# Patient Record
Sex: FEMALE | Race: WHITE | NOT HISPANIC OR LATINO | Employment: OTHER | URBAN - METROPOLITAN AREA
[De-identification: names, ages, dates, MRNs, and addresses within clinical notes are randomized per-mention and may not be internally consistent; named-entity substitution may affect disease eponyms.]

---

## 2017-01-13 ENCOUNTER — APPOINTMENT (OUTPATIENT)
Dept: PULMONOLOGY | Facility: HOSPICE | Age: 77
End: 2017-01-13
Payer: MEDICARE

## 2017-01-16 ENCOUNTER — HOSPITAL ENCOUNTER (OUTPATIENT)
Dept: LAB | Facility: MEDICAL CENTER | Age: 77
End: 2017-01-16
Attending: INTERNAL MEDICINE
Payer: MEDICARE

## 2017-01-16 ENCOUNTER — HOSPITAL ENCOUNTER (OUTPATIENT)
Facility: MEDICAL CENTER | Age: 77
End: 2017-01-16
Attending: INTERNAL MEDICINE
Payer: MEDICARE

## 2017-01-16 LAB
AMYLASE SERPL-CCNC: 25 U/L (ref 20–103)
FOLATE SERPL-MCNC: 10.3 NG/ML
LIPASE SERPL-CCNC: 19 U/L (ref 11–82)
VIT B12 SERPL-MCNC: 312 PG/ML (ref 211–911)

## 2017-01-16 PROCEDURE — 82607 VITAMIN B-12: CPT

## 2017-01-16 PROCEDURE — 83690 ASSAY OF LIPASE: CPT

## 2017-01-16 PROCEDURE — 82746 ASSAY OF FOLIC ACID SERUM: CPT

## 2017-01-16 PROCEDURE — 82150 ASSAY OF AMYLASE: CPT

## 2017-01-16 PROCEDURE — 82784 ASSAY IGA/IGD/IGG/IGM EACH: CPT

## 2017-01-16 PROCEDURE — 83516 IMMUNOASSAY NONANTIBODY: CPT

## 2017-01-16 PROCEDURE — 36415 COLL VENOUS BLD VENIPUNCTURE: CPT

## 2017-01-17 LAB
C DIFF DNA SPEC QL NAA+PROBE: NEGATIVE
C DIFF TOX GENS STL QL NAA+PROBE: NEGATIVE
G LAMBLIA+C PARVUM AG STL QL RAPID: NORMAL
IGA SERPL-MCNC: 95 MG/DL (ref 68–408)
SIGNIFICANT IND 70042: NORMAL
SOURCE SOURCE: NORMAL
WBC STL QL MICRO: NORMAL

## 2017-01-18 LAB — TTG IGA SER IA-ACNC: 0 U/ML (ref 0–3)

## 2017-02-01 ENCOUNTER — OFFICE VISIT (OUTPATIENT)
Dept: PULMONOLOGY | Facility: HOSPICE | Age: 77
End: 2017-02-01
Payer: MEDICARE

## 2017-02-01 VITALS
TEMPERATURE: 98.4 F | WEIGHT: 216 LBS | SYSTOLIC BLOOD PRESSURE: 110 MMHG | HEIGHT: 65 IN | RESPIRATION RATE: 14 BRPM | HEART RATE: 78 BPM | DIASTOLIC BLOOD PRESSURE: 72 MMHG | BODY MASS INDEX: 35.99 KG/M2

## 2017-02-01 DIAGNOSIS — C34.11 MALIGNANT NEOPLASM OF RIGHT UPPER LOBE OF LUNG (HCC): ICD-10-CM

## 2017-02-01 DIAGNOSIS — R06.09 DYSPNEA ON EXERTION: ICD-10-CM

## 2017-02-01 DIAGNOSIS — Z87.891 HISTORY OF TOBACCO USE: ICD-10-CM

## 2017-02-01 DIAGNOSIS — J98.4 SMALL AIRWAYS DISEASE: ICD-10-CM

## 2017-02-01 DIAGNOSIS — R05.8 DRY COUGH: ICD-10-CM

## 2017-02-01 PROCEDURE — G0009 ADMIN PNEUMOCOCCAL VACCINE: HCPCS | Performed by: NURSE PRACTITIONER

## 2017-02-01 PROCEDURE — G8419 CALC BMI OUT NRM PARAM NOF/U: HCPCS | Performed by: NURSE PRACTITIONER

## 2017-02-01 PROCEDURE — 4040F PNEUMOC VAC/ADMIN/RCVD: CPT | Performed by: NURSE PRACTITIONER

## 2017-02-01 PROCEDURE — 3017F COLORECTAL CA SCREEN DOC REV: CPT | Performed by: NURSE PRACTITIONER

## 2017-02-01 PROCEDURE — G8432 DEP SCR NOT DOC, RNG: HCPCS | Performed by: NURSE PRACTITIONER

## 2017-02-01 PROCEDURE — 1101F PT FALLS ASSESS-DOCD LE1/YR: CPT | Mod: 8P | Performed by: NURSE PRACTITIONER

## 2017-02-01 PROCEDURE — 99214 OFFICE O/P EST MOD 30 MIN: CPT | Mod: 25 | Performed by: NURSE PRACTITIONER

## 2017-02-01 PROCEDURE — 1036F TOBACCO NON-USER: CPT | Performed by: NURSE PRACTITIONER

## 2017-02-01 PROCEDURE — 90732 PPSV23 VACC 2 YRS+ SUBQ/IM: CPT | Performed by: NURSE PRACTITIONER

## 2017-02-01 PROCEDURE — G8484 FLU IMMUNIZE NO ADMIN: HCPCS | Performed by: NURSE PRACTITIONER

## 2017-02-01 NOTE — PROGRESS NOTES
Chief Complaint   Patient presents with   • Follow-Up       HPI:  Clarisse Reeves is a 76 y.o. year old female here today for follow-up on CT scan results. History of lung cancer. 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. She has a history of chronic lymphocytic leukemia and diabetes type 2. She had thoracic surgery 2000 for removal of granuloma.   Initially in February 2015 a density in the right upper lobe was identified.  CT scan 11/8/2015 indicates a stellate density in the anterior aspect of the right upper lobe measuring 16 x 22 mm.  When compared to February 2015 CT there may been a slight amount of interval enlargement. PET scan showed minimal FDG uptake, however biopsy showed adenocarcinoma. She underwent thoracoscopy with anterior segmentectomy of RUL 02/01/16 which showed adenocarcinoma in situ. She has felt well, and was released by Dr. Ganser. Repeat CT scan 8/27/16 indicated postoperative changes right upper lobe, residual fibrosis and calcifications, no recurrent mass. Hyperexpanded lungs, right lung apex fibrotic change and left lung base atelectasis similar to previous findings. No pleural effusions. Thyroid gland nodules. Reviewed testing with patient she is due for repeat CT scan now. She is also pending a follow-up visit for her thyroid nodules. She notes a chronic dry cough over the last 6 months and mild dyspnea on exertion. She denies phlegm. She notes rare wheezing. She denies fever, chills, night sweats, just becoming stronger hemoptysis. She notes chronic GI issues and recently stopped taking Prevacid daily. We will restart this to see if it reduces her cough. She denies dizziness, headaches or sinus issues. She is not on any inhalers.      PFT 9/8/2015 indicated FVC of 73%, FEV1 was 1.75 L or 72%, FEV1/FVC with a ratio of 74, RV with 132%, TLC was 107%, and a DLCO of 128% predicted.  There is no significant bronchodilator response.  The flow-volume loop shows changes  consistent with small airways disease.    ROS: As per HPI and otherwise negative if not stated.    Past Medical History   Diagnosis Date   • Hypertension    • CLL (chronic lymphoblastic leukemia)    • MEDICAL HOME    • Personal history of colonic polyps 11/26/2012   • Cutaneous skin tags 1/29/2015   • Thyroid disease    • Indigestion    • Hiatus hernia syndrome      no surgery   • Cancer (CMS-HCC) 2006     CLL   • Cancer (CMS-HCC) 2016     lung   • Carcinoma in situ of respiratory system 2016     lung- RUL lobectomy   • Type II or unspecified type diabetes mellitus without mention of complication, not stated as uncontrolled      pre-diabetic   • Pneumonia 2014   • Cataract      right  and  left  IOL   • DM (diabetes mellitus) (CMS-HCC)        Past Surgical History   Procedure Laterality Date   • Us-needle core bx-breast panel     • Vaginal hysterectomy total       Hysterectomy,Total Vaginal   • Recovery  12/18/2015     Procedure: CT-SCP-RUL LUNG BIOPSY-;  Surgeon: Recoveryon Surgery;  Location: SURGERY PRE-POST PROC UNIT [facility] [place];  Service:    • Other orthopedic surgery  1990     bakers cyst removed in right knee, multiple scopes   • Appendectomy  1955   • Cholecystectomy  1995   • Other  2001     Lower Left segment of lung removed    • Other  1984     left Thyroid removed, might remove right side in the future   • Other  1990     hemmroid removal   • Thoracoscopy Right 2/1/2016     Procedure: THORACOSCOPY Upper Lobectomy ;  Surgeon: John H Ganser, M.D.;  Location: SURGERY [facility] [place];  Service:    • Node dissection Right 2/1/2016     Procedure: NODE DISSECTION;  Surgeon: John H Ganser, M.D.;  Location: SURGERY [facility] [place];  Service:    • Cataract extraction with iol     • Tonsillectomy         Family History   Problem Relation Age of Onset   • Cancer Mother    • Lung Disease Father    • Cancer Father        Social History     Social History   • Marital Status: Single     Spouse Name: N/A   • Number of  "Children: N/A   • Years of Education: N/A     Occupational History   • Not on file.     Social History Main Topics   • Smoking status: Former Smoker -- 2.00 packs/day for 50 years     Types: Cigarettes     Quit date: 05/06/2006   • Smokeless tobacco: Never Used      Comment: quit smoking 2002   • Alcohol Use: 0.0 oz/week     0 Standard drinks or equivalent per week      Comment: 2 drinks per week   • Drug Use: No   • Sexual Activity: Not Currently     Other Topics Concern   • Not on file     Social History Narrative       Allergies as of 02/01/2017 - Delio as Reviewed 02/01/2017   Allergen Reaction Noted   • Codeine Hives and Nausea 06/19/2010   • Lipitor [atorvastatin calcium] Myalgia 09/11/2013   • Lovastatin Myalgia 12/03/2015   • Zocor [simvastatin - high dose] Myalgia 09/11/2013        @Vital signs for this encounter:  Filed Vitals:    02/01/17 0837   Height: 1.651 m (5' 5\")   Weight: 97.977 kg (216 lb)   Weight % change since last entry.: 0 %   BP: 110/72   Pulse: 78   BMI (Calculated): 35.94   Resp: 14   Temp: 36.9 °C (98.4 °F)   TempSrc: Temporal   O2 sat % room air: 96 %       Current medications as of today   Current Outpatient Prescriptions   Medication Sig Dispense Refill   • lansoprazole (PREVACID) 30 MG CAPSULE DELAYED RELEASE Take 1 Cap by mouth every day. 90 Cap 4   • lorazepam (ATIVAN) 2 MG tablet Take 2 Tabs by mouth at bedtime as needed. For sleep or anxiety 60 Tab 5   • omeprazole (PRILOSEC) 40 MG delayed-release capsule Take 1 Cap by mouth every day. 30 Cap 11   • ibuprofen (MOTRIN) 600 MG Tab Take 1 Tab by mouth every 6 hours as needed. 30 Tab 0   • diphenoxylate-atropine (LOMOTIL) 2.5-0.025 MG Tab Take 1 Tab by mouth 4 times a day as needed for Diarrhea. 30 Tab 0   • fenofibrate (TRIGLIDE) 160 MG tablet Take 1 Tab by mouth every day. 90 Tab 3   • lisinopril-hydrochlorothiazide (PRINZIDE, ZESTORETIC) 20-25 MG per tablet Take 1 Tab by mouth every day. 90 Tab 3   • metformin ER (GLUCOPHAGE XR) 500 " MG TABLET SR 24 HR Take 2 Tabs by mouth every day. 180 Tab 3   • levothyroxine (SYNTHROID) 112 MCG Tab Take 1 Tab by mouth every day. 90 Tab 3   • calcitRIOL (ROCALTROL) 0.5 MCG Cap Take 0.5 mcg by mouth every day.     • Cholecalciferol (VITAMIN D-3) 5000 UNITS Tab Take 5,000 Units by mouth every day. 90 Tab 1   • aspirin (ASA) 81 MG Chew Tab chewable tablet Take 1 Tab by mouth every day. 100 Tab 11     Current Facility-Administered Medications   Medication Dose Route Frequency Provider Last Rate Last Dose   • cyanocobalamin (VITAMIN B-12) injection 1,000 mcg  1,000 mcg Intramuscular Q30 DAYS Siva Smart M.D.   1,000 mcg at 04/14/16 1732         Physical Exam:   Gen:           Alert and oriented, No apparent distress. Mood and affect appropriate, normal interaction with examiner.  Eyes:          PERRL, EOM intact, sclere white, conjunctive moist.  Ears:          Not examined.   Hearing:     Grossly intact.  Nose:          Normal, no lesions or deformities.  Dentition:    Good dentition.  Oropharynx:   Tongue normal, posterior pharynx without erythema or exudate.  Mallampati Classification: 3, no uvula  Neck:        Supple, trachea midline, no masses.  Respiratory Effort: No intercostal retractions or use of accessory muscles.   Lung Auscultation:      Clear to auscultation bilaterally; no rales, rhonchi or wheezing.  CV:            Regular rate and rhythm. No murmurs, rubs or gallops.  Abd:           Not examined.   Lymphadenopathy: Not examined.  Gait and Station: Normal.  Digits and Nails: No clubbing, cyanosis, petechiae, or nodes.   Cranial Nerves: II-XII grossly intact.  Skin:        No rashes, lesions or ulcers noted.               Ext:           No cyanosis or edema.      Assessment:  1. Small airways disease  AMB PULMONARY FUNCTION TEST/LAB    PNEUMOCOCCAL POLYSACCHARIDE VACCINE 23-VALENT =>1YO SQ/IM   2. 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, 2/1/2016-  partial  lobectomy RUL/RML- Dr Ganser- folowed by [facility]  CT-CHEST (THORAX) W/O    AMB PULMONARY FUNCTION TEST/LAB    PNEUMOCOCCAL POLYSACCHARIDE VACCINE 23-VALENT =>1YO SQ/IM   3. History of tobacco use     4. BMI 35.0-35.9,adult     5. Dyspnea on exertion  AMB PULMONARY FUNCTION TEST/LAB   6. Dry cough  AMB PULMONARY FUNCTION TEST/LAB       Immunizations:    Flu:2016  Pneumovax 23:given today  Prevnar 13:2014    Plan:  1. Update pneumovax 23 today.  2. CT scan of chest w/o contrast now.  3. PFT at next OV.  4. Restart Prevacid QD.  5. Discussed respiratory hygiene.  6. Followup in 2 weeks with CT/PFT, sooner if needed. Consider adding ICS if asthma/reactive airway disease present.

## 2017-02-01 NOTE — PATIENT INSTRUCTIONS
1. Start Prevacid daily x 2 weeks,  2. Complete CT scan now.  3. Follow up in 2 weeks with testing, sooner if needed.  4. Pneumovax 23 given today.

## 2017-02-01 NOTE — MR AVS SNAPSHOT
"        Clarisse Reeves   2017 9:00 AM   Office Visit   MRN: [id number]    Department:  [facility]   Dept Phone:  [phone]    Description:  Female : 1940   Provider:  CHERRY Boateng           Reason for Visit     Follow-Up           Allergies as of 2017     Allergen Noted Reactions    Codeine 2010   Hives, Nausea    RXN=40 years ago    Lipitor [Atorvastatin Calcium] 2013   Myalgia    WQH=6662      Lovastatin 2015   Myalgia    Muscle aches  YBW=4820    Zocor [Simvastatin - High Dose] 2013   Myalgia    Severe myalgias  QNG=8515      You were diagnosed with     Small airways disease   [505490]       Malignant neoplasm of right upper lobe of lung (CMS-HCC)   [186879]       History of tobacco use   [262341]       BMI 35.0-35.9,adult   [961942]       Dyspnea on exertion   [852199]       Dry cough   [922042]         Vital Signs     Blood Pressure Pulse Temperature Respirations Height Weight    110/72 mmHg 78 36.9 °C (98.4 °F) (Temporal) 14 1.651 m (5' 5\") 97.977 kg (216 lb)    Body Mass Index Smoking Status                35.94 kg/m2 Former Smoker          Basic Information     Date Of Birth Sex Race Ethnicity Preferred Language    1940 Female White Non- English      Your appointments     2017  2:30 PM   Pulmonary Function Test with PFT-RM2   [facility] (--)    [address]  [address]  [facility] [address] [phone]            2017  3:30 PM   Established Patient Pul with ALEXIS BoatengRLEXY   [facility] (--)    [address]  [address]  [place] [address] [phone]            Mar 03, 2017  8:40 AM   Established Patient with Demarco Bowers M.D.   [facility]    [address]  [facility] [address] [phone]           You will be receiving a confirmation call a few days before your appointment from our " automated call confirmation system.              Problem List              ICD-10-CM Priority Class Noted - Resolved    CLL (chronic lymphocytic leukemia)- wbc= 20k-30k, since ; no rx; dr alegria C91.10 Medium  2012 - Present    Mixed hyperlipidemia E78.2 Medium  2012 - Present    Fatty infiltration of liver K76.0   2012 - Present    Vitamin D insufficiency E55.9   2013 - Present    GERD (gastroesophageal reflux disease) K21.9   2013 - Present    Multiple thyroid nodules- dr jaeger, neg FNA  E04.2   2015 - Present    Essential hypertension I10   2015 - Present    Diabetes mellitus type 2, controlled, with complications (CMS-HCC) E11.8   2015 - Present    Hypothyroidism due to acquired atrophy of thyroid E03.4   2015 - Present    BMI 35.0-35.9,adult Z68.35 Low  2015 - Present    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, 2016-  partial lobectomy RUL/RML- Dr Ganser- folowed by [facility] C34.11   2016 - Present    Abdominal pain, chronic, right upper quadrant- s/p cholecystectomy  R10.11, G89.29   10/7/2016 - Present    Family history of heart attack- daughter 52 yo  [place] Z82.49   10/7/2016 - Present    History of tobacco use Z87.891   2017 - Present    Small airways disease J98.4   2017 - Present      Health Maintenance        Date Due Completion Dates    IMM ZOSTER VACCINE 2001 ---    IMM PNEUMOCOCCAL 65+ (ADULT) HIGH/HIGHEST RISK SERIES (2 of 2 - PPSV23) 2014    IMM INFLUENZA (1) 2016, 10/31/2014, 2013, 2012, 2011, 2010    COLON CANCER SCREENING ANNUAL FIT 2016    A1C SCREENING 2017 10/11/2016, 2016, 2015, 2015, 2015, 2014, 2014, 10/31/2013, 2013, 3/8/2013, 2012, 2012    MAMMOGRAM 2017, 10/29/2014, 2014, 2014, 12/3/2012, 10/25/2011, 2010, 2009, 2009    DIABETES  MONOFILAMENT / LE EXAM 5/3/2017 5/3/2016, 4/14/2016, 12/3/2015, 9/1/2015, 1/29/2015 (Done)    Override on 1/29/2015: Done    RETINAL SCREENING 5/4/2017 5/4/2016    FASTING LIPID PROFILE 10/11/2017 10/11/2016, 4/11/2016, 11/28/2015, 9/5/2015, 2/4/2015, 2/3/2014, 10/31/2013, 6/12/2013, 3/8/2013, 11/26/2012, 1/26/2011, 8/25/2009    URINE ACR / MICROALBUMIN 10/11/2017 10/11/2016, 4/11/2016, 9/5/2015, 2/4/2015, 5/19/2014, 6/12/2013, 11/26/2012    SERUM CREATININE 10/11/2017 10/11/2016, 4/11/2016, 2/2/2016, 1/29/2016, 12/19/2015, 11/28/2015, 9/5/2015, 2/13/2015, 2/12/2015, 2/4/2015, 11/17/2014, 5/19/2014, 2/3/2014, 10/31/2013, 9/11/2013, 6/12/2013, 4/12/2013, 4/3/2013, 4/1/2013, 3/31/2013, 3/30/2013, 3/29/2013, 3/8/2013, 11/26/2012, 1/29/2011, 1/28/2011, 1/27/2011, 1/26/2011, 1/25/2011, 8/25/2009    COLONOSCOPY 6/7/2018 6/7/2016, 6/3/2016, 1/10/2011    BONE DENSITY 11/21/2019 11/21/2014    IMM DTaP/Tdap/Td Vaccine (2 - Td) 6/13/2026 6/13/2016, 4/14/2016, 6/19/2010            Current Immunizations     13-VALENT PCV PREVNAR 9/17/2014    Influenza TIV (IM) 10/31/2014, 8/30/2010    Influenza Vaccine Adult HD 9/19/2015    Influenza Vaccine Quad Inj (Pf) 9/11/2013, 9/4/2012, 8/2/2011    Pneumococcal polysaccharide vaccine (PPSV-23)  Incomplete    TD Vaccine 6/19/2010  6:30 PM    Tdap Vaccine 6/13/2016, 4/14/2016  5:25 PM    Tuberculin Skin Test 6/24/2014 10:15 AM, 6/17/2014      Below and/or attached are the medications your provider expects you to take. Review all of your home medications and newly ordered medications with your provider and/or pharmacist. Follow medication instructions as directed by your provider and/or pharmacist. Please keep your medication list with you and share with your provider. Update the information when medications are discontinued, doses are changed, or new medications (including over-the-counter products) are added; and carry medication information at all times in the event of emergency situations      Allergies:  CODEINE - Hives,Nausea     LIPITOR - Myalgia     LOVASTATIN - Myalgia     ZOCOR - Myalgia               Medications  Valid as of: February 01, 2017 -  9:18 AM    Generic Name Brand Name Tablet Size Instructions for use    Aspirin (Chew Tab) ASA 81 MG Take 1 Tab by mouth every day.        Calcitriol (Cap) ROCALTROL 0.5 MCG Take 0.5 mcg by mouth every day.        Cholecalciferol (Tab) Vitamin D-3 5000 UNITS Take 5,000 Units by mouth every day.        Diphenoxylate-Atropine (Tab) LOMOTIL 2.5-0.025 MG Take 1 Tab by mouth 4 times a day as needed for Diarrhea.        Fenofibrate (Tab) TRIGLIDE 160 MG Take 1 Tab by mouth every day.        Ibuprofen (Tab) MOTRIN 600 MG Take 1 Tab by mouth every 6 hours as needed.        Lansoprazole (CAPSULE DELAYED RELEASE) PREVACID 30 MG Take 1 Cap by mouth every day.        Levothyroxine Sodium (Tab) SYNTHROID 112 MCG Take 1 Tab by mouth every day.        Lisinopril-Hydrochlorothiazide (Tab) PRINZIDE, ZESTORETIC 20-25 MG Take 1 Tab by mouth every day.        LORazepam (Tab) ATIVAN 2 MG Take 2 Tabs by mouth at bedtime as needed. For sleep or anxiety        MetFORMIN HCl (TABLET SR 24 HR) GLUCOPHAGE  MG Take 2 Tabs by mouth every day.        Omeprazole (CAPSULE DELAYED RELEASE) PRILOSEC 40 MG Take 1 Cap by mouth every day.        .                 Medicines prescribed today were sent to:     [facility] #[id number] - [place], [place] - [address] [place] [address]    Phone: [phone] Fax: [phone]    Open 24 Hours?: No      Medication refill instructions:       If your prescription bottle indicates you have medication refills left, it is not necessary to call your provider’s office. Please contact your pharmacy and they will refill your medication.    If your prescription bottle indicates you do not have any refills left, you may request refills at any time through one of the following ways: The online [employer] system (except  Urgent Care), by calling your provider’s office, or by asking your pharmacy to contact your provider’s office with a refill request. Medication refills are processed only during regular business hours and may not be available until the next business day. Your provider may request additional information or to have a follow-up visit with you prior to refilling your medication.   *Please Note: Medication refills are assigned a new Rx number when refilled electronically. Your pharmacy may indicate that no refills were authorized even though a new prescription for the same medication is available at the pharmacy. Please request the medicine by name with the pharmacy before contacting your provider for a refill.        Your To Do List     Future Labs/Procedures Complete By Expires    CT-CHEST (THORAX) W/O  2/6/2017 2/1/2018    AMB PULMONARY FUNCTION TEST/LAB  As directed 2/1/2018    Comments:    At next OV      Instructions    1. Start Prevacid daily x 2 weeks,  2. Complete CT scan now.  3. Follow up in 2 weeks with testing, sooner if needed.  4. Pneumovax 23 given today.          [employer] Access Code: Activation code not generated  Current [employer] Status: Active

## 2017-02-09 ENCOUNTER — APPOINTMENT (OUTPATIENT)
Dept: RADIOLOGY | Facility: MEDICAL CENTER | Age: 77
End: 2017-02-09
Attending: INTERNAL MEDICINE
Payer: MEDICARE

## 2017-02-09 ENCOUNTER — TELEPHONE (OUTPATIENT)
Dept: MEDICAL GROUP | Age: 77
End: 2017-02-09

## 2017-02-09 DIAGNOSIS — F51.01 PRIMARY INSOMNIA: ICD-10-CM

## 2017-02-09 NOTE — TELEPHONE ENCOUNTER
Pt requesting a prescription for one touch ultra test strips sig: use once daily #100    Was the patient seen in the last year in this department? Yes     Does patient have an active prescription for medications requested? No     Received Request Via: Pharmacy

## 2017-02-09 NOTE — TELEPHONE ENCOUNTER
J[facility]ONNY ONLY    Phone Number Called: [facility] [phone]    Message: Left message on provider line informing all diabetic supplies and medication needs to go Dr. Bowers.    Left Message for patient to call back: no

## 2017-02-09 NOTE — TELEPHONE ENCOUNTER
Have pharmacy call Dr Bowers (pt's endocrinologist) for refill of this and all diabetes meds/supplies

## 2017-02-10 ENCOUNTER — TELEPHONE (OUTPATIENT)
Dept: ENDOCRINOLOGY | Facility: MEDICAL CENTER | Age: 77
End: 2017-02-10

## 2017-02-10 DIAGNOSIS — E11.9 TYPE 2 DIABETES MELLITUS WITHOUT COMPLICATION, WITHOUT LONG-TERM CURRENT USE OF INSULIN (HCC): ICD-10-CM

## 2017-02-10 NOTE — TELEPHONE ENCOUNTER
Kindly put a new Rx for One touch Ultra test strips.    Pt testing 2x daily.    Pt need a 30 days supply and Rx will be send to [facility].    Thank you  [place]

## 2017-02-21 ENCOUNTER — APPOINTMENT (OUTPATIENT)
Dept: RADIOLOGY | Facility: MEDICAL CENTER | Age: 77
End: 2017-02-21
Attending: NURSE PRACTITIONER
Payer: MEDICARE

## 2017-02-27 DIAGNOSIS — E11.8 CONTROLLED TYPE 2 DIABETES MELLITUS WITH COMPLICATION, WITHOUT LONG-TERM CURRENT USE OF INSULIN (HCC): ICD-10-CM

## 2017-02-27 DIAGNOSIS — E78.2 MIXED HYPERLIPIDEMIA: ICD-10-CM

## 2017-02-27 DIAGNOSIS — C91.10 CLL (CHRONIC LYMPHOCYTIC LEUKEMIA) (HCC): ICD-10-CM

## 2017-02-27 DIAGNOSIS — E03.4 HYPOTHYROIDISM DUE TO ACQUIRED ATROPHY OF THYROID: ICD-10-CM

## 2017-02-28 PROBLEM — Z87.891 HISTORY OF TOBACCO USE: Status: RESOLVED | Noted: 2017-02-01 | Resolved: 2017-02-28

## 2017-02-28 RX ORDER — FENOFIBRATE 160 MG/1
TABLET ORAL
Qty: 90 TAB | Refills: 0 | Status: SHIPPED | OUTPATIENT
Start: 2017-02-28 | End: 2017-07-11 | Stop reason: SDUPTHER

## 2017-03-01 NOTE — TELEPHONE ENCOUNTER
1. Caller Name: Clarisse Reeves                                           Call Back Number: [phone] (home)         Patient approves a detailed voicemail message: yes    Patient has scheduled appt for 3/30/2017. Patient is requesting lab orders for CBC and A1C for her upcoming visit.

## 2017-03-03 ENCOUNTER — OFFICE VISIT (OUTPATIENT)
Dept: ENDOCRINOLOGY | Facility: MEDICAL CENTER | Age: 77
End: 2017-03-03
Payer: MEDICARE

## 2017-03-03 VITALS
SYSTOLIC BLOOD PRESSURE: 124 MMHG | DIASTOLIC BLOOD PRESSURE: 68 MMHG | OXYGEN SATURATION: 94 % | HEART RATE: 95 BPM | WEIGHT: 213.8 LBS | HEIGHT: 65 IN | BODY MASS INDEX: 35.62 KG/M2

## 2017-03-03 DIAGNOSIS — E04.2 MULTIPLE THYROID NODULES: ICD-10-CM

## 2017-03-03 DIAGNOSIS — E03.9 ACQUIRED HYPOTHYROIDISM: ICD-10-CM

## 2017-03-03 DIAGNOSIS — E11.9 TYPE 2 DIABETES MELLITUS WITHOUT COMPLICATION, WITHOUT LONG-TERM CURRENT USE OF INSULIN (HCC): ICD-10-CM

## 2017-03-03 PROCEDURE — G8419 CALC BMI OUT NRM PARAM NOF/U: HCPCS | Performed by: INTERNAL MEDICINE

## 2017-03-03 PROCEDURE — 3044F HG A1C LEVEL LT 7.0%: CPT | Performed by: INTERNAL MEDICINE

## 2017-03-03 PROCEDURE — 3017F COLORECTAL CA SCREEN DOC REV: CPT | Performed by: INTERNAL MEDICINE

## 2017-03-03 PROCEDURE — 4040F PNEUMOC VAC/ADMIN/RCVD: CPT | Performed by: INTERNAL MEDICINE

## 2017-03-03 PROCEDURE — 99214 OFFICE O/P EST MOD 30 MIN: CPT | Mod: 25 | Performed by: INTERNAL MEDICINE

## 2017-03-03 PROCEDURE — G8484 FLU IMMUNIZE NO ADMIN: HCPCS | Performed by: INTERNAL MEDICINE

## 2017-03-03 PROCEDURE — 1101F PT FALLS ASSESS-DOCD LE1/YR: CPT | Mod: 8P | Performed by: INTERNAL MEDICINE

## 2017-03-03 PROCEDURE — 1036F TOBACCO NON-USER: CPT | Performed by: INTERNAL MEDICINE

## 2017-03-03 NOTE — PROGRESS NOTES
Endocrinology Clinic Progress Note  PCP: Siva Smart M.D.    CC: Thyroid nodules    HPI:  Clarisse Reeves is a 76 y.o. old patient who comes in today for routine follow up.     Multiple thyroid nodules: She was found to have multiple thyroid nodules in right thyroid several years ago. She has history of left thyroidectomy over 20 years ago. Most recent thyroid ultrasound showed slight increase in one of the right thyroid lobe nodules. Biopsy of right thyroid lobe and estimates nodule back in  was benign. No family history of thyroid cancer. She has mild right-sided neck discomfort and difficulty swallowing.    Hypothyroidism: She is currently on levothyroxine 112 µg daily. She reports compliance medications.    Type 2 diabetes: She is currently on metformin 1000 mg twice a day. Reports compliance with medications. Recent hemoglobin A1c is 5.9%.    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, 2016-  partial lobectomy RUL/RML- Dr Ganser- folowed by [facility] 2016   • Essential hypertension 2015   • Controlled type 2 diabetes mellitus with complication, without long-term current use of insulin (CMS-HCC) 2015   • Hypothyroidism due to acquired atrophy of thyroid 2015   • Multiple thyroid nodules- dr jaeger, neg FNA 2015   • GERD (gastroesophageal reflux  disease) 11/07/2013   • Vitamin D insufficiency 07/09/2013   • Fatty infiltration of liver 12/11/2012       Medications:    Current outpatient prescriptions:   •  fenofibrate (TRIGLIDE) 160 MG tablet, TAKE ONE TABLET BY MOUTH EVERY DAY (GENERIC FOR TRIGLIDE), Disp: 90 Tab, Rfl: 0  •  Blood Glucose Monitoring Suppl SUPPLIES Misc, One touch Ultra test strips for blood sugar check twice a day, Disp: 60 Each, Rfl: 6  •  lansoprazole (PREVACID) 30 MG CAPSULE DELAYED RELEASE, Take 1 Cap by mouth every day., Disp: 90 Cap, Rfl: 4  •  lorazepam (ATIVAN) 2 MG tablet, Take 2 Tabs by mouth at bedtime as needed. For sleep or anxiety, Disp: 60 Tab, Rfl: 5  •  omeprazole (PRILOSEC) 40 MG delayed-release capsule, Take 1 Cap by mouth every day., Disp: 30 Cap, Rfl: 11  •  lisinopril-hydrochlorothiazide (PRINZIDE, ZESTORETIC) 20-25 MG per tablet, Take 1 Tab by mouth every day., Disp: 90 Tab, Rfl: 3  •  metformin ER (GLUCOPHAGE XR) 500 MG TABLET SR 24 HR, Take 2 Tabs by mouth every day., Disp: 180 Tab, Rfl: 3  •  levothyroxine (SYNTHROID) 112 MCG Tab, Take 1 Tab by mouth every day., Disp: 90 Tab, Rfl: 3  •  Cholecalciferol (VITAMIN D-3) 5000 UNITS Tab, Take 5,000 Units by mouth every day., Disp: 90 Tab, Rfl: 1  •  aspirin (ASA) 81 MG Chew Tab chewable tablet, Take 1 Tab by mouth every day., Disp: 100 Tab, Rfl: 11    Current facility-administered medications:   •  cyanocobalamin (VITAMIN B-12) injection 1,000 mcg, 1,000 mcg, Intramuscular, Q30 DAYS, Siva Smart M.D., 1,000 mcg at 04/14/16 4362    Labs:   Results for GREG DEMARCO (MRN [id number]) as of 3/3/2017 09:20   Ref. Range 10/11/2016 07:04   Sodium Latest Ref Range: 135-145 mmol/L 137   Potassium Latest Ref Range: 3.6-5.5 mmol/L 4.0   Chloride Latest Ref Range:  mmol/L 101   Co2 Latest Ref Range: 20-33 mmol/L 26   Anion Gap Latest Ref Range: 0.0-11.9  10.0   Glucose Latest Ref Range: 65-99 mg/dL 117 (H)   Bun Latest Ref Range: 8-22 mg/dL 20   Creatinine Latest Ref  "Range: 0.50-1.40 mg/dL 0.92   GFR If  Latest Ref Range: >60 mL/min/1.73 m 2 >60   GFR If Non  Latest Ref Range: >60 mL/min/1.73 m 2 59 (A)   Calcium Latest Ref Range: 8.5-10.5 mg/dL 9.5   AST(SGOT) Latest Ref Range: 12-45 U/L 15   ALT(SGPT) Latest Ref Range: 2-50 U/L 22   Alkaline Phosphatase Latest Ref Range: 30-99 U/L 48   Total Bilirubin Latest Ref Range: 0.1-1.5 mg/dL 0.4   Albumin Latest Ref Range: 3.2-4.9 g/dL 4.2   Total Protein Latest Ref Range: 6.0-8.2 g/dL 7.1   Globulin Latest Ref Range: 1.9-3.5 g/dL 2.9   A-G Ratio Latest Units: g/dL 1.4   Glycohemoglobin Latest Ref Range: 0.0-5.6 % 5.9 (H)   Estim. Avg Glu Latest Units: mg/dL 123   Cholesterol,Tot Latest Ref Range: 100-199 mg/dL 156   Triglycerides Latest Ref Range: 0-149 mg/dL 162 (H)   HDL Latest Ref Range: >=40 mg/dL 37 (A)   LDL Latest Ref Range: <100 mg/dL 87   Micro Alb Creat Ratio Latest Ref Range: 0-30 mg/g see below   Creatinine, Urine Latest Units: mg/dL 158.30   Microalbumin, Urine Random Latest Units: mg/dL <0.7   Results for GREG DEMARCO (MRN [id number]) as of 3/3/2017 09:20   Ref. Range 10/11/2016 07:04   TSH Latest Ref Range: 0.300-3.700 uIU/mL 0.910   Free T-4 Latest Ref Range: 0.53-1.43 ng/dL 1.35     Physical Examination:  Vital signs: /68 mmHg  Pulse 95  Ht 1.651 m (5' 5\")  Wt 96.979 kg (213 lb 12.8 oz)  BMI 35.58 kg/m2  SpO2 94%  General: No apparent distress, cooperative  Eyes: No scleral icterus, no discharge, normal eyelids  Neck: +R thyroid nodule  Chest: Normal effort, clear to auscultation bilaterally  CVS: Regular rate and rhythm, S1 S2 normal, no murmur  Extremities: No lower extremity edema  Musculoskeletal: Normal digits and nails  Skin: No rash on visible skin  Psych: Alert and oriented, normal mood and affect    Assessment and Plan:    1. Multiple thyroid nodules  · Due to slight increase in the size of dominant right thyroid lobe nodule on ultrasound in August 2016 we will " repeat ultrasound now, if that is any further increase she is leaning towards completion thyroidectomy  · FNA of dominant right thyroid lobe nodule and isthmus nodule in June 2015 was benign  - US-SOFT TISSUES OF HEAD - NECK; Future    2. Acquired hypothyroidism  · Continue levothyroxine 112 µg daily  · Repeat labs:  - FREE THYROXINE; Future  - TSH; Future    3. Type 2 diabetes mellitus without complication, without long-term current use of insulin (CMS-[facility])  · Recent hemoglobin A1c 5.9%  · Continue metformin 1000 mg twice a day    Return in about 6 months (around 9/3/2017).    Thank you for allowing me to participate in the care of this patient.    Demarco Bowers M.D.  03/03/2017    CC:   Siva Smart M.D.    This note was created using voice recognition software (Dragon). The accuracy of the dictation is limited by the abilities of the software. I have reviewed the note prior to signing, however some errors in grammar and context are still possible. If you have any questions related to this note please do not hesitate to contact our office.

## 2017-03-03 NOTE — MR AVS SNAPSHOT
"        Clarisse Reeves   3/3/2017 8:40 AM   Office Visit   MRN: [id number]    Department:  Endocrinology Med [facility]   Dept Phone:  [phone]    Description:  Female : 1940   Provider:  Demarco Bowers M.D.           Reason for Visit     Follow-Up Multiple Thyroid Nodules      Allergies as of 3/3/2017     Allergen Noted Reactions    Codeine 2010   Hives, Nausea    RXN=40 years ago    Lipitor [Atorvastatin Calcium] 2013   Myalgia    BUJ=2811      Lovastatin 2015   Myalgia    Muscle aches  JNR=8392    Zocor [Simvastatin - High Dose] 2013   Myalgia    Severe myalgias  TPZ=9958      You were diagnosed with     Multiple thyroid nodules   [175848]       Acquired hypothyroidism   [6864215]         Vital Signs     Blood Pressure Pulse Height Weight Body Mass Index Oxygen Saturation    124/68 mmHg 95 1.651 m (5' 5\") 96.979 kg (213 lb 12.8 oz) 35.58 kg/m2 94%    Smoking Status                   Former Smoker           Basic Information     Date Of Birth Sex Race Ethnicity Preferred Language    1940 Female White Non- English      Your appointments     Mar 07, 2017  3:00 PM   CT BODY WO 30 with [facility] CT 2   [facility]    [address]  [place] [address] [phone]           Taking medications as regularly scheduled is strongly encouraged.            Mar 14, 2017  2:00 PM   Pulmonary Function Test with PFT-RM3   [facility] (--)    [address]  [address]  [place] [address] [phone]            Mar 14, 2017  3:00 PM   Established Patient Pul with CHERRY Boateng   [facility] (--)    [address]  [address]  [place] [address] [phone]            Mar 30, 2017 10:20 AM   Established Patient with Siva Smart M.D.   [facility]    [address]  [place] [address] [phone]           You will be receiving a " confirmation call a few days before your appointment from our automated call confirmation system.            Sep 01, 2017  1:00 PM   Established Patient with Demarco Jaeger M.D.   [facility] ([facility])    [address] [facility], [address]  [place] [id number] [phone]           You will be receiving a confirmation call a few days before your appointment from our automated call confirmation system.              Problem List              ICD-10-CM Priority Class Noted - Resolved    CLL (chronic lymphocytic leukemia)- wbc= 20k-30k, since ; no rx; dr alegria C91.10 Medium  2012 - Present    Mixed hyperlipidemia E78.2 Medium  2012 - Present    Fatty infiltration of liver K76.0   2012 - Present    Vitamin D insufficiency E55.9   2013 - Present    GERD (gastroesophageal reflux disease) K21.9   2013 - Present    Multiple thyroid nodules- dr jaeger, neg FNA  E04.2   2015 - Present    Essential hypertension I10   2015 - Present    Controlled type 2 diabetes mellitus with complication, without long-term current use of insulin (CMS-HCC) E11.8   2015 - Present    Hypothyroidism due to acquired atrophy of thyroid E03.4   2015 - Present    BMI 35.0-35.9,adult Z68.35 Low  2015 - Present    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, 2016-  partial lobectomy RUL/RML- Dr Ganser- folowed by [facility] C34.11   2016 - Present    Abdominal pain, chronic, right upper quadrant- s/p cholecystectomy  R10.11, G89.29   10/7/2016 - Present    Family history of heart attack- daughter 54 yo  MI Z82.49   10/7/2016 - Present    Small airways disease J98.4   2017 - Present      Health Maintenance        Date Due Completion Dates    IMM ZOSTER VACCINE 2001 ---    IMM INFLUENZA (1) 2016, 10/31/2014, 2013, 2012, 2011, 2010    A1C SCREENING 2017 10/11/2016, 2016, 2015,  9/1/2015, 2/4/2015, 11/17/2014, 5/19/2014, 10/31/2013, 6/12/2013, 3/8/2013, 12/11/2012, 11/26/2012    DIABETES MONOFILAMENT / LE EXAM 5/3/2017 5/3/2016, 4/14/2016, 12/3/2015, 9/1/2015, 1/29/2015 (Done)    Override on 1/29/2015: Done    RETINAL SCREENING 5/4/2017 5/4/2016    FASTING LIPID PROFILE 10/11/2017 10/11/2016, 4/11/2016, 11/28/2015, 9/5/2015, 2/4/2015, 2/3/2014, 10/31/2013, 6/12/2013, 3/8/2013, 11/26/2012, 1/26/2011, 8/25/2009    URINE ACR / MICROALBUMIN 10/11/2017 10/11/2016, 4/11/2016, 9/5/2015, 2/4/2015, 5/19/2014, 6/12/2013, 11/26/2012    SERUM CREATININE 10/11/2017 10/11/2016, 4/11/2016, 2/2/2016, 1/29/2016, 12/19/2015, 11/28/2015, 9/5/2015, 2/13/2015, 2/12/2015, 2/4/2015, 11/17/2014, 5/19/2014, 2/3/2014, 10/31/2013, 9/11/2013, 6/12/2013, 4/12/2013, 4/3/2013, 4/1/2013, 3/31/2013, 3/30/2013, 3/29/2013, 3/8/2013, 11/26/2012, 1/29/2011, 1/28/2011, 1/27/2011, 1/26/2011, 1/25/2011, 8/25/2009    MAMMOGRAM 5/2/2018 5/2/2016, 10/29/2014, 4/28/2014, 4/24/2014, 12/3/2012, 10/25/2011, 9/16/2010, 9/11/2009, 9/11/2009    COLONOSCOPY 6/7/2019 6/7/2016, 6/3/2016, 1/10/2011    BONE DENSITY 11/21/2019 11/21/2014    IMM DTaP/Tdap/Td Vaccine (2 - Td) 6/13/2026 6/13/2016, 4/14/2016, 6/19/2010            Current Immunizations     13-VALENT PCV PREVNAR 9/17/2014    Influenza TIV (IM) 10/31/2014, 8/30/2010    Influenza Vaccine Adult HD 9/19/2015    Influenza Vaccine Quad Inj (Pf) 9/11/2013, 9/4/2012, 8/2/2011    Pneumococcal polysaccharide vaccine (PPSV-23) 2/1/2017    TD Vaccine 6/19/2010  6:30 PM    Tdap Vaccine 6/13/2016, 4/14/2016  5:25 PM    Tuberculin Skin Test 6/24/2014 10:15 AM, 6/17/2014      Below and/or attached are the medications your provider expects you to take. Review all of your home medications and newly ordered medications with your provider and/or pharmacist. Follow medication instructions as directed by your provider and/or pharmacist. Please keep your medication list with you and share with your  provider. Update the information when medications are discontinued, doses are changed, or new medications (including over-the-counter products) are added; and carry medication information at all times in the event of emergency situations     Allergies:  CODEINE - Hives,Nausea     LIPITOR - Myalgia     LOVASTATIN - Myalgia     ZOCOR - Myalgia               Medications  Valid as of: March 03, 2017 -  8:59 AM    Generic Name Brand Name Tablet Size Instructions for use    Aspirin (Chew Tab) ASA 81 MG Take 1 Tab by mouth every day.        Blood Glucose Monitoring Suppl (Misc) Blood Glucose Monitoring Suppl SUPPLIES One touch Ultra test strips for blood sugar check twice a day        Cholecalciferol (Tab) Vitamin D-3 5000 UNITS Take 5,000 Units by mouth every day.        Fenofibrate (Tab) TRIGLIDE 160 MG TAKE ONE TABLET BY MOUTH EVERY DAY (GENERIC FOR TRIGLIDE)        Lansoprazole (CAPSULE DELAYED RELEASE) PREVACID 30 MG Take 1 Cap by mouth every day.        Levothyroxine Sodium (Tab) SYNTHROID 112 MCG Take 1 Tab by mouth every day.        Lisinopril-Hydrochlorothiazide (Tab) PRINZIDE, ZESTORETIC 20-25 MG Take 1 Tab by mouth every day.        LORazepam (Tab) ATIVAN 2 MG Take 2 Tabs by mouth at bedtime as needed. For sleep or anxiety        MetFORMIN HCl (TABLET SR 24 HR) GLUCOPHAGE  MG Take 2 Tabs by mouth every day.        Omeprazole (CAPSULE DELAYED RELEASE) PRILOSEC 40 MG Take 1 Cap by mouth every day.        .                 Medicines prescribed today were sent to:     [id number] - [place], [place] - [address] [place] [address]    Phone: [phone] Fax: [phone]    Open 24 Hours?: No      Medication refill instructions:       If your prescription bottle indicates you have medication refills left, it is not necessary to call your provider’s office. Please contact your pharmacy and they will refill your medication.    If your prescription bottle indicates you do  not have any refills left, you may request refills at any time through one of the following ways: The online Meta system (except Urgent Care), by calling your provider’s office, or by asking your pharmacy to contact your provider’s office with a refill request. Medication refills are processed only during regular business hours and may not be available until the next business day. Your provider may request additional information or to have a follow-up visit with you prior to refilling your medication.   *Please Note: Medication refills are assigned a new Rx number when refilled electronically. Your pharmacy may indicate that no refills were authorized even though a new prescription for the same medication is available at the pharmacy. Please request the medicine by name with the pharmacy before contacting your provider for a refill.        Your To Do List     Future Labs/Procedures Complete By Expires    FREE THYROXINE  As directed 3/3/2018    TSH  As directed 3/3/2018    US-SOFT TISSUES OF HEAD - NECK  As directed 3/3/2018         Vitronet [employer] Access Code: Activation code not generated  Current Meta Status: Active

## 2017-03-07 ENCOUNTER — HOSPITAL ENCOUNTER (OUTPATIENT)
Dept: RADIOLOGY | Facility: MEDICAL CENTER | Age: 77
End: 2017-03-07
Attending: NURSE PRACTITIONER
Payer: MEDICARE

## 2017-03-07 DIAGNOSIS — C34.11 MALIGNANT NEOPLASM OF RIGHT UPPER LOBE OF LUNG (HCC): ICD-10-CM

## 2017-03-07 PROCEDURE — 71250 CT THORAX DX C-: CPT

## 2017-03-14 ENCOUNTER — OFFICE VISIT (OUTPATIENT)
Dept: PULMONOLOGY | Facility: HOSPICE | Age: 77
End: 2017-03-14
Payer: MEDICARE

## 2017-03-14 ENCOUNTER — NON-PROVIDER VISIT (OUTPATIENT)
Dept: PULMONOLOGY | Facility: HOSPICE | Age: 77
End: 2017-03-14
Payer: MEDICARE

## 2017-03-14 VITALS
TEMPERATURE: 97.6 F | DIASTOLIC BLOOD PRESSURE: 78 MMHG | SYSTOLIC BLOOD PRESSURE: 122 MMHG | HEART RATE: 80 BPM | OXYGEN SATURATION: 94 % | HEIGHT: 65 IN | RESPIRATION RATE: 16 BRPM | BODY MASS INDEX: 35.49 KG/M2 | WEIGHT: 213 LBS

## 2017-03-14 VITALS — BODY MASS INDEX: 34.78 KG/M2 | WEIGHT: 209 LBS

## 2017-03-14 DIAGNOSIS — C34.11 MALIGNANT NEOPLASM OF RIGHT UPPER LOBE OF LUNG (HCC): ICD-10-CM

## 2017-03-14 DIAGNOSIS — K21.9 GASTROESOPHAGEAL REFLUX DISEASE, ESOPHAGITIS PRESENCE NOT SPECIFIED: ICD-10-CM

## 2017-03-14 DIAGNOSIS — R05.8 DRY COUGH: ICD-10-CM

## 2017-03-14 DIAGNOSIS — J98.4 SMALL AIRWAYS DISEASE: ICD-10-CM

## 2017-03-14 DIAGNOSIS — R06.09 DYSPNEA ON EXERTION: ICD-10-CM

## 2017-03-14 DIAGNOSIS — E04.2 MULTIPLE THYROID NODULES: ICD-10-CM

## 2017-03-14 DIAGNOSIS — E03.4 HYPOTHYROIDISM DUE TO ACQUIRED ATROPHY OF THYROID: ICD-10-CM

## 2017-03-14 DIAGNOSIS — I10 ESSENTIAL HYPERTENSION: ICD-10-CM

## 2017-03-14 PROCEDURE — G8432 DEP SCR NOT DOC, RNG: HCPCS | Performed by: NURSE PRACTITIONER

## 2017-03-14 PROCEDURE — 1101F PT FALLS ASSESS-DOCD LE1/YR: CPT | Mod: 8P | Performed by: NURSE PRACTITIONER

## 2017-03-14 PROCEDURE — 99214 OFFICE O/P EST MOD 30 MIN: CPT | Performed by: NURSE PRACTITIONER

## 2017-03-14 PROCEDURE — G8484 FLU IMMUNIZE NO ADMIN: HCPCS | Performed by: NURSE PRACTITIONER

## 2017-03-14 PROCEDURE — 3017F COLORECTAL CA SCREEN DOC REV: CPT | Performed by: NURSE PRACTITIONER

## 2017-03-14 PROCEDURE — 4040F PNEUMOC VAC/ADMIN/RCVD: CPT | Performed by: NURSE PRACTITIONER

## 2017-03-14 PROCEDURE — G8419 CALC BMI OUT NRM PARAM NOF/U: HCPCS | Performed by: NURSE PRACTITIONER

## 2017-03-14 PROCEDURE — 1036F TOBACCO NON-USER: CPT | Performed by: NURSE PRACTITIONER

## 2017-03-14 ASSESSMENT — PULMONARY FUNCTION TESTS
FEV1: 1.87
FEV1/FVC: 78.24
FEV1/FVC_PERCENT_PREDICTED: 75
FEV1/FVC_PERCENT_CHANGE: 0
FEV1_PERCENT_CHANGE: 0
FEV1_PREDICTED: 2.38
FVC_PERCENT_PREDICTED: 75
FVC_PREDICTED: 3.16
FEV1_PERCENT_PREDICTED: 78
FVC: 2.37
FEV1/FVC: 79
FVC: 2.39
FEV1: 1.87
FVC_PERCENT_PREDICTED: 75
FEV1_PERCENT_PREDICTED: 78
FEV1/FVC_PERCENT_PREDICTED: 104
FEV1/FVC_PERCENT_PREDICTED: 104
FEV1_PERCENT_CHANGE: 0

## 2017-03-14 NOTE — MR AVS SNAPSHOT
"        Clarisse Reeves   3/14/2017 3:00 PM   Office Visit   MRN: [id number]    Department:  [facility]   Dept Phone:  [phone]    Description:  Female : 1940   Provider:  CHERRY Boateng           Reason for Visit     Results CT/PFT      Allergies as of 3/14/2017     Allergen Noted Reactions    Codeine 2010   Hives, Nausea    RXN=40 years ago    Lipitor [Atorvastatin Calcium] 2013   Myalgia    ZYH=0464      Lovastatin 2015   Myalgia    Muscle aches  MUH=4372    Zocor [Simvastatin - High Dose] 2013   Myalgia    Severe myalgias  LJK=7162      You were diagnosed with     Malignant neoplasm of right upper lobe of lung (CMS-HCC)   [302240]       Multiple thyroid nodules   [700344]       Hypothyroidism due to acquired atrophy of thyroid   [0600676]       Gastroesophageal reflux disease, esophagitis presence not specified   [5788974]       Essential hypertension   [7119084]       BMI 35.0-35.9,adult   [608368]         Vital Signs     Blood Pressure Pulse Temperature Respirations Height Weight    122/78 mmHg 80 36.4 °C (97.6 °F) 16 1.651 m (5' 5\") 96.616 kg (213 lb)    Body Mass Index Oxygen Saturation Smoking Status             35.44 kg/m2 94% Former Smoker         Basic Information     Date Of Birth Sex Race Ethnicity Preferred Language    1940 Female White Non- English      Your appointments     Mar 22, 2017  1:00 PM   US [id number] with [facility] US 1   [facility] IMAGING - ULTRASOUND - [facility] ([facility])    [facility]  [address]  [place] [address] [phone]            Mar 30, 2017 10:20 AM   Established Patient with Siva Smart M.D.   [facility] [address] [place] [address] ([facility])    [address] [facility]  [place] [address] [phone]           You will be receiving a confirmation call a few days before your appointment from our automated call confirmation system.            Sep 01, 2017  1:00 PM   "   Established Patient with Demarco Jaeger M.D.   [facility] ([facility])    [address]  [place] [address] [phone]           You will be receiving a confirmation call a few days before your appointment from our automated call confirmation system.            Sep 18, 2017  1:00 PM   Established Patient Pul with CHERRY Boateng   [facility]monary [facility] (--)    [address]  [address]  [place] [address] [phone]              Problem List              ICD-10-CM Priority Class Noted - Resolved    CLL (chronic lymphocytic leukemia)- wbc= 20k-30k, since ; no rx; dr alegria C91.10 Medium  2012 - Present    Mixed hyperlipidemia E78.2 Medium  2012 - Present    Fatty infiltration of liver K76.0   2012 - Present    Vitamin D insufficiency E55.9   2013 - Present    GERD (gastroesophageal reflux disease) K21.9   2013 - Present    Multiple thyroid nodules- dr jaeger, neg FNA  E04.2   2015 - Present    Essential hypertension I10   2015 - Present    Controlled type 2 diabetes mellitus with complication, without long-term current use of insulin (CMS-HCC) E11.8   2015 - Present    Hypothyroidism due to acquired atrophy of thyroid E03.4   2015 - Present    BMI 35.0-35.9,adult Z68.35 Low  2015 - Present    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, 2016-  partial lobectomy RUL/RML- Dr Ganser- folowed by PMA C34.11   2016 - Present    Abdominal pain, chronic, right upper quadrant- s/p cholecystectomy  R10.11, G89.29   10/7/2016 - Present    Family history of heart attack- daughter 52 yo  MI Z82.49   10/7/2016 - Present    Small airways disease J98.4   2017 - Present      Health Maintenance        Date Due Completion Dates    IMM ZOSTER VACCINE 2001 ---    IMM INFLUENZA (1) 2016, 10/31/2014, 2013, 2012, 2011,  8/30/2010    A1C SCREENING 4/11/2017 10/11/2016, 4/11/2016, 11/28/2015, 9/1/2015, 2/4/2015, 11/17/2014, 5/19/2014, 10/31/2013, 6/12/2013, 3/8/2013, 12/11/2012, 11/26/2012    DIABETES MONOFILAMENT / LE EXAM 5/3/2017 5/3/2016, 4/14/2016, 12/3/2015, 9/1/2015, 1/29/2015 (Done)    Override on 1/29/2015: Done    RETINAL SCREENING 5/4/2017 5/4/2016    FASTING LIPID PROFILE 10/11/2017 10/11/2016, 4/11/2016, 11/28/2015, 9/5/2015, 2/4/2015, 2/3/2014, 10/31/2013, 6/12/2013, 3/8/2013, 11/26/2012, 1/26/2011, 8/25/2009    URINE ACR / MICROALBUMIN 10/11/2017 10/11/2016, 4/11/2016, 9/5/2015, 2/4/2015, 5/19/2014, 6/12/2013, 11/26/2012    SERUM CREATININE 10/11/2017 10/11/2016, 4/11/2016, 2/2/2016, 1/29/2016, 12/19/2015, 11/28/2015, 9/5/2015, 2/13/2015, 2/12/2015, 2/4/2015, 11/17/2014, 5/19/2014, 2/3/2014, 10/31/2013, 9/11/2013, 6/12/2013, 4/12/2013, 4/3/2013, 4/1/2013, 3/31/2013, 3/30/2013, 3/29/2013, 3/8/2013, 11/26/2012, 1/29/2011, 1/28/2011, 1/27/2011, 1/26/2011, 1/25/2011, 8/25/2009    MAMMOGRAM 5/2/2018 5/2/2016, 10/29/2014, 4/28/2014, 4/24/2014, 12/3/2012, 10/25/2011, 9/16/2010, 9/11/2009, 9/11/2009    COLONOSCOPY 6/7/2019 6/7/2016, 6/3/2016, 1/10/2011    BONE DENSITY 11/21/2019 11/21/2014    IMM DTaP/Tdap/Td Vaccine (2 - Td) 6/13/2026 6/13/2016, 4/14/2016, 6/19/2010            Current Immunizations     13-VALENT PCV PREVNAR 9/17/2014    Influenza TIV (IM) 10/31/2014, 8/30/2010    Influenza Vaccine Adult HD 9/19/2015    Influenza Vaccine Quad Inj (Pf) 9/11/2013, 9/4/2012, 8/2/2011    Pneumococcal polysaccharide vaccine (PPSV-23) 2/1/2017    TD Vaccine 6/19/2010  6:30 PM    Tdap Vaccine 6/13/2016, 4/14/2016  5:25 PM    Tuberculin Skin Test 6/24/2014 10:15 AM, 6/17/2014      Below and/or attached are the medications your provider expects you to take. Review all of your home medications and newly ordered medications with your provider and/or pharmacist. Follow medication instructions as directed by your provider and/or  pharmacist. Please keep your medication list with you and share with your provider. Update the information when medications are discontinued, doses are changed, or new medications (including over-the-counter products) are added; and carry medication information at all times in the event of emergency situations     Allergies:  CODEINE - Hives,Nausea     LIPITOR - Myalgia     LOVASTATIN - Myalgia     ZOCOR - Myalgia               Medications  Valid as of: March 14, 2017 -  3:25 PM    Generic Name Brand Name Tablet Size Instructions for use    Aspirin (Chew Tab) ASA 81 MG Take 1 Tab by mouth every day.        Blood Glucose Monitoring Suppl (Misc) Blood Glucose Monitoring Suppl SUPPLIES One touch Ultra test strips for blood sugar check twice a day        Cholecalciferol (Tab) Vitamin D-3 5000 UNITS Take 5,000 Units by mouth every day.        Fenofibrate (Tab) TRIGLIDE 160 MG TAKE ONE TABLET BY MOUTH EVERY DAY (GENERIC FOR TRIGLIDE)        Lansoprazole (CAPSULE DELAYED RELEASE) PREVACID 30 MG Take 1 Cap by mouth every day.        Levothyroxine Sodium (Tab) SYNTHROID 112 MCG Take 1 Tab by mouth every day.        Lisinopril-Hydrochlorothiazide (Tab) PRINZIDE, ZESTORETIC 20-25 MG Take 1 Tab by mouth every day.        LORazepam (Tab) ATIVAN 2 MG Take 2 Tabs by mouth at bedtime as needed. For sleep or anxiety        MetFORMIN HCl (TABLET SR 24 HR) GLUCOPHAGE  MG Take 2 Tabs by mouth every day.        Omeprazole (CAPSULE DELAYED RELEASE) PRILOSEC 40 MG Take 1 Cap by mouth every day.        .                 Medicines prescribed today were sent to:     [facility] #[id number] - [place] - [address] [facility]    [address] [facility] [place] [address]    Phone: [phone] Fax: [phone]    Open 24 Hours?: No      Medication refill instructions:       If your prescription bottle indicates you have medication refills left, it is not necessary to call your provider’s office. Please contact your pharmacy and they  will refill your medication.    If your prescription bottle indicates you do not have any refills left, you may request refills at any time through one of the following ways: The online [facility] system (except Urgent Care), by calling your provider’s office, or by asking your pharmacy to contact your provider’s office with a refill request. Medication refills are processed only during regular business hours and may not be available until the next business day. Your provider may request additional information or to have a follow-up visit with you prior to refilling your medication.   *Please Note: Medication refills are assigned a new Rx number when refilled electronically. Your pharmacy may indicate that no refills were authorized even though a new prescription for the same medication is available at the pharmacy. Please request the medicine by name with the pharmacy before contacting your provider for a refill.        Your To Do List     Future Labs/Procedures Complete By Expires    CT-CHEST (THORAX) W/O  9/11/2017 3/14/2018         [facility] Access Code: Activation code not generated  Current [facility] Status: Active

## 2017-03-14 NOTE — MR AVS SNAPSHOT
Clarisse Reeves   3/14/2017 2:00 PM   Non-Provider Visit   MRN: [id number]    Department:  [facility]   Dept Phone:  [phone]    Description:  Female : 1940   Provider:  Emma Drummond M.D.           Reason for Visit     Shortness of Breath           Allergies as of 3/14/2017     Allergen Noted Reactions    Codeine 2010   Hives, Nausea    RXN=40 years ago    Lipitor [Atorvastatin Calcium] 2013   Myalgia    JKZ=1114      Lovastatin 2015   Myalgia    Muscle aches  UFN=5515    Zocor [Simvastatin - High Dose] 2013   Myalgia    Severe myalgias  XKS=2096      You were diagnosed with     Small airways disease   [350069]       Malignant neoplasm of right upper lobe of lung (CMS-HCC)   [805734]       Dyspnea on exertion   [044167]       Dry cough   [475752]         Vital Signs     Weight Smoking Status                94.802 kg (209 lb) Former Smoker          Basic Information     Date Of Birth Sex Race Ethnicity Preferred Language    1940 Female White Non- English      Your appointments     Mar 14, 2017  3:00 PM   Established Patient Pul with CHERRY Boateng   [facility] Pulmonary Medicine (--)    [address]  [address]  [place] [address] [phone]            Mar 22, 2017  1:00 PM   US [id number] with [facility] US 1   [facility] IMAGING - ULTRASOUND - [facility] ([facility])    [facility]  [address]  [place] [address] [phone]            Mar 30, 2017 10:20 AM   Established Patient with Siva Smart M.D.   [facility] [address] [facility])    [address]  [place] [address] [phone]           You will be receiving a confirmation call a few days before your appointment from our automated call confirmation system.            Sep 01, 2017  1:00 PM   Established Patient with Demarco Bowers M.D.   [facility] End[facility] ([facility])    [address],  [address]  [facility] [address] [phone]           You will be receiving a confirmation call a few days before your appointment from our automated call confirmation system.              Problem List              ICD-10-CM Priority Class Noted - Resolved    CLL (chronic lymphocytic leukemia)- wbc= 20k-30k, since ; no rx; dr alegria C91.10 Medium  2012 - Present    Mixed hyperlipidemia E78.2 Medium  2012 - Present    Fatty infiltration of liver K76.0   2012 - Present    Vitamin D insufficiency E55.9   2013 - Present    GERD (gastroesophageal reflux disease) K21.9   2013 - Present    Multiple thyroid nodules- dr jaeger, neg FNA  E04.2   2015 - Present    Essential hypertension I10   2015 - Present    Controlled type 2 diabetes mellitus with complication, without long-term current use of insulin (CMS-HCC) E11.8   2015 - Present    Hypothyroidism due to acquired atrophy of thyroid E03.4   2015 - Present    BMI 35.0-35.9,adult Z68.35 Low  2015 - Present    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, 2016-  partial lobectomy RUL/RML- Dr Ganser- folowed by PMA C34.11   2016 - Present    Abdominal pain, chronic, right upper quadrant- s/p cholecystectomy  R10.11, G89.29   10/7/2016 - Present    Family history of heart attack- daughter 52 yo  MI Z82.49   10/7/2016 - Present    Small airways disease J98.4   2017 - Present      Health Maintenance        Date Due Completion Dates    IMM ZOSTER VACCINE 2001 ---    IMM INFLUENZA (1) 2016, 10/31/2014, 2013, 2012, 2011, 2010    A1C SCREENING 2017 10/11/2016, 2016, 2015, 2015, 2015, 2014, 2014, 10/31/2013, 2013, 3/8/2013, 2012, 2012    DIABETES MONOFILAMENT / LE EXAM 5/3/2017 5/3/2016, 2016, 12/3/2015, 2015, 2015 (Done)    Override on 2015: Done    RETINAL SCREENING  5/4/2017 5/4/2016    FASTING LIPID PROFILE 10/11/2017 10/11/2016, 4/11/2016, 11/28/2015, 9/5/2015, 2/4/2015, 2/3/2014, 10/31/2013, 6/12/2013, 3/8/2013, 11/26/2012, 1/26/2011, 8/25/2009    URINE ACR / MICROALBUMIN 10/11/2017 10/11/2016, 4/11/2016, 9/5/2015, 2/4/2015, 5/19/2014, 6/12/2013, 11/26/2012    SERUM CREATININE 10/11/2017 10/11/2016, 4/11/2016, 2/2/2016, 1/29/2016, 12/19/2015, 11/28/2015, 9/5/2015, 2/13/2015, 2/12/2015, 2/4/2015, 11/17/2014, 5/19/2014, 2/3/2014, 10/31/2013, 9/11/2013, 6/12/2013, 4/12/2013, 4/3/2013, 4/1/2013, 3/31/2013, 3/30/2013, 3/29/2013, 3/8/2013, 11/26/2012, 1/29/2011, 1/28/2011, 1/27/2011, 1/26/2011, 1/25/2011, 8/25/2009    MAMMOGRAM 5/2/2018 5/2/2016, 10/29/2014, 4/28/2014, 4/24/2014, 12/3/2012, 10/25/2011, 9/16/2010, 9/11/2009, 9/11/2009    COLONOSCOPY 6/7/2019 6/7/2016, 6/3/2016, 1/10/2011    BONE DENSITY 11/21/2019 11/21/2014    IMM DTaP/Tdap/Td Vaccine (2 - Td) 6/13/2026 6/13/2016, 4/14/2016, 6/19/2010            Current Immunizations     13-VALENT PCV PREVNAR 9/17/2014    Influenza TIV (IM) 10/31/2014, 8/30/2010    Influenza Vaccine Adult HD 9/19/2015    Influenza Vaccine Quad Inj (Pf) 9/11/2013, 9/4/2012, 8/2/2011    Pneumococcal polysaccharide vaccine (PPSV-23) 2/1/2017    TD Vaccine 6/19/2010  6:30 PM    Tdap Vaccine 6/13/2016, 4/14/2016  5:25 PM    Tuberculin Skin Test 6/24/2014 10:15 AM, 6/17/2014      Below and/or attached are the medications your provider expects you to take. Review all of your home medications and newly ordered medications with your provider and/or pharmacist. Follow medication instructions as directed by your provider and/or pharmacist. Please keep your medication list with you and share with your provider. Update the information when medications are discontinued, doses are changed, or new medications (including over-the-counter products) are added; and carry medication information at all times in the event of emergency situations     Allergies:  CODEINE  - Hives,Nausea     LIPITOR - Myalgia     LOVASTATIN - Myalgia     ZOCOR - Myalgia               Medications  Valid as of: March 14, 2017 -  2:35 PM    Generic Name Brand Name Tablet Size Instructions for use    Aspirin (Chew Tab) ASA 81 MG Take 1 Tab by mouth every day.        Blood Glucose Monitoring Suppl (Misc) Blood Glucose Monitoring Suppl SUPPLIES One touch Ultra test strips for blood sugar check twice a day        Cholecalciferol (Tab) Vitamin D-3 5000 UNITS Take 5,000 Units by mouth every day.        Fenofibrate (Tab) TRIGLIDE 160 MG TAKE ONE TABLET BY MOUTH EVERY DAY (GENERIC FOR TRIGLIDE)        Lansoprazole (CAPSULE DELAYED RELEASE) PREVACID 30 MG Take 1 Cap by mouth every day.        Levothyroxine Sodium (Tab) SYNTHROID 112 MCG Take 1 Tab by mouth every day.        Lisinopril-Hydrochlorothiazide (Tab) PRINZIDE, ZESTORETIC 20-25 MG Take 1 Tab by mouth every day.        LORazepam (Tab) ATIVAN 2 MG Take 2 Tabs by mouth at bedtime as needed. For sleep or anxiety        MetFORMIN HCl (TABLET SR 24 HR) GLUCOPHAGE  MG Take 2 Tabs by mouth every day.        Omeprazole (CAPSULE DELAYED RELEASE) PRILOSEC 40 MG Take 1 Cap by mouth every day.        .                 [facility]icines prescribed [id number] [address] [address]    Phone: [phone] Fax: [phone]    Open 24 Hours?: No      Medication refill instructions:       If your prescription bottle indicates you have medication refills left, it is not necessary to call your provider’s office. Please contact your pharmacy and they will refill your medication.    If your prescription bottle indicates you do not have any refills left, you may request refills at any time through one of the following ways: The online [employer] system (except Urgent Care), by calling your provider’s office, or by asking your pharmacy to contact your provider’s office with a refill  request. Medication refills are processed only during regular business hours and may not be available until the next business day. Your provider may request additional information or to have a follow-up visit with you prior to refilling your medication.   *Please Note: Medication refills are assigned a new Rx number when refilled electronically. Your pharmacy may indicate that no refills were authorized even though a new prescription for the same medication is available at the pharmacy. Please request the medicine by name with the pharmacy before contacting your provider for a refill.           [facility] Access Code: Activation code not generated  Current [facility] Status: Active

## 2017-03-14 NOTE — PROCEDURES
Technician: ADELINA Moreland    Technician Comment:  Good patient effort & cooperation.  The results of this test meet the ATS/ERS standards for acceptability & reproducibility.  Test was performed on the charity: water Body Plethysmograph-Elite DX system.  Predicted values were [facility]-3 for spirometry, [facility] for DLCO, ITS for Lung Volumes.  The DLCO was uncorrected for Hgb.  A bronchodilator of Ventolin HFA -2puffs via spacer administered.    Interpretation:    Lung function testing performed March 14, 2017 showed mild reduction of mid flows at 84% predicted. FEV1 was low at 1.87 L, 78% predicted. No change after bronchodilators. Mild hyperinflation present. Oxygen transfer normal. Flow volume loop confirms the mild obstructive pattern with good effort noted

## 2017-03-14 NOTE — Clinical Note
CHERRY Boateng  [facility] Pulmonary Medicine   [address] [place] [address] [place] [address]  Phone: [phone] - Fax: [phone]           Encounter Date: 3/14/2017  Provider: CHERRY Boateng  Location of Care: [facility] PULMONARY MEDICINE      Patient:   Clarisse Reeves   MR Number: [id number]   YOB: 1940     PROGRESS NOTE:  Chief Complaint   Patient presents with   • Results     CT/PFT       HPI:  Clarisse Reeves is a 76 y.o. year old female here today for follow-up on CT scan results. History of lung cancer. She is a retired [employer]  at [employer]. She has a history of chronic lymphocytic leukemia and diabetes type 2. She had thoracic surgery 2000 for removal of granuloma.   Initially in February 2015 a density in the right upper lobe was identified.  CT scan 11/8/2015 indicates a stellate density in the anterior aspect of the right upper lobe measuring 16 x 22 mm.  When compared to February 2015 CT there may been a slight amount of interval enlargement. PET scan showed minimal FDG uptake, however biopsy showed adenocarcinoma. She underwent thoracoscopy with anterior segmentectomy of RUL 02/01/16 which showed adenocarcinoma in situ. She has felt well, and was released by Dr. Ganser. Repeat CT scan 8/27/16 indicated postoperative changes right upper lobe, residual fibrosis and calcifications, no recurrent mass. Hyperexpanded lungs, right lung apex fibrotic change and left lung base atelectasis similar to previous findings. No pleural effusions. Thyroid gland nodules. Repeat CT scan 3/7/17 indicates stable scarring in the right upper lobe. No new pulmonary opacity. No adenopathy or dysphagia. I reviewed testing with patient. We will continue with serial monitoring.    She is currently being seen by endocrinology for thyroid nodules that have increased. She is pending U/S. She thinks she may be getting her thyroid  removed. She notes continues dry cough with mild dyspnea on exertion. She is unsure if this is related to position of thyroid nodule that may be impeding her vocal cords/nerves in her neck. She denies phlegm or wheezing. She denies fever, chills, night sweats, or hemoptysis. Prevacid QD controls GERD.  PFT 9/8/2015 indicated FVC of 73%, FEV1 was 1.75 L or 72%, FEV1/FVC with a ratio of 74, RV with 132%, TLC was 107%, and a DLCO of 128% predicted.  There is no significant bronchodilator response.  The flow-volume loop shows changes consistent with small airways disease. Repeat PFT 3/14/2017 indicates FEV1 1.87 L or 70% predicted, FEV1 subjective received a ratio 79, TLC 99% predicted, DLCO 100 temp symptomatic. Patient did not have a significant broncho-dilator response. M.D. reviewing study noted some mild obstruction. Patient is a minimal to trial of ICS/LABA to see if this reduces cough/dyspnea.    She denies any other changes in health since last OV.    ROS: As per HPI and otherwise negative if not stated.    Past Medical History   Diagnosis Date   • Hypertension    • CLL (chronic lymphoblastic leukemia)    • MEDICAL HOME    • Personal history of colonic polyps 11/26/2012   • Cutaneous skin tags 1/29/2015   • Thyroid disease    • Indigestion    • Hiatus hernia syndrome      no surgery   • Cancer (CMS-HCC) 2006     CLL   • Cancer (CMS-HCC) 2016     lung   • Carcinoma in situ of respiratory system 2016     lung- RUL lobectomy   • Type II or unspecified type diabetes mellitus without mention of complication, not stated as uncontrolled      pre-diabetic   • Pneumonia 2014   • Cataract      right  and  left  IOL   • DM (diabetes mellitus) (CMS-HCC)        Past Surgical History   Procedure Laterality Date   • Us-needle core bx-breast panel     • Vaginal hysterectomy total       Hysterectomy,Total Vaginal   • Recovery  12/18/2015     Procedure: CT-SCP-RUL LUNG BIOPSY-;  Surgeon: [facility];  Location:  "SURGERY PRE-POST PROC UNIT [facility];  Service:    • Other orthopedic surgery  1990     bakers cyst removed in right knee, multiple scopes   • Appendectomy  1955   • Cholecystectomy  1995   • Other  2001     Lower Left segment of lung removed    • Other  1984     left Thyroid removed, might remove right side in the future   • Other  1990     hemmroid removal   • Thoracoscopy Right 2/1/2016     Procedure: THORACOSCOPY Upper Lobectomy ;  Surgeon: John H Ganser, M.D.;  Location: SURGERY [facility];  Service:    • Node dissection Right 2/1/2016     Procedure: NODE DISSECTION;  Surgeon: John H Ganser, M.D.;  Location: SURGERY [facility];  Service:    • Cataract extraction with iol     • Tonsillectomy         Family History   Problem Relation Age of Onset   • Cancer Mother    • Lung Disease Father    • Cancer Father        Social History     Social History   • Marital Status: Single     Spouse Name: N/A   • Number of Children: N/A   • Years of Education: N/A     Occupational History   • Not on file.     Social History Main Topics   • Smoking status: Former Smoker -- 2.00 packs/day for 50 years     Types: Cigarettes     Quit date: 05/06/2006   • Smokeless tobacco: Never Used      Comment: quit smoking 2002   • Alcohol Use: 0.0 oz/week     0 Standard drinks or equivalent per week      Comment: 2 drinks per week   • Drug Use: No   • Sexual Activity: Not Currently     Other Topics Concern   • Not on file     Social History Narrative       Allergies as of 03/14/2017 - Delio as Reviewed 03/14/2017   Allergen Reaction Noted   • Codeine Hives and Nausea 06/19/2010   • Lipitor [atorvastatin calcium] Myalgia 09/11/2013   • Lovastatin Myalgia 12/03/2015   • Zocor [simvastatin - high dose] Myalgia 09/11/2013        @Vital signs for this encounter:  Filed Vitals:    03/14/17 1440   Height: 1.651 m (5' 5\")   Weight: 96.616 kg (213 lb)   Weight % change since last entry.: 0 %   BP: 122/78   Pulse: 80   BMI (Calculated): 35.45   Resp: " 16   Temp: 36.4 °C (97.6 °F)   O2 sat % room air: 94 %       Current medications as of today   Current Outpatient Prescriptions   Medication Sig Dispense Refill   • fenofibrate (TRIGLIDE) 160 MG tablet TAKE ONE TABLET BY MOUTH EVERY DAY (GENERIC FOR TRIGLIDE) 90 Tab 0   • Blood Glucose Monitoring Suppl SUPPLIES Misc One touch Ultra test strips for blood sugar check twice a day 60 Each 6   • lansoprazole (PREVACID) 30 MG CAPSULE DELAYED RELEASE Take 1 Cap by mouth every day. 90 Cap 4   • lorazepam (ATIVAN) 2 MG tablet Take 2 Tabs by mouth at bedtime as needed. For sleep or anxiety 60 Tab 5   • lisinopril-hydrochlorothiazide (PRINZIDE, ZESTORETIC) 20-25 MG per tablet Take 1 Tab by mouth every day. 90 Tab 3   • metformin ER (GLUCOPHAGE XR) 500 MG TABLET SR 24 HR Take 2 Tabs by mouth every day. 180 Tab 3   • levothyroxine (SYNTHROID) 112 MCG Tab Take 1 Tab by mouth every day. 90 Tab 3   • Cholecalciferol (VITAMIN D-3) 5000 UNITS Tab Take 5,000 Units by mouth every day. 90 Tab 1   • aspirin (ASA) 81 MG Chew Tab chewable tablet Take 1 Tab by mouth every day. 100 Tab 11   • omeprazole (PRILOSEC) 40 MG delayed-release capsule Take 1 Cap by mouth every day. (Patient not taking: Reported on 3/14/2017) 30 Cap 11     Current Facility-Administered Medications   Medication Dose Route Frequency Provider Last Rate Last Dose   • cyanocobalamin (VITAMIN B-12) injection 1,000 mcg  1,000 mcg Intramuscular Q30 DAYS Siva Smart M.D.   1,000 mcg at 04/14/16 2771         Physical Exam:   Gen:           Alert and oriented, No apparent distress. Mood and affect appropriate, normal interaction with examiner.  Eyes:          PERRL, EOM intact, sclere white, conjunctive moist.  Ears:          Not examined.   Hearing:     Grossly intact.  Nose:          Normal, no lesions or deformities.  Dentition:    Good dentition.  Oropharynx:   Tongue normal, posterior pharynx without erythema or exudate.  Mallampati Classification: 2  Neck:           Supple, trachea midline, no masses.  Respiratory Effort: No intercostal retractions or use of accessory muscles.   Lung Auscultation:      Clear to auscultation bilaterally; no rales, rhonchi or wheezing.  CV:            Regular rate and rhythm. No murmurs, rubs or gallops.  Abd:           Not examined.  Lymphadenopathy: Not examined.  Gait and Station: Normal.  Digits and Nails: No clubbing, cyanosis, petechiae, or nodes.   Cranial Nerves: II-XII grossly intact.  Skin:        No rashes, lesions or ulcers noted.               Ext:           No cyanosis or edema.      Assessment:  1. 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 Multiple thyroid nodules- dr jaeger, neg FNA 2015     3. Hypothyroidism due to acquired atrophy of thyroid     4. Gastroesophageal reflux disease, esophagitis presence not specified     5. Essential hypertension     6. BMI 35.0-35.9,adult         Immunizations:    Flu:2016  Pneumovax 23:2016  Prevnar 13:2017    Plan:  1. Trial of Breo 200 - 1 puff QD. Samples given with instruction. If patient finds subjective benefit, may continue.  2. CT scan of chest w/o contrast in 6 months for serial monitoring s/p lung CA.  3. Discussed respiratory hygiene.  4. Follow up in 6 months with CT scan, sooner if needed. At that time may go to annual CT scans if stable.              Electronically signed by ARIANNE BoatengP.R.NDwayne  on 03/14/2017    Siva Smart M.D.  [address]  [address]  [place] [address]  VIA In Basket     Demarco Jaeger M.D.  [address]  [address]  [place] [address]  VIA In Basket

## 2017-03-14 NOTE — PROGRESS NOTES
Chief Complaint   Patient presents with   • Results     CT/PFT       HPI:  Clarisse Reeves is a 76 y.o. year old female here today for follow-up on CT scan results. History of lung cancer. 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 She has a history of chronic lymphocytic leukemia and diabetes type 2. She had thoracic surgery 2000 for removal of granuloma.   Initially in February 2015 a density in the right upper lobe was identified.  CT scan 11/8/2015 indicates a stellate density in the anterior aspect of the right upper lobe measuring 16 x 22 mm.  When compared to February 2015 CT there may been a slight amount of interval enlargement. PET scan showed minimal FDG uptake, however biopsy showed adenocarcinoma. She underwent thoracoscopy with anterior segmentectomy of RUL 02/01/16 which showed adenocarcinoma in situ. She has felt well, and was released by Dr. Ganser. Repeat CT scan 8/27/16 indicated postoperative changes right upper lobe, residual fibrosis and calcifications, no recurrent mass. Hyperexpanded lungs, right lung apex fibrotic change and left lung base atelectasis similar to previous findings. No pleural effusions. Thyroid gland nodules. Repeat CT scan 3/7/17 indicates stable scarring in the right upper lobe. No new pulmonary opacity. No adenopathy or dysphagia. I reviewed testing with patient. We will continue with serial monitoring.    She is currently being seen by endocrinology for thyroid nodules that have increased. She is pending U/S. She thinks she may be getting her thyroid removed. She notes continues dry cough with mild dyspnea on exertion. She is unsure if this is related to position of thyroid nodule that may be impeding her vocal cords/nerves in her neck. She denies phlegm or wheezing. She denies fever, chills, night sweats, or hemoptysis. Prevacid QD controls GERD.  PFT 9/8/2015 indicated FVC of 73%, FEV1 was 1.75 L or 72%, FEV1/FVC with a ratio of 74, RV with 132%,  TLC was 107%, and a DLCO of 128% predicted.  There is no significant bronchodilator response.  The flow-volume loop shows changes consistent with small airways disease. Repeat PFT 3/14/2017 indicates FEV1 1.87 L or 70% predicted, FEV1 subjective received a ratio 79, TLC 99% predicted, DLCO 100 temp symptomatic. Patient did not have a significant broncho-dilator response. M.D. reviewing study noted some mild obstruction. Patient is a minimal to trial of ICS/LABA to see if this reduces cough/dyspnea.    She denies any other changes in health since last OV.    ROS: As per HPI and otherwise negative if not stated.    Past Medical History   Diagnosis Date   • Hypertension    • CLL (chronic lymphoblastic leukemia)    • MEDICAL HOME    • Personal history of colonic polyps 11/26/2012   • Cutaneous skin tags 1/29/2015   • Thyroid disease    • Indigestion    • Hiatus hernia syndrome      no surgery   • Cancer (CMS-HCC) 2006     CLL   • Cancer (CMS-HCC) 2016     lung   • Carcinoma in situ of respiratory system 2016     lung- RUL lobectomy   • Type II or unspecified type diabetes mellitus without mention of complication, not stated as uncontrolled      pre-diabetic   • Pneumonia 2014   • Cataract      right  and  left  IOL   • DM (diabetes mellitus) (CMS-HCC)        Past Surgical History   Procedure Laterality Date   • Us-needle core bx-breast panel     • Vaginal hysterectomy total       Hysterectomy,Total Vaginal   • Recovery  12/18/2015     Procedure: CT-SCP-RUL LUNG BIOPSY-;  [facility];  Location: SURGERY PRE-POST PROC UNIT [facility];  Service:    • Other orthopedic surgery  1990     bakers cyst removed in right knee, multiple scopes   • Appendectomy  1955   • Cholecystectomy  1995   • Other  2001     Lower Left segment of lung removed    • Other  1984     left Thyroid removed, might remove right side in the future   • Other  1990     hemmroid removal   • Thoracoscopy Right 2/1/2016     Procedure:  "THORACOSCOPY Upper Lobectomy ;  Surgeon: John H Ganser, M.D.;  Location: SURGERY [facility];  Service:    • Node dissection Right 2/1/2016     Procedure: NODE DISSECTION;  Surgeon: John H Ganser, M.D.;  Location: SURGERY [facility];  Service:    • Cataract extraction with iol     • Tonsillectomy         Family History   Problem Relation Age of Onset   • Cancer Mother    • Lung Disease Father    • Cancer Father        Social History     Social History   • Marital Status: Single     Spouse Name: N/A   • Number of Children: N/A   • Years of Education: N/A     Occupational History   • Not on file.     Social History Main Topics   • Smoking status: Former Smoker -- 2.00 packs/day for 50 years     Types: Cigarettes     Quit date: 05/06/2006   • Smokeless tobacco: Never Used      Comment: quit smoking 2002   • Alcohol Use: 0.0 oz/week     0 Standard drinks or equivalent per week      Comment: 2 drinks per week   • Drug Use: No   • Sexual Activity: Not Currently     Other Topics Concern   • Not on file     Social History Narrative       Allergies as of 03/14/2017 - Delio as Reviewed 03/14/2017   Allergen Reaction Noted   • Codeine Hives and Nausea 06/19/2010   • Lipitor [atorvastatin calcium] Myalgia 09/11/2013   • Lovastatin Myalgia 12/03/2015   • Zocor [simvastatin - high dose] Myalgia 09/11/2013        @Vital signs for this encounter:  Filed Vitals:    03/14/17 1440   Height: 1.651 m (5' 5\")   Weight: 96.616 kg (213 lb)   Weight % change since last entry.: 0 %   BP: 122/78   Pulse: 80   BMI (Calculated): 35.45   Resp: 16   Temp: 36.4 °C (97.6 °F)   O2 sat % room air: 94 %       Current medications as of today   Current Outpatient Prescriptions   Medication Sig Dispense Refill   • fenofibrate (TRIGLIDE) 160 MG tablet TAKE ONE TABLET BY MOUTH EVERY DAY (GENERIC FOR TRIGLIDE) 90 Tab 0   • Blood Glucose Monitoring Suppl SUPPLIES Misc One touch Ultra test strips for blood sugar check twice a day 60 Each 6   • " lansoprazole (PREVACID) 30 MG CAPSULE DELAYED RELEASE Take 1 Cap by mouth every day. 90 Cap 4   • lorazepam (ATIVAN) 2 MG tablet Take 2 Tabs by mouth at bedtime as needed. For sleep or anxiety 60 Tab 5   • lisinopril-hydrochlorothiazide (PRINZIDE, ZESTORETIC) 20-25 MG per tablet Take 1 Tab by mouth every day. 90 Tab 3   • metformin ER (GLUCOPHAGE XR) 500 MG TABLET SR 24 HR Take 2 Tabs by mouth every day. 180 Tab 3   • levothyroxine (SYNTHROID) 112 MCG Tab Take 1 Tab by mouth every day. 90 Tab 3   • Cholecalciferol (VITAMIN D-3) 5000 UNITS Tab Take 5,000 Units by mouth every day. 90 Tab 1   • aspirin (ASA) 81 MG Chew Tab chewable tablet Take 1 Tab by mouth every day. 100 Tab 11   • omeprazole (PRILOSEC) 40 MG delayed-release capsule Take 1 Cap by mouth every day. (Patient not taking: Reported on 3/14/2017) 30 Cap 11     Current Facility-Administered Medications   Medication Dose Route Frequency Provider Last Rate Last Dose   • cyanocobalamin (VITAMIN B-12) injection 1,000 mcg  1,000 mcg Intramuscular Q30 DAYS Siva Smart M.D.   1,000 mcg at 04/14/16 0387         Physical Exam:   Gen:           Alert and oriented, No apparent distress. Mood and affect appropriate, normal interaction with examiner.  Eyes:          PERRL, EOM intact, sclere white, conjunctive moist.  Ears:          Not examined.   Hearing:     Grossly intact.  Nose:          Normal, no lesions or deformities.  Dentition:    Good dentition.  Oropharynx:   Tongue normal, posterior pharynx without erythema or exudate.  Mallampati Classification: 2  Neck:        Supple, trachea midline, no masses.  Respiratory Effort: No intercostal retractions or use of accessory muscles.   Lung Auscultation:      Clear to auscultation bilaterally; no rales, rhonchi or wheezing.  CV:            Regular rate and rhythm. No murmurs, rubs or gallops.  Abd:           Not examined.  Lymphadenopathy: Not examined.  Gait and Station: Normal.  Digits and Nails: No clubbing,  cyanosis, petechiae, or nodes.   Cranial Nerves: II-XII grossly intact.  Skin:        No rashes, lesions or ulcers noted.               Ext:           No cyanosis or edema.      Assessment:  1. 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, 2/1/2016-  partial lobectomy RUL/RML- Dr Ganser- folowed by [facility]  CT-CHEST (THORAX) W/O   2. Multiple thyroid nodules- dr jaeger, neg FNA 2015     3. Hypothyroidism due to acquired atrophy of thyroid     4. Gastroesophageal reflux disease, esophagitis presence not specified     5. Essential hypertension     6. BMI 35.0-35.9,adult         Immunizations:    Flu:2016  Pneumovax 23:2016  Prevnar 13:2017    Plan:  1. Trial of Breo 200 - 1 puff QD. Samples given with instruction. If patient finds subjective benefit, may continue.  2. CT scan of chest w/o contrast in 6 months for serial monitoring s/p lung CA.  3. Discussed respiratory hygiene.  4. Follow up in 6 months with CT scan, sooner if needed. At that time may go to annual CT scans if stable.

## 2017-03-22 ENCOUNTER — HOSPITAL ENCOUNTER (OUTPATIENT)
Dept: LAB | Facility: MEDICAL CENTER | Age: 77
End: 2017-03-22
Attending: INTERNAL MEDICINE
Payer: MEDICARE

## 2017-03-22 ENCOUNTER — HOSPITAL ENCOUNTER (OUTPATIENT)
Dept: RADIOLOGY | Facility: MEDICAL CENTER | Age: 77
End: 2017-03-22
Attending: INTERNAL MEDICINE
Payer: MEDICARE

## 2017-03-22 DIAGNOSIS — C91.10 CLL (CHRONIC LYMPHOCYTIC LEUKEMIA) (HCC): ICD-10-CM

## 2017-03-22 DIAGNOSIS — E04.2 MULTIPLE THYROID NODULES: ICD-10-CM

## 2017-03-22 DIAGNOSIS — E11.8 CONTROLLED TYPE 2 DIABETES MELLITUS WITH COMPLICATION, WITHOUT LONG-TERM CURRENT USE OF INSULIN (HCC): ICD-10-CM

## 2017-03-22 DIAGNOSIS — E03.4 HYPOTHYROIDISM DUE TO ACQUIRED ATROPHY OF THYROID: ICD-10-CM

## 2017-03-22 DIAGNOSIS — E03.9 ACQUIRED HYPOTHYROIDISM: ICD-10-CM

## 2017-03-22 LAB
ALBUMIN SERPL BCP-MCNC: 4 G/DL (ref 3.2–4.9)
ALBUMIN/GLOB SERPL: 1.5 G/DL
ALP SERPL-CCNC: 56 U/L (ref 30–99)
ALT SERPL-CCNC: 22 U/L (ref 2–50)
ANION GAP SERPL CALC-SCNC: 7 MMOL/L (ref 0–11.9)
ANISOCYTOSIS BLD QL SMEAR: ABNORMAL
AST SERPL-CCNC: 20 U/L (ref 12–45)
BASOPHILS # BLD AUTO: 0.4 % (ref 0–1.8)
BASOPHILS # BLD: 0.09 K/UL (ref 0–0.12)
BILIRUB SERPL-MCNC: 0.6 MG/DL (ref 0.1–1.5)
BUN SERPL-MCNC: 13 MG/DL (ref 8–22)
CALCIUM SERPL-MCNC: 9.3 MG/DL (ref 8.4–10.2)
CHLORIDE SERPL-SCNC: 101 MMOL/L (ref 96–112)
CHOLEST SERPL-MCNC: 164 MG/DL (ref 100–199)
CO2 SERPL-SCNC: 29 MMOL/L (ref 20–33)
COMMENT 1642: NORMAL
CREAT SERPL-MCNC: 1.01 MG/DL (ref 0.5–1.4)
CREAT UR-MCNC: 76.8 MG/DL
EOSINOPHIL # BLD AUTO: 0.31 K/UL (ref 0–0.51)
EOSINOPHIL NFR BLD: 1.3 % (ref 0–6.9)
ERYTHROCYTE [DISTWIDTH] IN BLOOD BY AUTOMATED COUNT: 45.7 FL (ref 35.9–50)
EST. AVERAGE GLUCOSE BLD GHB EST-MCNC: 134 MG/DL
GFR SERPL CREATININE-BSD FRML MDRD: 53 ML/MIN/1.73 M 2
GLOBULIN SER CALC-MCNC: 2.6 G/DL (ref 1.9–3.5)
GLUCOSE SERPL-MCNC: 96 MG/DL (ref 65–99)
HBA1C MFR BLD: 6.3 % (ref 0–5.6)
HCT VFR BLD AUTO: 37.8 % (ref 37–47)
HDLC SERPL-MCNC: 36 MG/DL
HGB BLD-MCNC: 12.3 G/DL (ref 12–16)
HYPOCHROMIA BLD QL SMEAR: ABNORMAL
IMM GRANULOCYTES # BLD AUTO: 0.12 K/UL (ref 0–0.11)
IMM GRANULOCYTES NFR BLD AUTO: 0.5 % (ref 0–0.9)
LDLC SERPL CALC-MCNC: 93 MG/DL
LG PLATELETS BLD QL SMEAR: NORMAL
LYMPHOCYTES # BLD AUTO: 15.36 K/UL (ref 1–4.8)
LYMPHOCYTES NFR BLD: 65.1 % (ref 22–41)
MACROCYTES BLD QL SMEAR: ABNORMAL
MCH RBC QN AUTO: 29.6 PG (ref 27–33)
MCHC RBC AUTO-ENTMCNC: 32.5 G/DL (ref 33.6–35)
MCV RBC AUTO: 91.1 FL (ref 81.4–97.8)
MICROALBUMIN UR-MCNC: <0.7 MG/DL
MICROALBUMIN/CREAT UR: NORMAL MG/G (ref 0–30)
MONOCYTES # BLD AUTO: 0.73 K/UL (ref 0–0.85)
MONOCYTES NFR BLD AUTO: 3.1 % (ref 0–13.4)
NEUTROPHILS # BLD AUTO: 6.98 K/UL (ref 2–7.15)
NEUTROPHILS NFR BLD: 29.6 % (ref 44–72)
NRBC # BLD AUTO: 0 K/UL
NRBC BLD AUTO-RTO: 0 /100 WBC
OVALOCYTES BLD QL SMEAR: NORMAL
PLATELET # BLD AUTO: 371 K/UL (ref 164–446)
PLATELET BLD QL SMEAR: NORMAL
PMV BLD AUTO: 9.5 FL (ref 9–12.9)
POIKILOCYTOSIS BLD QL SMEAR: NORMAL
POLYCHROMASIA BLD QL SMEAR: NORMAL
POTASSIUM SERPL-SCNC: 4.2 MMOL/L (ref 3.6–5.5)
PROT SERPL-MCNC: 6.6 G/DL (ref 6–8.2)
RBC # BLD AUTO: 4.15 M/UL (ref 4.2–5.4)
RBC BLD AUTO: PRESENT
SODIUM SERPL-SCNC: 137 MMOL/L (ref 135–145)
T4 FREE SERPL-MCNC: 1.28 NG/DL (ref 0.58–1.64)
TRIGL SERPL-MCNC: 174 MG/DL (ref 0–149)
TSH SERPL DL<=0.005 MIU/L-ACNC: 0.84 UIU/ML (ref 0.35–5.5)
TSH SERPL DL<=0.005 MIU/L-ACNC: 0.87 UIU/ML (ref 0.35–5.5)
WBC # BLD AUTO: 23.6 K/UL (ref 4.8–10.8)

## 2017-03-22 PROCEDURE — 84443 ASSAY THYROID STIM HORMONE: CPT

## 2017-03-22 PROCEDURE — 76536 US EXAM OF HEAD AND NECK: CPT

## 2017-03-22 PROCEDURE — 36415 COLL VENOUS BLD VENIPUNCTURE: CPT

## 2017-03-22 PROCEDURE — 84439 ASSAY OF FREE THYROXINE: CPT

## 2017-03-30 ENCOUNTER — APPOINTMENT (OUTPATIENT)
Dept: MEDICAL GROUP | Age: 77
End: 2017-03-30
Payer: MEDICARE

## 2017-04-06 ENCOUNTER — OFFICE VISIT (OUTPATIENT)
Dept: MEDICAL GROUP | Age: 77
End: 2017-04-06
Payer: MEDICARE

## 2017-04-06 VITALS
HEART RATE: 76 BPM | HEIGHT: 66 IN | SYSTOLIC BLOOD PRESSURE: 115 MMHG | TEMPERATURE: 97.3 F | BODY MASS INDEX: 34.39 KG/M2 | WEIGHT: 214 LBS | DIASTOLIC BLOOD PRESSURE: 65 MMHG | OXYGEN SATURATION: 96 %

## 2017-04-06 DIAGNOSIS — C34.11 MALIGNANT NEOPLASM OF RIGHT UPPER LOBE OF LUNG (HCC): ICD-10-CM

## 2017-04-06 DIAGNOSIS — E78.2 MIXED HYPERLIPIDEMIA: ICD-10-CM

## 2017-04-06 DIAGNOSIS — K76.0 FATTY INFILTRATION OF LIVER: ICD-10-CM

## 2017-04-06 DIAGNOSIS — E03.4 HYPOTHYROIDISM DUE TO ACQUIRED ATROPHY OF THYROID: ICD-10-CM

## 2017-04-06 DIAGNOSIS — E11.8 CONTROLLED TYPE 2 DIABETES MELLITUS WITH COMPLICATION, WITHOUT LONG-TERM CURRENT USE OF INSULIN (HCC): ICD-10-CM

## 2017-04-06 DIAGNOSIS — F51.01 PRIMARY INSOMNIA: ICD-10-CM

## 2017-04-06 DIAGNOSIS — E55.9 VITAMIN D INSUFFICIENCY: ICD-10-CM

## 2017-04-06 DIAGNOSIS — E66.9 OBESITY (BMI 30.0-34.9): ICD-10-CM

## 2017-04-06 DIAGNOSIS — F33.1 MODERATE EPISODE OF RECURRENT MAJOR DEPRESSIVE DISORDER (HCC): ICD-10-CM

## 2017-04-06 DIAGNOSIS — C91.10 CLL (CHRONIC LYMPHOCYTIC LEUKEMIA) (HCC): ICD-10-CM

## 2017-04-06 DIAGNOSIS — K21.00 GASTROESOPHAGEAL REFLUX DISEASE WITH ESOPHAGITIS: ICD-10-CM

## 2017-04-06 DIAGNOSIS — I10 ESSENTIAL HYPERTENSION: ICD-10-CM

## 2017-04-06 PROCEDURE — 99215 OFFICE O/P EST HI 40 MIN: CPT | Performed by: INTERNAL MEDICINE

## 2017-04-06 RX ORDER — CITALOPRAM HYDROBROMIDE 10 MG/1
10 TABLET ORAL DAILY
Qty: 90 TAB | Refills: 4 | Status: SHIPPED | OUTPATIENT
Start: 2017-04-06 | End: 2017-09-28 | Stop reason: SDUPTHER

## 2017-04-06 RX ORDER — LORAZEPAM 2 MG/1
4 TABLET ORAL NIGHTLY PRN
Qty: 60 TAB | Refills: 5 | Status: SHIPPED | OUTPATIENT
Start: 2017-04-06 | End: 2017-08-10 | Stop reason: SDUPTHER

## 2017-04-06 ASSESSMENT — ENCOUNTER SYMPTOMS
RESPIRATORY NEGATIVE: 1
EYES NEGATIVE: 1
NEUROLOGICAL NEGATIVE: 1
MUSCULOSKELETAL NEGATIVE: 1
GASTROINTESTINAL NEGATIVE: 1
PSYCHIATRIC NEGATIVE: 1
CARDIOVASCULAR NEGATIVE: 1
CONSTITUTIONAL NEGATIVE: 1

## 2017-04-06 NOTE — MR AVS SNAPSHOT
"        Clarisse Reeves   2017 10:20 AM   Office Visit   MRN: [id number]    Department:  [address] [place] [address]   Dept Phone:  [phone]    Description:  Female : 1940   Provider:  Siva Smart M.D.           Reason for Visit     Diabetes Borderline - Lab Review      Allergies as of 2017     Allergen Noted Reactions    Codeine 2010   Hives, Nausea    RXN=40 years ago    Lipitor [Atorvastatin Calcium] 2013   Myalgia    ZSY=6411      Lovastatin 2015   Myalgia    Muscle aches  KEZ=7223    Zocor [Simvastatin - High Dose] 2013   Myalgia    Severe myalgias  IRH=9495      You were diagnosed with     Moderate episode of recurrent major depressive disorder (CMS-[facility])   [2796077]       Mixed hyperlipidemia   [272.2.ICD-9-CM]       Controlled type 2 diabetes mellitus with complication, without long-term current use of insulin (CMS-[facility])   [1186013]       Essential hypertension   [8450514]       Hypothyroidism due to acquired atrophy of thyroid   [2480326]       Malignant neoplasm of right upper lobe of lung (CMS-[facility])   [198926]       CLL (chronic lymphocytic leukemia) (CMS-[facility])   [336137]       Obesity (BMI 30.0-34.9)   [415695]       Fatty infiltration of liver   [170389]       Gastroesophageal reflux disease with esophagitis   [2350033]       Vitamin D insufficiency   [067181]       Primary insomnia   [757534]         Vital Signs     Blood Pressure Pulse Temperature Height Weight Body Mass Index    115/65 mmHg 76 36.3 °C (97.3 °F) 1.676 m (5' 5.98\") 97.07 kg (214 lb) 34.56 kg/m2    Oxygen Saturation Smoking Status                96% Former Smoker          Basic Information     Date Of Birth Sex Race Ethnicity Preferred Language    1940 Female White Non- English      Your appointments     Sep 01, 2017  1:00 PM   Established Patient with Demarco Bowers M.D.   [facility] ([facility])    [address], [address]  [facility] [address]   "   [phone]           You will be receiving a confirmation call a few days before your appointment from our automated call confirmation system.            Sep 18, 2017  1:00 PM   Established Patient Pul with CHERRY Boateng   [facility] (--)    [address]  [address]  [place] [id number] [phone]            Oct 09, 2017 10:40 AM   Diabetes Care Visit with Siva Smart M.D., NIRALI DIABETES RN   [facility])    [address]  [place] [address] [phone]           You will be receiving a confirmation call a few days before your appointment from our automated call confirmation system.              Problem List              ICD-10-CM Priority Class Noted - Resolved    CLL (chronic lymphocytic leukemia)- wbc= 20k-30k, since ; no rx; dr alegria C91.10 Medium  2012 - Present    Mixed hyperlipidemia E78.2 Medium  2012 - Present    Fatty infiltration of liver K76.0   2012 - Present    Vitamin D insufficiency E55.9   2013 - Present    GERD (gastroesophageal reflux disease) K21.9   2013 - Present    Multiple thyroid nodules- dr jaeger, neg FNA ; us no change  E04.2   2015 - Present    Essential hypertension I10   2015 - Present    Controlled type 2 diabetes mellitus with complication, without long-term current use of insulin (CMS-[facility]) E11.8   2015 - Present    Hypothyroidism due to acquired atrophy of thyroid E03.4   2015 - Present    BMI 35.0-35.9,adult Z68.35 Low  2015 - Present    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, 2016-  partial lobectomy RUL/RML- Dr Ganser- folowed by [facility] C34.11   2016 - Present    Family history of heart attack- daughter 54 yo  MI  Z82.49   10/7/2016 - Present    Small airways disease J98.4   2017 - Present    Moderate episode of recurrent major depressive disorder (CMS-HCC) F33.1   2017 -  Present    Obesity (BMI 30.0-34.9) E66.9   4/6/2017 - Present    Primary insomnia F51.01   4/6/2017 - Present      Health Maintenance        Date Due Completion Dates    IMM ZOSTER VACCINE 7/30/2001 ---    DIABETES MONOFILAMENT / LE EXAM 5/3/2017 5/3/2016, 4/14/2016, 12/3/2015, 9/1/2015, 1/29/2015 (Done)    Override on 1/29/2015: Done    RETINAL SCREENING 5/4/2017 5/4/2016    A1C SCREENING 9/22/2017 3/22/2017, 10/11/2016, 4/11/2016, 11/28/2015, 9/1/2015, 2/4/2015, 11/17/2014, 5/19/2014, 10/31/2013, 6/12/2013, 3/8/2013, 12/11/2012, 11/26/2012    FASTING LIPID PROFILE 3/22/2018 3/22/2017, 10/11/2016, 4/11/2016, 11/28/2015, 9/5/2015, 2/4/2015, 2/3/2014, 10/31/2013, 6/12/2013, 3/8/2013, 11/26/2012, 1/26/2011, 8/25/2009    URINE ACR / MICROALBUMIN 3/22/2018 3/22/2017, 10/11/2016, 4/11/2016, 9/5/2015, 2/4/2015, 5/19/2014, 6/12/2013, 11/26/2012    SERUM CREATININE 3/22/2018 3/22/2017, 10/11/2016, 4/11/2016, 2/2/2016, 1/29/2016, 12/19/2015, 11/28/2015, 9/5/2015, 2/13/2015, 2/12/2015, 2/4/2015, 11/17/2014, 5/19/2014, 2/3/2014, 10/31/2013, 9/11/2013, 6/12/2013, 4/12/2013, 4/3/2013, 4/1/2013, 3/31/2013, 3/30/2013, 3/29/2013, 3/8/2013, 11/26/2012, 1/29/2011, 1/28/2011, 1/27/2011, 1/26/2011, 1/25/2011, 8/25/2009    MAMMOGRAM 5/2/2018 5/2/2016, 10/29/2014, 4/28/2014, 4/24/2014, 12/3/2012, 10/25/2011, 9/16/2010, 9/11/2009, 9/11/2009    COLONOSCOPY 6/7/2019 6/7/2016, 6/3/2016, 1/10/2011    BONE DENSITY 11/21/2019 11/21/2014    IMM DTaP/Tdap/Td Vaccine (2 - Td) 6/13/2026 6/13/2016, 4/14/2016, 6/19/2010            Current Immunizations     13-VALENT PCV PREVNAR 9/17/2014    Influenza TIV (IM) 10/31/2014, 8/30/2010    Influenza Vaccine Adult HD 9/19/2015    Influenza Vaccine Quad Inj (Pf) 9/11/2013, 9/4/2012, 8/2/2011    Pneumococcal polysaccharide vaccine (PPSV-23) 2/1/2017    TD Vaccine 6/19/2010  6:30 PM    Tdap Vaccine 6/13/2016, 4/14/2016  5:25 PM    Tuberculin Skin Test 6/24/2014 10:15 AM, 6/17/2014      Below and/or attached  are the medications your provider expects you to take. Review all of your home medications and newly ordered medications with your provider and/or pharmacist. Follow medication instructions as directed by your provider and/or pharmacist. Please keep your medication list with you and share with your provider. Update the information when medications are discontinued, doses are changed, or new medications (including over-the-counter products) are added; and carry medication information at all times in the event of emergency situations     Allergies:  CODEINE - Hives,Nausea     LIPITOR - Myalgia     LOVASTATIN - Myalgia     ZOCOR - Myalgia               Medications  Valid as of: April 06, 2017 - 11:16 AM    Generic Name Brand Name Tablet Size Instructions for use    Aspirin (Chew Tab) ASA 81 MG Take 1 Tab by mouth every day.        Blood Glucose Monitoring Suppl (Misc) Blood Glucose Monitoring Suppl SUPPLIES One touch Ultra test strips for blood sugar check twice a day        Cholecalciferol (Tab) Vitamin D-3 5000 UNITS Take 5,000 Units by mouth every day.        Citalopram Hydrobromide (Tab) CELEXA 10 MG Take 1 Tab by mouth every day.        Fenofibrate (Tab) TRIGLIDE 160 MG TAKE ONE TABLET BY MOUTH EVERY DAY (GENERIC FOR TRIGLIDE)        Lansoprazole (CAPSULE DELAYED RELEASE) PREVACID 30 MG Take 1 Cap by mouth every day.        Levothyroxine Sodium (Tab) SYNTHROID 112 MCG Take 1 Tab by mouth every day.        Lisinopril-Hydrochlorothiazide (Tab) PRINZIDE, ZESTORETIC 20-25 MG Take 1 Tab by mouth every day.        LORazepam (Tab) ATIVAN 2 MG Take 2 Tabs by mouth at bedtime as needed. For sleep or anxiety        MetFORMIN HCl (TABLET SR 24 HR) GLUCOPHAGE  MG Take 2 Tabs by mouth every day.        Omeprazole (CAPSULE DELAYED RELEASE) PRILOSEC 40 MG Take 1 Cap by mouth every day.        .                 Medicines prescribed today were sent to:     [facility][id number] - [place], [place] - [address] [address]  [facility] [address]    Phone: [phone] Fax: [phone]    Open 24 Hours?: No      Medication refill instructions:       If your prescription bottle indicates you have medication refills left, it is not necessary to call your provider’s office. Please contact your pharmacy and they will refill your medication.    If your prescription bottle indicates you do not have any refills left, you may request refills at any time through one of the following ways: The online Indexing system (except Urgent Care), by calling your provider’s office, or by asking your pharmacy to contact your provider’s office with a refill request. Medication refills are processed only during regular business hours and may not be available until the next business day. Your provider may request additional information or to have a follow-up visit with you prior to refilling your medication.   *Please Note: Medication refills are assigned a new Rx number when refilled electronically. Your pharmacy may indicate that no refills were authorized even though a new prescription for the same medication is available at the pharmacy. Please request the medicine by name with the pharmacy before contacting your provider for a refill.        Your To Do List     Future Labs/Procedures Complete By Expires    CBC WITH DIFFERENTIAL  As directed 4/6/2018    COMP METABOLIC PANEL  As directed 4/6/2018    HEMOGLOBIN A1C  As directed 4/6/2018    LIPID PROFILE  As directed 4/6/2018    MICROALBUMIN CREAT RATIO URINE  As directed 4/6/2018    TSH  As directed 4/6/2018         Indexing Access Code: Activation code not generated  Current Indexing Status: Active

## 2017-04-07 NOTE — PROGRESS NOTES
Subjective:      Clarisse Reeves is a 76 y.o. female who presents with Diabetes  The patient is here for followup of chronic medical problems listed below. The patient is compliant with medications and having no side effects from them. Denies chest pain, abdominal pain, dyspnea, myalgias, or cough.   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 2016   • Essential hypertension 2015   • Controlled type 2 diabetes mellitus with complication, without long-term current use of insulin (CMS-[facility]) 2015   • Hypothyroidism due to acquired atrophy of thyroid 2015   • Multiple thyroid nodules- dr jaeger, neg FNA ;  no change 2015   • GERD (gastroesophageal reflux disease) 2013   • Vitamin D insufficiency 2013   • Fatty infiltration of liver 2012     Codeine; Lipitor; Lovastatin; and Zocor  Outpatient Prescriptions Prior to Visit   Medication Sig Dispense Refill   • fenofibrate (TRIGLIDE) 160 MG tablet TAKE ONE TABLET BY MOUTH EVERY DAY (GENERIC FOR TRIGLIDE) 90 Tab 0   • Blood Glucose Monitoring Suppl SUPPLIES Misc One touch Ultra test strips for blood sugar check twice a day 60 Each 6   • lansoprazole (PREVACID) 30 MG CAPSULE DELAYED RELEASE Take 1 Cap by mouth every day. 90 Cap 4   • lisinopril-hydrochlorothiazide (PRINZIDE, ZESTORETIC)  "20-25 MG per tablet Take 1 Tab by mouth every day. 90 Tab 3   • metformin ER (GLUCOPHAGE XR) 500 MG TABLET SR 24 HR Take 2 Tabs by mouth every day. 180 Tab 3   • levothyroxine (SYNTHROID) 112 MCG Tab Take 1 Tab by mouth every day. 90 Tab 3   • Cholecalciferol (VITAMIN D-3) 5000 UNITS Tab Take 5,000 Units by mouth every day. 90 Tab 1   • aspirin (ASA) 81 MG Chew Tab chewable tablet Take 1 Tab by mouth every day. 100 Tab 11   • lorazepam (ATIVAN) 2 MG tablet Take 2 Tabs by mouth at bedtime as needed. For sleep or anxiety 60 Tab 5   • omeprazole (PRILOSEC) 40 MG delayed-release capsule Take 1 Cap by mouth every day. (Patient not taking: Reported on 3/14/2017) 30 Cap 11     Facility-Administered Medications Prior to Visit   Medication Dose Route Frequency Provider Last Rate Last Dose   • cyanocobalamin (VITAMIN B-12) injection 1,000 mcg  1,000 mcg Intramuscular Q30 DAYS Siva Smart M.D.   1,000 mcg at 04/14/16 1732              Diabetes        Review of Systems   Constitutional: Negative.    HENT: Negative.    Eyes: Negative.    Respiratory: Negative.    Cardiovascular: Negative.    Gastrointestinal: Negative.    Genitourinary: Negative.    Musculoskeletal: Negative.    Skin: Negative.    Neurological: Negative.    Endo/Heme/Allergies: Negative.    Psychiatric/Behavioral: Negative.           Objective:     /65 mmHg  Pulse 76  Temp(Src) 36.3 °C (97.3 °F)  Ht 1.676 m (5' 5.98\")  Wt 97.07 kg (214 lb)  BMI 34.56 kg/m2  SpO2 96%     Physical Exam   Constitutional: She is oriented to person, place, and time. She appears well-developed and well-nourished.   HENT:   Head: Normocephalic and atraumatic.   Right Ear: External ear normal.   Left Ear: External ear normal.   Nose: Nose normal.   Mouth/Throat: Oropharynx is clear and moist.   Eyes: Conjunctivae and EOM are normal. Pupils are equal, round, and reactive to light. Right eye exhibits no discharge. Left eye exhibits no discharge. No scleral icterus. "   Neck: Normal range of motion. Neck supple. No JVD present. No tracheal deviation present. No thyromegaly present.   Cardiovascular: Normal rate, regular rhythm, normal heart sounds and intact distal pulses.  Exam reveals no gallop and no friction rub.    Pulmonary/Chest: Effort normal and breath sounds normal. No stridor. No respiratory distress. She has no wheezes. She has no rales. She exhibits no tenderness.   Abdominal: Soft. Bowel sounds are normal. She exhibits no distension and no mass. There is no tenderness. There is no rebound and no guarding. No hernia.   Musculoskeletal: Normal range of motion. She exhibits no edema or tenderness.   Lymphadenopathy:     She has no cervical adenopathy.   Neurological: She is alert and oriented to person, place, and time. She has normal reflexes. She displays normal reflexes. No cranial nerve deficit. Coordination normal.   Skin: Skin is warm and dry. No rash noted. No erythema. No pallor.   Psychiatric: She has a normal mood and affect. Her behavior is normal. Judgment and thought content normal.   Nursing note and vitals reviewed.    Hospital Outpatient Visit on 03/22/2017   Component Date Value   • Free T-4 03/22/2017 1.28    • TSH 03/22/2017 0.840    Hospital Outpatient Visit on 03/22/2017   Component Date Value   • TSH 03/22/2017 0.870    • Sodium 03/22/2017 137    • Potassium 03/22/2017 4.2    • Chloride 03/22/2017 101    • Co2 03/22/2017 29    • Anion Gap 03/22/2017 7.0    • Glucose 03/22/2017 96    • Bun 03/22/2017 13    • Creatinine 03/22/2017 1.01    • Calcium 03/22/2017 9.3    • AST(SGOT) 03/22/2017 20    • ALT(SGPT) 03/22/2017 22    • Alkaline Phosphatase 03/22/2017 56    • Total Bilirubin 03/22/2017 0.6    • Albumin 03/22/2017 4.0    • Total Protein 03/22/2017 6.6    • Globulin 03/22/2017 2.6    • A-G Ratio 03/22/2017 1.5    • Cholesterol,Tot 03/22/2017 164    • Triglycerides 03/22/2017 174*   • HDL 03/22/2017 36*   • LDL 03/22/2017 93    • WBC 03/22/2017  23.6*   • RBC 03/22/2017 4.15*   • Hemoglobin 03/22/2017 12.3    • Hematocrit 03/22/2017 37.8    • MCV 03/22/2017 91.1    • MCH 03/22/2017 29.6    • MCHC 03/22/2017 32.5*   • RDW 03/22/2017 45.7    • Platelet Count 03/22/2017 371    • MPV 03/22/2017 9.5    • Nucleated RBC 03/22/2017 0.00    • NRBC (Absolute) 03/22/2017 0.00    • Neutrophils-Polys 03/22/2017 29.60*   • Lymphocytes 03/22/2017 65.10*   • Monocytes 03/22/2017 3.10    • Eosinophils 03/22/2017 1.30    • Basophils 03/22/2017 0.40    • Immature Granulocytes 03/22/2017 0.50    • Neutrophils (Absolute) 03/22/2017 6.98    • Lymphs (Absolute) 03/22/2017 15.36*   • Monos (Absolute) 03/22/2017 0.73    • Eos (Absolute) 03/22/2017 0.31    • Baso (Absolute) 03/22/2017 0.09    • Immature Granulocytes (a* 03/22/2017 0.12*   • Hypochromia 03/22/2017 2+    • Anisocytosis 03/22/2017 1+    • Macrocytosis 03/22/2017 1+    • Glycohemoglobin 03/22/2017 6.3*   • Est Avg Glucose 03/22/2017 134    • Creatinine, Urine 03/22/2017 76.80    • Microalbumin, Urine Rand* 03/22/2017 <0.7    • Micro Alb Creat Ratio 03/22/2017 see below    • Plt Estimation 03/22/2017 Normal    • RBC Morphology 03/22/2017 Present    • Large Platelets 03/22/2017 1+    • Polychromia 03/22/2017 1+    • Poikilocytosis 03/22/2017 1+    • Ovalocytes 03/22/2017 1+    • Comments-Diff 03/22/2017 see below    • GFR If  03/22/2017 >60    • GFR If Non  Ameri* 03/22/2017 53*      Lab Results   Component Value Date/Time    GLYCOHEMOGLOBIN 6.3* 03/22/2017 12:19 PM    GLYCOHEMOGLOBIN 5.9* 10/11/2016 07:04 AM     Lab Results   Component Value Date/Time    SODIUM 137 03/22/2017 12:19 PM    POTASSIUM 4.2 03/22/2017 12:19 PM    CHLORIDE 101 03/22/2017 12:19 PM    CO2 29 03/22/2017 12:19 PM    GLUCOSE 96 03/22/2017 12:19 PM    BUN 13 03/22/2017 12:19 PM    CREATININE 1.01 03/22/2017 12:19 PM    BUN-CREATININE RATIO 19 11/17/2014 10:29 AM    ALKALINE PHOSPHATASE 56 03/22/2017 12:19 PM    AST(SGOT) 20  03/22/2017 12:19 PM    ALT(SGPT) 22 03/22/2017 12:19 PM    TOTAL BILIRUBIN 0.6 03/22/2017 12:19 PM     Lab Results   Component Value Date/Time    INR 0.93 12/16/2015 03:17 PM    INR 1.01 02/13/2015 12:45 AM    INR 0.85* 02/12/2015 06:03 PM     Lab Results   Component Value Date/Time    CHOLESTEROL, 03/22/2017 12:19 PM    LDL 93 03/22/2017 12:19 PM    HDL 36* 03/22/2017 12:19 PM    TRIGLYCERIDES 174* 03/22/2017 12:19 PM       No results found for: TESTOSTERONE  Lab Results   Component Value Date/Time    TSH 0.617 04/30/2014 10:35 AM    TSH 0.383* 02/06/2014 01:26 PM     Lab Results   Component Value Date/Time    FREE T-4 1.28 03/22/2017 12:21 PM    FREE T-4 1.35 10/11/2016 07:04 AM     No results found for: URICACID  No components found for: VITB12  Lab Results   Component Value Date/Time    25-HYDROXY   VITAMIN D 25 33 10/11/2016 07:04 AM    25-HYDROXY   VITAMIN D 25 37 04/11/2016 08:07 AM                   Assessment/Plan:     1. Moderate episode of recurrent major depressive disorder (CMS-HCC)Under good control. Continue same regimen.     - citalopram (CELEXA) 10 MG tablet; Take 1 Tab by mouth every day.  Dispense: 90 Tab; Refill: 4    2. Mixed hyperlipidemia   Under good control. Continue same regimen.    3. Controlled type 2 diabetes mellitus with complication, without long-term current use of insulin (CMS-HCC)Under good control. Continue same regimen.     - COMP METABOLIC PANEL; Future  - LIPID PROFILE; Future  - CBC WITH DIFFERENTIAL; Future  - HEMOGLOBIN A1C; Future  - MICROALBUMIN CREAT RATIO URINE; Future    4. Essential hypertensionUnder good control. Continue same regimen.       5. Hypothyroidism due to acquired atrophy of thyroidUnder good control. Continue same regimen.     - TSH; Future    6. 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 Continue same regimen.     7. CLL (chronic  lymphocytic leukemia)- wbc= 20k-30k, since 2006; no rx; dr Wynn good control. Continue same regimen.     8. Obesity (BMI 30.0-34.9)   diet/exercise/lose 15 lbs.; patient counseled       9. Fatty infiltration of liver   diet/exercise/lose 15 lbs.; patient counseled         10. Gastroesophageal reflux disease with esophagitis   Under good control. Continue same regimen.    11. Vitamin D insufficiencyUnder good control. Continue same regimen.      12. Primary insomniaUnder good control. Continue same regimen.       - lorazepam (ATIVAN) 2 MG tablet; Take 2 Tabs by mouth at bedtime as needed. For sleep or anxiety  Dispense: 60 Tab; Refill: 5      40 minute face-to-face encounter took place today.  More than half of this time was spent in the coordination of care of the above problems, as well as counseling.

## 2017-05-10 RX ORDER — LEVOTHYROXINE SODIUM 112 UG/1
TABLET ORAL
Qty: 90 TAB | Refills: 4 | Status: ON HOLD | OUTPATIENT
Start: 2017-05-10 | End: 2017-12-01

## 2017-06-01 ENCOUNTER — RESOLUTE PROFESSIONAL BILLING HOSPITAL PROF FEE (OUTPATIENT)
Dept: HOSPITALIST | Facility: MEDICAL CENTER | Age: 77
End: 2017-06-01
Payer: MEDICARE

## 2017-06-01 ENCOUNTER — APPOINTMENT (OUTPATIENT)
Dept: RADIOLOGY | Facility: MEDICAL CENTER | Age: 77
DRG: 871 | End: 2017-06-01
Attending: EMERGENCY MEDICINE
Payer: MEDICARE

## 2017-06-01 ENCOUNTER — HOSPITAL ENCOUNTER (INPATIENT)
Facility: MEDICAL CENTER | Age: 77
LOS: 5 days | DRG: 871 | End: 2017-06-06
Attending: EMERGENCY MEDICINE | Admitting: INTERNAL MEDICINE
Payer: MEDICARE

## 2017-06-01 DIAGNOSIS — R65.21 SEPTIC SHOCK(785.52): ICD-10-CM

## 2017-06-01 DIAGNOSIS — A41.9 SEPSIS, DUE TO UNSPECIFIED ORGANISM: ICD-10-CM

## 2017-06-01 DIAGNOSIS — J18.9 PNEUMONIA OF RIGHT LOWER LOBE DUE TO INFECTIOUS ORGANISM: ICD-10-CM

## 2017-06-01 DIAGNOSIS — A41.9 SEPTIC SHOCK(785.52): ICD-10-CM

## 2017-06-01 DIAGNOSIS — J18.9 CAP (COMMUNITY ACQUIRED PNEUMONIA): ICD-10-CM

## 2017-06-01 DIAGNOSIS — C91.10 CLL (CHRONIC LYMPHOCYTIC LEUKEMIA) (HCC): ICD-10-CM

## 2017-06-01 PROBLEM — N28.9 ACUTE RENAL INSUFFICIENCY: Status: ACTIVE | Noted: 2017-06-01

## 2017-06-01 LAB
ALBUMIN SERPL BCP-MCNC: 4.4 G/DL (ref 3.2–4.9)
ALBUMIN/GLOB SERPL: 1.5 G/DL
ALP SERPL-CCNC: 53 U/L (ref 30–99)
ALT SERPL-CCNC: 26 U/L (ref 2–50)
ANION GAP SERPL CALC-SCNC: 10 MMOL/L (ref 0–11.9)
APPEARANCE UR: CLEAR
APTT PPP: 26.9 SEC (ref 24.7–36)
AST SERPL-CCNC: 27 U/L (ref 12–45)
BASE EXCESS BLDV CALC-SCNC: -6 MMOL/L
BASOPHILS # BLD AUTO: 0 % (ref 0–1.8)
BASOPHILS # BLD: 0 K/UL (ref 0–0.12)
BILIRUB SERPL-MCNC: 0.4 MG/DL (ref 0.1–1.5)
BILIRUB UR QL STRIP.AUTO: NEGATIVE
BNP SERPL-MCNC: 74 PG/ML (ref 0–100)
BODY TEMPERATURE: ABNORMAL CENTIGRADE
BUN SERPL-MCNC: 21 MG/DL (ref 8–22)
CALCIUM SERPL-MCNC: 8.8 MG/DL (ref 8.4–10.2)
CHLORIDE SERPL-SCNC: 101 MMOL/L (ref 96–112)
CO2 SERPL-SCNC: 25 MMOL/L (ref 20–33)
COLOR UR: YELLOW
CREAT SERPL-MCNC: 1.13 MG/DL (ref 0.5–1.4)
EOSINOPHIL # BLD AUTO: 0 K/UL (ref 0–0.51)
EOSINOPHIL NFR BLD: 0 % (ref 0–6.9)
ERYTHROCYTE [DISTWIDTH] IN BLOOD BY AUTOMATED COUNT: 45.4 FL (ref 35.9–50)
GFR SERPL CREATININE-BSD FRML MDRD: 47 ML/MIN/1.73 M 2
GLOBULIN SER CALC-MCNC: 2.9 G/DL (ref 1.9–3.5)
GLUCOSE BLD-MCNC: 114 MG/DL (ref 65–99)
GLUCOSE BLD-MCNC: 118 MG/DL (ref 65–99)
GLUCOSE BLD-MCNC: 119 MG/DL (ref 65–99)
GLUCOSE SERPL-MCNC: 170 MG/DL (ref 65–99)
GLUCOSE UR STRIP.AUTO-MCNC: NEGATIVE MG/DL
HCO3 BLDV-SCNC: 20 MMOL/L (ref 24–28)
HCT VFR BLD AUTO: 36.9 % (ref 37–47)
HGB BLD-MCNC: 12.2 G/DL (ref 12–16)
INR PPP: 0.95 (ref 0.87–1.13)
KETONES UR STRIP.AUTO-MCNC: NEGATIVE MG/DL
LACTATE BLD-SCNC: 1.38 MMOL/L (ref 0.5–2)
LACTATE BLD-SCNC: 1.53 MMOL/L (ref 0.5–2)
LACTATE BLD-SCNC: 2.29 MMOL/L (ref 0.5–2)
LACTATE BLD-SCNC: 3 MMOL/L (ref 0.5–2)
LEUKOCYTE ESTERASE UR QL STRIP.AUTO: NEGATIVE
LYMPHOCYTES # BLD AUTO: 13.3 K/UL (ref 1–4.8)
LYMPHOCYTES NFR BLD: 38 % (ref 22–41)
MANUAL DIFF BLD: NORMAL
MCH RBC QN AUTO: 29.8 PG (ref 27–33)
MCHC RBC AUTO-ENTMCNC: 33.1 G/DL (ref 33.6–35)
MCV RBC AUTO: 90 FL (ref 81.4–97.8)
MICRO URNS: NORMAL
MONOCYTES # BLD AUTO: 0.7 K/UL (ref 0–0.85)
MONOCYTES NFR BLD AUTO: 2 % (ref 0–13.4)
NEUTROPHILS # BLD AUTO: 21 K/UL (ref 2–7.15)
NEUTROPHILS NFR BLD: 60 % (ref 44–72)
NITRITE UR QL STRIP.AUTO: NEGATIVE
NRBC # BLD AUTO: 0 K/UL
NRBC BLD AUTO-RTO: 0 /100 WBC
PCO2 BLDV: 42.7 MMHG (ref 41–51)
PH BLDV: 7.3 [PH] (ref 7.31–7.45)
PH UR STRIP.AUTO: 6 [PH]
PLATELET # BLD AUTO: 371 K/UL (ref 164–446)
PMV BLD AUTO: 9.8 FL (ref 9–12.9)
PO2 BLDV: 41.6 MMHG (ref 25–40)
POTASSIUM SERPL-SCNC: 4.3 MMOL/L (ref 3.6–5.5)
PROT SERPL-MCNC: 7.3 G/DL (ref 6–8.2)
PROT UR QL STRIP: NEGATIVE MG/DL
PROTHROMBIN TIME: 12.5 SEC (ref 12–14.6)
RBC # BLD AUTO: 4.1 M/UL (ref 4.2–5.4)
RBC UR QL AUTO: NEGATIVE
SAO2 % BLDV: 73.1 %
SODIUM SERPL-SCNC: 136 MMOL/L (ref 135–145)
SP GR UR STRIP.AUTO: <=1.005
SPECIMEN DRAWN FROM PATIENT: ABNORMAL
SPECIMEN DRAWN FROM PATIENT: ABNORMAL
SPECIMEN DRAWN FROM PATIENT: NORMAL
SPECIMEN DRAWN FROM PATIENT: NORMAL
TROPONIN I SERPL-MCNC: 0.03 NG/ML (ref 0–0.04)
TROPONIN I SERPL-MCNC: 0.03 NG/ML (ref 0–0.04)
TROPONIN I SERPL-MCNC: 0.04 NG/ML (ref 0–0.04)
WBC # BLD AUTO: 35 K/UL (ref 4.8–10.8)

## 2017-06-01 PROCEDURE — 74177 CT ABD & PELVIS W/CONTRAST: CPT

## 2017-06-01 PROCEDURE — 700105 HCHG RX REV CODE 258: Performed by: INTERNAL MEDICINE

## 2017-06-01 PROCEDURE — 83880 ASSAY OF NATRIURETIC PEPTIDE: CPT

## 2017-06-01 PROCEDURE — 96375 TX/PRO/DX INJ NEW DRUG ADDON: CPT

## 2017-06-01 PROCEDURE — 80053 COMPREHEN METABOLIC PANEL: CPT

## 2017-06-01 PROCEDURE — 96367 TX/PROPH/DG ADDL SEQ IV INF: CPT

## 2017-06-01 PROCEDURE — 71010 DX-CHEST-PORTABLE (1 VIEW): CPT

## 2017-06-01 PROCEDURE — 87040 BLOOD CULTURE FOR BACTERIA: CPT | Mod: 91

## 2017-06-01 PROCEDURE — 700117 HCHG RX CONTRAST REV CODE 255: Performed by: EMERGENCY MEDICINE

## 2017-06-01 PROCEDURE — A9270 NON-COVERED ITEM OR SERVICE: HCPCS | Performed by: INTERNAL MEDICINE

## 2017-06-01 PROCEDURE — 36415 COLL VENOUS BLD VENIPUNCTURE: CPT

## 2017-06-01 PROCEDURE — 700111 HCHG RX REV CODE 636 W/ 250 OVERRIDE (IP): Performed by: EMERGENCY MEDICINE

## 2017-06-01 PROCEDURE — 83605 ASSAY OF LACTIC ACID: CPT | Mod: 91

## 2017-06-01 PROCEDURE — 99291 CRITICAL CARE FIRST HOUR: CPT

## 2017-06-01 PROCEDURE — 85610 PROTHROMBIN TIME: CPT

## 2017-06-01 PROCEDURE — 85027 COMPLETE CBC AUTOMATED: CPT

## 2017-06-01 PROCEDURE — 770022 HCHG ROOM/CARE - ICU (200)

## 2017-06-01 PROCEDURE — 96361 HYDRATE IV INFUSION ADD-ON: CPT

## 2017-06-01 PROCEDURE — 96365 THER/PROPH/DIAG IV INF INIT: CPT

## 2017-06-01 PROCEDURE — 700111 HCHG RX REV CODE 636 W/ 250 OVERRIDE (IP)

## 2017-06-01 PROCEDURE — 700102 HCHG RX REV CODE 250 W/ 637 OVERRIDE(OP): Performed by: INTERNAL MEDICINE

## 2017-06-01 PROCEDURE — 85007 BL SMEAR W/DIFF WBC COUNT: CPT

## 2017-06-01 PROCEDURE — 84484 ASSAY OF TROPONIN QUANT: CPT | Mod: 91

## 2017-06-01 PROCEDURE — 99291 CRITICAL CARE FIRST HOUR: CPT | Performed by: INTERNAL MEDICINE

## 2017-06-01 PROCEDURE — 304562 HCHG STAT O2 MASK/CANNULA

## 2017-06-01 PROCEDURE — 85730 THROMBOPLASTIN TIME PARTIAL: CPT

## 2017-06-01 PROCEDURE — 94760 N-INVAS EAR/PLS OXIMETRY 1: CPT

## 2017-06-01 PROCEDURE — 82803 BLOOD GASES ANY COMBINATION: CPT

## 2017-06-01 PROCEDURE — 82962 GLUCOSE BLOOD TEST: CPT | Mod: 91

## 2017-06-01 PROCEDURE — 81003 URINALYSIS AUTO W/O SCOPE: CPT

## 2017-06-01 PROCEDURE — 700111 HCHG RX REV CODE 636 W/ 250 OVERRIDE (IP): Performed by: INTERNAL MEDICINE

## 2017-06-01 PROCEDURE — 87086 URINE CULTURE/COLONY COUNT: CPT

## 2017-06-01 PROCEDURE — 700105 HCHG RX REV CODE 258: Performed by: EMERGENCY MEDICINE

## 2017-06-01 RX ORDER — BENZONATATE 100 MG/1
100 CAPSULE ORAL 3 TIMES DAILY PRN
Status: DISCONTINUED | OUTPATIENT
Start: 2017-06-01 | End: 2017-06-06 | Stop reason: HOSPADM

## 2017-06-01 RX ORDER — MIDAZOLAM HYDROCHLORIDE 1 MG/ML
1-5 INJECTION INTRAMUSCULAR; INTRAVENOUS ONCE
Status: COMPLETED | OUTPATIENT
Start: 2017-06-01 | End: 2017-06-01

## 2017-06-01 RX ORDER — SODIUM CHLORIDE 9 MG/ML
INJECTION, SOLUTION INTRAVENOUS CONTINUOUS
Status: DISCONTINUED | OUTPATIENT
Start: 2017-06-01 | End: 2017-06-01

## 2017-06-01 RX ORDER — POLYETHYLENE GLYCOL 3350 17 G/17G
1 POWDER, FOR SOLUTION ORAL
Status: DISCONTINUED | OUTPATIENT
Start: 2017-06-01 | End: 2017-06-05

## 2017-06-01 RX ORDER — ONDANSETRON 2 MG/ML
4 INJECTION INTRAMUSCULAR; INTRAVENOUS ONCE
Status: COMPLETED | OUTPATIENT
Start: 2017-06-01 | End: 2017-06-01

## 2017-06-01 RX ORDER — ASPIRIN 81 MG/1
81 TABLET, CHEWABLE ORAL DAILY
Status: DISCONTINUED | OUTPATIENT
Start: 2017-06-01 | End: 2017-06-06 | Stop reason: HOSPADM

## 2017-06-01 RX ORDER — AZITHROMYCIN 250 MG/1
250 TABLET, FILM COATED ORAL EVERY 24 HOURS
Status: DISCONTINUED | OUTPATIENT
Start: 2017-06-02 | End: 2017-06-06 | Stop reason: HOSPADM

## 2017-06-01 RX ORDER — AZITHROMYCIN 250 MG/1
500 TABLET, FILM COATED ORAL ONCE
Status: DISCONTINUED | OUTPATIENT
Start: 2017-06-01 | End: 2017-06-01

## 2017-06-01 RX ORDER — LORAZEPAM 2 MG/ML
0.5 INJECTION INTRAMUSCULAR ONCE
Status: COMPLETED | OUTPATIENT
Start: 2017-06-01 | End: 2017-06-01

## 2017-06-01 RX ORDER — LORAZEPAM 2 MG/ML
0.5 INJECTION INTRAMUSCULAR EVERY 6 HOURS PRN
Status: DISCONTINUED | OUTPATIENT
Start: 2017-06-01 | End: 2017-06-06 | Stop reason: HOSPADM

## 2017-06-01 RX ORDER — AMPICILLIN AND SULBACTAM 2; 1 G/1; G/1
3 INJECTION, POWDER, FOR SOLUTION INTRAMUSCULAR; INTRAVENOUS ONCE
Status: COMPLETED | OUTPATIENT
Start: 2017-06-01 | End: 2017-06-01

## 2017-06-01 RX ORDER — SODIUM CHLORIDE 9 MG/ML
30 INJECTION, SOLUTION INTRAVENOUS
Status: DISCONTINUED | OUTPATIENT
Start: 2017-06-01 | End: 2017-06-06 | Stop reason: HOSPADM

## 2017-06-01 RX ORDER — ACETAMINOPHEN 325 MG/1
650 TABLET ORAL EVERY 6 HOURS PRN
Status: DISCONTINUED | OUTPATIENT
Start: 2017-06-01 | End: 2017-06-06 | Stop reason: HOSPADM

## 2017-06-01 RX ORDER — SODIUM CHLORIDE 9 MG/ML
1000 INJECTION, SOLUTION INTRAVENOUS ONCE
Status: COMPLETED | OUTPATIENT
Start: 2017-06-01 | End: 2017-06-01

## 2017-06-01 RX ORDER — LORAZEPAM 0.5 MG/1
0.5 TABLET ORAL EVERY 6 HOURS PRN
Status: DISCONTINUED | OUTPATIENT
Start: 2017-06-01 | End: 2017-06-06 | Stop reason: HOSPADM

## 2017-06-01 RX ORDER — ONDANSETRON 2 MG/ML
4 INJECTION INTRAMUSCULAR; INTRAVENOUS EVERY 4 HOURS PRN
Status: DISCONTINUED | OUTPATIENT
Start: 2017-06-01 | End: 2017-06-06 | Stop reason: HOSPADM

## 2017-06-01 RX ORDER — CITALOPRAM 20 MG/1
10 TABLET ORAL DAILY
Status: DISCONTINUED | OUTPATIENT
Start: 2017-06-01 | End: 2017-06-06 | Stop reason: HOSPADM

## 2017-06-01 RX ORDER — OMEPRAZOLE 20 MG/1
20 CAPSULE, DELAYED RELEASE ORAL DAILY
Status: DISCONTINUED | OUTPATIENT
Start: 2017-06-01 | End: 2017-06-06 | Stop reason: HOSPADM

## 2017-06-01 RX ORDER — ONDANSETRON 4 MG/1
4 TABLET, ORALLY DISINTEGRATING ORAL EVERY 4 HOURS PRN
Status: DISCONTINUED | OUTPATIENT
Start: 2017-06-01 | End: 2017-06-06 | Stop reason: HOSPADM

## 2017-06-01 RX ORDER — SODIUM CHLORIDE 9 MG/ML
1000 INJECTION, SOLUTION INTRAVENOUS
Status: COMPLETED | OUTPATIENT
Start: 2017-06-01 | End: 2017-06-01

## 2017-06-01 RX ORDER — MIDAZOLAM HYDROCHLORIDE 1 MG/ML
INJECTION INTRAMUSCULAR; INTRAVENOUS
Status: COMPLETED
Start: 2017-06-01 | End: 2017-06-01

## 2017-06-01 RX ORDER — LEVOTHYROXINE SODIUM 112 UG/1
112 TABLET ORAL
Status: DISCONTINUED | OUTPATIENT
Start: 2017-06-01 | End: 2017-06-06 | Stop reason: HOSPADM

## 2017-06-01 RX ORDER — AMOXICILLIN 250 MG
2 CAPSULE ORAL 2 TIMES DAILY
Status: DISCONTINUED | OUTPATIENT
Start: 2017-06-01 | End: 2017-06-05

## 2017-06-01 RX ORDER — BISACODYL 10 MG
10 SUPPOSITORY, RECTAL RECTAL
Status: DISCONTINUED | OUTPATIENT
Start: 2017-06-01 | End: 2017-06-05

## 2017-06-01 RX ORDER — LANSOPRAZOLE 30 MG/1
30 CAPSULE, DELAYED RELEASE ORAL DAILY
Status: DISCONTINUED | OUTPATIENT
Start: 2017-06-01 | End: 2017-06-01

## 2017-06-01 RX ORDER — SODIUM CHLORIDE 9 MG/ML
30 INJECTION, SOLUTION INTRAVENOUS ONCE
Status: COMPLETED | OUTPATIENT
Start: 2017-06-01 | End: 2017-06-01

## 2017-06-01 RX ORDER — GUAIFENESIN 600 MG/1
600 TABLET, EXTENDED RELEASE ORAL EVERY 12 HOURS
Status: DISCONTINUED | OUTPATIENT
Start: 2017-06-01 | End: 2017-06-06 | Stop reason: HOSPADM

## 2017-06-01 RX ADMIN — ENOXAPARIN SODIUM 40 MG: 100 INJECTION SUBCUTANEOUS at 11:21

## 2017-06-01 RX ADMIN — IOHEXOL 100 ML: 350 INJECTION, SOLUTION INTRAVENOUS at 08:03

## 2017-06-01 RX ADMIN — SODIUM CHLORIDE: 9 INJECTION, SOLUTION INTRAVENOUS at 06:38

## 2017-06-01 RX ADMIN — AMPICILLIN SODIUM AND SULBACTAM SODIUM 3 G: 2; 1 INJECTION, POWDER, FOR SOLUTION INTRAMUSCULAR; INTRAVENOUS at 17:44

## 2017-06-01 RX ADMIN — ONDANSETRON 4 MG: 2 INJECTION INTRAMUSCULAR; INTRAVENOUS at 06:56

## 2017-06-01 RX ADMIN — ONDANSETRON 4 MG: 2 INJECTION INTRAMUSCULAR; INTRAVENOUS at 06:33

## 2017-06-01 RX ADMIN — MIDAZOLAM HYDROCHLORIDE 5 MG: 1 INJECTION, SOLUTION INTRAMUSCULAR; INTRAVENOUS at 14:56

## 2017-06-01 RX ADMIN — GUAIFENESIN 600 MG: 600 TABLET, EXTENDED RELEASE ORAL at 11:20

## 2017-06-01 RX ADMIN — MIDAZOLAM HYDROCHLORIDE 5 MG: 1 INJECTION INTRAMUSCULAR; INTRAVENOUS at 14:56

## 2017-06-01 RX ADMIN — AMPICILLIN SODIUM AND SULBACTAM SODIUM 3 G: 2; 1 INJECTION, POWDER, FOR SOLUTION INTRAMUSCULAR; INTRAVENOUS at 12:22

## 2017-06-01 RX ADMIN — ASPIRIN 81 MG CHEWABLE TABLET 81 MG: 81 TABLET CHEWABLE at 11:20

## 2017-06-01 RX ADMIN — SODIUM CHLORIDE: 9 INJECTION, SOLUTION INTRAVENOUS at 11:06

## 2017-06-01 RX ADMIN — AZITHROMYCIN MONOHYDRATE 500 MG: 500 INJECTION, POWDER, LYOPHILIZED, FOR SOLUTION INTRAVENOUS at 08:10

## 2017-06-01 RX ADMIN — HYDROMORPHONE HYDROCHLORIDE 0.5 MG: 1 INJECTION, SOLUTION INTRAMUSCULAR; INTRAVENOUS; SUBCUTANEOUS at 06:36

## 2017-06-01 RX ADMIN — SODIUM CHLORIDE 1000 ML: 9 INJECTION, SOLUTION INTRAVENOUS at 12:25

## 2017-06-01 RX ADMIN — AMPICILLIN SODIUM AND SULBACTAM SODIUM 3 G: 2; 1 INJECTION, POWDER, FOR SOLUTION INTRAMUSCULAR; INTRAVENOUS at 07:33

## 2017-06-01 RX ADMIN — SODIUM CHLORIDE 2856 ML: 9 INJECTION, SOLUTION INTRAVENOUS at 06:52

## 2017-06-01 RX ADMIN — SODIUM CHLORIDE 1000 ML: 900 INJECTION, SOLUTION INTRAVENOUS at 09:20

## 2017-06-01 RX ADMIN — ONDANSETRON 4 MG: 2 INJECTION INTRAMUSCULAR; INTRAVENOUS at 15:20

## 2017-06-01 RX ADMIN — CITALOPRAM HYDROBROMIDE 10 MG: 20 TABLET ORAL at 11:21

## 2017-06-01 RX ADMIN — LORAZEPAM 0.5 MG: 2 INJECTION INTRAMUSCULAR; INTRAVENOUS at 07:16

## 2017-06-01 RX ADMIN — ACETAMINOPHEN 650 MG: 325 TABLET, FILM COATED ORAL at 11:20

## 2017-06-01 RX ADMIN — OMEPRAZOLE 20 MG: 20 CAPSULE, DELAYED RELEASE ORAL at 11:20

## 2017-06-01 RX ADMIN — GUAIFENESIN 600 MG: 600 TABLET, EXTENDED RELEASE ORAL at 20:56

## 2017-06-01 ASSESSMENT — LIFESTYLE VARIABLES
TOTAL SCORE: 0
EVER FELT BAD OR GUILTY ABOUT YOUR DRINKING: NO
HOW MANY TIMES IN THE PAST YEAR HAVE YOU HAD 5 OR MORE DRINKS IN A DAY: 0
HAVE YOU EVER FELT YOU SHOULD CUT DOWN ON YOUR DRINKING: NO
TOTAL SCORE: 0
ALCOHOL_USE: YES
EVER_SMOKED: YES
AVERAGE NUMBER OF DAYS PER WEEK YOU HAVE A DRINK CONTAINING ALCOHOL: 3
TOTAL SCORE: 0
ON A TYPICAL DAY WHEN YOU DRINK ALCOHOL HOW MANY DRINKS DO YOU HAVE: 1
HAVE PEOPLE ANNOYED YOU BY CRITICIZING YOUR DRINKING: NO
CONSUMPTION TOTAL: NEGATIVE
EVER_SMOKED: YES
EVER HAD A DRINK FIRST THING IN THE MORNING TO STEADY YOUR NERVES TO GET RID OF A HANGOVER: NO

## 2017-06-01 ASSESSMENT — COPD QUESTIONNAIRES
DO YOU EVER COUGH UP ANY MUCUS OR PHLEGM?: NO/ONLY WITH OCCASIONAL COLDS OR INFECTIONS
COPD SCREENING SCORE: 4
HAVE YOU SMOKED AT LEAST 100 CIGARETTES IN YOUR ENTIRE LIFE: YES
DURING THE PAST 4 WEEKS HOW MUCH DID YOU FEEL SHORT OF BREATH: NONE/LITTLE OF THE TIME

## 2017-06-01 ASSESSMENT — PAIN SCALES - GENERAL
PAINLEVEL_OUTOF10: 0
PAINLEVEL_OUTOF10: 4
PAINLEVEL_OUTOF10: 2

## 2017-06-01 NOTE — IP AVS SNAPSHOT
[employer] Access Code: Activation code not generated  Current [employer] Status: Active    [employer]  A secure, online tool to manage your health information     [employer]® is a secure, online tool that connects you to your personalized health information from the privacy of your home -- day or night - making it very easy for you to manage your healthcare. Once the activation process is completed, you can even access your medical information using the [employer] josey, which is available for free in the Apple Josey store or Google Play store.     [employer] provides the following levels of access (as shown below):   My Chart Features   [facility] Primary Care Doctor [facility]  Specialists [facility]  Urgent  Care Non-[facility]  Primary Care  Doctor   Email your healthcare team securely and privately 24/7 X X X X   Manage appointments: schedule your next appointment; view details of past/upcoming appointments X      Request prescription refills. X      View recent personal medical records, including lab and immunizations X X X X   View health record, including health history, allergies, medications X X X X   Read reports about your outpatient visits, procedures, consult and ER notes X X X X   See your discharge summary, which is a recap of your hospital and/or ER visit that includes your diagnosis, lab results, and care plan. X X       How to register for [employer]  1. Go to  [email or web address].  2. Click on the Sign Up Now box, which takes you to the New Member Sign Up page. You will need to provide the following information:  a. Enter your [employer] Access Code exactly as it appears at the top of this page. (You will not need to use this code after you’ve completed the sign-up process. If you do not sign up before the expiration date, you must request a new code.)   b. Enter your date of birth.   c. Enter your home email address.   d. Click Submit, and follow the next screen’s instructions.  3. Create a [employer] ID. This will  be your [facility] login ID and cannot be changed, so think of one that is secure and easy to remember.  4. Create a [facility] password. You can change your password at any time.  5. Enter your Password Reset Question and Answer. This can be used at a later time if you forget your password.   6. Enter your e-mail address. This allows you to receive e-mail notifications when new information is available in [facility].  7. Click Sign Up. You can now view your health information.    For assistance activating your [facility] account, call [phone]

## 2017-06-01 NOTE — IP AVS SNAPSHOT
" <p align=\"LEFT\"><IMG SRC=\"//[email or web address]$/[email or web address]\" alt=\"Image\" WIDTH=\"50%\" HEIGHT=\"200\" BORDER=\"\"></p>                   Name:Clarisse Reeves  Medical Record Number:[id number]  CSN: [id number]    YOB: 1940   Age: 76 y.o.  Sex: female  HT:1.676 m (5' 5.98\") WT: 100.2 kg (220 lb 14.4 oz)          Admit Date: 6/1/2017     Discharge Date:   Today's Date: 6/6/2017  Attending Doctor:  Mireille Crane D.O.                  Allergies:  Codeine; Lipitor; Lovastatin; and Zocor          Your appointments     Sep 01, 2017  1:00 PM   Established Patient with Demaroc Bowers M.D.   [facility] ([facility]    [address], [address]  [facility] [address] [phone]           You will be receiving a confirmation call a few days before your appointment from our automated call confirmation system.            Sep 11, 2017 11:00 AM   CT BODY WO 30 with [facility] CT 1   [facility] - CT - [facility])    [address] [facility] [address] [phone]           Some exams require specific prep instructions that would have been given to you at time of scheduling. If you have any additional questions about the prep instructions, please call Imaging Scheduling at [phone] and press #2.            Sep 19, 2017  1:00 PM   Established Patient Pul with CHERRY Boateng   [facility] (--)    [address]  [address]  [place] [address] [phone]            Oct 09, 2017 10:40 AM   Diabetes Care Visit with Siva Smart M.D., NIRALI DIABETES RN   [facility] [address] [facility] ([facility])    [address]  [place] [address] [phone]           You will be receiving a confirmation call a few days before your appointment from our automated call confirmation system.              Follow-up Information     1. Follow up with [facility] .    Specialty:  Home Health Services    Contact information    [address] " [facility]  [place] [address]           Medication List      Take these Medications        Instructions    amoxicillin-clavulanate 875-125 MG Tabs   Commonly known as:  AUGMENTIN    Take 1 Tab by mouth every 12 hours.   Dose:  1 Tab       aspirin 81 MG Chew chewable tablet   Commonly known as:  ASA    Take 1 Tab by mouth every day.   Dose:  81 mg       citalopram 10 MG tablet   Commonly known as:  CELEXA    Take 1 Tab by mouth every day.   Dose:  10 mg       fenofibrate 160 MG tablet   Commonly known as:  TRIGLIDE    Doctor's comments:  Needs to be seen for any more refills, after this one; make 3 mos appt now   TAKE ONE TABLET BY MOUTH EVERY DAY (GENERIC FOR TRIGLIDE)       lansoprazole 30 MG Cpdr   Commonly known as:  PREVACID    Doctor's comments:  Replaces 15 mg pill   Take 1 Cap by mouth every day.   Dose:  30 mg       levothyroxine 112 MCG Tabs   Commonly known as:  SYNTHROID    TAKE ONE TABLET BY MOUTH EVERY DAY       lisinopril-hydrochlorothiazide 20-25 MG per tablet   Commonly known as:  PRINZIDE, ZESTORETIC    Take 1 Tab by mouth every day.   Dose:  1 Tab       lorazepam 2 MG tablet   Commonly known as:  ATIVAN    Take 2 Tabs by mouth at bedtime as needed. For sleep or anxiety   Dose:  4 mg       metformin  MG Tb24   What changed:  how much to take   Commonly known as:  GLUCOPHAGE XR    Take 2 Tabs by mouth every day.   Dose:  1000 mg       Vitamin D-3 5000 UNITS Tabs    Take 5,000 Units by mouth every day.   Dose:  5000 Units

## 2017-06-01 NOTE — IP AVS SNAPSHOT
" Home Care Instructions                                                                                                                  Name:Clarisse Reeves  Medical Record Number:[id number]  CSN: [id number]    YOB: 1940   Age: 76 y.o.  Sex: female  HT:1.676 m (5' 5.98\") WT: 100.2 kg (220 lb 14.4 oz)          Admit Date: 6/1/2017     Discharge Date:   Today's Date: 6/6/2017  Attending Doctor:  Mireille Crane D.O.                  Allergies:  Codeine; Lipitor; Lovastatin; and Zocor            Discharge Instructions       Discharge Instructions    Discharged to home by car with relative. Discharged via wheelchair, hospital escort: Yes.  Special equipment needed: Oxygen    Be sure to schedule a follow-up appointment with your primary care doctor or any specialists as instructed.     Discharge Plan:   Diet Plan: Discussed  Activity Level: Discussed  Confirmed Follow up Appointment: Patient to Call and Schedule Appointment  Confirmed Symptoms Management: Discussed  Medication Reconciliation Updated: Yes  Influenza Vaccine Indication: Not indicated: Previously immunized this influenza season and > 8 years of age    I understand that a diet low in cholesterol, fat, and sodium is recommended for good health. Unless I have been given specific instructions below for another diet, I accept this instruction as my diet prescription.   Other diet: as tolerated    Special Instructions: Clostridium Difficile Toxin  This is a test which may be done when a patient has diarrhea that lasts for several days, or has abdominal pain, fever, and nausea after antibiotic therapy.   This test looks for the presence of Clostridium difficile (C.diff.) toxin in a stool sample. C.diff. is a germ (bacterium) that is one of the groups of bacteria that are usually in the colon, called \"normal deangelo.\" If something upsets the growth of the other normal deangelo, C.diff. may overgrow and disrupt the balance of bacteria in the colon. C. diff. " may produce two toxins, A and B. The combination of overgrowth and toxins may cause prolonged diarrhea. The toxins may damage the lining of the colon and lead to colitis.   While some cases of C. diff. diarrhea and colitis do not require treatment, others require specific oral antibiotic therapy. Most patients improve as the normal deangelo re-establishes itself, but about some may have one or more relapses, with symptoms and detectible toxin levels coming back.  PREPARATION FOR TEST  There is no special preparation for the test. A fresh stool sample is collected in a sterile container. The sample should not be mixed with urine or water. The stool should be taken to the lab within an hour. It may be refrigerated or frozen and taken to the lab as soon as possible. The container should be labeled with your name and the date and time of the stool collection.   NORMAL FINDINGS  Negative Tissue Culture (no toxin identified)  Ranges for normal findings may vary among different laboratories and hospitals. You should always check with your doctor after having lab work or other tests done to discuss the meaning of your test results and whether your values are considered within normal limits.  MEANING OF TEST   Your caregiver will go over the test results with you and discuss the importance and meaning of your results, as well as treatment options and the need for additional tests if necessary.  OBTAINING THE TEST RESULTS  It is your responsibility to obtain your test results. Ask the lab or department performing the test when and how you will get your results.  Document Released: 01/10/2006 Document Revised: 03/11/2013 Document Reviewed: 11/26/2009  [employer]® Patient Information ©2014 [employer].  Pneumonia, Adult  Pneumonia is an infection of the lungs.   CAUSES  Pneumonia may be caused by bacteria or a virus. Usually, the infection is caused by breathing in droplets from an infected person's cough or sneeze.   SYMPTOMS      Symptoms of pneumonia include:  · Cough.  · Fever.  · Chest pain.  · Rapid breathing.  · Shortness of breath.  · Shaking chills.  · Mucus production.  DIAGNOSIS   If you have the common symptoms of pneumonia, often your health care provider will confirm the diagnosis with a chest X-ray. The X-ray will show an abnormality in the lung if you have pneumonia. Other tests may be done on your blood, urine, or mucus (sputum) to find the specific cause of your pneumonia. A blood gas test or pulse oximetry test may be needed to check how well your lungs are working.  TREATMENT   Your treatment will depend on whether your pneumonia is caused by bacteria or a virus.   · Bacterial pneumonia is treated with antibiotic medicine.  · Pneumonia that is caused by the influenza virus may be treated with an antiviral medicine.  · Pneumonia that is caused by a virus other than influenza will not respond to antibiotic medicine. This type of pneumonia will have to run its course.   HOME CARE INSTRUCTIONS   · Cough suppressants may be used if you are losing too much rest from coughing at night. However, you should try to avoid taking cough suppresants. This is because coughing helps to remove mucus from your lungs.  · Sleep in a semi-upright position at night. Try sleeping in a reclining chair, or place a few pillows under your head.  · Try using a cold steam vaporizer or humidifier in your home or bedroom. This may help loosen your mucus.  · If you were prescribed an antibiotic medicine, finish all of it even if you start to feel better.  · If you were prescribed an expectorant, take it as directed by your health care provider. This medicine loosens the mucus so you can cough it up.  · Take medicines only as directed by your health care provider.  · Do not smoke. If you are a smoker and continue to smoke, your cough may last several weeks after your pneumonia has cleared.  · Get rest when you feel tired, or as needed.  PREVENTION  A  pneumococcal shot (vaccine) is available to prevent a common bacterial cause of pneumonia. This is usually suggested for:  · People over 65 years old.  · People on chemotherapy.  · People with chronic lung problems, such as bronchitis or emphysema.  · People with immune system problems.  If you are over 65 years old or have a high risk condition, you may receive the pneumococcal vaccine if you have not received it before. In some countries, a routine influenza vaccine is also recommended. This vaccine can help prevent some cases of pneumonia. You may be offered the influenza vaccine as part of your care.  If you are a smoker, it is time to quit in order to prevent pneumonia in the future. You may receive instructions on how to stop smoking. Your health care provider can provide medicines and counseling to help you quit.  SEEK MEDICAL CARE IF:  · You have a fever.  · You cannot control your cough with suppressants at night, and you keep losing sleep.  SEEK IMMEDIATE MEDICAL CARE IF:   · You have worsening shortness of breath.  · You have increased chest pain.  · Your sickness becomes worse, especially if you are an older adult or have a weakened immune system.  · You cough up blood.  · You have pain that is getting worse or is not controlled with medicines.  · Your symptoms are getting worse rather than better.     This information is not intended to replace advice given to you by your health care provider. Make sure you discuss any questions you have with your health care provider.     Document Released: 12/18/2006 Document Revised: 01/08/2016 Document Reviewed: 04/13/2016  [employer] Patient Education ©2016 [employer]  Sepsis, Adult  Sepsis is a serious infection of your blood or tissues that affects your whole body. The infection that causes sepsis may be bacterial, viral, fungal, or parasitic. Sepsis may be life threatening. Sepsis can cause your blood pressure to drop. This may result in shock.  Shock causes your central nervous system and your organs to stop working correctly.   RISK FACTORS  Sepsis can happen in anyone, but it is more likely to happen in people who have weakened immune systems.  SIGNS AND SYMPTOMS   Symptoms of sepsis can include:  · Fever or low body temperature (hypothermia).  · Rapid breathing (hyperventilation).  · Chills.  · Rapid heartbeat (tachycardia).  · Confusion or light-headedness.  · Trouble breathing.  · Urinating much less than usual.  · Cool, clammy skin or red, flushed skin.  · Other problems with the heart, kidneys, or brain.  DIAGNOSIS   Your health care provider will likely do tests to look for an infection, to see if the infection has spread to your blood, and to see how serious your condition is. Tests can include:  · Blood tests, including cultures of your blood.  · Cultures of other fluids from your body, such as:  · Urine.  · Pus from wounds.  · Mucus coughed up from your lungs.  · Urine tests other than cultures.  · X-ray exams or other imaging tests.  TREATMENT   Treatment will begin with elimination of the source of infection. If your sepsis is likely caused by a bacterial or fungal infection, you will be given antibiotic or antifungal medicines.  You may also receive:  · Oxygen.  · Fluids through an IV tube.  · Medicines to increase your blood pressure.  · A machine to clean your blood (dialysis) if your kidneys fail.  · A machine to help you breathe if your lungs fail.  SEEK IMMEDIATE MEDICAL CARE IF:  You get an infection or develop any of the signs and symptoms of sepsis after surgery or a hospitalization.     This information is not intended to replace advice given to you by your health care provider. Make sure you discuss any questions you have with your health care provider.     Document Released: 09/15/2004 Document Revised: 05/03/2016 Document Reviewed: 08/25/2014  [email or web address] Interactive Patient Education ©2016 [email or web address]      · Is patient discharged  on Warfarin / Coumadin?   No     · Is patient Post Blood Transfusion?  No    Depression / Suicide Risk    As you are discharged from this [facility] facility, it is important to learn how to keep safe from harming yourself.    Recognize the warning signs:  · Abrupt changes in personality, positive or negative- including increase in energy   · Giving away possessions  · Change in eating patterns- significant weight changes-  positive or negative  · Change in sleeping patterns- unable to sleep or sleeping all the time   · Unwillingness or inability to communicate  · Depression  · Unusual sadness, discouragement and loneliness  · Talk of wanting to die  · Neglect of personal appearance   · Rebelliousness- reckless behavior  · Withdrawal from people/activities they love  · Confusion- inability to concentrate     If you or a loved one observes any of these behaviors or has concerns about self-harm, here's what you can do:  · Talk about it- your feelings and reasons for harming yourself  · Remove any means that you might use to hurt yourself (examples: pills, rope, extension cords, firearm)  · Get professional help from the community (Mental Health, Substance Abuse, psychological counseling)  · Do not be alone:Call your Safe Contact- someone whom you trust who will be there for you.  · Call your local CRISIS HOTLINE 350-5042 or 433-370-1314  · Call your local Children's Mobile Crisis Response Team [place] (856) 710-9516 or www.[employer]  · Call the toll free National Suicide Prevention Hotlines   · National Suicide Prevention Lifeline 761-382-HESS (4331)  · National Hope Line Network 800-SUICIDE (094-0786)        Your appointments     Sep 01, 2017  1:00 PM   Established Patient with Demarco Bowers M.D.   [facility] ([facility])    [address]  [facility] [address] [phone]           You will be receiving a confirmation call a few days before your  appointment from our automated call confirmation system.            Sep 11, 2017 11:00 AM   CT BODY WO 30 with [facility] 1   [facility] - [place] - [place])    [address] [facility] [address] [phone]           Some exams require specific prep instructions that would have been given to you at time of scheduling. If you have any additional questions about the prep instructions, please call Imaging Scheduling at [phone] and press #2.            Sep 19, 2017  1:00 PM   Established Patient Pul with CHERRY Boateng   [facility] (--)    [address]  [address]  [facility] [address] [phone]            Oct 09, 2017 10:40 AM   Diabetes Care Visit with Siva Smart M.D., NIRALI DIABETES RN   [facility] 25 [facility] ([facility])    [address]  [facility] [address] [phone]           You will be receiving a confirmation call a few days before your appointment from our automated call confirmation system.              Follow-up Information     1. Follow up with [facility] .    Specialty:  Home Health Services    Contact information    [address] [facility] [address]           Discharge Medication Instructions:    Below are the medications your physician expects you to take upon discharge:    Review all your home medications and newly ordered medications with your doctor and/or pharmacist. Follow medication instructions as directed by your doctor and/or pharmacist.    Please keep your medication list with you and share with your physician.               Medication List      START taking these medications        Instructions    Morning Afternoon Evening Bedtime    amoxicillin-clavulanate 875-125 MG Tabs   Last time this was given:  1 Tab on 6/6/2017  8:05 AM   Commonly known as:  AUGMENTIN        Take 1 Tab by mouth every 12 hours.   Dose:  1 Tab                          CHANGE how you take these medications         Instructions    Morning Afternoon Evening Bedtime    metformin  MG Tb24   What changed:  how much to take   Commonly known as:  GLUCOPHAGE XR        Take 2 Tabs by mouth every day.   Dose:  1000 mg                          CONTINUE taking these medications        Instructions    Morning Afternoon Evening Bedtime    aspirin 81 MG Chew chewable tablet   Last time this was given:  81 mg on 6/6/2017  8:05 AM   Commonly known as:  ASA        Take 1 Tab by mouth every day.   Dose:  81 mg                        citalopram 10 MG tablet   Last time this was given:  10 mg on 6/6/2017  8:05 AM   Commonly known as:  CELEXA        Take 1 Tab by mouth every day.   Dose:  10 mg                        fenofibrate 160 MG tablet   Commonly known as:  TRIGLIDE        Doctor's comments:  Needs to be seen for any more refills, after this one; make 3 mos appt now   TAKE ONE TABLET BY MOUTH EVERY DAY (GENERIC FOR TRIGLIDE)                        lansoprazole 30 MG Cpdr   Commonly known as:  PREVACID        Doctor's comments:  Replaces 15 mg pill   Take 1 Cap by mouth every day.   Dose:  30 mg                        levothyroxine 112 MCG Tabs   Last time this was given:  112 mcg on 6/6/2017  6:22 AM   Commonly known as:  SYNTHROID        TAKE ONE TABLET BY MOUTH EVERY DAY                        lisinopril-hydrochlorothiazide 20-25 MG per tablet   Commonly known as:  PRINZIDE, ZESTORETIC        Take 1 Tab by mouth every day.   Dose:  1 Tab                        lorazepam 2 MG tablet   Last time this was given:  0.5 mg on 6/5/2017  8:37 PM   Commonly known as:  ATIVAN        Take 2 Tabs by mouth at bedtime as needed. For sleep or anxiety   Dose:  4 mg                        Vitamin D-3 5000 UNITS Tabs        Take 5,000 Units by mouth every day.   Dose:  5000 Units                             Where to Get Your Medications      Information about where to get these medications is not yet available     ! Ask your nurse or doctor about  these medications    - amoxicillin-clavulanate 875-125 MG Tabs            Orders for after discharge     DME Nebulizer    Complete by:  As directed    Small volume nebulizer and supplies.       DME O2 New Set Up    Complete by:  As directed        REFERRAL TO HOME HEALTH    Complete by:  As directed    Home health will create and establish a plan of care             Instructions           Diet / Nutrition:    Follow any diet instructions given to you by your doctor or the dietician, including how much salt (sodium) you are allowed each day.    If you are overweight, talk to your doctor about a weight reduction plan.    Activity:    Remain physically active following your doctor's instructions about exercise and activity.    Rest often.     Any time you become even a little tired or short of breath, SIT DOWN and rest.    Worsening Symptoms:    Report any of the following signs and symptoms to the doctor's office immediately:    *Pain of jaw, arm, or neck  *Chest pain not relieved by medication                               *Dizziness or loss of consciousness  *Difficulty breathing even when at rest   *More tired than usual                                       *Bleeding drainage or swelling of surgical site  *Swelling of feet, ankles, legs or stomach                 *Fever (>100ºF)  *Pink or blood tinged sputum  *Weight gain (3lbs/day or 5lbs /week)           *Shock from internal defibrillator (if applicable)  *Palpitations or irregular heartbeats                *Cool and/or numb extremities    Stroke Awareness    Common Risk Factors for Stroke include:    Age  Atrial Fibrillation  Carotid Artery Stenosis  Diabetes Mellitus  Excessive alcohol consumption  High blood pressure  Overweight   Physical inactivity  Smoking    Warning signs and symptoms of a stroke include:    *Sudden numbness or weakness of the face, arm or leg (especially on one side of the body).  *Sudden confusion, trouble speaking or  understanding.  *Sudden trouble seeing in one or both eyes.  *Sudden trouble walking, dizziness, loss of balance or coordination.Sudden severe headache with no known cause.    It is very important to get treatment quickly when a stroke occurs. If you experience any of the above warning signs, call 911 immediately.                   Disclaimer         Quit Smoking / Tobacco Use:    I understand the use of any tobacco products increases my chance of suffering from future heart disease or stroke and could cause other illnesses which may shorten my life. Quitting the use of tobacco products is the single most important thing I can do to improve my health. For further information on smoking / tobacco cessation call a Toll Free Quit Line at 1-797.828.8667 (*National Cancer [place]) or 1-275.895.3721 (American Lung Association) or you can access the web based program at www.lungusa.org.    [place] Tobacco Users Help Line:  (129) 546-5436       Toll Free: 1-939.305.3417  Quit Tobacco Program [facility] (720)830-6896    Crisis Hotline:    [place] Crisis Hotline:  5-045-STBBPDO or 1-829.232.2167    [place] Crisis Hotline:    1-449.308.2681 or 135-105-6594    Discharge Survey:   Thank you for choosing [facility]. We hope we did everything we could to make your hospital stay a pleasant one. You may be receiving a phone survey and we would appreciate your time and participation in answering the questions. Your input is very valuable to us in our efforts to improve our service to our patients and their families.        My signature on this form indicates that:    1. I have reviewed and understand the above information.  2. My questions regarding this information have been answered to my satisfaction.  3. I have formulated a plan with my discharge nurse to obtain my prescribed medications for home.                  Disclaimer         __________________________________                     __________        ________                       Patient Signature                                                 Date                    Time

## 2017-06-01 NOTE — FLOWSHEET NOTE
06/01/17 1215   Interdisciplinary Plan of Care-Goals (Indications)   Hyperinflation Protocol Indications Atelectasis Documented by Chest X-Ray;Restrictive Lung Disorder / Consolidation   Interdisciplinary Plan of Care-Outcomes    Hyperinflation Protocol Goals/Outcome Greater Than 60% of Predicted I.S. Volume x 24 hrs   Education   Education Yes - Pt. / Family has been Instructed in use of Respiratory Equipment   Incentive Spirometry Group   Incentive Spirometry Instruction Yes   Incentive Spirometer Volume 1650 mL   Incentive Spirometer Date Last Changed 06/01/17   Incentive Spirometer Next Change Date (Q 30 Days) 07/01/17   Chest Exam   Respiration (!) 32   Pulse 68   Heart Rate (Monitored) 67   Breath Sounds   RML Breath Sounds Clear   RLL Breath Sounds Diminished;Fine Crackles   JERED Breath Sounds Clear   LLL Breath Sounds Diminished   Oximetry   Continuous Oximetry Yes   Oxygen   Home O2 Use Prior To Admission? No   Pulse Oximetry 100 %   O2 (LPM) 2   O2 Daily Delivery Respiratory  Nasal Cannula

## 2017-06-01 NOTE — ED NOTES
"Pt here with c/o    Chief Complaint   Patient presents with   • Epigastric Pain     radiates to LLQ x 2 days   • Generalized Body Aches   • Nausea/Vomiting/Diarrhea     started last night   • Dizziness   • Fever     102 degree fever yesterday       /76 mmHg  Pulse 114  Temp(Src) 39.8 °C (103.6 °F)  Resp 20  Ht 1.676 m (5' 5.98\")  Wt 95.2 kg (209 lb 14.1 oz)  BMI 33.89 kg/m2  SpO2 95%    "

## 2017-06-01 NOTE — PROGRESS NOTES
[id number] - Pt admitted to  324 from ER via gurney.  A&O x4.  Up to amb to bed with asst x 1, unsteady on feet.  C/o dizziness with amb.  Friend at bs.  Pt oriented to , bed controls, call light.  Fall precautions in effect, bed alarm on.  Pt c/o mild sob oe, O2 per nc ongoing, mild nausea, declines intervention, mild HA, see mar, and dizziness with amb, pt encouraged to call for asst.  Assessment completed, see flowsheet.  poc reviewed with pt, pt vu, all questions answered.  Tele = sr, freq vs ongoing, vss  Will cont to monitor.  1300 - call to update Dr Suárez on pt's b/p.  Will continue with infusing bolus and update MD.  2605 - update to Dr Suárez with pt's b/p.

## 2017-06-01 NOTE — ED PROVIDER NOTES
ED Provider Note    CHIEF COMPLAINT  Chief Complaint   Patient presents with   • Epigastric Pain     radiates to LLQ x 2 days   • Generalized Body Aches   • Nausea/Vomiting/Diarrhea     started last night   • Dizziness   • Fever     102 degree fever yesterday       HPI  Clarisse Reeves is a 76 y.o. female who presents for evaluation of abdominal pain, vomiting, and diarrhea. The patient's been sick over the past 2 days. She states that she is had both vomiting and diarrhea. Her abdominal pain is generalized at this point she states it seemed to start in the epigastric region. She states she feels dehydrated. She had a temperature of 102 yesterday. She is status post cholecystectomy, appendectomy, and hysterectomy. She is on metformin for type II diabetes. She denies any urinary symptoms.    REVIEW OF SYSTEMS  See HPI for further details. All other systems are negative.     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   • Type II or unspecified type diabetes mellitus without mention of complication, not stated as uncontrolled      pre-diabetic   • Pneumonia 2014   • Cataract      right  and  left  IOL   • DM (diabetes mellitus) (CMS-[facility])        FAMILY HISTORY  Family History   Problem Relation Age of Onset   • Cancer Mother    • Lung Disease Father    • Cancer Father        SOCIAL HISTORY  Social History     Social History   • Marital Status: Single     Spouse Name: N/A   • Number of Children: N/A   • Years of Education: N/A     Social History Main Topics   • Smoking status: Former Smoker -- 2.00 packs/day for 50 years     " Types: Cigarettes     Quit date: 05/06/2006   • Smokeless tobacco: Never Used      Comment: quit smoking 2002   • Alcohol Use: 0.0 oz/week     0 Standard drinks or equivalent per week      Comment: 2 drinks per week   • Drug Use: No   • Sexual Activity: Not Currently     Other Topics Concern   • Not on file     Social History Narrative       SURGICAL HISTORY  Past Surgical History   Procedure Laterality Date   • Us-needle core bx-breast panel     • Vaginal hysterectomy total       Hysterectomy,Total Vaginal   • Recovery  12/18/2015     Procedure: CT-SCP-RUL LUNG BIOPSY-;  Surgeon: Recoveryon Surgery;  Location: SURGERY PRE-POST PROC UNIT [facility] [place];  Service:    • Other orthopedic surgery  1990     bakers cyst removed in right knee, multiple scopes   • Appendectomy  1955   • Cholecystectomy  1995   • Other  2001     Lower Left segment of lung removed    • Other  1984     left Thyroid removed, might remove right side in the future   • Other  1990     hemmroid removal   • Thoracoscopy Right 2/1/2016     Procedure: THORACOSCOPY Upper Lobectomy ;  Surgeon: John H Ganser, M.D.;  Location: SURGERY [facility];  Service:    • Node dissection Right 2/1/2016     Procedure: NODE DISSECTION;  Surgeon: John H Ganser, M.D.;  Location: SURGERY [facility];  Service:    • Cataract extraction with iol     • Tonsillectomy         CURRENT MEDICATIONS  Home Medications     **Home medications have not yet been reviewed for this encounter**          ALLERGIES  Allergies   Allergen Reactions   • Codeine Hives and Nausea     RXN=40 years ago   • Lipitor [Atorvastatin Calcium] Myalgia     SAT=7453     • Lovastatin Myalgia     Muscle aches  JOH=7896   • Zocor [Simvastatin - High Dose] Myalgia     Severe myalgias  AKG=9389       PHYSICAL EXAM  VITAL SIGNS: /76 mmHg  Pulse 114  Temp(Src) 39.8 °C (103.6 °F)  Resp 20  Ht 1.676 m (5' 5.98\")  Wt 95.2 kg (209 lb 14.1 oz)  BMI 33.89 kg/m2  SpO2 95%    Constitutional: Well " developed, Well nourished,  Non-toxic appearance.   HENT: Normocephalic, Atraumatic. Somewhat dry mucous membranes.  Eyes:  EOMI, Conjunctiva normal, No discharge.   Cardiovascular: Tachycardic, Normal rhythm, No murmurs, No rubs, No gallops.   Thorax & Lungs: Lungs clear to auscultation bilaterally without wheezes, rales or rhonchi. No respiratory distress.    Abdomen:  Soft, mild diffuse tenderness, No masses, No pulsatile masses. No guarding and no rebound.  Skin: Warm, Dry.   Musculoskeletal: Good range of motion in all major joints.  Neurologic: Awake alert.    EKG  EKG Interpretation    Interpreted by emergency department physician    Rhythm: sinus tachycardia  Rate: 100-110  Axis: normal  Ectopy: premature atrial contraction  Conduction: normal  ST Segments: no acute change  T Waves: no acute change  Q Waves: none    Clinical Impression: sinus tachycardia        RADIOLOGY/PROCEDURES  CT-ABDOMEN-PELVIS WITH   Final Result      1.  No evidence of bowel obstruction or inflammatory change.      2.  Fatty liver.      3.  Prior cholecystectomy.      4.  Hiatal hernia.      DX-CHEST-PORTABLE (1 VIEW)   Final Result      Minimal right lower lobe interstitial and alveolar edema or pneumonia.            COURSE & MEDICAL DECISION MAKING  Pertinent Labs & Imaging studies reviewed. (See chart for details)  This is a 76-year-old here for evaluation of vomiting and diarrhea as well as epigastric abdominal pain. She is febrile and tachycardic on arrival and meet sepsis criteria and therefore sepsis protocol is initiated. The patient is hydrated with 30 ML's per kilogram of normal saline. Laboratories include chemistries which are normal with the exception of a glucose of 170. Liver function studies are normal. The patient has a CBC showing a white count of 35. She does have a history of CLL and chronically has white counts in the 20s. Differential today shows 60 polys and 30 lymphocytes. Lactate is elevated at 3.0. Chest  x-ray shows a minimal right lower lobe interstitial and alveolar infiltrate which I believe represents a pneumonia. The patient is treated with antibiotics for sepsis protocol community-acquired pneumonia. Patient had an episode of feeling short of breath. When I went in to reevaluate her she was 100% on room air saturations. Her breath sounds were clear and full bilaterally. She didn't seem to have symptoms when we were speaking but when not speaking to have occasional gasping respirations. This seemed to be more anxiety related and she is treated with Ativan with improvement. When I explained to her that she appears to have pneumonia she states that that would explain why she's had such a bad cough over the past week although those 1st time she never mentioned a cough. I will discuss case with Dr. Suárez of the hospitalist service and he will be the primary admitting physician.      FINAL IMPRESSION  1. Sepsis  2. Pneumonia  3. CLL         Electronically signed by: Jj Barboza, 6/1/2017 6:23 AM

## 2017-06-01 NOTE — ED NOTES
Called into room for possible resp distress, sitting up in bed, able to talk in complete sentences and take a deep breath.  ERP at bedside.  Oxygen increased to 3L NC to keep SPO2> 90%.  New PIV started in RAC, medicated as ordered.

## 2017-06-01 NOTE — IP AVS SNAPSHOT
6/6/2017    Clarisse Reeves  [address]  [place] [address]    Dear Clarisse:    [facility] wants to ensure your discharge home is safe and you or your loved ones have had all of your questions answered regarding your care after you leave the hospital.    Below is a list of resources and contact information should you have any questions regarding your hospital stay, follow-up instructions, or active medical symptoms.    Questions or Concerns Regarding… Contact   Medical Questions Related to Your Discharge  (7 days a week, 8am-5pm) Contact a Nurse Care Coordinator   [phone]   Medical Questions Not Related to Your Discharge  (24 hours a day / 7 days a week)  Contact the Nurse Health Line   [phone]    Medications or Discharge Instructions Refer to your discharge packet   or contact your [facility] Primary Care Provider   [phone]   Follow-up Appointment(s) Schedule your appointment via Ryan   or contact Scheduling [phone]   Billing Review your statement via Ryan  or contact Billing [phone]   Medical Records Review your records via Ryan   or contact Medical Records [phone]     You may receive a telephone call within two days of discharge. This call is to make certain you understand your discharge instructions and have the opportunity to have any questions answered. You can also easily access your medical information, test results and upcoming appointments via the Ryan free online health management tool. You can learn more and sign up at PingMD/Ryan. For assistance setting up your Ryan account, please call [phone].    Once again, we want to ensure your discharge home is safe and that you have a clear understanding of any next steps in your care. If you have any questions or concerns, please do not hesitate to contact us, we are here for you. Thank you for choosing [facility] for your healthcare needs.    Sincerely,    Your [facility] Healthcare Team

## 2017-06-02 ENCOUNTER — APPOINTMENT (OUTPATIENT)
Dept: RADIOLOGY | Facility: MEDICAL CENTER | Age: 77
End: 2017-06-02
Attending: INTERNAL MEDICINE
Payer: MEDICARE

## 2017-06-02 PROBLEM — D64.9 NORMOCYTIC ANEMIA: Status: ACTIVE | Noted: 2017-06-02

## 2017-06-02 LAB
ALBUMIN SERPL BCP-MCNC: 3.1 G/DL (ref 3.2–4.9)
ALBUMIN/GLOB SERPL: 1.6 G/DL
ALP SERPL-CCNC: 40 U/L (ref 30–99)
ALT SERPL-CCNC: 77 U/L (ref 2–50)
ANION GAP SERPL CALC-SCNC: 6 MMOL/L (ref 0–11.9)
AST SERPL-CCNC: 48 U/L (ref 12–45)
BASOPHILS # BLD AUTO: 0 % (ref 0–1.8)
BASOPHILS # BLD: 0 K/UL (ref 0–0.12)
BILIRUB SERPL-MCNC: 0.6 MG/DL (ref 0.1–1.5)
BUN SERPL-MCNC: 9 MG/DL (ref 8–22)
CALCIUM SERPL-MCNC: 7.4 MG/DL (ref 8.4–10.2)
CHLORIDE SERPL-SCNC: 106 MMOL/L (ref 96–112)
CO2 SERPL-SCNC: 24 MMOL/L (ref 20–33)
CREAT SERPL-MCNC: 0.84 MG/DL (ref 0.5–1.4)
EOSINOPHIL # BLD AUTO: 0.21 K/UL (ref 0–0.51)
EOSINOPHIL NFR BLD: 1 % (ref 0–6.9)
ERYTHROCYTE [DISTWIDTH] IN BLOOD BY AUTOMATED COUNT: 49.1 FL (ref 35.9–50)
GFR SERPL CREATININE-BSD FRML MDRD: >60 ML/MIN/1.73 M 2
GLOBULIN SER CALC-MCNC: 1.9 G/DL (ref 1.9–3.5)
GLUCOSE BLD-MCNC: 113 MG/DL (ref 65–99)
GLUCOSE BLD-MCNC: 117 MG/DL (ref 65–99)
GLUCOSE BLD-MCNC: 122 MG/DL (ref 65–99)
GLUCOSE BLD-MCNC: 136 MG/DL (ref 65–99)
GLUCOSE SERPL-MCNC: 122 MG/DL (ref 65–99)
HCT VFR BLD AUTO: 29.7 % (ref 37–47)
HGB BLD-MCNC: 9.6 G/DL (ref 12–16)
LYMPHOCYTES # BLD AUTO: 7.94 K/UL (ref 1–4.8)
LYMPHOCYTES NFR BLD: 38 % (ref 22–41)
MANUAL DIFF BLD: NORMAL
MCH RBC QN AUTO: 30.1 PG (ref 27–33)
MCHC RBC AUTO-ENTMCNC: 32.2 G/DL (ref 33.6–35)
MCV RBC AUTO: 93.4 FL (ref 81.4–97.8)
MONOCYTES # BLD AUTO: 1.05 K/UL (ref 0–0.85)
MONOCYTES NFR BLD AUTO: 5 % (ref 0–13.4)
NEUTROPHILS # BLD AUTO: 11.7 K/UL (ref 2–7.15)
NEUTROPHILS NFR BLD: 55 % (ref 44–72)
NEUTS BAND NFR BLD MANUAL: 1 % (ref 0–10)
NRBC # BLD AUTO: 0 K/UL
NRBC BLD AUTO-RTO: 0 /100 WBC
PLATELET # BLD AUTO: 255 K/UL (ref 164–446)
PLATELET BLD QL SMEAR: NORMAL
PMV BLD AUTO: 9.8 FL (ref 9–12.9)
POTASSIUM SERPL-SCNC: 3.5 MMOL/L (ref 3.6–5.5)
PROT SERPL-MCNC: 5 G/DL (ref 6–8.2)
RBC # BLD AUTO: 3.16 M/UL (ref 4.2–5.4)
RBC BLD AUTO: NORMAL
SMUDGE CELLS BLD QL SMEAR: NORMAL
SODIUM SERPL-SCNC: 136 MMOL/L (ref 135–145)
VARIANT LYMPHS BLD QL SMEAR: NORMAL
WBC # BLD AUTO: 20.9 K/UL (ref 4.8–10.8)

## 2017-06-02 PROCEDURE — 700102 HCHG RX REV CODE 250 W/ 637 OVERRIDE(OP): Performed by: INTERNAL MEDICINE

## 2017-06-02 PROCEDURE — A9270 NON-COVERED ITEM OR SERVICE: HCPCS | Performed by: INTERNAL MEDICINE

## 2017-06-02 PROCEDURE — 80053 COMPREHEN METABOLIC PANEL: CPT

## 2017-06-02 PROCEDURE — 700105 HCHG RX REV CODE 258: Performed by: INTERNAL MEDICINE

## 2017-06-02 PROCEDURE — 770020 HCHG ROOM/CARE - TELE (206)

## 2017-06-02 PROCEDURE — 85027 COMPLETE CBC AUTOMATED: CPT

## 2017-06-02 PROCEDURE — 82962 GLUCOSE BLOOD TEST: CPT | Mod: 91

## 2017-06-02 PROCEDURE — 94760 N-INVAS EAR/PLS OXIMETRY 1: CPT

## 2017-06-02 PROCEDURE — 99233 SBSQ HOSP IP/OBS HIGH 50: CPT | Performed by: INTERNAL MEDICINE

## 2017-06-02 PROCEDURE — 700111 HCHG RX REV CODE 636 W/ 250 OVERRIDE (IP): Performed by: INTERNAL MEDICINE

## 2017-06-02 PROCEDURE — 85007 BL SMEAR W/DIFF WBC COUNT: CPT

## 2017-06-02 RX ORDER — POTASSIUM CHLORIDE 20 MEQ/1
40 TABLET, EXTENDED RELEASE ORAL 2 TIMES DAILY
Status: COMPLETED | OUTPATIENT
Start: 2017-06-02 | End: 2017-06-02

## 2017-06-02 RX ADMIN — MAGNESIUM HYDROXIDE 30 ML: 400 SUSPENSION ORAL at 17:29

## 2017-06-02 RX ADMIN — DOCUSATE SODIUM AND SENNOSIDES 2 TABLET: 8.6; 5 TABLET, FILM COATED ORAL at 20:29

## 2017-06-02 RX ADMIN — AMPICILLIN SODIUM AND SULBACTAM SODIUM 3 G: 2; 1 INJECTION, POWDER, FOR SOLUTION INTRAMUSCULAR; INTRAVENOUS at 00:06

## 2017-06-02 RX ADMIN — GUAIFENESIN 600 MG: 600 TABLET, EXTENDED RELEASE ORAL at 20:29

## 2017-06-02 RX ADMIN — AMPICILLIN SODIUM AND SULBACTAM SODIUM 3 G: 2; 1 INJECTION, POWDER, FOR SOLUTION INTRAMUSCULAR; INTRAVENOUS at 17:30

## 2017-06-02 RX ADMIN — DOCUSATE SODIUM AND SENNOSIDES 2 TABLET: 8.6; 5 TABLET, FILM COATED ORAL at 08:31

## 2017-06-02 RX ADMIN — ONDANSETRON 4 MG: 2 INJECTION INTRAMUSCULAR; INTRAVENOUS at 20:29

## 2017-06-02 RX ADMIN — ASPIRIN 81 MG CHEWABLE TABLET 81 MG: 81 TABLET CHEWABLE at 08:31

## 2017-06-02 RX ADMIN — OMEPRAZOLE 20 MG: 20 CAPSULE, DELAYED RELEASE ORAL at 08:32

## 2017-06-02 RX ADMIN — AMPICILLIN SODIUM AND SULBACTAM SODIUM 3 G: 2; 1 INJECTION, POWDER, FOR SOLUTION INTRAMUSCULAR; INTRAVENOUS at 06:15

## 2017-06-02 RX ADMIN — GUAIFENESIN 600 MG: 600 TABLET, EXTENDED RELEASE ORAL at 08:32

## 2017-06-02 RX ADMIN — CITALOPRAM HYDROBROMIDE 10 MG: 20 TABLET ORAL at 08:32

## 2017-06-02 RX ADMIN — LEVOTHYROXINE SODIUM 112 MCG: 112 TABLET ORAL at 06:15

## 2017-06-02 RX ADMIN — POTASSIUM CHLORIDE 40 MEQ: 1500 TABLET, EXTENDED RELEASE ORAL at 08:32

## 2017-06-02 RX ADMIN — AMPICILLIN SODIUM AND SULBACTAM SODIUM 3 G: 2; 1 INJECTION, POWDER, FOR SOLUTION INTRAMUSCULAR; INTRAVENOUS at 11:45

## 2017-06-02 RX ADMIN — ONDANSETRON 4 MG: 4 TABLET, ORALLY DISINTEGRATING ORAL at 14:38

## 2017-06-02 RX ADMIN — AZITHROMYCIN 250 MG: 250 TABLET, FILM COATED ORAL at 08:31

## 2017-06-02 RX ADMIN — ENOXAPARIN SODIUM 40 MG: 100 INJECTION SUBCUTANEOUS at 08:32

## 2017-06-02 RX ADMIN — POTASSIUM CHLORIDE 40 MEQ: 1500 TABLET, EXTENDED RELEASE ORAL at 20:29

## 2017-06-02 ASSESSMENT — ENCOUNTER SYMPTOMS
FEVER: 0
DEPRESSION: 0
COUGH: 1
TINGLING: 0
MYALGIAS: 0
SPUTUM PRODUCTION: 0
FALLS: 0
HEADACHES: 0
DIARRHEA: 0
STRIDOR: 0
LOSS OF CONSCIOUSNESS: 0
PALPITATIONS: 0
CONSTIPATION: 0
SHORTNESS OF BREATH: 1
CHILLS: 0
WEAKNESS: 0
NAUSEA: 0
DIZZINESS: 0
VOMITING: 0
ABDOMINAL PAIN: 0

## 2017-06-02 ASSESSMENT — PAIN SCALES - GENERAL
PAINLEVEL_OUTOF10: 0

## 2017-06-02 NOTE — PROGRESS NOTES
0700-Assumed care,  bedside report received. Pt resting in bed comfortably, AAO. Pt c/o no pain. All lines, tubes, and pumps checked and verified. All orders, labs, and medications reviewed. POC discussed, questions and concerns addressed. Bed locked, fall precautions in place, call light within reach. Will continue to monitor.     0800- Patient up to chair for breakfast. MD at bedside, plan to downgrade patient from ICU status to Telemetry. Oxygen turned off to evaluate room air saturation, continuous pulse ox in place. Encouraged use of IS while sitting up.     1000-Patient back to bed per request. Oxygen placed back on 2 L as patients saturations dropped to high 80s. Encouraged continued use of IS.     1230-Patient sitting at edge of bed for lunch. No other needs at this time.     1440-Patient having some dizziness and nausea. Orthostatic BPs done while patient sitting and standing. Patient did experience dizziness, but no significant change in blood pressure and heart rate appreciated. Patient medicated for nausea. Instructed her to change positions slowly and call for assistance out of bed with emphasis on not getting out of bed on her own. Patient verbalizes understanding.     1500-Patient transferred to Med/Tele 316-2 with all belongings with CNA via wheelchair. Report given to receiving RN. Patient stable, care relinquished.

## 2017-06-02 NOTE — PROGRESS NOTES
[facility] Hospitalist Progress Note    Date of Service: 2017    Chief Complaint  76 y.o. female admitted 2017 with dyspnea and cough.    Interval Problem Update  Pt noted to have sepsis from pna, had borderline BP that is now improved.   Pt with hx of CLL, chronically high wbc.  Pt seen in ICU, ICU care given.  Discussed plan and pt condition with RN at bedside and social work.    Consultants/Specialty  none    Disposition  Improved, ok to floor.        Review of Systems   Constitutional: Positive for malaise/fatigue. Negative for fever and chills.   HENT: Negative for congestion.    Respiratory: Positive for cough and shortness of breath. Negative for sputum production and stridor.    Cardiovascular: Negative for chest pain, palpitations and leg swelling.   Gastrointestinal: Negative for nausea, vomiting, abdominal pain, diarrhea and constipation.   Genitourinary: Negative for dysuria and urgency.   Musculoskeletal: Negative for myalgias and falls.   Neurological: Negative for dizziness, tingling, loss of consciousness, weakness and headaches.   Psychiatric/Behavioral: Negative for depression and suicidal ideas.      Physical Exam  Laboratory/Imaging   Hemodynamics  Temp (24hrs), Av.1 °C (98.8 °F), Min:36.3 °C (97.4 °F), Max:37.6 °C (99.7 °F)   Temperature: 37.3 °C (99.2 °F)  Pulse  Av.2  Min: 39  Max: 114 Heart Rate (Monitored): 73  NIBP: 111/59 mmHg      Respiratory      Respiration: 16, Pulse Oximetry: 97 %, O2 Daily Delivery Respiratory : Nasal Cannula        RUL Breath Sounds: Absent, RML Breath Sounds: Clear, RLL Breath Sounds: Diminished, JERED Breath Sounds: Clear, LLL Breath Sounds: Absent    Fluids    Intake/Output Summary (Last 24 hours) at 17 1141  Last data filed at 17 1000   Gross per 24 hour   Intake   5190 ml   Output   1975 ml   Net   3215 ml       Nutrition  Orders Placed This Encounter   Procedures   • Diet Order     Standing Status: Standing      Number of Occurrences: 1       Standing Expiration Date:      Order Specific Question:  Diet:     Answer:  Diabetic [3]     Physical Exam   Constitutional: She is oriented to person, place, and time. She appears well-developed. No distress.   HENT:   Head: Normocephalic and atraumatic.   Mouth/Throat: Oropharynx is clear and moist. No oropharyngeal exudate.   Eyes: Right eye exhibits no discharge. Left eye exhibits no discharge.   Neck: Neck supple. No tracheal deviation present.   Cardiovascular: Normal rate and regular rhythm.  Exam reveals no gallop and no friction rub.    No murmur heard.  Pulmonary/Chest: Effort normal and breath sounds normal. No stridor. No respiratory distress. She has no wheezes. She has no rales. She exhibits no tenderness.   Abdominal: Soft. Bowel sounds are normal. She exhibits no distension. There is no tenderness.   Musculoskeletal: Normal range of motion. She exhibits no edema or tenderness.   Lymphadenopathy:     She has no cervical adenopathy.   Neurological: She is alert and oriented to person, place, and time. No cranial nerve deficit.   Skin: Skin is warm and dry. No rash noted. She is not diaphoretic. No erythema.   Psychiatric: She has a normal mood and affect. Her behavior is normal. Judgment and thought content normal.   Nursing note and vitals reviewed.      Recent Labs      06/01/17 0623 06/02/17 0440   WBC  35.0*  20.9*   RBC  4.10*  3.16*   HEMOGLOBIN  12.2  9.6*   HEMATOCRIT  36.9*  29.7*   MCV  90.0  93.4   MCH  29.8  30.1   MCHC  33.1*  32.2*   RDW  45.4  49.1   PLATELETCT  371  255   MPV  9.8  9.8     Recent Labs      06/01/17 0623 06/02/17   0440   SODIUM  136  136   POTASSIUM  4.3  3.5*   CHLORIDE  101  106   CO2  25  24   GLUCOSE  170*  122*   BUN  21  9   CREATININE  1.13  0.84   CALCIUM  8.8  7.4*     Recent Labs      06/01/17 0623   APTT  26.9   INR  0.95     Recent Labs      06/01/17 0623   BNPBTYPENAT  74              Assessment/Plan     Septic shock (CMS-[facility])  Assessment &  Plan  - improved, shock now resolved   - continue abx  - await culture results    CAP (community acquired pneumonia)  Assessment & Plan  - continue unasyn/azithromycin  - much improved today    GERD (gastroesophageal reflux disease) (present on admission)  Assessment & Plan  - continue omeprazole     Controlled type 2 diabetes mellitus with complication, without long-term current use of insulin (CMS-[facility]) (present on admission)  Assessment & Plan  - continue ISS, adjust as needed    Hypothyroidism due to acquired atrophy of thyroid (present on admission)  Assessment & Plan  - continue synthroid    Acute renal insufficiency  Assessment & Plan  - resolved with ivf    Normocytic anemia  Assessment & Plan  - new, no sign of gross bleeding  - will repeat cbc in am    CLL (chronic lymphocytic leukemia)- wbc= 20k-30k, since 2006; no rx; dr alegria (present on admission)  Assessment & Plan  - wbc back to baseline  - outpt f/u      Labs reviewed, Medications reviewed and Radiology images reviewed        DVT Prophylaxis: Enoxaparin (Lovenox)    Ulcer prophylaxis: Yes  Antibiotics: Treating active infection/contamination beyond 24 hours perioperative coverage

## 2017-06-02 NOTE — PROGRESS NOTES
Report received from Lynn about 1445 and pt transferred about 1500 in a wheelchair.  Pt able to ambulate short distance, but she does complain of dizziness when she stands.  Assessment completed and diminished in her lungs.  Pt on 3 liters/min which she states that she wears none at home.  Pt wanting to ambulate to the bathroom vs [facility] and has been calling for assistance.  VSS.  Tele showing SR with no ectopy.

## 2017-06-02 NOTE — H&P
Date of service: 6/1/17  ATTENDING:  [facility] hospitalist.    PRIMARY CARE PROVIDER:  Siva Smart MD    CODE STATUS:  Full code.    CHIEF COMPLAINT:  Cough and shortness of breath.    HISTORY OF PRESENT ILLNESS:  This is a 76-year-old female who presented to the   emergency department due to cough and shortness of breath.  Patient states   her cough started on Friday.  Initially, she thought it was allergies,   however, persisted.  Patient began having sputum that was yellow.  Patient   stated that started on Friday, shortly thereafter she began having abdominal   pain that was across the upper aspect of her abdomen, likely from coughing.    Patient states yesterday she developed fever and chills.  Patient states all   this continued to worsen, so she presented to the emergency department.  Upon   arrival to the emergency department, it was noted that she had a pneumonia as   well as sepsis.    PAST MEDICAL HISTORY:  1.  CLL.  2.  Hypertension.  3.  Thyroid cancer.  4.  Diabetes.    PAST SURGICAL HISTORY:  1.  Lung biopsy.  2.  Appendectomy.  3.  Hysterectomy, partial.  4.  Bilateral lung lobectomy.  5.  Left thyroidectomy.  6.  Hemorrhoidectomy.    MEDICATIONS:  1.  Aspirin 81 mg daily.  2.  Vitamin D3 5000 units daily.  3.  Celexa 10 mg daily.  4.  Vitamin B12 every 30 days.  5.  Fenofibrate 160 mg daily.  6.  Prevacid 30 mg daily.  7.  Synthroid 112 mcg daily.  8.  Lisinopril/hydrochlorothiazide 20/25 mg daily.  9.  Ativan 2 mg at bedtime p.r.n.  10.  Metformin 1000 mg daily.    ALLERGIES:  CODEINE AND STATINS.    SOCIAL HISTORY:  She quit smoking 20 years ago, occasionally drinks alcohol,   denies any illicit drug use.    FAMILY HISTORY:  Significant for unknown cancer, multiple myeloma, coronary   artery disease with myocardial infarction at the age of 54.    REVIEW OF SYSTEMS:  Complete review of systems obtained with positives noted   in the HPI above, all others negative.    PHYSICAL EXAMINATION:  VITAL  SIGNS:  Temp 37.8, pulse 101, respirations 25, blood pressure 86/47,   satting 97% on 2 liter nasal cannula.  GENERAL:  No acute distress, alert and oriented x3.  HEENT:  Normocephalic, atraumatic.  Dry mucous membranes.  NECK:  No JVD, bruit, thyromegaly, cervical or supraclavicular adenopathy   noted.  HEART:  Tachycardic.  No murmurs, rubs, or gallops appreciated.  Bilateral   pulses are 2+ with a brisk capillary refill.  LUNGS:  Scattered crackles.  No wheeze or rhonchi appreciated.  ABDOMEN:  Bowel sounds x4, soft, nontender, nondistended, no   hepatosplenomegaly.  EXTREMITIES:  No clubbing, cyanosis or edema.  SKIN:  No rash or erythema, normal in appearance.  NEUROLOGIC:  Cranial nerves II-XII intact, extraocular muscles intact.    LABORATORY DATA:  White count 35, hemoglobin 12.2, hematocrit 36.9, platelets   371.  Sodium 136, potassium 4.3, chloride 101, CO2 25, BUN 21, creatinine   1.13, glucose is 170.  Lactic acid is 3.  Troponin 0.03.  BNP is 74.    IMAGING:  There was a CT abdomen and pelvis that showed no evidence of bowel   obstruction or inflammatory change.  There was a chest x-ray that showed   minimal right lower lobe interstitial and alveolar edema or pneumonia.    ASSESSMENT AND PLAN:  1.  Septic shock - patient's blood pressure has continued to be marginal, at   times it has dropped down to a systolic into the 60s, patient is hesitant to   receive a central line, but agreeable if necessary.  I did spend some time   looking for decent vein on either side on the IJ and I could not locate a   vein.  At this point in time, patient's blood pressure has improved; however,   she still is at high risk for needing IV pressors and she is understandable.    If PICC line can be placed today, we will just go ahead and do that.    Otherwise, we will monitor, try again to locate an IJ later or place   subclavian.  Levophed would be first line agent if needed.  At this point in   time, we will continue to  bolus as needed.  Continue to trend lactic acid.    Patient does have tachycardia, tachypnea, also does have a significant   leukocytosis, does appear her borderline is closer to 25 and now at 35.  2.  Pneumonia - likely community-acquired pneumonia, we will give Unasyn and   azithromycin, pan culture and await the results.  3.  Acute renal insufficiency - mild, likely secondary to dehydration, we will   give IV fluids and repeat a BMP in the morning.  4.  Chronic lymphocytic leukemia - continue outpatient workup.  5.  Hypothyroidism - patient did have thyroid cancer status post partial   thyroidectomy, continue Synthroid.  6.  Diabetes - we will give insulin sliding scale and adjust as needed.  7.  Gastroesophageal reflux disease - continue omeprazole.    Patient is critically ill and will be placed in the ICU for continued fluid   boluses and patient is at high risk for needing pressors, Levophed will be   initiated.    Critical care time not including procedures, no overlap:  44 minutes.       ____________________________________     DO RIVERA TORRES / WANDA    DD:  06/01/2017 16:45:30  DT:  06/01/2017 19:59:49    D#:  [id number]  Job#:  [id number]

## 2017-06-02 NOTE — CARE PLAN
Problem: Safety  Goal: Will remain free from falls  Intervention: Implement fall precautions  Room/floor clear. Non skid socks on. Proper signs up. Bed alarm on, bed low and locked. Call light and belonging within reach. Hourly rounding in place to make sure needs are met.        Problem: Infection  Goal: Will remain free from infection  Intervention: Implement standard precautions and perform hand washing before and after patient contact  Hand washing every encounter. IV ports scrubbed with alcohol when hanging medicine. Patient watch for s/s of infection. Patient taught to report s/s of infection, verbalized understanding.        Problem: Venous Thromboembolism (VTW)/Deep Vein Thrombosis (DVT) Prevention:  Goal: Patient will participate in Venous Thrombosis (VTE)/Deep Vein Thrombosis (DVT)Prevention Measures  Intervention: Encourage patient to perform ankle flex, foot rotation, and knee flex exercises in addition to other prophylatic measures every hour while awake  Encouraged to perform flexion of the feet while in bed and awake, verbalized understanding.       Problem: Respiratory:  Goal: Respiratory status will improve  Intervention: Educate and encourage coughing and deep breathing  Encouraged to perform coughing and deep breathing, verbalized understanding.

## 2017-06-02 NOTE — ASSESSMENT & PLAN NOTE
- continue augmentin  - improving per breath sounds but pt hasn't felt better the last couple days  - had to give significant fluid d/t hypotension, then fluid overloaded, improved with lasix

## 2017-06-02 NOTE — PROGRESS NOTES
BS report received. Patient in bed, alert and oriented. No c/o pain, nausea or sob at this time. POC discussed, verbalized understanding. Bed low and locked, bed alarm on. Call light and belonging within reach and demonstrated use of call light. Lines, drips, and monitor alarms verified. Fall precaution in effect. Will continue to monitor.

## 2017-06-02 NOTE — RESPIRATORY CARE
COPD EDUCATION by COPD CLINICAL EDUCATOR  6/2/2017 at 10:50 AM by Isabella Pulido     Patient reviewed by COPD education team. Patient does not qualify for COPD program. Patient denies COPD.

## 2017-06-02 NOTE — ASSESSMENT & PLAN NOTE
- shock now resolved   - continue abx  - cultures negative, was unable to get sputum culture   - now with diarrhea, r/o c-diff

## 2017-06-02 NOTE — CARE PLAN
Problem: Venous Thromboembolism (VTW)/Deep Vein Thrombosis (DVT) Prevention:  Goal: Patient will participate in Venous Thrombosis (VTE)/Deep Vein Thrombosis (DVT)Prevention Measures  Patient is currently on appropriate DVT prophylaxis.     Problem: Pain Management  Goal: Pain level will decrease to patient’s comfort goal  Patient educated regarding pain scale. Pt calls appropriately for pain medication when pain level is outside of comfort level.

## 2017-06-03 ENCOUNTER — HOME HEALTH ADMISSION (OUTPATIENT)
Dept: HOME HEALTH SERVICES | Facility: HOME HEALTHCARE | Age: 77
End: 2017-06-03
Payer: MEDICARE

## 2017-06-03 LAB
BACTERIA UR CULT: NORMAL
ERYTHROCYTE [DISTWIDTH] IN BLOOD BY AUTOMATED COUNT: 47.4 FL (ref 35.9–50)
GLUCOSE BLD-MCNC: 118 MG/DL (ref 65–99)
GLUCOSE BLD-MCNC: 119 MG/DL (ref 65–99)
GLUCOSE BLD-MCNC: 119 MG/DL (ref 65–99)
GLUCOSE BLD-MCNC: 155 MG/DL (ref 65–99)
HCT VFR BLD AUTO: 28.9 % (ref 37–47)
HGB BLD-MCNC: 9.4 G/DL (ref 12–16)
MCH RBC QN AUTO: 29.7 PG (ref 27–33)
MCHC RBC AUTO-ENTMCNC: 32.5 G/DL (ref 33.6–35)
MCV RBC AUTO: 91.5 FL (ref 81.4–97.8)
PLATELET # BLD AUTO: 265 K/UL (ref 164–446)
PMV BLD AUTO: 10.4 FL (ref 9–12.9)
RBC # BLD AUTO: 3.16 M/UL (ref 4.2–5.4)
SIGNIFICANT IND 70042: NORMAL
SITE SITE: NORMAL
SOURCE SOURCE: NORMAL
WBC # BLD AUTO: 19.2 K/UL (ref 4.8–10.8)

## 2017-06-03 PROCEDURE — 99232 SBSQ HOSP IP/OBS MODERATE 35: CPT | Performed by: INTERNAL MEDICINE

## 2017-06-03 PROCEDURE — 700111 HCHG RX REV CODE 636 W/ 250 OVERRIDE (IP): Performed by: INTERNAL MEDICINE

## 2017-06-03 PROCEDURE — 94640 AIRWAY INHALATION TREATMENT: CPT

## 2017-06-03 PROCEDURE — 700101 HCHG RX REV CODE 250: Performed by: INTERNAL MEDICINE

## 2017-06-03 PROCEDURE — A9270 NON-COVERED ITEM OR SERVICE: HCPCS | Performed by: INTERNAL MEDICINE

## 2017-06-03 PROCEDURE — 94760 N-INVAS EAR/PLS OXIMETRY 1: CPT

## 2017-06-03 PROCEDURE — 770020 HCHG ROOM/CARE - TELE (206)

## 2017-06-03 PROCEDURE — 85027 COMPLETE CBC AUTOMATED: CPT

## 2017-06-03 PROCEDURE — 700102 HCHG RX REV CODE 250 W/ 637 OVERRIDE(OP): Performed by: INTERNAL MEDICINE

## 2017-06-03 PROCEDURE — 700105 HCHG RX REV CODE 258: Performed by: INTERNAL MEDICINE

## 2017-06-03 PROCEDURE — 82962 GLUCOSE BLOOD TEST: CPT | Mod: 91

## 2017-06-03 RX ORDER — POTASSIUM CHLORIDE 20 MEQ/1
40 TABLET, EXTENDED RELEASE ORAL ONCE
Status: COMPLETED | OUTPATIENT
Start: 2017-06-03 | End: 2017-06-03

## 2017-06-03 RX ORDER — AMOXICILLIN AND CLAVULANATE POTASSIUM 875; 125 MG/1; MG/1
1 TABLET, FILM COATED ORAL EVERY 12 HOURS
Status: DISCONTINUED | OUTPATIENT
Start: 2017-06-03 | End: 2017-06-06 | Stop reason: HOSPADM

## 2017-06-03 RX ORDER — IPRATROPIUM BROMIDE AND ALBUTEROL SULFATE 2.5; .5 MG/3ML; MG/3ML
3 SOLUTION RESPIRATORY (INHALATION)
Status: DISCONTINUED | OUTPATIENT
Start: 2017-06-03 | End: 2017-06-06 | Stop reason: HOSPADM

## 2017-06-03 RX ORDER — IPRATROPIUM BROMIDE AND ALBUTEROL SULFATE 2.5; .5 MG/3ML; MG/3ML
3 SOLUTION RESPIRATORY (INHALATION)
Status: COMPLETED | OUTPATIENT
Start: 2017-06-03 | End: 2017-06-03

## 2017-06-03 RX ADMIN — ACETAMINOPHEN 650 MG: 325 TABLET, FILM COATED ORAL at 13:27

## 2017-06-03 RX ADMIN — IPRATROPIUM BROMIDE AND ALBUTEROL SULFATE 3 ML: .5; 3 SOLUTION RESPIRATORY (INHALATION) at 08:58

## 2017-06-03 RX ADMIN — GUAIFENESIN 600 MG: 600 TABLET, EXTENDED RELEASE ORAL at 08:12

## 2017-06-03 RX ADMIN — ENOXAPARIN SODIUM 40 MG: 100 INJECTION SUBCUTANEOUS at 08:15

## 2017-06-03 RX ADMIN — POTASSIUM CHLORIDE 40 MEQ: 1500 TABLET, EXTENDED RELEASE ORAL at 12:07

## 2017-06-03 RX ADMIN — LEVOTHYROXINE SODIUM 112 MCG: 112 TABLET ORAL at 06:15

## 2017-06-03 RX ADMIN — ASPIRIN 81 MG CHEWABLE TABLET 81 MG: 81 TABLET CHEWABLE at 08:13

## 2017-06-03 RX ADMIN — ACETAMINOPHEN 650 MG: 325 TABLET, FILM COATED ORAL at 21:15

## 2017-06-03 RX ADMIN — ACETAMINOPHEN 650 MG: 325 TABLET, FILM COATED ORAL at 00:34

## 2017-06-03 RX ADMIN — AMOXICILLIN AND CLAVULANATE POTASSIUM 1 TABLET: 875; 125 TABLET, FILM COATED ORAL at 21:16

## 2017-06-03 RX ADMIN — ONDANSETRON 4 MG: 4 TABLET, ORALLY DISINTEGRATING ORAL at 13:13

## 2017-06-03 RX ADMIN — LORAZEPAM 0.5 MG: 0.5 TABLET ORAL at 00:34

## 2017-06-03 RX ADMIN — AMPICILLIN SODIUM AND SULBACTAM SODIUM 3 G: 2; 1 INJECTION, POWDER, FOR SOLUTION INTRAMUSCULAR; INTRAVENOUS at 06:15

## 2017-06-03 RX ADMIN — GUAIFENESIN 600 MG: 600 TABLET, EXTENDED RELEASE ORAL at 21:15

## 2017-06-03 RX ADMIN — LORAZEPAM 0.5 MG: 0.5 TABLET ORAL at 21:16

## 2017-06-03 RX ADMIN — AMOXICILLIN AND CLAVULANATE POTASSIUM 1 TABLET: 875; 125 TABLET, FILM COATED ORAL at 12:08

## 2017-06-03 RX ADMIN — AZITHROMYCIN 250 MG: 250 TABLET, FILM COATED ORAL at 08:12

## 2017-06-03 RX ADMIN — OMEPRAZOLE 20 MG: 20 CAPSULE, DELAYED RELEASE ORAL at 08:13

## 2017-06-03 RX ADMIN — AMPICILLIN SODIUM AND SULBACTAM SODIUM 3 G: 2; 1 INJECTION, POWDER, FOR SOLUTION INTRAMUSCULAR; INTRAVENOUS at 00:30

## 2017-06-03 RX ADMIN — CITALOPRAM HYDROBROMIDE 10 MG: 20 TABLET ORAL at 08:13

## 2017-06-03 ASSESSMENT — ENCOUNTER SYMPTOMS
LOSS OF CONSCIOUSNESS: 0
WHEEZING: 0
STRIDOR: 0
NECK PAIN: 0
COUGH: 1
DIZZINESS: 0
FALLS: 0
ABDOMINAL PAIN: 0
SHORTNESS OF BREATH: 1
CHILLS: 0
FEVER: 0
PALPITATIONS: 0
SPUTUM PRODUCTION: 0
WEAKNESS: 1
VOMITING: 0
NAUSEA: 0
MYALGIAS: 0
DEPRESSION: 0

## 2017-06-03 ASSESSMENT — PAIN SCALES - GENERAL
PAINLEVEL_OUTOF10: 4
PAINLEVEL_OUTOF10: 3

## 2017-06-03 NOTE — DISCHARGE PLANNING
MARYURI met with pt at bedside to obtain HH choice. SW discussed HH services with pt and went over options for HH providers. Pt signed choice form for [facility]. SW advised she would be updated with information on HH. Copy of choice form provided to pt with instructions to call HH if they have not contacted her within 2 days of dc.     Choice form faxed to [facility]. Awaiting accepting HH agency.

## 2017-06-03 NOTE — PROGRESS NOTES
POC discussed with pt including noc routine. Denies CP, some abd bloating from constipation. Bowel protocol being followed. Numbness/tingling in fingertips. Minor nausea. Meds given per MAR. FSBS 136, no coverage required. Snack provided. No other needs at this time. Safety precautions in place.

## 2017-06-03 NOTE — PROGRESS NOTES
FSBS 118, no coverage required. Meds given per MAR. No other needs at this time. Pt back to sleep. O2 down to 1L.

## 2017-06-03 NOTE — PROGRESS NOTES
[facility] Hospitalist Progress Note    Date of Service: 6/3/2017    Chief Complaint  76 y.o. female admitted 2017 with dyspnea and cough.    Interval Problem Update  Pt noted to have sepsis from pna, had borderline BP that is now improved.   Pt with hx of CLL, chronically high wbc.  Discussed plan and pt condition with RN at bedside and social work.    Consultants/Specialty  none    Disposition  Still requires additional treatment in the hospital         Review of Systems   Constitutional: Positive for malaise/fatigue. Negative for fever and chills.   HENT: Negative for congestion.    Respiratory: Positive for cough and shortness of breath. Negative for sputum production, wheezing and stridor.    Cardiovascular: Negative for chest pain and palpitations.   Gastrointestinal: Negative for nausea, vomiting and abdominal pain.   Genitourinary: Negative for dysuria.   Musculoskeletal: Negative for myalgias, falls and neck pain.   Neurological: Positive for weakness. Negative for dizziness and loss of consciousness.   Psychiatric/Behavioral: Negative for depression and suicidal ideas.      Physical Exam  Laboratory/Imaging   Hemodynamics  Temp (24hrs), Av.9 °C (98.4 °F), Min:36.5 °C (97.7 °F), Max:37.2 °C (98.9 °F)   Temperature: 36.9 °C (98.5 °F)  Pulse  Av.6  Min: 39  Max: 114 Heart Rate (Monitored): 89  Blood Pressure : 147/86 mmHg (nurse aware.), NIBP: 116/57 mmHg      Respiratory      Respiration: 16 (post 16), Pulse Oximetry: 94 %, O2 Daily Delivery Respiratory : Silicone Nasal Cannula     Given By:: Mouthpiece, Work Of Breathing / Effort: Mild  RUL Breath Sounds: Diminished, RML Breath Sounds: Diminished, RLL Breath Sounds: Diminished, JERED Breath Sounds: Diminished, LLL Breath Sounds: Diminished    Fluids    Intake/Output Summary (Last 24 hours) at 17 1159  Last data filed at 17 1800   Gross per 24 hour   Intake    610 ml   Output    250 ml   Net    360 ml       Nutrition  Orders Placed This  Encounter   Procedures   • Diet Order     Standing Status: Standing      Number of Occurrences: 1      Standing Expiration Date:      Order Specific Question:  Diet:     Answer:  Diabetic [3]     Physical Exam   Constitutional: She is oriented to person, place, and time. No distress.   HENT:   Head: Normocephalic and atraumatic.   Eyes: Right eye exhibits no discharge. Left eye exhibits no discharge. No scleral icterus.   Neck: Neck supple.   Cardiovascular: Normal rate and regular rhythm.    No murmur heard.  Pulmonary/Chest: Effort normal. No stridor. No respiratory distress. She has wheezes. She has no rales. She exhibits no tenderness.   Abdominal: Soft. Bowel sounds are normal. She exhibits no distension.   Musculoskeletal: She exhibits no edema or tenderness.   Lymphadenopathy:     She has no cervical adenopathy.   Neurological: She is alert and oriented to person, place, and time.   Skin: Skin is warm and dry. She is not diaphoretic. No erythema.   Psychiatric: She has a normal mood and affect. Her behavior is normal. Judgment normal.   Nursing note and vitals reviewed.      Recent Labs      06/01/17 0623 06/02/17   0440  06/03/17   0412   WBC  35.0*  20.9*  19.2*   RBC  4.10*  3.16*  3.16*   HEMOGLOBIN  12.2  9.6*  9.4*   HEMATOCRIT  36.9*  29.7*  28.9*   MCV  90.0  93.4  91.5   MCH  29.8  30.1  29.7   MCHC  33.1*  32.2*  32.5*   RDW  45.4  49.1  47.4   PLATELETCT  371  255  265   MPV  9.8  9.8  10.4     Recent Labs      06/01/17 0623 06/02/17   0440   SODIUM  136  136   POTASSIUM  4.3  3.5*   CHLORIDE  101  106   CO2  25  24   GLUCOSE  170*  122*   BUN  21  9   CREATININE  1.13  0.84   CALCIUM  8.8  7.4*     Recent Labs      06/01/17 0623   APTT  26.9   INR  0.95     Recent Labs      06/01/17 0623   BNPBTYPENAT  74              Assessment/Plan     Septic shock (CMS-[facility])  Assessment & Plan  - improved, shock now resolved   - continue abx  - await culture results    CAP (community acquired  pneumonia)  Assessment & Plan  - continue unasyn/azithromycin  - improving but mild wheeze today, will get RT for nebs    GERD (gastroesophageal reflux disease) (present on admission)  Assessment & Plan  - continue omeprazole     Controlled type 2 diabetes mellitus with complication, without long-term current use of insulin (CMS-HCC) (present on admission)  Assessment & Plan  - continue ISS, adjust as needed    Hypothyroidism due to acquired atrophy of thyroid (present on admission)  Assessment & Plan  - continue synthroid    Acute renal insufficiency  Assessment & Plan  - resolved with ivf    Normocytic anemia  Assessment & Plan  - no sign of gross bleeding    CLL (chronic lymphocytic leukemia)- wbc= 20k-30k, since 2006; no rx; dr alegria (present on admission)  Assessment & Plan  - wbc back to baseline  - outpt f/u      Labs reviewed, Medications reviewed and Radiology images reviewed        DVT Prophylaxis: Enoxaparin (Lovenox)    Ulcer prophylaxis: Yes  Antibiotics: Treating active infection/contamination beyond 24 hours perioperative coverage

## 2017-06-03 NOTE — PROGRESS NOTES
Report received from JEN Anne. Pt denies any needs at this time. Lines patent. Safety precautions in place, call light within reach. Will continue to monitor.

## 2017-06-03 NOTE — FLOWSHEET NOTE
06/03/17 0901   Events/Summary/Plan   Events/Summary/Plan svn given on MDs request   Interdisciplinary Plan of Care-Goals (Indications)   Obstructive Ventilatory Defect or Pulmonary Disease without Obvious Obstruction Strong Subjective / Objective Improvement   Interdisciplinary Plan of Care-Outcomes    Bronchodilator Outcome Improved Vital Signs and Measures of Gas Exchange;Patient at Stable Baseline   SVN Group   #SVN Performed Yes   Given By: Mouthpiece   Respiratory WDL   Respiratory (WDL) X   Chest Exam   Work Of Breathing / Effort Mild   Respiration 16  (post 16)   Pulse 89  (post 86)   Heart Rate (Monitored) 89   Breath Sounds   Pre/Post Intervention Pre Intervention Assessment  (slight increase in aeration after svn)   RUL Breath Sounds Diminished   RML Breath Sounds Diminished   RLL Breath Sounds Diminished   JERED Breath Sounds Diminished   LLL Breath Sounds Diminished   Secretions   Cough Dry   Oximetry   #Pulse Oximetry (Single Determination) Yes   Oxygen   Home O2 Use Prior To Admission? No   Pulse Oximetry 94 %   O2 (LPM) 2   O2 Daily Delivery Respiratory  Silicone Nasal Cannula

## 2017-06-03 NOTE — DISCHARGE PLANNING
Received choice form from [facility] at [phone].  Referral sent to [facility] at [phone] on 06-03-17.

## 2017-06-03 NOTE — FACE TO FACE
Face to Face Supporting Documentation - Home Health    The encounter with this patient was in whole or in part the primary reason for home health admission.    Date of encounter:   Patient:                    MRN:                       YOB: 2016  Clarisse Reeves  [id number]  1940     Home health to see patient for:  Skilled Nursing care for assessment, interventions & education, Physical Therapy evaluation and treatment and Occupational therapy evaluation and treatment    Skilled need for:  New Onset Medical Diagnosis weakness s/p pna, general medical care    Skilled nursing interventions to include:  Comment: medical care    Homebound status evidenced by:  Need the aid of supportive devices such as crutches, canes, wheelchairs or walkers. Leaving home requires a considerable and taxing effort. There is a normal inability to leave the home.    Community Physician to provide follow up care: Siva Smart M.D.     Optional Interventions? No      I certify the face to face encounter for this home health care referral meets the CMS requirements and the encounter/clinical assessment with the patient was, in whole, or in part, for the medical condition(s) listed above, which is the primary reason for home health care. Based on my clinical findings: the service(s) are medically necessary, support the need for home health care, and the homebound criteria are met.  I certify that this patient has had a face to face encounter by myself.  Gary Suárez D.O. - HAROLDOI: [id number]

## 2017-06-03 NOTE — CARE PLAN
Problem: Bowel/Gastric:  Goal: Normal bowel function is maintained or improved  Outcome: PROGRESSING SLOWER THAN EXPECTED    06/02/17 2021   OTHER   Last BM 06/01/17     Pt describes feeling constipated. Bowel protocol being followed.    Problem: Knowledge Deficit  Goal: Knowledge of disease process/condition, treatment plan, diagnostic tests, and medications will improve  Outcome: PROGRESSING AS EXPECTED  Frequently updated with POC. Questions answered.

## 2017-06-04 ENCOUNTER — APPOINTMENT (OUTPATIENT)
Dept: RADIOLOGY | Facility: MEDICAL CENTER | Age: 77
DRG: 871 | End: 2017-06-04
Attending: INTERNAL MEDICINE
Payer: MEDICARE

## 2017-06-04 LAB
ANION GAP SERPL CALC-SCNC: 11 MMOL/L (ref 0–11.9)
BASOPHILS # BLD AUTO: 0.2 % (ref 0–1.8)
BASOPHILS # BLD: 0.04 K/UL (ref 0–0.12)
BUN SERPL-MCNC: 8 MG/DL (ref 8–22)
CALCIUM SERPL-MCNC: 8.5 MG/DL (ref 8.4–10.2)
CHLORIDE SERPL-SCNC: 103 MMOL/L (ref 96–112)
CO2 SERPL-SCNC: 24 MMOL/L (ref 20–33)
COMMENT 1642: NORMAL
CREAT SERPL-MCNC: 0.78 MG/DL (ref 0.5–1.4)
EOSINOPHIL # BLD AUTO: 0.02 K/UL (ref 0–0.51)
EOSINOPHIL NFR BLD: 0.1 % (ref 0–6.9)
ERYTHROCYTE [DISTWIDTH] IN BLOOD BY AUTOMATED COUNT: 46.1 FL (ref 35.9–50)
GFR SERPL CREATININE-BSD FRML MDRD: >60 ML/MIN/1.73 M 2
GLUCOSE BLD-MCNC: 124 MG/DL (ref 65–99)
GLUCOSE BLD-MCNC: 133 MG/DL (ref 65–99)
GLUCOSE BLD-MCNC: 150 MG/DL (ref 65–99)
GLUCOSE BLD-MCNC: 152 MG/DL (ref 65–99)
GLUCOSE SERPL-MCNC: 144 MG/DL (ref 65–99)
HCT VFR BLD AUTO: 29.2 % (ref 37–47)
HGB BLD-MCNC: 9.5 G/DL (ref 12–16)
IMM GRANULOCYTES # BLD AUTO: 0.13 K/UL (ref 0–0.11)
IMM GRANULOCYTES NFR BLD AUTO: 0.7 % (ref 0–0.9)
LACTATE BLD-SCNC: 1.52 MMOL/L (ref 0.5–2)
LYMPHOCYTES # BLD AUTO: 7.14 K/UL (ref 1–4.8)
LYMPHOCYTES NFR BLD: 37.7 % (ref 22–41)
MCH RBC QN AUTO: 29.4 PG (ref 27–33)
MCHC RBC AUTO-ENTMCNC: 32.5 G/DL (ref 33.6–35)
MCV RBC AUTO: 90.4 FL (ref 81.4–97.8)
MONOCYTES # BLD AUTO: 0.6 K/UL (ref 0–0.85)
MONOCYTES NFR BLD AUTO: 3.2 % (ref 0–13.4)
NEUTROPHILS # BLD AUTO: 11 K/UL (ref 2–7.15)
NEUTROPHILS NFR BLD: 58.1 % (ref 44–72)
NRBC # BLD AUTO: 0 K/UL
NRBC BLD AUTO-RTO: 0 /100 WBC
PLATELET # BLD AUTO: 276 K/UL (ref 164–446)
PMV BLD AUTO: 10.4 FL (ref 9–12.9)
POTASSIUM SERPL-SCNC: 4.1 MMOL/L (ref 3.6–5.5)
RBC # BLD AUTO: 3.23 M/UL (ref 4.2–5.4)
SODIUM SERPL-SCNC: 138 MMOL/L (ref 135–145)
SPECIMEN DRAWN FROM PATIENT: NORMAL
WBC # BLD AUTO: 18.9 K/UL (ref 4.8–10.8)

## 2017-06-04 PROCEDURE — 82962 GLUCOSE BLOOD TEST: CPT | Mod: 91

## 2017-06-04 PROCEDURE — 94640 AIRWAY INHALATION TREATMENT: CPT

## 2017-06-04 PROCEDURE — 770020 HCHG ROOM/CARE - TELE (206)

## 2017-06-04 PROCEDURE — 99233 SBSQ HOSP IP/OBS HIGH 50: CPT | Performed by: INTERNAL MEDICINE

## 2017-06-04 PROCEDURE — 71020 DX-CHEST-2 VIEWS: CPT

## 2017-06-04 PROCEDURE — 83605 ASSAY OF LACTIC ACID: CPT

## 2017-06-04 PROCEDURE — 700102 HCHG RX REV CODE 250 W/ 637 OVERRIDE(OP): Performed by: INTERNAL MEDICINE

## 2017-06-04 PROCEDURE — 700101 HCHG RX REV CODE 250: Performed by: INTERNAL MEDICINE

## 2017-06-04 PROCEDURE — 700111 HCHG RX REV CODE 636 W/ 250 OVERRIDE (IP): Performed by: INTERNAL MEDICINE

## 2017-06-04 PROCEDURE — A9270 NON-COVERED ITEM OR SERVICE: HCPCS | Performed by: INTERNAL MEDICINE

## 2017-06-04 PROCEDURE — 85025 COMPLETE CBC W/AUTO DIFF WBC: CPT

## 2017-06-04 PROCEDURE — 80048 BASIC METABOLIC PNL TOTAL CA: CPT

## 2017-06-04 PROCEDURE — 94760 N-INVAS EAR/PLS OXIMETRY 1: CPT

## 2017-06-04 RX ORDER — FUROSEMIDE 40 MG/1
40 TABLET ORAL DAILY
Status: DISCONTINUED | OUTPATIENT
Start: 2017-06-04 | End: 2017-06-06 | Stop reason: HOSPADM

## 2017-06-04 RX ORDER — FUROSEMIDE 10 MG/ML
40 INJECTION INTRAMUSCULAR; INTRAVENOUS DAILY
Status: DISCONTINUED | OUTPATIENT
Start: 2017-06-04 | End: 2017-06-04

## 2017-06-04 RX ADMIN — CITALOPRAM HYDROBROMIDE 10 MG: 20 TABLET ORAL at 09:11

## 2017-06-04 RX ADMIN — FUROSEMIDE 40 MG: 40 TABLET ORAL at 16:23

## 2017-06-04 RX ADMIN — ASPIRIN 81 MG CHEWABLE TABLET 81 MG: 81 TABLET CHEWABLE at 09:11

## 2017-06-04 RX ADMIN — GUAIFENESIN 600 MG: 600 TABLET, EXTENDED RELEASE ORAL at 20:54

## 2017-06-04 RX ADMIN — AMOXICILLIN AND CLAVULANATE POTASSIUM 1 TABLET: 875; 125 TABLET, FILM COATED ORAL at 09:11

## 2017-06-04 RX ADMIN — IPRATROPIUM BROMIDE AND ALBUTEROL SULFATE 3 ML: .5; 3 SOLUTION RESPIRATORY (INHALATION) at 13:51

## 2017-06-04 RX ADMIN — OMEPRAZOLE 20 MG: 20 CAPSULE, DELAYED RELEASE ORAL at 09:11

## 2017-06-04 RX ADMIN — AZITHROMYCIN 250 MG: 250 TABLET, FILM COATED ORAL at 09:11

## 2017-06-04 RX ADMIN — LORAZEPAM 0.5 MG: 0.5 TABLET ORAL at 20:54

## 2017-06-04 RX ADMIN — AMOXICILLIN AND CLAVULANATE POTASSIUM 1 TABLET: 875; 125 TABLET, FILM COATED ORAL at 20:54

## 2017-06-04 RX ADMIN — LORAZEPAM 0.5 MG: 0.5 TABLET ORAL at 04:43

## 2017-06-04 RX ADMIN — GUAIFENESIN 600 MG: 600 TABLET, EXTENDED RELEASE ORAL at 09:11

## 2017-06-04 RX ADMIN — ENOXAPARIN SODIUM 40 MG: 100 INJECTION SUBCUTANEOUS at 09:12

## 2017-06-04 RX ADMIN — ACETAMINOPHEN 650 MG: 325 TABLET, FILM COATED ORAL at 09:11

## 2017-06-04 RX ADMIN — LEVOTHYROXINE SODIUM 112 MCG: 112 TABLET ORAL at 06:29

## 2017-06-04 RX ADMIN — IPRATROPIUM BROMIDE AND ALBUTEROL SULFATE 3 ML: .5; 3 SOLUTION RESPIRATORY (INHALATION) at 03:41

## 2017-06-04 RX ADMIN — ONDANSETRON 4 MG: 4 TABLET, ORALLY DISINTEGRATING ORAL at 11:07

## 2017-06-04 RX ADMIN — ACETAMINOPHEN 650 MG: 325 TABLET, FILM COATED ORAL at 16:27

## 2017-06-04 ASSESSMENT — ENCOUNTER SYMPTOMS
WEAKNESS: 1
HEADACHES: 0
NAUSEA: 0
SHORTNESS OF BREATH: 1
LOSS OF CONSCIOUSNESS: 0
TINGLING: 0
DIZZINESS: 0
COUGH: 1
SPUTUM PRODUCTION: 0
MYALGIAS: 0
DEPRESSION: 0
STRIDOR: 0
VOMITING: 0
FEVER: 0
FALLS: 0

## 2017-06-04 ASSESSMENT — PAIN SCALES - GENERAL
PAINLEVEL_OUTOF10: 0
PAINLEVEL_OUTOF10: 0

## 2017-06-04 NOTE — PROGRESS NOTES
Received bedside report from NOC nurseRenan.  Discussed POC.  Pt resting in bed, semi-fowlers, no s/s of acute distress, respirations even and mild labored, on 2 L of oxygen via NC, calm and cooperative, personal belongings w/in reach, safety precautions in place, assumed pt care, will CTM.

## 2017-06-04 NOTE — PROGRESS NOTES
Report received from Millie at bedside and pt states she is feeling better and ready to go home.  Morning medications and assessment completed about 0815.  When asking how she was feeling overall she states she feels out of it, but she has more energy.  Pt pulled out her IV accidentally after breakfast and Dr. Suárez ok'd to leave it out.  She also reports that her constipations has resolved and that she has gone 3 times and its very liquidy.  Pt rested after breakfast and then showered with some assistance of the CNA before lunch. Pt very cold after her shower and having chills but she sat up at edge of bed for lunch and gave her antibiotics and potassium.  Pt called me back into the room about 1300 saying she was nauseated and that she was having pain in arms and then proceed to vomit 10 ml of yellow fluid.  When returning back from throwing away her emesis, pt said she did not feel good; her lips were blue and she had the chills again.  Grabbed Kelli, hospitalist RN, to assess.  Full set of vitals taken with her temp being 99.5F orally and her sat at the time was 76% on 3 lt/min, but her fingers were cold; called monitor tech to verify heart rhythm and rate and it was accurate on the pulse oximetry machine.  Pt placed on an oxy mask at 6 liters/min and wrapped her up in warm blankets.  No new orders received from doctor.  When checking back about 1325 pt still complaining of the pain in arms, but overall achiness, medicated with tylenol.  Friend Clarisse at bedside and stayed with her.  When checking back in an hour, pt complaining that she was hot and oral temp was 99.4F, but that she was hungry.  Box lunch of a turkey sandwich given and she ate at least half.  When doing her FS for dinner, pt states after she ate and rested, that she broke out in a sweat and soaked through her sheets, gown and her pillow case, but that she was feeling better.  FS was 155 tonight, but no coverage given as she had just eaten about 11/2  hour prior.  Bedside report given to Florencia.    Tele strip at 0747 shows SR at 90.      Measurements from am strip were as follows:  WY=0.12  QRS=0.08  QT=0.36    Tele Shift Summary:    Rhythm : SR  Rate : 80-90s  Ectopy : Per CCT Neva, pt had rare PVCs and couplets.     Telemetry monitoring strips placed in pt chart.

## 2017-06-04 NOTE — CARE PLAN
Problem: Safety  Goal: Will remain free from falls  Outcome: PROGRESSING AS EXPECTED  Pt remains free of accidental falls.  Pt uses call light approprietly.  Safety precautions in place: anti slip socks on, bed in low position, call light/personal belongings w/in reach, hourly rounding, room next to nursing station.

## 2017-06-04 NOTE — PROGRESS NOTES
Pt's tele summary: sinus rhythm w/occasional PAC's.        HR 80      ME interval 0.12  QRS complex 0.08  QT interval 0.34      @1536  Pt experienced PSVT for approximately 0.6 sec    This RN notified of this occurrence @1803.    @ 1800  Pt experience PSVT for approximately 0.8 sec    This RN notified of this occurrence @1803.    Pt denied any s/s of pain/discomfort.

## 2017-06-04 NOTE — FLOWSHEET NOTE
06/04/17 0400   Interdisciplinary Plan of Care-Goals (Indications)   Obstructive Ventilatory Defect or Pulmonary Disease without Obvious Obstruction Strong Subjective / Objective Improvement   Interdisciplinary Plan of Care-Outcomes    Bronchodilator Outcome Improvement in Airflow (peak flow, PFT)   Education   Education Yes - Pt. / Family has been Instructed in use of Respiratory Medications and Adverse Reactions   RT Assessment of Delivered Medications   Evaluation of Medication Delivery Daily Yes-- Pt /Family has been Instructed in use of Respiratory Medications and Adverse Reactions   SVN Group   #SVN Performed Yes   Given By: Mask   Date SVN Last Changed 06/03/17   Date SVN Next Change Due (Q 7 Days) 06/10/17   Chest Exam   Work Of Breathing / Effort Mild   Respiration 18   Pulse 89   Breath Sounds   RUL Breath Sounds Diminished   RML Breath Sounds Diminished   RLL Breath Sounds Diminished   JERED Breath Sounds Diminished   LLL Breath Sounds Diminished   Secretions   Cough Non Productive   Oximetry   #Pulse Oximetry (Single Determination) Yes   Oxygen   Home O2 Use Prior To Admission? No   Pulse Oximetry 95 %   O2 (LPM) 2   O2 Daily Delivery Respiratory  Silicone Nasal Cannula

## 2017-06-04 NOTE — CARE PLAN
Problem: Safety  Goal: Will remain free from falls  Outcome: PROGRESSING AS EXPECTED  Pt encouraged to call for assistance as needed. Safety precautions in place. Regular rounding.    Problem: Discharge Barriers/Planning  Goal: Patient’s continuum of care needs will be met  Outcome: PROGRESSING AS EXPECTED  Plan of care discussed with pt. Explained medications/procedures in place. Provided pt with education on illnesses.

## 2017-06-04 NOTE — FLOWSHEET NOTE
06/04/17 1352   Events/Summary/Plan   Events/Summary/Plan Pt requested for PRN tx for SOB   Non-Invasive Resp Device Site Inspection Completed Intact   Interdisciplinary Plan of Care-Goals (Indications)   Obstructive Ventilatory Defect or Pulmonary Disease without Obvious Obstruction Strong Subjective / Objective Improvement   Interdisciplinary Plan of Care-Outcomes    Bronchodilator Outcome Patient at Stable Baseline   Education   Education Yes - Pt. / Family has been Instructed in use of Respiratory Medications and Adverse Reactions   RT Assessment of Delivered Medications   Evaluation of Medication Delivery Daily Yes-- Pt /Family has been Instructed in use of Respiratory Medications and Adverse Reactions   SVN Group   #SVN Performed Yes   Given By: Mask   Respiratory WDL   Respiratory (WDL) X   Chest Exam   Work Of Breathing / Effort Mild   Respiration 18   Heart Rate (Monitored) 78   Breath Sounds   Pre/Post Intervention Pre Intervention Assessment  (slight wheeze heard on RUL. good air movement post tx)   RUL Breath Sounds Clear;Fine Crackles   RML Breath Sounds Clear;Fine Crackles   RLL Breath Sounds Fine Crackles   JERED Breath Sounds Clear;Diminished   LLL Breath Sounds Fine Crackles   Oximetry   #Pulse Oximetry (Single Determination) Yes   Oxygen   Home O2 Use Prior To Admission? No   Pulse Oximetry 95 %   O2 (LPM) 2   O2 Daily Delivery Respiratory  Silicone Nasal Cannula

## 2017-06-04 NOTE — PROGRESS NOTES
Pt feeling short of breath. Requested breathing treatment and anxiety medication. Meds given per MAR.

## 2017-06-05 ENCOUNTER — APPOINTMENT (OUTPATIENT)
Dept: RADIOLOGY | Facility: MEDICAL CENTER | Age: 77
DRG: 871 | End: 2017-06-05
Attending: INTERNAL MEDICINE
Payer: MEDICARE

## 2017-06-05 PROBLEM — R19.7 DIARRHEA: Status: ACTIVE | Noted: 2017-06-05

## 2017-06-05 LAB
C DIFF DNA SPEC QL NAA+PROBE: NEGATIVE
C DIFF TOX A+B STL QL IA: NEGATIVE
C DIFF TOX GENS STL QL NAA+PROBE: NEGATIVE
ERYTHROCYTE [DISTWIDTH] IN BLOOD BY AUTOMATED COUNT: 45.8 FL (ref 35.9–50)
GLUCOSE BLD-MCNC: 108 MG/DL (ref 65–99)
GLUCOSE BLD-MCNC: 126 MG/DL (ref 65–99)
GLUCOSE BLD-MCNC: 127 MG/DL (ref 65–99)
GLUCOSE BLD-MCNC: 137 MG/DL (ref 65–99)
HCT VFR BLD AUTO: 29.7 % (ref 37–47)
HGB BLD-MCNC: 9.6 G/DL (ref 12–16)
MCH RBC QN AUTO: 29.4 PG (ref 27–33)
MCHC RBC AUTO-ENTMCNC: 32.3 G/DL (ref 33.6–35)
MCV RBC AUTO: 90.8 FL (ref 81.4–97.8)
PLATELET # BLD AUTO: 334 K/UL (ref 164–446)
PMV BLD AUTO: 9.7 FL (ref 9–12.9)
RBC # BLD AUTO: 3.27 M/UL (ref 4.2–5.4)
WBC # BLD AUTO: 20.6 K/UL (ref 4.8–10.8)

## 2017-06-05 PROCEDURE — 770020 HCHG ROOM/CARE - TELE (206)

## 2017-06-05 PROCEDURE — 99233 SBSQ HOSP IP/OBS HIGH 50: CPT | Performed by: INTERNAL MEDICINE

## 2017-06-05 PROCEDURE — 700101 HCHG RX REV CODE 250: Performed by: INTERNAL MEDICINE

## 2017-06-05 PROCEDURE — 82962 GLUCOSE BLOOD TEST: CPT | Mod: 91

## 2017-06-05 PROCEDURE — A9270 NON-COVERED ITEM OR SERVICE: HCPCS | Performed by: INTERNAL MEDICINE

## 2017-06-05 PROCEDURE — 94760 N-INVAS EAR/PLS OXIMETRY 1: CPT

## 2017-06-05 PROCEDURE — 87324 CLOSTRIDIUM AG IA: CPT

## 2017-06-05 PROCEDURE — 700111 HCHG RX REV CODE 636 W/ 250 OVERRIDE (IP): Performed by: INTERNAL MEDICINE

## 2017-06-05 PROCEDURE — 87493 C DIFF AMPLIFIED PROBE: CPT

## 2017-06-05 PROCEDURE — 700102 HCHG RX REV CODE 250 W/ 637 OVERRIDE(OP): Performed by: INTERNAL MEDICINE

## 2017-06-05 PROCEDURE — 85027 COMPLETE CBC AUTOMATED: CPT

## 2017-06-05 PROCEDURE — 71010 DX-CHEST-PORTABLE (1 VIEW): CPT

## 2017-06-05 PROCEDURE — 94640 AIRWAY INHALATION TREATMENT: CPT

## 2017-06-05 RX ADMIN — BENZONATATE 100 MG: 100 CAPSULE ORAL at 06:02

## 2017-06-05 RX ADMIN — CITALOPRAM HYDROBROMIDE 10 MG: 20 TABLET ORAL at 09:17

## 2017-06-05 RX ADMIN — GUAIFENESIN 600 MG: 600 TABLET, EXTENDED RELEASE ORAL at 20:37

## 2017-06-05 RX ADMIN — GUAIFENESIN 600 MG: 600 TABLET, EXTENDED RELEASE ORAL at 09:17

## 2017-06-05 RX ADMIN — LORAZEPAM 0.5 MG: 0.5 TABLET ORAL at 03:12

## 2017-06-05 RX ADMIN — IPRATROPIUM BROMIDE AND ALBUTEROL SULFATE 3 ML: .5; 3 SOLUTION RESPIRATORY (INHALATION) at 00:46

## 2017-06-05 RX ADMIN — ASPIRIN 81 MG CHEWABLE TABLET 81 MG: 81 TABLET CHEWABLE at 09:17

## 2017-06-05 RX ADMIN — ENOXAPARIN SODIUM 40 MG: 100 INJECTION SUBCUTANEOUS at 09:17

## 2017-06-05 RX ADMIN — AMOXICILLIN AND CLAVULANATE POTASSIUM 1 TABLET: 875; 125 TABLET, FILM COATED ORAL at 20:37

## 2017-06-05 RX ADMIN — FUROSEMIDE 40 MG: 40 TABLET ORAL at 09:17

## 2017-06-05 RX ADMIN — AMOXICILLIN AND CLAVULANATE POTASSIUM 1 TABLET: 875; 125 TABLET, FILM COATED ORAL at 09:17

## 2017-06-05 RX ADMIN — AZITHROMYCIN 250 MG: 250 TABLET, FILM COATED ORAL at 09:17

## 2017-06-05 RX ADMIN — LEVOTHYROXINE SODIUM 112 MCG: 112 TABLET ORAL at 06:02

## 2017-06-05 RX ADMIN — ACETAMINOPHEN 650 MG: 325 TABLET, FILM COATED ORAL at 06:30

## 2017-06-05 RX ADMIN — LORAZEPAM 0.5 MG: 0.5 TABLET ORAL at 20:37

## 2017-06-05 RX ADMIN — OMEPRAZOLE 20 MG: 20 CAPSULE, DELAYED RELEASE ORAL at 09:17

## 2017-06-05 ASSESSMENT — ENCOUNTER SYMPTOMS
WHEEZING: 0
NAUSEA: 0
COUGH: 1
SPUTUM PRODUCTION: 0
ABDOMINAL PAIN: 1
LOSS OF CONSCIOUSNESS: 0
NECK PAIN: 0
STRIDOR: 0
PALPITATIONS: 0
DIARRHEA: 1
FALLS: 0
FEVER: 1
VOMITING: 0
MYALGIAS: 0
SHORTNESS OF BREATH: 1
CHILLS: 1
WEAKNESS: 1
DIZZINESS: 0
DEPRESSION: 0

## 2017-06-05 ASSESSMENT — PAIN SCALES - GENERAL
PAINLEVEL_OUTOF10: 0
PAINLEVEL_OUTOF10: 7
PAINLEVEL_OUTOF10: 0

## 2017-06-05 NOTE — DISCHARGE PLANNING
[facility] received a DME choice form. Per the chocie form the referral has been sent to [facility].

## 2017-06-05 NOTE — DISCHARGE PLANNING
SW noted new order for Nebulizer and home O2.  Ziggy met with this patient at bedside and completed choice form ([facility] chosen)  SW faxed choice form to [facility].

## 2017-06-05 NOTE — FACE TO FACE
Face to Face Note  -  Durable Medical Equipment    Gary Suárez D.O. - NPI: [id number]  I certify that this patient is under my care and that they had a durable medical equipment(DME)face to face encounter by myself that meets the physician DME face-to-face encounter requirements with this patient on:    Date of encounter:   Patient:                    MRN:                       YOB: 2016  Clarisse Reeves  [id number]  1940     The encounter with the patient was in whole, or in part, for the following medical condition, which is the primary reason for durable medical equipment:  Other - pneumonia with hypoxia    I certify that, based on my findings, the following durable medical equipment is medically necessary:  Nebulizer.    HOME O2 Saturation Measurements:(Values must be present for Home Oxygen orders)         ,     ,         My Clinical findings support the need for the above equipment due to:  Hypoxia    Supporting Symptoms: pneumonia causing hypoxia that is improved with nebs. Will help to decrease hospitalization.

## 2017-06-05 NOTE — PROGRESS NOTES
Pt's tele summary: sinus rhythm w/rare PVC's.      HR 73      [place] interval 0.12  QRS complex 0.08  QT interval 0.38

## 2017-06-05 NOTE — DISCHARGE PLANNING
Care Transition Team Assessment    Met with pt at bedside. Pt states she will discharge home with no needs. Per physician notes, pt most likely will discharge home with nebulizer. Pt drives and has support at home.    Information Source  Orientation : Oriented x 4  Information Given By: Patient  Informant's Name: Clarisse Reeves  Who is responsible for making decisions for patient? : Patient    Readmission Evaluation  Is this a readmission?: No    Elopement Risk  Legal Hold: No  Ambulatory or Self Mobile in Wheelchair: Yes  Disoriented: No  Psychiatric Symptoms: None  History of Wandering: No  Elopement this Admit: No  Vocalizing Wanting to Leave: No  Displays Behaviors, Body Language Wanting to Leave: No-Not at Risk for Elopement  Elopement Risk: Not at Risk for Elopement    Interdisciplinary Discharge Planning  Does Admitting Nurse Feel This Could be a Complex Discharge?: No  Primary Care Physician: rober  Lives with - Patient's Self Care Capacity: Alone and Able to Care For Self  Patient or legal guardian wants to designate a caregiver (see row info): No  Support Systems: Friends / Neighbors  Housing / Facility: 2 Story Apartment / Condo  Do You Take your Prescribed Medications Regularly: Yes  Able to Return to Previous ADL's: Yes  Mobility Issues: No  Prior Services: None  Patient Expects to be Discharged to:: home  Assistance Needed: No  Durable Medical Equipment: Not Applicable    Discharge Preparedness  What is your plan after discharge?: Home with help  What are your discharge supports?: Other (comment)  Prior Functional Level: Ambulatory, Drives Self, Independent with Activities of Daily Living, Independent with Medication Management  Difficulity with ADLs: None  Difficulity with IADLs: None    Functional Assesment  Prior Functional Level: Ambulatory, Drives Self, Independent with Activities of Daily Living, Independent with Medication Management    Finances  Financial Barriers to Discharge: No  Prescription  Coverage: Yes (Pharmacy: [facility] [place] [place])    Vision / Hearing Impairment  Vision Impairment : No  Hearing Impairment : No    Values / Beliefs / Concerns  Values / Beliefs Concerns : No    Advance Directive  Advance Directive?: None  Advance Directive offered?: AD Booklet refused    Domestic Abuse  Have you ever been the victim of abuse or violence?: No    Psychological Assessment  History of Substance Abuse: None  History of Psychiatric Problems: No  Non-compliant with Treatment: No  Newly Diagnosed Illness: No    Discharge Risks or Barriers  Discharge risks or barriers?: No    Anticipated Discharge Information  Anticipated discharge disposition: Home  Discharge Address: [facility] [address]. [address] [place], [place] [address] [id number]  Discharge Contact Phone Number: [phone]

## 2017-06-05 NOTE — PROGRESS NOTES
Gave report to NOC nurseBethany.  Discussed POC.  Pt sleeping in bed, semi-fowlers, no s/s of acute distress, personal belongings w/in reach, safety precautions in place.

## 2017-06-05 NOTE — FACE TO FACE
Face to Face Note  -  Durable Medical Equipment    Gary Suárez D.O. - NPI: [id number]  I certify that this patient is under my care and that they had a durable medical equipment(DME)face to face encounter by myself that meets the physician DME face-to-face encounter requirements with this patient on:    Date of encounter:   Patient:                    MRN:                       YOB: 2016  Clarisse Reeves  [id number]  1940     The encounter with the patient was in whole, or in part, for the following medical condition, which is the primary reason for durable medical equipment:  Other - pneumonia    I certify that, based on my findings, the following durable medical equipment is medically necessary:  Oxygen.    HOME O2 Saturation Measurements:(Values must be present for Home Oxygen orders)  Room air sat at rest: 90  Room air sat with amb: 88  With liters of O2: 2, O2 sat at rest with O2: 94  With Liters of O2: 2, O2 sat with amb with O2 : 92  Is the patient mobile?: Yes    My Clinical findings support the need for the above equipment due to:  Hypoxia    Supporting Symptoms: pneumonia causing hypoxia

## 2017-06-05 NOTE — PROGRESS NOTES
[facility] Hospitalist Progress Note    Date of Service: 2017    Chief Complaint  76 y.o. female admitted 2017 with dyspnea and cough.    Interval Problem Update  Pt noted to have sepsis from pna, had borderline BP that is now improved.   Pt with hx of CLL, chronically high wbc.  Pt was improving, then with fluid overload, now with diarrhea.  Discussed plan and pt condition with RN at bedside and social work.    Consultants/Specialty  none    Disposition  Still requires additional treatment in the hospital         Review of Systems   Constitutional: Positive for fever, chills and malaise/fatigue.   HENT: Negative for congestion.    Respiratory: Positive for cough and shortness of breath. Negative for sputum production, wheezing and stridor.    Cardiovascular: Negative for chest pain, palpitations and leg swelling.   Gastrointestinal: Positive for abdominal pain and diarrhea. Negative for nausea and vomiting.   Genitourinary: Negative for dysuria, urgency and frequency.   Musculoskeletal: Negative for myalgias, falls and neck pain.   Neurological: Positive for weakness. Negative for dizziness and loss of consciousness.   Psychiatric/Behavioral: Negative for depression and suicidal ideas.      Physical Exam  Laboratory/Imaging   Hemodynamics  Temp (24hrs), Av.2 °C (98.9 °F), Min:36.4 °C (97.6 °F), Max:38.2 °C (100.7 °F)   Temperature: 36.4 °C (97.6 °F)  Pulse  Av.1  Min: 39  Max: 114    Blood Pressure : 128/69 mmHg      Respiratory      Respiration: 18, Pulse Oximetry: 91 %, O2 Daily Delivery Respiratory : Silicone Nasal Cannula     Given By:: Mask, Work Of Breathing / Effort: Mild  RUL Breath Sounds: Clear, RML Breath Sounds: Clear, RLL Breath Sounds: Fine Crackles, JERED Breath Sounds: Clear, LLL Breath Sounds: Fine Crackles    Fluids  No intake or output data in the 24 hours ending 17 1424    Nutrition  Orders Placed This Encounter   Procedures   • Diet Order     Standing Status: Standing       Number of Occurrences: 1      Standing Expiration Date:      Order Specific Question:  Diet:     Answer:  Diabetic [3]     Physical Exam   Constitutional: She is oriented to person, place, and time. No distress.   HENT:   Head: Normocephalic and atraumatic.   Eyes: Right eye exhibits no discharge. Left eye exhibits no discharge. No scleral icterus.   Neck: Neck supple. No JVD present.   Cardiovascular: Normal rate and regular rhythm.  Exam reveals no gallop and no friction rub.    No murmur heard.  Pulmonary/Chest: Effort normal. No stridor. No respiratory distress. She has no wheezes. She has rales. She exhibits no tenderness.   Abdominal: Soft. Bowel sounds are normal. She exhibits no distension. There is tenderness.   Musculoskeletal: She exhibits no edema or tenderness.   Neurological: She is alert and oriented to person, place, and time.   Skin: Skin is warm and dry. She is not diaphoretic. No erythema.   Psychiatric: She has a normal mood and affect. Judgment and thought content normal.   Nursing note and vitals reviewed.      Recent Labs      06/03/17   0412  06/04/17   0800  06/05/17   0815   WBC  19.2*  18.9*  20.6*   RBC  3.16*  3.23*  3.27*   HEMOGLOBIN  9.4*  9.5*  9.6*   HEMATOCRIT  28.9*  29.2*  29.7*   MCV  91.5  90.4  90.8   MCH  29.7  29.4  29.4   MCHC  32.5*  32.5*  32.3*   RDW  47.4  46.1  45.8   PLATELETCT  265  276  334   MPV  10.4  10.4  9.7     Recent Labs      06/04/17   0457   SODIUM  138   POTASSIUM  4.1   CHLORIDE  103   CO2  24   GLUCOSE  144*   BUN  8   CREATININE  0.78   CALCIUM  8.5                      Assessment/Plan     Septic shock (CMS-[facility])  Assessment & Plan  - shock now resolved   - continue abx  - cultures negative, was unable to get sputum culture   - now with diarrhea, r/o c-diff    CAP (community acquired pneumonia)  Assessment & Plan  - continue augmentin  - improving per breath sounds but pt hasn't felt better the last couple days  - had to give significant fluid d/t  hypotension, then fluid overloaded, improved with lasix    GERD (gastroesophageal reflux disease) (present on admission)  Assessment & Plan  - continue omeprazole     Controlled type 2 diabetes mellitus with complication, without long-term current use of insulin (CMS-[facility]) (present on admission)  Assessment & Plan  - continue ISS, adjust as needed    Hypothyroidism due to acquired atrophy of thyroid (present on admission)  Assessment & Plan  - continue synthroid    Acute renal insufficiency  Assessment & Plan  - resolved with ivf    Normocytic anemia  Assessment & Plan  - no sign of gross bleeding    Diarrhea  Assessment & Plan  - new, associated with abdominal pain  - was just on abx, concern for c-diff per RN  - will get c-diff, oral vanco if positive  - was constipated and received stool softeners but not for days, was having regular BMs post softeners but now severe diarrhea    CLL (chronic lymphocytic leukemia)- wbc= 20k-30k, since 2006; no rx; dr alegria (present on admission)  Assessment & Plan  - wbc back to baseline  - outpt f/u      Labs reviewed, Medications reviewed and Radiology images reviewed        DVT Prophylaxis: Enoxaparin (Lovenox)    Ulcer prophylaxis: Yes  Antibiotics: Treating active infection/contamination beyond 24 hours perioperative coverage

## 2017-06-05 NOTE — ASSESSMENT & PLAN NOTE
- new, associated with abdominal pain  - was just on abx, concern for c-diff per RN  - will get c-diff, oral vanco if positive  - was constipated and received stool softeners but not for days, was having regular BMs post softeners but now severe diarrhea

## 2017-06-05 NOTE — PROGRESS NOTES
Late entry due to priority of care:    Tele strip:  NSR HR 76, no ectopy seen, 0.16/0.08/0.40    1900: Received bedside shift report with off-going RN, POC/events/orders reviewed> Fall precautions in place, bed in lowest position, brakes on, and call bell/belongsings within reach. Pt currently resting, no requests.     2000: Assessment completed, pt states she is a little anxious, ativan given as per MAR. Medications given as per MAR. Pt denies any pain. No requests at this time.    2200: Anxiety improved, no requests at this time.     0000: Pt sleeping no requests at this time.    0100: Pt moved rooms, complaining about some anxiety, explained she was not due for ativan until 0300    0300: Pt still complaining of some anxiety pt given ativan as per MAR.     0600: FS completed, medications given as per MAR. No further requests from patient.     0635: Pt complaining of 7/10 head ache. Pt given medications as per MAR. No other requests at this time

## 2017-06-05 NOTE — PROGRESS NOTES
Pt c/o feeling warm, temp checked 100.7 f, tylenol given (see MAR), will CTM.        @ 1725  Pt's temp 98.8 F

## 2017-06-06 VITALS
WEIGHT: 220.9 LBS | RESPIRATION RATE: 18 BRPM | DIASTOLIC BLOOD PRESSURE: 57 MMHG | HEIGHT: 66 IN | BODY MASS INDEX: 35.5 KG/M2 | TEMPERATURE: 98.5 F | HEART RATE: 71 BPM | OXYGEN SATURATION: 95 % | SYSTOLIC BLOOD PRESSURE: 133 MMHG

## 2017-06-06 PROBLEM — N28.9 ACUTE RENAL INSUFFICIENCY: Status: RESOLVED | Noted: 2017-06-01 | Resolved: 2017-06-06

## 2017-06-06 PROBLEM — A41.9 SEPTIC SHOCK(785.52): Status: RESOLVED | Noted: 2017-06-01 | Resolved: 2017-06-06

## 2017-06-06 PROBLEM — R65.21 SEPTIC SHOCK(785.52): Status: RESOLVED | Noted: 2017-06-01 | Resolved: 2017-06-06

## 2017-06-06 LAB
ANION GAP SERPL CALC-SCNC: 9 MMOL/L (ref 0–11.9)
BACTERIA BLD CULT: NORMAL
BACTERIA BLD CULT: NORMAL
BUN SERPL-MCNC: 7 MG/DL (ref 8–22)
CALCIUM SERPL-MCNC: 8.7 MG/DL (ref 8.4–10.2)
CHLORIDE SERPL-SCNC: 102 MMOL/L (ref 96–112)
CO2 SERPL-SCNC: 28 MMOL/L (ref 20–33)
CREAT SERPL-MCNC: 0.8 MG/DL (ref 0.5–1.4)
ERYTHROCYTE [DISTWIDTH] IN BLOOD BY AUTOMATED COUNT: 45.9 FL (ref 35.9–50)
GFR SERPL CREATININE-BSD FRML MDRD: >60 ML/MIN/1.73 M 2
GLUCOSE BLD-MCNC: 136 MG/DL (ref 65–99)
GLUCOSE BLD-MCNC: 99 MG/DL (ref 65–99)
GLUCOSE SERPL-MCNC: 116 MG/DL (ref 65–99)
HCT VFR BLD AUTO: 29.2 % (ref 37–47)
HGB BLD-MCNC: 9.5 G/DL (ref 12–16)
MCH RBC QN AUTO: 29.2 PG (ref 27–33)
MCHC RBC AUTO-ENTMCNC: 32.5 G/DL (ref 33.6–35)
MCV RBC AUTO: 89.8 FL (ref 81.4–97.8)
PLATELET # BLD AUTO: 354 K/UL (ref 164–446)
PMV BLD AUTO: 10 FL (ref 9–12.9)
POTASSIUM SERPL-SCNC: 3.6 MMOL/L (ref 3.6–5.5)
RBC # BLD AUTO: 3.25 M/UL (ref 4.2–5.4)
SIGNIFICANT IND 70042: NORMAL
SIGNIFICANT IND 70042: NORMAL
SITE SITE: NORMAL
SITE SITE: NORMAL
SODIUM SERPL-SCNC: 139 MMOL/L (ref 135–145)
SOURCE SOURCE: NORMAL
SOURCE SOURCE: NORMAL
WBC # BLD AUTO: 19.7 K/UL (ref 4.8–10.8)

## 2017-06-06 PROCEDURE — 82962 GLUCOSE BLOOD TEST: CPT

## 2017-06-06 PROCEDURE — A9270 NON-COVERED ITEM OR SERVICE: HCPCS | Performed by: INTERNAL MEDICINE

## 2017-06-06 PROCEDURE — 94640 AIRWAY INHALATION TREATMENT: CPT

## 2017-06-06 PROCEDURE — 700101 HCHG RX REV CODE 250: Performed by: INTERNAL MEDICINE

## 2017-06-06 PROCEDURE — 700111 HCHG RX REV CODE 636 W/ 250 OVERRIDE (IP): Performed by: INTERNAL MEDICINE

## 2017-06-06 PROCEDURE — 80048 BASIC METABOLIC PNL TOTAL CA: CPT

## 2017-06-06 PROCEDURE — 99238 HOSP IP/OBS DSCHRG MGMT 30/<: CPT | Performed by: INTERNAL MEDICINE

## 2017-06-06 PROCEDURE — 94760 N-INVAS EAR/PLS OXIMETRY 1: CPT

## 2017-06-06 PROCEDURE — 700102 HCHG RX REV CODE 250 W/ 637 OVERRIDE(OP): Performed by: INTERNAL MEDICINE

## 2017-06-06 PROCEDURE — 85027 COMPLETE CBC AUTOMATED: CPT

## 2017-06-06 RX ORDER — IPRATROPIUM BROMIDE AND ALBUTEROL SULFATE 2.5; .5 MG/3ML; MG/3ML
3 SOLUTION RESPIRATORY (INHALATION) 4 TIMES DAILY
Qty: 120 VIAL | Refills: 2 | Status: SHIPPED | OUTPATIENT
Start: 2017-06-06 | End: 2017-06-16

## 2017-06-06 RX ORDER — AMOXICILLIN AND CLAVULANATE POTASSIUM 875; 125 MG/1; MG/1
1 TABLET, FILM COATED ORAL EVERY 12 HOURS
Qty: 14 TAB | Refills: 0
Start: 2017-06-06 | End: 2017-06-16

## 2017-06-06 RX ADMIN — CITALOPRAM HYDROBROMIDE 10 MG: 20 TABLET ORAL at 08:05

## 2017-06-06 RX ADMIN — AZITHROMYCIN 250 MG: 250 TABLET, FILM COATED ORAL at 08:05

## 2017-06-06 RX ADMIN — LEVOTHYROXINE SODIUM 112 MCG: 112 TABLET ORAL at 06:22

## 2017-06-06 RX ADMIN — ENOXAPARIN SODIUM 40 MG: 100 INJECTION SUBCUTANEOUS at 08:05

## 2017-06-06 RX ADMIN — OMEPRAZOLE 20 MG: 20 CAPSULE, DELAYED RELEASE ORAL at 08:05

## 2017-06-06 RX ADMIN — IPRATROPIUM BROMIDE AND ALBUTEROL SULFATE 3 ML: .5; 3 SOLUTION RESPIRATORY (INHALATION) at 05:11

## 2017-06-06 RX ADMIN — AMOXICILLIN AND CLAVULANATE POTASSIUM 1 TABLET: 875; 125 TABLET, FILM COATED ORAL at 08:05

## 2017-06-06 RX ADMIN — ASPIRIN 81 MG CHEWABLE TABLET 81 MG: 81 TABLET CHEWABLE at 08:05

## 2017-06-06 RX ADMIN — FUROSEMIDE 40 MG: 40 TABLET ORAL at 08:05

## 2017-06-06 RX ADMIN — GUAIFENESIN 600 MG: 600 TABLET, EXTENDED RELEASE ORAL at 08:05

## 2017-06-06 ASSESSMENT — PAIN SCALES - GENERAL
PAINLEVEL_OUTOF10: 0
PAINLEVEL_OUTOF10: 0

## 2017-06-06 ASSESSMENT — LIFESTYLE VARIABLES: EVER_SMOKED: YES

## 2017-06-06 NOTE — CARE PLAN
Problem: Safety  Goal: Will remain free from injury  Outcome: PROGRESSING AS EXPECTED  Self, up ad divya with steady gait. CLIP. Pt calls for assist appropriately. Hourly rounding. Personal belongings within reach. Non-skid socks. Bed locked & in low position.            Problem: Bowel/Gastric:  Goal: Normal bowel function is maintained or improved  Outcome: PROGRESSING SLOWER THAN EXPECTED  Multiple loose BMs. Iso for r/o cdiff. Stool negative now.     Problem: Discharge Barriers/Planning  Goal: Patient’s continuum of care needs will be met  Outcome: PROGRESSING AS EXPECTED  Plan to d/c home when medically cleared. Nursing to continue coordinating with pt, MD, and case management.

## 2017-06-06 NOTE — PROGRESS NOTES
AOx4. Afebrile. Denied pain. PRN ativan given with due NOC meds per pt request (see MAR).  Assessment per doc flowsheet. No IV access. States understanding of POC. Snacks given. No additional concerns at this time. CLIP. Personal belongings within reach. Non-skid socks. Bed locked & in low position.

## 2017-06-06 NOTE — PROGRESS NOTES
Bedside report given to JEN Alonzo. POC discussed w/ pt. Lines patent. Fall precautions in place.

## 2017-06-06 NOTE — PROGRESS NOTES
Assumed care of patient. Bedside report from JEN Seymour. POC discussed w/ pt. Pt resting comfortably in bed. No IV access. CLIP. Fall precautions in place.

## 2017-06-06 NOTE — PROGRESS NOTES
FSBS 136, no coverage given per sliding scale. Up to bathroom. Still with loose BM. Due AM meds given (see MAR). No further needs.

## 2017-06-06 NOTE — CARE PLAN
Problem: Safety  Goal: Will remain free from injury  Outcome: PROGRESSING AS EXPECTED  Intervention: Provide assistance with mobility  Encourage to call for any assistance needed, call light within reach.      Problem: Knowledge Deficit  Goal: Knowledge of disease process/condition, treatment plan, diagnostic tests, and medications will improve  Outcome: PROGRESSING AS EXPECTED  Intervention: Explain information regarding disease process/condition, treatment plan, diagnostic tests, and medications and document in education  Updated with plan of care, going home today.seen by MD.

## 2017-06-06 NOTE — PROGRESS NOTES
Prescriptions given to pt. By rg GRAY to home, discharge instructions given, explained and understood, accompanied by friend via wheel chair with O2., nebulized will be deliver to home.

## 2017-06-06 NOTE — PROGRESS NOTES
Tele strip at 1906 shows sinus tach w/ HR of 102.     Measurements: 0.20 / 0.08 / 0.36    Tele Shift Summary:    Rhythm : SR  Rate : 60-80s    Ectopy : Per CCT Noy, pt had rare to occasional PVCs.     Telemetry monitoring strips placed in pt chart.

## 2017-06-06 NOTE — DISCHARGE PLANNING
Medical Social Work    Referral: Pt discussed at IDT rounds this AM.     Intervention: Pt has been accepted by [facility].  O2 was delivered by [facility] but not the nebulizer.  Pt possible d.c home today.    Plan: MARYURI Ratliff called [facility] requesting follow up with [facility] who stated that they will deliver the nebulizer when pt calls for the concentrator to be delivered.  MARYURI gave pt the contact number for [facility] and informed nova Alonzo.

## 2017-06-06 NOTE — DISCHARGE INSTRUCTIONS
"Discharge Instructions    Discharged to home by car with relative. Discharged via wheelchair, hospital escort: Yes.  Special equipment needed: Oxygen    Be sure to schedule a follow-up appointment with your primary care doctor or any specialists as instructed.     Discharge Plan:   Diet Plan: Discussed  Activity Level: Discussed  Confirmed Follow up Appointment: Patient to Call and Schedule Appointment  Confirmed Symptoms Management: Discussed  Medication Reconciliation Updated: Yes  Influenza Vaccine Indication: Not indicated: Previously immunized this influenza season and > 8 years of age    I understand that a diet low in cholesterol, fat, and sodium is recommended for good health. Unless I have been given specific instructions below for another diet, I accept this instruction as my diet prescription.   Other diet: as tolerated    Special Instructions: Clostridium Difficile Toxin  This is a test which may be done when a patient has diarrhea that lasts for several days, or has abdominal pain, fever, and nausea after antibiotic therapy.   This test looks for the presence of Clostridium difficile (C.diff.) toxin in a stool sample. C.diff. is a germ (bacterium) that is one of the groups of bacteria that are usually in the colon, called \"normal deangelo.\" If something upsets the growth of the other normal deangelo, C.diff. may overgrow and disrupt the balance of bacteria in the colon. C. diff. may produce two toxins, A and B. The combination of overgrowth and toxins may cause prolonged diarrhea. The toxins may damage the lining of the colon and lead to colitis.   While some cases of C. diff. diarrhea and colitis do not require treatment, others require specific oral antibiotic therapy. Most patients improve as the normal deangelo re-establishes itself, but about some may have one or more relapses, with symptoms and detectible toxin levels coming back.  PREPARATION FOR TEST  There is no special preparation for the test. A fresh " stool sample is collected in a sterile container. The sample should not be mixed with urine or water. The stool should be taken to the lab within an hour. It may be refrigerated or frozen and taken to the lab as soon as possible. The container should be labeled with your name and the date and time of the stool collection.   NORMAL FINDINGS  Negative Tissue Culture (no toxin identified)  Ranges for normal findings may vary among different laboratories and hospitals. You should always check with your doctor after having lab work or other tests done to discuss the meaning of your test results and whether your values are considered within normal limits.  MEANING OF TEST   Your caregiver will go over the test results with you and discuss the importance and meaning of your results, as well as treatment options and the need for additional tests if necessary.  OBTAINING THE TEST RESULTS  It is your responsibility to obtain your test results. Ask the lab or department performing the test when and how you will get your results.  Document Released: 01/10/2006 Document Revised: 03/11/2013 Document Reviewed: 11/26/2009  [employer]® Patient Information ©2014 [employer].  Pneumonia, Adult  Pneumonia is an infection of the lungs.   CAUSES  Pneumonia may be caused by bacteria or a virus. Usually, the infection is caused by breathing in droplets from an infected person's cough or sneeze.   SYMPTOMS   Symptoms of pneumonia include:  · Cough.  · Fever.  · Chest pain.  · Rapid breathing.  · Shortness of breath.  · Shaking chills.  · Mucus production.  DIAGNOSIS   If you have the common symptoms of pneumonia, often your health care provider will confirm the diagnosis with a chest X-ray. The X-ray will show an abnormality in the lung if you have pneumonia. Other tests may be done on your blood, urine, or mucus (sputum) to find the specific cause of your pneumonia. A blood gas test or pulse oximetry test may be needed to check how well  your lungs are working.  TREATMENT   Your treatment will depend on whether your pneumonia is caused by bacteria or a virus.   · Bacterial pneumonia is treated with antibiotic medicine.  · Pneumonia that is caused by the influenza virus may be treated with an antiviral medicine.  · Pneumonia that is caused by a virus other than influenza will not respond to antibiotic medicine. This type of pneumonia will have to run its course.   HOME CARE INSTRUCTIONS   · Cough suppressants may be used if you are losing too much rest from coughing at night. However, you should try to avoid taking cough suppresants. This is because coughing helps to remove mucus from your lungs.  · Sleep in a semi-upright position at night. Try sleeping in a reclining chair, or place a few pillows under your head.  · Try using a cold steam vaporizer or humidifier in your home or bedroom. This may help loosen your mucus.  · If you were prescribed an antibiotic medicine, finish all of it even if you start to feel better.  · If you were prescribed an expectorant, take it as directed by your health care provider. This medicine loosens the mucus so you can cough it up.  · Take medicines only as directed by your health care provider.  · Do not smoke. If you are a smoker and continue to smoke, your cough may last several weeks after your pneumonia has cleared.  · Get rest when you feel tired, or as needed.  PREVENTION  A pneumococcal shot (vaccine) is available to prevent a common bacterial cause of pneumonia. This is usually suggested for:  · People over 65 years old.  · People on chemotherapy.  · People with chronic lung problems, such as bronchitis or emphysema.  · People with immune system problems.  If you are over 65 years old or have a high risk condition, you may receive the pneumococcal vaccine if you have not received it before. In some countries, a routine influenza vaccine is also recommended. This vaccine can help prevent some cases of  pneumonia. You may be offered the influenza vaccine as part of your care.  If you are a smoker, it is time to quit in order to prevent pneumonia in the future. You may receive instructions on how to stop smoking. Your health care provider can provide medicines and counseling to help you quit.  SEEK MEDICAL CARE IF:  · You have a fever.  · You cannot control your cough with suppressants at night, and you keep losing sleep.  SEEK IMMEDIATE MEDICAL CARE IF:   · You have worsening shortness of breath.  · You have increased chest pain.  · Your sickness becomes worse, especially if you are an older adult or have a weakened immune system.  · You cough up blood.  · You have pain that is getting worse or is not controlled with medicines.  · Your symptoms are getting worse rather than better.     This information is not intended to replace advice given to you by your health care provider. Make sure you discuss any questions you have with your health care provider.     Document Released: 12/18/2006 Document Revised: 01/08/2016 Document Reviewed: 04/13/2016  [employer] Interactive Patient Education ©2016 [employer].  Sepsis, Adult  Sepsis is a serious infection of your blood or tissues that affects your whole body. The infection that causes sepsis may be bacterial, viral, fungal, or parasitic. Sepsis may be life threatening. Sepsis can cause your blood pressure to drop. This may result in shock. Shock causes your central nervous system and your organs to stop working correctly.   RISK FACTORS  Sepsis can happen in anyone, but it is more likely to happen in people who have weakened immune systems.  SIGNS AND SYMPTOMS   Symptoms of sepsis can include:  · Fever or low body temperature (hypothermia).  · Rapid breathing (hyperventilation).  · Chills.  · Rapid heartbeat (tachycardia).  · Confusion or light-headedness.  · Trouble breathing.  · Urinating much less than usual.  · Cool, clammy skin or red, flushed skin.  · Other problems  with the heart, kidneys, or brain.  DIAGNOSIS   Your health care provider will likely do tests to look for an infection, to see if the infection has spread to your blood, and to see how serious your condition is. Tests can include:  · Blood tests, including cultures of your blood.  · Cultures of other fluids from your body, such as:  · Urine.  · Pus from wounds.  · Mucus coughed up from your lungs.  · Urine tests other than cultures.  · X-ray exams or other imaging tests.  TREATMENT   Treatment will begin with elimination of the source of infection. If your sepsis is likely caused by a bacterial or fungal infection, you will be given antibiotic or antifungal medicines.  You may also receive:  · Oxygen.  · Fluids through an IV tube.  · Medicines to increase your blood pressure.  · A machine to clean your blood (dialysis) if your kidneys fail.  · A machine to help you breathe if your lungs fail.  SEEK IMMEDIATE MEDICAL CARE IF:  You get an infection or develop any of the signs and symptoms of sepsis after surgery or a hospitalization.     This information is not intended to replace advice given to you by your health care provider. Make sure you discuss any questions you have with your health care provider.     Document Released: 09/15/2004 Document Revised: 05/03/2016 Document Reviewed: 08/25/2014  [employer] Interactive Patient Education ©2016 [employer]      · Is patient discharged on Warfarin / Coumadin?   No     · Is patient Post Blood Transfusion?  No    Depression / Suicide Risk    As you are discharged from this [facility] facility, it is important to learn how to keep safe from harming yourself.    Recognize the warning signs:  · Abrupt changes in personality, positive or negative- including increase in energy   · Giving away possessions  · Change in eating patterns- significant weight changes-  positive or negative  · Change in sleeping patterns- unable to sleep or sleeping all the time   · Unwillingness  or inability to communicate  · Depression  · Unusual sadness, discouragement and loneliness  · Talk of wanting to die  · Neglect of personal appearance   · Rebelliousness- reckless behavior  · Withdrawal from people/activities they love  · Confusion- inability to concentrate     If you or a loved one observes any of these behaviors or has concerns about self-harm, here's what you can do:  · Talk about it- your feelings and reasons for harming yourself  · Remove any means that you might use to hurt yourself (examples: pills, rope, extension cords, firearm)  · Get professional help from the community (Mental Health, Substance Abuse, psychological counseling)  · Do not be alone:Call your Safe Contact- someone whom you trust who will be there for you.  · Call your local CRISIS HOTLINE 047-8854 or 673-475-8919  · Call your local Children's Mobile Crisis Response Team [place] (031) 593-2436 or www.[employer]  · Call the toll free National Suicide Prevention Hotlines   · National Suicide Prevention Lifeline 802-377-ARIP (4836)  · National Hope Line Network 800-SUICIDE (252-6137)

## 2017-06-07 ENCOUNTER — PATIENT OUTREACH (OUTPATIENT)
Dept: HEALTH INFORMATION MANAGEMENT | Facility: OTHER | Age: 77
End: 2017-06-07

## 2017-06-07 ENCOUNTER — HOME CARE VISIT (OUTPATIENT)
Dept: HOME HEALTH SERVICES | Facility: HOME HEALTHCARE | Age: 77
End: 2017-06-07

## 2017-06-07 ENCOUNTER — TELEPHONE (OUTPATIENT)
Dept: PULMONOLOGY | Facility: HOSPICE | Age: 77
End: 2017-06-07

## 2017-06-07 NOTE — TELEPHONE ENCOUNTER
Yes, Please see if we can get her in for a sooner hospital follow up visit. See is currently scheduled with Walter OSBORN in September.

## 2017-06-07 NOTE — TELEPHONE ENCOUNTER
Pt called states that she was released from the hospital yesterday, states that she had sepsis due to pneumonia and wanted to know if she should be seen sooner than her 9/19/17 appt?    Last OV: 3/14/17   Next OV: 9/19/17  Community acquired pneumonia

## 2017-06-07 NOTE — DISCHARGE SUMMARY
DATE OF ADMISSION:  06/01/2017    DATE OF DISCHARGE:  06/06/2017    DISCHARGE DIAGNOSES:  1.  Septic shock, resolved.  2.  Community-acquired pneumonia, on antibiotics.  3.  Acute renal insufficiency, resolved.  4.  Pulmonary edema, improved on Lasix.  5.  Chronic lymphocytic leukemia with chronic elevated WBC, which is now back   to baseline.  6.  Hypothyroidism.  7.  Diabetes.  8.  Gastroesophageal reflux disease.    PROCEDURES OPERATIONS PERFORMED:  None.    CONSULTATIONS:  None.    SUMMARY OF HOSPITALIZATION:  The patient is a 76-year-old female with past   medical history of CLL, hypertension, thyroid cancer, and diabetes who   presented to [facility] on 06/01/2017 with complaint   of cough and shortness of breath.  Apparently, the cough started few days   prior to admission with productive yellow sputum.  Patient also had associated   symptoms of fevers and chills.  She was diagnosed with pneumonia as well as   sepsis and was admitted for antibiotics administration.  It was noticed that   patient's systolic blood pressure decreased 60s; therefore, a central line was   placed and patient had aggressive fluid resuscitation.  Patient was also   started on azithromycin and Unasyn.  Chest x-ray was ordered on the patient,   which showed evidence of pulmonary edema, which was likely due to aggressive   fluid resuscitation.  Patient was subsequently started on Lasix after her   blood pressure stabilized and septic shock resolved.  Patient was able to   diurese well on Lasix and a followup chest x-ray showed mild pulmonary edema.    Also, patient's elevated WBC trended down to 19 on the day of discharge,   which appears to be patient's baseline.    At the day of discharge, patient with stable vitals and laboratory values.    She does not complain of any chest pain, shortness of breath, cough, nausea,   vomiting, or abdominal pain.  She states all of the symptoms that brought her   to the  hospital on the admission has completely resolved and patient feels   well and would like to go home.    Also,  was consulted for home health and oxygen.    DISCHARGE INSTRUCTIONS AND FOLLOWUP:  1.  The patient to follow up with Dr. Smart, primary care physician within 7   days of discharge from the hospital.  2.  The patient was also advised to follow up with her pulmonologist with   pulmonary specialist as soon as she is able to obtain an appointment.  3.  The patient will be seen by [facility] on 2017.  4.  The patient was advised to return immediately to the hospital if any   symptoms of shortness of breath, chest pain or any worsening symptoms.    ACTIVITY:  As tolerated.    DIET:  Diabetic.    DISCHARGE MEDICATIONS:  The patient will be discharged on medication   includin.  Lasix 20 mg 1 tablet p.o. daily for next 30 days.  5.  Aspirin 81 mg 1 tablet p.o. daily, levothyroxine 112 mcg 1 tablet p.o.   daily.  6.  Azithromycin was completed on this admission and patient did not require   any prescription for this.  7.  Citalopram 10 mg 1 tablet by mouth daily.  8.  Fenofibrate 160 mg tablet by mouth daily.  9.  Patient was told to resume her, lisinopril/hydrochlorothiazide 1 tablet by   mouth daily.  10.  Metformin extended release 500 mg 2 tablets by mouth daily.  11.  Vitamin D3 5000 units tablet by mouth daily.  12.  Vitamin B12 shots 1000 mcg every 30 days as needed per her primary care   physician.  13.  Patient was given prescription for Augmentin 875/125 mg 1 tablet p.o.   b.i.d. for the next 7 days as well as Lasix 20 mg 1 tablet daily for total of   30 tablets.  14.  At the time of discharge, the patient is alert and oriented x4.  She has   a reasonable understanding of her diagnosis, and plan of care.  Patient   demonstrated understanding by repeating the above instructions back to me.    The patient understands that she requires to continue with her antibiotics as    recommended as well as Lasix and rest of the home medication.  She is also   aware that she requires a followup with her primary care physician, Dr. Stafford   within 7 days after discharge from the hospital.  All their questions were   answered.       ____________________________________     DO GILDA Field / WANDA    DD:  06/06/2017 14:56:23  DT:  06/06/2017 22:24:19    D#:  [id number]  Job#:  [id number]    cc: JESIKA STAFFORD MD

## 2017-06-09 ENCOUNTER — TELEPHONE (OUTPATIENT)
Dept: MEDICAL GROUP | Age: 77
End: 2017-06-09

## 2017-06-09 NOTE — TELEPHONE ENCOUNTER
Phone Number Called: [phone] (home)     Spoke to patient regarding scheduling an appointment after her recent release from the Hospital.  She has a follow-up appointment scheduled for the Pulmonary Specialist on 6/12/17 and will schedule an appointment with this office if necessary after that appointment.    Left Message for patient to call back: no

## 2017-06-12 ENCOUNTER — OFFICE VISIT (OUTPATIENT)
Dept: PULMONOLOGY | Facility: HOSPICE | Age: 77
End: 2017-06-12
Payer: MEDICARE

## 2017-06-12 VITALS
BODY MASS INDEX: 34.49 KG/M2 | RESPIRATION RATE: 16 BRPM | DIASTOLIC BLOOD PRESSURE: 60 MMHG | OXYGEN SATURATION: 96 % | WEIGHT: 207 LBS | HEIGHT: 65 IN | SYSTOLIC BLOOD PRESSURE: 110 MMHG | TEMPERATURE: 97.9 F | HEART RATE: 83 BPM

## 2017-06-12 DIAGNOSIS — E66.9 OBESITY (BMI 30.0-34.9): ICD-10-CM

## 2017-06-12 DIAGNOSIS — C34.11 MALIGNANT NEOPLASM OF RIGHT UPPER LOBE OF LUNG (HCC): ICD-10-CM

## 2017-06-12 DIAGNOSIS — I10 ESSENTIAL HYPERTENSION: ICD-10-CM

## 2017-06-12 DIAGNOSIS — J18.9 CAP (COMMUNITY ACQUIRED PNEUMONIA): ICD-10-CM

## 2017-06-12 DIAGNOSIS — J98.4 SMALL AIRWAYS DISEASE: ICD-10-CM

## 2017-06-12 DIAGNOSIS — J45.20 MILD INTERMITTENT ASTHMA WITHOUT COMPLICATION: ICD-10-CM

## 2017-06-12 PROCEDURE — 99214 OFFICE O/P EST MOD 30 MIN: CPT | Performed by: NURSE PRACTITIONER

## 2017-06-12 NOTE — MR AVS SNAPSHOT
"        Clarisse Reeves   2017 3:00 PM   Office Visit   MRN: [id number]    Department:  [facility]   Dept Phone:  [phone]    Description:  Female : 1940   Provider:  ALEXIS BoatengRLEXY           Reason for Visit     Follow-Up           Allergies as of 2017     Allergen Noted Reactions    Codeine 2010   Hives, Nausea    RXN=40 years ago    Lipitor [Atorvastatin Calcium] 2013   Myalgia    VWV=6055      Lovastatin 2015   Myalgia    Muscle aches  YWF=5134    Zocor [Simvastatin - High Dose] 2013   Myalgia    Severe myalgias  UGO=4273      You were diagnosed with     CAP (community acquired pneumonia)   [271382]       Malignant neoplasm of right upper lobe of lung (CMS-HCC)   [919681]       Mild intermittent asthma without complication   [759347]       Small airways disease   [251574]       Obesity (BMI 30.0-34.9)   [948907]       Essential hypertension   [7132744]         Vital Signs     Blood Pressure Pulse Temperature Respirations Height Weight    110/60 mmHg 83 36.6 °C (97.9 °F) 16 1.651 m (5' 5\") 93.895 kg (207 lb)    Body Mass Index Oxygen Saturation Smoking Status             34.45 kg/m2 96% Former Smoker         Basic Information     Date Of Birth Sex Race Ethnicity Preferred Language    1940 Female White Non- English      Your appointments     Aug 15, 2017 11:20 AM   XRAY 15 with PULMONARY DX 1   [facility] - Pulmonary ([address])    [address] [facility] [address]               Aug 15, 2017 11:40 AM   Established Patient Pul with ALEXIS BoatengRLEXY   [facility] (--)    [address]  [address]  [place] [address] [phone]            Sep 01, 2017  1:00 PM   Established Patient with Demarco Bowers M.D.   [facility]    [address]  [place] [address] [phone]           You will be receiving a confirmation call a few days " before your appointment from our automated call confirmation system.            Sep 11, 2017 11:00 AM   CT BODY WO 30 with [facility] CT 1   [facility] [place]    [address]  [place] [address] [phone]           Some exams require specific prep instructions that would have been given to you at time of scheduling. If you have any additional questions about the prep instructions, please call Imaging Scheduling at [phone] and press #2.            Sep 19, 2017  1:00 PM   Established Patient Pul with CHERRY Boateng   [facility] (--)    [address]  [address]  [place] [address] [phone]            Oct 09, 2017 10:40 AM   Diabetes Care Visit with Siva Smart M.D., NIRALI DIABETES RN   [facility]    [address]  [place] [address] [phone]           You will be receiving a confirmation call a few days before your appointment from our automated call confirmation system.              Problem List              ICD-10-CM Priority Class Noted - Resolved    CLL (chronic lymphocytic leukemia)- wbc= 20k-30k, since 2006; no rx; dr alegria C91.10 Low  4/30/2012 - Present    Mixed hyperlipidemia E78.2 Medium  11/26/2012 - Present    Fatty infiltration of liver K76.0   12/11/2012 - Present    Vitamin D insufficiency E55.9   7/9/2013 - Present    GERD (gastroesophageal reflux disease) K21.9   11/7/2013 - Present    Multiple thyroid nodules- dr jaeger, neg FNA 2015; us no change 2017 E04.2   4/21/2015 - Present    Essential hypertension I10   9/1/2015 - Present    Controlled type 2 diabetes mellitus with complication, without long-term current use of insulin (CMS-HCC) E11.8   9/1/2015 - Present    Hypothyroidism due to acquired atrophy of thyroid E03.4   9/1/2015 - Present    BMI 35.0-35.9,adult Z68.35 Low  9/1/2015 - Present    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, 2016-  partial lobectomy RUL/RML- Dr Ganser- folowed by [facility] C34.11   2016 - Present    Family history of heart attack- daughter 54 yo  MI  Z82.49   10/7/2016 - Present    Small airways disease J98.4   2017 - Present    Moderate episode of recurrent major depressive disorder (CMS-HCC) F33.1   2017 - Present    Obesity (BMI 30.0-34.9) E66.9   2017 - Present    Primary insomnia F51.01   2017 - Present    CAP (community acquired pneumonia) J18.9 Medium  2017 - Present    Normocytic anemia D64.9   2017 - Present    Diarrhea R19.7   2017 - Present    Mild intermittent asthma without complication J45.20   2017 - Present      Health Maintenance        Date Due Completion Dates    IMM ZOSTER VACCINE 2001 ---    DIABETES MONOFILAMENT / LE EXAM 5/3/2017 5/3/2016, 2016, 12/3/2015, 2015, 2015 (Done)    Override on 2015: Done    RETINAL SCREENING 2017    A1C SCREENING 2017 3/22/2017, 10/11/2016, 2016, 2015, 2015, 2015, 2014, 2014, 10/31/2013, 2013, 3/8/2013, 2012, 2012    FASTING LIPID PROFILE 3/22/2018 3/22/2017, 10/11/2016, 2016, 2015, 2015, 2015, 2/3/2014, 10/31/2013, 2013, 3/8/2013, 2012, 2011, 2009    URINE ACR / MICROALBUMIN 3/22/2018 3/22/2017, 10/11/2016, 2016, 2015, 2015, 2014, 2013, 2012    MAMMOGRAM 2018, 10/29/2014, 2014, 2014, 12/3/2012, 10/25/2011, 2010, 2009, 2009    SERUM CREATININE 2018, 2017, 2017, 2017, 3/22/2017, 10/11/2016, 2016, 2016, 2016, 2015, 2015, 2015, 2015, 2015, 2015, 2014, 2014, 2/3/2014, 10/31/2013, 2013, 2013, 2013, 4/3/2013, 2013, 3/31/2013, 3/30/2013, 3/29/2013, 3/8/2013, 2012, 2011, 2011, 2011, 2011, 2011, 2009     COLONOSCOPY 6/7/2019 6/7/2016, 6/3/2016, 1/10/2011    BONE DENSITY 11/21/2019 11/21/2014    IMM DTaP/Tdap/Td Vaccine (2 - Td) 6/13/2026 6/13/2016, 4/14/2016, 6/19/2010            Current Immunizations     13-VALENT PCV PREVNAR 9/17/2014    Influenza TIV (IM) 10/31/2014, 8/30/2010    Influenza Vaccine Adult HD 9/19/2015    Influenza Vaccine Quad Inj (Pf) 9/11/2013, 9/4/2012, 8/2/2011    Pneumococcal polysaccharide vaccine (PPSV-23) 2/1/2017    TD Vaccine 6/19/2010  6:30 PM    Tdap Vaccine 6/13/2016, 4/14/2016  5:25 PM    Tuberculin Skin Test 6/24/2014 10:15 AM, 6/17/2014      Below and/or attached are the medications your provider expects you to take. Review all of your home medications and newly ordered medications with your provider and/or pharmacist. Follow medication instructions as directed by your provider and/or pharmacist. Please keep your medication list with you and share with your provider. Update the information when medications are discontinued, doses are changed, or new medications (including over-the-counter products) are added; and carry medication information at all times in the event of emergency situations     Allergies:  CODEINE - Hives,Nausea     LIPITOR - Myalgia     LOVASTATIN - Myalgia     ZOCOR - Myalgia               Medications  Valid as of: June 12, 2017 -  3:16 PM    Generic Name Brand Name Tablet Size Instructions for use    Amoxicillin-Pot Clavulanate (Tab) AUGMENTIN 875-125 MG Take 1 Tab by mouth every 12 hours.        Aspirin (Chew Tab) ASA 81 MG Take 1 Tab by mouth every day.        Cholecalciferol (Tab) Vitamin D-3 5000 UNITS Take 5,000 Units by mouth every day.        Citalopram Hydrobromide (Tab) CELEXA 10 MG Take 1 Tab by mouth every day.        Fenofibrate (Tab) TRIGLIDE 160 MG TAKE ONE TABLET BY MOUTH EVERY DAY (GENERIC FOR TRIGLIDE)        Ipratropium-Albuterol (Solution) DUONEB 0.5-2.5 (3) MG/3ML 3 mL by Nebulization route 4 times a day.        Lansoprazole (CAPSULE DELAYED  RELEASE) PREVACID 30 MG Take 1 Cap by mouth every day.        Levothyroxine Sodium (Tab) SYNTHROID 112 MCG TAKE ONE TABLET BY MOUTH EVERY DAY        Lisinopril-Hydrochlorothiazide (Tab) PRINZIDE, ZESTORETIC 20-25 MG Take 1 Tab by mouth every day.        LORazepam (Tab) ATIVAN 2 MG Take 2 Tabs by mouth at bedtime as needed. For sleep or anxiety        MetFORMIN HCl (TABLET SR 24 HR) GLUCOPHAGE  MG Take 2 Tabs by mouth every day.        .                 Medicines prescribed today were sent to:     [facility][id number] - [place], [place] - [address]    [address] [place] [address]    Phone: [phone] Fax: [phone]    Open 24 Hours?: No      Medication refill instructions:       If your prescription bottle indicates you have medication refills left, it is not necessary to call your provider’s office. Please contact your pharmacy and they will refill your medication.    If your prescription bottle indicates you do not have any refills left, you may request refills at any time through one of the following ways: The online [employer] system (except Urgent Care), by calling your provider’s office, or by asking your pharmacy to contact your provider’s office with a refill request. Medication refills are processed only during regular business hours and may not be available until the next business day. Your provider may request additional information or to have a follow-up visit with you prior to refilling your medication.   *Please Note: Medication refills are assigned a new Rx number when refilled electronically. Your pharmacy may indicate that no refills were authorized even though a new prescription for the same medication is available at the pharmacy. Please request the medicine by name with the pharmacy before contacting your provider for a refill.        Your To Do List     Future Labs/Procedures Complete By Expires    DX-CHEST-2 VIEWS  7/17/2017 6/12/2018    Scheduling Instructions:     When: at next OV  Where: in clinic      Instructions    1. Finish Augmentin and complete 30 days of Lasix.  2. Follow up in 6-8 weeks with chest x ray in office, sooner if needed.          [employer] Access Code: Activation code not generated  Current [employer] Status: Active

## 2017-06-12 NOTE — PROGRESS NOTES
Chief Complaint   Patient presents with   • Follow-Up       HPI:  Clarisse eReves is a 76 y.o. year old female here today for follow-up on recent hospital visit for CAP. She noted cough and shortness of breath. Chest x-ray 6/1/2017 indicated minimal right lower lobe interstitial and alveolar edema or pneumonia. She is given Unasyn and azithromycin. Patient's blood pressure was weighing 2 aggressive fluid resuscitation as given. Chest x-ray indicated evidence of pulmonary edema, which is likely due to aggressive fluid resuscitation and subsequently sternal Lasix once blood pressure stabilized. She is discharged home on Lasix 20 mg one tablet daily ×30 days, no bronchodilators and Augmentin one tablet ×7 days. Chest x-ray 6/5/2017 indicated cardiomegaly with pulmonary edema. Today she notes feeling fatigued and recovering slowly but no dyspnea and rare cough/phlegm/wheezing. She denies fever, chills, night sweats, chest pain, ankle swelling or hemoptysis. She did not start Breo due to no significant dyspnea.     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 She has a history of chronic lymphocytic leukemia and diabetes type 2. She had thoracic surgery 2000 for removal of granuloma.    History of lung cancer.   Initially in February 2015 a density in the right upper lobe was identified.  CT scan 11/8/2015 indicates a stellate density in the anterior aspect of the right upper lobe measuring 16 x 22 mm.  When compared to February 2015 CT there may been a slight amount of interval enlargement. PET scan showed minimal FDG uptake, however biopsy showed adenocarcinoma. She underwent thoracoscopy with anterior segmentectomy of RUL 02/01/16 which showed adenocarcinoma in situ. She has felt well, and was released by Dr. Ganser. Repeat CT scan 8/27/16 indicated postoperative changes right upper lobe, residual fibrosis and calcifications, no recurrent mass. Hyperexpanded lungs, right lung apex fibrotic change  and left lung base atelectasis similar to previous findings. No pleural effusions. Thyroid gland nodules.  CT 3/7/17 indicatedStable scarring in the right upper lobe. No new pulmonary opacity. No adenopathy identified. No adrenal enlargement. Stable moderate size hiatal hernia. She is due for serial CT scan 9/2017 and continued monitoring for remission of lung CA.    PFT 9/8/2015 indicated FVC of 73%, FEV1 was 1.75 L or 72%, FEV1/FVC with a ratio of 74, RV with 132%, TLC was 107%, and a DLCO of 128% predicted.  There is no significant bronchodilator response.  The flow-volume loop shows changes consistent with small airways disease.      ROS: As per HPI and otherwise negative if not stated.    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   • Type II or unspecified type diabetes mellitus without mention of complication, not stated as uncontrolled      pre-diabetic   • Pneumonia 2014   • Cataract      right  and  left  IOL   • DM (diabetes mellitus) (CMS-HCC)    • Cancer (CMS-HCC) 2006     CLL   • Cancer (CMS-HCC) 2016     lung       Past Surgical History   Procedure Laterality Date   • Us-needle core bx-breast panel     • Vaginal hysterectomy total       Hysterectomy,Total Vaginal   • Recovery  12/18/2015     Procedure: CT-SCP-RUL LUNG BIOPSY-;  Surgeon: Recoveryonly Surgery;  Location: SURGERY [facility];  Service:    • Other orthopedic surgery  1990     bakers cyst removed in right knee, multiple scopes   • Appendectomy  1955   • Cholecystectomy  1995   • Other  2001     Lower Left segment of lung removed    • Other  1984     left Thyroid removed, might remove right side in the future   •  "Other  1990     hemmroid removal   • Thoracoscopy Right 2/1/2016     Procedure: THORACOSCOPY Upper Lobectomy ;  Surgeon: John H Ganser, M.D.;  Location: SURGERY [facility];  Service:    • Node dissection Right 2/1/2016     Procedure: NODE DISSECTION;  Surgeon: John H Ganser, M.D.;  Location: SURGERY [facility];  Service:    • Cataract extraction with iol     • Tonsillectomy         Family History   Problem Relation Age of Onset   • Cancer Mother    • Lung Disease Father    • Cancer Father        Social History     Social History   • Marital Status: Single     Spouse Name: N/A   • Number of Children: N/A   • Years of Education: N/A     Occupational History   • Not on file.     Social History Main Topics   • Smoking status: Former Smoker -- 2.00 packs/day for 50 years     Types: Cigarettes     Quit date: 05/06/2006   • Smokeless tobacco: Never Used      Comment: quit smoking 2002   • Alcohol Use: 0.0 oz/week     0 Standard drinks or equivalent per week      Comment: 2 drinks per week   • Drug Use: No   • Sexual Activity: Not Currently     Other Topics Concern   • Not on file     Social History Narrative       Allergies as of 06/12/2017 - Delio as Reviewed 06/12/2017   Allergen Reaction Noted   • Codeine Hives and Nausea 06/19/2010   • Lipitor [atorvastatin calcium] Myalgia 09/11/2013   • Lovastatin Myalgia 12/03/2015   • Zocor [simvastatin - high dose] Myalgia 09/11/2013        @Vital signs for this encounter:  Filed Vitals:    06/12/17 1446   Height: 1.651 m (5' 5\")   Weight: 93.895 kg (207 lb)   Weight % change since last entry.: 0 %   BP: 110/60   Pulse: 83   BMI (Calculated): 34.45   Resp: 16   Temp: 36.6 °C (97.9 °F)       Current medications as of today   Current Outpatient Prescriptions   Medication Sig Dispense Refill   • amoxicillin-clavulanate (AUGMENTIN) 875-125 MG Tab Take 1 Tab by mouth every 12 hours. 14 Tab 0   • ipratropium-albuterol (DUONEB) 0.5-2.5 (3) MG/3ML nebulizer solution 3 mL by " Nebulization route 4 times a day. 120 Vial 2   • levothyroxine (SYNTHROID) 112 MCG Tab TAKE ONE TABLET BY MOUTH EVERY DAY 90 Tab 4   • citalopram (CELEXA) 10 MG tablet Take 1 Tab by mouth every day. 90 Tab 4   • lorazepam (ATIVAN) 2 MG tablet Take 2 Tabs by mouth at bedtime as needed. For sleep or anxiety 60 Tab 5   • fenofibrate (TRIGLIDE) 160 MG tablet TAKE ONE TABLET BY MOUTH EVERY DAY (GENERIC FOR TRIGLIDE) 90 Tab 0   • lansoprazole (PREVACID) 30 MG CAPSULE DELAYED RELEASE Take 1 Cap by mouth every day. 90 Cap 4   • lisinopril-hydrochlorothiazide (PRINZIDE, ZESTORETIC) 20-25 MG per tablet Take 1 Tab by mouth every day. 90 Tab 3   • metformin ER (GLUCOPHAGE XR) 500 MG TABLET SR 24 HR Take 2 Tabs by mouth every day. (Patient taking differently: Take 500 mg by mouth every day.) 180 Tab 3   • Cholecalciferol (VITAMIN D-3) 5000 UNITS Tab Take 5,000 Units by mouth every day. 90 Tab 1   • aspirin (ASA) 81 MG Chew Tab chewable tablet Take 1 Tab by mouth every day. 100 Tab 11     Current Facility-Administered Medications   Medication Dose Route Frequency Provider Last Rate Last Dose   • cyanocobalamin (VITAMIN B-12) injection 1,000 mcg  1,000 mcg Intramuscular Q30 DAYS Siva Smart M.D.   1,000 mcg at 04/14/16 1732         Physical Exam:   Gen:           Alert and oriented, No apparent distress. Mood and affect appropriate, normal interaction with examiner.  Eyes:          PERRL, EOM intact, sclere white, conjunctive moist.  Ears:          Not examined.   Hearing:     Grossly intact.  Nose:          Normal, no lesions or deformities.  Dentition:    Good dentition.  Oropharynx:   Tongue normal, posterior pharynx without erythema or exudate.  Mallampati Classification: 2/3  Neck:        Supple, trachea midline, no masses.  Respiratory Effort: No intercostal retractions or use of accessory muscles.   Lung Auscultation:      Clear to auscultation bilaterally; no rales, rhonchi or wheezing.  CV:            Regular rate  and rhythm. No murmurs, rubs or gallops.  Abd:           Not examined.   Lymphadenopathy: Not examined.  Gait and Station: Normal.  Digits and Nails: No clubbing, cyanosis, petechiae, or nodes.   Cranial Nerves: II-XII grossly intact.  Skin:        No rashes, lesions or ulcers noted.               Ext:           No cyanosis or edema.      Assessment:  1. CAP (community acquired pneumonia)     2. 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 Mild intermittent asthma without complication     4. Small airways disease     5. Obesity (BMI 30.0-34.9)     6. Essential hypertension         Immunizations:    Flu:not given  Pneumovax 23:2017  Prevnar 13:2014    Plan: Patient is clinically stable and recovering slow from CAP with sepsis. She is asymptomatic.  1. CXR in 6-8 weeks to verify resolution of pneumonia/pulmonary edema.  2. CT scan of chest for hx lung CA due 9/2017.  3. Patient denies dyspnea and would like to hold off on bronchodilators at this time.  4. Discussed respiratory hygiene.  5. Finish lasix 20mg daily and Augmentin until gone.  6. Encouraged routine walking.  7. Follow up in 6-8 weeks with CXR, sooner if needed.

## 2017-06-12 NOTE — PATIENT INSTRUCTIONS
1. Finish Augmentin and complete 30 days of Lasix.  2. Follow up in 6-8 weeks with chest x ray in office, sooner if needed.

## 2017-06-16 ENCOUNTER — APPOINTMENT (OUTPATIENT)
Dept: RADIOLOGY | Facility: MEDICAL CENTER | Age: 77
End: 2017-06-16
Attending: EMERGENCY MEDICINE
Payer: MEDICARE

## 2017-06-16 ENCOUNTER — HOSPITAL ENCOUNTER (EMERGENCY)
Facility: MEDICAL CENTER | Age: 77
End: 2017-06-16
Attending: EMERGENCY MEDICINE
Payer: MEDICARE

## 2017-06-16 ENCOUNTER — TELEPHONE (OUTPATIENT)
Dept: MEDICAL GROUP | Age: 77
End: 2017-06-16

## 2017-06-16 VITALS
BODY MASS INDEX: 32.77 KG/M2 | DIASTOLIC BLOOD PRESSURE: 58 MMHG | TEMPERATURE: 97.3 F | HEART RATE: 93 BPM | RESPIRATION RATE: 16 BRPM | HEIGHT: 66 IN | SYSTOLIC BLOOD PRESSURE: 113 MMHG | WEIGHT: 203.93 LBS | OXYGEN SATURATION: 96 %

## 2017-06-16 DIAGNOSIS — R10.84 GENERALIZED ABDOMINAL PAIN: ICD-10-CM

## 2017-06-16 DIAGNOSIS — K59.00 CONSTIPATION, UNSPECIFIED CONSTIPATION TYPE: ICD-10-CM

## 2017-06-16 DIAGNOSIS — R53.83 FATIGUE, UNSPECIFIED TYPE: ICD-10-CM

## 2017-06-16 LAB
ALBUMIN SERPL BCP-MCNC: 4.1 G/DL (ref 3.2–4.9)
ALBUMIN/GLOB SERPL: 1.5 G/DL
ALP SERPL-CCNC: 58 U/L (ref 30–99)
ALT SERPL-CCNC: 30 U/L (ref 2–50)
ANION GAP SERPL CALC-SCNC: 11 MMOL/L (ref 0–11.9)
APPEARANCE UR: CLEAR
AST SERPL-CCNC: 24 U/L (ref 12–45)
BASOPHILS # BLD AUTO: 0.3 % (ref 0–1.8)
BASOPHILS # BLD: 0.11 K/UL (ref 0–0.12)
BILIRUB SERPL-MCNC: 0.4 MG/DL (ref 0.1–1.5)
BILIRUB UR QL STRIP.AUTO: NEGATIVE
BUN SERPL-MCNC: 16 MG/DL (ref 8–22)
CALCIUM SERPL-MCNC: 9.5 MG/DL (ref 8.4–10.2)
CHLORIDE SERPL-SCNC: 99 MMOL/L (ref 96–112)
CO2 SERPL-SCNC: 25 MMOL/L (ref 20–33)
COLOR UR: YELLOW
COMMENT 1642: NORMAL
CREAT SERPL-MCNC: 1.01 MG/DL (ref 0.5–1.4)
CULTURE IF INDICATED INDCX: NO UA CULTURE
EOSINOPHIL # BLD AUTO: 0.4 K/UL (ref 0–0.51)
EOSINOPHIL NFR BLD: 1.2 % (ref 0–6.9)
ERYTHROCYTE [DISTWIDTH] IN BLOOD BY AUTOMATED COUNT: 45.1 FL (ref 35.9–50)
GFR SERPL CREATININE-BSD FRML MDRD: 53 ML/MIN/1.73 M 2
GLOBULIN SER CALC-MCNC: 2.7 G/DL (ref 1.9–3.5)
GLUCOSE SERPL-MCNC: 117 MG/DL (ref 65–99)
GLUCOSE UR STRIP.AUTO-MCNC: NEGATIVE MG/DL
HCT VFR BLD AUTO: 37.2 % (ref 37–47)
HGB BLD-MCNC: 12.1 G/DL (ref 12–16)
IMM GRANULOCYTES # BLD AUTO: 0.16 K/UL (ref 0–0.11)
IMM GRANULOCYTES NFR BLD AUTO: 0.5 % (ref 0–0.9)
KETONES UR STRIP.AUTO-MCNC: NEGATIVE MG/DL
LEUKOCYTE ESTERASE UR QL STRIP.AUTO: NEGATIVE
LYMPHOCYTES # BLD AUTO: 23.24 K/UL (ref 1–4.8)
LYMPHOCYTES NFR BLD: 72.4 % (ref 22–41)
MCH RBC QN AUTO: 28.7 PG (ref 27–33)
MCHC RBC AUTO-ENTMCNC: 32.5 G/DL (ref 33.6–35)
MCV RBC AUTO: 88.2 FL (ref 81.4–97.8)
MICRO URNS: NORMAL
MONOCYTES # BLD AUTO: 0.84 K/UL (ref 0–0.85)
MONOCYTES NFR BLD AUTO: 2.6 % (ref 0–13.4)
NEUTROPHILS # BLD AUTO: 7.37 K/UL (ref 2–7.15)
NEUTROPHILS NFR BLD: 23 % (ref 44–72)
NITRITE UR QL STRIP.AUTO: NEGATIVE
NRBC # BLD AUTO: 0 K/UL
NRBC BLD AUTO-RTO: 0 /100 WBC
PH UR STRIP.AUTO: 5.5 [PH]
PLATELET # BLD AUTO: 522 K/UL (ref 164–446)
PLATELET BLD QL SMEAR: NORMAL
PMV BLD AUTO: 9.1 FL (ref 9–12.9)
POTASSIUM SERPL-SCNC: 4.3 MMOL/L (ref 3.6–5.5)
PROT SERPL-MCNC: 6.8 G/DL (ref 6–8.2)
PROT UR QL STRIP: NEGATIVE MG/DL
RBC # BLD AUTO: 4.22 M/UL (ref 4.2–5.4)
RBC BLD AUTO: NORMAL
RBC UR QL AUTO: NEGATIVE
SODIUM SERPL-SCNC: 135 MMOL/L (ref 135–145)
SP GR UR STRIP.AUTO: <=1.005
VARIANT LYMPHS BLD QL SMEAR: NORMAL
WBC # BLD AUTO: 32.1 K/UL (ref 4.8–10.8)

## 2017-06-16 PROCEDURE — 99284 EMERGENCY DEPT VISIT MOD MDM: CPT

## 2017-06-16 PROCEDURE — 74020 DX-ABDOMEN-2 VIEWS: CPT

## 2017-06-16 PROCEDURE — 85025 COMPLETE CBC W/AUTO DIFF WBC: CPT

## 2017-06-16 PROCEDURE — 700102 HCHG RX REV CODE 250 W/ 637 OVERRIDE(OP): Performed by: EMERGENCY MEDICINE

## 2017-06-16 PROCEDURE — 36415 COLL VENOUS BLD VENIPUNCTURE: CPT

## 2017-06-16 PROCEDURE — 80053 COMPREHEN METABOLIC PANEL: CPT

## 2017-06-16 PROCEDURE — 81003 URINALYSIS AUTO W/O SCOPE: CPT

## 2017-06-16 PROCEDURE — A9270 NON-COVERED ITEM OR SERVICE: HCPCS | Performed by: EMERGENCY MEDICINE

## 2017-06-16 RX ORDER — BISACODYL 10 MG
10 SUPPOSITORY, RECTAL RECTAL DAILY
Qty: 30 SUPPOSITORY | Refills: 0 | Status: SHIPPED | OUTPATIENT
Start: 2017-06-16 | End: 2017-07-11

## 2017-06-16 RX ORDER — DICYCLOMINE HCL 20 MG
20 TABLET ORAL ONCE
Status: COMPLETED | OUTPATIENT
Start: 2017-06-16 | End: 2017-06-16

## 2017-06-16 RX ORDER — SENNOSIDES 8.6 MG
1 TABLET ORAL
Qty: 30 TAB | Refills: 0 | Status: SHIPPED | OUTPATIENT
Start: 2017-06-16 | End: 2017-07-11

## 2017-06-16 RX ORDER — AMOXICILLIN AND CLAVULANATE POTASSIUM 875; 125 MG/1; MG/1
1 TABLET, FILM COATED ORAL 2 TIMES DAILY
Status: SHIPPED | COMMUNITY
Start: 2017-06-12 | End: 2017-07-11

## 2017-06-16 RX ORDER — DOCUSATE SODIUM 100 MG/1
100 CAPSULE, LIQUID FILLED ORAL 2 TIMES DAILY
Qty: 60 CAP | Refills: 0 | Status: SHIPPED | OUTPATIENT
Start: 2017-06-16 | End: 2017-07-11

## 2017-06-16 RX ADMIN — DICYCLOMINE HYDROCHLORIDE 20 MG: 20 TABLET ORAL at 11:35

## 2017-06-16 ASSESSMENT — PAIN SCALES - GENERAL: PAINLEVEL_OUTOF10: 8

## 2017-06-16 NOTE — ED NOTES
IV started in RW with 20g x1 attempt. Labs drawn from start, sent to lab for processing. IV flushed with NS without difficulty. Pt tolerated well.

## 2017-06-16 NOTE — ED NOTES
Black soft, un-formed stools x 1 week. Fatigued, nausea, and lightheaded. Was tested for C-diff recently. Discharged on 6/6 post pneumonia/sepsis.

## 2017-06-16 NOTE — ED AVS SNAPSHOT
Home Care Instructions                                                                                                                Clarisse Reeves   MRN: [id number]    Department:  [facility], Emergency Dept   Date of Visit:  6/16/2017            [facility], Emergency Dept    [address]    [place] [address]    Phone:  [phone]      You were seen by     Michelle Salcido M.D.      Your Diagnosis Was     Generalized abdominal pain     R10.84       These are the medications you received during your hospitalization from 06/16/2017 0933 to 06/16/2017 1341     Date/Time Order Dose Route Action    06/16/2017 1135 dicyclomine (BENTYL) tablet 20 mg 20 mg Oral Given      Follow-up Information     1. Follow up with Siva Smart M.D.. Schedule an appointment as soon as possible for a visit in 1 week.    Specialty:  Internal Medicine    Why:  for recheck    Contact information    [address] [id number] [phone]          2. Follow up with [facility], Emergency Dept.    Specialty:  Emergency Medicine    Why:  worsening pain, weakness, fever or other concerns    Contact information    [address] [address] [phone]      Medication Information     Review all of your home medications and newly ordered medications with your primary doctor and/or pharmacist as soon as possible. Follow medication instructions as directed by your doctor and/or pharmacist.     Please keep your complete medication list with you and share with your physician. Update the information when medications are discontinued, doses are changed, or new medications (including over-the-counter products) are added; and carry medication information at all times in the event of emergency situations.               Medication List      START taking these medications        Instructions    Morning Afternoon Evening Bedtime    bisacodyl 10  MG Supp   Commonly known as:  DULCOLAX        Insert 1 Suppository in rectum every day.   Dose:  10 mg                        docusate sodium 100 MG Caps   Commonly known as:  COLACE        Take 1 Cap by mouth 2 times a day.   Dose:  100 mg                        Senna 8.6 MG Tabs        Take 1 Tab by mouth every bedtime.   Dose:  1 Tab                          ASK your doctor about these medications        Instructions    Morning Afternoon Evening Bedtime    amoxicillin-clavulanate 875-125 MG Tabs   Commonly known as:  AUGMENTIN        Take 1 Tab by mouth 2 times a day. Pt started a 7 day course on 6/12   Dose:  1 Tab                        aspirin 81 MG Chew chewable tablet   Commonly known as:  ASA        Take 1 Tab by mouth every day.   Dose:  81 mg                        citalopram 10 MG tablet   Commonly known as:  CELEXA        Take 1 Tab by mouth every day.   Dose:  10 mg                        fenofibrate 160 MG tablet   Commonly known as:  TRIGLIDE        Doctor's comments:  Needs to be seen for any more refills, after this one; make 3 mos appt now   TAKE ONE TABLET BY MOUTH EVERY DAY (GENERIC FOR TRIGLIDE)                        lansoprazole 30 MG Cpdr   Commonly known as:  PREVACID        Doctor's comments:  Replaces 15 mg pill   Take 1 Cap by mouth every day.   Dose:  30 mg                        levothyroxine 112 MCG Tabs   Commonly known as:  SYNTHROID        TAKE ONE TABLET BY MOUTH EVERY DAY                        lisinopril-hydrochlorothiazide 20-25 MG per tablet   Commonly known as:  PRINZIDE, ZESTORETIC        Take 1 Tab by mouth every day.   Dose:  1 Tab                        lorazepam 2 MG tablet   Commonly known as:  ATIVAN        Take 2 Tabs by mouth at bedtime as needed. For sleep or anxiety   Dose:  4 mg                        metformin  MG Tb24   Commonly known as:  GLUCOPHAGE XR        Take 2 Tabs by mouth every day.   Dose:  1000 mg                        Vitamin D-3 5000 UNITS  Tabs        Take 5,000 Units by mouth every day.   Dose:  5000 Units                             Where to Get Your Medications      You can get these medications from any pharmacy     Bring a paper prescription for each of these medications    - bisacodyl 10 MG Supp  - docusate sodium 100 MG Caps  - Senna 8.6 MG Tabs            Procedures and tests performed during your visit     CBC WITH DIFFERENTIAL    COMP METABOLIC PANEL    DIFFERENTIAL COMMENT    ON-TFRBOXD-3 VIEWS    ESTIMATED GFR    IV Saline Lock    MORPHOLOGY    PLATELET ESTIMATE    URINALYSIS CULTURE, IF INDICATED        Discharge Instructions       Abdominal Pain, Adult  Many things can cause abdominal pain. Usually, abdominal pain is not caused by a disease and will improve without treatment. It can often be observed and treated at home. Your health care provider will do a physical exam and possibly order blood tests and X-rays to help determine the seriousness of your pain. However, in many cases, more time must pass before a clear cause of the pain can be found. Before that point, your health care provider may not know if you need more testing or further treatment.  HOME CARE INSTRUCTIONS   Monitor your abdominal pain for any changes. The following actions may help to alleviate any discomfort you are experiencing:  · Only take over-the-counter or prescription medicines as directed by your health care provider.  · Do not take laxatives unless directed to do so by your health care provider.  · Try a clear liquid diet (broth, tea, or water) as directed by your health care provider. Slowly move to a bland diet as tolerated.  SEEK MEDICAL CARE IF:  · You have unexplained abdominal pain.  · You have abdominal pain associated with nausea or diarrhea.  · You have pain when you urinate or have a bowel movement.  · You experience abdominal pain that wakes you in the night.  · You have abdominal pain that is worsened or improved by eating food.  · You have  abdominal pain that is worsened with eating fatty foods.  · You have a fever.  SEEK IMMEDIATE MEDICAL CARE IF:   · Your pain does not go away within 2 hours.  · You keep throwing up (vomiting).  · Your pain is felt only in portions of the abdomen, such as the right side or the left lower portion of the abdomen.  · You pass bloody or black tarry stools.  MAKE SURE YOU:  · Understand these instructions.    · Will watch your condition.    · Will get help right away if you are not doing well or get worse.       This information is not intended to replace advice given to you by your health care provider. Make sure you discuss any questions you have with your health care provider.     Document Released: 09/27/2006 Document Revised: 01/08/2016 Document Reviewed: 08/27/2014  [employer] Interactive Patient Education ©2016 [employer]    Bloating  Bloating is the feeling of fullness in your belly. You may feel as though your pants are too tight. Often the cause of bloating is overeating, retaining fluids, or having gas in your bowel. It is also caused by swallowing air and eating foods that cause gas. Irritable bowel syndrome is one of the most common causes of bloating. Constipation is also a common cause. Sometimes more serious problems can cause bloating.  SYMPTOMS   Usually there is a feeling of fullness, as though your abdomen is bulged out. There may be mild discomfort.   DIAGNOSIS   Usually no particular testing is necessary for most bloating. If the condition persists and seems to become worse, your caregiver may do additional testing.   TREATMENT   · There is no direct treatment for bloating.   · Do not put gas into the bowel. Avoid chewing gum and sucking on candy. These tend to make you swallow air. Swallowing air can also be a nervous habit. Try to avoid this.   · Avoiding high residue diets will help. Eat foods with soluble fibers (examples include root vegetables, apples, or barley) and substitute dairy products  with soy and rice products. This helps irritable bowel syndrome.   · If constipation is the cause, then a high residue diet with more fiber will help.   · Avoid carbonated beverages.   · Over-the-counter preparations are available that help reduce gas. Your pharmacist can help you with this.   SEEK MEDICAL CARE IF:   · Bloating continues and seems to be getting worse.   · You notice a weight gain.   · You have a weight loss but the bloating is getting worse.   · You have changes in your bowel habits or develop nausea or vomiting.   SEEK IMMEDIATE MEDICAL CARE IF:   · You develop shortness of breath or swelling in your legs.   · You have an increase in abdominal pain or develop chest pain.   Document Released: 10/18/2007 Document Revised: 03/11/2013 Document Reviewed: 12/05/2008  [employer]® Patient Information ©2013 [employer].          Patient Information     Patient Information    Following emergency treatment: all patient requiring follow-up care must return either to a private physician or a clinic if your condition worsens before you are able to obtain further medical attention, please return to the emergency room.     Billing Information    At [facility], we work to make the billing process streamlined for our patients.  Our Representatives are here to answer any questions you may have regarding your hospital bill.  If you have insurance coverage and have supplied your insurance information to us, we will submit a claim to your insurer on your behalf.  Should you have any questions regarding your bill, we can be reached online or by phone as follows:  Online: You are able pay your bills online or live chat with our representatives about any billing questions you may have. We are here to help Monday - Friday from 8:00am to 7:30pm and 9:00am - 12:00pm on Saturdays.  Please visit [email or web address]  for more information.   Phone:  [phone] or [phone]    Please  note that your emergency physician, surgeon, pathologist, radiologist, anesthesiologist, and other specialists are not employed by [facility] and will therefore bill separately for their services.  Please contact them directly for any questions concerning their bills at the numbers below:     Emergency Physician Services:  [phone]  [facility]:  [phone]  [facility]:  [phone]  [facility]:  [phone]    1. Your final bill may vary from the amount quoted upon discharge if all procedures are not complete at that time, or if your doctor has additional procedures of which we are not aware. You will receive an additional bill if you return to the Emergency Department at [facility] for suture removal regardless of the facility of which the sutures were placed.     2. Please arrange for settlement of this account at the emergency registration.    3. All self-pay accounts are due in full at the time of treatment.  If you are unable to meet this obligation then payment is expected within 4-5 days.     4. If you have had radiology studies (CT, X-ray, Ultrasound, MRI), you have received a preliminary result during your emergency department visit. Please contact the radiology department [phone] to receive a copy of your final result. Please discuss the Final result with your primary physician or with the follow up physician provided.     Crisis Hotline:  [place] Crisis Hotline:  2-712-ZOMWXVM or 1-131.123.2384  [place] Crisis Hotline:    1-554.192.7176 or 367-800-0159         ED Discharge Follow Up Questions    1. In order to provide you with very good care, we would like to follow up with a phone call in the next few days.  May we have your permission to contact you?     YES /  NO    2. What is the best phone number to call you? (       )_____-__________    3. What is the best time to call you?      Morning  /  Afternoon  /  Evening                    Patient Signature:  ____________________________________________________________    Date:  ____________________________________________________________      Your appointments     Jun 19, 2017 10:20 AM   Established Patient with Siva Smart M.D.   [facility]    [address]  [place] [id number] [phone]           You will be receiving a confirmation call a few days before your appointment from our automated call confirmation system.            Aug 02, 2017  3:10 PM   [place] SCRN10 with S RASHI MG 1   [facility] MAMMO[facility]HY ([place])    [address]  [place] [address]   [phone]           No deodorant, powder, perfume or lotion under the arm or breast area.            Aug 15, 2017 11:20 AM   XRAY 15 with PULMONARY DX 1   [facility] - Pulmonary ([address])    [address] [place] [address] [place] [address]               Aug 15, 2017 11:40 AM   Established Patient Pul with CHERRY Boateng   [facility] Pulmonary Medicine (--)    [address] [facility] [address]  [place] [address]   [phone]            Sep 01, 2017  1:00 PM   Established Patient with Demarco Bowers M.D.   [facility] Endocrinology [facility])    [address]  [place] [id number] [phone]           You will be receiving a confirmation call a few days before your appointment from our automated call confirmation system.            Sep 11, 2017 11:00 AM   CT BODY WO 30 with [facility] CT 1   [facility] - [facility])    [address]  [place] [address] [phone]           Some exams require specific prep instructions that would have been given to you at time of scheduling. If you have any additional questions about the prep instructions, please call Imaging Scheduling at [phone] and press #[phone].            Sep 19, 2017  1:00 PM   Established Patient Pul with CHERRY Boateng      [facility] Pulmonary Medicine (--)    [address]  [address]  [facility] [address]   [phone]            Oct 09, 2017 10:40 AM   Diabetes Care Visit with Siva Smatr M.D., [facility] RN   [address] ([facility])    [address] [place] [id number] [phone]           You will be receiving a confirmation call a few days before your appointment from our automated call confirmation system.

## 2017-06-16 NOTE — TELEPHONE ENCOUNTER
ESTABLISHED PATIENT PRE-VISIT PLANNING     Note: Patient will not be contacted if there is no indication to call.     1.  Reviewed notes from the last few office visits within the medical group: Yes    2.  If any orders were placed at last visit or intended to be done for this visit (i.e. 6 mos follow-up), do we have Results/Consult Notes?        •  Labs - Labs ordered, completed and results are in chart.       •  Imaging - Imaging ordered, completed and results are in chart.       •  Referrals - No referrals were ordered at last office visit.    3. Is this appointment scheduled as a Hospital Follow-Up? No    4.  Immunizations were updated in Epic using WebIZ?: Epic matches WebIZ       •  Web Iz Recommendations: HEPATITIS A  TD ZOSTAVAX (Shingles)    5.  Patient is due for the following Health Maintenance Topics:   Health Maintenance Due   Topic Date Due   • Annual Wellness Visit  1940   • IMM ZOSTER VACCINE  07/30/2001   • DIABETES MONOFILAMENT / LE EXAM  05/03/2017   • RETINAL SCREENING  05/04/2017           6.  Patient was NOT informed to arrive 15 min prior to their scheduled appointment and bring in their medication bottles.

## 2017-06-16 NOTE — ED PROVIDER NOTES
ED Provider Note    CHIEF COMPLAINT  Chief Complaint   Patient presents with   • Melena   • Nausea   • Abdominal Pain   • Lightheadedness       HPI  Clarisse Reeves is a 76 y.o. female who presents with abdominal pain distention and black stools for the last week. She was admitted to the hospital beginning of this month for pneumonia and sepsis. Tested for C. diff before she left. When she was in the hospital she was quite constipated and had multiple stool softeners and then had a very large bowel movement. Since then she has had this dark stool. She is going multiple times a day but only a small bit every time. She has felt very tired and fatigued. Her abdominal pain is more bloating and diffuse.      REVIEW OF SYSTEMS  positive for abdominal pain bloating fatigue, negative for fevers nausea vomiting. All other systems are negative.     PAST MEDICAL HISTORY   has a past medical history of Hypertension; CLL (chronic lymphoblastic leukemia); MEDICAL HOME; Personal history of colonic polyps (11/26/2012); Cutaneous skin tags (1/29/2015); Thyroid disease; Indigestion; Hiatus hernia syndrome; Carcinoma in situ of respiratory system (2016); Type II or unspecified type diabetes mellitus without mention of complication, not stated as uncontrolled; Pneumonia (2014); Cataract; DM (diabetes mellitus) (CMS[facility]); Cancer (CMS-Formerly [facility]) (2006); and Cancer (CMS-Formerly [facility]) (2016).    SOCIAL HISTORY  Social History     Social History Main Topics   • Smoking status: Former Smoker -- 2.00 packs/day for 50 years     Types: Cigarettes     Quit date: 05/06/2006   • Smokeless tobacco: Never Used      Comment: quit smoking 2002   • Alcohol Use: 0.0 oz/week     0 Standard drinks or equivalent per week      Comment: 2 drinks per week   • Drug Use: No   • Sexual Activity: Not Currently       SURGICAL HISTORY   has past surgical history that includes us-needle core bx-breast panel; vaginal hysterectomy total; recovery (12/18/2015); other orthopedic  "surgery (1990); appendectomy (1955); cholecystectomy (1995); other (2001); other (1984); other (1990); thoracoscopy (Right, 2/1/2016); node dissection (Right, 2/1/2016); cataract extraction with iol; and tonsillectomy.    CURRENT MEDICATIONS  Home Medications     Reviewed by Franko Wilson (Pharmacy Tech) on 06/16/17 at 1042  Med List Status: Complete    Medication Last Dose Status    amoxicillin-clavulanate (AUGMENTIN) 875-125 MG Tab 6/16/2017 Active    aspirin (ASA) 81 MG Chew Tab chewable tablet 6/16/2017 Active    Cholecalciferol (VITAMIN D-3) 5000 UNITS Tab 6/16/2017 Active    citalopram (CELEXA) 10 MG tablet 6/16/2017 Active    cyanocobalamin (VITAMIN B-12) injection 1,000 mcg  Active    fenofibrate (TRIGLIDE) 160 MG tablet 6/16/2017 Active    lansoprazole (PREVACID) 30 MG CAPSULE DELAYED RELEASE 6/16/2017 Active    levothyroxine (SYNTHROID) 112 MCG Tab 6/16/2017 Active    lisinopril-hydrochlorothiazide (PRINZIDE, ZESTORETIC) 20-25 MG per tablet 6/15/2017 Active    lorazepam (ATIVAN) 2 MG tablet 6/15/2017 Active    metformin ER (GLUCOPHAGE XR) 500 MG TABLET SR 24 HR 6/15/2017 Active                ALLERGIES  Allergies   Allergen Reactions   • Codeine Hives and Nausea     RXN=40 years ago   • Lipitor [Atorvastatin Calcium] Myalgia     DGX=0304     • Lovastatin Myalgia     Muscle aches  UJJ=7681   • Zocor [Simvastatin - High Dose] Myalgia     Severe myalgias  WVP=9458       PHYSICAL EXAM  VITAL SIGNS: /58 mmHg  Pulse 93  Temp(Src) 36.3 °C (97.3 °F)  Resp 16  Ht 1.676 m (5' 5.98\")  Wt 92.5 kg (203 lb 14.8 oz)  BMI 32.93 kg/m2  SpO2 96%.  Constitutional: Alert in no apparent distress.  HENT: No signs of trauma, Bilateral external ears normal, Nose normal.   Eyes: Pupils are equal and reactive, Conjunctiva normal, Non-icteric.   Neck: Normal range of motion, No tenderness, Supple, No stridor.   Cardiovascular: Regular rate and rhythm, no murmurs.   Thorax & Lungs: Normal breath sounds, No " respiratory distress, No wheezing, No chest tenderness.   Abdomen: Bowel sounds normal, Soft, No tenderness, No masses, No peritoneal signs.  Rectal: Hemoccult negative.  Skin: Warm, Dry, No erythema, No rash.   Musculoskeletal:  no major deformities noted.   Neurologic: Alert,  No focal deficits noted.   Psychiatric: Affect normal, Judgment normal, Mood normal.       DIAGNOSTIC STUDIES / PROCEDURES      LABS  Results for orders placed or performed during the hospital encounter of 06/16/17   CBC WITH DIFFERENTIAL   Result Value Ref Range    WBC 32.1 (HH) 4.8 - 10.8 K/uL    RBC 4.22 4.20 - 5.40 M/uL    Hemoglobin 12.1 12.0 - 16.0 g/dL    Hematocrit 37.2 37.0 - 47.0 %    MCV 88.2 81.4 - 97.8 fL    MCH 28.7 27.0 - 33.0 pg    MCHC 32.5 (L) 33.6 - 35.0 g/dL    RDW 45.1 35.9 - 50.0 fL    Platelet Count 522 (H) 164 - 446 K/uL    MPV 9.1 9.0 - 12.9 fL    Neutrophils-Polys 23.00 (L) 44.00 - 72.00 %    Lymphocytes 72.40 (H) 22.00 - 41.00 %    Monocytes 2.60 0.00 - 13.40 %    Eosinophils 1.20 0.00 - 6.90 %    Basophils 0.30 0.00 - 1.80 %    Immature Granulocytes 0.50 0.00 - 0.90 %    Nucleated RBC 0.00 /100 WBC    Neutrophils (Absolute) 7.37 (H) 2.00 - 7.15 K/uL    Lymphs (Absolute) 23.24 (H) 1.00 - 4.80 K/uL    Monos (Absolute) 0.84 0.00 - 0.85 K/uL    Eos (Absolute) 0.40 0.00 - 0.51 K/uL    Baso (Absolute) 0.11 0.00 - 0.12 K/uL    Immature Granulocytes (abs) 0.16 (H) 0.00 - 0.11 K/uL    NRBC (Absolute) 0.00 K/uL   COMP METABOLIC PANEL   Result Value Ref Range    Sodium 135 135 - 145 mmol/L    Potassium 4.3 3.6 - 5.5 mmol/L    Chloride 99 96 - 112 mmol/L    Co2 25 20 - 33 mmol/L    Anion Gap 11.0 0.0 - 11.9    Glucose 117 (H) 65 - 99 mg/dL    Bun 16 8 - 22 mg/dL    Creatinine 1.01 0.50 - 1.40 mg/dL    Calcium 9.5 8.4 - 10.2 mg/dL    AST(SGOT) 24 12 - 45 U/L    ALT(SGPT) 30 2 - 50 U/L    Alkaline Phosphatase 58 30 - 99 U/L    Total Bilirubin 0.4 0.1 - 1.5 mg/dL    Albumin 4.1 3.2 - 4.9 g/dL    Total Protein 6.8 6.0 - 8.2 g/dL     Globulin 2.7 1.9 - 3.5 g/dL    A-G Ratio 1.5 g/dL   URINALYSIS CULTURE, IF INDICATED   Result Value Ref Range    Color Yellow     Character Clear     Specific Gravity <=1.005 <1.035    Ph 5.5 5.0-8.0    Glucose Negative Negative mg/dL    Ketones Negative Negative mg/dL    Protein Negative Negative mg/dL    Bilirubin Negative Negative    Nitrite Negative Negative    Leukocyte Esterase Negative Negative    Occult Blood Negative Negative    Micro Urine Req see below     Culture Indicated No UA Culture   ESTIMATED GFR   Result Value Ref Range    GFR If African American >60 >60 mL/min/1.73 m 2    GFR If Non  53 (A) >60 mL/min/1.73 m 2   MORPHOLOGY   Result Value Ref Range    RBC Morphology Normal     Reactive Lymphocytes Few    PLATELET ESTIMATE   Result Value Ref Range    Plt Estimation Increased    DIFFERENTIAL COMMENT   Result Value Ref Range    Comments-Diff see below          RADIOLOGY  [id number] VIEWS   Final Result      No dilated bowel              COURSE & MEDICAL DECISION MAKING  Pertinent Labs & Imaging studies reviewed. (See chart for details)  This is a 76-year-old female presents with abdominal pain and bloating. She has a history of CLL and was recently admitted for pneumonia. Differentials include but are not limited to constipation, obstruction, GI bleed. Rectal exam is negative for occult blood. Labs show an elevated white blood cell count primarily lymphocytes. CMP is essentially normal. X-ray does not show any evidence of obstruction or dilated bowel. She does have a significant amount of stool burden on my review of the images. She felt better after Bentyl. I suspect that she is significantly constipated. She had a significant hospitalization a few weeks ago. She does have CLL and I suspect a white blood cell count is because of this. She typically runs in the 20s so this is elevated however she is not febrile. She is not tachycardic or do not think she has an active infection  at this time. I do think she is stable for discharge she feels better she will follow-up with her primary care doctor on Monday.     The patient will return for new or worsening symptoms and is stable at the time of discharge. Patient was given return precautions. Patient and/or family member verbalizes understanding and will comply.    DISPOSITION:  Patient will be discharged home in stable condition.    FOLLOW UP:  Siva Smart M.D.  [address] [facility] [place]   [address]  [place] [id number] [phone]    Schedule an appointment as soon as possible for a visit in 1 week  for recheck    [facility] [place], Emergency Dept  [address]  [place] [id number] [phone]    worsening pain, weakness, fever or other concerns      OUTPATIENT MEDICATIONS:  New Prescriptions    BISACODYL (DULCOLAX) 10 MG SUPPOS    Insert 1 Suppository in rectum every day.    DOCUSATE SODIUM (COLACE) 100 MG CAP    Take 1 Cap by mouth 2 times a day.    SENNOSIDES (SENNA) 8.6 MG TAB    Take 1 Tab by mouth every bedtime.             FINAL IMPRESSION  1. Generalized abdominal pain    2. Constipation, unspecified constipation type    3. Fatigue, unspecified type        2.   3.    This dictation has been creating using voice recognition software. The accuracy of the dictation is limited the abilities of the software.  I expect there may be some errors of grammar and possibly content. I made every attempt to manually correct the errors within my dictation. However errors related to this voice recognition software may still exist and should be interpreted within the appropriate context.      The note accurately reflects work and decisions made by me.  Michelle Salcido  6/16/2017  1:46 PM

## 2017-06-16 NOTE — ED NOTES
Tech from Lab called with critical result of WBC 32.1 at 1128. Critical lab result read back to Tech.   Dr. Salcido notified of critical lab result at 1128.  Critical lab result read back by Dr. Salcido.

## 2017-06-16 NOTE — ED AVS SNAPSHOT
[employer] Access Code: Activation code not generated  Current [employer] Status: Active    [employer]  A secure, online tool to manage your health information     [employer]’s [employer]® is a secure, online tool that connects you to your personalized health information from the privacy of your home -- day or night - making it very easy for you to manage your healthcare. Once the activation process is completed, you can even access your medical information using the [employer] josey, which is available for free in the Apple Josey store or Google Play store.     [employer] provides the following levels of access (as shown below):   My Chart Features   [facility] Primary Care Doctor [facility]  Specialists [facility]  Urgent  Care Non-[facility]  Primary Care  Doctor   Email your healthcare team securely and privately 24/7 X X X X   Manage appointments: schedule your next appointment; view details of past/upcoming appointments X      Request prescription refills. X      View recent personal medical records, including lab and immunizations X X X X   View health record, including health history, allergies, medications X X X X   Read reports about your outpatient visits, procedures, consult and ER notes X X X X   See your discharge summary, which is a recap of your hospital and/or ER visit that includes your diagnosis, lab results, and care plan. X X       How to register for [employer]:  1. Go to  [email or web address].  2. Click on the Sign Up Now box, which takes you to the New Member Sign Up page. You will need to provide the following information:  a. Enter your [employer] Access Code exactly as it appears at the top of this page. (You will not need to use this code after you’ve completed the sign-up process. If you do not sign up before the expiration date, you must request a new code.)   b. Enter your date of birth.   c. Enter your home email address.   d. Click Submit, and follow the next screen’s instructions.  3. Create a [employer] ID. This will  be your [employer] login ID and cannot be changed, so think of one that is secure and easy to remember.  4. Create a [employer] password. You can change your password at any time.  5. Enter your Password Reset Question and Answer. This can be used at a later time if you forget your password.   6. Enter your e-mail address. This allows you to receive e-mail notifications when new information is available in [employer].  7. Click Sign Up. You can now view your health information.    For assistance activating your [employer] account, call [phone]

## 2017-06-16 NOTE — ED AVS SNAPSHOT
6/16/2017    Clarisse Reeves  [address]  [place] [address]    Dear Clarisse:    [facility] wants to ensure your discharge home is safe and you or your loved ones have had all of your questions answered regarding your care after you leave the hospital.    Below is a list of resources and contact information should you have any questions regarding your hospital stay, follow-up instructions, or active medical symptoms.    Questions or Concerns Regarding… Contact   Medical Questions Related to Your Discharge  (7 days a week, 8am-5pm) Contact a Nurse Care Coordinator   [phone]   Medical Questions Not Related to Your Discharge  (24 hours a day / 7 days a week)  Contact the Nurse Health Line   [phone]    Medications or Discharge Instructions Refer to your discharge packet   or contact your [facility] Primary Care Provider   [phone]   Follow-up Appointment(s) Schedule your appointment via [employer]   or contact Scheduling [phone]   Billing Review your statement via [employer]  or contact Billing [phone]   Medical Records Review your records via [employer]   or contact Medical Records [phone]     You may receive a telephone call within two days of discharge. This call is to make certain you understand your discharge instructions and have the opportunity to have any questions answered. You can also easily access your medical information, test results and upcoming appointments via the [employer] free online health management tool. You can learn more and sign up at [email or web address]. For assistance setting up your [employer] account, please call [phone].    Once again, we want to ensure your discharge home is safe and that you have a clear understanding of any next steps in your care. If you have any questions or concerns, please do not hesitate to contact us, we are here for you. Thank you for choosing [facility] for your healthcare needs.    Sincerely,    Your [facility] Healthcare Team

## 2017-06-16 NOTE — ED NOTES
Discharge instructions provided.  Pt verbalized the understanding of discharge instructions to follow up with PCP and to return to ER if condition worsens.  Pt ambulated out of ER without difficulty.   Patient educated on 3 new prescriptions.

## 2017-06-16 NOTE — DISCHARGE INSTRUCTIONS
Abdominal Pain, Adult  Many things can cause abdominal pain. Usually, abdominal pain is not caused by a disease and will improve without treatment. It can often be observed and treated at home. Your health care provider will do a physical exam and possibly order blood tests and X-rays to help determine the seriousness of your pain. However, in many cases, more time must pass before a clear cause of the pain can be found. Before that point, your health care provider may not know if you need more testing or further treatment.  HOME CARE INSTRUCTIONS   Monitor your abdominal pain for any changes. The following actions may help to alleviate any discomfort you are experiencing:  · Only take over-the-counter or prescription medicines as directed by your health care provider.  · Do not take laxatives unless directed to do so by your health care provider.  · Try a clear liquid diet (broth, tea, or water) as directed by your health care provider. Slowly move to a bland diet as tolerated.  SEEK MEDICAL CARE IF:  · You have unexplained abdominal pain.  · You have abdominal pain associated with nausea or diarrhea.  · You have pain when you urinate or have a bowel movement.  · You experience abdominal pain that wakes you in the night.  · You have abdominal pain that is worsened or improved by eating food.  · You have abdominal pain that is worsened with eating fatty foods.  · You have a fever.  SEEK IMMEDIATE MEDICAL CARE IF:   · Your pain does not go away within 2 hours.  · You keep throwing up (vomiting).  · Your pain is felt only in portions of the abdomen, such as the right side or the left lower portion of the abdomen.  · You pass bloody or black tarry stools.  MAKE SURE YOU:  · Understand these instructions.    · Will watch your condition.    · Will get help right away if you are not doing well or get worse.       This information is not intended to replace advice given to you by your health care provider. Make sure you  discuss any questions you have with your health care provider.     Document Released: 09/27/2006 Document Revised: 01/08/2016 Document Reviewed: 08/27/2014  [employer] Interactive Patient Education ©2016 [employer]    Bloating  Bloating is the feeling of fullness in your belly. You may feel as though your pants are too tight. Often the cause of bloating is overeating, retaining fluids, or having gas in your bowel. It is also caused by swallowing air and eating foods that cause gas. Irritable bowel syndrome is one of the most common causes of bloating. Constipation is also a common cause. Sometimes more serious problems can cause bloating.  SYMPTOMS   Usually there is a feeling of fullness, as though your abdomen is bulged out. There may be mild discomfort.   DIAGNOSIS   Usually no particular testing is necessary for most bloating. If the condition persists and seems to become worse, your caregiver may do additional testing.   TREATMENT   · There is no direct treatment for bloating.   · Do not put gas into the bowel. Avoid chewing gum and sucking on candy. These tend to make you swallow air. Swallowing air can also be a nervous habit. Try to avoid this.   · Avoiding high residue diets will help. Eat foods with soluble fibers (examples include root vegetables, apples, or barley) and substitute dairy products with soy and rice products. This helps irritable bowel syndrome.   · If constipation is the cause, then a high residue diet with more fiber will help.   · Avoid carbonated beverages.   · Over-the-counter preparations are available that help reduce gas. Your pharmacist can help you with this.   SEEK MEDICAL CARE IF:   · Bloating continues and seems to be getting worse.   · You notice a weight gain.   · You have a weight loss but the bloating is getting worse.   · You have changes in your bowel habits or develop nausea or vomiting.   SEEK IMMEDIATE MEDICAL CARE IF:   · You develop shortness of breath or swelling in  your legs.   · You have an increase in abdominal pain or develop chest pain.   Document Released: 10/18/2007 Document Revised: 03/11/2013 Document Reviewed: 12/05/2008  [employer]® Patient Information ©2013 [employer].

## 2017-06-19 ENCOUNTER — OFFICE VISIT (OUTPATIENT)
Dept: MEDICAL GROUP | Age: 77
End: 2017-06-19
Payer: MEDICARE

## 2017-06-19 VITALS
OXYGEN SATURATION: 97 % | HEIGHT: 66 IN | SYSTOLIC BLOOD PRESSURE: 110 MMHG | TEMPERATURE: 97.9 F | DIASTOLIC BLOOD PRESSURE: 64 MMHG | WEIGHT: 203 LBS | HEART RATE: 92 BPM | BODY MASS INDEX: 32.62 KG/M2

## 2017-06-19 DIAGNOSIS — F51.01 PRIMARY INSOMNIA: ICD-10-CM

## 2017-06-19 DIAGNOSIS — J81.0 ACUTE PULMONARY EDEMA (HCC): ICD-10-CM

## 2017-06-19 DIAGNOSIS — C34.11 MALIGNANT NEOPLASM OF RIGHT UPPER LOBE OF LUNG (HCC): ICD-10-CM

## 2017-06-19 DIAGNOSIS — E11.8 CONTROLLED TYPE 2 DIABETES MELLITUS WITH COMPLICATION, WITHOUT LONG-TERM CURRENT USE OF INSULIN (HCC): ICD-10-CM

## 2017-06-19 DIAGNOSIS — E55.9 VITAMIN D DEFICIENCY: ICD-10-CM

## 2017-06-19 DIAGNOSIS — J45.20 MILD INTERMITTENT ASTHMA WITHOUT COMPLICATION: ICD-10-CM

## 2017-06-19 DIAGNOSIS — E66.9 OBESITY (BMI 30-39.9): ICD-10-CM

## 2017-06-19 DIAGNOSIS — J18.9 CAP (COMMUNITY ACQUIRED PNEUMONIA): ICD-10-CM

## 2017-06-19 DIAGNOSIS — E03.4 HYPOTHYROIDISM DUE TO ACQUIRED ATROPHY OF THYROID: ICD-10-CM

## 2017-06-19 DIAGNOSIS — F33.1 MODERATE EPISODE OF RECURRENT MAJOR DEPRESSIVE DISORDER (HCC): ICD-10-CM

## 2017-06-19 DIAGNOSIS — E78.2 MIXED HYPERLIPIDEMIA: ICD-10-CM

## 2017-06-19 DIAGNOSIS — I10 ESSENTIAL HYPERTENSION: ICD-10-CM

## 2017-06-19 DIAGNOSIS — C91.10 CLL (CHRONIC LYMPHOCYTIC LEUKEMIA) (HCC): ICD-10-CM

## 2017-06-19 PROBLEM — J81.1 PULMONARY EDEMA: Status: ACTIVE | Noted: 2017-06-19

## 2017-06-19 PROBLEM — R19.7 DIARRHEA: Status: RESOLVED | Noted: 2017-06-05 | Resolved: 2017-06-19

## 2017-06-19 PROCEDURE — 99214 OFFICE O/P EST MOD 30 MIN: CPT | Performed by: INTERNAL MEDICINE

## 2017-06-19 ASSESSMENT — ENCOUNTER SYMPTOMS
PSYCHIATRIC NEGATIVE: 1
GASTROINTESTINAL NEGATIVE: 1
NEUROLOGICAL NEGATIVE: 1
EYES NEGATIVE: 1
CONSTITUTIONAL NEGATIVE: 1
RESPIRATORY NEGATIVE: 1
CARDIOVASCULAR NEGATIVE: 1
MUSCULOSKELETAL NEGATIVE: 1

## 2017-06-19 NOTE — MR AVS SNAPSHOT
"        Clarisse Reeves   2017 10:20 AM   Office Visit   MRN: [id number]    Department:  [address] [place] [address]   Dept Phone:  [phone]    Description:  Female : 1940   Provider:  Siva Smart M.D.           Reason for Visit     Follow-Up ER      Allergies as of 2017     Allergen Noted Reactions    Codeine 2010   Hives, Nausea    RXN=40 years ago    Lipitor [Atorvastatin Calcium] 2013   Myalgia    MQE=2859      Lovastatin 2015   Myalgia    Muscle aches  OFT=4206    Zocor [Simvastatin - High Dose] 2013   Myalgia    Severe myalgias  VFK=9603      You were diagnosed with     CAP (community acquired pneumonia)   [288959]       Mixed hyperlipidemia   [272.2.ICD-9-CM]       CLL (chronic lymphocytic leukemia) (CMS-[facility])   [741144]       Mild intermittent asthma without complication   [946925]       Obesity (BMI 30-39.9)   [447008]       Essential hypertension   [7303951]       Controlled type 2 diabetes mellitus with complication, without long-term current use of insulin (CMS-[facility])   [8612966]       Hypothyroidism due to acquired atrophy of thyroid   [9360290]       Moderate episode of recurrent major depressive disorder (CMS-HCC)   [2685165]       Primary insomnia   [604041]       Malignant neoplasm of right upper lobe of lung (CMS-[facility])   [484449]       Acute pulmonary edema (CMS-[facility])   [604542]       Vitamin D deficiency   [0815961]         Vital Signs     Blood Pressure Pulse Temperature Height Weight Body Mass Index    110/64 mmHg 92 36.6 °C (97.9 °F) 1.676 m (5' 5.98\") 92.08 kg (203 lb) 32.78 kg/m2    Oxygen Saturation Smoking Status                97% Former Smoker          Basic Information     Date Of Birth Sex Race Ethnicity Preferred Language    1940 Female White Non- English      Your appointments     Aug 02, 2017  3:10 PM   MA SCRN10 with LILIYA MARKHAM MG 1   [facility]    [address]  [place] [address]   [phone]           No deodorant, powder, perfume or lotion under the arm or breast area.            Aug 15, 2017 11:20 AM   XRAY 15 with PULMONARY DX 1   [facility] - Pulmonary ([address])    [address]  [place] [address]               Aug 15, 2017 11:40 AM   Established Patient Pul with CHERRY Boateng   [facility] Pulmonary Medicine (--)    [address] [place] [address] [address]  [place] [address] [phone]            Sep 01, 2017  1:00 PM   Established Patient with Demarco Bowers M.D.   [facility] & Endocrinology ([facility])    [address]  [place] [address] [phone]           You will be receiving a confirmation call a few days before your appointment from our automated call confirmation system.            Sep 11, 2017 11:00 AM   CT BODY WO 30 with [facility] CT 1   [facility] - [place] - [place]    [address]  [place] [address] [phone]           Some exams require specific prep instructions that would have been given to you at time of scheduling. If you have any additional questions about the prep instructions, please call Imaging Scheduling at [phone] and press #2.            Sep 19, 2017  1:00 PM   Established Patient Pul with CHERRY Boateng   [facility] (--)    [address] [place] [address] [address]  [place] [address] [phone]            Sep 25, 2017  2:40 PM   Established Patient with Siva Smart M.D.   [facility] [address] [facility])    [address] [facility] [place] [address] [phone]           You will be receiving a confirmation call a few days before your appointment from our automated call confirmation system.            Oct 09, 2017 10:40 AM   Diabetes Care Visit with Siva Smart M.D., NIRALI DIABETES RN   [facility] [address] [facility]    [address]  [place] [address] [phone]           You  will be receiving a confirmation call a few days before your appointment from our automated call confirmation system.              Problem List              ICD-10-CM Priority Class Noted - Resolved    CLL (chronic lymphocytic leukemia)- wbc= 20k-30k, since ; no rx; dr alegria C91.10 Low  2012 - Present    Mixed hyperlipidemia E78.2 Medium  2012 - Present    Fatty infiltration of liver K76.0   2012 - Present    Vitamin D insufficiency E55.9   2013 - Present    GERD (gastroesophageal reflux disease) K21.9   2013 - Present    Multiple thyroid nodules- dr jaeger, neg FNA ; us no change  E04.2   2015 - Present    Essential hypertension I10   2015 - Present    Controlled type 2 diabetes mellitus with complication, without long-term current use of insulin (CMS-HCC) E11.8   2015 - Present    Hypothyroidism due to acquired atrophy of thyroid E03.4   2015 - Present    Obesity (BMI 30-39.9) E66.9 Low  2015 - Present    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, 2016-  partial lobectomy RUL/RML- Dr Ganser- folowed by [facility] C34.11   2016 - Present    Family history of heart attack- daughter 54 yo  MI  Z82.49   10/7/2016 - Present    Small airways disease J98.4   2017 - Present    Moderate episode of recurrent major depressive disorder (CMS-HCC) F33.1   2017 - Present    Primary insomnia F51.01   2017 - Present    CAP (community acquired pneumonia) J18.9 Medium  2017 - Present    Normocytic anemia D64.9   2017 - Present    Mild intermittent asthma without complication J45.20   2017 - Present    Pulmonary edema-  J81.1   2017 - Present      Health Maintenance        Date Due Completion Dates    IMM ZOSTER VACCINE 2001 ---    DIABETES MONOFILAMENT / LE EXAM 5/3/2017 5/3/2016, 2016, 12/3/2015, 2015, 2015 (Done)    Override on 2015: Done    RETINAL SCREENING 2017  5/4/2016    A1C SCREENING 9/22/2017 3/22/2017, 10/11/2016, 4/11/2016, 11/28/2015, 9/1/2015, 2/4/2015, 11/17/2014, 5/19/2014, 10/31/2013, 6/12/2013, 3/8/2013, 12/11/2012, 11/26/2012    FASTING LIPID PROFILE 3/22/2018 3/22/2017, 10/11/2016, 4/11/2016, 11/28/2015, 9/5/2015, 2/4/2015, 2/3/2014, 10/31/2013, 6/12/2013, 3/8/2013, 11/26/2012, 1/26/2011, 8/25/2009    URINE ACR / MICROALBUMIN 3/22/2018 3/22/2017, 10/11/2016, 4/11/2016, 9/5/2015, 2/4/2015, 5/19/2014, 6/12/2013, 11/26/2012    MAMMOGRAM 5/2/2018 5/2/2016, 10/29/2014, 4/28/2014, 4/24/2014, 12/3/2012, 10/25/2011, 9/16/2010, 9/11/2009, 9/11/2009    SERUM CREATININE 6/16/2018 6/16/2017, 6/6/2017, 6/4/2017, 6/2/2017, 6/1/2017, 3/22/2017, 10/11/2016, 4/11/2016, 2/2/2016, 1/29/2016, 12/19/2015, 11/28/2015, 9/5/2015, 2/13/2015, 2/12/2015, 2/4/2015, 11/17/2014, 5/19/2014, 2/3/2014, 10/31/2013, 9/11/2013, 6/12/2013, 4/12/2013, 4/3/2013, 4/1/2013, 3/31/2013, 3/30/2013, 3/29/2013, 3/8/2013, 11/26/2012, 1/29/2011, 1/28/2011, 1/27/2011, 1/26/2011, 1/25/2011, 8/25/2009    COLONOSCOPY 6/7/2019 6/7/2016, 6/3/2016, 1/10/2011    BONE DENSITY 11/21/2019 11/21/2014    IMM DTaP/Tdap/Td Vaccine (2 - Td) 6/13/2026 6/13/2016, 4/14/2016, 6/19/2010            Current Immunizations     13-VALENT PCV PREVNAR 9/17/2014    Influenza TIV (IM) 10/31/2014, 8/30/2010    Influenza Vaccine Adult HD 9/19/2015    Influenza Vaccine Quad Inj (Pf) 9/11/2013, 9/4/2012, 8/2/2011    Pneumococcal polysaccharide vaccine (PPSV-23) 2/1/2017    TD Vaccine 6/19/2010  6:30 PM    Tdap Vaccine 6/13/2016, 4/14/2016  5:25 PM    Tuberculin Skin Test 6/24/2014 10:15 AM, 6/17/2014      Below and/or attached are the medications your provider expects you to take. Review all of your home medications and newly ordered medications with your provider and/or pharmacist. Follow medication instructions as directed by your provider and/or pharmacist. Please keep your medication list with you and share with your provider. Update  the information when medications are discontinued, doses are changed, or new medications (including over-the-counter products) are added; and carry medication information at all times in the event of emergency situations     Allergies:  CODEINE - Hives,Nausea     LIPITOR - Myalgia     LOVASTATIN - Myalgia     ZOCOR - Myalgia               Medications  Valid as of: June 19, 2017 - 11:30 AM    Generic Name Brand Name Tablet Size Instructions for use    Amoxicillin-Pot Clavulanate (Tab) AUGMENTIN 875-125 MG Take 1 Tab by mouth 2 times a day. Pt started a 7 day course on 6/12        Aspirin (Chew Tab) ASA 81 MG Take 1 Tab by mouth every day.        Bisacodyl (Suppos) DULCOLAX 10 MG Insert 1 Suppository in rectum every day.        Cholecalciferol (Tab) Vitamin D-3 5000 UNITS Take 5,000 Units by mouth every day.        Citalopram Hydrobromide (Tab) CELEXA 10 MG Take 1 Tab by mouth every day.        Docusate Sodium (Cap) COLACE 100 MG Take 1 Cap by mouth 2 times a day.        Fenofibrate (Tab) TRIGLIDE 160 MG TAKE ONE TABLET BY MOUTH EVERY DAY (GENERIC FOR TRIGLIDE)        Lansoprazole (CAPSULE DELAYED RELEASE) PREVACID 30 MG Take 1 Cap by mouth every day.        Levothyroxine Sodium (Tab) SYNTHROID 112 MCG TAKE ONE TABLET BY MOUTH EVERY DAY        Lisinopril-Hydrochlorothiazide (Tab) PRINZIDE, ZESTORETIC 20-25 MG Take 1 Tab by mouth every day.        LORazepam (Tab) ATIVAN 2 MG Take 2 Tabs by mouth at bedtime as needed. For sleep or anxiety        MetFORMIN HCl (TABLET SR 24 HR) GLUCOPHAGE  MG Take 2 Tabs by mouth every day.        Sennosides (Tab) Senna 8.6 MG Take 1 Tab by mouth every bedtime.        .                 Medicines prescribed today were sent to:     [facility][id number] - [place] - [address]    [address] [place] [address]    Phone: [phone] Fax: [phone]    Open 24 Hours?: No      Medication refill instructions:       If your prescription bottle indicates you  have medication refills left, it is not necessary to call your provider’s office. Please contact your pharmacy and they will refill your medication.    If your prescription bottle indicates you do not have any refills left, you may request refills at any time through one of the following ways: The online Rundown system (except Urgent Care), by calling your provider’s office, or by asking your pharmacy to contact your provider’s office with a refill request. Medication refills are processed only during regular business hours and may not be available until the next business day. Your provider may request additional information or to have a follow-up visit with you prior to refilling your medication.   *Please Note: Medication refills are assigned a new Rx number when refilled electronically. Your pharmacy may indicate that no refills were authorized even though a new prescription for the same medication is available at the pharmacy. Please request the medicine by name with the pharmacy before contacting your provider for a refill.        Your To Do List     Future Labs/Procedures Complete By Expires    CBC WITH DIFFERENTIAL  As directed 6/19/2018    COMP METABOLIC PANEL  As directed 6/19/2018    ECHOCARDIOGRAM COMP W/O CONT  As directed 12/18/2017    Comments:    asap    HEMOGLOBIN A1C  As directed 6/19/2018    LIPID PROFILE  As directed 6/19/2018    MICROALBUMIN CREAT RATIO URINE  As directed 6/19/2018    TSH  As directed 6/19/2018    VITAMIN D,25 HYDROXY  As directed 6/19/2018      Referral     A referral request has been sent to our patient care coordination department. Please allow 3-5 business days for us to process this request and contact you either by phone or mail. If you do not hear from us by the 5th business day, please call us at [phone].           Rundown Access Code: Activation code not generated  Current Rundown Status: Active

## 2017-06-19 NOTE — Clinical Note
[facility]  Siva Smart M.D.  [address]  [place] [address]  Fax: [phone]   Authorization for Release/Disclosure of   Protected Health Information   Name: GREG DEMARCO : 1940 SSN: [id number]   Address: [facility] [address]  [place] [address] Phone:    [phone] (home)    I authorize the entity listed below to release/disclose the PHI below to:   [facility] mobileo/Siva Smart M.D. and Siav Smart M.D.   Provider or Entity Name:  [facility]   Address   City, [facility]   Phone:      Fax:[phone]       Reason for request: continuity of care   Information to be released:    [  ] LAST COLONOSCOPY,  including any PATH REPORT and follow-up  [  ] LAST FIT/COLOGUARD RESULT [  ] LAST DEXA  [  ] LAST MAMMOGRAM  [  ] LAST PAP  [  ] LAST LABS [  ] RETINA EXAM REPORT  [  ] IMMUNIZATION RECORDS  [  ] Release all info      [  ] Check here and initial the line next to each item to release ALL health information INCLUDING  _____ Care and treatment for drug and / or alcohol abuse  _____ HIV testing, infection status, or AIDS  _____ Genetic Testing    DATES OF SERVICE OR TIME PERIOD TO BE DISCLOSED: _____________  I understand and acknowledge that:  * This Authorization may be revoked at any time by you in writing, except if your health information has already been used or disclosed.  * Your health information that will be used or disclosed as a result of you signing this authorization could be re-disclosed by the recipient. If this occurs, your re-disclosed health information may no longer be protected by State or Federal laws.  * You may refuse to sign this Authorization. Your refusal will not affect your ability to obtain treatment.  * This Authorization becomes effective upon signing and will  on (date) __________.      If no date is indicated, this Authorization will  one (1) year from the signature date.    Name: Greg Demarco    Signature:   Date:          6/19/2017       PLEASE FAX REQUESTED RECORDS BACK TO: [phone]

## 2017-06-19 NOTE — PROGRESS NOTES
"Subjective:      Clarisse Reeves is a 76 y.o. female who presents with Follow-Up            HPI    Review of Systems   Constitutional: Negative.    HENT: Negative.    Eyes: Negative.    Respiratory: Negative.    Cardiovascular: Negative.    Gastrointestinal: Negative.    Genitourinary: Negative.    Musculoskeletal: Negative.    Skin: Negative.    Neurological: Negative.    Endo/Heme/Allergies: Negative.    Psychiatric/Behavioral: Negative.           Objective:     /64 mmHg  Pulse 92  Temp(Src) 36.6 °C (97.9 °F)  Ht 1.676 m (5' 5.98\")  Wt 92.08 kg (203 lb)  BMI 32.78 kg/m2  SpO2 97%     Physical Exam   Constitutional: She is oriented to person, place, and time. She appears well-developed and well-nourished.   HENT:   Head: Normocephalic and atraumatic.   Right Ear: External ear normal.   Left Ear: External ear normal.   Nose: Nose normal.   Mouth/Throat: Oropharynx is clear and moist.   Eyes: Conjunctivae and EOM are normal. Pupils are equal, round, and reactive to light. Right eye exhibits no discharge. Left eye exhibits no discharge. No scleral icterus.   Neck: Normal range of motion. Neck supple. No JVD present. No tracheal deviation present. No thyromegaly present.   Cardiovascular: Normal rate, regular rhythm, normal heart sounds and intact distal pulses.  Exam reveals no gallop and no friction rub.    Pulmonary/Chest: Effort normal and breath sounds normal. No stridor. No respiratory distress. She has no wheezes. She has no rales. She exhibits no tenderness.   Abdominal: Soft. Bowel sounds are normal. She exhibits no distension and no mass. There is no tenderness. There is no rebound and no guarding. No hernia.   Musculoskeletal: Normal range of motion. She exhibits no edema or tenderness.   Lymphadenopathy:     She has no cervical adenopathy.   Neurological: She is alert and oriented to person, place, and time. She has normal reflexes. She displays normal reflexes. No cranial nerve deficit. " Coordination normal.   Skin: Skin is warm and dry. No rash noted. No erythema. No pallor.   Psychiatric: She has a normal mood and affect. Her behavior is normal. Judgment and thought content normal.   Nursing note and vitals reviewed.    Admission on 06/16/2017, Discharged on 06/16/2017   Component Date Value   • WBC 06/16/2017 32.1*   • RBC 06/16/2017 4.22    • Hemoglobin 06/16/2017 12.1    • Hematocrit 06/16/2017 37.2    • MCV 06/16/2017 88.2    • MCH 06/16/2017 28.7    • MCHC 06/16/2017 32.5*   • RDW 06/16/2017 45.1    • Platelet Count 06/16/2017 522*   • MPV 06/16/2017 9.1    • Neutrophils-Polys 06/16/2017 23.00*   • Lymphocytes 06/16/2017 72.40*   • Monocytes 06/16/2017 2.60    • Eosinophils 06/16/2017 1.20    • Basophils 06/16/2017 0.30    • Immature Granulocytes 06/16/2017 0.50    • Nucleated RBC 06/16/2017 0.00    • Neutrophils (Absolute) 06/16/2017 7.37*   • Lymphs (Absolute) 06/16/2017 23.24*   • Monos (Absolute) 06/16/2017 0.84    • Eos (Absolute) 06/16/2017 0.40    • Baso (Absolute) 06/16/2017 0.11    • Immature Granulocytes (a* 06/16/2017 0.16*   • NRBC (Absolute) 06/16/2017 0.00    • Sodium 06/16/2017 135    • Potassium 06/16/2017 4.3    • Chloride 06/16/2017 99    • Co2 06/16/2017 25    • Anion Gap 06/16/2017 11.0    • Glucose 06/16/2017 117*   • Bun 06/16/2017 16    • Creatinine 06/16/2017 1.01    • Calcium 06/16/2017 9.5    • AST(SGOT) 06/16/2017 24    • ALT(SGPT) 06/16/2017 30    • Alkaline Phosphatase 06/16/2017 58    • Total Bilirubin 06/16/2017 0.4    • Albumin 06/16/2017 4.1    • Total Protein 06/16/2017 6.8    • Globulin 06/16/2017 2.7    • A-G Ratio 06/16/2017 1.5    • Color 06/16/2017 Yellow    • Character 06/16/2017 Clear    • Specific Gravity 06/16/2017 <=1.005    • Ph 06/16/2017 5.5    • Glucose 06/16/2017 Negative    • Ketones 06/16/2017 Negative    • Protein 06/16/2017 Negative    • Bilirubin 06/16/2017 Negative    • Nitrite 06/16/2017 Negative    • Leukocyte Esterase 06/16/2017  Negative    • Occult Blood 06/16/2017 Negative    • Micro Urine Req 06/16/2017 see below    • Culture Indicated 06/16/2017 No    • GFR If  06/16/2017 >60    • GFR If Non  Ameri* 06/16/2017 53*   • RBC Morphology 06/16/2017 Normal    • Reactive Lymphocytes 06/16/2017 Few    • Plt Estimation 06/16/2017 Increased    • Comments-Diff 06/16/2017 see below    Admission on 06/01/2017, Discharged on 06/06/2017   No results displayed because visit has over 200 results.         Lab Results   Component Value Date/Time    GLYCOHEMOGLOBIN 6.3* 03/22/2017 12:19 PM    GLYCOHEMOGLOBIN 5.9* 10/11/2016 07:04 AM     Lab Results   Component Value Date/Time    SODIUM 135 06/16/2017 10:45 AM    POTASSIUM 4.3 06/16/2017 10:45 AM    CHLORIDE 99 06/16/2017 10:45 AM    CO2 25 06/16/2017 10:45 AM    GLUCOSE 117* 06/16/2017 10:45 AM    BUN 16 06/16/2017 10:45 AM    CREATININE 1.01 06/16/2017 10:45 AM    BUN-CREATININE RATIO 19 11/17/2014 10:29 AM    ALKALINE PHOSPHATASE 58 06/16/2017 10:45 AM    AST(SGOT) 24 06/16/2017 10:45 AM    ALT(SGPT) 30 06/16/2017 10:45 AM    TOTAL BILIRUBIN 0.4 06/16/2017 10:45 AM     Lab Results   Component Value Date/Time    INR 0.95 06/01/2017 06:23 AM    INR 0.93 12/16/2015 03:17 PM    INR 1.01 02/13/2015 12:45 AM     Lab Results   Component Value Date/Time    CHOLESTEROL, 03/22/2017 12:19 PM    LDL 93 03/22/2017 12:19 PM    HDL 36* 03/22/2017 12:19 PM    TRIGLYCERIDES 174* 03/22/2017 12:19 PM       No results found for: TESTOSTERONE  Lab Results   Component Value Date/Time    TSH 0.617 04/30/2014 10:35 AM    TSH 0.383* 02/06/2014 01:26 PM     Lab Results   Component Value Date/Time    FREE T-4 1.28 03/22/2017 12:21 PM    FREE T-4 1.35 10/11/2016 07:04 AM     No results found for: URICACID  No components found for: VITB12  Lab Results   Component Value Date/Time    25-HYDROXY   VITAMIN D 25 33 10/11/2016 07:04 AM    25-HYDROXY   VITAMIN D 25 37 04/11/2016 08:07 AM                   Assessment/Plan:     1. CAP (community acquired pneumonia)      This has resolved. Lungs are clear. Repeat chest x-ray clear. However patient presented to exclude O with her pneumonia with an pulmonary edema and was diuresed. We'll therefore check an echocardiogram to make sure is no LV dysfunction    2. Mixed hyperlipidemia  Under good control. Continue same regimen.       3. CLL (chronic lymphocytic leukemia)- wbc= 20k-30k, since 2006; no rx; dr alegria-patient's white count is increasing to 35,000. We'll get second opinion from oncology even though she has no anemia or throbs endocranial lymphadenopathy or hepatosplenomegaly. And she feels generally well at this time although 3 days ago she presented to the ER with severe malaise and dizziness and aching all over. This is probably a viral syndrome which appears to have run its course. Nevertheless a second opinion would be reasonable.      - REFERRAL TO HEMATOLOGY ONCOLOGY Referral to?: [facility]    4. Mild intermittent asthma without complication  Under good control. Continue same regimen.       5. Obesity (BMI 30-39.9)   diet/exercise/lose 15 lbs.; patient counseled         6. Essential hypertension      Under good control. Continue same regimen.    7. Controlled type 2 diabetes mellitus with complication, without long-term current use of insulin (CMS-HCC)    diet/exercise/lose 15 lbs.; patient counseled     - COMP METABOLIC PANEL; Future  - LIPID PROFILE; Future  - CBC WITH DIFFERENTIAL; Future  - HEMOGLOBIN A1C; Future  - MICROALBUMIN CREAT RATIO URINE; Future  - Diabetic Monofilament Lower Extremity Exam    8. Hypothyroidism due to acquired atrophy of thyroid  Under good control. Continue same regimen.     - TSH; Future    9. Moderate episode of recurrent major depressive disorder (CMS-HCC)  Under good control. Continue same regimen.       10. Primary insomnia  Under good control. Continue same regimen.        11. 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 Continue same regimen. No recurrence.     12. Acute pulmonary edema (CMS-HCC)-rule out LV dysfunction either systolic or diastolic. Her pulmonary edema has resolved since hospitalization with diuresis. Probably secondary to her sepsis.      - ECHOCARDIOGRAM COMP W/O CONT; Future  - CBC WITH DIFFERENTIAL; Future    13. Vitamin D deficiency  Under good control. Continue same regimen.       - VITAMIN D,25 HYDROXY; Future    30 minute face-to-face encounter took place today.  More than half of this time was spent in the coordination of care of the above problems, as well as counseling.

## 2017-06-21 ENCOUNTER — TELEPHONE (OUTPATIENT)
Dept: ENDOCRINOLOGY | Facility: MEDICAL CENTER | Age: 77
End: 2017-06-21

## 2017-06-21 DIAGNOSIS — E04.2 MULTIPLE THYROID NODULES: ICD-10-CM

## 2017-06-21 DIAGNOSIS — E11.9 TYPE 2 DIABETES MELLITUS WITHOUT COMPLICATION, WITHOUT LONG-TERM CURRENT USE OF INSULIN (HCC): ICD-10-CM

## 2017-06-27 RX ORDER — LISINOPRIL AND HYDROCHLOROTHIAZIDE 25; 20 MG/1; MG/1
TABLET ORAL
Qty: 90 TAB | Refills: 4 | Status: SHIPPED | OUTPATIENT
Start: 2017-06-27 | End: 2017-10-30 | Stop reason: SINTOL

## 2017-07-05 ENCOUNTER — PATIENT OUTREACH (OUTPATIENT)
Dept: HEALTH INFORMATION MANAGEMENT | Facility: OTHER | Age: 77
End: 2017-07-05

## 2017-07-05 NOTE — PROGRESS NOTES
Attempt #:1    WebIZ Checked & Epic Updated: yes  HealthConnect Verified: yes  Verify PCP: yes    Communication Preference Obtained: yes     Review Care Team: yes    Annual Wellness Visit Scheduling  1. Scheduling Status:Scheduled    Care Gap Scheduling (Attempt to Schedule EACH Overdue Care Gap!)     Health Maintenance Due   Topic Date Due   • Annual Wellness Visit  SCHEDULED    • RETINAL SCREENING  PT DIDN'T WANT TO SCHEDULE AT THE MOMENT          [employer] Activation: already active  [employer] Josey: no  Virtual Visits: no  Opt In to Text Messages: no

## 2017-07-06 ENCOUNTER — TELEPHONE (OUTPATIENT)
Dept: MEDICAL GROUP | Age: 77
End: 2017-07-06

## 2017-07-06 NOTE — TELEPHONE ENCOUNTER
ANNUAL WELLNESS VISIT PRE-VISIT PLANNING     1.  Reviewed note from last office visit with PCP: YES    2.  If any orders were placed at last visit, do we have Results/Consult Notes?        •  Labs - Labs ordered, completed and results are in chart.       •  Imaging - Imaging ordered, completed and results are in chart.       •  Referrals - No referrals were ordered at last office visit.    3.  Immunizations were updated in [facility] using WebIZ?: Yes       •  WebIZ Recommendations: ZOSTAVAX (Shingles)       •  Is patient due for Tdap? NO       •  Is patient due for Shingles? YES. Patient was notified of copay.     4.  Patient is due for the following Health Maintenance Topics:   Health Maintenance Due   Topic Date Due   • Annual Wellness Visit  1940   • RETINAL SCREENING  05/04/2017       - Patient has completed FLU, PNEUMOVAX (PPSV23), PREVNAR (PCV13) , TD and TDAP Immunization(s) per WebIZ. Chart has been updated.    5.  Reviewed/Updated the following with patient:       •   Preferred Pharmacy? YES       •   Preferred Lab? YES       •   Medications? YES. Was Abstract Encounter opened and chart updated? YES       •   Social History? YES. Was Abstract Encounter opened and chart updated? YES       •   Family History? YES. Was Abstract Encounter opened and chart updated? YES    6.  Care Team Updated:       •   DME Company (gait device, O2, CPAP, etc.): N\A       •   Other Specialists (eye doctor, derm, GYN, cardiology, endo, etc): YES    7.  Patient has the following Care Path diagnoses on Problem List:  DIABETES    Has patient ever had diabetes education? Yes, and is NOT interested in more at this time.      8.  Specialty Comments was updated with diagnosis information provided by SCP: N\A    9.  Patient was advised: “This is a free wellness visit. The provider will screen for medical conditions to help you stay healthy. If you have other concerns to address you may be asked to discuss these at a separate visit or  there may be an additional fee.”     6.  Patient was informed to arrive 15 min prior to their scheduled appointment and bring in their medication bottles.

## 2017-07-11 ENCOUNTER — OFFICE VISIT (OUTPATIENT)
Dept: MEDICAL GROUP | Age: 77
End: 2017-07-11
Payer: MEDICARE

## 2017-07-11 VITALS
BODY MASS INDEX: 32.95 KG/M2 | WEIGHT: 205 LBS | HEART RATE: 63 BPM | TEMPERATURE: 97.6 F | DIASTOLIC BLOOD PRESSURE: 72 MMHG | HEIGHT: 66 IN | OXYGEN SATURATION: 96 % | SYSTOLIC BLOOD PRESSURE: 98 MMHG

## 2017-07-11 DIAGNOSIS — J81.0 ACUTE PULMONARY EDEMA (HCC): ICD-10-CM

## 2017-07-11 DIAGNOSIS — J45.20 MILD INTERMITTENT ASTHMA WITHOUT COMPLICATION: ICD-10-CM

## 2017-07-11 DIAGNOSIS — F51.01 PRIMARY INSOMNIA: ICD-10-CM

## 2017-07-11 DIAGNOSIS — I10 ESSENTIAL HYPERTENSION: ICD-10-CM

## 2017-07-11 DIAGNOSIS — E66.9 OBESITY (BMI 30-39.9): ICD-10-CM

## 2017-07-11 DIAGNOSIS — K21.00 GASTROESOPHAGEAL REFLUX DISEASE WITH ESOPHAGITIS: ICD-10-CM

## 2017-07-11 DIAGNOSIS — E03.4 HYPOTHYROIDISM DUE TO ACQUIRED ATROPHY OF THYROID: ICD-10-CM

## 2017-07-11 DIAGNOSIS — R20.0 NUMBNESS OF LEFT HAND: ICD-10-CM

## 2017-07-11 DIAGNOSIS — F33.1 MODERATE EPISODE OF RECURRENT MAJOR DEPRESSIVE DISORDER (HCC): ICD-10-CM

## 2017-07-11 DIAGNOSIS — D64.9 NORMOCYTIC ANEMIA: ICD-10-CM

## 2017-07-11 DIAGNOSIS — C34.11 MALIGNANT NEOPLASM OF RIGHT UPPER LOBE OF LUNG (HCC): ICD-10-CM

## 2017-07-11 DIAGNOSIS — E04.2 MULTIPLE THYROID NODULES: ICD-10-CM

## 2017-07-11 DIAGNOSIS — K76.0 FATTY INFILTRATION OF LIVER: ICD-10-CM

## 2017-07-11 DIAGNOSIS — Z00.00 MEDICARE ANNUAL WELLNESS VISIT, SUBSEQUENT: ICD-10-CM

## 2017-07-11 DIAGNOSIS — E11.8 CONTROLLED TYPE 2 DIABETES MELLITUS WITH COMPLICATION, WITHOUT LONG-TERM CURRENT USE OF INSULIN (HCC): ICD-10-CM

## 2017-07-11 DIAGNOSIS — E78.2 MIXED HYPERLIPIDEMIA: ICD-10-CM

## 2017-07-11 DIAGNOSIS — C91.10 CLL (CHRONIC LYMPHOCYTIC LEUKEMIA) (HCC): ICD-10-CM

## 2017-07-11 DIAGNOSIS — E55.9 VITAMIN D INSUFFICIENCY: ICD-10-CM

## 2017-07-11 DIAGNOSIS — R53.82 CHRONIC FATIGUE: ICD-10-CM

## 2017-07-11 PROBLEM — J98.4 SMALL AIRWAYS DISEASE: Status: RESOLVED | Noted: 2017-02-01 | Resolved: 2017-07-11

## 2017-07-11 PROBLEM — J18.9 CAP (COMMUNITY ACQUIRED PNEUMONIA): Status: RESOLVED | Noted: 2017-06-01 | Resolved: 2017-07-11

## 2017-07-11 PROCEDURE — G0438 PPPS, INITIAL VISIT: HCPCS | Performed by: INTERNAL MEDICINE

## 2017-07-11 RX ORDER — FENOFIBRATE 160 MG/1
160 TABLET ORAL DAILY
Qty: 90 TAB | Refills: 4 | Status: SHIPPED | OUTPATIENT
Start: 2017-07-11 | End: 2018-01-15 | Stop reason: SDUPTHER

## 2017-07-11 RX ORDER — METFORMIN HYDROCHLORIDE 500 MG/1
500 TABLET, EXTENDED RELEASE ORAL
Qty: 90 TAB | Refills: 4 | Status: SHIPPED | OUTPATIENT
Start: 2017-07-11 | End: 2017-09-28 | Stop reason: SINTOL

## 2017-07-11 RX ADMIN — CYANOCOBALAMIN 1000 MCG: 1000 INJECTION, SOLUTION INTRAMUSCULAR; SUBCUTANEOUS at 12:02

## 2017-07-11 ASSESSMENT — PATIENT HEALTH QUESTIONNAIRE - PHQ9: CLINICAL INTERPRETATION OF PHQ2 SCORE: 0

## 2017-07-11 NOTE — MR AVS SNAPSHOT
"        Clarisse Reeves   2017 11:20 AM   Office Visit   MRN: [id number]    Department:  [facility]   Dept Phone:  [phone]    Description:  Female : 1940   Provider:  Siva Smart M.D.; [facility]            Reason for Visit     Annual Wellness Visit           Allergies as of 2017     Allergen Noted Reactions    Codeine 2010   Hives, Nausea    RXN=40 years ago    Lipitor [Atorvastatin Calcium] 2013   Myalgia    OCW=2659      Lovastatin 2015   Myalgia    Muscle aches  JAW=1386    Zocor [Simvastatin - High Dose] 2013   Myalgia    Severe myalgias  LDB=4917      You were diagnosed with     Mixed hyperlipidemia   [272.2.ICD-9-CM]       CLL (chronic lymphocytic leukemia) (CMS-HCC)   [191991]       Obesity (BMI 30-39.9)   [260717]       Fatty infiltration of liver   [005265]       Vitamin D insufficiency   [320321]       Gastroesophageal reflux disease with esophagitis   [2892307]       Multiple thyroid nodules   [955619]       Essential hypertension   [4002488]       Controlled type 2 diabetes mellitus with complication, without long-term current use of insulin (CMS-HCC)   [0934220]       Hypothyroidism due to acquired atrophy of thyroid   [9860730]       Malignant neoplasm of right upper lobe of lung (CMS-HCC)   [045089]       Moderate episode of recurrent major depressive disorder (CMS-HCC)   [8859333]       Primary insomnia   [482303]       Normocytic anemia   [970286]       Mild intermittent asthma without complication   [900223]       Acute pulmonary edema (CMS-HCC)   [221881]       Numbness of left hand   [9481967]       Chronic fatigue   [478212]         Vital Signs     Blood Pressure Pulse Temperature Height Weight Body Mass Index    98/72 mmHg 63 36.4 °C (97.6 °F) 1.676 m (5' 6\") 92.987 kg (205 lb) 33.10 kg/m2    Oxygen Saturation Smoking Status                96% Former Smoker          Basic Information     Date Of Birth Sex Race Ethnicity " Preferred Language    1940 Female White Non- English      Your appointments     Jul 12, 2017  2:45 PM   ECHO with ECHO [facility] RM2   ECHOCARDIOLOGY [facility] ([facility])    [address]  [place] [address]   [phone]            Jul 19, 2017  1:00 PM   Hematology New Patient with Denise Barroso M.D.   [facility] (--)    [address], [address]  [place] [address] [phone]            Jul 25, 2017  1:30 PM   CT BODY WO 30 with [facility] CT 2   [facility] IMAGING - CT - [place]    [address]  [place] [address] [phone]           Some exams require specific prep instructions that would have been given to you at time of scheduling. If you have any additional questions about the prep instructions, please call Imaging Scheduling at [phone] and press #2.            Aug 02, 2017  3:10 PM   MA [id number] with LILIYA MARKHAM MG 1   [facility] IMAGING [facility] MAMMOGRAPHY ([place])    [address]  [place] [address] [phone]           No deodorant, powder, perfume or lotion under the arm or breast area.            Aug 15, 2017 11:20 AM   XRAY 15 with PULMONARY DX 1   [facility] - Pulmonary ([address])    [address]  [place] [address]               Aug 15, 2017 11:40 AM   Established Patient Pul with CHERRY Boateng   [facility]monary [facility] (--)    [address]  [place] [address]   [phone]            Aug 25, 2017  8:30 AM   Adult Draw/Collection with LAB MARIXA MANRIQUE   [facility] (--)    [address]  [place] [address]   [phone]            Sep 01, 2017 11:30 AM   US [id number] with LILIYA MARKHAM US 1   [facility]    [facility]  [address]  [address]  [place] [id number] [phone]            Sep 06, 2017  1:20 PM   Established Patient with Demarco Bowers M.D.   [facility] ([facility])     [address]  [facility] [id number] [phone]           You will be receiving a confirmation call a few days before your appointment from our automated call confirmation system.            Sep 25, 2017  2:40 PM   Established Patient with Siva Smart M.D.   [address])    [facility] [address] [phone]           You will be receiving a confirmation call a few days before your appointment from our automated call confirmation system.            Oct 09, 2017 10:40 AM   Diabetes Care Visit with Siva Smart M.D., NIRALI DIABETES RN   [facility] [address])    [address] [facility] [address] [phone]           You will be receiving a confirmation call a few days before your appointment from our automated call confirmation system.              Problem List              ICD-10-CM Priority Class Noted - Resolved    CLL (chronic lymphocytic leukemia)- wbc= 20k-30k, since 2006; no rx; oncologist to follow C91.10 Low  4/30/2012 - Present    Mixed hyperlipidemia E78.2 Medium  11/26/2012 - Present    Fatty infiltration of liver K76.0   12/11/2012 - Present    Vitamin D insufficiency E55.9   7/9/2013 - Present    Gastroesophageal reflux disease with esophagitis K21.0   11/7/2013 - Present    Multiple thyroid nodules- dr jaeger, neg FNA 2015; us no change 2017 E04.2   4/21/2015 - Present    Essential hypertension I10   9/1/2015 - Present    Controlled type 2 diabetes mellitus with complication, without long-term current use of insulin (CMS-HCC) E11.8   9/1/2015 - Present    Hypothyroidism due to acquired atrophy of thyroid E03.4   9/1/2015 - Present    Obesity (BMI 30-39.9) E66.9 Low  9/1/2015 - Present    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, 2/1/2016-  partial lobectomy RUL/RML- Dr Ganser- folowed by [facility] C34.11   2/1/2016 - Present    Family history of heart attack- daughter 53  yo  MI 2016 Z82.49   10/7/2016 - Present    Moderate episode of recurrent major depressive disorder (CMS-HCC) F33.1   2017 - Present    Primary insomnia F51.01   2017 - Present    Normocytic anemia D64.9   2017 - Present    Mild intermittent asthma without complication- pma;  albuterol inhaler prn J45.20   2017 - Present    Acute pulmonary edema (CMS-HCC)- 2017 from overhydration iv fluids from septic shock of cap- resolved; echo pend tbd 2017 J81.0   2017 - Present    Numbness of left hand- aftter hosp for cap 2017 R20.8   2017 - Present    Chronic fatigue- following cap 2017 R53.82   2017 - Present      Health Maintenance        Date Due Completion Dates    RETINAL SCREENING 2017    IMM ZOSTER VACCINE 2018 (Originally 2001) ---    IMM INFLUENZA (1) 2017, 10/31/2014, 2013, 2012, 2011, 2010    A1C SCREENING 2017 3/22/2017, 10/11/2016, 2016, 2015, 2015, 2015, 2014, 2014, 10/31/2013, 2013, 3/8/2013, 2012, 2012    FASTING LIPID PROFILE 3/22/2018 3/22/2017, 10/11/2016, 2016, 2015, 2015, 2015, 2/3/2014, 10/31/2013, 2013, 3/8/2013, 2012, 2011, 2009    URINE ACR / MICROALBUMIN 3/22/2018 3/22/2017, 10/11/2016, 2016, 2015, 2015, 2014, 2013, 2012    MAMMOGRAM 2018, 10/29/2014, 2014, 2014, 12/3/2012, 10/25/2011, 2010, 2009, 2009    SERUM CREATININE 2018, 2017, 2017, 2017, 2017, 3/22/2017, 10/11/2016, 2016, 2016, 2016, 2015, 2015, 2015, 2015, 2015, 2015, 2014, 2014, 2/3/2014, 10/31/2013, 2013, 2013, 2013, 4/3/2013, 2013, 3/31/2013, 3/30/2013, 3/29/2013, 3/8/2013, 2012, 2011, 2011, 2011, 2011, 2011, 2009    DIABETES  MONOFILAMENT / LE EXAM 6/19/2018 6/19/2017, 5/3/2016, 4/14/2016, 12/3/2015, 9/1/2015, 1/29/2015 (Done)    Override on 1/29/2015: Done    COLONOSCOPY 6/7/2019 6/7/2016, 6/3/2016, 1/10/2011    BONE DENSITY 11/21/2019 11/21/2014    IMM DTaP/Tdap/Td Vaccine (2 - Td) 6/13/2026 6/13/2016, 4/14/2016, 6/19/2010            Current Immunizations     13-VALENT PCV PREVNAR 9/17/2014    Influenza TIV (IM) 10/31/2014, 8/30/2010    Influenza Vaccine Adult HD 9/19/2015    Influenza Vaccine Quad Inj (Pf) 9/11/2013, 9/4/2012, 8/2/2011    Pneumococcal polysaccharide vaccine (PPSV-23) 2/1/2017    TD Vaccine 6/19/2010  6:30 PM    Tdap Vaccine 6/13/2016, 4/14/2016  5:25 PM    Tuberculin Skin Test 6/24/2014 10:15 AM, 6/17/2014      Below and/or attached are the medications your provider expects you to take. Review all of your home medications and newly ordered medications with your provider and/or pharmacist. Follow medication instructions as directed by your provider and/or pharmacist. Please keep your medication list with you and share with your provider. Update the information when medications are discontinued, doses are changed, or new medications (including over-the-counter products) are added; and carry medication information at all times in the event of emergency situations     Allergies:  CODEINE - Hives,Nausea     LIPITOR - Myalgia     LOVASTATIN - Myalgia     ZOCOR - Myalgia               Medications  Valid as of: July 11, 2017 - 12:08 PM    Generic Name Brand Name Tablet Size Instructions for use    Amoxicillin-Pot Clavulanate (Tab) AUGMENTIN 875-125 MG Take 1 Tab by mouth 2 times a day. Pt started a 7 day course on 6/12        Aspirin (Chew Tab) ASA 81 MG Take 1 Tab by mouth every day.        Bisacodyl (Suppos) DULCOLAX 10 MG Insert 1 Suppository in rectum every day.        Cholecalciferol (Tab) Vitamin D-3 5000 UNITS Take 5,000 Units by mouth every day.        Citalopram Hydrobromide (Tab) CELEXA 10 MG Take 1 Tab by mouth  every day.        Docusate Sodium (Cap) COLACE 100 MG Take 1 Cap by mouth 2 times a day.        Fenofibrate (Tab) TRIGLIDE 160 MG TAKE ONE TABLET BY MOUTH EVERY DAY (GENERIC FOR TRIGLIDE)        Lansoprazole (CAPSULE DELAYED RELEASE) PREVACID 30 MG Take 1 Cap by mouth every day.        Levothyroxine Sodium (Tab) SYNTHROID 112 MCG TAKE ONE TABLET BY MOUTH EVERY DAY        Lisinopril-Hydrochlorothiazide (Tab) PRINZIDE, ZESTORETIC 20-25 MG TAKE 1 TABLET BY MOUTH EVERY DAY        LORazepam (Tab) ATIVAN 2 MG Take 2 Tabs by mouth at bedtime as needed. For sleep or anxiety        MetFORMIN HCl (TABLET SR 24 HR) GLUCOPHAGE  MG Take 2 Tabs by mouth every day.        Sennosides (Tab) Senna 8.6 MG Take 1 Tab by mouth every bedtime.        .                 Medicines prescribed today were sent to:     \[facility]\"" [facility] #[id number] - [place], [place] - [address]    [address] [facility] [place] [address]    Phone: [phone] Fax: [phone]    Open 24 Hours?: No      Medication refill instructions:       If your prescription bottle indicates you have medication refills left, it is not necessary to call your provider’s office. Please contact your pharmacy and they will refill your medication.    If your prescription bottle indicates you do not have any refills left, you may request refills at any time through one of the following ways: The online [employer] system (except Urgent Care), by calling your provider’s office, or by asking your pharmacy to contact your provider’s office with a refill request. Medication refills are processed only during regular business hours and may not be available until the next business day. Your provider may request additional information or to have a follow-up visit with you prior to refilling your medication.   *Please Note: Medication refills are assigned a new Rx number when refilled electronically. Your pharmacy may indicate that no refills were authorized even though a new  prescription for the same medication is available at the pharmacy. Please request the medicine by name with the pharmacy before contacting your provider for a refill.           MyChart Access Code: Activation code not generated  Current MyChart Status: Active

## 2017-07-11 NOTE — PROGRESS NOTES
Chief Complaint   Patient presents with   • Annual Wellness Visit         HPI:  Clarisse Reeves is a 76 y.o. female here for Medicare Annual Wellness Visit          Patient Active Problem List    Diagnosis Date Noted   • CAP (community acquired pneumonia) 2017     Priority: Medium   • Mixed hyperlipidemia 2012     Priority: Medium   • Obesity (BMI 30-39.9) 2015     Priority: Low   • CLL (chronic lymphocytic leukemia)- wbc= 20k-30k, since ; no rx; dr alegria 2012     Priority: Low   • Pulmonary edema- 2017   • Mild intermittent asthma without complication 2017   • Normocytic anemia 2017   • Moderate episode of recurrent major depressive disorder (CMS-HCC) 2017   • Primary insomnia 2017   • Small airways disease 2017   • Family history of heart attack- daughter 52 yo  MI 2016 10/07/2016   • 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, 2016-  partial lobectomy RUL/RML- Dr Ganser- folowed by [facility] 2016   • Essential hypertension 2015   • Controlled type 2 diabetes mellitus with complication, without long-term current use of insulin (CMS-HCC) 2015   • Hypothyroidism due to acquired atrophy of thyroid 2015   • Multiple thyroid nodules- dr jaeger, neg FNA ; us no change 2017 2015   • GERD (gastroesophageal reflux disease) 2013   • Vitamin D insufficiency 2013   • Fatty infiltration of liver 2012       Current Outpatient Prescriptions   Medication Sig Dispense Refill   • lisinopril-hydrochlorothiazide (PRINZIDE, ZESTORETIC) 20-25 MG per tablet TAKE 1 TABLET BY MOUTH EVERY DAY 90 Tab 4   • levothyroxine (SYNTHROID) 112 MCG Tab TAKE ONE TABLET BY MOUTH EVERY DAY 90 Tab 4   • citalopram (CELEXA) 10 MG tablet Take 1 Tab by mouth every day. 90 Tab 4   • lorazepam (ATIVAN) 2 MG tablet Take 2 Tabs by mouth at bedtime as needed. For sleep or anxiety 60 Tab 5   •  fenofibrate (TRIGLIDE) 160 MG tablet TAKE ONE TABLET BY MOUTH EVERY DAY (GENERIC FOR TRIGLIDE) 90 Tab 0   • lansoprazole (PREVACID) 30 MG CAPSULE DELAYED RELEASE Take 1 Cap by mouth every day. 90 Cap 4   • metformin ER (GLUCOPHAGE XR) 500 MG TABLET SR 24 HR Take 2 Tabs by mouth every day. (Patient taking differently: Take 500 mg by mouth every day.) 180 Tab 3   • Cholecalciferol (VITAMIN D-3) 5000 UNITS Tab Take 5,000 Units by mouth every day. 90 Tab 1   • aspirin (ASA) 81 MG Chew Tab chewable tablet Take 1 Tab by mouth every day. 100 Tab 11   • amoxicillin-clavulanate (AUGMENTIN) 875-125 MG Tab Take 1 Tab by mouth 2 times a day. Pt started a 7 day course on 6/12     • docusate sodium (COLACE) 100 MG Cap Take 1 Cap by mouth 2 times a day. (Patient not taking: Reported on 7/11/2017) 60 Cap 0   • Sennosides (SENNA) 8.6 MG Tab Take 1 Tab by mouth every bedtime. (Patient not taking: Reported on 7/11/2017) 30 Tab 0   • bisacodyl (DULCOLAX) 10 MG Suppos Insert 1 Suppository in rectum every day. (Patient not taking: Reported on 7/11/2017) 30 Suppository 0     Current Facility-Administered Medications   Medication Dose Route Frequency Provider Last Rate Last Dose   • cyanocobalamin (VITAMIN B-12) injection 1,000 mcg  1,000 mcg Intramuscular Q30 DAYS Siva Smart M.D.   1,000 mcg at 04/14/16 2903        Patient is taking medications as noted in medication list.  Current supplements as per medication list.   Chronic narcotic pain medicines: no    Allergies: Codeine; Lipitor; Lovastatin; and Zocor    Current social contact/activities: gambling, going to shows, movies, taking long drives, going to [employer] for lunch,travel      Is patient current with immunizations?  Yes.     She  reports that she quit smoking about 11 years ago. Her smoking use included Cigarettes. She has a 100 pack-year smoking history. She has never used smokeless tobacco. She reports that she drinks alcohol. She reports that she does not use illicit  drugs.  Counseling given: Yes        DPA/Advanced Directive:  Patient has Advanced Directive on file.     ROS:    Gait: Uses no assistive device    Ostomy: no    Other tubes: no    Amputations: no    Chronic oxygen use: no    Last eye exam: 5/2016    Wears hearing aids: no    : Reports incontinence. Wears a small pad        Depression Screening    Little interest or pleasure in doing things?  0 - not at all  Feeling down, depressed, or hopeless?  0 - not at all  Patient Health Questionnaire Score: 0  If depressive symptoms identified deferred to follow up visit unless specifically addressed in assessment and plan.    Interpretation of PHQ-9 Total Score   Score Severity   1-4 Minimal Depression   5-9 Mild Depression   10-14 Moderate Depression   15-19 Moderately Severe Depression   20-27 Severe Depression    Screening for Cognitive Impairment    Three Minute Recall (banana, sunrise, fence)  3/3    Draw clock face with all 12 numbers set to the hand to show 10 minutes past 11 o'clock   4/5  If cognitive concerns identified deferred to follow up visit unless specifically addressed in assessment and plan.    Fall Risk Assessment    Has the patient had two or more falls in the last year or any fall with injury in the last year?  No  If Fall Risk identified deferred to follow up visit unless specifically addressed in assessment and plan.    Safety Assessment    Throw rugs on floor.  Yes  Handrails on all stairs.  Yes  Good lighting in all hallways.  Yes  Difficulty hearing.  No  Patient counseled about all safety risks that were identified.    Functional Assessment ADLs    Are there any barriers preventing you from cooking for yourself or meeting nutritional needs?  No.    Are there any barriers preventing you from driving safely or obtaining transportation?  No.    Are there any barriers preventing you from using a telephone or calling for help?  No.    Are there any barriers preventing you from shopping?  No.    Are  there any barriers preventing you from taking care of your own finances?  No.    Are there any barriers preventing you from managing your medications?  No.    Are currently engaging any exercise or physical activity?  Yes.  Walking the dog 4 times a day.    Health Maintenance Summary                Annual Wellness Visit Overdue 1940     RETINAL SCREENING Overdue 5/4/2017      Done 5/4/2016 AMB REFERRAL FOR RETINAL SCREENING EXAM    IMM ZOSTER VACCINE Postponed 1/5/2018 Originally 7/30/2001. Patient Refused    IMM INFLUENZA Next Due 9/1/2017      Done 9/19/2015 Imm Admin: Influenza Vaccine Adult HD     Patient has more history with this topic...    A1C SCREENING Next Due 9/22/2017      Done 3/22/2017 HEMOGLOBIN A1C (A)     Patient has more history with this topic...    PFT SCREENING-FEV1 AND FEV/FVC RATIO / SPIROMETRY SHOULD BE PERFORMED ANNUALLY Next Due 3/14/2018      Done 3/14/2017 AMB PULMONARY FUNCTION TEST/LAB    FASTING LIPID PROFILE Next Due 3/22/2018      Done 3/22/2017 LIPID PROFILE (A)     Patient has more history with this topic...    URINE ACR / MICROALBUMIN Next Due 3/22/2018      Done 3/22/2017 MICROALBUMIN CREAT RATIO URINE     Patient has more history with this topic...    MAMMOGRAM Next Due 5/2/2018      Done 5/2/2016 MA-SCREEN MAMMO W/CAD-BILAT     Patient has more history with this topic...    SERUM CREATININE Next Due 6/16/2018      Done 6/16/2017 COMP METABOLIC PANEL (A)     Patient has more history with this topic...    DIABETES MONOFILAMENT / LE EXAM Next Due 6/19/2018      Done 6/19/2017 AMB DIABETIC MONOFILAMENT LOWER EXTREMITY EXAM     Patient has more history with this topic...    COLONOSCOPY Next Due 6/7/2019      Done 6/7/2016 AMB REFERRAL TO GI FOR COLONOSCOPY     Patient has more history with this topic...    BONE DENSITY Next Due 11/21/2019      Done 11/21/2014 DS-BONE DENSITY STUDY (DEXA)    IMM DTaP/Tdap/Td Vaccine Next Due 6/13/2026      Done 6/13/2016 Imm Admin: Tdap Vaccine      Patient has more history with this topic...          Patient Care Team:  Siva Smart M.D. as PCP - General (Internal Medicine)  [facility] as Consulting Physician (Ophthalmology)  [facility] as Consulting Physician (Gastroenterology)  [facility] as Respiratory (Pulmonary Medicine)  Demarco Bowers M.D. as Consulting Physician (Endocrinology)  TELMA Troncoso as Patient Advocate - IHD    Social History   Substance Use Topics   • Smoking status: Former Smoker -- 2.00 packs/day for 50 years     Types: Cigarettes     Quit date: 05/06/2006   • Smokeless tobacco: Never Used      Comment: quit smoking 2002   • Alcohol Use: 0.0 oz/week     0 Standard drinks or equivalent per week      Comment: 2 drinks per week     Family History   Problem Relation Age of Onset   • Cancer Mother    • Lung Disease Father    • Cancer Father      She  has a past medical history of Hypertension; CLL (chronic lymphoblastic leukemia); MEDICAL HOME; Personal history of colonic polyps (11/26/2012); Cutaneous skin tags (1/29/2015); Thyroid disease; Indigestion; Hiatus hernia syndrome; Carcinoma in situ of respiratory system (2016); Type II or unspecified type diabetes mellitus without mention of complication, not stated as uncontrolled; Pneumonia (2014); Cataract; DM (diabetes mellitus) (CMS-[facility]); Cancer (CMS-[facility]) (2006); and Cancer (CMS-[facility]) (2016).   Past Surgical History   Procedure Laterality Date   • Us-needle core bx-breast panel     • Vaginal hysterectomy total       Hysterectomy,Total Vaginal   • Recovery  12/18/2015     Procedure: CT-SCP-RUL LUNG BIOPSY-;  Surgeon: Recoveryon Surgery;  Location: SURGERY PRE-POST PROC UNIT [facility];  Service:    • Other orthopedic surgery  1990     bakers cyst removed in right knee, multiple scopes   • Appendectomy  1955   • Cholecystectomy  1995   • Other  2001     Lower Left segment of lung removed    • Other  1984      "left Thyroid removed, might remove right side in the future   • Other  1990     hemmroid removal   • Thoracoscopy Right 2/1/2016     Procedure: THORACOSCOPY Upper Lobectomy ;  Surgeon: John H Ganser, M.D.;  Location: SURGERY [facility];  Service:    • Node dissection Right 2/1/2016     Procedure: NODE DISSECTION;  Surgeon: John H Ganser, M.D.;  Location: SURGERY [facility];  Service:    • Cataract extraction with iol     • Tonsillectomy             Exam:     Blood pressure 98/72, pulse 63, temperature 36.4 °C (97.6 °F), height 1.676 m (5' 6\"), weight 92.987 kg (205 lb), SpO2 96 %. Body mass index is 33.1 kg/(m^2).    Hearing excellent.    Dentition good  Alert, oriented in no acute distress.  Eye contact is good, speech goal directed, affect calm        Assessment and Plan. The following treatment and monitoring plan is recommended:       1. Medicare annual wellness visit, subsequent       2. Controlled type 2 diabetes mellitus with complication, without long-term current use of insulin (CMS-HCC)    Under good control. Continue same regimen.    - metformin ER (GLUCOPHAGE XR) 500 MG TABLET SR 24 HR; Take 1 Tab by mouth every day.  Dispense: 90 Tab; Refill: 4  - Initial Wellness Visit - Includes PPPS ()    3. Mixed hyperlipidemia  Under good control. Continue same regimen.   - fenofibrate (TRIGLIDE) 160 MG tablet; Take 1 Tab by mouth every day.  Dispense: 90 Tab; Refill: 4  - Initial Wellness Visit - Includes PPPS ()    4. CLL (chronic lymphocytic leukemia)- wbc= 20k-30k, since 2006; no rx; oncologist to follow- appt next wk     - Initial Wellness Visit - Includes PPPS ()    5. Obesity (BMI 30-39.9)    diet/exercise/lose 15 lbs.; patient counseled    - Initial Wellness Visit - Includes PPPS ()    6. Fatty infiltration of liver         diet/exercise/lose 15 lbs.; patient counseled    - Initial Wellness Visit - Includes PPPS ()    7. Vitamin D insufficiency     Under good control. " Continue same regimen.vit d 5000 u qd otc  - Initial Wellness Visit - Includes PPPS ()    8. Gastroesophageal reflux disease with esophagitis  Under good control. Continue same regimen. Prevacid 30 g daily OTC      - Initial Wellness Visit - Includes PPPS ()    9. Multiple thyroid nodules- dr jaeger, neg FNA 2015; us no change 2017  Under good control. Continue same regimen. Still active problem. Recent thyroid ultrasound pending. We'll see endocrinologist later this month to review     - Initial Wellness Visit - Includes PPPS ()    10. Essential hypertension  Under good control. Continue same regimen. Prinzide 20/25 daily  - Initial Wellness Visit - Includes PPPS ()    11. Hypothyroidism due to acquired atrophy of thyroid  Under good control. Continue same regimen. Continue Synthroid 112 mg daily     - Initial Wellness Visit - Includes PPPS ()    12. 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 Continue same regimen.       - Initial Wellness Visit - Includes PPPS ()    13. Moderate episode of recurrent major depressive disorder (CMS-HCC)     Under good control. Continue same regimen. Celexa 10 mg daily- Initial Wellness Visit - Includes PPPS ()    14. Primary insomnia  Under good control. Continue same regimen. Lorazepam 2 mg at bedtime.    - Initial Wellness Visit - Includes PPPS ()    15. Normocytic anemia  Under good control. Continue same regimen. Secondary to CLL     - Initial Wellness Visit - Includes PPPS ()    16. Mild intermittent asthma without complication- pma;  albuterol inhaler prn   Albuterol inhaler when necessary  - Initial Wellness Visit - Includes PPPS ()    17. Acute pulmonary edema (CMS-HCC)- june 2017 from overhydration iv fluids from septic shock of cap- resolved; echo pend tbd july 2017   This problem appears to have resolved. We'll  check echocardiogram to confirm no LV dysfunction, and if normal will remove from the problem list.- Initial Wellness Visit - Includes PPPS ()    18. Numbness of left hand-probably compression neuropathy from recent hospitalization. A take 6 months to resolve. Patient reassured will follow-up in 3 months and if it seems to be progressing well obtain neurodiagnostic studies     - Initial Wellness Visit - Includes PPPS ()    19. Chronic fatigue-improving and probably secondary to recent hospitalization for severe pneumonia with septic shock.     - Initial Wellness Visit - Includes PPPS ()         Services suggested: No services needed at this time   Health Care Screening recommendations as per orders if indicated.  Referrals offered: PT/OT/Nutrition counseling/Behavioral Health/Smoking cessation as per orders if indicated.    Discussion today about general wellness and lifestyle habits:    · Prevent falls and reduce trip hazards; Cautioned about securing or removing rugs.  · Have a working fire alarm and carbon monoxide detector;   · Engage in regular physical activity and social activities       Follow-up: No Follow-up on file.

## 2017-07-12 ENCOUNTER — APPOINTMENT (OUTPATIENT)
Dept: CARDIOLOGY | Facility: MEDICAL CENTER | Age: 77
End: 2017-07-12
Attending: INTERNAL MEDICINE
Payer: MEDICARE

## 2017-07-13 ENCOUNTER — PATIENT OUTREACH (OUTPATIENT)
Dept: HEALTH INFORMATION MANAGEMENT | Facility: OTHER | Age: 77
End: 2017-07-13

## 2017-07-13 ENCOUNTER — TELEPHONE (OUTPATIENT)
Dept: HEALTH INFORMATION MANAGEMENT | Facility: OTHER | Age: 77
End: 2017-07-13

## 2017-07-13 DIAGNOSIS — I10 ESSENTIAL HYPERTENSION: ICD-10-CM

## 2017-07-13 NOTE — PROGRESS NOTES
Outbound call to patient for medication reconciliation.  Updated allergy and medication lists.    Patient demonstrates adherence to medication schedule and understanding of indications for medications.    Meds that meet Beer's criteria for potentially inappropriate use in patients over 65 include:  Lorazepam. Patient denies any cognitive impairment, no recent falls.  This is preferred agent in elderly patient requiring a benzodiazepine due to short t1/2 and no active metabolite.    Patient experiences diarrhea with metformin BID.  She decreased to 1 daily and has tolerated well.  FBS is between 140 and 148.  Patient is amenable to adding/changing to an injectable medication to avoid GI side affects and to lower FBS.    Clarisse reports episodes of hypotension.  She states her BP has been as low as 70/52 when using the monitor at the pharmacy.  She is symptomatic and frequently feels lightheaded and dizzy.  Patient would like to use RHM equipment as BP medications are adjusted.    Patient feels satisfied with medication regimen.      Patient can afford medications.    Patient does not smoke tobacco.    She takes her dog for a walk twice daily.    Patient is up to date with vaccinations.  States she is not a candidate for shingles vaccine due to CLL.    Plan:  Contact PCP regarding remote home monitoring for HTN.

## 2017-07-13 NOTE — PROGRESS NOTES
Outbound call to patient for med rec. Left  for patient to call [phone].  1st attempt to reach patient.

## 2017-07-13 NOTE — TELEPHONE ENCOUNTER
Dear Dr. Smart,    I had the pleasure of speaking with Clarisse today and she reports episodes of hypotension.  She is interested in remote home monitoring.  If you feel patient would benefit from pharmacist managed HTN service, please sign pended order.    Thanks for your time,  Shabbir Raya, PharmD  Clinical Pharmacist Population Health Management   [address] [place] [id number]  P: [phone]  [email or web address]

## 2017-07-14 ENCOUNTER — PATIENT OUTREACH (OUTPATIENT)
Dept: HEALTH INFORMATION MANAGEMENT | Facility: OTHER | Age: 77
End: 2017-07-14

## 2017-07-14 NOTE — PROGRESS NOTES
Order for RHM and HTN service approved by PCP.  Ticket opened in [facility].  Outbound call to patient to notify of approval.  Let her know that a kit will be shipped to her next week and she will be getting a call from [facility] to set up equipment.  Patient aware that she will have to come to office for initial visit to calibrate RHM equipment.  Patient is amenable and excited to begin the program.      Plan:    F/U call to patient next week after she receives RHM equipment and to schedule initial visit for patient.

## 2017-07-19 ENCOUNTER — HOSPITAL ENCOUNTER (OUTPATIENT)
Facility: MEDICAL CENTER | Age: 77
End: 2017-07-19
Attending: INTERNAL MEDICINE
Payer: MEDICARE

## 2017-07-19 ENCOUNTER — OFFICE VISIT (OUTPATIENT)
Dept: HEMATOLOGY ONCOLOGY | Facility: MEDICAL CENTER | Age: 77
End: 2017-07-19
Payer: MEDICARE

## 2017-07-19 ENCOUNTER — NON-PROVIDER VISIT (OUTPATIENT)
Dept: HEMATOLOGY ONCOLOGY | Facility: MEDICAL CENTER | Age: 77
End: 2017-07-19
Payer: MEDICARE

## 2017-07-19 VITALS
WEIGHT: 204 LBS | DIASTOLIC BLOOD PRESSURE: 54 MMHG | BODY MASS INDEX: 32.78 KG/M2 | HEART RATE: 100 BPM | OXYGEN SATURATION: 95 % | TEMPERATURE: 97.8 F | HEIGHT: 66 IN | RESPIRATION RATE: 18 BRPM | SYSTOLIC BLOOD PRESSURE: 98 MMHG

## 2017-07-19 VITALS
DIASTOLIC BLOOD PRESSURE: 54 MMHG | BODY MASS INDEX: 32.92 KG/M2 | WEIGHT: 204.81 LBS | OXYGEN SATURATION: 95 % | HEIGHT: 66 IN | HEART RATE: 100 BPM | RESPIRATION RATE: 18 BRPM | SYSTOLIC BLOOD PRESSURE: 98 MMHG | TEMPERATURE: 97.8 F

## 2017-07-19 DIAGNOSIS — C91.10 CLL (CHRONIC LYMPHOCYTIC LEUKEMIA) (HCC): ICD-10-CM

## 2017-07-19 DIAGNOSIS — C91.10 CLL (CHRONIC LYMPHOCYTIC LEUKEMIA) (HCC): Primary | ICD-10-CM

## 2017-07-19 LAB
ALBUMIN SERPL BCP-MCNC: 4.4 G/DL (ref 3.2–4.9)
ALP SERPL-CCNC: 58 U/L (ref 30–99)
ALT SERPL-CCNC: 18 U/L (ref 2–50)
ANISOCYTOSIS BLD QL SMEAR: ABNORMAL
AST SERPL-CCNC: 17 U/L (ref 12–45)
BASOPHILS # BLD AUTO: 0 % (ref 0–1.8)
BASOPHILS # BLD: 0 K/UL (ref 0–0.12)
BILIRUB CONJ SERPL-MCNC: <0.1 MG/DL (ref 0.1–0.5)
BILIRUB INDIRECT SERPL-MCNC: NORMAL MG/DL (ref 0–1)
BILIRUB SERPL-MCNC: 0.4 MG/DL (ref 0.1–1.5)
EOSINOPHIL # BLD AUTO: 0.39 K/UL (ref 0–0.51)
EOSINOPHIL NFR BLD: 1.7 % (ref 0–6.9)
ERYTHROCYTE [DISTWIDTH] IN BLOOD BY AUTOMATED COUNT: 46.5 FL (ref 35.9–50)
HCT VFR BLD AUTO: 36.8 % (ref 37–47)
HGB BLD-MCNC: 11.6 G/DL (ref 12–16)
LYMPHOCYTES # BLD AUTO: 14.46 K/UL (ref 1–4.8)
LYMPHOCYTES NFR BLD: 62.6 % (ref 22–41)
MANUAL DIFF BLD: NORMAL
MCH RBC QN AUTO: 28.4 PG (ref 27–33)
MCHC RBC AUTO-ENTMCNC: 31.5 G/DL (ref 33.6–35)
MCV RBC AUTO: 90.2 FL (ref 81.4–97.8)
MONOCYTES # BLD AUTO: 0.81 K/UL (ref 0–0.85)
MONOCYTES NFR BLD AUTO: 3.5 % (ref 0–13.4)
MORPHOLOGY BLD-IMP: NORMAL
NEUTROPHILS # BLD AUTO: 7.44 K/UL (ref 2–7.15)
NEUTROPHILS NFR BLD: 32.2 % (ref 44–72)
NRBC # BLD AUTO: 0.02 K/UL
NRBC BLD AUTO-RTO: 0.1 /100 WBC
PLATELET # BLD AUTO: 437 K/UL (ref 164–446)
PLATELET BLD QL SMEAR: NORMAL
PMV BLD AUTO: 10.3 FL (ref 9–12.9)
PROT SERPL-MCNC: 6.8 G/DL (ref 6–8.2)
RBC # BLD AUTO: 4.08 M/UL (ref 4.2–5.4)
RBC BLD AUTO: PRESENT
SMUDGE CELLS BLD QL SMEAR: NORMAL
WBC # BLD AUTO: 23.1 K/UL (ref 4.8–10.8)

## 2017-07-19 PROCEDURE — 80076 HEPATIC FUNCTION PANEL: CPT

## 2017-07-19 PROCEDURE — 85007 BL SMEAR W/DIFF WBC COUNT: CPT

## 2017-07-19 PROCEDURE — 36415 COLL VENOUS BLD VENIPUNCTURE: CPT | Performed by: INTERNAL MEDICINE

## 2017-07-19 PROCEDURE — 99205 OFFICE O/P NEW HI 60 MIN: CPT | Performed by: INTERNAL MEDICINE

## 2017-07-19 PROCEDURE — 85027 COMPLETE CBC AUTOMATED: CPT

## 2017-07-19 ASSESSMENT — PAIN SCALES - GENERAL
PAINLEVEL: NO PAIN
PAINLEVEL: NO PAIN

## 2017-07-19 NOTE — MR AVS SNAPSHOT
"        Clarisse Reeves   2017 2:00 PM   Non-Provider Visit   MRN: [id number]    Department:  [facility]   Dept Phone:  [phone]    Description:  Female : 1940   Provider:  ONC MA 1           Reason for Visit     Other pERIPHERAL LABS      Allergies as of 2017     Allergen Noted Reactions    Codeine 2010   Hives, Nausea    RXN=40 years ago    Lipitor [Atorvastatin Calcium] 2013   Myalgia    LXV=6237      Lovastatin 2015   Myalgia    Muscle aches  MTH=3386    Zocor [Simvastatin - High Dose] 2013   Myalgia    Severe myalgias  BAQ=5749      Vital Signs     Blood Pressure Pulse Temperature Respirations Height Weight    98/54 mmHg 100 36.6 °C (97.8 °F) 18 1.676 m (5' 5.98\") 92.534 kg (204 lb)    Body Mass Index Oxygen Saturation Breastfeeding? Smoking Status          32.94 kg/m2 95% No Former Smoker        Basic Information     Date Of Birth Sex Race Ethnicity Preferred Language    1940 Female White Non- English      Your appointments     2017  2:00 PM   Non Provider 1 with ONC MA 1   [facility] (--)    [address], [address]  [place] [address] [phone]           You will be receiving a confirmation call a few days before your appointment from our automated call confirmation system.            2017  1:30 PM   CT BODY WO 30 with [facility] CT 2   [facility])    [address]  [place] [address] [phone]           Some exams require specific prep instructions that would have been given to you at time of scheduling. If you have any additional questions about the prep instructions, please call Imaging Scheduling at [phone] and press #2.            2017  1:45 PM   ECHO with ECHO [facility] RM2   ECHOCARDIOLOGY [facility])    [address]  [place] [address]   [phone]            Aug 02, 2017  3:10 PM   MA [id number] with LILIYA MARKHAM MG 1   [facility]" [facility] ([place])    [address]  [facility] [address] [phone]           No deodorant, powder, perfume or lotion under the arm or breast area.            Aug 15, 2017 11:15 AM   XRAY 15 with PULMONARY DX 1   [facility] Imaging - Pulmonary ([address])    [address]  [place] [address]               Aug 15, 2017 11:40 AM   Established Patient Pul with CHERRY Boateng   [facility] Pulmonary Medicine (--)    [address]  [address]  [place] [address]   [phone]            Aug 25, 2017  8:30 AM   Adult Draw/Collection with LAB MARIXA HILARIA   LAB - [facility] (--)    [address]  [facility] [address]   [phone]            Sep 01, 2017 11:30 AM   US [id number] with S RASHI US 1   IMAGING [facility] ([place])    Imaging [place]  [address]  [facility] [address]   [phone]            Sep 06, 2017  1:20 PM   Established Patient with Demarco Bowers M.D.   [facility] & Endocrinology ([facility]    [address]  [facility] [address] [phone]           You will be receiving a confirmation call a few days before your appointment from our automated call confirmation system.            Sep 25, 2017  2:40 PM   Established Patient with Siva Smart M.D.   [address]    [address] [facility] [address] [phone]           You will be receiving a confirmation call a few days before your appointment from our automated call confirmation system.            Oct 09, 2017 10:40 AM   Diabetes Care Visit with Siva Smart M.D., NIRALI DIABETES RN   [address]    [address] [facility] [address] [phone]           You will be receiving a confirmation call a few days before your appointment from our automated call confirmation system.            Nov 16, 2017 10:00 AM   US ABDOMEN FASTING (30  MINUTES) with [facility] US 1   [facility] IMAGING - ULTRASOUND - [facility] ([facility])    [facility]  [address]  [place] [address] [phone]           Some exams require specific prep instructions that would have been given to you at time of scheduling. If you have any additional questions about the prep instructions, please call Imaging Scheduling at [phone] and press #2.            2018  2:00 PM   Hematology Est Patient with Denise Barroso M.D.   [facility] (--)    [address] [facility], [address]  [place] [address] [phone]              Problem List              ICD-10-CM Priority Class Noted - Resolved    CLL (chronic lymphocytic leukemia)- wbc= 20k-30k, since ; no rx; oncologist to follow C91.10 Low  2012 - Present    Mixed hyperlipidemia E78.2 Medium  2012 - Present    Fatty infiltration of liver K76.0   2012 - Present    Vitamin D insufficiency E55.9   2013 - Present    Gastroesophageal reflux disease with esophagitis K21.0   2013 - Present    Multiple thyroid nodules- dr jaeger, neg FNA ; us no change  E04.2   2015 - Present    Essential hypertension I10   2015 - Present    Controlled type 2 diabetes mellitus with complication, without long-term current use of insulin (CMS-HCC) E11.8   2015 - Present    Hypothyroidism due to acquired atrophy of thyroid E03.4   2015 - Present    Obesity (BMI 30-39.9) E66.9 Low  2015 - Present    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, 2016-  partial lobectomy RUL/RML- Dr Ganser- folowed by PMA C34.11   2016 - Present    Family history of heart attack- daughter 52 yo  MI 2016 Z82.49   10/7/2016 - Present    Moderate episode of recurrent major depressive disorder (CMS-HCC) F33.1   2017 - Present    Primary insomnia F51.01   2017 - Present    Normocytic anemia D64.9   2017 - Present    Mild  intermittent asthma without complication- pma;  albuterol inhaler prn J45.20   6/12/2017 - Present    Acute pulmonary edema (CMS-HCC)- june 2017 from overhydration iv fluids from septic shock of cap- resolved; echo pend tbd july 2017 J81.0   6/19/2017 - Present    Numbness of left hand- aftter hosp for cap june 2017 R20.8   7/11/2017 - Present    Chronic fatigue- following cap june 2017 R53.82   7/11/2017 - Present      Health Maintenance        Date Due Completion Dates    RETINAL SCREENING 5/4/2017 5/4/2016    IMM ZOSTER VACCINE 1/5/2018 (Originally 7/30/2001) ---    IMM INFLUENZA (1) 9/1/2017 9/19/2015, 10/31/2014, 9/11/2013, 9/4/2012, 8/2/2011, 8/30/2010    A1C SCREENING 9/22/2017 3/22/2017, 10/11/2016, 4/11/2016, 11/28/2015, 9/1/2015, 2/4/2015, 11/17/2014, 5/19/2014, 10/31/2013, 6/12/2013, 3/8/2013, 12/11/2012, 11/26/2012    FASTING LIPID PROFILE 3/22/2018 3/22/2017, 10/11/2016, 4/11/2016, 11/28/2015, 9/5/2015, 2/4/2015, 2/3/2014, 10/31/2013, 6/12/2013, 3/8/2013, 11/26/2012, 1/26/2011, 8/25/2009    URINE ACR / MICROALBUMIN 3/22/2018 3/22/2017, 10/11/2016, 4/11/2016, 9/5/2015, 2/4/2015, 5/19/2014, 6/12/2013, 11/26/2012    MAMMOGRAM 5/2/2018 5/2/2016, 10/29/2014, 4/28/2014, 4/24/2014, 12/3/2012, 10/25/2011, 9/16/2010, 9/11/2009, 9/11/2009    SERUM CREATININE 6/16/2018 6/16/2017, 6/6/2017, 6/4/2017, 6/2/2017, 6/1/2017, 3/22/2017, 10/11/2016, 4/11/2016, 2/2/2016, 1/29/2016, 12/19/2015, 11/28/2015, 9/5/2015, 2/13/2015, 2/12/2015, 2/4/2015, 11/17/2014, 5/19/2014, 2/3/2014, 10/31/2013, 9/11/2013, 6/12/2013, 4/12/2013, 4/3/2013, 4/1/2013, 3/31/2013, 3/30/2013, 3/29/2013, 3/8/2013, 11/26/2012, 1/29/2011, 1/28/2011, 1/27/2011, 1/26/2011, 1/25/2011, 8/25/2009    DIABETES MONOFILAMENT / LE EXAM 6/19/2018 6/19/2017, 5/3/2016, 4/14/2016, 12/3/2015, 9/1/2015, 1/29/2015 (Done)    Override on 1/29/2015: Done    COLONOSCOPY 6/7/2019 6/7/2016, 6/3/2016, 1/10/2011    BONE DENSITY 11/21/2019 11/21/2014    IMM DTaP/Tdap/Td Vaccine  (2 - Td) 6/13/2026 6/13/2016, 4/14/2016, 6/19/2010            Current Immunizations     13-VALENT PCV PREVNAR 9/17/2014    Influenza TIV (IM) 10/31/2014, 8/30/2010    Influenza Vaccine Adult HD 9/19/2015    Influenza Vaccine Quad Inj (Pf) 9/11/2013, 9/4/2012, 8/2/2011    Pneumococcal polysaccharide vaccine (PPSV-23) 2/1/2017    TD Vaccine 6/19/2010  6:30 PM    Tdap Vaccine 6/13/2016, 4/14/2016  5:25 PM    Tuberculin Skin Test 6/24/2014 10:15 AM, 6/17/2014      Below and/or attached are the medications your provider expects you to take. Review all of your home medications and newly ordered medications with your provider and/or pharmacist. Follow medication instructions as directed by your provider and/or pharmacist. Please keep your medication list with you and share with your provider. Update the information when medications are discontinued, doses are changed, or new medications (including over-the-counter products) are added; and carry medication information at all times in the event of emergency situations     Allergies:  CODEINE - Hives,Nausea     LIPITOR - Myalgia     LOVASTATIN - Myalgia     ZOCOR - Myalgia               Medications  Valid as of: July 19, 2017 -  1:34 PM    Generic Name Brand Name Tablet Size Instructions for use    Aspirin (Chew Tab) ASA 81 MG Take 1 Tab by mouth every day.        Cholecalciferol (Tab) Vitamin D-3 5000 UNITS Take 5,000 Units by mouth every day.        Citalopram Hydrobromide (Tab) CELEXA 10 MG Take 1 Tab by mouth every day.        Fenofibrate (Tab) TRIGLIDE 160 MG Take 1 Tab by mouth every day.        Lansoprazole (CAPSULE DELAYED RELEASE) PREVACID 30 MG Take 1 Cap by mouth every day.        Levothyroxine Sodium (Tab) SYNTHROID 112 MCG TAKE ONE TABLET BY MOUTH EVERY DAY        Lisinopril-Hydrochlorothiazide (Tab) PRINZIDE, ZESTORETIC 20-25 MG TAKE 1 TABLET BY MOUTH EVERY DAY        LORazepam (Tab) ATIVAN 2 MG Take 2 Tabs by mouth at bedtime as needed. For sleep or anxiety         MetFORMIN HCl (TABLET SR 24 HR) GLUCOPHAGE  MG Take 1 Tab by mouth every day.        .                 Medicines prescribed today were sent to:     [facility] #[id number] - [place], [place] - [address]    [address] [facility] [place] [address]    Phone: [phone] Fax: [phone]    Open 24 Hours?: No      Medication refill instructions:       If your prescription bottle indicates you have medication refills left, it is not necessary to call your provider’s office. Please contact your pharmacy and they will refill your medication.    If your prescription bottle indicates you do not have any refills left, you may request refills at any time through one of the following ways: The online [employer] system (except Urgent Care), by calling your provider’s office, or by asking your pharmacy to contact your provider’s office with a refill request. Medication refills are processed only during regular business hours and may not be available until the next business day. Your provider may request additional information or to have a follow-up visit with you prior to refilling your medication.   *Please Note: Medication refills are assigned a new Rx number when refilled electronically. Your pharmacy may indicate that no refills were authorized even though a new prescription for the same medication is available at the pharmacy. Please request the medicine by name with the pharmacy before contacting your provider for a refill.           [employer] Access Code: Activation code not generated  Current [employer] Status: Active

## 2017-07-19 NOTE — MR AVS SNAPSHOT
"        Clarisse Reeves   2017 1:00 PM   Office Visit   MRN: [id number]    Department:  [facility]   Dept Phone:  [phone]    Description:  Female : 1940   Provider:  Denise Barroso M.D.           Reason for Visit     New Patient Ref by Bing Dx:CLL      Allergies as of 2017     Allergen Noted Reactions    Codeine 2010   Hives, Nausea    RXN=40 years ago    Lipitor [Atorvastatin Calcium] 2013   Myalgia    MTD=6693      Lovastatin 2015   Myalgia    Muscle aches  PVS=4905    Zocor [Simvastatin - High Dose] 2013   Myalgia    Severe myalgias  ALA=2652      You were diagnosed with     CLL (chronic lymphocytic leukemia) (CMS-HCC)   [313236]  -  Primary       Vital Signs     Blood Pressure Pulse Temperature Respirations Height Weight    98/54 mmHg 100 36.6 °C (97.8 °F) 18 1.676 m (5' 5.98\") 92.9 kg (204 lb 12.9 oz)    Body Mass Index Oxygen Saturation Breastfeeding? Smoking Status          33.07 kg/m2 95% No Former Smoker        Basic Information     Date Of Birth Sex Race Ethnicity Preferred Language    1940 Female White Non- English      Your appointments     2017  2:00 PM   Non Provider 1 with ONC MA 1   [facility] (--)    [address] [place] [address], [address]  [facility] [address] [phone]           You will be receiving a confirmation call a few days before your appointment from our automated call confirmation system.            2017  1:30 PM   CT BODY WO 30 with [facility] CT 2   [facility] IMAGING - CT - [facility])    [address]  [place] [address] [phone]           Some exams require specific prep instructions that would have been given to you at time of scheduling. If you have any additional questions about the prep instructions, please call Imaging Scheduling at [phone] and press #2.            2017  1:45 PM   ECHO with ECHO [facility] RM2   ECHOCARDIOLOGY [facility])    [address]  [facility] [address]   [phone]            Aug 02, 2017  3:10 PM   MA [id number] with S MCCARRAN MG 1   [facility] IMAGING [facility] MAMMOGRAPHY ([place])    [address]  [facility] [address]   [phone]           No deodorant, powder, perfume or lotion under the arm or breast area.            Aug 15, 2017 11:15 AM   XRAY 15 with PULMONARY DX 1   [facility] Imaging - Pulmonary ([address])    [address]  [place] [address]               Aug 15, 2017 11:40 AM   Established Patient Pul with CHERRY Boateng   [facility] Pulmonary Medicine (--)    [address]  [address]  [facility] [address]   [phone]            Aug 25, 2017  8:30 AM   Adult Draw/Collection with LAB MARIXA MANRIQUE   LAB - [facility] (--)    [address]  [facility] [address]   [phone]            Sep 01, 2017 11:30 AM   US [id number] with S EDWINARRAN US 1   [facility])    Imaging [facility]  [address]  [address]  [facility] [address]   [phone]            Sep 06, 2017  1:20 PM   Established Patient with Demarco Bowers M.D.   [facility] & Endocrinology [facility]    [address]  [facility] [address] [phone]           You will be receiving a confirmation call a few days before your appointment from our automated call confirmation system.            Sep 25, 2017  2:40 PM   Established Patient with Siva Smart M.D.   [address])    [address] [facility] [address] [phone]           You will be receiving a confirmation call a few days before your appointment from our automated call confirmation system.            Oct 09, 2017 10:40 AM   Diabetes Care Visit with Siva Smart M.D., NIRALI DIABETES RN   [address]  [place] [address] [phone]           You will be receiving a confirmation call a few days before  your appointment from our automated call confirmation system.            2018  2:00 PM   Hematology Est Patient with Denise Barroso M.D.   [facility] (--)    [address], [address]  [place] [address] [phone]              Problem List              ICD-10-CM Priority Class Noted - Resolved    CLL (chronic lymphocytic leukemia)- wbc= 20k-30k, since ; no rx; oncologist to follow C91.10 Low  2012 - Present    Mixed hyperlipidemia E78.2 Medium  2012 - Present    Fatty infiltration of liver K76.0   2012 - Present    Vitamin D insufficiency E55.9   2013 - Present    Gastroesophageal reflux disease with esophagitis K21.0   2013 - Present    Multiple thyroid nodules- dr jaeger, neg FNA ; us no change  E04.2   2015 - Present    Essential hypertension I10   2015 - Present    Controlled type 2 diabetes mellitus with complication, without long-term current use of insulin (CMS-HCC) E11.8   2015 - Present    Hypothyroidism due to acquired atrophy of thyroid E03.4   2015 - Present    Obesity (BMI 30-39.9) E66.9 Low  2015 - Present    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, 2016-  partial lobectomy RUL/RML- Dr Ganser- folowed by PMA C34.11   2016 - Present    Family history of heart attack- daughter 54 yo  MI  Z82.49   10/7/2016 - Present    Moderate episode of recurrent major depressive disorder (CMS-HCC) F33.1   2017 - Present    Primary insomnia F51.01   2017 - Present    Normocytic anemia D64.9   2017 - Present    Mild intermittent asthma without complication- pma;  albuterol inhaler prn J45.20   2017 - Present    Acute pulmonary edema (CMS-HCC)- 2017 from overhydration iv fluids from septic shock of cap- resolved; echo pend tbd 2017 J81.0   2017 - Present    Numbness of left hand- aftter hosp for cap 2017 R20.8   2017 - Present    Chronic fatigue-  following cap june 2017 R53.82   7/11/2017 - Present      Health Maintenance        Date Due Completion Dates    RETINAL SCREENING 5/4/2017 5/4/2016    IMM ZOSTER VACCINE 1/5/2018 (Originally 7/30/2001) ---    IMM INFLUENZA (1) 9/1/2017 9/19/2015, 10/31/2014, 9/11/2013, 9/4/2012, 8/2/2011, 8/30/2010    A1C SCREENING 9/22/2017 3/22/2017, 10/11/2016, 4/11/2016, 11/28/2015, 9/1/2015, 2/4/2015, 11/17/2014, 5/19/2014, 10/31/2013, 6/12/2013, 3/8/2013, 12/11/2012, 11/26/2012    FASTING LIPID PROFILE 3/22/2018 3/22/2017, 10/11/2016, 4/11/2016, 11/28/2015, 9/5/2015, 2/4/2015, 2/3/2014, 10/31/2013, 6/12/2013, 3/8/2013, 11/26/2012, 1/26/2011, 8/25/2009    URINE ACR / MICROALBUMIN 3/22/2018 3/22/2017, 10/11/2016, 4/11/2016, 9/5/2015, 2/4/2015, 5/19/2014, 6/12/2013, 11/26/2012    MAMMOGRAM 5/2/2018 5/2/2016, 10/29/2014, 4/28/2014, 4/24/2014, 12/3/2012, 10/25/2011, 9/16/2010, 9/11/2009, 9/11/2009    SERUM CREATININE 6/16/2018 6/16/2017, 6/6/2017, 6/4/2017, 6/2/2017, 6/1/2017, 3/22/2017, 10/11/2016, 4/11/2016, 2/2/2016, 1/29/2016, 12/19/2015, 11/28/2015, 9/5/2015, 2/13/2015, 2/12/2015, 2/4/2015, 11/17/2014, 5/19/2014, 2/3/2014, 10/31/2013, 9/11/2013, 6/12/2013, 4/12/2013, 4/3/2013, 4/1/2013, 3/31/2013, 3/30/2013, 3/29/2013, 3/8/2013, 11/26/2012, 1/29/2011, 1/28/2011, 1/27/2011, 1/26/2011, 1/25/2011, 8/25/2009    DIABETES MONOFILAMENT / LE EXAM 6/19/2018 6/19/2017, 5/3/2016, 4/14/2016, 12/3/2015, 9/1/2015, 1/29/2015 (Done)    Override on 1/29/2015: Done    COLONOSCOPY 6/7/2019 6/7/2016, 6/3/2016, 1/10/2011    BONE DENSITY 11/21/2019 11/21/2014    IMM DTaP/Tdap/Td Vaccine (2 - Td) 6/13/2026 6/13/2016, 4/14/2016, 6/19/2010            Current Immunizations     13-VALENT PCV PREVNAR 9/17/2014    Influenza TIV (IM) 10/31/2014, 8/30/2010    Influenza Vaccine Adult HD 9/19/2015    Influenza Vaccine Quad Inj (Pf) 9/11/2013, 9/4/2012, 8/2/2011    Pneumococcal polysaccharide vaccine (PPSV-23) 2/1/2017    TD Vaccine 6/19/2010  6:30 PM    Tdap  Vaccine 6/13/2016, 4/14/2016  5:25 PM    Tuberculin Skin Test 6/24/2014 10:15 AM, 6/17/2014      Below and/or attached are the medications your provider expects you to take. Review all of your home medications and newly ordered medications with your provider and/or pharmacist. Follow medication instructions as directed by your provider and/or pharmacist. Please keep your medication list with you and share with your provider. Update the information when medications are discontinued, doses are changed, or new medications (including over-the-counter products) are added; and carry medication information at all times in the event of emergency situations     Allergies:  CODEINE - Hives,Nausea     LIPITOR - Myalgia     LOVASTATIN - Myalgia     ZOCOR - Myalgia               Medications  Valid as of: July 19, 2017 -  1:12 PM    Generic Name Brand Name Tablet Size Instructions for use    Aspirin (Chew Tab) ASA 81 MG Take 1 Tab by mouth every day.        Cholecalciferol (Tab) Vitamin D-3 5000 UNITS Take 5,000 Units by mouth every day.        Citalopram Hydrobromide (Tab) CELEXA 10 MG Take 1 Tab by mouth every day.        Fenofibrate (Tab) TRIGLIDE 160 MG Take 1 Tab by mouth every day.        Lansoprazole (CAPSULE DELAYED RELEASE) PREVACID 30 MG Take 1 Cap by mouth every day.        Levothyroxine Sodium (Tab) SYNTHROID 112 MCG TAKE ONE TABLET BY MOUTH EVERY DAY        Lisinopril-Hydrochlorothiazide (Tab) PRINZIDE, ZESTORETIC 20-25 MG TAKE 1 TABLET BY MOUTH EVERY DAY        LORazepam (Tab) ATIVAN 2 MG Take 2 Tabs by mouth at bedtime as needed. For sleep or anxiety        MetFORMIN HCl (TABLET SR 24 HR) GLUCOPHAGE  MG Take 1 Tab by mouth every day.        .                 Medicines prescribed today were sent to:     [facility] #[id number] - [place], [place] - [address]    [address] [facility] [place] [address]    Phone: [phone] Fax: [phone]    Open 24 Hours?: No      Medication refill instructions:        If your prescription bottle indicates you have medication refills left, it is not necessary to call your provider’s office. Please contact your pharmacy and they will refill your medication.    If your prescription bottle indicates you do not have any refills left, you may request refills at any time through one of the following ways: The online [employer] system (except Urgent Care), by calling your provider’s office, or by asking your pharmacy to contact your provider’s office with a refill request. Medication refills are processed only during regular business hours and may not be available until the next business day. Your provider may request additional information or to have a follow-up visit with you prior to refilling your medication.   *Please Note: Medication refills are assigned a new Rx number when refilled electronically. Your pharmacy may indicate that no refills were authorized even though a new prescription for the same medication is available at the pharmacy. Please request the medicine by name with the pharmacy before contacting your provider for a refill.        Your To Do List     Future Labs/Procedures Complete By Expires    CBC WITH DIFFERENTIAL  As directed 7/19/2018    HEPATIC FUNCTION PANEL  As directed 7/19/2018    US-ABDOMEN LIMITED  As directed 7/19/2018         [employer] Access Code: Activation code not generated  Current [employer] Status: Active

## 2017-07-19 NOTE — NON-PROVIDER
Clarisse Reeves is a 76 y.o. female here for a non-provider visit for: Lab Draws  on 7/19/2017 at 1:38 PM    Procedure Performed: Venipuncture     Anatomical site: Left Antecubital Area (AC)    Equipment used: 21g    Labs drawn: CBC w/diff and hEPATIC fUNCTION pANEL    Ordering Provider: Dharmesh Gaspar By: Melia Luna, Med Ass't  No Complications

## 2017-07-19 NOTE — PROGRESS NOTES
Consult Note: Oncology    Date of consultation: 7/19/2017 1:09 PM    Referring provider: Siva Smart M.D.    Reason for consultation:   CC: Follow up for CLL    History of presenting illness:   CLL  2006 diagnosed with CLL in [place] after being admitted to the hospital for an infection and found to have leucocytosis.  Patient followed by Dr. Foster until 2014 until her insurance changed. Has been followed by her PCP since.    Fibroadenoma of right breast  Nov 12, 2014  Stereotactic biopsy: Benign fibrofatty breast tissue with discrete nodules of hyalinized fibroadenoma with microcalcifications. No atypia or malignancy.    Adenocarcinoma in situ   Dec 18, 2015 .Right upper lobe lung nodule, 6 cores: Adenocarcinoma-in-situ in fibroelastotic scar tissue, with foci suspicious for invasive adenocarcinoma.  Feb 1, 2016. Right thoracoscopy with anterior segmentectomy of the right upper lobe and removal of a portion of the right middle lobe.      Clarisse Reeves  is a 76 y.o. year old female who presents to [facility] care. She denies any acute complaints at this time except for some chronic fatigue which has been worse for the past past 3-4 months. She states she had some drenching night sweats last night but none in the past 6 months. She has also lost about 27 lbs in the past 3-4 months or so, but she has been trying to watch her diet and lose weight.    She was started on Celexa for depression last year(2016) after she lost her daughter who 51 to massive MI.      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   • Type II or unspecified type diabetes mellitus without mention of  complication, not stated as uncontrolled      pre-diabetic   • Pneumonia 2014   • Cataract      right  and  left  IOL   • DM (diabetes mellitus) (CMS-HCC)    • Cancer (CMS-HCC) 2006     CLL   • Cancer (CMS-HCC) 2016     lung       Past surgical history:    Past Surgical History   Procedure Laterality Date   • Us-needle core bx-breast panel     • Vaginal hysterectomy total       Hysterectomy,Total Vaginal   • Recovery  12/18/2015     Procedure: CT-SCP-RUL LUNG BIOPSY-;  Surgeon: Recoveryonly Surgery;  Location: SURGERY PRE-POST PROC UNIT [facility];  Service:    • Other orthopedic surgery  1990     bakers cyst removed in right knee, multiple scopes   • Appendectomy  1955   • Cholecystectomy  1995   • Other  2001     Lower Left segment of lung removed    • Other  1984     left Thyroid removed, might remove right side in the future   • Other  1990     hemmroid removal   • Thoracoscopy Right 2/1/2016     Procedure: THORACOSCOPY Upper Lobectomy ;  Surgeon: John H Ganser, M.D.;  Location: SURGERY [facility];  Service:    • Node dissection Right 2/1/2016     Procedure: NODE DISSECTION;  Surgeon: John H Ganser, M.D.;  Location: SURGERY [facility];  Service:    • Cataract extraction with iol     • Tonsillectomy         Allergies:  Codeine; Lipitor; Lovastatin; and Zocor    Medications:    Current Outpatient Prescriptions   Medication Sig Dispense Refill   • metformin ER (GLUCOPHAGE XR) 500 MG TABLET SR 24 HR Take 1 Tab by mouth every day. 90 Tab 4   • fenofibrate (TRIGLIDE) 160 MG tablet Take 1 Tab by mouth every day. 90 Tab 4   • lisinopril-hydrochlorothiazide (PRINZIDE, ZESTORETIC) 20-25 MG per tablet TAKE 1 TABLET BY MOUTH EVERY DAY 90 Tab 4   • levothyroxine (SYNTHROID) 112 MCG Tab TAKE ONE TABLET BY MOUTH EVERY DAY 90 Tab 4   • citalopram (CELEXA) 10 MG tablet Take 1 Tab by mouth every day. 90 Tab 4   • lorazepam (ATIVAN) 2 MG tablet Take 2 Tabs by mouth at bedtime as needed. For sleep or anxiety 60 Tab 5   •  "lansoprazole (PREVACID) 30 MG CAPSULE DELAYED RELEASE Take 1 Cap by mouth every day. 90 Cap 4   • Cholecalciferol (VITAMIN D-3) 5000 UNITS Tab Take 5,000 Units by mouth every day. 90 Tab 1   • aspirin (ASA) 81 MG Chew Tab chewable tablet Take 1 Tab by mouth every day. 100 Tab 11     Current Facility-Administered Medications   Medication Dose Route Frequency Provider Last Rate Last Dose   • cyanocobalamin (VITAMIN B-12) injection 1,000 mcg  1,000 mcg Intramuscular Q30 DAYS Siva Smart M.D.   1,000 mcg at 07/11/17 1202       Social History:     Social History     Social History   • Marital Status: Single     Spouse Name: N/A   • Number of Children: N/A   • Years of Education: N/A     Occupational History   • Not on file.     Social History Main Topics   • Smoking status: Former Smoker -- 2.00 packs/day for 50 years     Types: Cigarettes     Quit date: 05/06/2006   • Smokeless tobacco: Never Used      Comment: quit smoking 2002   • Alcohol Use: 0.0 oz/week     0 Standard drinks or equivalent per week      Comment: 2 drinks per week   • Drug Use: No   • Sexual Activity: Not Currently     Other Topics Concern   • Not on file     Social History Narrative     She i    Family History:     Family History   Problem Relation Age of Onset   • Cancer Mother    • Lung Disease Father    • Cancer Father        Review of Systems:    All other review of systems are negative except what was mentioned above in the HPI.      Physical Exam:  Vitals:   BP 98/54 mmHg  Pulse 100  Temp(Src) 36.6 °C (97.8 °F)  Resp 18  Ht 1.676 m (5' 5.98\")  Wt 92.9 kg (204 lb 12.9 oz)  BMI 33.07 kg/m2  SpO2 95%  Breastfeeding? No    General: Not in acute distress, alert and oriented x 3  HEENT: No pallor, icterus. Oropharynx clear.   Neck: Supple, no palpable masses.  Lymph nodes: No palpable cervical, supraclavicular, axillary or inguinal lymphadenopathy.    CVS: regular rate and rhythm, no rubs or gallops  RESP: Clear to auscultate " bilaterally, no wheezing or crackles.   ABD: Soft, RUQ tender to deep palpation, non distended, positive bowel sounds, no palpable organomegaly  EXT: No edema or cyanosis  CNS: Alert and oriented x3, No focal deficits.  Skin- No rash      Labs:   Results for GREG DEMARCO (MRN [id number]) as of 7/19/2017 14:00   Ref. Range 6/16/2017 10:45   WBC Latest Ref Range: 4.8-10.8 K/uL 32.1 (HH)   RBC Latest Ref Range: 4.20-5.40 M/uL 4.22   Hemoglobin Latest Ref Range: 12.0-16.0 g/dL 12.1   Hematocrit Latest Ref Range: 37.0-47.0 % 37.2   MCV Latest Ref Range: 81.4-97.8 fL 88.2   MCH Latest Ref Range: 27.0-33.0 pg 28.7   MCHC Latest Ref Range: 33.6-35.0 g/dL 32.5 (L)   RDW Latest Ref Range: 35.9-50.0 fL 45.1   Platelet Count Latest Ref Range: 164-446 K/uL 522 (H)   MPV Latest Ref Range: 9.0-12.9 fL 9.1   Neutrophils-Polys Latest Ref Range: 44.00-72.00 % 23.00 (L)   Neutrophils (Absolute) Latest Ref Range: 2.00-7.15 K/uL 7.37 (H)   Lymphocytes Latest Ref Range: 22.00-41.00 % 72.40 (H)   Lymphs (Absolute) Latest Ref Range: 1.00-4.80 K/uL 23.24 (H)   Monocytes Latest Ref Range: 0.00-13.40 % 2.60   Monos (Absolute) Latest Ref Range: 0.00-0.85 K/uL 0.84   Eosinophils Latest Ref Range: 0.00-6.90 % 1.20   Eos (Absolute) Latest Ref Range: 0.00-0.51 K/uL 0.40   Basophils Latest Ref Range: 0.00-1.80 % 0.30   Baso (Absolute) Latest Ref Range: 0.00-0.12 K/uL 0.11   Immature Granulocytes Latest Ref Range: 0.00-0.90 % 0.50   Immature Granulocytes (abs) Latest Ref Range: 0.00-0.11 K/uL 0.16 (H)   Nucleated RBC Latest Units: /100 WBC 0.00   NRBC (Absolute) Latest Units: K/uL 0.00   Plt Estimation Unknown Increased   RBC Morphology Unknown Normal   Reactive Lymphocytes Unknown Few         Assessment and Plan:  - CLL  - The patient is currently Coon Stage 0 with lymphocytois only. She has some constitutional complaints which are unclear if tied to the CLL.   - She is currently under active observation  - She states she did have some night  sweats 6 months ago but none since then.   - She also has significant weight loss HOWEVER the patient states she has been trying to lose weight.   - She has also complained of fatigues which is not severe and temporally related to her recent hospitalization.  - On physical exam RUQ tenderness was elicited and USG abdomen has been requested to see if the CLL stage has changed.  - If the patients symptoms do not resolve we will repeat flow cytometry and FISH to evaluate for cytogenetics before starting therapy.    - Fibroadenoma of right breast  - Stereotactic biopsy Nov 12, 2014  Showed benign fibrofatty breast tissue with discrete nodules of hyalinized fibroadenoma with microcalcifications. No atypia or malignancy.  - The patient continues to have annual mammograms, as ordered by PCP  - Self breast exam was encouraged    - Adenocarcinoma in situ   - Diagnosed Dec 18, 2015 in the right upper lobe lung. biopsy of  nodule, 6 showed Adenocarcinoma-in-situ in fibroelastotic scar tissue, with foci suspicious for invasive adenocarcinoma.  - Feb 1, 2016. Right thoracoscopy with anterior segmentectomy of the right upper lobe and removal of a portion of the right middle lobe.Pathology report states The adenocarcinoma in situ focally demonstrates areas of central fibrosis which surrounds the adenocarcinoma in situ glands, which makes evaluation for invasion difficult. However, there are a few scattered clusters of malignant cells and small foci suspicious for invasive adenocarcinoma  - CT chest was done in March,2017 and repeat scheduled for later this month as ordered per pulm service  - continue follow up with pulm with Q month CT chest.    Follow up in 6 months, or sooner if needed.    She agreed and verbalized her agreement and understanding with the current plan.  I answered all questions and concerns she has at this time.   Dear  Siva Smart M.D., Thank you very much for allowing me to see  Clarisse Reeves today  .     Please note that this dictation was created using voice recognition software. I have made every reasonable attempt to correct obvious errors, but I expect that there are errors of grammar and possibly content that I did not discover before finalizing the note.      SIGNATURES:  Denise Barroso    CC:  Siva Smart M.D.  Siva Smart M.D.        Hospital Outpatient Visit on 07/19/2017   Component Date Value Ref Range Status   • WBC 07/19/2017 23.1* 4.8 - 10.8 K/uL Final   • RBC 07/19/2017 4.08* 4.20 - 5.40 M/uL Final   • Hemoglobin 07/19/2017 11.6* 12.0 - 16.0 g/dL Final   • Hematocrit 07/19/2017 36.8* 37.0 - 47.0 % Final   • MCV 07/19/2017 90.2  81.4 - 97.8 fL Final   • MCH 07/19/2017 28.4  27.0 - 33.0 pg Final   • MCHC 07/19/2017 31.5* 33.6 - 35.0 g/dL Final   • RDW 07/19/2017 46.5  35.9 - 50.0 fL Final   • Platelet Count 07/19/2017 437  164 - 446 K/uL Final   • MPV 07/19/2017 10.3  9.0 - 12.9 fL Final   • Nucleated RBC 07/19/2017 0.10   Final   • NRBC (Absolute) 07/19/2017 0.02   Final   • Neutrophils-Polys 07/19/2017 32.20* 44.00 - 72.00 % Final   • Lymphocytes 07/19/2017 62.60* 22.00 - 41.00 % Final   • Monocytes 07/19/2017 3.50  0.00 - 13.40 % Final   • Eosinophils 07/19/2017 1.70  0.00 - 6.90 % Final   • Basophils 07/19/2017 0.00  0.00 - 1.80 % Final   • Neutrophils (Absolute) 07/19/2017 7.44* 2.00 - 7.15 K/uL Final    Includes immature neutrophils, if present.   • Lymphs (Absolute) 07/19/2017 14.46* 1.00 - 4.80 K/uL Final   • Monos (Absolute) 07/19/2017 0.81  0.00 - 0.85 K/uL Final   • Eos (Absolute) 07/19/2017 0.39  0.00 - 0.51 K/uL Final   • Baso (Absolute) 07/19/2017 0.00  0.00 - 0.12 K/uL Final   • Anisocytosis 07/19/2017 1+   Final   • Alkaline Phosphatase 07/19/2017 58  30 - 99 U/L Final   • AST(SGOT) 07/19/2017 17  12 - 45 U/L Final   • ALT(SGPT) 07/19/2017 18  2 - 50 U/L Final   • Total Bilirubin 07/19/2017 0.4  0.1 - 1.5 mg/dL Final   • Direct Bilirubin 07/19/2017 <0.1  0.1 - 0.5  mg/dL Final   • Indirect Bilirubin 07/19/2017 see below  0.0 - 1.0 mg/dL Final    Comment: Unable to calculate indirect bilirubin due to a total or direct  bilirubin result being outside the measurement range of the analyzer.     • Albumin 07/19/2017 4.4  3.2 - 4.9 g/dL Final   • Total Protein 07/19/2017 6.8  6.0 - 8.2 g/dL Final   • Manual Diff Status 07/19/2017 PERFORMED   Final   • Peripheral Smear Review 07/19/2017 see below   Final    Comment: Due to instrument suspect flags, further review of peripheral smear is  indicated on this patient sample. This review may or may not result in  abnormal findings.     • Plt Estimation 07/19/2017 Increased   Final   • RBC Morphology 07/19/2017 Present   Final    WBC: diff.count done on albumin smear   • Smudge Cells 07/19/2017 Few   Final     Labs reviewed are within normal limits    Denise Barroso  07/28/2017 1:44 PM

## 2017-07-20 ENCOUNTER — PATIENT OUTREACH (OUTPATIENT)
Dept: HEALTH INFORMATION MANAGEMENT | Facility: OTHER | Age: 77
End: 2017-07-20

## 2017-07-20 NOTE — PROGRESS NOTES
Outbound call to Clarisse returning VM from pt.  Patient inquired how to use recently delivered [facility] equipment.  Per Jimmy notes:        Left  stating patient should call Jimmy at the number left on VM.  If she cannot do so, Jimmy will try to reach patient again.      Plan:  Follow up with patient when Jimmy sets up equipment.

## 2017-07-25 ENCOUNTER — APPOINTMENT (OUTPATIENT)
Dept: RADIOLOGY | Facility: MEDICAL CENTER | Age: 77
End: 2017-07-25
Attending: NURSE PRACTITIONER
Payer: MEDICARE

## 2017-07-25 ENCOUNTER — PATIENT OUTREACH (OUTPATIENT)
Dept: HEALTH INFORMATION MANAGEMENT | Facility: OTHER | Age: 77
End: 2017-07-25

## 2017-07-27 ENCOUNTER — APPOINTMENT (OUTPATIENT)
Dept: VASCULAR LAB | Facility: MEDICAL CENTER | Age: 77
End: 2017-07-27
Payer: MEDICARE

## 2017-07-28 ENCOUNTER — HOSPITAL ENCOUNTER (OUTPATIENT)
Dept: CARDIOLOGY | Facility: MEDICAL CENTER | Age: 77
End: 2017-07-28
Attending: INTERNAL MEDICINE
Payer: MEDICARE

## 2017-07-28 DIAGNOSIS — J81.0 ACUTE PULMONARY EDEMA (HCC): ICD-10-CM

## 2017-07-28 LAB
LV EJECT FRACT  99904: 70
LV EJECT FRACT MOD 2C 99903: 71.15
LV EJECT FRACT MOD 4C 99902: 72.38
LV EJECT FRACT MOD BP 99901: 73.72

## 2017-07-28 PROCEDURE — 93306 TTE W/DOPPLER COMPLETE: CPT

## 2017-07-31 ENCOUNTER — PATIENT OUTREACH (OUTPATIENT)
Dept: HEALTH INFORMATION MANAGEMENT | Facility: OTHER | Age: 77
End: 2017-07-31

## 2017-07-31 NOTE — PROGRESS NOTES
Clarisse Reeves was discharged from [facility] on 6/6/2017 with a diagnosis of severe sepsis and septic shock and a LACE score of 77. The patient was discharged home with instructions to follow up with Pulmonology and PCP. The patient kept both appointments. The patient was ordered [facility] but the patient declined services post discharge. The patient discharged home with oxygen and was delivered a nebulizer post discharge. The Patient Advocate was able to confirm that the patient picked up all of their medications. The patient did not have transportation barriers. Patient Advocate was able to engage with the patient after discharge. At time of closing, the patient’s appointments are as follows:  • Pulmonology Dr. Watson scheduled 7/15 -Kept   • PCP Dr. Smart scheduled 6/19 - Kept   • [facility] - Patient declined; services not rendered.

## 2017-08-08 ENCOUNTER — HOSPITAL ENCOUNTER (OUTPATIENT)
Dept: RADIOLOGY | Facility: MEDICAL CENTER | Age: 77
End: 2017-08-08
Attending: NURSE PRACTITIONER
Payer: MEDICARE

## 2017-08-08 ENCOUNTER — HOSPITAL ENCOUNTER (OUTPATIENT)
Dept: LAB | Facility: MEDICAL CENTER | Age: 77
End: 2017-08-08
Attending: INTERNAL MEDICINE
Payer: MEDICARE

## 2017-08-08 ENCOUNTER — HOSPITAL ENCOUNTER (OUTPATIENT)
Dept: RADIOLOGY | Facility: MEDICAL CENTER | Age: 77
End: 2017-08-08
Attending: INTERNAL MEDICINE
Payer: MEDICARE

## 2017-08-08 DIAGNOSIS — E04.2 MULTIPLE THYROID NODULES: ICD-10-CM

## 2017-08-08 DIAGNOSIS — C34.11 MALIGNANT NEOPLASM OF RIGHT UPPER LOBE OF LUNG (HCC): ICD-10-CM

## 2017-08-08 DIAGNOSIS — E11.9 TYPE 2 DIABETES MELLITUS WITHOUT COMPLICATION, WITHOUT LONG-TERM CURRENT USE OF INSULIN (HCC): ICD-10-CM

## 2017-08-08 LAB
ANION GAP SERPL CALC-SCNC: 8 MMOL/L (ref 0–11.9)
BUN SERPL-MCNC: 20 MG/DL (ref 8–22)
CALCIUM SERPL-MCNC: 9.6 MG/DL (ref 8.5–10.5)
CHLORIDE SERPL-SCNC: 103 MMOL/L (ref 96–112)
CHOLEST SERPL-MCNC: 160 MG/DL (ref 100–199)
CO2 SERPL-SCNC: 25 MMOL/L (ref 20–33)
CREAT SERPL-MCNC: 0.8 MG/DL (ref 0.5–1.4)
CREAT UR-MCNC: 25 MG/DL
EST. AVERAGE GLUCOSE BLD GHB EST-MCNC: 126 MG/DL
GFR SERPL CREATININE-BSD FRML MDRD: >60 ML/MIN/1.73 M 2
GLUCOSE SERPL-MCNC: 103 MG/DL (ref 65–99)
HBA1C MFR BLD: 6 % (ref 0–5.6)
HDLC SERPL-MCNC: 39 MG/DL
LDLC SERPL CALC-MCNC: 96 MG/DL
MICROALBUMIN UR-MCNC: <0.7 MG/DL
MICROALBUMIN/CREAT UR: NORMAL MG/G (ref 0–30)
POTASSIUM SERPL-SCNC: 3.9 MMOL/L (ref 3.6–5.5)
SODIUM SERPL-SCNC: 136 MMOL/L (ref 135–145)
T4 FREE SERPL-MCNC: 1.34 NG/DL (ref 0.53–1.43)
TRIGL SERPL-MCNC: 124 MG/DL (ref 0–149)
TSH SERPL DL<=0.005 MIU/L-ACNC: 0.4 UIU/ML (ref 0.3–3.7)

## 2017-08-08 PROCEDURE — 83036 HEMOGLOBIN GLYCOSYLATED A1C: CPT

## 2017-08-08 PROCEDURE — 84443 ASSAY THYROID STIM HORMONE: CPT

## 2017-08-08 PROCEDURE — 82570 ASSAY OF URINE CREATININE: CPT

## 2017-08-08 PROCEDURE — 80048 BASIC METABOLIC PNL TOTAL CA: CPT

## 2017-08-08 PROCEDURE — 84439 ASSAY OF FREE THYROXINE: CPT

## 2017-08-08 PROCEDURE — 82043 UR ALBUMIN QUANTITATIVE: CPT

## 2017-08-08 PROCEDURE — 36415 COLL VENOUS BLD VENIPUNCTURE: CPT

## 2017-08-08 PROCEDURE — 80061 LIPID PANEL: CPT

## 2017-08-08 PROCEDURE — 71250 CT THORAX DX C-: CPT

## 2017-08-10 ENCOUNTER — NON-PROVIDER VISIT (OUTPATIENT)
Dept: MEDICAL GROUP | Age: 77
End: 2017-08-10
Payer: MEDICARE

## 2017-08-10 DIAGNOSIS — F51.01 PRIMARY INSOMNIA: ICD-10-CM

## 2017-08-10 DIAGNOSIS — E53.8 VITAMIN B 12 DEFICIENCY: ICD-10-CM

## 2017-08-10 PROCEDURE — 96372 THER/PROPH/DIAG INJ SC/IM: CPT | Performed by: INTERNAL MEDICINE

## 2017-08-10 RX ORDER — LORAZEPAM 2 MG/1
4 TABLET ORAL NIGHTLY PRN
Qty: 60 TAB | Refills: 5 | Status: SHIPPED | OUTPATIENT
Start: 2017-08-10 | End: 2017-08-16

## 2017-08-10 RX ADMIN — CYANOCOBALAMIN 1000 MCG: 1000 INJECTION, SOLUTION INTRAMUSCULAR; SUBCUTANEOUS at 10:22

## 2017-08-10 NOTE — MR AVS SNAPSHOT
Clarisse Reeves   8/10/2017 10:30 AM   Non-Provider Visit   MRN: [id number]    Department:  [address] [place] [address]   Dept Phone:  [phone]    Description:  Female : 1940   Provider:  BLAS CAMPOVERDE MA           Reason for Visit     Injections Vitamin B 12      Allergies as of 8/10/2017     Allergen Noted Reactions    Codeine 2010   Hives, Nausea    RXN=40 years ago    Lipitor [Atorvastatin Calcium] 2013   Myalgia    XNF=1627      Lovastatin 2015   Myalgia    Muscle aches  OQJ=0608    Zocor [Simvastatin - High Dose] 2013   Myalgia    Severe myalgias  PIV=5123      You were diagnosed with     Vitamin B 12 deficiency   [710938]         Vital Signs     Smoking Status                   Former Smoker           Basic Information     Date Of Birth Sex Race Ethnicity Preferred Language    1940 Female White Non- English      Your appointments     Aug 15, 2017 11:15 AM   XRAY 15 with PULMONARY DX 1   [facility] - Pulmonary ([address])    [address]  [place] [address]               Aug 15, 2017 11:40 AM   Established Patient Pul with CHERRY Boateng   [facility] (--)    [address]  [address]  [place] [id number] [phone]            Aug 17, 2017  1:20 PM   MA SCRN10 with RBHC MG 3   [facility] ([facility])    [address]  [place] [address] [phone]           No deodorant, powder, perfume or lotion under the arm or breast area.            Aug 22, 2017  8:20 AM   Established Patient with Demarco Bowers M.D.   [facility] Endocrinology [facility])    [address], [address]  [place] [address] [phone]           You will be receiving a confirmation call a few days before your appointment from our automated call confirmation system.            Sep 25, 2017  2:40 PM   Established Patient with Siva Smart M.D.   [facility]  ([facility])    [address] [address] [phone]           You will be receiving a confirmation call a few days before your appointment from our automated call confirmation system.            Oct 09, 2017 10:40 AM   Diabetes Care Visit with Siva Smart M.D., LAURA DIABETES RN   [facility] [address] ([facility])    [address]  [place] [address] [phone]           You will be receiving a confirmation call a few days before your appointment from our automated call confirmation system.            Nov 16, 2017 10:00 AM   US ABDOMEN FASTING (30 MINUTES) with [facility] US 1   [facility] ULTR[facility]OUND - [facility] ([facility])    [facility]  [address]  [place] [address] [phone]           Some exams require specific prep instructions that would have been given to you at time of scheduling. If you have any additional questions about the prep instructions, please call Imaging Scheduling at [phone] and press #2.            Jan 19, 2018  2:00 PM   Hematology Est Patient with Denise Barroso M.D.   [facility] (--)    [address] [facility] [address] [facility], [address]  [facility] [address] [phone]              Problem List              ICD-10-CM Priority Class Noted - Resolved    CLL (chronic lymphocytic leukemia)- wbc= 20k-30k, since 2006; no rx; oncologist to follow C91.10 Low  4/30/2012 - Present    Mixed hyperlipidemia E78.2 Medium  11/26/2012 - Present    Fatty infiltration of liver K76.0   12/11/2012 - Present    Vitamin D insufficiency E55.9   7/9/2013 - Present    Gastroesophageal reflux disease with esophagitis K21.0   11/7/2013 - Present    Multiple thyroid nodules- dr jaeger, neg FNA 2015; us no change 2017 E04.2   4/21/2015 - Present    Essential hypertension I10   9/1/2015 - Present    Controlled type 2 diabetes mellitus with complication, without long-term current use of insulin (CMS-HCC) E11.8   9/1/2015 - Present     Hypothyroidism due to acquired atrophy of thyroid E03.4   2015 - Present    Obesity (BMI 30-39.9) E66.9 Low  2015 - Present    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, 2016-  partial lobectomy RUL/RML- Dr Ganser- folowed by PMA C34.11   2016 - Present    Family history of heart attack- daughter 54 yo  MI  Z82.49   10/7/2016 - Present    Moderate episode of recurrent major depressive disorder (CMS-HCC) F33.1   2017 - Present    Primary insomnia F51.01   2017 - Present    Normocytic anemia D64.9   2017 - Present    Mild intermittent asthma without complication- pma;  albuterol inhaler prn J45.20   2017 - Present    Acute pulmonary edema (CMS-HCC)- 2017 from overhydration iv fluids from septic shock of cap- resolved; echo pend tbd 2017 J81.0   2017 - Present    Numbness of left hand- aftter hosp for cap 2017 R20.8   2017 - Present    Chronic fatigue- following cap 2017 R53.82   2017 - Present      Health Maintenance        Date Due Completion Dates    RETINAL SCREENING 2017    IMM ZOSTER VACCINE 2018 (Originally 2001) ---    IMM INFLUENZA (1) 2017, 10/31/2014, 2013, 2012, 2011, 2010    A1C SCREENING 2018, 3/22/2017, 10/11/2016, 2016, 2015, 2015, 2015, 2014, 2014, 10/31/2013, 2013, 3/8/2013, 2012, 2012    MAMMOGRAM 2018, 10/29/2014, 2014, 2014, 12/3/2012, 10/25/2011, 2010, 2009, 2009    DIABETES MONOFILAMENT / LE EXAM 2018, 5/3/2016, 2016, 12/3/2015, 2015, 2015 (Done)    Override on 2015: Done    FASTING LIPID PROFILE 2018, 3/22/2017, 10/11/2016, 2016, 2015, 2015, 2015, 2/3/2014, 10/31/2013, 2013, 3/8/2013, 2012, 2011, 2009    URINE ACR / MICROALBUMIN 2018,  3/22/2017, 10/11/2016, 4/11/2016, 9/5/2015, 2/4/2015, 5/19/2014, 6/12/2013, 11/26/2012    SERUM CREATININE 8/8/2018 8/8/2017, 6/16/2017, 6/6/2017, 6/4/2017, 6/2/2017, 6/1/2017, 3/22/2017, 10/11/2016, 4/11/2016, 2/2/2016, 1/29/2016, 12/19/2015, 11/28/2015, 9/5/2015, 2/13/2015, 2/12/2015, 2/4/2015, 11/17/2014, 5/19/2014, 2/3/2014, 10/31/2013, 9/11/2013, 6/12/2013, 4/12/2013, 4/3/2013, 4/1/2013, 3/31/2013, 3/30/2013, 3/29/2013, 3/8/2013, 11/26/2012, 1/29/2011, 1/28/2011, 1/27/2011, 1/26/2011, 1/25/2011, 8/25/2009    COLONOSCOPY 6/7/2019 6/7/2016, 6/3/2016, 1/10/2011    BONE DENSITY 11/21/2019 11/21/2014    IMM DTaP/Tdap/Td Vaccine (2 - Td) 6/13/2026 6/13/2016, 4/14/2016, 6/19/2010            Current Immunizations     13-VALENT PCV PREVNAR 9/17/2014    Influenza TIV (IM) 10/31/2014, 8/30/2010    Influenza Vaccine Adult HD 9/19/2015    Influenza Vaccine Quad Inj (Pf) 9/11/2013, 9/4/2012, 8/2/2011    Pneumococcal polysaccharide vaccine (PPSV-23) 2/1/2017    TD Vaccine 6/19/2010  6:30 PM    Tdap Vaccine 6/13/2016, 4/14/2016  5:25 PM    Tuberculin Skin Test 6/24/2014 10:15 AM, 6/17/2014      Below and/or attached are the medications your provider expects you to take. Review all of your home medications and newly ordered medications with your provider and/or pharmacist. Follow medication instructions as directed by your provider and/or pharmacist. Please keep your medication list with you and share with your provider. Update the information when medications are discontinued, doses are changed, or new medications (including over-the-counter products) are added; and carry medication information at all times in the event of emergency situations     Allergies:  CODEINE - Hives,Nausea     LIPITOR - Myalgia     LOVASTATIN - Myalgia     ZOCOR - Myalgia               Medications  Valid as of: August 10, 2017 - 10:40 AM    Generic Name Brand Name Tablet Size Instructions for use    Aspirin (Chew Tab) ASA 81 MG Take 1 Tab by mouth  every day.        Cholecalciferol (Tab) Vitamin D-3 5000 UNITS Take 5,000 Units by mouth every day.        Citalopram Hydrobromide (Tab) CELEXA 10 MG Take 1 Tab by mouth every day.        Fenofibrate (Tab) TRIGLIDE 160 MG Take 1 Tab by mouth every day.        Lansoprazole (CAPSULE DELAYED RELEASE) PREVACID 30 MG Take 1 Cap by mouth every day.        Levothyroxine Sodium (Tab) SYNTHROID 112 MCG TAKE ONE TABLET BY MOUTH EVERY DAY        Lisinopril-Hydrochlorothiazide (Tab) PRINZIDE, ZESTORETIC 20-25 MG TAKE 1 TABLET BY MOUTH EVERY DAY        LORazepam (Tab) ATIVAN 2 MG Take 2 Tabs by mouth at bedtime as needed. For sleep or anxiety        MetFORMIN HCl (TABLET SR 24 HR) GLUCOPHAGE  MG Take 1 Tab by mouth every day.        .                 Medicines prescribed today were sent to:     [facility][id number] - [place], [place] - [address]    [address] [facility] [place] [address]    Phone: [phone] Fax: [phone]    Open 24 Hours?: No      Medication refill instructions:       If your prescription bottle indicates you have medication refills left, it is not necessary to call your provider’s office. Please contact your pharmacy and they will refill your medication.    If your prescription bottle indicates you do not have any refills left, you may request refills at any time through one of the following ways: The online [employer] system (except Urgent Care), by calling your provider’s office, or by asking your pharmacy to contact your provider’s office with a refill request. Medication refills are processed only during regular business hours and may not be available until the next business day. Your provider may request additional information or to have a follow-up visit with you prior to refilling your medication.   *Please Note: Medication refills are assigned a new Rx number when refilled electronically. Your pharmacy may indicate that no refills were authorized even though a new prescription for  the same medication is available at the pharmacy. Please request the medicine by name with the pharmacy before contacting your provider for a refill.           MyChart Access Code: Activation code not generated  Current MyChart Status: Active

## 2017-08-10 NOTE — TELEPHONE ENCOUNTER
Was the patient seen in the last year in this department? Yes     Does patient have an active prescription for medications requested? Yes     Received Request Via: Patient   Patient came in for a MA visit and stated she had dropped her ativan down her garbage disposal and just needs a 10 day supply.

## 2017-08-10 NOTE — PROGRESS NOTES
Clarisse Reeves is a 77 y.o. female here for a non-provider visit for B 12 injection.    Reason for injection: Vitamin B 12 deficiency  Order in MAR?: Yes  Patient supplied?:No  Minimum interval has been met for this injection (per MAR order): Yes    Order and dose verified by: Uriel  Patient tolerated injection and no adverse effects were observed or reported: Yes    # of Administrations remaining in MAR: 1

## 2017-08-15 ENCOUNTER — APPOINTMENT (OUTPATIENT)
Dept: RADIOLOGY | Facility: IMAGING CENTER | Age: 77
End: 2017-08-15
Attending: NURSE PRACTITIONER
Payer: MEDICARE

## 2017-08-15 ENCOUNTER — OFFICE VISIT (OUTPATIENT)
Dept: PULMONOLOGY | Facility: HOSPICE | Age: 77
End: 2017-08-15
Payer: MEDICARE

## 2017-08-15 VITALS
OXYGEN SATURATION: 96 % | HEART RATE: 77 BPM | WEIGHT: 202 LBS | SYSTOLIC BLOOD PRESSURE: 102 MMHG | RESPIRATION RATE: 16 BRPM | DIASTOLIC BLOOD PRESSURE: 68 MMHG | BODY MASS INDEX: 32.47 KG/M2 | TEMPERATURE: 98.6 F | HEIGHT: 66 IN

## 2017-08-15 DIAGNOSIS — E66.9 OBESITY (BMI 30-39.9): ICD-10-CM

## 2017-08-15 DIAGNOSIS — I10 ESSENTIAL HYPERTENSION: ICD-10-CM

## 2017-08-15 DIAGNOSIS — K21.00 GASTROESOPHAGEAL REFLUX DISEASE WITH ESOPHAGITIS: ICD-10-CM

## 2017-08-15 DIAGNOSIS — J45.20 MILD INTERMITTENT ASTHMA WITHOUT COMPLICATION: ICD-10-CM

## 2017-08-15 DIAGNOSIS — J18.9 CAP (COMMUNITY ACQUIRED PNEUMONIA): ICD-10-CM

## 2017-08-15 DIAGNOSIS — J98.4 SMALL AIRWAYS DISEASE: ICD-10-CM

## 2017-08-15 DIAGNOSIS — C34.11 MALIGNANT NEOPLASM OF RIGHT UPPER LOBE OF LUNG (HCC): ICD-10-CM

## 2017-08-15 PROCEDURE — 99214 OFFICE O/P EST MOD 30 MIN: CPT | Performed by: NURSE PRACTITIONER

## 2017-08-15 PROCEDURE — 71020 DX-CHEST-2 VIEWS: CPT | Mod: TC | Performed by: NURSE PRACTITIONER

## 2017-08-15 NOTE — PROGRESS NOTES
Chief Complaint   Patient presents with   • Results     CXR/CT       HPI:  Clarisse Reeves is a 77 y.o. year old female here today for follow-up on CXR and CT scan results. 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 Last OV patient followed up for CAP and successfully treated with Unasyn and azithromycin and DC'd home on Augmentin. Chest x-ray 6/1/2017 indicated minimal right lower lobe interstitial and alveolar edema or pneumonia. Chest x-ray 6/5/2017 indicated cardiomegaly with pulmonary edema. CXR 8/15/17 indicates resolution of pulmonary interstitial edema. She feels much better and rarely uses LORY. She denies dyspnea, phlegm or wheezing. She notes dry cough that started prior to pneumonia. She is on ACE but has been for a long time. She has a history of thyroid nodules being monitored and is wondering if the right nodule could be causing some irritation to trache/vocal cords. She f/u's with endocrinology next week. She denies GERD.     She has a history of chronic lymphocytic leukemia and diabetes type 2. She had thoracic surgery 2000 for removal of granuloma.    History of lung cancer.   Initially in February 2015 a density in the right upper lobe was identified.  CT scan 11/8/2015 indicates a stellate density in the anterior aspect of the right upper lobe measuring 16 x 22 mm.  When compared to February 2015 CT there may been a slight amount of interval enlargement. PET scan showed minimal FDG uptake, however biopsy showed adenocarcinoma. She underwent thoracoscopy with anterior segmentectomy of RUL 02/01/16 which showed adenocarcinoma in situ. She has felt well, and was released by Dr. Ganser. Repeat CT scan 8/27/16 indicated postoperative changes right upper lobe, residual fibrosis and calcifications, no recurrent mass. Hyperexpanded lungs, right lung apex fibrotic change and left lung base atelectasis similar to previous findings. No pleural effusions. Thyroid gland nodules.  CT  3/7/17 indicatedStable scarring in the right upper lobe. No new pulmonary opacity. No adenopathy identified. No adrenal enlargement. Stable moderate size hiatal hernia. Repeat CT 8/8/17 indicates postsurgical changes and scarring in bilateral upper lungs. No CT evidence of recurrent disease. I reviewed testing with patient.    PFT 9/8/2015 indicated FVC of 73%, FEV1 was 1.75 L or 72%, FEV1/FVC with a ratio of 74, RV with 132%, TLC was 107%, and a DLCO of 128% predicted.  There is no significant bronchodilator response.  The flow-volume loop shows changes consistent with small airways disease.      ROS: As per HPI and otherwise negative if not stated.    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   • Type II or unspecified type diabetes mellitus without mention of complication, not stated as uncontrolled      pre-diabetic   • Pneumonia 2014   • Cataract      right  and  left  IOL   • DM (diabetes mellitus) (CMS-HCC)    • Cancer (CMS-HCC) 2006     CLL   • Cancer (CMS-HCC) 2016     lung       Past Surgical History   Procedure Laterality Date   • Us-needle core bx-breast panel     • Vaginal hysterectomy total       Hysterectomy,Total Vaginal   • Recovery  12/18/2015     Procedure: CT-SCP-RUL LUNG BIOPSY-;  Surgeon: Recoveryonly Surgery;  Location: SURGERY PRE-POST [facility] [place];  Service:    • Other orthopedic surgery  1990     bakers cyst removed in right knee, multiple scopes   • Appendectomy  1955   • Cholecystectomy  1995   • Other  2001     Lower Left segment of lung removed    • Other  1984     left Thyroid removed, might remove right side in the future   • Other  1990     hemmroid removal   •  "Thoracoscopy Right 2/1/2016     Procedure: THORACOSCOPY Upper Lobectomy ;  Surgeon: John H Ganser, M.D.;  Location: SURGERY [facility];  Service:    • Node dissection Right 2/1/2016     Procedure: NODE DISSECTION;  Surgeon: John H Ganser, M.D.;  Location: SURGERY [facility];  Service:    • Cataract extraction with iol     • Tonsillectomy         Family History   Problem Relation Age of Onset   • Cancer Mother    • Lung Disease Father    • Cancer Father        Social History     Social History   • Marital Status: Single     Spouse Name: N/A   • Number of Children: N/A   • Years of Education: N/A     Occupational History   • Not on file.     Social History Main Topics   • Smoking status: Former Smoker -- 2.00 packs/day for 50 years     Types: Cigarettes     Quit date: 05/06/2006   • Smokeless tobacco: Never Used      Comment: quit smoking 2002   • Alcohol Use: 0.0 oz/week     0 Standard drinks or equivalent per week      Comment: 2 drinks per week   • Drug Use: No   • Sexual Activity: Not Currently     Other Topics Concern   • Not on file     Social History Narrative       Allergies as of 08/15/2017 - Delio as Reviewed 08/15/2017   Allergen Reaction Noted   • Codeine Hives and Nausea 06/19/2010   • Lipitor [atorvastatin calcium] Myalgia 09/11/2013   • Lovastatin Myalgia 12/03/2015   • Zocor [simvastatin - high dose] Myalgia 09/11/2013        @Vital signs for this encounter:  Filed Vitals:    08/15/17 1135   Height: 1.676 m (5' 5.98\")   Weight: 91.627 kg (202 lb)   Weight % change since last entry.: 0 %   BP: 102/68   Pulse: 77   BMI (Calculated): 32.62   Resp: 16   Temp: 37 °C (98.6 °F)   O2 sat % room air: 96 %       Current medications as of today   Current Outpatient Prescriptions   Medication Sig Dispense Refill   • lorazepam (ATIVAN) 2 MG tablet Take 2 Tabs by mouth at bedtime as needed. For sleep or anxiety 60 Tab 5   • metformin ER (GLUCOPHAGE XR) 500 MG TABLET SR 24 HR Take 1 Tab by mouth every day. " 90 Tab 4   • fenofibrate (TRIGLIDE) 160 MG tablet Take 1 Tab by mouth every day. 90 Tab 4   • lisinopril-hydrochlorothiazide (PRINZIDE, ZESTORETIC) 20-25 MG per tablet TAKE 1 TABLET BY MOUTH EVERY DAY 90 Tab 4   • levothyroxine (SYNTHROID) 112 MCG Tab TAKE ONE TABLET BY MOUTH EVERY DAY 90 Tab 4   • citalopram (CELEXA) 10 MG tablet Take 1 Tab by mouth every day. 90 Tab 4   • lansoprazole (PREVACID) 30 MG CAPSULE DELAYED RELEASE Take 1 Cap by mouth every day. 90 Cap 4   • Cholecalciferol (VITAMIN D-3) 5000 UNITS Tab Take 5,000 Units by mouth every day. 90 Tab 1   • aspirin (ASA) 81 MG Chew Tab chewable tablet Take 1 Tab by mouth every day. 100 Tab 11     Current Facility-Administered Medications   Medication Dose Route Frequency Provider Last Rate Last Dose   • cyanocobalamin (VITAMIN B-12) injection 1,000 mcg  1,000 mcg Intramuscular Q30 DAYS Siva Smart M.D.   1,000 mcg at 08/10/17 1022         Physical Exam:   Gen:           Alert and oriented, No apparent distress. Mood and affect appropriate, normal interaction with examiner.  Eyes:          PERRL, EOM intact, sclere white, conjunctive moist.  Ears:          Not examined.   Hearing:     Grossly intact.  Nose:          Normal, no lesions or deformities.  Dentition:    Good dentition.  Oropharynx:   Tongue normal, posterior pharynx without erythema or exudate.  Mallampati Classification: 3  Neck:        Supple, trachea midline, no masses.  Respiratory Effort: No intercostal retractions or use of accessory muscles.   Lung Auscultation:      Clear to auscultation bilaterally; no rales, rhonchi or wheezing.  CV:            Regular rate and rhythm. No murmurs, rubs or gallops.  Abd:           Not examined.   Lymphadenopathy: Not examined.  Gait and Station: Normal.  Digits and Nails: No clubbing, cyanosis, petechiae, or nodes.   Cranial Nerves: II-XII grossly intact.  Skin:        No rashes, lesions or ulcers noted.               Ext:           No cyanosis or  edema.      Assessment:  1. 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, 2/1/2016-  partial lobectomy RUL/RML- Dr Ganser- folowed by [facility]  CT-CHEST (THORAX) W/O   2. Mild intermittent asthma without complication- [facility];  albuterol inhaler prn     3. Gastroesophageal reflux disease with esophagitis     4. Essential hypertension     5. Obesity (BMI 30-39.9)  HEIGHT AND WEIGHT       Immunizations:    Flu:not given, recommend fall 2017  Pneumovax 23:2017  Prevnar 13:2014    Plan:  1. CT scan of chest w/o contrast in 6 months. Patient hx lung CA.   2. Patient declines medication for dry cough at this time. She notes it mild and not impairing quality of life.  3. Continue PPI daily.  4. Discussed respiratory hygiene.  5. Follow up in 6 months with CT Scan, sooner if needed.

## 2017-08-15 NOTE — MR AVS SNAPSHOT
"        Clarisse Reeves   8/15/2017 11:40 AM   Office Visit   MRN: [id number]    Department:  [facility]   Dept Phone:  [phone]    Description:  Female : 1940   Provider:  CHERRY Boateng           Reason for Visit     Results CXR/CT      Allergies as of 8/15/2017     Allergen Noted Reactions    Codeine 2010   Hives, Nausea    RXN=40 years ago    Lipitor [Atorvastatin Calcium] 2013   Myalgia    VRT=7211      Lovastatin 2015   Myalgia    Muscle aches  FBQ=2369    Zocor [Simvastatin - High Dose] 2013   Myalgia    Severe myalgias  AIX=5471      You were diagnosed with     Malignant neoplasm of right upper lobe of lung (CMS-HCC)   [336767]       Mild intermittent asthma without complication   [112547]       Gastroesophageal reflux disease with esophagitis   [9914573]       Essential hypertension   [5835306]       Obesity (BMI 30-39.9)   [957372]         Vital Signs     Blood Pressure Pulse Temperature Respirations Height Weight    102/68 mmHg 77 37 °C (98.6 °F) 16 1.676 m (5' 5.98\") 91.627 kg (202 lb)    Body Mass Index Oxygen Saturation Smoking Status             32.62 kg/m2 96% Former Smoker         Basic Information     Date Of Birth Sex Race Ethnicity Preferred Language    1940 Female White Non- English      Your appointments     Aug 22, 2017  8:20 AM   Established Patient with Demarco Bowers M.D.   [facility])    [address]  [facility] [address] [phone]           You will be receiving a confirmation call a few days before your appointment from our automated call confirmation system.            Aug 22, 2017  1:20 PM   MA [id number] with LILIYA MARKHAM MG 1   [facility] ([place])    [address]  [place] [address] [phone]           No deodorant, powder, perfume or lotion under the arm or breast area.            Sep 25, 2017  " 2:40 PM   Established Patient with Siva Smart M.D.   [facility] [address] ([facility])    [address]  [place] [address] [phone]           You will be receiving a confirmation call a few days before your appointment from our automated call confirmation system.            Oct 09, 2017 10:40 AM   Diabetes Care Visit with Siva Smart M.D., NIRALI DIABETES RN   [facility] [address] [facility])    [address]  [place] [address] [phone]           You will be receiving a confirmation call a few days before your appointment from our automated call confirmation system.            Nov 16, 2017 10:00 AM   US ABDOMEN FASTING (30 MINUTES) with [facility] US 1   [facility] IMAGING - ULTRASOUND - [facility] ([facility])    [facility]  [address]  [place] [address] [phone]           Some exams require specific prep instructions that would have been given to you at time of scheduling. If you have any additional questions about the prep instructions, please call Imaging Scheduling at [phone] and press #2.            Jan 19, 2018  2:00 PM   Hematology Est Patient with Denise Barroso M.D.   [facility] (--)    [address] [facility] [address], [address]  [place] [address] [phone]            Feb 13, 2018 11:40 AM   Established Patient Pul with CHERRY Boateng   [facility] (--)    [address]  [address]  [place] [address] [phone]              Problem List              ICD-10-CM Priority Class Noted - Resolved    CLL (chronic lymphocytic leukemia)- wbc= 20k-30k, since 2006; no rx; oncologist to follow C91.10 Low  4/30/2012 - Present    Mixed hyperlipidemia E78.2 Medium  11/26/2012 - Present    Fatty infiltration of liver K76.0   12/11/2012 - Present    Vitamin D insufficiency E55.9   7/9/2013 - Present    Gastroesophageal reflux disease with esophagitis K21.0   11/7/2013 - Present     Multiple thyroid nodules- dr jaeger, neg FNA ; us no change  E04.2   2015 - Present    Essential hypertension I10   2015 - Present    Controlled type 2 diabetes mellitus with complication, without long-term current use of insulin (CMS-HCC) E11.8   2015 - Present    Hypothyroidism due to acquired atrophy of thyroid E03.4   2015 - Present    Obesity (BMI 30-39.9) E66.9 Low  2015 - Present    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, 2016-  partial lobectomy RUL/RML- Dr Ganser- folowed by [facility] C34.11   2016 - Present    Family history of heart attack- daughter 54 yo  MI  Z82.49   10/7/2016 - Present    Moderate episode of recurrent major depressive disorder (CMS-HCC) F33.1   2017 - Present    Primary insomnia F51.01   2017 - Present    Normocytic anemia D64.9   2017 - Present    Mild intermittent asthma without complication- [facility];  albuterol inhaler prn J45.20   2017 - Present    Acute pulmonary edema (CMS-HCC)- 2017 from overhydration iv fluids from septic shock of cap- resolved; echo pend tbd 2017 J81.0   2017 - Present    Numbness of left hand- aftter hosp for cap 2017 R20.8   2017 - Present    Chronic fatigue- following cap 2017 R53.82   2017 - Present      Health Maintenance        Date Due Completion Dates    RETINAL SCREENING 2017    IMM ZOSTER VACCINE 2018 (Originally 2001) ---    IMM INFLUENZA (1) 2017, 10/31/2014, 2013, 2012, 2011, 2010    A1C SCREENING 2018, 3/22/2017, 10/11/2016, 2016, 2015, 2015, 2015, 2014, 2014, 10/31/2013, 2013, 3/8/2013, 2012, 2012    MAMMOGRAM 2018, 10/29/2014, 2014, 2014, 12/3/2012, 10/25/2011, 2010, 2009, 2009    DIABETES MONOFILAMENT / LE EXAM 2018, 5/3/2016, 2016, 12/3/2015, 2015,  1/29/2015 (Done)    Override on 1/29/2015: Done    FASTING LIPID PROFILE 8/8/2018 8/8/2017, 3/22/2017, 10/11/2016, 4/11/2016, 11/28/2015, 9/5/2015, 2/4/2015, 2/3/2014, 10/31/2013, 6/12/2013, 3/8/2013, 11/26/2012, 1/26/2011, 8/25/2009    URINE ACR / MICROALBUMIN 8/8/2018 8/8/2017, 3/22/2017, 10/11/2016, 4/11/2016, 9/5/2015, 2/4/2015, 5/19/2014, 6/12/2013, 11/26/2012    SERUM CREATININE 8/8/2018 8/8/2017, 6/16/2017, 6/6/2017, 6/4/2017, 6/2/2017, 6/1/2017, 3/22/2017, 10/11/2016, 4/11/2016, 2/2/2016, 1/29/2016, 12/19/2015, 11/28/2015, 9/5/2015, 2/13/2015, 2/12/2015, 2/4/2015, 11/17/2014, 5/19/2014, 2/3/2014, 10/31/2013, 9/11/2013, 6/12/2013, 4/12/2013, 4/3/2013, 4/1/2013, 3/31/2013, 3/30/2013, 3/29/2013, 3/8/2013, 11/26/2012, 1/29/2011, 1/28/2011, 1/27/2011, 1/26/2011, 1/25/2011, 8/25/2009    COLONOSCOPY 6/7/2019 6/7/2016, 6/3/2016, 1/10/2011    BONE DENSITY 11/21/2019 11/21/2014    IMM DTaP/Tdap/Td Vaccine (2 - Td) 6/13/2026 6/13/2016, 4/14/2016, 6/19/2010            Current Immunizations     13-VALENT PCV PREVNAR 9/17/2014    Influenza TIV (IM) 10/31/2014, 8/30/2010    Influenza Vaccine Adult HD 9/19/2015    Influenza Vaccine Quad Inj (Pf) 9/11/2013, 9/4/2012, 8/2/2011    Pneumococcal polysaccharide vaccine (PPSV-23) 2/1/2017    TD Vaccine 6/19/2010  6:30 PM    Tdap Vaccine 6/13/2016, 4/14/2016  5:25 PM    Tuberculin Skin Test 6/24/2014 10:15 AM, 6/17/2014      Below and/or attached are the medications your provider expects you to take. Review all of your home medications and newly ordered medications with your provider and/or pharmacist. Follow medication instructions as directed by your provider and/or pharmacist. Please keep your medication list with you and share with your provider. Update the information when medications are discontinued, doses are changed, or new medications (including over-the-counter products) are added; and carry medication information at all times in the event of emergency situations      Allergies:  CODEINE - Hives,Nausea     LIPITOR - Myalgia     LOVASTATIN - Myalgia     ZOCOR - Myalgia               Medications  Valid as of: August 15, 2017 - 12:06 PM    Generic Name Brand Name Tablet Size Instructions for use    Aspirin (Chew Tab) ASA 81 MG Take 1 Tab by mouth every day.        Cholecalciferol (Tab) Vitamin D-3 5000 UNITS Take 5,000 Units by mouth every day.        Citalopram Hydrobromide (Tab) CELEXA 10 MG Take 1 Tab by mouth every day.        Fenofibrate (Tab) TRIGLIDE 160 MG Take 1 Tab by mouth every day.        Lansoprazole (CAPSULE DELAYED RELEASE) PREVACID 30 MG Take 1 Cap by mouth every day.        Levothyroxine Sodium (Tab) SYNTHROID 112 MCG TAKE ONE TABLET BY MOUTH EVERY DAY        Lisinopril-Hydrochlorothiazide (Tab) PRINZIDE, ZESTORETIC 20-25 MG TAKE 1 TABLET BY MOUTH EVERY DAY        LORazepam (Tab) ATIVAN 2 MG Take 2 Tabs by mouth at bedtime as needed. For sleep or anxiety        MetFORMIN HCl (TABLET SR 24 HR) GLUCOPHAGE  MG Take 1 Tab by mouth every day.        .                 Medicines prescribed today were sent to:     [id number] - [place], [place] - [address]    [address] [facility] [place] [address]    Phone: [phone] Fax: [phone]    Open 24 Hours?: No      Medication refill instructions:       If your prescription bottle indicates you have medication refills left, it is not necessary to call your provider’s office. Please contact your pharmacy and they will refill your medication.    If your prescription bottle indicates you do not have any refills left, you may request refills at any time through one of the following ways: The online SNTMNT system (except Urgent Care), by calling your provider’s office, or by asking your pharmacy to contact your provider’s office with a refill request. Medication refills are processed only during regular business hours and may not be available until the next business day. Your provider may request  additional information or to have a follow-up visit with you prior to refilling your medication.   *Please Note: Medication refills are assigned a new Rx number when refilled electronically. Your pharmacy may indicate that no refills were authorized even though a new prescription for the same medication is available at the pharmacy. Please request the medicine by name with the pharmacy before contacting your provider for a refill.        Your To Do List     Future Labs/Procedures Complete By Expires    CT-CHEST (THORAX) W/O  2/15/2018 8/15/2018         Topokine Therapeutic[facility] Access Code: Activation code not generated  Current CREAT Status: Active

## 2017-08-16 ENCOUNTER — RESOLUTE PROFESSIONAL BILLING HOSPITAL PROF FEE (OUTPATIENT)
Dept: HOSPITALIST | Facility: MEDICAL CENTER | Age: 77
End: 2017-08-16
Payer: MEDICARE

## 2017-08-16 ENCOUNTER — HOSPITAL ENCOUNTER (INPATIENT)
Facility: MEDICAL CENTER | Age: 77
LOS: 2 days | DRG: 392 | End: 2017-08-18
Attending: EMERGENCY MEDICINE | Admitting: HOSPITALIST
Payer: MEDICARE

## 2017-08-16 ENCOUNTER — APPOINTMENT (OUTPATIENT)
Dept: RADIOLOGY | Facility: MEDICAL CENTER | Age: 77
DRG: 392 | End: 2017-08-16
Attending: EMERGENCY MEDICINE
Payer: MEDICARE

## 2017-08-16 DIAGNOSIS — R19.7 DIARRHEA, UNSPECIFIED TYPE: ICD-10-CM

## 2017-08-16 DIAGNOSIS — R10.84 GENERALIZED ABDOMINAL PAIN: ICD-10-CM

## 2017-08-16 PROBLEM — D72.829 LEUKOCYTOSIS: Status: ACTIVE | Noted: 2017-08-16

## 2017-08-16 PROBLEM — A04.72 CLOSTRIDIUM DIFFICILE ENTEROCOLITIS: Status: ACTIVE | Noted: 2017-08-16

## 2017-08-16 PROBLEM — K52.9 ACUTE GASTROENTERITIS: Status: ACTIVE | Noted: 2017-08-16

## 2017-08-16 PROBLEM — Z66 DNR (DO NOT RESUSCITATE): Status: ACTIVE | Noted: 2017-08-16

## 2017-08-16 LAB
ALBUMIN SERPL BCP-MCNC: 4.3 G/DL (ref 3.2–4.9)
ALBUMIN/GLOB SERPL: 1.6 G/DL
ALP SERPL-CCNC: 54 U/L (ref 30–99)
ALT SERPL-CCNC: 28 U/L (ref 2–50)
ANION GAP SERPL CALC-SCNC: 8 MMOL/L (ref 0–11.9)
APPEARANCE UR: CLEAR
AST SERPL-CCNC: 19 U/L (ref 12–45)
BASOPHILS # BLD AUTO: 0 % (ref 0–1.8)
BASOPHILS # BLD: 0 K/UL (ref 0–0.12)
BILIRUB SERPL-MCNC: 0.5 MG/DL (ref 0.1–1.5)
BILIRUB UR QL STRIP.AUTO: NEGATIVE
BUN SERPL-MCNC: 25 MG/DL (ref 8–22)
CALCIUM SERPL-MCNC: 8.7 MG/DL (ref 8.4–10.2)
CHLORIDE SERPL-SCNC: 105 MMOL/L (ref 96–112)
CO2 SERPL-SCNC: 22 MMOL/L (ref 20–33)
COLOR UR: YELLOW
CREAT SERPL-MCNC: 0.86 MG/DL (ref 0.5–1.4)
CULTURE IF INDICATED INDCX: NO UA CULTURE
EOSINOPHIL # BLD AUTO: 0.23 K/UL (ref 0–0.51)
EOSINOPHIL NFR BLD: 1 % (ref 0–6.9)
ERYTHROCYTE [DISTWIDTH] IN BLOOD BY AUTOMATED COUNT: 45.7 FL (ref 35.9–50)
FERRITIN SERPL-MCNC: 42.6 NG/ML (ref 10–291)
FOLATE SERPL-MCNC: 11.8 NG/ML
GFR SERPL CREATININE-BSD FRML MDRD: >60 ML/MIN/1.73 M 2
GLOBULIN SER CALC-MCNC: 2.7 G/DL (ref 1.9–3.5)
GLUCOSE SERPL-MCNC: 98 MG/DL (ref 65–99)
GLUCOSE UR STRIP.AUTO-MCNC: NEGATIVE MG/DL
HCT VFR BLD AUTO: 35.2 % (ref 37–47)
HGB BLD-MCNC: 11.4 G/DL (ref 12–16)
IRON SATN MFR SERPL: 15 % (ref 15–55)
IRON SERPL-MCNC: 68 UG/DL (ref 40–170)
KETONES UR STRIP.AUTO-MCNC: NEGATIVE MG/DL
LACTATE BLD-SCNC: 1.12 MMOL/L (ref 0.5–2)
LEUKOCYTE ESTERASE UR QL STRIP.AUTO: NEGATIVE
LIPASE SERPL-CCNC: 21 U/L (ref 7–58)
LYMPHOCYTES # BLD AUTO: 19.09 K/UL (ref 1–4.8)
LYMPHOCYTES NFR BLD: 83 % (ref 22–41)
MAGNESIUM SERPL-MCNC: 1.9 MG/DL (ref 1.5–2.5)
MANUAL DIFF BLD: NORMAL
MCH RBC QN AUTO: 29.2 PG (ref 27–33)
MCHC RBC AUTO-ENTMCNC: 32.4 G/DL (ref 33.6–35)
MCV RBC AUTO: 90 FL (ref 81.4–97.8)
MICRO URNS: NORMAL
MONOCYTES # BLD AUTO: 0.23 K/UL (ref 0–0.85)
MONOCYTES NFR BLD AUTO: 1 % (ref 0–13.4)
NEUTROPHILS # BLD AUTO: 3.45 K/UL (ref 2–7.15)
NEUTROPHILS NFR BLD: 15 % (ref 44–72)
NITRITE UR QL STRIP.AUTO: NEGATIVE
NRBC # BLD AUTO: 0 K/UL
NRBC BLD AUTO-RTO: 0 /100 WBC
PH UR STRIP.AUTO: 5 [PH]
PLATELET # BLD AUTO: 345 K/UL (ref 164–446)
PMV BLD AUTO: 9.7 FL (ref 9–12.9)
POTASSIUM SERPL-SCNC: 4.1 MMOL/L (ref 3.6–5.5)
PROT SERPL-MCNC: 7 G/DL (ref 6–8.2)
PROT UR QL STRIP: NEGATIVE MG/DL
RBC # BLD AUTO: 3.91 M/UL (ref 4.2–5.4)
RBC UR QL AUTO: NEGATIVE
SODIUM SERPL-SCNC: 135 MMOL/L (ref 135–145)
SP GR UR STRIP.AUTO: <=1.005
SPECIMEN DRAWN FROM PATIENT: NORMAL
TIBC SERPL-MCNC: 448 UG/DL (ref 250–450)
VIT B12 SERPL-MCNC: 696 PG/ML (ref 211–911)
WBC # BLD AUTO: 23 K/UL (ref 4.8–10.8)

## 2017-08-16 PROCEDURE — 96374 THER/PROPH/DIAG INJ IV PUSH: CPT

## 2017-08-16 PROCEDURE — 83550 IRON BINDING TEST: CPT

## 2017-08-16 PROCEDURE — 700111 HCHG RX REV CODE 636 W/ 250 OVERRIDE (IP): Performed by: EMERGENCY MEDICINE

## 2017-08-16 PROCEDURE — 700117 HCHG RX CONTRAST REV CODE 255: Performed by: EMERGENCY MEDICINE

## 2017-08-16 PROCEDURE — 700111 HCHG RX REV CODE 636 W/ 250 OVERRIDE (IP): Performed by: HOSPITALIST

## 2017-08-16 PROCEDURE — 770006 HCHG ROOM/CARE - MED/SURG/GYN SEMI*

## 2017-08-16 PROCEDURE — 700105 HCHG RX REV CODE 258: Performed by: HOSPITALIST

## 2017-08-16 PROCEDURE — 83690 ASSAY OF LIPASE: CPT

## 2017-08-16 PROCEDURE — 82607 VITAMIN B-12: CPT

## 2017-08-16 PROCEDURE — A9270 NON-COVERED ITEM OR SERVICE: HCPCS | Performed by: NURSE PRACTITIONER

## 2017-08-16 PROCEDURE — 700105 HCHG RX REV CODE 258: Performed by: EMERGENCY MEDICINE

## 2017-08-16 PROCEDURE — 83605 ASSAY OF LACTIC ACID: CPT

## 2017-08-16 PROCEDURE — 96376 TX/PRO/DX INJ SAME DRUG ADON: CPT

## 2017-08-16 PROCEDURE — 80053 COMPREHEN METABOLIC PANEL: CPT

## 2017-08-16 PROCEDURE — 700102 HCHG RX REV CODE 250 W/ 637 OVERRIDE(OP): Performed by: HOSPITALIST

## 2017-08-16 PROCEDURE — 83735 ASSAY OF MAGNESIUM: CPT

## 2017-08-16 PROCEDURE — 74177 CT ABD & PELVIS W/CONTRAST: CPT

## 2017-08-16 PROCEDURE — 85007 BL SMEAR W/DIFF WBC COUNT: CPT

## 2017-08-16 PROCEDURE — 87040 BLOOD CULTURE FOR BACTERIA: CPT

## 2017-08-16 PROCEDURE — 83540 ASSAY OF IRON: CPT

## 2017-08-16 PROCEDURE — 85027 COMPLETE CBC AUTOMATED: CPT

## 2017-08-16 PROCEDURE — 96375 TX/PRO/DX INJ NEW DRUG ADDON: CPT

## 2017-08-16 PROCEDURE — 81003 URINALYSIS AUTO W/O SCOPE: CPT

## 2017-08-16 PROCEDURE — 99285 EMERGENCY DEPT VISIT HI MDM: CPT

## 2017-08-16 PROCEDURE — 700111 HCHG RX REV CODE 636 W/ 250 OVERRIDE (IP)

## 2017-08-16 PROCEDURE — 700111 HCHG RX REV CODE 636 W/ 250 OVERRIDE (IP): Performed by: NURSE PRACTITIONER

## 2017-08-16 PROCEDURE — 82728 ASSAY OF FERRITIN: CPT

## 2017-08-16 PROCEDURE — 99223 1ST HOSP IP/OBS HIGH 75: CPT | Performed by: HOSPITALIST

## 2017-08-16 PROCEDURE — 82746 ASSAY OF FOLIC ACID SERUM: CPT

## 2017-08-16 PROCEDURE — 700102 HCHG RX REV CODE 250 W/ 637 OVERRIDE(OP): Performed by: NURSE PRACTITIONER

## 2017-08-16 PROCEDURE — A9270 NON-COVERED ITEM OR SERVICE: HCPCS | Performed by: HOSPITALIST

## 2017-08-16 RX ORDER — MORPHINE SULFATE 4 MG/ML
INJECTION, SOLUTION INTRAMUSCULAR; INTRAVENOUS
Status: COMPLETED
Start: 2017-08-16 | End: 2017-08-16

## 2017-08-16 RX ORDER — ACETAMINOPHEN 325 MG/1
650 TABLET ORAL EVERY 6 HOURS PRN
Status: DISCONTINUED | OUTPATIENT
Start: 2017-08-16 | End: 2017-08-18 | Stop reason: HOSPADM

## 2017-08-16 RX ORDER — ZOLPIDEM TARTRATE 5 MG/1
5 TABLET ORAL
Status: DISCONTINUED | OUTPATIENT
Start: 2017-08-16 | End: 2017-08-18 | Stop reason: HOSPADM

## 2017-08-16 RX ORDER — METFORMIN HYDROCHLORIDE 500 MG/1
500 TABLET, EXTENDED RELEASE ORAL
Status: DISCONTINUED | OUTPATIENT
Start: 2017-08-17 | End: 2017-08-17

## 2017-08-16 RX ORDER — MORPHINE SULFATE 4 MG/ML
4 INJECTION, SOLUTION INTRAMUSCULAR; INTRAVENOUS ONCE
Status: COMPLETED | OUTPATIENT
Start: 2017-08-16 | End: 2017-08-16

## 2017-08-16 RX ORDER — ONDANSETRON 2 MG/ML
4 INJECTION INTRAMUSCULAR; INTRAVENOUS ONCE
Status: COMPLETED | OUTPATIENT
Start: 2017-08-16 | End: 2017-08-16

## 2017-08-16 RX ORDER — SODIUM CHLORIDE 9 MG/ML
1000 INJECTION, SOLUTION INTRAVENOUS ONCE
Status: COMPLETED | OUTPATIENT
Start: 2017-08-16 | End: 2017-08-16

## 2017-08-16 RX ORDER — LORAZEPAM 1 MG/1
1 TABLET ORAL
Status: DISCONTINUED | OUTPATIENT
Start: 2017-08-16 | End: 2017-08-17

## 2017-08-16 RX ORDER — BISACODYL 10 MG
10 SUPPOSITORY, RECTAL RECTAL
Status: DISCONTINUED | OUTPATIENT
Start: 2017-08-16 | End: 2017-08-18 | Stop reason: HOSPADM

## 2017-08-16 RX ORDER — CITALOPRAM 20 MG/1
10 TABLET ORAL DAILY
Status: DISCONTINUED | OUTPATIENT
Start: 2017-08-17 | End: 2017-08-18 | Stop reason: HOSPADM

## 2017-08-16 RX ORDER — LABETALOL HYDROCHLORIDE 5 MG/ML
10 INJECTION, SOLUTION INTRAVENOUS EVERY 4 HOURS PRN
Status: DISCONTINUED | OUTPATIENT
Start: 2017-08-16 | End: 2017-08-18 | Stop reason: HOSPADM

## 2017-08-16 RX ORDER — ONDANSETRON 2 MG/ML
4 INJECTION INTRAMUSCULAR; INTRAVENOUS EVERY 4 HOURS PRN
Status: DISCONTINUED | OUTPATIENT
Start: 2017-08-16 | End: 2017-08-18 | Stop reason: HOSPADM

## 2017-08-16 RX ORDER — METFORMIN HYDROCHLORIDE 500 MG/1
500 TABLET, EXTENDED RELEASE ORAL
Status: DISCONTINUED | OUTPATIENT
Start: 2017-08-16 | End: 2017-08-16

## 2017-08-16 RX ORDER — LORAZEPAM 2 MG/1
2 TABLET ORAL
COMMUNITY
End: 2017-09-15 | Stop reason: SDUPTHER

## 2017-08-16 RX ORDER — AMOXICILLIN 250 MG
2 CAPSULE ORAL 2 TIMES DAILY
Status: DISCONTINUED | OUTPATIENT
Start: 2017-08-16 | End: 2017-08-18 | Stop reason: HOSPADM

## 2017-08-16 RX ORDER — METRONIDAZOLE 500 MG/1
500 TABLET ORAL EVERY 8 HOURS
Status: DISCONTINUED | OUTPATIENT
Start: 2017-08-16 | End: 2017-08-18 | Stop reason: HOSPADM

## 2017-08-16 RX ORDER — DIPHENOXYLATE HYDROCHLORIDE AND ATROPINE SULFATE 2.5; .025 MG/1; MG/1
1 TABLET ORAL 4 TIMES DAILY PRN
COMMUNITY
End: 2017-11-27

## 2017-08-16 RX ORDER — ONDANSETRON 4 MG/1
4 TABLET, ORALLY DISINTEGRATING ORAL EVERY 4 HOURS PRN
Status: DISCONTINUED | OUTPATIENT
Start: 2017-08-16 | End: 2017-08-18 | Stop reason: HOSPADM

## 2017-08-16 RX ORDER — MORPHINE SULFATE 4 MG/ML
1 INJECTION, SOLUTION INTRAMUSCULAR; INTRAVENOUS EVERY 4 HOURS PRN
Status: DISCONTINUED | OUTPATIENT
Start: 2017-08-16 | End: 2017-08-18 | Stop reason: HOSPADM

## 2017-08-16 RX ORDER — CIPROFLOXACIN 2 MG/ML
400 INJECTION, SOLUTION INTRAVENOUS EVERY 12 HOURS
Status: DISCONTINUED | OUTPATIENT
Start: 2017-08-16 | End: 2017-08-18 | Stop reason: HOSPADM

## 2017-08-16 RX ORDER — POLYETHYLENE GLYCOL 3350 17 G/17G
1 POWDER, FOR SOLUTION ORAL
Status: DISCONTINUED | OUTPATIENT
Start: 2017-08-16 | End: 2017-08-18 | Stop reason: HOSPADM

## 2017-08-16 RX ORDER — DEXTROSE MONOHYDRATE 25 G/50ML
25 INJECTION, SOLUTION INTRAVENOUS
Status: DISCONTINUED | OUTPATIENT
Start: 2017-08-16 | End: 2017-08-18 | Stop reason: HOSPADM

## 2017-08-16 RX ORDER — SODIUM CHLORIDE 9 MG/ML
INJECTION, SOLUTION INTRAVENOUS CONTINUOUS
Status: DISCONTINUED | OUTPATIENT
Start: 2017-08-16 | End: 2017-08-18 | Stop reason: HOSPADM

## 2017-08-16 RX ORDER — MORPHINE SULFATE 4 MG/ML
2 INJECTION, SOLUTION INTRAMUSCULAR; INTRAVENOUS ONCE
Status: COMPLETED | OUTPATIENT
Start: 2017-08-16 | End: 2017-08-16

## 2017-08-16 RX ORDER — LEVOTHYROXINE SODIUM 112 UG/1
112 TABLET ORAL
Status: DISCONTINUED | OUTPATIENT
Start: 2017-08-17 | End: 2017-08-18 | Stop reason: HOSPADM

## 2017-08-16 RX ADMIN — MORPHINE SULFATE 2 MG: 4 INJECTION INTRAVENOUS at 15:58

## 2017-08-16 RX ADMIN — IOHEXOL 100 ML: 350 INJECTION, SOLUTION INTRAVENOUS at 17:15

## 2017-08-16 RX ADMIN — LORAZEPAM 1 MG: 1 TABLET ORAL at 21:04

## 2017-08-16 RX ADMIN — ONDANSETRON 4 MG: 2 INJECTION INTRAMUSCULAR; INTRAVENOUS at 14:22

## 2017-08-16 RX ADMIN — SODIUM CHLORIDE: 9 INJECTION, SOLUTION INTRAVENOUS at 18:45

## 2017-08-16 RX ADMIN — MORPHINE SULFATE 4 MG: 4 INJECTION INTRAVENOUS at 14:21

## 2017-08-16 RX ADMIN — METRONIDAZOLE 500 MG: 500 TABLET ORAL at 21:04

## 2017-08-16 RX ADMIN — SODIUM CHLORIDE 1000 ML: 9 INJECTION, SOLUTION INTRAVENOUS at 14:21

## 2017-08-16 RX ADMIN — ONDANSETRON 4 MG: 4 TABLET, ORALLY DISINTEGRATING ORAL at 21:04

## 2017-08-16 RX ADMIN — CIPROFLOXACIN 400 MG: 2 INJECTION, SOLUTION INTRAVENOUS at 20:51

## 2017-08-16 RX ADMIN — MORPHINE SULFATE 1 MG: 4 INJECTION INTRAVENOUS at 20:45

## 2017-08-16 ASSESSMENT — LIFESTYLE VARIABLES
TOTAL SCORE: 0
ON A TYPICAL DAY WHEN YOU DRINK ALCOHOL HOW MANY DRINKS DO YOU HAVE: 2
EVER HAD A DRINK FIRST THING IN THE MORNING TO STEADY YOUR NERVES TO GET RID OF A HANGOVER: NO
HAVE PEOPLE ANNOYED YOU BY CRITICIZING YOUR DRINKING: NO
HOW MANY TIMES IN THE PAST YEAR HAVE YOU HAD 5 OR MORE DRINKS IN A DAY: 0
HAVE YOU EVER FELT YOU SHOULD CUT DOWN ON YOUR DRINKING: NO
TOTAL SCORE: 0
EVER FELT BAD OR GUILTY ABOUT YOUR DRINKING: NO
TOTAL SCORE: 0
ALCOHOL_USE: YES
AVERAGE NUMBER OF DAYS PER WEEK YOU HAVE A DRINK CONTAINING ALCOHOL: 0
EVER_SMOKED: NEVER
CONSUMPTION TOTAL: NEGATIVE

## 2017-08-16 ASSESSMENT — COPD QUESTIONNAIRES
DO YOU EVER COUGH UP ANY MUCUS OR PHLEGM?: NO/ONLY WITH OCCASIONAL COLDS OR INFECTIONS
HAVE YOU SMOKED AT LEAST 100 CIGARETTES IN YOUR ENTIRE LIFE: NO/DON'T KNOW
COPD SCREENING SCORE: 2
DURING THE PAST 4 WEEKS HOW MUCH DID YOU FEEL SHORT OF BREATH: NONE/LITTLE OF THE TIME

## 2017-08-16 ASSESSMENT — PAIN SCALES - GENERAL
PAINLEVEL_OUTOF10: 6
PAINLEVEL_OUTOF10: 7
PAINLEVEL_OUTOF10: 8

## 2017-08-16 ASSESSMENT — PATIENT HEALTH QUESTIONNAIRE - PHQ9
SUM OF ALL RESPONSES TO PHQ QUESTIONS 1-9: 0
SUM OF ALL RESPONSES TO PHQ9 QUESTIONS 1 AND 2: 0
2. FEELING DOWN, DEPRESSED, IRRITABLE, OR HOPELESS: NOT AT ALL
1. LITTLE INTEREST OR PLEASURE IN DOING THINGS: NOT AT ALL

## 2017-08-16 NOTE — IP AVS SNAPSHOT
" <p align=\"LEFT\"><IMG SRC=\"//EMRWB/blob$/Images/[email or web address]\" alt=\"Image\" WIDTH=\"50%\" HEIGHT=\"200\" BORDER=\"\"></p>                   Name:Clarisse Reeves  Medical Record Number:[id number]  CSN: [id number]    YOB: 1940   Age: 77 y.o.  Sex: female  HT:1.651 m (5' 5\") WT: 92.4 kg (203 lb 11.3 oz)          Admit Date: 8/16/2017     Discharge Date:   Today's Date: 8/18/2017  Attending Doctor:  Lety Jackson M.D.                  Allergies:  Codeine; Lipitor; Lovastatin; and Zocor          Your appointments     Aug 22, 2017  8:20 AM   Established Patient with Demarco Bowers M.D.   [facility]    [address]  [place] [address] [phone]           You will be receiving a confirmation call a few days before your appointment from our automated call confirmation system.            Aug 22, 2017  1:20 PM   MA [id number] with LILIYA MARKHAM MG 1   [facility] MAMMOGRAPHY ([facility]    [address]  [place] [address]   [phone]           No deodorant, powder, perfume or lotion under the arm or breast area.            Aug 22, 2017  4:00 PM   CARE MANAGER TELEPHONE VISIT with CARE MANAGER   [address]  [place] [address] [phone]           ***IMPORTANT**** This is a phone visit only. Do not come into the office. The Care Manager will call you at the scheduled time for Care Manager Telephone Visit.            Aug 23, 2017  4:20 PM   Established Patient with Esperanza Ordoñez M.D.   [address]  [place] [address] [phone]           You will be receiving a confirmation call a few days before your appointment from our automated call confirmation system.            Sep 25, 2017  2:40 PM   Established Patient with Siva Smart M.D.   [facility] [address] [place] [address] ([facility])    [address]  [facility] [id number] [phone]           You will be receiving a confirmation call a few days before your appointment from our automated call confirmation system.            Oct 09, 2017 10:40 AM   Diabetes Care Visit with Siva Smart M.D., LAURA DIABETES RN   [facility] [address] ([facility])    [address]  [facility] [address] [phone]           You will be receiving a confirmation call a few days before your appointment from our automated call confirmation system.            Nov 16, 2017 10:00 AM   US ABDOMEN FASTING (30 MINUTES) with [facility] US 1   [facility] - ULTRASOUND - [facility] ([facility])    [facility]  [address]  [facility] [id number] [phone]           Some exams require specific prep instructions that would have been given to you at time of scheduling. If you have any additional questions about the prep instructions, please call Imaging Scheduling at [phone] and press #2.            Jan 19, 2018  2:00 PM   Hematology Est Patient with Denise Barroso M.D.   [facility] (--)    [address], [address]  [facility] [address] [phone]            Feb 13, 2018 11:40 AM   Established Patient Pul with CHERRY Boateng   [facility] Pulmonary Medicine (--)    [address]  [address]  [facility] [id number] [phone]                 Medication List      Take these Medications        Instructions    aspirin 81 MG Chew chewable tablet   Commonly known as:  ASA    Take 1 Tab by mouth every day.   Dose:  81 mg       ATIVAN 2 MG tablet   Generic drug:  lorazepam    Take 2 mg by mouth every bedtime.   Dose:  2 mg       citalopram 10 MG tablet   Commonly known as:  CELEXA    Take 1 Tab by mouth every day.   Dose:  10 mg       fenofibrate 160 MG tablet   Commonly known as:  TRIGLIDE    Take 1 Tab by mouth every day.   Dose:  160 mg       lansoprazole 30 MG Cpdr   Commonly known as:  PREVACID     Doctor's comments:  Replaces 15 mg pill   Take 1 Cap by mouth every day.   Dose:  30 mg       levothyroxine 112 MCG Tabs   Commonly known as:  SYNTHROID    TAKE ONE TABLET BY MOUTH EVERY DAY       lisinopril-hydrochlorothiazide 20-25 MG per tablet   Commonly known as:  PRINZIDE, ZESTORETIC    TAKE 1 TABLET BY MOUTH EVERY DAY       LOMOTIL 2.5-0.025 MG Tabs   Generic drug:  diphenoxylate-atropine    Take 1 Tab by mouth 4 times a day as needed for Diarrhea.   Dose:  1 Tab       metformin  MG Tb24   Commonly known as:  GLUCOPHAGE XR    Take 1 Tab by mouth every day.   Dose:  500 mg       Vitamin D-3 5000 units Tabs    Take 5,000 Units by mouth every day.   Dose:  5000 Units

## 2017-08-16 NOTE — ED PROVIDER NOTES
ED Provider Note    CHIEF COMPLAINT  Chief Complaint   Patient presents with   • Diarrhea   • N/V       HPI  Clarisse Reeves is a 77 y.o. female who presents to the ER complaining abdominal pain, nausea, vomiting and diarrhea.  Symptoms have been present since Friday, 5 days ago.  Initially she had pain primarily upper abdomen.  This was diffuse and poorly localized.  This followed by multiple episodes of diarrhea.  She states having diarrhea multiple times a day.  This is foul-smelling diarrhea that smells and appears like the previous time she's had C. diff.  She's also had some nausea and vomiting.  Patient as well.  She has anorexia.  Vomiting is less severe than the diarrhea has seemed to taper off.  The patient denies any fevers or chills.  She denies any dysuria.  She did recently have antibiotics when she was in the hospital for sepsis in June of this year.  She had a history of C. diff.  She denies any sore throat has cough or chest pain.  She denies any other aggravating or alleviating factors or associated complaints.    REVIEW OF SYSTEMS  See HPI for further details. All other systems are negative.    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   • Type II or unspecified type diabetes mellitus without mention of complication, not stated as uncontrolled      pre-diabetic   • Pneumonia 2014   • Cataract      right  and  left  IOL   • DM (diabetes mellitus) (CMS-HCC)    • Cancer (CMS-HCC) 2006     CLL   • Cancer (CMS-HCC) 2016     lung       FAMILY HISTORY  Family History   Problem Relation Age of Onset   • Cancer Mother    • Lung Disease Father    • Cancer Father        SOCIAL  HISTORY  Social History     Social History   • Marital Status: Single     Spouse Name: N/A   • Number of Children: N/A   • Years of Education: N/A     Social History Main Topics   • Smoking status: Former Smoker -- 2.00 packs/day for 50 years     Types: Cigarettes     Quit date: 05/06/2006   • Smokeless tobacco: Never Used      Comment: quit smoking 2002   • Alcohol Use: 0.0 oz/week     0 Standard drinks or equivalent per week      Comment: 2 drinks per week   • Drug Use: No   • Sexual Activity: Not Currently     Other Topics Concern   • None     Social History Narrative       SURGICAL HISTORY  Past Surgical History   Procedure Laterality Date   • Us-needle core bx-breast panel     • Vaginal hysterectomy total       Hysterectomy,Total Vaginal   • Recovery  12/18/2015     Procedure: CT-SCP-RUL LUNG BIOPSY-;  Surgeon: [facility];  Location: SURGERY PRE-POST PROC UNIT [facility];  Service:    • Other orthopedic surgery  1990     bakers cyst removed in right knee, multiple scopes   • Appendectomy  1955   • Cholecystectomy  1995   • Other  2001     Lower Left segment of lung removed    • Other  1984     left Thyroid removed, might remove right side in the future   • Other  1990     hemmroid removal   • Thoracoscopy Right 2/1/2016     Procedure: THORACOSCOPY Upper Lobectomy ;  Surgeon: John H Ganser, M.D.;  Location: SURGERY [facility];  Service:    • Node dissection Right 2/1/2016     Procedure: NODE DISSECTION;  Surgeon: John H Ganser, M.D.;  Location: SURGERY [facility];  Service:    • Cataract extraction with iol     • Tonsillectomy         CURRENT MEDICATIONS  Home Medications     **Home medications have not yet been reviewed for this encounter**          ALLERGIES  Allergies   Allergen Reactions   • Codeine Hives and Nausea     RXN=40 years ago   • Lipitor [Atorvastatin Calcium] Myalgia     DHQ=9705     • Lovastatin Myalgia     Muscle aches  KGU=4060   • Zocor [Simvastatin - High Dose] Myalgia      Severe myalgias  XPA=2268       PHYSICAL EXAM  VITAL SIGNS: /53 mmHg  Pulse 85  Temp(Src) 36.7 °C (98.1 °F)  Resp 16  Wt 92.4 kg (203 lb 11.3 oz)  SpO2 96%   Constitutional: Awake alert appears uncomfortable no acute cardiopulmonary distress  HENT: Normocephalic, Atraumatic, Bilateral external ears normal, Oropharynx moist, No oral exudates, Nose normal.   Eyes: PERRL, EOMI, Conjunctiva normal, No discharge.   Neck: Normal range of motion, No tenderness, Supple, No stridor.   Lymphatic: No lymphadenopathy noted.   Cardiovascular: Normal heart rate, Normal rhythm, No murmurs, No rubs, No gallops.   Thorax & Lungs: Normal breath sounds, No respiratory distress, No wheezing.   Abdomen: Bowel sounds normal, Soft, tenderness in epigastrium.  No rebound, guarding or peritonitis  Skin: Warm, Dry, No erythema, No rash.   Back: No tenderness, No CVA tenderness.   Musculoskeletal: Good range of motion in all major joints.   Neurologic: Alert & oriented x 3, No focal deficits noted.   Psychiatric: Affect normal,      RADIOLOGY/PROCEDURES  CT-ABDOMEN-PELVIS WITH   Final Result      1.  Distal colonic decompression is the most likely explanation for mild wall thickening. Colitis not excluded.      2.  Moderate distal colonic diverticulosis without CT evidence of diverticulitis      3.  Increased number of normal-sized lymph nodes may indicate elevated hematopoietic state. Recommend clinical correlation for possible CLL      4.  Hepatic steatosis      5.  Mild extrahepatic biliary ductal dilatation following cholecystectomy, 14 mm. Choledocholithiasis, ampullary stricture or mass are not excluded      6.  Medium-sized hiatal hernia            COURSE & MEDICAL DECISION MAKING  Pertinent Labs & Imaging studies reviewed. (See chart for details)  IV is established, labs are ordered.  Fluids are ordered pain medicines ordered.  A broad difficult diagnosis was considered including but not limited to gastroenteritis, C. diff  colitis, infectious colitis or ischemic colitis.    She has a normal lactic acid.  I doubt this is ischemic bowel or other complication of her hiatal hernia.    Patient received fluids and pain medicines ordered stool cultures and labs.  The patient has a significant leukocytosis for this as a result of her CLL.  I suspect much more than is an acute infectious process, although that cannot be excluded at this time.    CT showed questionable colitis.  This is thought to be secondary to relatively empty colon, but might be colitis.  The patient from fluids, morphine, and she will be admitted.  Stool cultures are pending, so I have held off on antibiotics.      The patient will be admitted to the hospital for continued workup.  Care is transferred to Dr. Tapia.      FINAL IMPRESSION  1. Generalized abdominal pain    2. Diarrhea, unspecified type    3.  Presumed C. diff    2.   3.         Electronically signed by: Uriel Moore, 8/16/2017 2:31 PM

## 2017-08-16 NOTE — IP AVS SNAPSHOT
[employer] Access Code: Activation code not generated  Current [employer] Status: Active    eCurvhart  A secure, online tool to manage your health information     [employer]’s [employer]® is a secure, online tool that connects you to your personalized health information from the privacy of your home -- day or night - making it very easy for you to manage your healthcare. Once the activation process is completed, you can even access your medical information using the [employer] josey, which is available for free in the Apple Josey store or Google Play store.     [employer] provides the following levels of access (as shown below):   My Chart Features   [facility] Primary Care Doctor [facility]  Specialists [facility]  Urgent  Care Non-[facility]  Primary Care  Doctor   Email your healthcare team securely and privately 24/7 X X X X   Manage appointments: schedule your next appointment; view details of past/upcoming appointments X      Request prescription refills. X      View recent personal medical records, including lab and immunizations X X X X   View health record, including health history, allergies, medications X X X X   Read reports about your outpatient visits, procedures, consult and ER notes X X X X   See your discharge summary, which is a recap of your hospital and/or ER visit that includes your diagnosis, lab results, and care plan. X X       How to register for [employer]:  1. Go to  [email or web address].  2. Click on the Sign Up Now box, which takes you to the New Member Sign Up page. You will need to provide the following information:  a. Enter your [employer] Access Code exactly as it appears at the top of this page. (You will not need to use this code after you’ve completed the sign-up process. If you do not sign up before the expiration date, you must request a new code.)   b. Enter your date of birth.   c. Enter your home email address.   d. Click Submit, and follow the next screen’s instructions.  3. Create a [employer] ID. This will  be your Madefire login ID and cannot be changed, so think of one that is secure and easy to remember.  4. Create a Made[employer] password. You can change your password at any time.  5. Enter your Password Reset Question and Answer. This can be used at a later time if you forget your password.   6. Enter your e-mail address. This allows you to receive e-mail notifications when new information is available in Madefire.  7. Click Sign Up. You can now view your health information.    For assistance activating your [employer] account, call [phone]

## 2017-08-16 NOTE — IP AVS SNAPSHOT
8/18/2017    Clarisse Reeves  [address]  [place] [address]    Dear Clarisse:    [facility] wants to ensure your discharge home is safe and you or your loved ones have had all of your questions answered regarding your care after you leave the hospital.    Below is a list of resources and contact information should you have any questions regarding your hospital stay, follow-up instructions, or active medical symptoms.    Questions or Concerns Regarding… Contact   Medical Questions Related to Your Discharge  (7 days a week, 8am-5pm) Contact a Nurse Care Coordinator   [phone]   Medical Questions Not Related to Your Discharge  (24 hours a day / 7 days a week)  Contact the Nurse Health Line   [phone]    Medications or Discharge Instructions Refer to your discharge packet   or contact your [facility] Primary Care Provider   [phone]   Follow-up Appointment(s) Schedule your appointment via FANCRU   or contact Scheduling [phone]   Billing Review your statement via FANCRU  or contact Billing [phone]   Medical Records Review your records via FANCRU   or contact Medical Records [phone]     You may receive a telephone call within two days of discharge. This call is to make certain you understand your discharge instructions and have the opportunity to have any questions answered. You can also easily access your medical information, test results and upcoming appointments via the FANCRU free online health management tool. You can learn more and sign up at [email or web address]/[email or web address]. For assistance setting up your FANCRU account, please call [phone].    Once again, we want to ensure your discharge home is safe and that you have a clear understanding of any next steps in your care. If you have any questions or concerns, please do not hesitate to contact us, we are here for you. Thank you for choosing [facility] for your healthcare needs.    Sincerely,    Your [facility] Healthcare Team

## 2017-08-16 NOTE — IP AVS SNAPSHOT
" Home Care Instructions                                                                                                                  Name:Clarisse Reeves  Medical Record Number:[id number]  CSN: [id number]    YOB: 1940   Age: 77 y.o.  Sex: female  HT:1.651 m (5' 5\") WT: 92.4 kg (203 lb 11.3 oz)          Admit Date: 8/16/2017     Discharge Date:   Today's Date: 8/18/2017  Attending Doctor:  Lety Jackson M.D.                  Allergies:  Codeine; Lipitor; Lovastatin; and Zocor            Discharge Instructions       Discharge Instructions    Discharged to home by car with self. Discharged via wheelchair, hospital escort: Yes.  Special equipment needed: Not Applicable    Be sure to schedule a follow-up appointment with your primary care doctor or any specialists as instructed.     Discharge Plan:   Diet Plan: Discussed  Activity Level: Discussed  Confirmed Follow up Appointment: Appointment Scheduled  Confirmed Symptoms Management: Discussed  Medication Reconciliation Updated: Yes  Influenza Vaccine Indication: Not indicated: Previously immunized this influenza season and > 8 years of age    I understand that a diet low in cholesterol, fat, and sodium is recommended for good health. Unless I have been given specific instructions below for another diet, I accept this instruction as my diet prescription.   Other diet: Keep yourself hydrated.    Special Instructions:   Diarrhea  Diarrhea is watery poop (stool). It can make you feel weak, tired, thirsty, or give you a dry mouth (signs of dehydration). Watery poop is a sign of another problem, most often an infection. It often lasts 2-3 days. It can last longer if it is a sign of something serious. Take care of yourself as told by your doctor.  HOME CARE   · Drink 1 cup (8 ounces) of fluid each time you have watery poop.  · Do not drink the following fluids:  ¨ Those that contain simple sugars (fructose, glucose, galactose, lactose, sucrose, " maltose).  ¨ Sports drinks.  ¨ Fruit juices.  ¨ Whole milk products.  ¨ Sodas.  ¨ Drinks with caffeine (coffee, tea, soda) or alcohol.  · Oral rehydration solution may be used if the doctor says it is okay. You may make your own solution. Follow this recipe:  ¨  - teaspoon table salt.  ¨ ¾ teaspoon baking soda.  ¨  teaspoon salt substitute containing potassium chloride.  ¨ 1 tablespoons sugar.  ¨ 1 liter (34 ounces) of water.  · Avoid the following foods:  ¨ High fiber foods, such as raw fruits and vegetables.  ¨ Nuts, seeds, and whole grain breads and cereals.  ¨  Those that are sweetened with sugar alcohols (xylitol, sorbitol, mannitol).  · Try eating the following foods:  ¨ Starchy foods, such as rice, toast, pasta, low-sugar cereal, oatmeal, baked potatoes, crackers, and bagels.  ¨ Bananas.  ¨ Applesauce.  · Eat probiotic-rich foods, such as yogurt and milk products that are fermented.  · Wash your hands well after each time you have watery poop.  · Only take medicine as told by your doctor.  · Take a warm bath to help lessen burning or pain from having watery poop.  GET HELP RIGHT AWAY IF:   · You cannot drink fluids without throwing up (vomiting).  · You keep throwing up.  · You have blood in your poop, or your poop looks black and tarry.  · You do not pee (urinate) in 6-8 hours, or there is only a small amount of very dark pee.  · You have belly (abdominal) pain that gets worse or stays in the same spot (localizes).  · You are weak, dizzy, confused, or light-headed.  · You have a very bad headache.  · Your watery poop gets worse or does not get better.  · You have a fever or lasting symptoms for more than 2-3 days.  · You have a fever and your symptoms suddenly get worse.  MAKE SURE YOU:   · Understand these instructions.  · Will watch your condition.  · Will get help right away if you are not doing well or get worse.     This information is not intended to replace advice given to you by your health care  provider. Make sure you discuss any questions you have with your health care provider.     Document Released: 06/05/2009 Document Revised: 01/08/2016 Document Reviewed: 08/25/2013  [employer] Interactive Patient Education ©2016 [employer]      · Is patient discharged on Warfarin / Coumadin?   No     · Is patient Post Blood Transfusion?  No    Depression / Suicide Risk    As you are discharged from this [facility] facility, it is important to learn how to keep safe from harming yourself.    Recognize the warning signs:  · Abrupt changes in personality, positive or negative- including increase in energy   · Giving away possessions  · Change in eating patterns- significant weight changes-  positive or negative  · Change in sleeping patterns- unable to sleep or sleeping all the time   · Unwillingness or inability to communicate  · Depression  · Unusual sadness, discouragement and loneliness  · Talk of wanting to die  · Neglect of personal appearance   · Rebelliousness- reckless behavior  · Withdrawal from people/activities they love  · Confusion- inability to concentrate     If you or a loved one observes any of these behaviors or has concerns about self-harm, here's what you can do:  · Talk about it- your feelings and reasons for harming yourself  · Remove any means that you might use to hurt yourself (examples: pills, rope, extension cords, firearm)  · Get professional help from the community (Mental Health, Substance Abuse, psychological counseling)  · Do not be alone:Call your Safe Contact- someone whom you trust who will be there for you.  · Call your local CRISIS HOTLINE 134-3367 or 954-238-3854  · Call your local Children's Mobile Crisis Response Team [place] (445) 664-7724 or www.[employer]  · Call the toll free National Suicide Prevention Hotlines   · National Suicide Prevention Lifeline 705-331-DWFN (5329)  · National Hope Line Network 800-SUICIDE (858-4959)        Your appointments     Aug 22,  2017  8:20 AM   Established Patient with Demarco Bowers M.D.   [facility] ([facility]    [address]  [place] [address] [phone]           You will be receiving a confirmation call a few days before your appointment from our automated call confirmation system.            Aug 22, 2017  1:20 PM   MA SCRN10 with S RASHI MG 1   [facility] MAMMOGRAPHY ([place])    [address]  [place] [address] [phone]           No deodorant, powder, perfume or lotion under the arm or breast area.            Aug 22, 2017  4:00 PM   CARE MANAGER TELEPHONE VISIT with CARE MANAGER   [facility])    [address]  [place] [address] [phone]           ***IMPORTANT**** This is a phone visit only. Do not come into the office. The Care Manager will call you at the scheduled time for Care Manager Telephone Visit.            Aug 23, 2017  4:20 PM   Established Patient with Esperanza Ordoñez M.D.   [facility])    [address]  [place] [address] [phone]           You will be receiving a confirmation call a few days before your appointment from our automated call confirmation system.            Sep 25, 2017  2:40 PM   Established Patient with Siva Smart M.D.   [address]    [address] [facility] [place] [id number] [phone]           You will be receiving a confirmation call a few days before your appointment from our automated call confirmation system.            Oct 09, 2017 10:40 AM   Diabetes Care Visit with Siva Smart M.D., NIRALI DIABETES RN   [facility] [address]    [address] [facility] [place] [address] [phone]           You will be receiving a confirmation call a few days before your appointment from our automated call confirmation system.            Nov 16, 2017 10:00 AM    US ABDOMEN FASTING (30 MINUTES) with [facility] US 1   [facility] IM[facility]ING - ULTRASOUND - [facility] ([facility])    [facility]  [address]  [place] [address] [phone]           Some exams require specific prep instructions that would have been given to you at time of scheduling. If you have any additional questions about the prep instructions, please call Imaging Scheduling at [phone] and press #2.            Jan 19, 2018  2:00 PM   Hematology Est Patient with Denise Barroso M.D.   [facility] (--)    [address]  [facility] [id number] [phone]            Feb 13, 2018 11:40 AM   Established Patient Pul with CHERRY Boateng   [facility] (--)    [address]  [place] [id number] [phone]                 Discharge Medication Instructions:    Below are the medications your physician expects you to take upon discharge:    Review all your home medications and newly ordered medications with your doctor and/or pharmacist. Follow medication instructions as directed by your doctor and/or pharmacist.    Please keep your medication list with you and share with your physician.               Medication List      CONTINUE taking these medications        Instructions    Morning Afternoon Evening Bedtime    aspirin 81 MG Chew chewable tablet   Commonly known as:  ASA        Take 1 Tab by mouth every day.   Dose:  81 mg                        ATIVAN 2 MG tablet   Last time this was given:  2 mg on 8/17/2017 10:23 PM   Generic drug:  lorazepam        Take 2 mg by mouth every bedtime.   Dose:  2 mg                        citalopram 10 MG tablet   Last time this was given:  10 mg on 8/18/2017  7:41 AM   Commonly known as:  CELEXA        Take 1 Tab by mouth every day.   Dose:  10 mg                        fenofibrate 160 MG tablet   Commonly known as:  TRIGLIDE        Take 1 Tab by mouth every day.   Dose:  160  mg                        lansoprazole 30 MG Cpdr   Commonly known as:  PREVACID        Doctor's comments:  Replaces 15 mg pill   Take 1 Cap by mouth every day.   Dose:  30 mg                        levothyroxine 112 MCG Tabs   Last time this was given:  112 mcg on 8/18/2017  6:10 AM   Commonly known as:  SYNTHROID        TAKE ONE TABLET BY MOUTH EVERY DAY                        lisinopril-hydrochlorothiazide 20-25 MG per tablet   Commonly known as:  PRINZIDE, ZESTORETIC        TAKE 1 TABLET BY MOUTH EVERY DAY                        LOMOTIL 2.5-0.025 MG Tabs   Generic drug:  diphenoxylate-atropine        Take 1 Tab by mouth 4 times a day as needed for Diarrhea.   Dose:  1 Tab                        metformin  MG Tb24   Commonly known as:  GLUCOPHAGE XR        Take 1 Tab by mouth every day.   Dose:  500 mg                        Vitamin D-3 5000 units Tabs        Take 5,000 Units by mouth every day.   Dose:  5000 Units                                Instructions           Diet / Nutrition:    Follow any diet instructions given to you by your doctor or the dietician, including how much salt (sodium) you are allowed each day.    If you are overweight, talk to your doctor about a weight reduction plan.    Activity:    Remain physically active following your doctor's instructions about exercise and activity.    Rest often.     Any time you become even a little tired or short of breath, SIT DOWN and rest.    Worsening Symptoms:    Report any of the following signs and symptoms to the doctor's office immediately:    *Pain of jaw, arm, or neck  *Chest pain not relieved by medication                               *Dizziness or loss of consciousness  *Difficulty breathing even when at rest   *More tired than usual                                       *Bleeding drainage or swelling of surgical site  *Swelling of feet, ankles, legs or stomach                 *Fever (>100ºF)  *Pink or blood tinged sputum  *Weight gain  (3lbs/day or 5lbs /week)           *Shock from internal defibrillator (if applicable)  *Palpitations or irregular heartbeats                *Cool and/or numb extremities    Stroke Awareness    Common Risk Factors for Stroke include:    Age  Atrial Fibrillation  Carotid Artery Stenosis  Diabetes Mellitus  Excessive alcohol consumption  High blood pressure  Overweight   Physical inactivity  Smoking    Warning signs and symptoms of a stroke include:    *Sudden numbness or weakness of the face, arm or leg (especially on one side of the body).  *Sudden confusion, trouble speaking or understanding.  *Sudden trouble seeing in one or both eyes.  *Sudden trouble walking, dizziness, loss of balance or coordination.Sudden severe headache with no known cause.    It is very important to get treatment quickly when a stroke occurs. If you experience any of the above warning signs, call 911 immediately.                   Disclaimer         Quit Smoking / Tobacco Use:    I understand the use of any tobacco products increases my chance of suffering from future heart disease or stroke and could cause other illnesses which may shorten my life. Quitting the use of tobacco products is the single most important thing I can do to improve my health. For further information on smoking / tobacco cessation call a Toll Free Quit Line at 1-122.616.8739 (*National Cancer [place]) or 1-458.474.6755 (American Lung Association) or you can access the web based program at www.[employer]Grand [employer].org.    [place] Tobacco Users Help Line:  (773) 260-8846       Toll Free: 1-535.140.9627  Quit Tobacco Program [facility] (717)715-3838    Crisis Hotline:    [place] Crisis Hotline:  1-706-PVOFXUH or 1-164.615.4422    [place] Crisis Hotline:    1-643.571.8344 or 863-885-2290    Discharge Survey:   Thank you for choosing [facility]. We hope we did everything we could to make your hospital stay a pleasant one. You may be receiving a phone survey  and we would appreciate your time and participation in answering the questions. Your input is very valuable to us in our efforts to improve our service to our patients and their families.        My signature on this form indicates that:    1. I have reviewed and understand the above information.  2. My questions regarding this information have been answered to my satisfaction.  3. I have formulated a plan with my discharge nurse to obtain my prescribed medications for home.                  Disclaimer         __________________________________                     __________       ________                       Patient Signature                                                 Date                    Time

## 2017-08-16 NOTE — ED NOTES
Abdominal cramping, foul smelling diarrhea and vomiting since Friday. Uncontrolled diarrhea and vomiting. States hx of C. Diff. Denies recent antibiotic use.

## 2017-08-17 LAB
ANION GAP SERPL CALC-SCNC: 6 MMOL/L (ref 0–11.9)
BUN SERPL-MCNC: 15 MG/DL (ref 8–22)
C DIFF DNA SPEC QL NAA+PROBE: NEGATIVE
C DIFF TOX GENS STL QL NAA+PROBE: NEGATIVE
CALCIUM SERPL-MCNC: 8.1 MG/DL (ref 8.4–10.2)
CHLORIDE SERPL-SCNC: 105 MMOL/L (ref 96–112)
CO2 SERPL-SCNC: 24 MMOL/L (ref 20–33)
CREAT SERPL-MCNC: 0.85 MG/DL (ref 0.5–1.4)
ERYTHROCYTE [DISTWIDTH] IN BLOOD BY AUTOMATED COUNT: 45.6 FL (ref 35.9–50)
GFR SERPL CREATININE-BSD FRML MDRD: >60 ML/MIN/1.73 M 2
GLUCOSE BLD-MCNC: 103 MG/DL (ref 65–99)
GLUCOSE BLD-MCNC: 115 MG/DL (ref 65–99)
GLUCOSE BLD-MCNC: 119 MG/DL (ref 65–99)
GLUCOSE BLD-MCNC: 98 MG/DL (ref 65–99)
GLUCOSE SERPL-MCNC: 110 MG/DL (ref 65–99)
HCT VFR BLD AUTO: 31.2 % (ref 37–47)
HGB BLD-MCNC: 10.2 G/DL (ref 12–16)
MCH RBC QN AUTO: 29.2 PG (ref 27–33)
MCHC RBC AUTO-ENTMCNC: 32.7 G/DL (ref 33.6–35)
MCV RBC AUTO: 89.4 FL (ref 81.4–97.8)
PLATELET # BLD AUTO: 335 K/UL (ref 164–446)
PMV BLD AUTO: 9.6 FL (ref 9–12.9)
POTASSIUM SERPL-SCNC: 3.9 MMOL/L (ref 3.6–5.5)
RBC # BLD AUTO: 3.49 M/UL (ref 4.2–5.4)
SODIUM SERPL-SCNC: 135 MMOL/L (ref 135–145)
WBC # BLD AUTO: 19.5 K/UL (ref 4.8–10.8)

## 2017-08-17 PROCEDURE — 82962 GLUCOSE BLOOD TEST: CPT

## 2017-08-17 PROCEDURE — A9270 NON-COVERED ITEM OR SERVICE: HCPCS | Performed by: HOSPITALIST

## 2017-08-17 PROCEDURE — A9270 NON-COVERED ITEM OR SERVICE: HCPCS | Performed by: NURSE PRACTITIONER

## 2017-08-17 PROCEDURE — 700102 HCHG RX REV CODE 250 W/ 637 OVERRIDE(OP): Performed by: NURSE PRACTITIONER

## 2017-08-17 PROCEDURE — 85027 COMPLETE CBC AUTOMATED: CPT

## 2017-08-17 PROCEDURE — 700111 HCHG RX REV CODE 636 W/ 250 OVERRIDE (IP): Performed by: NURSE PRACTITIONER

## 2017-08-17 PROCEDURE — 99232 SBSQ HOSP IP/OBS MODERATE 35: CPT | Performed by: FAMILY MEDICINE

## 2017-08-17 PROCEDURE — 700102 HCHG RX REV CODE 250 W/ 637 OVERRIDE(OP): Performed by: HOSPITALIST

## 2017-08-17 PROCEDURE — 80048 BASIC METABOLIC PNL TOTAL CA: CPT

## 2017-08-17 PROCEDURE — 700105 HCHG RX REV CODE 258: Performed by: HOSPITALIST

## 2017-08-17 PROCEDURE — 770001 HCHG ROOM/CARE - MED/SURG/GYN PRIV*

## 2017-08-17 PROCEDURE — 87493 C DIFF AMPLIFIED PROBE: CPT

## 2017-08-17 PROCEDURE — 700111 HCHG RX REV CODE 636 W/ 250 OVERRIDE (IP): Performed by: HOSPITALIST

## 2017-08-17 PROCEDURE — 700111 HCHG RX REV CODE 636 W/ 250 OVERRIDE (IP): Performed by: FAMILY MEDICINE

## 2017-08-17 RX ORDER — LORAZEPAM 1 MG/1
2 TABLET ORAL
Status: DISCONTINUED | OUTPATIENT
Start: 2017-08-18 | End: 2017-08-17

## 2017-08-17 RX ORDER — LORAZEPAM 1 MG/1
2 TABLET ORAL
Status: DISCONTINUED | OUTPATIENT
Start: 2017-08-17 | End: 2017-08-18 | Stop reason: HOSPADM

## 2017-08-17 RX ORDER — LOPERAMIDE HYDROCHLORIDE 2 MG/1
2 CAPSULE ORAL 4 TIMES DAILY PRN
Status: DISCONTINUED | OUTPATIENT
Start: 2017-08-17 | End: 2017-08-18 | Stop reason: HOSPADM

## 2017-08-17 RX ORDER — HEPARIN SODIUM 5000 [USP'U]/ML
5000 INJECTION, SOLUTION INTRAVENOUS; SUBCUTANEOUS EVERY 12 HOURS
Status: DISCONTINUED | OUTPATIENT
Start: 2017-08-17 | End: 2017-08-18 | Stop reason: HOSPADM

## 2017-08-17 RX ADMIN — ONDANSETRON 4 MG: 2 INJECTION INTRAMUSCULAR; INTRAVENOUS at 17:26

## 2017-08-17 RX ADMIN — LORAZEPAM 2 MG: 1 TABLET ORAL at 22:23

## 2017-08-17 RX ADMIN — MORPHINE SULFATE 1 MG: 4 INJECTION INTRAVENOUS at 14:18

## 2017-08-17 RX ADMIN — MORPHINE SULFATE 1 MG: 4 INJECTION INTRAVENOUS at 00:39

## 2017-08-17 RX ADMIN — METRONIDAZOLE 500 MG: 500 TABLET ORAL at 05:54

## 2017-08-17 RX ADMIN — ACETAMINOPHEN 650 MG: 325 TABLET, FILM COATED ORAL at 10:17

## 2017-08-17 RX ADMIN — LEVOTHYROXINE SODIUM 112 MCG: 112 TABLET ORAL at 05:54

## 2017-08-17 RX ADMIN — CIPROFLOXACIN 400 MG: 2 INJECTION, SOLUTION INTRAVENOUS at 21:42

## 2017-08-17 RX ADMIN — SODIUM CHLORIDE: 9 INJECTION, SOLUTION INTRAVENOUS at 21:40

## 2017-08-17 RX ADMIN — CIPROFLOXACIN 400 MG: 2 INJECTION, SOLUTION INTRAVENOUS at 10:18

## 2017-08-17 RX ADMIN — HEPARIN SODIUM 5000 UNITS: 5000 INJECTION, SOLUTION INTRAVENOUS; SUBCUTANEOUS at 21:42

## 2017-08-17 RX ADMIN — CITALOPRAM HYDROBROMIDE 10 MG: 20 TABLET ORAL at 10:15

## 2017-08-17 RX ADMIN — METRONIDAZOLE 500 MG: 500 TABLET ORAL at 21:43

## 2017-08-17 RX ADMIN — MORPHINE SULFATE 1 MG: 4 INJECTION INTRAVENOUS at 04:38

## 2017-08-17 RX ADMIN — HEPARIN SODIUM 5000 UNITS: 5000 INJECTION, SOLUTION INTRAVENOUS; SUBCUTANEOUS at 12:20

## 2017-08-17 RX ADMIN — LOPERAMIDE HYDROCHLORIDE 2 MG: 2 CAPSULE ORAL at 22:23

## 2017-08-17 RX ADMIN — METRONIDAZOLE 500 MG: 500 TABLET ORAL at 14:12

## 2017-08-17 ASSESSMENT — ENCOUNTER SYMPTOMS
CHILLS: 0
ABDOMINAL PAIN: 1
DIARRHEA: 0
NECK PAIN: 0
TINGLING: 0
HEADACHES: 0
PHOTOPHOBIA: 0
TREMORS: 0
VOMITING: 0
MYALGIAS: 0
SPUTUM PRODUCTION: 0
FEVER: 0
BLURRED VISION: 0
BACK PAIN: 0
WEIGHT LOSS: 0
HEARTBURN: 0
DIZZINESS: 0
DOUBLE VISION: 0
ORTHOPNEA: 0
HEMOPTYSIS: 0
PALPITATIONS: 0
NAUSEA: 0
COUGH: 0

## 2017-08-17 ASSESSMENT — PAIN SCALES - GENERAL
PAINLEVEL_OUTOF10: 3
PAINLEVEL_OUTOF10: 7

## 2017-08-17 NOTE — PROGRESS NOTES
Received new admission up from ER. Patient awake, alert and oriented, calls appropriately. Patient c/o abdominal pain and nausea. Explains that her only child  last year, and she is having trouble moving on from this. She lives in a senior living facility with close neighbors. She has a #22 to her right wrist, NS runs at 125 without difficulty. She prefers not to have bed alarm on, but staff explains why this is important. Isolation precautions in place due to possible C-Diff.

## 2017-08-17 NOTE — ASSESSMENT & PLAN NOTE
Continue to monitor H&H, will need iron panel as well as B12 level and folic acid level if she drops below 7 and 21 or becomes unstable as usual because of it.

## 2017-08-17 NOTE — CARE PLAN
Problem: Safety  Goal: Will remain free from injury  Intervention: Provide assistance with mobility  Patient continues to ask to have bed alarm deactivated. Reminded patient that she could fall. Patient instructed to call for assistance when needed.      Problem: Infection  Goal: Will remain free from infection  Outcome: PROGRESSING AS EXPECTED  Administered medications per physician order, including antibiotics.

## 2017-08-17 NOTE — DISCHARGE PLANNING
Care Transition Team Assessment    Met with pt and friend at bedside, pt states she will discharge home with no needs. Pt lives alone, has support in the area. Pt drives.    Information Source  Orientation : Oriented x 4  Information Given By: Patient  Informant's Name: Clarisse Reeves  Who is responsible for making decisions for patient? : Patient    Readmission Evaluation  Is this a readmission?: No    Elopement Risk  Legal Hold: No  Ambulatory or Self Mobile in Wheelchair: Yes  Disoriented: No  Psychiatric Symptoms: None  History of Wandering: No  Elopement this Admit: No  Vocalizing Wanting to Leave: No  Displays Behaviors, Body Language Wanting to Leave: No-Not at Risk for Elopement  Elopement Risk: Not at Risk for Elopement    Interdisciplinary Discharge Planning  Does Admitting Nurse Feel This Could be a Complex Discharge?: No  Primary Care Physician: Dr. Smart  Lives with - Patient's Self Care Capacity: Alone and Able to Care For Self  Patient or legal guardian wants to designate a caregiver (see row info): No  Support Systems: Friends / Neighbors  Housing / Facility: [address]  [facility]  Do You Take your Prescribed Medications Regularly: Yes  Able to Return to Previous ADL's: Yes  Mobility Issues: No  Prior Services: Skilled Home Health Services  Patient Expects to be Discharged to:: home  Assistance Needed: Unknown at this Time  Durable Medical Equipment: Not Applicable    Discharge Preparedness  What is your plan after discharge?: Home with help  What are your discharge supports?: Other (comment) (Friend)  Prior Functional Level: Ambulatory, Drives Self, Independent with Activities of Daily Living, Independent with Medication Management  Difficulity with ADLs: None  Difficulity with IADLs: None    Functional Assesment  Prior Functional Level: Ambulatory, Drives Self, Independent with Activities of Daily Living, Independent with Medication Management    Finances  Financial  Barriers to Discharge: No  Prescription Coverage: Yes (Pharmacy: [facility] [place])    Vision / Hearing Impairment  Vision Impairment : No  Hearing Impairment : No    Values / Beliefs / Concerns  Values / Beliefs Concerns : No  Special Hospitalization Concerns: no    Advance Directive  Advance Directive?: None  Advance Directive offered?: AD Booklet given    Domestic Abuse  Have you ever been the victim of abuse or violence?: No  Physical Abuse or Sexual Abuse: No  Verbal Abuse or Emotional Abuse: No  Possible Abuse Reported to:: Not Applicable    Psychological Assessment  History of Substance Abuse: None  History of Psychiatric Problems: No  Non-compliant with Treatment: No  Newly Diagnosed Illness: No    Discharge Risks or Barriers  Discharge risks or barriers?: No    Anticipated Discharge Information  Anticipated discharge disposition: Home  Discharge Address: [facility] [address] [place] [address] [place] [address]  Discharge Contact Phone Number: [phone]

## 2017-08-17 NOTE — PROGRESS NOTES
[facility] Hospitalist Progress Note    Date of Service: 2017    Chief Complaint  77 y.o. female admitted 2017 with DIAHRREA AND ABD PAIN    Interval Problem Update  NONE    Consultants/Specialty  NONE    Disposition  PENDING        Review of Systems   Constitutional: Negative for fever, chills and weight loss.   HENT: Negative for hearing loss and tinnitus.    Eyes: Negative for blurred vision, double vision and photophobia.   Respiratory: Negative for cough, hemoptysis and sputum production.    Cardiovascular: Negative for chest pain, palpitations and orthopnea.   Gastrointestinal: Positive for abdominal pain. Negative for heartburn, nausea, vomiting and diarrhea.   Musculoskeletal: Negative for myalgias, back pain and neck pain.   Skin: Negative for itching and rash.   Neurological: Negative for dizziness, tingling, tremors and headaches.      Physical Exam  Laboratory/Imaging   Hemodynamics  Temp (24hrs), Av.8 °C (98.3 °F), Min:36.7 °C (98.1 °F), Max:36.9 °C (98.5 °F)   Temperature: 36.8 °C (98.2 °F)  Pulse  Av  Min: 70  Max: 85    Blood Pressure : (!) 91/47 mmHg (RN notified), NIBP: 100/42 mmHg      Respiratory      Respiration: 18, Pulse Oximetry: 90 %             Fluids    Intake/Output Summary (Last 24 hours) at 17 1004  Last data filed at 17 0500   Gross per 24 hour   Intake   1200 ml   Output      0 ml   Net   1200 ml       Nutrition  Orders Placed This Encounter   Procedures   • Diet Order     Standing Status: Standing      Number of Occurrences: 1      Standing Expiration Date:      Order Specific Question:  Diet:     Answer:  Full Liquid [11]     Physical Exam   Constitutional: She is oriented to person, place, and time. No distress.   HENT:   Head: Normocephalic and atraumatic.   Eyes: Right eye exhibits no discharge. Left eye exhibits no discharge.   Neck: Neck supple. No JVD present.   Cardiovascular: Normal rate and regular rhythm.    Pulmonary/Chest: No stridor. No  respiratory distress. She has no wheezes. She has no rales.   Abdominal: Soft. There is tenderness.   Musculoskeletal: She exhibits no tenderness.   Neurological: She is alert and oriented to person, place, and time.   Skin: Skin is warm and dry. She is not diaphoretic.       Recent Labs      08/16/17   1509  08/17/17   0427   WBC  23.0*  19.5*   RBC  3.91*  3.49*   HEMOGLOBIN  11.4*  10.2*   HEMATOCRIT  35.2*  31.2*   MCV  90.0  89.4   MCH  29.2  29.2   MCHC  32.4*  32.7*   RDW  45.7  45.6   PLATELETCT  345  335   MPV  9.7  9.6     Recent Labs      08/16/17   1509  08/17/17   0427   SODIUM  135  135   POTASSIUM  4.1  3.9   CHLORIDE  105  105   CO2  22  24   GLUCOSE  98  110*   BUN  25*  15   CREATININE  0.86  0.85   CALCIUM  8.7  8.1*                      Assessment/Plan     * Acute gastroenteritis (present on admission)  Assessment & Plan  PROBABLE COLITIS  WILL CONTINUE WITH CIPRO  AND FLAGYL    Clostridium difficile enterocolitis (present on admission)  Assessment & Plan  HER DIAHRREA HAS RESOLVED  C.DIFF RESULT IS STILL PENDING  LESS LIKELY THIS IS C.DIFF    Essential hypertension (present on admission)  Assessment & Plan  Continue with blood pressure management, use labetalol when necessary keep systolic blood pressure under 150 and diastolic under 100.    Controlled type 2 diabetes mellitus with complication, without long-term current use of insulin (CMS-[facility]) (present on admission)  Assessment & Plan  -accus with sliding scale coverage  -diabetic diet  -diabetic education  -follow glycohemoglobin levels long term last hemoglobin A1c is 6.0 and this was in last week.  WILL DC METFORMIN DUE TO DIAHRREA    Leukocytosis (present on admission)  Assessment & Plan  Secondary to CLL continue to monitor this is a combination at this point also with the infection.    CLL (chronic lymphocytic leukemia)- wbc= 20k-30k, since 2006; no rx; oncologist to follow (present on admission)  Assessment & Plan  She is in remission  since 2006. Oncology has signed off her and she is only followed by her primary care physician she does have chronically elevated white blood cell count however.    Mixed hyperlipidemia (present on admission)  Assessment & Plan  Continue with low-fat low-cholesterol diet she is allergic to statins for right non-going to hold her fenofibrate as this may cause a worsening exacerbation of her gastroenteritis.    Vitamin D insufficiency (present on admission)  Assessment & Plan  Hold vitamin D supplementation for right now until her diarrhea resolves then resume with vitamin D supplementation.    Gastroesophageal reflux disease with esophagitis (present on admission)  Assessment & Plan  Currently hold Prevacid as this may cause a worsening C. diff infection. Resume once the C. diff infection has resolved. Proton pump inhibitors will need to be reevaluated with chronic C. diff infections.    Hypothyroidism due to acquired atrophy of thyroid (present on admission)  Assessment & Plan  -supportive measures with synthroid supplementation at 112 mcg per day, no change  -latest TSH is 0.400 and this is with in establish normal guidlines     Obesity (BMI 30-39.9) (present on admission)  Assessment & Plan  Will need to see outpatient bariatric clinic for evaluation and management.    Normocytic anemia (present on admission)  Assessment & Plan  Continue to monitor H&H, will need iron panel as well as B12 level and folic acid level if she drops below 7 and 21 or becomes unstable as usual because of it.    DNR (do not resuscitate) (present on admission)  Assessment & Plan   637 discussed with patient, she is okay with hemodialysis and tube feeds but no extraneous efforts.        Oglesby catheter: No Oglesby      DVT Prophylaxis: Heparin

## 2017-08-17 NOTE — PROGRESS NOTES
"Report received from Kelli at bedside, pt awake and asking questions.  Pt wanted to sleep until about 1000 and then asked for her breakfast tray.  Morning assessment and medications completed about 1015.  When asking how she is feels, she states like shit.  She says that her abdomen is tender and her arms and legs are sore.  Pt asking for \"real food\" as she was not happy with her full liquids.  Pt wanting to shower and currently waiting for housekeeping to bring up pj bottoms.  Spoke to Dr. Jackson about her diet and advanced her to cardiac.  Pt refusing her bed alarm even though she is aware of why the alarm is on, but states she had a terrible experience with it last time she was here in June.  Bowel sounds are hypoactive and pt report no gas in the past 3 days, just liquid stool.    "

## 2017-08-17 NOTE — ASSESSMENT & PLAN NOTE
Currently hold Prevacid as this may cause a worsening C. diff infection. Resume once the C. diff infection has resolved. Proton pump inhibitors will need to be reevaluated with chronic C. diff infections.

## 2017-08-17 NOTE — ASSESSMENT & PLAN NOTE
-supportive measures with synthroid supplementation at 112 mcg per day, no change  -latest TSH is 0.400 and this is with in establish normal guidlines

## 2017-08-17 NOTE — ASSESSMENT & PLAN NOTE
Continue with low-fat low-cholesterol diet she is allergic to statins for right non-going to hold her fenofibrate as this may cause a worsening exacerbation of her gastroenteritis.

## 2017-08-17 NOTE — ASSESSMENT & PLAN NOTE
Hold vitamin D supplementation for right now until her diarrhea resolves then resume with vitamin D supplementation.

## 2017-08-17 NOTE — H&P
Hospital Medicine History and Physical    Date of Service  8/16/2017    Chief Complaint  Chief Complaint   Patient presents with   • Diarrhea   • N/V       History of Presenting Illness  77 y.o. female who presented 8/16/2017 with acute abdominal pain. Patient was admitted to the hospital in June, for acute pneumonia, she was treated with IV antibiotics and then followed with outpatient oral antibiotics. The patient since Friday has been having severe abdominal pain with bouts of diarrhea that exceed 10 bowel movements per day. She has previous history of Clostridium difficile colitis. Patient at this point is a highly likely suspicious carrier as well as most likely again infected with Clostridium difficile and thus will be admitted to the medical floor under contact precautions with IV antibiotics and blood cultures as well as stool cultures have been requested.    Primary Care Physician  Siva Smart M.D.    Consultants  None    Code Status  DNR code status    Review of Systems  ROS complaints of severe abdominal pain 10 out of 10 intensity, complains of diarrhea and axis of 10 bowel movements per day, complaints of fevers and chills, complains of nausea vomiting, complains of headache, complains of loss of appetite. Denies chest pain shortness of breath all other review of systems were reviewed and are negative.    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   • Type II or unspecified type diabetes mellitus without mention of complication, not stated as uncontrolled      pre-diabetic   • Pneumonia 2014   • Cataract      right  and  left  IOL   • DM (diabetes mellitus) (CMS-[facility])     • Cancer (CMS-HCC) 2006     CLL   • Cancer (CMS-HCC) 2016     lung       Surgical History  Past Surgical History   Procedure Laterality Date   • Us-needle core bx-breast panel     • Vaginal hysterectomy total       Hysterectomy,Total Vaginal   • Recovery  12/18/2015     Procedure: CT-SCP-RUL LUNG BIOPSY-;  Surgeon: Recoveryonly Surgery;  [place] [facility] [place];  Service:    • Other orthopedic surgery  1990     bakers cyst removed in right knee, multiple scopes   • Appendectomy  1955   • Cholecystectomy  1995   • Other  2001     Lower Left segment of lung removed    • Other  1984     left Thyroid removed, might remove right side in the future   • Other  1990     hemmroid removal   • Thoracoscopy Right 2/1/2016     Procedure: THORACOSCOPY Upper Lobectomy ;  Surgeon: John H Ganser, M.D.;  Location: SURGERY [facility];  Service:    • Node dissection Right 2/1/2016     Procedure: NODE DISSECTION;  Surgeon: John H Ganser, M.D.;  Location: SURGERY [facility];  Service:    • Cataract extraction with iol     • Tonsillectomy         Medications  Current Facility-Administered Medications on File Prior to Encounter   Medication Dose Route Frequency Provider Last Rate Last Dose   • cyanocobalamin (VITAMIN B-12) injection 1,000 mcg  1,000 mcg Intramuscular Q30 DAYS Siva Smart M.D.   1,000 mcg at 08/10/17 1022     Current Outpatient Prescriptions on File Prior to Encounter   Medication Sig Dispense Refill   • metformin ER (GLUCOPHAGE XR) 500 MG TABLET SR 24 HR Take 1 Tab by mouth every day. 90 Tab 4   • fenofibrate (TRIGLIDE) 160 MG tablet Take 1 Tab by mouth every day. 90 Tab 4   • lisinopril-hydrochlorothiazide (PRINZIDE, ZESTORETIC) 20-25 MG per tablet TAKE 1 TABLET BY MOUTH EVERY DAY 90 Tab 4   • levothyroxine (SYNTHROID) 112 MCG Tab TAKE ONE TABLET BY MOUTH EVERY DAY 90 Tab 4   • citalopram (CELEXA) 10 MG tablet Take 1 Tab by mouth every day. 90 Tab 4   • lansoprazole (PREVACID) 30  MG CAPSULE DELAYED RELEASE Take 1 Cap by mouth every day. 90 Cap 4   • Cholecalciferol (VITAMIN D-3) 5000 UNITS Tab Take 5,000 Units by mouth every day. 90 Tab 1   • aspirin (ASA) 81 MG Chew Tab chewable tablet Take 1 Tab by mouth every day. 100 Tab 11       Family History  Family History   Problem Relation Age of Onset   • Cancer Mother    • Lung Disease Father    • Cancer Father        Social History  Social History   Substance Use Topics   • Smoking status: Former Smoker -- 2.00 packs/day for 50 years     Types: Cigarettes     Quit date: 2006   • Smokeless tobacco: Never Used      Comment: quit smoking    • Alcohol Use: 0.0 oz/week     0 Standard drinks or equivalent per week      Comment: 2 drinks per week       Allergies  Allergies   Allergen Reactions   • Codeine Hives and Nausea     RXN=40 years ago   • Lipitor [Atorvastatin Calcium] Myalgia     TRQ=5931     • Lovastatin Myalgia     Muscle aches  OCV=1414   • Zocor [Simvastatin - High Dose] Myalgia     Severe myalgias  VCG=7136        Physical Exam  Laboratory   Hemodynamics  Temp (24hrs), Av.7 °C (98.1 °F), Min:36.7 °C (98.1 °F), Max:36.7 °C (98.1 °F)   Temperature: 36.7 °C (98.1 °F)  Pulse  Av.6  Min: 70  Max: 85    Blood Pressure : 106/50 mmHg, NIBP: 100/42 mmHg      Respiratory      Respiration: 16, Pulse Oximetry: 95 %             Physical Exam  77-year-old female who is currently in pain with her abdomen.  Head is atraumatic, eyes follow in normal range of gaze, pupils are equal round reactive bilaterally, sclerae anicteric, conjunctiva is of normal hue.  Nose is midline without discharge, no bleeding from the nose, no nasal fracture appreciated.  Ears bilaterally intact, no discharge from ear canals mastoid tenderness not appreciated, eardrums are intact.  Oral cavity moist neck his membranes no clear focus of infection in the oral cavity.  Neck supple no JVD carotid bruit thyromegaly or lymphadenopathy appreciated.  Chest wall moves  equally to inspiration and expiration approximately motion no reversible chest wall tenderness.  Heart S1-S2 no murmurs or gallops detected sinus rhythm.  Lungs clear to auscultation no rales or rhonchi detected.  Abdomen slightly distended, bowel sounds are hyperactive, she does diffuse tenderness throughout the abdomen. Spleen and liver could not be palpated. No hepatomegaly, no splenomegaly.  Genital exam deferred,  Rectal exam deferred.  Upper and lower external ears positive pulses no edema good strength. Positive degenerative reflexes.  Skin normal turgor no rashes or lesions identified.   Recent Labs      08/16/17   1509   WBC  23.0*   RBC  3.91*   HEMOGLOBIN  11.4*   HEMATOCRIT  35.2*   MCV  90.0   MCH  29.2   MCHC  32.4*   RDW  45.7   PLATELETCT  345   MPV  9.7     Recent Labs      08/16/17   1509   SODIUM  135   POTASSIUM  4.1   CHLORIDE  105   CO2  22   GLUCOSE  98   BUN  25*   CREATININE  0.86   CALCIUM  8.7     Recent Labs      08/16/17   1509   ALTSGPT  28   ASTSGOT  19   ALKPHOSPHAT  54   TBILIRUBIN  0.5   LIPASE  21   GLUCOSE  98                 Lab Results   Component Value Date    TROPONINI 0.04 06/01/2017       Imaging  CT-ABDOMEN-PELVIS WITH   Final Result      1.  Distal colonic decompression is the most likely explanation for mild wall thickening. Colitis not excluded.      2.  Moderate distal colonic diverticulosis without CT evidence of diverticulitis      3.  Increased number of normal-sized lymph nodes may indicate elevated hematopoietic state. Recommend clinical correlation for possible CLL      4.  Hepatic steatosis      5.  Mild extrahepatic biliary ductal dilatation following cholecystectomy, 14 mm. Choledocholithiasis, ampullary stricture or mass are not excluded      6.  Medium-sized hiatal hernia           Assessment/Plan     I anticipate this patient will require at least two midnights for appropriate medical management, necessitating inpatient admission.    * Acute gastroenteritis  (present on admission)  Assessment & Plan  Suspect the patient has acute gastroenteritis either from low-sodium distal colitis, or from other infectious source.    Clostridium difficile enterocolitis (present on admission)  Assessment & Plan  Patient was recently treated for pneumonia with diet IV antibiotics. She finished her treatment in the middle of June. The patient has previous history of Clostridium difficile colitis thus she is a carrier. She is a high suspicion at this point for clostridium differential Infection. Patient  patient will be at this point treated with IV Cipro and oral Flagyl. We will follow at this point for C. diff infection with stool cultures. IV blood cultures have also been requested.    Essential hypertension (present on admission)  Assessment & Plan  Continue with blood pressure management, use labetalol when necessary keep systolic blood pressure under 150 and diastolic under 100.    Controlled type 2 diabetes mellitus with complication, without long-term current use of insulin (CMS-[facility]) (present on admission)  Assessment & Plan  -accus with sliding scale coverage  -diabetic diet  -diabetic education  -follow glycohemoglobin levels long term last hemoglobin A1c is 6.0 and this was in last week.  -continue with oral antihyperglycemics using metformin at R5 100 mg daily, currently she is with normal renal functions.  -monitor for hypoglycemic episodes and adjust control if he should get low    Leukocytosis (present on admission)  Assessment & Plan  Secondary to CLL continue to monitor this is a combination at this point also with the infection.    CLL (chronic lymphocytic leukemia)- wbc= 20k-30k, since 2006; no rx; oncologist to follow (present on admission)  Assessment & Plan  She is in remission since 2006. Oncology has signed off her and she is only followed by her primary care physician she does have chronically elevated white blood cell count however.    Mixed hyperlipidemia  (present on admission)  Assessment & Plan  Continue with low-fat low-cholesterol diet she is allergic to statins for right non-going to hold her fenofibrate as this may cause a worsening exacerbation of her gastroenteritis.    Vitamin D insufficiency (present on admission)  Assessment & Plan  Hold vitamin D supplementation for right now until her diarrhea resolves then resume with vitamin D supplementation.    Gastroesophageal reflux disease with esophagitis (present on admission)  Assessment & Plan  Currently hold Prevacid as this may cause a worsening C. diff infection. Resume once the C. diff infection has resolved. Proton pump inhibitors will need to be reevaluated with chronic C. diff infections.    Hypothyroidism due to acquired atrophy of thyroid (present on admission)  Assessment & Plan  -supportive measures with synthroid supplementation at 112 mcg per day, no change  -latest TSH is 0.400 and this is with in establish normal guidlines     Obesity (BMI 30-39.9) (present on admission)  Assessment & Plan  Will need to see outpatient bariatric clinic for evaluation and management.    Normocytic anemia (present on admission)  Assessment & Plan  Continue to monitor H&H, will need iron panel as well as B12 level and folic acid level if she drops below 7 and 21 or becomes unstable as usual because of it.    DNR (do not resuscitate) (present on admission)  Assessment & Plan   637 discussed with patient, she is okay with hemodialysis and tube feeds but no extraneous efforts.        VTE prophylaxis: Sequentials .

## 2017-08-17 NOTE — PROGRESS NOTES
"Patient c/o pain, pain medication administered per order. Patient sees the \"[employer]\" out the window, and comments about how she would like one of those.  "

## 2017-08-17 NOTE — ASSESSMENT & PLAN NOTE
She is in remission since 2006. Oncology has signed off her and she is only followed by her primary care physician she does have chronically elevated white blood cell count however.

## 2017-08-17 NOTE — DISCHARGE PLANNING
Medical Social Work    Referral: Pt discussed at IDT rounds this AM.    Intervention: Per flowsheet, pt lives alone and expects to d.c home.  Pt possible d.c today.  No SS needs identified nor any requests for MARYURI Ratliff during rounds.      Plan: MARYURI available for any assistance with d.c planning.

## 2017-08-17 NOTE — ASSESSMENT & PLAN NOTE
-accus with sliding scale coverage  -diabetic diet  -diabetic education  -follow glycohemoglobin levels long term last hemoglobin A1c is 6.0 and this was in last week.  WILL DC METFORMIN DUE TO RENAE

## 2017-08-17 NOTE — ASSESSMENT & PLAN NOTE
Continue with blood pressure management, use labetalol when necessary keep systolic blood pressure under 150 and diastolic under 100.

## 2017-08-17 NOTE — ASSESSMENT & PLAN NOTE
637 discussed with patient, she is okay with hemodialysis and tube feeds but no extraneous efforts.

## 2017-08-17 NOTE — THERAPY
Occupational Therapy- Arrived to attempt eval as ordered.  OT eval deferred at this time per nursing recommendation.  Pt has been up self to bathroom at least 10 times today per RN, no cognitive or balance issues noted by RN at this time.  RN states pt's only limitation is that she is fatigued from the gastroenteritis and multiple trips to the bathroom.  Per RN OT eval not warranted at this time.  Will complete order.

## 2017-08-17 NOTE — PROGRESS NOTES
After advancing diet for lunch, pt has been having loose stool at least 10x times of liquid, foul smelling stool.  IV infiltrated before lunch and new one placed after she ate.  She showered this afternoon after having a large incontinent stool episode.  Pt is very fatigued and asking how do we make this stop; she reports at home she was taking Lomotil 4x/day with no slowing of stool.  Called microbiology to get a time estimated of results on c-diff and she reported it would not be until after 2100 as they had to receive the sample from [facility].  VSS and afebrile.  Medicated once for cramping abdominal pain.

## 2017-08-17 NOTE — DIETARY
"Nutrition Services:  Patient with Nutrition Admit Triggers for poor oral intake and weight loss prior to admit.  77 year old female admitted for gastroenteritis and C.diff.  PMH:  Lung cancer (2016), DM  Pertinent Labs:  Glucose 98 (8/16), 110 (8/17)  Pertinent Medications:  SSI (dose not required)  BMI:  33.9  Weight:  92.4 kg (203 lbs)  Ht:  5'5\"  IBW:  125 lbs  Diet Rx:  Cardiac  Visited with patient.  She was previously on a Full Liquid diet.  She took in cream of wheat and orange juice for breakfast this am.  Patient did not like Full Liquid diet.  Diet was changed to Cardiac.  Per patient, she has lost ~12 lbs since June 1st.  She did also state that she has been trying to lose some weight.  Plan:  Monitor PO intake.      "

## 2017-08-17 NOTE — PROGRESS NOTES
Patient calls for assistance getting up to the bathroom. Patient urinates, has no stool. Assistance provided with line management. Patient now resting quietly with respirations even and unlabored.

## 2017-08-18 VITALS
WEIGHT: 203.71 LBS | HEIGHT: 65 IN | TEMPERATURE: 97.8 F | RESPIRATION RATE: 16 BRPM | OXYGEN SATURATION: 95 % | BODY MASS INDEX: 33.94 KG/M2 | HEART RATE: 60 BPM | SYSTOLIC BLOOD PRESSURE: 95 MMHG | DIASTOLIC BLOOD PRESSURE: 50 MMHG

## 2017-08-18 LAB
ALBUMIN SERPL BCP-MCNC: 3.4 G/DL (ref 3.2–4.9)
ALBUMIN/GLOB SERPL: 1.5 G/DL
ALP SERPL-CCNC: 55 U/L (ref 30–99)
ALT SERPL-CCNC: 49 U/L (ref 2–50)
ANION GAP SERPL CALC-SCNC: 7 MMOL/L (ref 0–11.9)
ANISOCYTOSIS BLD QL SMEAR: ABNORMAL
AST SERPL-CCNC: 34 U/L (ref 12–45)
BASOPHILS # BLD AUTO: 0 % (ref 0–1.8)
BASOPHILS # BLD: 0 K/UL (ref 0–0.12)
BILIRUB SERPL-MCNC: 0.5 MG/DL (ref 0.1–1.5)
BUN SERPL-MCNC: 7 MG/DL (ref 8–22)
CALCIUM SERPL-MCNC: 8.2 MG/DL (ref 8.4–10.2)
CHLORIDE SERPL-SCNC: 106 MMOL/L (ref 96–112)
CO2 SERPL-SCNC: 24 MMOL/L (ref 20–33)
CREAT SERPL-MCNC: 0.79 MG/DL (ref 0.5–1.4)
EOSINOPHIL # BLD AUTO: 0.14 K/UL (ref 0–0.51)
EOSINOPHIL NFR BLD: 1 % (ref 0–6.9)
ERYTHROCYTE [DISTWIDTH] IN BLOOD BY AUTOMATED COUNT: 46 FL (ref 35.9–50)
GFR SERPL CREATININE-BSD FRML MDRD: >60 ML/MIN/1.73 M 2
GLOBULIN SER CALC-MCNC: 2.3 G/DL (ref 1.9–3.5)
GLUCOSE BLD-MCNC: 111 MG/DL (ref 65–99)
GLUCOSE SERPL-MCNC: 103 MG/DL (ref 65–99)
HCT VFR BLD AUTO: 30.5 % (ref 37–47)
HGB BLD-MCNC: 9.9 G/DL (ref 12–16)
LYMPHOCYTES # BLD AUTO: 7.87 K/UL (ref 1–4.8)
LYMPHOCYTES NFR BLD: 57 % (ref 22–41)
MANUAL DIFF BLD: NORMAL
MCH RBC QN AUTO: 29.2 PG (ref 27–33)
MCHC RBC AUTO-ENTMCNC: 32.5 G/DL (ref 33.6–35)
MCV RBC AUTO: 90 FL (ref 81.4–97.8)
MONOCYTES # BLD AUTO: 0.69 K/UL (ref 0–0.85)
MONOCYTES NFR BLD AUTO: 5 % (ref 0–13.4)
NEUTROPHILS # BLD AUTO: 5.11 K/UL (ref 2–7.15)
NEUTROPHILS NFR BLD: 37 % (ref 44–72)
NRBC # BLD AUTO: 0 K/UL
NRBC BLD AUTO-RTO: 0 /100 WBC
PLATELET # BLD AUTO: 322 K/UL (ref 164–446)
PLATELET BLD QL SMEAR: NORMAL
PMV BLD AUTO: 10.2 FL (ref 9–12.9)
POTASSIUM SERPL-SCNC: 3.6 MMOL/L (ref 3.6–5.5)
PROT SERPL-MCNC: 5.7 G/DL (ref 6–8.2)
RBC # BLD AUTO: 3.39 M/UL (ref 4.2–5.4)
RBC BLD AUTO: PRESENT
SODIUM SERPL-SCNC: 137 MMOL/L (ref 135–145)
WBC # BLD AUTO: 13.8 K/UL (ref 4.8–10.8)

## 2017-08-18 PROCEDURE — 85007 BL SMEAR W/DIFF WBC COUNT: CPT

## 2017-08-18 PROCEDURE — 80053 COMPREHEN METABOLIC PANEL: CPT

## 2017-08-18 PROCEDURE — A9270 NON-COVERED ITEM OR SERVICE: HCPCS | Performed by: HOSPITALIST

## 2017-08-18 PROCEDURE — 700102 HCHG RX REV CODE 250 W/ 637 OVERRIDE(OP): Performed by: HOSPITALIST

## 2017-08-18 PROCEDURE — A9270 NON-COVERED ITEM OR SERVICE: HCPCS | Performed by: NURSE PRACTITIONER

## 2017-08-18 PROCEDURE — 85027 COMPLETE CBC AUTOMATED: CPT

## 2017-08-18 PROCEDURE — 99239 HOSP IP/OBS DSCHRG MGMT >30: CPT | Performed by: FAMILY MEDICINE

## 2017-08-18 PROCEDURE — 700102 HCHG RX REV CODE 250 W/ 637 OVERRIDE(OP): Performed by: NURSE PRACTITIONER

## 2017-08-18 PROCEDURE — 700111 HCHG RX REV CODE 636 W/ 250 OVERRIDE (IP): Performed by: HOSPITALIST

## 2017-08-18 PROCEDURE — 82962 GLUCOSE BLOOD TEST: CPT

## 2017-08-18 RX ADMIN — CIPROFLOXACIN 400 MG: 2 INJECTION, SOLUTION INTRAVENOUS at 07:42

## 2017-08-18 RX ADMIN — LEVOTHYROXINE SODIUM 112 MCG: 112 TABLET ORAL at 06:10

## 2017-08-18 RX ADMIN — ONDANSETRON 4 MG: 4 TABLET, ORALLY DISINTEGRATING ORAL at 06:10

## 2017-08-18 RX ADMIN — METRONIDAZOLE 500 MG: 500 TABLET ORAL at 06:10

## 2017-08-18 RX ADMIN — LOPERAMIDE HYDROCHLORIDE 2 MG: 2 CAPSULE ORAL at 07:41

## 2017-08-18 RX ADMIN — CITALOPRAM HYDROBROMIDE 10 MG: 20 TABLET ORAL at 07:41

## 2017-08-18 NOTE — DISCHARGE SUMMARY
CHIEF COMPLAINT ON ADMISSION  Chief Complaint   Patient presents with   • Diarrhea   • N/V       CODE STATUS  DNR    HPI & HOSPITAL COURSE  This is a 77 y.o. female here with DIAHRREA AND ABD PAIN.HER CDIFF IS NEGATIVE.HER SYMPTOMS HAVE RESOLVED AND SHE FEELS MUCH BETTER AND SHE WANTS TO GO HOME.MOST LIKLEY 2ND TI VIRAL GASTROENTERITIS.    Therefore, she is discharged in good and stable condition with close outpatient follow-up.    SPECIFIC OUTPATIENT FOLLOW-UP  PCP IN 1 WEEK    DISCHARGE PROBLEM LIST  Principal Problem:    Acute gastroenteritis POA: Yes  Active Problems:    Clostridium difficile enterocolitis POA: Yes    Essential hypertension POA: Yes    Controlled type 2 diabetes mellitus with complication, without long-term current use of insulin (CMS-[facility]) POA: Yes    Leukocytosis POA: Yes    CLL (chronic lymphocytic leukemia)- wbc= 20k-30k, since 2006; no rx; oncologist to follow POA: Yes    Mixed hyperlipidemia POA: Yes    Vitamin D insufficiency POA: Yes    Gastroesophageal reflux disease with esophagitis POA: Yes    Hypothyroidism due to acquired atrophy of thyroid POA: Yes      Overview: ICD-10 transition    Obesity (BMI 30-39.9) POA: Yes    Normocytic anemia POA: Yes    DNR (do not resuscitate) POA: Yes  Resolved Problems:    * No resolved hospital problems. *      FOLLOW UP  Future Appointments  Date Time Provider Department Center   8/22/2017 8:20 AM BETSY Dorantes   8/22/2017 1:20 PM LILIYA MARKHAM  1 [facility]   9/25/2017 2:40 PM Siva Smart M.D. [facility]   10/9/2017 10:40 AM Siva Smart M.D. [address]   11/16/2017 10:00 AM [facility] 1 DANA Olmos   1/19/2018 2:00 PM BETSY Cross None   2/13/2018 11:40 AM CHERRY Boateng PULM None     No follow-up provider specified.    MEDICATIONS ON DISCHARGE   Clarisse Reeves   Home Medication Instructions SARAH:[id number]    Printed on:08/18/17 0806   Medication Information                       aspirin (ASA) 81 MG Chew Tab chewable tablet  Take 1 Tab by mouth every day.             Cholecalciferol (VITAMIN D-3) 5000 UNITS Tab  Take 5,000 Units by mouth every day.             citalopram (CELEXA) 10 MG tablet  Take 1 Tab by mouth every day.             diphenoxylate-atropine (LOMOTIL) 2.5-0.025 MG Tab  Take 1 Tab by mouth 4 times a day as needed for Diarrhea.             fenofibrate (TRIGLIDE) 160 MG tablet  Take 1 Tab by mouth every day.             lansoprazole (PREVACID) 30 MG CAPSULE DELAYED RELEASE  Take 1 Cap by mouth every day.             levothyroxine (SYNTHROID) 112 MCG Tab  TAKE ONE TABLET BY MOUTH EVERY DAY             lisinopril-hydrochlorothiazide (PRINZIDE, ZESTORETIC) 20-25 MG per tablet  TAKE 1 TABLET BY MOUTH EVERY DAY             lorazepam (ATIVAN) 2 MG tablet  Take 2 mg by mouth every bedtime.             metformin ER (GLUCOPHAGE XR) 500 MG TABLET SR 24 HR  Take 1 Tab by mouth every day.                 DIET  Orders Placed This Encounter   Procedures   • DIET ORDER     Standing Status: Standing      Number of Occurrences: 1      Standing Expiration Date:      Order Specific Question:  Diet:     Answer:  Cardiac [6]       ACTIVITY  As tolerated.  Weight bearing as tolerated      CONSULTATIONS  NONE    PROCEDURES  NONE    LABORATORY  Lab Results   Component Value Date/Time    SODIUM 137 08/18/2017 04:30 AM    POTASSIUM 3.6 08/18/2017 04:30 AM    CHLORIDE 106 08/18/2017 04:30 AM    CO2 24 08/18/2017 04:30 AM    GLUCOSE 103* 08/18/2017 04:30 AM    BUN 7* 08/18/2017 04:30 AM    CREATININE 0.79 08/18/2017 04:30 AM        Lab Results   Component Value Date/Time    WBC 13.8* 08/18/2017 04:30 AM    HEMOGLOBIN 9.9* 08/18/2017 04:30 AM    HEMATOCRIT 30.5* 08/18/2017 04:30 AM    PLATELET COUNT 322 08/18/2017 04:30 AM        Total time of the discharge process exceeds 36 minutes

## 2017-08-18 NOTE — PROGRESS NOTES
Report received from neftali Rankin awake and asking for immodium before breakfast.  Dr. Jackson rounded about 0715 and said that she could go home later today.  Morning assessment and medications given about 0745.  Pt states she feels much better today and that diarrhea has stopped.  She is  in her abdomen but she is starting to pass flatus.  Headache has resolved.  In talking about discharge, she states her car is in the parking lot and that she has a friend that come over and help her today and through the weekend if she needs her.   Will wait for orders to come through.

## 2017-08-18 NOTE — CARE PLAN
Problem: Communication  Goal: The ability to communicate needs accurately and effectively will improve  Outcome: PROGRESSING AS EXPECTED  Oriented patient to the call light in order to alert staff of her needs. Educated patient about the plan of care, procedures, treatments, medications, and allowing for patient questions and answering them appropriately    Problem: Infection  Goal: Will remain free from infection  Outcome: PROGRESSING AS EXPECTED  Administering antiinfective (IV Cipro and PO Flagyl) as ordered and assessing for signs and symptoms of improvement

## 2017-08-18 NOTE — PROGRESS NOTES
Spoke with Dr. Jackson about about prescription for antibiotics and he did not want to prescribe any upon discharge. Discharging Patient home per physician order.  Discharged with herself.  Demonstrated understanding of discharge instructions, follow up appointments, home medications, prescriptions,  and nursing care instructions for diarrhea.  Ambulating without assistance, voiding without difficulty, pain well controlled, tolerating oral medications, oxygen saturation greater than 90% , tolerating diet.   Educational handouts given and discussed.  Verbalized understanding of discharge instructions and educational handouts.  All questions answered.  Belongings with patient at time of discharge.

## 2017-08-18 NOTE — PROGRESS NOTES
1900: Bedside report received from Omid LI, patient updated about POC and denies any needs or complaints at this time, safety and isolation precautions in place, CLIP    2000: Assessment performed, patient reports 3 more loose bowel movements since start of shift but denies any pain at this time, safety precautions in place, CLIP    2142: Due medications administered per MAR, FSBS: 103 this evening - no coverage needed    2223: Patient's CDIF results are negative, MD updated, prn imodium as well as scheduled Ativan administered per MAR, special contact isolation discontinued per protocol    2330: Patient resting comfortably in bed with no s/s of distress noted, respirations even and unlabored, safety precautions remain in place    0230: Patient still resting comfortably in bed with no s/s of distress noted, respirations even and unlabored    0400: Patient sleeping comfortably in bed with no signs or symptoms of acute distress noted, safety precautions in place, CLIP    0700: Bedside report given toOmid LI, patient resting comfortably in bed and denies any needs or complaints at this time, safety precautions in place, CLIP

## 2017-08-18 NOTE — DISCHARGE INSTRUCTIONS
Discharge Instructions    Discharged to home by car with self. Discharged via wheelchair, hospital escort: Yes.  Special equipment needed: Not Applicable    Be sure to schedule a follow-up appointment with your primary care doctor or any specialists as instructed.     Discharge Plan:   Diet Plan: Discussed  Activity Level: Discussed  Confirmed Follow up Appointment: Appointment Scheduled  Confirmed Symptoms Management: Discussed  Medication Reconciliation Updated: Yes  Influenza Vaccine Indication: Not indicated: Previously immunized this influenza season and > 8 years of age    I understand that a diet low in cholesterol, fat, and sodium is recommended for good health. Unless I have been given specific instructions below for another diet, I accept this instruction as my diet prescription.   Other diet: Keep yourself hydrated.    Special Instructions:   Diarrhea  Diarrhea is watery poop (stool). It can make you feel weak, tired, thirsty, or give you a dry mouth (signs of dehydration). Watery poop is a sign of another problem, most often an infection. It often lasts 2-3 days. It can last longer if it is a sign of something serious. Take care of yourself as told by your doctor.  HOME CARE   · Drink 1 cup (8 ounces) of fluid each time you have watery poop.  · Do not drink the following fluids:  ¨ Those that contain simple sugars (fructose, glucose, galactose, lactose, sucrose, maltose).  ¨ Sports drinks.  ¨ Fruit juices.  ¨ Whole milk products.  ¨ Sodas.  ¨ Drinks with caffeine (coffee, tea, soda) or alcohol.  · Oral rehydration solution may be used if the doctor says it is okay. You may make your own solution. Follow this recipe:  ¨  - teaspoon table salt.  ¨ ¾ teaspoon baking soda.  ¨  teaspoon salt substitute containing potassium chloride.  ¨ 1 tablespoons sugar.  ¨ 1 liter (34 ounces) of water.  · Avoid the following foods:  ¨ High fiber foods, such as raw fruits and vegetables.  ¨ Nuts, seeds, and whole grain  breads and cereals.  ¨  Those that are sweetened with sugar alcohols (xylitol, sorbitol, mannitol).  · Try eating the following foods:  ¨ Starchy foods, such as rice, toast, pasta, low-sugar cereal, oatmeal, baked potatoes, crackers, and bagels.  ¨ Bananas.  ¨ Applesauce.  · Eat probiotic-rich foods, such as yogurt and milk products that are fermented.  · Wash your hands well after each time you have watery poop.  · Only take medicine as told by your doctor.  · Take a warm bath to help lessen burning or pain from having watery poop.  GET HELP RIGHT AWAY IF:   · You cannot drink fluids without throwing up (vomiting).  · You keep throwing up.  · You have blood in your poop, or your poop looks black and tarry.  · You do not pee (urinate) in 6-8 hours, or there is only a small amount of very dark pee.  · You have belly (abdominal) pain that gets worse or stays in the same spot (localizes).  · You are weak, dizzy, confused, or light-headed.  · You have a very bad headache.  · Your watery poop gets worse or does not get better.  · You have a fever or lasting symptoms for more than 2-3 days.  · You have a fever and your symptoms suddenly get worse.  MAKE SURE YOU:   · Understand these instructions.  · Will watch your condition.  · Will get help right away if you are not doing well or get worse.     This information is not intended to replace advice given to you by your health care provider. Make sure you discuss any questions you have with your health care provider.     Document Released: 06/05/2009 Document Revised: 01/08/2016 Document Reviewed: 08/25/2013  [employer] Interactive Patient Education ©2016 [employer]      · Is patient discharged on Warfarin / Coumadin?   No     · Is patient Post Blood Transfusion?  No    Depression / Suicide Risk    As you are discharged from this [facility] facility, it is important to learn how to keep safe from harming yourself.    Recognize the warning signs:  · Abrupt changes in  personality, positive or negative- including increase in energy   · Giving away possessions  · Change in eating patterns- significant weight changes-  positive or negative  · Change in sleeping patterns- unable to sleep or sleeping all the time   · Unwillingness or inability to communicate  · Depression  · Unusual sadness, discouragement and loneliness  · Talk of wanting to die  · Neglect of personal appearance   · Rebelliousness- reckless behavior  · Withdrawal from people/activities they love  · Confusion- inability to concentrate     If you or a loved one observes any of these behaviors or has concerns about self-harm, here's what you can do:  · Talk about it- your feelings and reasons for harming yourself  · Remove any means that you might use to hurt yourself (examples: pills, rope, extension cords, firearm)  · Get professional help from the community (Mental Health, Substance Abuse, psychological counseling)  · Do not be alone:Call your Safe Contact- someone whom you trust who will be there for you.  · Call your local CRISIS HOTLINE 583-9795 or 948-905-0337  · Call your local Children's Mobile Crisis Response Team [place] (880) 834-3926 or www.[employer]  · Call the toll free National Suicide Prevention Hotlines   · National Suicide Prevention Lifeline 916-078-TPZB (0878)  · National Hope Line Network 800-SUICIDE (088-4828)

## 2017-08-19 ENCOUNTER — PATIENT OUTREACH (OUTPATIENT)
Dept: HEALTH INFORMATION MANAGEMENT | Facility: OTHER | Age: 77
End: 2017-08-19

## 2017-08-21 ENCOUNTER — PATIENT OUTREACH (OUTPATIENT)
Dept: HEALTH INFORMATION MANAGEMENT | Facility: OTHER | Age: 77
End: 2017-08-21

## 2017-08-21 LAB
BACTERIA BLD CULT: NORMAL
BACTERIA BLD CULT: NORMAL
SIGNIFICANT IND 70042: NORMAL
SIGNIFICANT IND 70042: NORMAL
SITE SITE: NORMAL
SITE SITE: NORMAL
SOURCE SOURCE: NORMAL
SOURCE SOURCE: NORMAL

## 2017-08-22 ENCOUNTER — HOSPITAL ENCOUNTER (OUTPATIENT)
Dept: RADIOLOGY | Facility: MEDICAL CENTER | Age: 77
End: 2017-08-22
Attending: INTERNAL MEDICINE
Payer: MEDICARE

## 2017-08-22 ENCOUNTER — OFFICE VISIT (OUTPATIENT)
Dept: ENDOCRINOLOGY | Facility: MEDICAL CENTER | Age: 77
End: 2017-08-22
Payer: MEDICARE

## 2017-08-22 ENCOUNTER — PATIENT OUTREACH (OUTPATIENT)
Dept: HEALTH INFORMATION MANAGEMENT | Facility: OTHER | Age: 77
End: 2017-08-22

## 2017-08-22 VITALS
WEIGHT: 209 LBS | BODY MASS INDEX: 33.59 KG/M2 | HEIGHT: 66 IN | DIASTOLIC BLOOD PRESSURE: 60 MMHG | OXYGEN SATURATION: 94 % | HEART RATE: 86 BPM | SYSTOLIC BLOOD PRESSURE: 108 MMHG

## 2017-08-22 DIAGNOSIS — E11.9 TYPE 2 DIABETES MELLITUS WITHOUT COMPLICATION, WITHOUT LONG-TERM CURRENT USE OF INSULIN (HCC): ICD-10-CM

## 2017-08-22 DIAGNOSIS — E04.2 MULTIPLE THYROID NODULES: ICD-10-CM

## 2017-08-22 DIAGNOSIS — Z12.31 SCREENING MAMMOGRAM, ENCOUNTER FOR: ICD-10-CM

## 2017-08-22 DIAGNOSIS — E03.9 ACQUIRED HYPOTHYROIDISM: ICD-10-CM

## 2017-08-22 PROCEDURE — 99214 OFFICE O/P EST MOD 30 MIN: CPT | Performed by: INTERNAL MEDICINE

## 2017-08-22 PROCEDURE — 77063 BREAST TOMOSYNTHESIS BI: CPT

## 2017-08-22 NOTE — MR AVS SNAPSHOT
"        Clarisse Reeves   2017 8:20 AM   Office Visit   MRN: [id number]    Department:  Endocrinology [facility]   Dept Phone:  [phone]    Description:  Female : 1940   Provider:  Demarco Bowers M.D.           Reason for Visit     Follow-Up Thyroid nodule      Allergies as of 2017     Allergen Noted Reactions    Codeine 2010   Hives, Nausea    RXN=40 years ago    Lipitor [Atorvastatin Calcium] 2013   Myalgia    UUR=0843      Lovastatin 2015   Myalgia    Muscle aches  LJP=0535    Zocor [Simvastatin - High Dose] 2013   Myalgia    Severe myalgias  LPA=8115      You were diagnosed with     Multiple thyroid nodules   [585803]       Acquired hypothyroidism   [1403970]       Type 2 diabetes mellitus without complication, without long-term current use of insulin (CMS-[facility])   [0701623]         Vital Signs     Blood Pressure Pulse Height Weight Body Mass Index Oxygen Saturation    108/60 mmHg 86 1.676 m (5' 6\") 94.802 kg (209 lb) 33.75 kg/m2 94%    Smoking Status                   Former Smoker           Basic Information     Date Of Birth Sex Race Ethnicity Preferred Language    1940 Female White Non- English      Your appointments     Aug 22, 2017  1:20 PM   MA SCRN10 with LILIYA MARKHAM MG 1   [employer] [facility] MAMMOGRAPHY ([place])    [address]  [place] [address]   [phone]           No deodorant, powder, perfume or lotion under the arm or breast area.            Aug 22, 2017  4:00 PM   CARE MANAGER TELEPHONE VISIT with CARE MANAGER   [address]  [place] [address] [phone]           ***IMPORTANT**** This is a phone visit only. Do not come into the office. The Care Manager will call you at the scheduled time for Care Manager Telephone Visit.            Aug 23, 2017  4:20 PM   Established Patient with Esperanza Ordoñez M.D.   [facility])    [address]  [place] [address] [phone]           You will be receiving a confirmation call a few days before your appointment from our automated call confirmation system.            Sep 25, 2017  2:40 PM   Established Patient with Siva Smart M.D.   [facility])    [address] [place] [address] [phone]           You will be receiving a confirmation call a few days before your appointment from our automated call confirmation system.            Oct 09, 2017 10:40 AM   Diabetes Care Visit with Siva Smart M.D., NIRALI DIABETES RN   [facility])    [address] [place] [address] [phone]           You will be receiving a confirmation call a few days before your appointment from our automated call confirmation system.            Nov 16, 2017 10:00 AM   US ABDOMEN FASTING (30 MINUTES) with [facility] US 1   [facility] IMAGING - ULTRASOUND - [facility] ([facility])    [facility]  [address]  [place] [address] [phone]           Some exams require specific prep instructions that would have been given to you at time of scheduling. If you have any additional questions about the prep instructions, please call Imaging Scheduling at [phone] and press #2.            Jan 19, 2018  2:00 PM   Hematology Est Patient with Denise Barroso M.D.   [facility] (--)    [address], [address]  [place] [address] [phone]            Feb 13, 2018 11:40 AM   Established Patient Pul with CHERRY Boateng   [facility]monary Medicine (--)    [address]  [place] [address] [phone]              Problem List              ICD-10-CM Priority Class Noted - Resolved    CLL (chronic lymphocytic leukemia)- wbc= 20k-30k, since 2006; no rx; oncologist to follow C91.10 Low  4/30/2012 - Present    Mixed hyperlipidemia E78.2 Low  11/26/2012 -  Present    Fatty infiltration of liver K76.0   2012 - Present    Vitamin D insufficiency E55.9 Low  2013 - Present    Gastroesophageal reflux disease with esophagitis K21.0 Low  2013 - Present    Multiple thyroid nodules- dr jaeger, neg FNA ; us no change  E04.2   2015 - Present    Essential hypertension I10 Medium  2015 - Present    Controlled type 2 diabetes mellitus with complication, without long-term current use of insulin (CMS-HCC) E11.8 Medium  2015 - Present    Hypothyroidism due to acquired atrophy of thyroid E03.4 Low  2015 - Present    Obesity (BMI 30-39.9) E66.9 Low  2015 - Present    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, 2016-  partial lobectomy RUL/RML- Dr Ganser- folowed by PMA C34.11   2016 - Present    Family history of heart attack- daughter 52 yo  MI  Z82.49   10/7/2016 - Present    Moderate episode of recurrent major depressive disorder (CMS-HCC) F33.1   2017 - Present    Primary insomnia F51.01   2017 - Present    Normocytic anemia D64.9 Low  2017 - Present    Mild intermittent asthma without complication- pma;  albuterol inhaler prn J45.20   2017 - Present    Acute pulmonary edema (CMS-HCC)- 2017 from overhydration iv fluids from septic shock of cap- resolved; echo pend tbd 2017 J81.0   2017 - Present    Numbness of left hand- aftter hosp for cap 2017 R20.8   2017 - Present    Chronic fatigue- following cap 2017 R53.82   2017 - Present    Acute gastroenteritis K52.9 High  2017 - Present    Clostridium difficile enterocolitis A04.7 High  2017 - Present    DNR (do not resuscitate) Z66 Low  2017 - Present    Leukocytosis D72.829 Medium  2017 - Present      Health Maintenance        Date Due Completion Dates    RETINAL SCREENING 2017    IMM INFLUENZA (1) 2017, 10/31/2014, 2013, 2012, 2011,  8/30/2010    IMM ZOSTER VACCINE 1/5/2018 (Originally 7/30/2001) ---    A1C SCREENING 2/8/2018 8/8/2017, 3/22/2017, 10/11/2016, 4/11/2016, 11/28/2015, 9/1/2015, 2/4/2015, 11/17/2014, 5/19/2014, 10/31/2013, 6/12/2013, 3/8/2013, 12/11/2012, 11/26/2012    MAMMOGRAM 5/2/2018 5/2/2016, 10/29/2014, 4/28/2014, 4/24/2014, 12/3/2012, 10/25/2011, 9/16/2010, 9/11/2009, 9/11/2009    DIABETES MONOFILAMENT / LE EXAM 6/19/2018 6/19/2017, 5/3/2016, 4/14/2016, 12/3/2015, 9/1/2015, 1/29/2015 (Done)    Override on 1/29/2015: Done    FASTING LIPID PROFILE 8/8/2018 8/8/2017, 3/22/2017, 10/11/2016, 4/11/2016, 11/28/2015, 9/5/2015, 2/4/2015, 2/3/2014, 10/31/2013, 6/12/2013, 3/8/2013, 11/26/2012, 1/26/2011, 8/25/2009    URINE ACR / MICROALBUMIN 8/8/2018 8/8/2017, 3/22/2017, 10/11/2016, 4/11/2016, 9/5/2015, 2/4/2015, 5/19/2014, 6/12/2013, 11/26/2012    SERUM CREATININE 8/18/2018 8/18/2017, 8/17/2017, 8/16/2017, 8/8/2017, 6/16/2017, 6/6/2017, 6/4/2017, 6/2/2017, 6/1/2017, 3/22/2017, 10/11/2016, 4/11/2016, 2/2/2016, 1/29/2016, 12/19/2015, 11/28/2015, 9/5/2015, 2/13/2015, 2/12/2015, 2/4/2015, 11/17/2014, 5/19/2014, 2/3/2014, 10/31/2013, 9/11/2013, 6/12/2013, 4/12/2013, 4/3/2013, 4/1/2013, 3/31/2013, 3/30/2013, 3/29/2013, 3/8/2013, 11/26/2012, 1/29/2011, 1/28/2011, 1/27/2011, 1/26/2011, 1/25/2011, 8/25/2009    COLONOSCOPY 6/7/2019 6/7/2016, 6/3/2016, 1/10/2011    BONE DENSITY 11/21/2019 11/21/2014    IMM DTaP/Tdap/Td Vaccine (2 - Td) 6/13/2026 6/13/2016, 4/14/2016, 6/19/2010            Current Immunizations     13-VALENT PCV PREVNAR 9/17/2014    Influenza TIV (IM) 10/31/2014, 8/30/2010    Influenza Vaccine Adult HD 9/19/2015    Influenza Vaccine Quad Inj (Pf) 9/11/2013, 9/4/2012, 8/2/2011    Pneumococcal polysaccharide vaccine (PPSV-23) 2/1/2017    TD Vaccine 6/19/2010  6:30 PM    Tdap Vaccine 6/13/2016, 4/14/2016  5:25 PM    Tuberculin Skin Test 6/24/2014 10:15 AM, 6/17/2014      Below and/or attached are the medications your provider expects  you to take. Review all of your home medications and newly ordered medications with your provider and/or pharmacist. Follow medication instructions as directed by your provider and/or pharmacist. Please keep your medication list with you and share with your provider. Update the information when medications are discontinued, doses are changed, or new medications (including over-the-counter products) are added; and carry medication information at all times in the event of emergency situations     Allergies:  CODEINE - Hives,Nausea     LIPITOR - Myalgia     LOVASTATIN - Myalgia     ZOCOR - Myalgia               Medications  Valid as of: August 22, 2017 - 10:14 AM    Generic Name Brand Name Tablet Size Instructions for use    Aspirin (Chew Tab) ASA 81 MG Take 1 Tab by mouth every day.        Cholecalciferol (Tab) Vitamin D-3 5000 units Take 5,000 Units by mouth every day.        Citalopram Hydrobromide (Tab) CELEXA 10 MG Take 1 Tab by mouth every day.        Diphenoxylate-Atropine (Tab) LOMOTIL 2.5-0.025 MG Take 1 Tab by mouth 4 times a day as needed for Diarrhea.        Fenofibrate (Tab) TRIGLIDE 160 MG Take 1 Tab by mouth every day.        Lansoprazole (CAPSULE DELAYED RELEASE) PREVACID 30 MG Take 1 Cap by mouth every day.        Levothyroxine Sodium (Tab) SYNTHROID 112 MCG TAKE ONE TABLET BY MOUTH EVERY DAY        Lisinopril-Hydrochlorothiazide (Tab) PRINZIDE, ZESTORETIC 20-25 MG TAKE 1 TABLET BY MOUTH EVERY DAY        LORazepam (Tab) ATIVAN 2 MG Take 2 mg by mouth every bedtime.        MetFORMIN HCl (TABLET SR 24 HR) GLUCOPHAGE  MG Take 1 Tab by mouth every day.        .                 Medicines prescribed today were sent to:     \[facility]\"" [facility] #[id number] - [place], [place] - [address]    [address] [place] [address]    Phone: [phone] Fax: [phone]    Open 24 Hours?: No      Medication refill instructions:       If your prescription bottle indicates you have medication refills left,  it is not necessary to call your provider’s office. Please contact your pharmacy and they will refill your medication.    If your prescription bottle indicates you do not have any refills left, you may request refills at any time through one of the following ways: The online [facility] system (except Urgent Care), by calling your provider’s office, or by asking your pharmacy to contact your provider’s office with a refill request. Medication refills are processed only during regular business hours and may not be available until the next business day. Your provider may request additional information or to have a follow-up visit with you prior to refilling your medication.   *Please Note: Medication refills are assigned a new Rx number when refilled electronically. Your pharmacy may indicate that no refills were authorized even though a new prescription for the same medication is available at the pharmacy. Please request the medicine by name with the pharmacy before contacting your provider for a refill.        Your To Do List     Future Labs/Procedures Complete By Expires    BASIC METABOLIC PANEL  2/16/2018 8/22/2018    FREE THYROXINE  2/16/2018 8/22/2018    HEMOGLOBIN A1C  2/16/2018 8/22/2018    TSH  2/16/2018 8/22/2018      Referral     A referral request has been sent to our patient care coordination department. Please allow 3-5 business days for us to process this request and contact you either by phone or mail. If you do not hear from us by the 5th business day, please call us at [phone].           [facility] Access Code: Activation code not generated  Current [facility] Status: Active

## 2017-08-22 NOTE — PROGRESS NOTES
Endocrinology Clinic Progress Note  PCP: Siva Smart M.D.    CC: Thyroid nodules    HPI:  Clarisse Reeves is a 76 y.o. old patient who comes in today for routine follow up.     Multiple thyroid nodules: Recent thyroid ultrasound shows enlargement of the dominant right thyroid lobe nodule. FNA of this nodule in 2015 was benign. She has history of left thyroidectomy over 20 years ago. She also has some associated right anterior neck discomfort, and difficulty swallowing.    Hypothyroidism: She is currently on levothyroxine 112 µg daily. She reports compliance medications.    Type 2 diabetes: She is currently on metformin 1000 mg twice a day. Reports compliance with medications.     ROS:  Constitutional: No unintentional weight loss  Endo: Denies excessive thirst or frequent urination    Past Medical History:  Patient Active Problem List    Diagnosis Date Noted   • Acute gastroenteritis 08/16/2017     Priority: High   • Clostridium difficile enterocolitis 08/16/2017     Priority: High   • Leukocytosis 08/16/2017     Priority: Medium   • Essential hypertension 09/01/2015     Priority: Medium   • Controlled type 2 diabetes mellitus with complication, without long-term current use of insulin (CMS-[facility]) 09/01/2015     Priority: Medium   • DNR (do not resuscitate) 08/16/2017     Priority: Low   • Normocytic anemia 06/02/2017     Priority: Low   • Hypothyroidism due to acquired atrophy of thyroid 09/01/2015     Priority: Low   • Obesity (BMI 30-39.9) 09/01/2015     Priority: Low   • Gastroesophageal reflux disease with esophagitis 11/07/2013     Priority: Low   • Vitamin D insufficiency 07/09/2013     Priority: Low   • Mixed hyperlipidemia 11/26/2012     Priority: Low   • CLL (chronic lymphocytic leukemia)- wbc= 20k-30k, since 2006; no rx; oncologist to follow 04/30/2012     Priority: Low   • Numbness of left hand- aftter hosp for cap june 2017 07/11/2017   • Chronic fatigue- following cap june 2017 07/11/2017   •  Acute pulmonary edema (CMS-HCC)- 2017 from overhydration iv fluids from septic shock of cap- resolved; echo pend tbd 2017   • Mild intermittent asthma without complication- pma;  albuterol inhaler prn 2017   • Moderate episode of recurrent major depressive disorder (CMS-HCC) 2017   • Primary insomnia 2017   • Family history of heart attack- daughter 52 yo  MI 2016 10/07/2016   • 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, 2016-  partial lobectomy RUL/RML- Dr Ganser- folowed by PMA 2016   • Multiple thyroid nodules- dr jaeger, neg FNA ; us no change 2015   • Fatty infiltration of liver 2012       Medications:    Current outpatient prescriptions:   •  lorazepam (ATIVAN) 2 MG tablet, Take 2 mg by mouth every bedtime., Disp: , Rfl:   •  metformin ER (GLUCOPHAGE XR) 500 MG TABLET SR 24 HR, Take 1 Tab by mouth every day., Disp: 90 Tab, Rfl: 4  •  fenofibrate (TRIGLIDE) 160 MG tablet, Take 1 Tab by mouth every day., Disp: 90 Tab, Rfl: 4  •  lisinopril-hydrochlorothiazide (PRINZIDE, ZESTORETIC) 20-25 MG per tablet, TAKE 1 TABLET BY MOUTH EVERY DAY, Disp: 90 Tab, Rfl: 4  •  levothyroxine (SYNTHROID) 112 MCG Tab, TAKE ONE TABLET BY MOUTH EVERY DAY, Disp: 90 Tab, Rfl: 4  •  citalopram (CELEXA) 10 MG tablet, Take 1 Tab by mouth every day., Disp: 90 Tab, Rfl: 4  •  lansoprazole (PREVACID) 30 MG CAPSULE DELAYED RELEASE, Take 1 Cap by mouth every day., Disp: 90 Cap, Rfl: 4  •  Cholecalciferol (VITAMIN D-3) 5000 UNITS Tab, Take 5,000 Units by mouth every day., Disp: 90 Tab, Rfl: 1  •  aspirin (ASA) 81 MG Chew Tab chewable tablet, Take 1 Tab by mouth every day., Disp: 100 Tab, Rfl: 11  •  diphenoxylate-atropine (LOMOTIL) 2.5-0.025 MG Tab, Take 1 Tab by mouth 4 times a day as needed for Diarrhea., Disp: , Rfl:     Current facility-administered medications:   •  cyanocobalamin (VITAMIN B-12) injection 1,000 mcg, 1,000 mcg,  Intramuscular, Q30 DAYS, Siva Smart M.D., 1,000 mcg at 08/10/17 1022    Labs:   Results for GREG DEMARCO (MRN [id number]) as of 8/22/2017 08:12   Ref. Range 3/22/2017 12:21 8/8/2017 11:35   TSH Latest Ref Range: 0.300-3.700 uIU/mL 0.840 0.400   Free T-4 Latest Ref Range: 0.53-1.43 ng/dL 1.28 1.34   Results for GREG DEMARCO (MRN [id number]) as of 8/22/2017 08:12   Ref. Range 8/8/2017 11:35 8/8/2017 11:36 8/16/2017 15:09   Sodium Latest Ref Range: 135-145 mmol/L 136  135   Potassium Latest Ref Range: 3.6-5.5 mmol/L 3.9  4.1   Chloride Latest Ref Range:  mmol/L 103  105   Co2 Latest Ref Range: 20-33 mmol/L 25  22   Anion Gap Latest Ref Range: 0.0-11.9  8.0  8.0   Glucose Latest Ref Range: 65-99 mg/dL 103 (H)  98   Bun Latest Ref Range: 8-22 mg/dL 20  25 (H)   Creatinine Latest Ref Range: 0.50-1.40 mg/dL 0.80  0.86   GFR If  Latest Ref Range: >60 mL/min/1.73 m 2 >60  >60   GFR If Non  Latest Ref Range: >60 mL/min/1.73 m 2 >60  >60   Calcium Latest Ref Range: 8.4-10.2 mg/dL 9.6  8.7   AST(SGOT) Latest Ref Range: 12-45 U/L   19   ALT(SGPT) Latest Ref Range: 2-50 U/L   28   Alkaline Phosphatase Latest Ref Range: 30-99 U/L   54   Total Bilirubin Latest Ref Range: 0.1-1.5 mg/dL   0.5   Albumin Latest Ref Range: 3.2-4.9 g/dL   4.3   Total Protein Latest Ref Range: 6.0-8.2 g/dL   7.0   Globulin Latest Ref Range: 1.9-3.5 g/dL   2.7   A-G Ratio Latest Units: g/dL   1.6   Magnesium Latest Ref Range: 1.5-2.5 mg/dL   1.9   Lipase Latest Ref Range: 7-58 U/L   21   Glycohemoglobin Latest Ref Range: 0.0-5.6 % 6.0 (H)     Estim. Avg Glu Latest Units: mg/dL 126     Cholesterol,Tot Latest Ref Range: 100-199 mg/dL 160     Triglycerides Latest Ref Range: 0-149 mg/dL 124     HDL Latest Ref Range: >=40 mg/dL 39 (A)     LDL Latest Ref Range: <100 mg/dL 96     Micro Alb Creat Ratio Latest Ref Range: 0-30 mg/g  see below    Creatinine, Urine Latest Units: mg/dL  25.00    Microalbumin, Urine  "Random Latest Units: mg/dL  <0.7      Physical Examination:  Vital signs: /60 mmHg  Pulse 86  Ht 1.676 m (5' 6\")  Wt 94.802 kg (209 lb)  BMI 33.75 kg/m2  SpO2 94%  General: No apparent distress, cooperative  Eyes: No scleral icterus, no discharge, normal eyelids  Neck: +R thyroid nodule  Chest: Normal effort, clear to auscultation bilaterally  CVS: Regular rate and rhythm, S1 S2 normal  Extremities: No lower extremity edema  Skin: No rash on visible skin  Psych: Alert and oriented, normal mood and affect    Assessment and Plan:    1. Multiple thyroid nodules  · Due to slight increase in the size of dominant right thyroid lobe nodule on ultrasound in August 2017 we discussed about either repeating FNA (FNA of this nodule in 2015 was benign) or consider right thyroid lobectomy, especially as she also has associated difficulty swallowing/neck discomfort  · She is status post left thyroid lobectomy over 20 years ago    2. Acquired hypothyroidism  · Continue levothyroxine 112 µg daily  · Repeat labs in 6 months  · Also advised to repeat labs for TSH and free T4 6 weeks after thyroid surgery    3. Type 2 diabetes mellitus without complication, without long-term current use of insulin (CMS-[facility])  · Recent hemoglobin A1c 6.0%  · Continue metformin 1000 mg twice a day    Return in about 6 months (around 2/22/2018).    Thank you for allowing me to participate in the care of this patient.    Demarco Bowers M.D.    CC:   Siva Smart M.D.    This note was created using voice recognition software (Dragon). The accuracy of the dictation is limited by the abilities of the software. I have reviewed the note prior to signing, however some errors in grammar and context are still possible. If you have any questions related to this note please do not hesitate to contact our office.     "

## 2017-08-31 ENCOUNTER — HOSPITAL ENCOUNTER (OUTPATIENT)
Dept: LAB | Facility: MEDICAL CENTER | Age: 77
End: 2017-08-31
Attending: NURSE PRACTITIONER
Payer: MEDICARE

## 2017-08-31 PROCEDURE — 36415 COLL VENOUS BLD VENIPUNCTURE: CPT

## 2017-08-31 PROCEDURE — 83516 IMMUNOASSAY NONANTIBODY: CPT | Mod: 91

## 2017-08-31 PROCEDURE — 82784 ASSAY IGA/IGD/IGG/IGM EACH: CPT

## 2017-09-01 ENCOUNTER — HOSPITAL ENCOUNTER (OUTPATIENT)
Facility: MEDICAL CENTER | Age: 77
End: 2017-09-01
Attending: NURSE PRACTITIONER
Payer: MEDICARE

## 2017-09-01 LAB
G LAMBLIA+C PARVUM AG STL QL RAPID: NORMAL
SIGNIFICANT IND 70042: NORMAL
SOURCE SOURCE: NORMAL

## 2017-09-01 PROCEDURE — 87329 GIARDIA AG IA: CPT

## 2017-09-01 PROCEDURE — 82272 OCCULT BLD FECES 1-3 TESTS: CPT

## 2017-09-01 PROCEDURE — 87493 C DIFF AMPLIFIED PROBE: CPT

## 2017-09-01 PROCEDURE — 87328 CRYPTOSPORIDIUM AG IA: CPT

## 2017-09-01 PROCEDURE — 89125 SPECIMEN FAT STAIN: CPT

## 2017-09-01 PROCEDURE — 83993 ASSAY FOR CALPROTECTIN FECAL: CPT

## 2017-09-02 LAB
BODY FLD TYPE: NORMAL
C DIFF DNA SPEC QL NAA+PROBE: NEGATIVE
C DIFF TOX GENS STL QL NAA+PROBE: NEGATIVE
FAT FLD QL: NORMAL
HEMOCCULT STL QL: NEGATIVE
IGA SERPL-MCNC: 83 MG/DL (ref 68–408)
TTG IGA SER IA-ACNC: 0 U/ML (ref 0–3)

## 2017-09-04 LAB — GLIADIN PEPTIDE+TTG IGA+IGG SER QL IA: 4 UNITS (ref 0–19)

## 2017-09-05 LAB — CALPROTECTIN STL-MCNT: 31 UG/G

## 2017-09-14 ENCOUNTER — HOSPITAL ENCOUNTER (OUTPATIENT)
Dept: LAB | Facility: MEDICAL CENTER | Age: 77
End: 2017-09-14
Attending: NURSE PRACTITIONER
Payer: MEDICARE

## 2017-09-14 LAB — H PYLORI AG STL QL IA: NOT DETECTED

## 2017-09-14 PROCEDURE — 87338 HPYLORI STOOL AG IA: CPT

## 2017-09-15 RX ORDER — LORAZEPAM 2 MG/1
2 TABLET ORAL
Qty: 30 TAB | Refills: 0 | Status: SHIPPED | OUTPATIENT
Start: 2017-09-15 | End: 2017-09-28 | Stop reason: SDUPTHER

## 2017-09-15 NOTE — TELEPHONE ENCOUNTER
Was the patient seen in the last year in this department? Yes     Does patient have an active prescription for medications requested? No     Received Request Via: Pharmacy     Pharmacy is asking for new Rx for clarification on how may tabs to take because they 2 different sigs.

## 2017-09-19 ENCOUNTER — PATIENT OUTREACH (OUTPATIENT)
Dept: HEALTH INFORMATION MANAGEMENT | Facility: OTHER | Age: 77
End: 2017-09-19

## 2017-09-19 NOTE — PROGRESS NOTES
Clarisse Reeves discharged 8/18. IHD confirmed patient kept 1 appointment with PCP. The patient kept 3 follow up appointments with GI and plans to keep 2 appointments with  and 10/6, 1 appointment with Oncology on 1/19/18 and 1 appointment with Pulmonology on 2/13/18.

## 2017-09-25 ENCOUNTER — HOSPITAL ENCOUNTER (OUTPATIENT)
Dept: LAB | Facility: MEDICAL CENTER | Age: 77
End: 2017-09-25
Attending: INTERNAL MEDICINE
Payer: MEDICARE

## 2017-09-25 DIAGNOSIS — E11.8 CONTROLLED TYPE 2 DIABETES MELLITUS WITH COMPLICATION, WITHOUT LONG-TERM CURRENT USE OF INSULIN (HCC): ICD-10-CM

## 2017-09-25 DIAGNOSIS — E55.9 VITAMIN D DEFICIENCY: ICD-10-CM

## 2017-09-25 DIAGNOSIS — E03.4 HYPOTHYROIDISM DUE TO ACQUIRED ATROPHY OF THYROID: ICD-10-CM

## 2017-09-25 DIAGNOSIS — J81.0 ACUTE PULMONARY EDEMA (HCC): ICD-10-CM

## 2017-09-25 LAB — GFR SERPL CREATININE-BSD FRML MDRD: >60 ML/MIN/1.73 M 2

## 2017-09-25 PROCEDURE — 80053 COMPREHEN METABOLIC PANEL: CPT

## 2017-09-25 PROCEDURE — 84443 ASSAY THYROID STIM HORMONE: CPT

## 2017-09-25 PROCEDURE — 36415 COLL VENOUS BLD VENIPUNCTURE: CPT

## 2017-09-25 PROCEDURE — 80061 LIPID PANEL: CPT

## 2017-09-25 PROCEDURE — 83036 HEMOGLOBIN GLYCOSYLATED A1C: CPT

## 2017-09-25 PROCEDURE — 82306 VITAMIN D 25 HYDROXY: CPT

## 2017-09-25 PROCEDURE — 82043 UR ALBUMIN QUANTITATIVE: CPT

## 2017-09-25 PROCEDURE — 85027 COMPLETE CBC AUTOMATED: CPT

## 2017-09-25 PROCEDURE — 82570 ASSAY OF URINE CREATININE: CPT

## 2017-09-25 PROCEDURE — 85007 BL SMEAR W/DIFF WBC COUNT: CPT

## 2017-09-26 LAB
25(OH)D3 SERPL-MCNC: 24 NG/ML (ref 30–100)
ALBUMIN SERPL BCP-MCNC: 4 G/DL (ref 3.2–4.9)
ALBUMIN/GLOB SERPL: 1.5 G/DL
ALP SERPL-CCNC: 70 U/L (ref 30–99)
ALT SERPL-CCNC: 17 U/L (ref 2–50)
ANION GAP SERPL CALC-SCNC: 10 MMOL/L (ref 0–11.9)
ANISOCYTOSIS BLD QL SMEAR: ABNORMAL
AST SERPL-CCNC: 14 U/L (ref 12–45)
BASOPHILS # BLD AUTO: 3 % (ref 0–1.8)
BASOPHILS # BLD: 0.81 K/UL (ref 0–0.12)
BILIRUB SERPL-MCNC: 0.3 MG/DL (ref 0.1–1.5)
BUN SERPL-MCNC: 13 MG/DL (ref 8–22)
CALCIUM SERPL-MCNC: 9.6 MG/DL (ref 8.5–10.5)
CHLORIDE SERPL-SCNC: 101 MMOL/L (ref 96–112)
CHOLEST SERPL-MCNC: 145 MG/DL (ref 100–199)
CO2 SERPL-SCNC: 23 MMOL/L (ref 20–33)
CREAT SERPL-MCNC: 0.89 MG/DL (ref 0.5–1.4)
CREAT UR-MCNC: 221.3 MG/DL
EOSINOPHIL # BLD AUTO: 0.27 K/UL (ref 0–0.51)
EOSINOPHIL NFR BLD: 1 % (ref 0–6.9)
ERYTHROCYTE [DISTWIDTH] IN BLOOD BY AUTOMATED COUNT: 47.8 FL (ref 35.9–50)
EST. AVERAGE GLUCOSE BLD GHB EST-MCNC: 131 MG/DL
GLOBULIN SER CALC-MCNC: 2.7 G/DL (ref 1.9–3.5)
GLUCOSE SERPL-MCNC: 104 MG/DL (ref 65–99)
HBA1C MFR BLD: 6.2 % (ref 0–5.6)
HCT VFR BLD AUTO: 36.3 % (ref 37–47)
HDLC SERPL-MCNC: 43 MG/DL
HGB BLD-MCNC: 11.6 G/DL (ref 12–16)
LDLC SERPL CALC-MCNC: 83 MG/DL
LYMPHOCYTES # BLD AUTO: 18.16 K/UL (ref 1–4.8)
LYMPHOCYTES NFR BLD: 67 % (ref 22–41)
MANUAL DIFF BLD: NORMAL
MCH RBC QN AUTO: 29.1 PG (ref 27–33)
MCHC RBC AUTO-ENTMCNC: 32 G/DL (ref 33.6–35)
MCV RBC AUTO: 91 FL (ref 81.4–97.8)
MICROALBUMIN UR-MCNC: 1.1 MG/DL
MICROALBUMIN/CREAT UR: 5 MG/G (ref 0–30)
MONOCYTES # BLD AUTO: 1.08 K/UL (ref 0–0.85)
MONOCYTES NFR BLD AUTO: 4 % (ref 0–13.4)
MORPHOLOGY BLD-IMP: NORMAL
NEUTROPHILS # BLD AUTO: 6.78 K/UL (ref 2–7.15)
NEUTROPHILS NFR BLD: 25 % (ref 44–72)
NRBC # BLD AUTO: 0 K/UL
NRBC BLD AUTO-RTO: 0 /100 WBC
PLATELET # BLD AUTO: 415 K/UL (ref 164–446)
PLATELET BLD QL SMEAR: NORMAL
PMV BLD AUTO: 10.6 FL (ref 9–12.9)
POTASSIUM SERPL-SCNC: 4.5 MMOL/L (ref 3.6–5.5)
PROT SERPL-MCNC: 6.7 G/DL (ref 6–8.2)
RBC # BLD AUTO: 3.99 M/UL (ref 4.2–5.4)
RBC BLD AUTO: PRESENT
SMUDGE CELLS BLD QL SMEAR: NORMAL
SODIUM SERPL-SCNC: 134 MMOL/L (ref 135–145)
TRIGL SERPL-MCNC: 96 MG/DL (ref 0–149)
TSH SERPL DL<=0.005 MIU/L-ACNC: 0.56 UIU/ML (ref 0.3–3.7)
WBC # BLD AUTO: 27.1 K/UL (ref 4.8–10.8)

## 2017-09-28 ENCOUNTER — OFFICE VISIT (OUTPATIENT)
Dept: MEDICAL GROUP | Age: 77
End: 2017-09-28
Payer: MEDICARE

## 2017-09-28 VITALS
BODY MASS INDEX: 31.82 KG/M2 | OXYGEN SATURATION: 97 % | DIASTOLIC BLOOD PRESSURE: 74 MMHG | HEIGHT: 66 IN | SYSTOLIC BLOOD PRESSURE: 132 MMHG | TEMPERATURE: 97.5 F | WEIGHT: 198 LBS | HEART RATE: 86 BPM

## 2017-09-28 DIAGNOSIS — J45.20 MILD INTERMITTENT ASTHMA WITHOUT COMPLICATION: ICD-10-CM

## 2017-09-28 DIAGNOSIS — F51.01 PRIMARY INSOMNIA: ICD-10-CM

## 2017-09-28 DIAGNOSIS — I10 ESSENTIAL HYPERTENSION: ICD-10-CM

## 2017-09-28 DIAGNOSIS — E11.8 CONTROLLED TYPE 2 DIABETES MELLITUS WITH COMPLICATION, WITHOUT LONG-TERM CURRENT USE OF INSULIN (HCC): ICD-10-CM

## 2017-09-28 DIAGNOSIS — E66.9 OBESITY (BMI 30-39.9): ICD-10-CM

## 2017-09-28 DIAGNOSIS — F33.1 MODERATE EPISODE OF RECURRENT MAJOR DEPRESSIVE DISORDER (HCC): ICD-10-CM

## 2017-09-28 DIAGNOSIS — C91.10 CLL (CHRONIC LYMPHOCYTIC LEUKEMIA) (HCC): ICD-10-CM

## 2017-09-28 DIAGNOSIS — K21.00 GASTROESOPHAGEAL REFLUX DISEASE WITH ESOPHAGITIS: ICD-10-CM

## 2017-09-28 DIAGNOSIS — E55.9 VITAMIN D INSUFFICIENCY: ICD-10-CM

## 2017-09-28 DIAGNOSIS — E03.4 HYPOTHYROIDISM DUE TO ACQUIRED ATROPHY OF THYROID: ICD-10-CM

## 2017-09-28 DIAGNOSIS — C34.11 MALIGNANT NEOPLASM OF RIGHT UPPER LOBE OF LUNG (HCC): ICD-10-CM

## 2017-09-28 DIAGNOSIS — Z23 NEED FOR IMMUNIZATION AGAINST INFLUENZA: ICD-10-CM

## 2017-09-28 DIAGNOSIS — D64.9 NORMOCYTIC ANEMIA: ICD-10-CM

## 2017-09-28 DIAGNOSIS — K52.9 CHRONIC DIARRHEA: ICD-10-CM

## 2017-09-28 DIAGNOSIS — E78.2 MIXED HYPERLIPIDEMIA: ICD-10-CM

## 2017-09-28 DIAGNOSIS — E04.2 MULTIPLE THYROID NODULES: ICD-10-CM

## 2017-09-28 PROBLEM — A04.72 CLOSTRIDIUM DIFFICILE ENTEROCOLITIS: Status: RESOLVED | Noted: 2017-08-16 | Resolved: 2017-09-28

## 2017-09-28 PROBLEM — J81.0 ACUTE PULMONARY EDEMA (HCC): Status: RESOLVED | Noted: 2017-06-19 | Resolved: 2017-09-28

## 2017-09-28 PROBLEM — D72.829 LEUKOCYTOSIS: Status: RESOLVED | Noted: 2017-08-16 | Resolved: 2017-09-28

## 2017-09-28 PROCEDURE — 90662 IIV NO PRSV INCREASED AG IM: CPT | Performed by: INTERNAL MEDICINE

## 2017-09-28 PROCEDURE — 99215 OFFICE O/P EST HI 40 MIN: CPT | Mod: 25 | Performed by: INTERNAL MEDICINE

## 2017-09-28 PROCEDURE — G0008 ADMIN INFLUENZA VIRUS VAC: HCPCS | Performed by: INTERNAL MEDICINE

## 2017-09-28 RX ORDER — CITALOPRAM HYDROBROMIDE 10 MG/1
20 TABLET ORAL DAILY
Qty: 180 TAB | Refills: 4 | Status: SHIPPED | OUTPATIENT
Start: 2017-09-28 | End: 2017-10-30

## 2017-09-28 RX ORDER — GLIPIZIDE 5 MG/1
5 TABLET ORAL DAILY
Qty: 90 TAB | Refills: 4 | Status: SHIPPED | OUTPATIENT
Start: 2017-09-28 | End: 2018-01-15 | Stop reason: SDUPTHER

## 2017-09-28 RX ORDER — CITALOPRAM HYDROBROMIDE 10 MG/1
20 TABLET ORAL DAILY
Qty: 90 TAB | Refills: 4 | Status: SHIPPED | OUTPATIENT
Start: 2017-09-28 | End: 2017-09-28 | Stop reason: SDUPTHER

## 2017-09-28 RX ORDER — LORAZEPAM 2 MG/1
2 TABLET ORAL
Qty: 30 TAB | Refills: 6 | Status: SHIPPED | OUTPATIENT
Start: 2017-09-28 | End: 2017-10-30 | Stop reason: SDUPTHER

## 2017-09-28 ASSESSMENT — ENCOUNTER SYMPTOMS
EYES NEGATIVE: 1
CARDIOVASCULAR NEGATIVE: 1
MUSCULOSKELETAL NEGATIVE: 1
GASTROINTESTINAL NEGATIVE: 1
NEUROLOGICAL NEGATIVE: 1
PSYCHIATRIC NEGATIVE: 1
CONSTITUTIONAL NEGATIVE: 1
RESPIRATORY NEGATIVE: 1

## 2017-09-28 ASSESSMENT — PATIENT HEALTH QUESTIONNAIRE - PHQ9: CLINICAL INTERPRETATION OF PHQ2 SCORE: 0

## 2017-09-28 NOTE — PROGRESS NOTES
Subjective:      Clarisse Reeves is a 77 y.o. female who presents with Diarrhea (since July) and Hypertension (evaluation )  and   The patient is here for followup of chronic medical problems listed below. The patient is compliant with medications and having no side effects from them. Denies chest pain, abdominal pain, dyspnea, myalgias, or cough.   Patient Active Problem List    Diagnosis Date Noted   • Essential hypertension 2015     Priority: Medium   • Controlled type 2 diabetes mellitus with complication, without long-term current use of insulin (CMS-HCC) 2015     Priority: Medium   • DNR (do not resuscitate) 2017     Priority: Low   • Normocytic anemia 2017     Priority: Low   • Hypothyroidism due to acquired atrophy of thyroid 2015     Priority: Low   • Obesity (BMI 30-39.9) 2015     Priority: Low   • Gastroesophageal reflux disease with esophagitis 2013     Priority: Low   • Vitamin D insufficiency 2013     Priority: Low   • Mixed hyperlipidemia 2012     Priority: Low   • CLL (chronic lymphocytic leukemia)- wbc= 20k-30k, since ; no rx; oncologist to follow 2012     Priority: Low   • Numbness of left hand- aftter hosp for cap 2017   • Chronic fatigue- following cap 2017   • Mild intermittent asthma without complication- pma;  albuterol inhaler prn 2017   • Moderate episode of recurrent major depressive disorder (CMS-HCC) 2017   • Primary insomnia 2017   • Family history of heart attack- daughter 54 yo  MI 2016 10/07/2016   • 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, 2016-  partial lobectomy RUL/RML- Dr Ganser- folowed by PMA 2016   • Multiple thyroid nodules- dr jaeger, neg FNA ; us no change 2015   • Fatty infiltration of liver 2012     Allergies   Allergen Reactions   • Codeine Hives and Nausea     RXN=40 years ago   • Lipitor  "[Atorvastatin Calcium] Myalgia     IPS=[id number]     • Lovastatin Myalgia     Muscle aches  [id number]   • Zocor [Simvastatin - High Dose] Myalgia     Severe myalgias  GLC=3750   • Metformin      Diarrhea       Outpatient Medications Prior to Visit   Medication Sig Dispense Refill   • diphenoxylate-atropine (LOMOTIL) 2.5-0.025 MG Tab Take 1 Tab by mouth 4 times a day as needed for Diarrhea.     • fenofibrate (TRIGLIDE) 160 MG tablet Take 1 Tab by mouth every day. 90 Tab 4   • lisinopril-hydrochlorothiazide (PRINZIDE, ZESTORETIC) 20-25 MG per tablet TAKE 1 TABLET BY MOUTH EVERY DAY 90 Tab 4   • levothyroxine (SYNTHROID) 112 MCG Tab TAKE ONE TABLET BY MOUTH EVERY DAY 90 Tab 4   • Cholecalciferol (VITAMIN D-3) 5000 UNITS Tab Take 5,000 Units by mouth every day. 90 Tab 1   • aspirin (ASA) 81 MG Chew Tab chewable tablet Take 1 Tab by mouth every day. 100 Tab 11   • lorazepam (ATIVAN) 2 MG tablet Take 1 Tab by mouth every bedtime. 30 Tab 0   • metformin ER (GLUCOPHAGE XR) 500 MG TABLET SR 24 HR Take 1 Tab by mouth every day. 90 Tab 4   • citalopram (CELEXA) 10 MG tablet Take 1 Tab by mouth every day. 90 Tab 4   • lansoprazole (PREVACID) 30 MG CAPSULE DELAYED RELEASE Take 1 Cap by mouth every day. 90 Cap 4     Facility-Administered Medications Prior to Visit   Medication Dose Route Frequency Provider Last Rate Last Dose   • cyanocobalamin (VITAMIN B-12) injection 1,000 mcg  1,000 mcg Intramuscular Q30 DAYS Siva Smart M.D.   1,000 mcg at 08/10/17 1022               HPI    Review of Systems   Constitutional: Negative.    HENT: Negative.    Eyes: Negative.    Respiratory: Negative.    Cardiovascular: Negative.    Gastrointestinal: Negative.    Genitourinary: Negative.    Musculoskeletal: Negative.    Skin: Negative.    Neurological: Negative.    Endo/Heme/Allergies: Negative.    Psychiatric/Behavioral: Negative.           Objective:     /74   Pulse 86   Temp 36.4 °C (97.5 °F)   Ht 1.676 m (5' 5.98\")   Wt 89.8 kg (198 " lb)   SpO2 97%   BMI 31.97 kg/m²      Physical Exam   Constitutional: She is oriented to person, place, and time. She appears well-developed and well-nourished. No distress.   HENT:   Head: Normocephalic and atraumatic.   Right Ear: External ear normal.   Left Ear: External ear normal.   Nose: Nose normal.   Mouth/Throat: Oropharynx is clear and moist. No oropharyngeal exudate.   Eyes: Conjunctivae and EOM are normal. Pupils are equal, round, and reactive to light. Right eye exhibits no discharge. Left eye exhibits no discharge. No scleral icterus.   Neck: Normal range of motion. Neck supple. No JVD present. No tracheal deviation present. No thyromegaly present.   Cardiovascular: Normal rate, regular rhythm, normal heart sounds and intact distal pulses.  Exam reveals no gallop and no friction rub.    No murmur heard.  Pulmonary/Chest: Effort normal and breath sounds normal. No stridor. No respiratory distress. She has no wheezes. She has no rales. She exhibits no tenderness.   Abdominal: Soft. Bowel sounds are normal. She exhibits no distension and no mass. There is no tenderness. There is no rebound and no guarding.   Musculoskeletal: Normal range of motion. She exhibits no edema or tenderness.   Lymphadenopathy:     She has no cervical adenopathy.   Neurological: She is alert and oriented to person, place, and time. She has normal reflexes. She displays normal reflexes. No cranial nerve deficit. She exhibits normal muscle tone. Coordination normal.   Skin: Skin is warm and dry. No rash noted. She is not diaphoretic. No erythema. No pallor.   Psychiatric: She has a normal mood and affect. Her behavior is normal. Judgment and thought content normal.   Vitals reviewed.    Hospital Outpatient Visit on 09/25/2017   Component Date Value   • TSH 09/26/2017 0.560    • Sodium 09/26/2017 134*   • Potassium 09/26/2017 4.5    • Chloride 09/26/2017 101    • Co2 09/26/2017 23    • Anion Gap 09/26/2017 10.0    • Glucose  09/26/2017 104*   • Bun 09/26/2017 13    • Creatinine 09/26/2017 0.89    • Calcium 09/26/2017 9.6    • AST(SGOT) 09/26/2017 14    • ALT(SGPT) 09/26/2017 17    • Alkaline Phosphatase 09/26/2017 70    • Total Bilirubin 09/26/2017 0.3    • Albumin 09/26/2017 4.0    • Total Protein 09/26/2017 6.7    • Globulin 09/26/2017 2.7    • A-G Ratio 09/26/2017 1.5    • Cholesterol,Tot 09/26/2017 145    • Triglycerides 09/26/2017 96    • HDL 09/26/2017 43    • LDL 09/26/2017 83    • WBC 09/26/2017 27.1*   • RBC 09/26/2017 3.99*   • Hemoglobin 09/26/2017 11.6*   • Hematocrit 09/26/2017 36.3*   • MCV 09/26/2017 91.0    • MCH 09/26/2017 29.1    • MCHC 09/26/2017 32.0*   • RDW 09/26/2017 47.8    • Platelet Count 09/26/2017 415    • MPV 09/26/2017 10.6    • Nucleated RBC 09/26/2017 0.00    • NRBC (Absolute) 09/26/2017 0.00    • Neutrophils-Polys 09/26/2017 25.00*   • Lymphocytes 09/26/2017 67.00*   • Monocytes 09/26/2017 4.00    • Eosinophils 09/26/2017 1.00    • Basophils 09/26/2017 3.00*   • Neutrophils (Absolute) 09/26/2017 6.78    • Lymphs (Absolute) 09/26/2017 18.16*   • Monos (Absolute) 09/26/2017 1.08*   • Eos (Absolute) 09/26/2017 0.27    • Baso (Absolute) 09/26/2017 0.81*   • Anisocytosis 09/26/2017 1+    • Glycohemoglobin 09/26/2017 6.2*   • Est Avg Glucose 09/26/2017 131    • 25-Hydroxy   Vitamin D 25 09/26/2017 24*   • Creatinine, Urine 09/26/2017 221.30    • Microalbumin, Urine Rand* 09/26/2017 1.1    • Micro Alb Creat Ratio 09/26/2017 5    • GFR If  09/25/2017 >60    • GFR If Non  Ameri* 09/25/2017 >60    • Manual Diff Status 09/26/2017 PERFORMED    • Peripheral Smear Review 09/26/2017 see below    • Plt Estimation 09/26/2017 Normal    • RBC Morphology 09/26/2017 Present    • Smudge Cells 09/26/2017 Few    Hospital Outpatient Visit on 09/14/2017   Component Date Value   • H Pylori Ag Stool E 09/14/2017 Not detected    Hospital Outpatient Visit on 09/01/2017   Component Date Value   • C Diff by PCR  09/02/2017 Negative    • 027-NAP1-BI Presumptive 09/02/2017 Negative    • Fluid Fat 09/02/2017 Absent    • Fluid Type 09/02/2017 Stool    • Calprotectin, Fecal 09/05/2017 31    • Occult Blood Feces 09/02/2017 Negative    • Significant Indicator 09/01/2017 NEG    • Source 09/01/2017 STL    • Ova And Parasites Antige* 09/01/2017                      Value:Negative for Giardia lamblia antigen.  Negative for Cryptosporidium parvum antigen.  NOTE:  The Cryptosporidium/Giardia assay is a rapid test for the  presence or absence of these specific antigens. In special  circumstances, a physician may need to request a complete  ova and parasite procedure when the patient meets certain  criteria. For example, recent travel abroad,immunosupression,  recent immigration, persistent undiagnosed diarrhea, or  persistent unexplained eosinophilia may be conditions to  warrant a complete ova and parasite examination. In these  special cases, or if the physician suspects another specific  gastrointestinal parasite,the Microbiology Department can  perform a complete ova and parasite microscopic examination.  The request for a complete ova and parasite examination must  come directly from the physician (or designee) within the  seven days of the original stool specimen being received in  the Microbiology Department. Stool specimens are discarded  after sev                          en days of storage.     Hospital Outpatient Visit on 08/31/2017   Component Date Value   • Immunoglobulin A 09/02/2017 83    • t-TG IgA 09/02/2017 0    • Celiac Dual Ag Screen 09/04/2017 4       Lab Results   Component Value Date/Time    HBA1C 6.2 (H) 09/25/2017 09:47 AM    HBA1C 6.0 (H) 08/08/2017 11:35 AM     Lab Results   Component Value Date/Time    SODIUM 134 (L) 09/25/2017 09:47 AM    POTASSIUM 4.5 09/25/2017 09:47 AM    CHLORIDE 101 09/25/2017 09:47 AM    CO2 23 09/25/2017 09:47 AM    GLUCOSE 104 (H) 09/25/2017 09:47 AM    BUN 13 09/25/2017 09:47 AM     CREATININE 0.89 09/25/2017 09:47 AM    CREATININE 0.76 04/12/2013 11:17 AM    BUNCREATRAT 19 11/17/2014 10:29 AM    BUNCREATRAT 25 04/12/2013 11:17 AM    ALKPHOSPHAT 70 09/25/2017 09:47 AM    ASTSGOT 14 09/25/2017 09:47 AM    ALTSGPT 17 09/25/2017 09:47 AM    TBILIRUBIN 0.3 09/25/2017 09:47 AM     Lab Results   Component Value Date/Time    INR 0.95 06/01/2017 06:23 AM    INR 0.93 12/16/2015 03:17 PM    INR 1.01 02/13/2015 12:45 AM     Lab Results   Component Value Date/Time    CHOLSTRLTOT 145 09/25/2017 09:47 AM    LDL 83 09/25/2017 09:47 AM    HDL 43 09/25/2017 09:47 AM    TRIGLYCERIDE 96 09/25/2017 09:47 AM       No results found for: TESTOSTERONE  Lab Results   Component Value Date/Time    TSH 0.617 04/30/2014 10:35 AM    TSH 0.383 (L) 02/06/2014 01:26 PM     Lab Results   Component Value Date/Time    FREET4 1.34 08/08/2017 11:35 AM    FREET4 1.28 03/22/2017 12:21 PM     No results found for: URICACID  No components found for: VITB12  Lab Results   Component Value Date/Time    25HYDROXY 24 (L) 09/25/2017 09:47 AM    25HYDROXY 33 10/11/2016 07:04 AM               Assessment/Plan:     1. Controlled type 2 diabetes mellitus with complication, without long-term current use of insulin (CMS-HCC)    Under good control. Switch to glipizide. Metformin causing diarrhea. A1c 6.2    - glipiZIDE (GLUCOTROL) 5 MG Tab; Take 1 Tab by mouth every day.  Dispense: 90 Tab; Refill: 4  - Diabetic Monofilament Lower Extremity Exam  - REFERRAL TO OPHTHALMOLOGY    2. Essential hypertension   Under good control. Continue same regimen.    3. Mixed hyperlipidemia     Under good control. Continue same regimen. Fenofibrate.  - COMP METABOLIC PANEL; Future  - LIPID PROFILE; Future  - CBC WITH DIFFERENTIAL; Future  - HEMOGLOBIN A1C; Future  - MICROALBUMIN CREAT RATIO URINE; Future    4. Vitamin D insufficiency    Under good control. Continue same regimen. Continue supplements. Unknown strength. She will let us know.      - VITAMIN D,25 HYDROXY;  Future    5. CLL (chronic lymphocytic leukemia)- wbc= 20k-30k, since 2006; no rx; oncologist to follow    Under good control. Continue same regimen. No meds.    6. Gastroesophageal reflux disease with esophagitis    Under good control. Continue same regimen. PPI discontinued. Tums when necessary keeps under control. Intake H2 blockers if necessary but we'll try to avoid PPIs due to risk of diarrhea.  7. Hypothyroidism due to acquired atrophy of thyroid    Under good control. Continue same regimen.- TSH; Future    8. Obesity (BMI 30-39.9)    diet/exercise/lose 15 lbs.; patient counseled      9. Normocytic anemia    Under good control. Continue same regimen. Probably secondary to CLL. Continue follow-up with oncology. This is only mild with hemoglobin = 11    10. Multiple thyroid nodules- dr jaeger, neg FNA 2015; us no change 2017    Under good control. Continue same regimen. Possible thyroidectomy to be done in near future. Patient been seen by general surgeon and ENT. And also by endocrinology.  11. 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 Continue same regimen.  12. Moderate episode of recurrent major depressive disorder (CMS-HCC)  Not well-controlled. Patient has recently increased her Celexa 20 daily which she'll continue on. New prescription written    .- citalopram (CELEXA) 10 MG tablet; Take 2 Tabs by mouth every day.  Dispense: 180 Tab; Refill: 4    13. Primary insomnia     Under good control. Continue same regimen.  - lorazepam (ATIVAN) 2 MG tablet; Take 1 Tab by mouth every bedtime.  Dispense: 30 Tab; Refill: 6    14. Mild intermittent asthma without complication- pma;  albuterol inhaler prn    Under good control. Continue same regimen.    15. Need for immunization against influenza           - INFLUENZA VACCINE, HIGH DOSE (65+ ONLY)      40 minute face-to-face encounter took place today.   More than half of this time was spent in the coordination of care of the above problems, as well as counseling.

## 2017-10-09 ENCOUNTER — APPOINTMENT (OUTPATIENT)
Dept: MEDICAL GROUP | Age: 77
End: 2017-10-09
Payer: MEDICARE

## 2017-10-30 ENCOUNTER — OFFICE VISIT (OUTPATIENT)
Dept: MEDICAL GROUP | Age: 77
End: 2017-10-30
Payer: MEDICARE

## 2017-10-30 VITALS
OXYGEN SATURATION: 95 % | TEMPERATURE: 97.3 F | WEIGHT: 203.6 LBS | SYSTOLIC BLOOD PRESSURE: 120 MMHG | DIASTOLIC BLOOD PRESSURE: 70 MMHG | BODY MASS INDEX: 32.72 KG/M2 | HEIGHT: 66 IN | HEART RATE: 75 BPM

## 2017-10-30 DIAGNOSIS — E11.8 CONTROLLED TYPE 2 DIABETES MELLITUS WITH COMPLICATION, WITHOUT LONG-TERM CURRENT USE OF INSULIN (HCC): ICD-10-CM

## 2017-10-30 DIAGNOSIS — I10 ESSENTIAL HYPERTENSION: ICD-10-CM

## 2017-10-30 DIAGNOSIS — K21.00 GASTROESOPHAGEAL REFLUX DISEASE WITH ESOPHAGITIS: ICD-10-CM

## 2017-10-30 DIAGNOSIS — E03.4 HYPOTHYROIDISM DUE TO ACQUIRED ATROPHY OF THYROID: ICD-10-CM

## 2017-10-30 DIAGNOSIS — E78.2 MIXED HYPERLIPIDEMIA: ICD-10-CM

## 2017-10-30 DIAGNOSIS — J45.20 MILD INTERMITTENT ASTHMA WITHOUT COMPLICATION: ICD-10-CM

## 2017-10-30 DIAGNOSIS — C91.10 CLL (CHRONIC LYMPHOCYTIC LEUKEMIA) (HCC): ICD-10-CM

## 2017-10-30 DIAGNOSIS — F51.01 PRIMARY INSOMNIA: ICD-10-CM

## 2017-10-30 DIAGNOSIS — D64.9 NORMOCYTIC ANEMIA: ICD-10-CM

## 2017-10-30 DIAGNOSIS — C34.11 MALIGNANT NEOPLASM OF RIGHT UPPER LOBE OF LUNG (HCC): ICD-10-CM

## 2017-10-30 DIAGNOSIS — E55.9 VITAMIN D INSUFFICIENCY: ICD-10-CM

## 2017-10-30 DIAGNOSIS — K76.0 FATTY INFILTRATION OF LIVER: ICD-10-CM

## 2017-10-30 DIAGNOSIS — E66.9 OBESITY (BMI 30-39.9): ICD-10-CM

## 2017-10-30 DIAGNOSIS — F33.1 MODERATE EPISODE OF RECURRENT MAJOR DEPRESSIVE DISORDER (HCC): ICD-10-CM

## 2017-10-30 DIAGNOSIS — E04.2 MULTIPLE THYROID NODULES: ICD-10-CM

## 2017-10-30 PROBLEM — R20.0 NUMBNESS OF LEFT HAND: Status: RESOLVED | Noted: 2017-07-11 | Resolved: 2017-10-30

## 2017-10-30 PROBLEM — R53.82 CHRONIC FATIGUE: Status: RESOLVED | Noted: 2017-07-11 | Resolved: 2017-10-30

## 2017-10-30 PROBLEM — K52.9 CHRONIC DIARRHEA: Status: RESOLVED | Noted: 2017-09-28 | Resolved: 2017-10-30

## 2017-10-30 PROCEDURE — 99215 OFFICE O/P EST HI 40 MIN: CPT | Performed by: INTERNAL MEDICINE

## 2017-10-30 RX ORDER — CITALOPRAM 20 MG/1
20 TABLET ORAL DAILY
Qty: 90 TAB | Refills: 4 | Status: SHIPPED | OUTPATIENT
Start: 2017-10-30 | End: 2018-06-13

## 2017-10-30 RX ORDER — CITALOPRAM 20 MG/1
20 TABLET ORAL DAILY
Qty: 90 TAB | Refills: 4 | Status: SHIPPED | OUTPATIENT
Start: 2017-10-30 | End: 2017-10-30 | Stop reason: SDUPTHER

## 2017-10-30 RX ORDER — LORAZEPAM 2 MG/1
4 TABLET ORAL NIGHTLY PRN
Qty: 60 TAB | Refills: 3 | Status: SHIPPED | OUTPATIENT
Start: 2017-10-30 | End: 2018-03-08

## 2017-10-30 RX ADMIN — CYANOCOBALAMIN 1000 MCG: 1000 INJECTION, SOLUTION INTRAMUSCULAR; SUBCUTANEOUS at 11:43

## 2017-10-30 ASSESSMENT — ENCOUNTER SYMPTOMS
GASTROINTESTINAL NEGATIVE: 1
MUSCULOSKELETAL NEGATIVE: 1
NEUROLOGICAL NEGATIVE: 1
PSYCHIATRIC NEGATIVE: 1
CONSTITUTIONAL NEGATIVE: 1
RESPIRATORY NEGATIVE: 1
EYES NEGATIVE: 1
CARDIOVASCULAR NEGATIVE: 1

## 2017-10-30 NOTE — PROGRESS NOTES
Subjective:      Clarisse Reeves is a 77 y.o. female who presents with Medication Management  The patient is here for followup of chronic medical problems listed below. The patient is compliant with medications and having no side effects from them. Denies chest pain, abdominal pain, dyspnea, myalgias, or cough.   Patient Active Problem List    Diagnosis Date Noted   • Essential hypertension 2015     Priority: Medium   • Controlled type 2 diabetes mellitus with complication, without long-term current use of insulin (CMS-[facility]) 2015     Priority: Medium   • DNR (do not resuscitate) 2017     Priority: Low   • Normocytic anemia 2017     Priority: Low   • Hypothyroidism due to acquired atrophy of thyroid 2015     Priority: Low   • Obesity (BMI 30-39.9) 2015     Priority: Low   • Gastroesophageal reflux disease with esophagitis 2013     Priority: Low   • Vitamin D insufficiency 2013     Priority: Low   • Mixed hyperlipidemia 2012     Priority: Low   • CLL (chronic lymphocytic leukemia)- wbc= 20k-30k, since ; no rx; oncologist to follow 2012     Priority: Low   • Mild intermittent asthma without complication- pma;  albuterol inhaler prn 2017   • Moderate episode of recurrent major depressive disorder (CMS-HCC) 2017   • Primary insomnia 2017   • Family history of heart attack- daughter 54 yo  MI 2016 10/07/2016   • 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 90 Tab 4   • levothyroxine (SYNTHROID) 112 MCG Tab TAKE ONE TABLET BY MOUTH EVERY DAY 90 Tab 4   •  "Cholecalciferol (VITAMIN D-3) 5000 UNITS Tab Take 5,000 Units by mouth every day. 90 Tab 1   • aspirin (ASA) 81 MG Chew Tab chewable tablet Take 1 Tab by mouth every day. 100 Tab 11   • lorazepam (ATIVAN) 2 MG tablet Take 1 Tab by mouth every bedtime. 30 Tab 6   • citalopram (CELEXA) 10 MG tablet Take 2 Tabs by mouth every day. 180 Tab 4   • diphenoxylate-atropine (LOMOTIL) 2.5-0.025 MG Tab Take 1 Tab by mouth 4 times a day as needed for Diarrhea.     • fenofibrate (TRIGLIDE) 160 MG tablet Take 1 Tab by mouth every day. (Patient not taking: Reported on 10/30/2017) 90 Tab 4   • lisinopril-hydrochlorothiazide (PRINZIDE, ZESTORETIC) 20-25 MG per tablet TAKE 1 TABLET BY MOUTH EVERY DAY (Patient not taking: Reported on 10/30/2017) 90 Tab 4     Facility-Administered Medications Prior to Visit   Medication Dose Route Frequency Provider Last Rate Last Dose   • cyanocobalamin (VITAMIN B-12) injection 1,000 mcg  1,000 mcg Intramuscular Q30 DAYS Siva Smart M.D.   1,000 mcg at 08/10/17 1022     Allergies   Allergen Reactions   • Codeine Hives and Nausea     RXN=40 years ago   • Lipitor [Atorvastatin Calcium] Myalgia     DLS=5138     • Lovastatin Myalgia     Muscle aches  DMR=3344   • Zocor [Simvastatin - High Dose] Myalgia     Severe myalgias  LRM=4545   • Lisinopril Cough   • Metformin      Diarrhea                 HPI    Review of Systems   Constitutional: Negative.    HENT: Negative.    Eyes: Negative.    Respiratory: Negative.    Cardiovascular: Negative.    Gastrointestinal: Negative.    Genitourinary: Negative.    Musculoskeletal: Negative.    Skin: Negative.    Neurological: Negative.    Endo/Heme/Allergies: Negative.    Psychiatric/Behavioral: Negative.           Objective:     /70   Pulse 75   Temp 36.3 °C (97.3 °F)   Ht 1.669 m (5' 5.7\")   Wt 92.4 kg (203 lb 9.6 oz)   SpO2 95%   BMI 33.16 kg/m²      Physical Exam   Constitutional: She is oriented to person, place, and time. She appears well-developed " and well-nourished. No distress.   HENT:   Head: Normocephalic and atraumatic.   Right Ear: External ear normal.   Left Ear: External ear normal.   Nose: Nose normal.   Mouth/Throat: Oropharynx is clear and moist. No oropharyngeal exudate.   Eyes: Conjunctivae and EOM are normal. Pupils are equal, round, and reactive to light. Right eye exhibits no discharge. Left eye exhibits no discharge. No scleral icterus.   Neck: Normal range of motion. Neck supple. No JVD present. No tracheal deviation present. No thyromegaly present.   Cardiovascular: Normal rate, regular rhythm, normal heart sounds and intact distal pulses.  Exam reveals no gallop and no friction rub.    No murmur heard.  Pulmonary/Chest: Effort normal and breath sounds normal. No stridor. No respiratory distress. She has no wheezes. She has no rales. She exhibits no tenderness.   Abdominal: Soft. Bowel sounds are normal. She exhibits no distension and no mass. There is no tenderness. There is no rebound and no guarding.   Musculoskeletal: Normal range of motion. She exhibits no edema or tenderness.   Lymphadenopathy:     She has no cervical adenopathy.   Neurological: She is alert and oriented to person, place, and time. She has normal reflexes. She displays normal reflexes. No cranial nerve deficit. She exhibits normal muscle tone. Coordination normal.   Skin: Skin is warm and dry. No rash noted. She is not diaphoretic. No erythema. No pallor.   Psychiatric: She has a normal mood and affect. Her behavior is normal. Judgment and thought content normal.   Vitals reviewed.         No visits with results within 1 Month(s) from this visit.   Latest known visit with results is:   Hospital Outpatient Visit on 09/25/2017   Component Date Value   • TSH 09/26/2017 0.560    • Sodium 09/26/2017 134*   • Potassium 09/26/2017 4.5    • Chloride 09/26/2017 101    • Co2 09/26/2017 23    • Anion Gap 09/26/2017 10.0    • Glucose 09/26/2017 104*   • Bun 09/26/2017 13    •  Creatinine 09/26/2017 0.89    • Calcium 09/26/2017 9.6    • AST(SGOT) 09/26/2017 14    • ALT(SGPT) 09/26/2017 17    • Alkaline Phosphatase 09/26/2017 70    • Total Bilirubin 09/26/2017 0.3    • Albumin 09/26/2017 4.0    • Total Protein 09/26/2017 6.7    • Globulin 09/26/2017 2.7    • A-G Ratio 09/26/2017 1.5    • Cholesterol,Tot 09/26/2017 145    • Triglycerides 09/26/2017 96    • HDL 09/26/2017 43    • LDL 09/26/2017 83    • WBC 09/26/2017 27.1*   • RBC 09/26/2017 3.99*   • Hemoglobin 09/26/2017 11.6*   • Hematocrit 09/26/2017 36.3*   • MCV 09/26/2017 91.0    • MCH 09/26/2017 29.1    • MCHC 09/26/2017 32.0*   • RDW 09/26/2017 47.8    • Platelet Count 09/26/2017 415    • MPV 09/26/2017 10.6    • Nucleated RBC 09/26/2017 0.00    • NRBC (Absolute) 09/26/2017 0.00    • Neutrophils-Polys 09/26/2017 25.00*   • Lymphocytes 09/26/2017 67.00*   • Monocytes 09/26/2017 4.00    • Eosinophils 09/26/2017 1.00    • Basophils 09/26/2017 3.00*   • Neutrophils (Absolute) 09/26/2017 6.78    • Lymphs (Absolute) 09/26/2017 18.16*   • Monos (Absolute) 09/26/2017 1.08*   • Eos (Absolute) 09/26/2017 0.27    • Baso (Absolute) 09/26/2017 0.81*   • Anisocytosis 09/26/2017 1+    • Glycohemoglobin 09/26/2017 6.2*   • Est Avg Glucose 09/26/2017 131    • 25-Hydroxy   Vitamin D 25 09/26/2017 24*   • Creatinine, Urine 09/26/2017 221.30    • Microalbumin, Urine Rand* 09/26/2017 1.1    • Micro Alb Creat Ratio 09/26/2017 5    • GFR If  09/25/2017 >60    • GFR If Non  Ameri* 09/25/2017 >60    • Manual Diff Status 09/26/2017 PERFORMED    • Peripheral Smear Review 09/26/2017 see below    • Plt Estimation 09/26/2017 Normal    • RBC Morphology 09/26/2017 Present    • Smudge Cells 09/26/2017 Few       Lab Results   Component Value Date/Time    HBA1C 6.2 (H) 09/25/2017 09:47 AM    HBA1C 6.0 (H) 08/08/2017 11:35 AM     Lab Results   Component Value Date/Time    SODIUM 134 (L) 09/25/2017 09:47 AM    POTASSIUM 4.5 09/25/2017 09:47 AM     CHLORIDE 101 09/25/2017 09:47 AM    CO2 23 09/25/2017 09:47 AM    GLUCOSE 104 (H) 09/25/2017 09:47 AM    BUN 13 09/25/2017 09:47 AM    CREATININE 0.89 09/25/2017 09:47 AM    CREATININE 0.76 04/12/2013 11:17 AM    BUNCREATRAT 19 11/17/2014 10:29 AM    BUNCREATRAT 25 04/12/2013 11:17 AM    ALKPHOSPHAT 70 09/25/2017 09:47 AM    ASTSGOT 14 09/25/2017 09:47 AM    ALTSGPT 17 09/25/2017 09:47 AM    TBILIRUBIN 0.3 09/25/2017 09:47 AM     Lab Results   Component Value Date/Time    INR 0.95 06/01/2017 06:23 AM    INR 0.93 12/16/2015 03:17 PM    INR 1.01 02/13/2015 12:45 AM     Lab Results   Component Value Date/Time    CHOLSTRLTOT 145 09/25/2017 09:47 AM    LDL 83 09/25/2017 09:47 AM    HDL 43 09/25/2017 09:47 AM    TRIGLYCERIDE 96 09/25/2017 09:47 AM       No results found for: TESTOSTERONE  Lab Results   Component Value Date/Time    TSH 0.617 04/30/2014 10:35 AM    TSH 0.383 (L) 02/06/2014 01:26 PM     Lab Results   Component Value Date/Time    FREET4 1.34 08/08/2017 11:35 AM    FREET4 1.28 03/22/2017 12:21 PM     No results found for: URICACID  No components found for: VITB12  Lab Results   Component Value Date/Time    25HYDROXY 24 (L) 09/25/2017 09:47 AM    25HYDROXY 33 10/11/2016 07:04 AM          Assessment/Plan:     1. Controlled type 2 diabetes mellitus with complication, without long-term current use of insulin (CMS-[facility])    Under good control. Continue same regimen.   .- HEMOGLOBIN A1C; Future  - MICROALBUMIN CREAT RATIO URINE; Future  - Diabetic Monofilament Lower Extremity Exam  - REFERRAL TO OPHTHALMOLOGY    2. Hypothyroidism due to acquired atrophy of thyroid   Under good control. Continue same regimen.  - TSH; Future    3. Moderate episode of recurrent major depressive disorder (CMS-HCC)   Under good control. Continue same regimen.    4. Primary insomnia   Under good control. Continue same regimen.- lorazepam (ATIVAN) 2 MG tablet; Take 2 Tabs by mouth at bedtime as needed for Anxiety.  Dispense: 60 Tab; Refill:  3    5. CLL (chronic lymphocytic leukemia)- wbc= 20k-30k, since 2006; no rx; oncologist to follow   Under good control. Continue same regimen.    6. Mixed hyperlipidemia   Under good control. Continue same regimen.  - COMP METABOLIC PANEL; Future  - LIPID PROFILE; Future  - CBC WITH DIFFERENTIAL; Future    7. Vitamin D insufficiency   Under good control. Continue same regimen.  - VITAMIN D,25 HYDROXY; Future    8. Gastroesophageal reflux disease with esophagitis    Under good control. Continue same regimen.    9. Essential hypertension   Under good control. Continue same regimen.    10. Obesity (BMI 30-39.9)    diet/exercise/lose 15 lbs.; patient counseled      11. Normocytic anemia   Under good control. Continue same regimen.    12. Fatty infiltration of liver   Under good control. Continue same regimen.    13. Multiple thyroid nodules- dr jaeger, neg FNA 2015; us no change 2017- surveillance- no surgery   Under good control. Continue same regimen.    14. Mild intermittent asthma without complication- pma;  albuterol inhaler prnUnder good control. Continue same regimen.       15. 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 Continue same regimen.      40 minute face-to-face encounter took place today.  More than half of this time was spent in the coordination of care of the above problems, as well as counseling.

## 2017-11-16 ENCOUNTER — APPOINTMENT (OUTPATIENT)
Dept: RADIOLOGY | Facility: MEDICAL CENTER | Age: 77
End: 2017-11-16
Attending: INTERNAL MEDICINE
Payer: MEDICARE

## 2017-11-27 DIAGNOSIS — Z01.812 PRE-PROCEDURAL LABORATORY EXAMINATION: ICD-10-CM

## 2017-11-27 DIAGNOSIS — Z01.810 PRE-OPERATIVE CARDIOVASCULAR EXAMINATION: ICD-10-CM

## 2017-11-27 LAB
ANION GAP SERPL CALC-SCNC: 8 MMOL/L (ref 0–11.9)
BUN SERPL-MCNC: 17 MG/DL (ref 8–22)
CALCIUM SERPL-MCNC: 9.2 MG/DL (ref 8.5–10.5)
CHLORIDE SERPL-SCNC: 102 MMOL/L (ref 96–112)
CO2 SERPL-SCNC: 27 MMOL/L (ref 20–33)
CREAT SERPL-MCNC: 0.86 MG/DL (ref 0.5–1.4)
EKG IMPRESSION: NORMAL
ERYTHROCYTE [DISTWIDTH] IN BLOOD BY AUTOMATED COUNT: 48.6 FL (ref 35.9–50)
GFR SERPL CREATININE-BSD FRML MDRD: >60 ML/MIN/1.73 M 2
GLUCOSE SERPL-MCNC: 100 MG/DL (ref 65–99)
HCT VFR BLD AUTO: 37.9 % (ref 37–47)
HGB BLD-MCNC: 11.6 G/DL (ref 12–16)
MCH RBC QN AUTO: 28.2 PG (ref 27–33)
MCHC RBC AUTO-ENTMCNC: 30.6 G/DL (ref 33.6–35)
MCV RBC AUTO: 92 FL (ref 81.4–97.8)
PLATELET # BLD AUTO: 405 K/UL (ref 164–446)
PMV BLD AUTO: 9.8 FL (ref 9–12.9)
POTASSIUM SERPL-SCNC: 3.7 MMOL/L (ref 3.6–5.5)
RBC # BLD AUTO: 4.12 M/UL (ref 4.2–5.4)
SODIUM SERPL-SCNC: 137 MMOL/L (ref 135–145)
WBC # BLD AUTO: 29.4 K/UL (ref 4.8–10.8)

## 2017-11-27 PROCEDURE — 93010 ELECTROCARDIOGRAM REPORT: CPT | Performed by: INTERNAL MEDICINE

## 2017-11-27 PROCEDURE — 93005 ELECTROCARDIOGRAM TRACING: CPT

## 2017-11-27 PROCEDURE — 85027 COMPLETE CBC AUTOMATED: CPT

## 2017-11-27 PROCEDURE — 80048 BASIC METABOLIC PNL TOTAL CA: CPT

## 2017-11-27 PROCEDURE — 36415 COLL VENOUS BLD VENIPUNCTURE: CPT

## 2017-11-29 ENCOUNTER — HOSPITAL ENCOUNTER (OUTPATIENT)
Facility: MEDICAL CENTER | Age: 77
End: 2017-12-01
Attending: SURGERY | Admitting: SURGERY
Payer: MEDICARE

## 2017-11-29 DIAGNOSIS — E53.8 VITAMIN B 12 DEFICIENCY: ICD-10-CM

## 2017-11-29 PROBLEM — E04.1 NONTOXIC UNINODULAR GOITER: Status: ACTIVE | Noted: 2017-11-29

## 2017-11-29 LAB
GLUCOSE BLD-MCNC: 114 MG/DL (ref 65–99)
GLUCOSE BLD-MCNC: 171 MG/DL (ref 65–99)
GLUCOSE BLD-MCNC: 203 MG/DL (ref 65–99)

## 2017-11-29 PROCEDURE — 500122 HCHG BOVIE, BLADE: Performed by: SURGERY

## 2017-11-29 PROCEDURE — 700111 HCHG RX REV CODE 636 W/ 250 OVERRIDE (IP)

## 2017-11-29 PROCEDURE — 160009 HCHG ANES TIME/MIN: Performed by: SURGERY

## 2017-11-29 PROCEDURE — 160036 HCHG PACU - EA ADDL 30 MINS PHASE I: Performed by: SURGERY

## 2017-11-29 PROCEDURE — 160041 HCHG SURGERY MINUTES - EA ADDL 1 MIN LEVEL 4: Performed by: SURGERY

## 2017-11-29 PROCEDURE — 88307 TISSUE EXAM BY PATHOLOGIST: CPT

## 2017-11-29 PROCEDURE — 160029 HCHG SURGERY MINUTES - 1ST 30 MINS LEVEL 4: Performed by: SURGERY

## 2017-11-29 PROCEDURE — A9270 NON-COVERED ITEM OR SERVICE: HCPCS | Performed by: SURGERY

## 2017-11-29 PROCEDURE — 502594 HCHG SCISSOR HANDLE, HARMONIC ACE: Performed by: SURGERY

## 2017-11-29 PROCEDURE — 700101 HCHG RX REV CODE 250

## 2017-11-29 PROCEDURE — G0378 HOSPITAL OBSERVATION PER HR: HCPCS

## 2017-11-29 PROCEDURE — 700102 HCHG RX REV CODE 250 W/ 637 OVERRIDE(OP)

## 2017-11-29 PROCEDURE — 500002 HCHG ADHESIVE, DERMABOND: Performed by: SURGERY

## 2017-11-29 PROCEDURE — A9270 NON-COVERED ITEM OR SERVICE: HCPCS

## 2017-11-29 PROCEDURE — 160048 HCHG OR STATISTICAL LEVEL 1-5: Performed by: SURGERY

## 2017-11-29 PROCEDURE — 82962 GLUCOSE BLOOD TEST: CPT

## 2017-11-29 PROCEDURE — 500700 HCHG HEMOCLIP, SMALL (RED): Performed by: SURGERY

## 2017-11-29 PROCEDURE — 160035 HCHG PACU - 1ST 60 MINS PHASE I: Performed by: SURGERY

## 2017-11-29 PROCEDURE — 700102 HCHG RX REV CODE 250 W/ 637 OVERRIDE(OP): Performed by: SURGERY

## 2017-11-29 PROCEDURE — 160002 HCHG RECOVERY MINUTES (STAT): Performed by: SURGERY

## 2017-11-29 PROCEDURE — 501838 HCHG SUTURE GENERAL: Performed by: SURGERY

## 2017-11-29 RX ORDER — OXYCODONE HYDROCHLORIDE 5 MG/1
5 TABLET ORAL
Status: DISCONTINUED | OUTPATIENT
Start: 2017-11-29 | End: 2017-12-01 | Stop reason: HOSPADM

## 2017-11-29 RX ORDER — BUPIVACAINE HYDROCHLORIDE AND EPINEPHRINE 5; 5 MG/ML; UG/ML
INJECTION, SOLUTION EPIDURAL; INTRACAUDAL; PERINEURAL
Status: COMPLETED
Start: 2017-11-29 | End: 2017-11-29

## 2017-11-29 RX ORDER — ACETAMINOPHEN 500 MG
1000 TABLET ORAL EVERY 6 HOURS
Status: DISCONTINUED | OUTPATIENT
Start: 2017-11-29 | End: 2017-12-01 | Stop reason: HOSPADM

## 2017-11-29 RX ORDER — FENOFIBRATE 160 MG/1
160 TABLET ORAL DAILY
Status: DISCONTINUED | OUTPATIENT
Start: 2017-11-29 | End: 2017-11-29

## 2017-11-29 RX ORDER — OXYCODONE HCL 5 MG/5 ML
SOLUTION, ORAL ORAL
Status: COMPLETED
Start: 2017-11-29 | End: 2017-11-29

## 2017-11-29 RX ORDER — LEVOTHYROXINE SODIUM 0.15 MG/1
150 TABLET ORAL
Status: DISCONTINUED | OUTPATIENT
Start: 2017-11-30 | End: 2017-12-01 | Stop reason: HOSPADM

## 2017-11-29 RX ORDER — BUPIVACAINE HYDROCHLORIDE AND EPINEPHRINE 5; 5 MG/ML; UG/ML
INJECTION, SOLUTION PERINEURAL
Status: DISCONTINUED | OUTPATIENT
Start: 2017-11-29 | End: 2017-11-29 | Stop reason: HOSPADM

## 2017-11-29 RX ORDER — OXYCODONE HYDROCHLORIDE 10 MG/1
10 TABLET ORAL
Status: DISCONTINUED | OUTPATIENT
Start: 2017-11-29 | End: 2017-12-01 | Stop reason: HOSPADM

## 2017-11-29 RX ORDER — SODIUM CHLORIDE, SODIUM LACTATE, POTASSIUM CHLORIDE, CALCIUM CHLORIDE 600; 310; 30; 20 MG/100ML; MG/100ML; MG/100ML; MG/100ML
INJECTION, SOLUTION INTRAVENOUS CONTINUOUS
Status: DISCONTINUED | OUTPATIENT
Start: 2017-11-29 | End: 2017-12-01 | Stop reason: HOSPADM

## 2017-11-29 RX ORDER — ONDANSETRON 2 MG/ML
4 INJECTION INTRAMUSCULAR; INTRAVENOUS EVERY 4 HOURS PRN
Status: DISCONTINUED | OUTPATIENT
Start: 2017-11-29 | End: 2017-12-01 | Stop reason: HOSPADM

## 2017-11-29 RX ORDER — DEXTROSE MONOHYDRATE 25 G/50ML
25 INJECTION, SOLUTION INTRAVENOUS
Status: DISCONTINUED | OUTPATIENT
Start: 2017-11-29 | End: 2017-12-01 | Stop reason: HOSPADM

## 2017-11-29 RX ORDER — CITALOPRAM 20 MG/1
20 TABLET ORAL DAILY
Status: DISCONTINUED | OUTPATIENT
Start: 2017-11-29 | End: 2017-12-01 | Stop reason: HOSPADM

## 2017-11-29 RX ORDER — KETOROLAC TROMETHAMINE 30 MG/ML
INJECTION, SOLUTION INTRAMUSCULAR; INTRAVENOUS
Status: COMPLETED
Start: 2017-11-29 | End: 2017-11-29

## 2017-11-29 RX ORDER — HYDROMORPHONE HYDROCHLORIDE 2 MG/ML
INJECTION, SOLUTION INTRAMUSCULAR; INTRAVENOUS; SUBCUTANEOUS
Status: COMPLETED
Start: 2017-11-29 | End: 2017-11-29

## 2017-11-29 RX ADMIN — CALCIUM CARBONATE-CHOLECALCIFEROL TAB 250 MG-125 UNIT 2 TABLET: 250-125 TAB at 19:49

## 2017-11-29 RX ADMIN — HYDROMORPHONE HYDROCHLORIDE 0.2 MG: 2 INJECTION INTRAMUSCULAR; INTRAVENOUS; SUBCUTANEOUS at 12:40

## 2017-11-29 RX ADMIN — OXYCODONE HYDROCHLORIDE 10 MG: 10 TABLET ORAL at 19:18

## 2017-11-29 RX ADMIN — SODIUM CHLORIDE, SODIUM LACTATE, POTASSIUM CHLORIDE, CALCIUM CHLORIDE: 600; 310; 30; 20 INJECTION, SOLUTION INTRAVENOUS at 06:45

## 2017-11-29 RX ADMIN — FENTANYL CITRATE 50 MCG: 50 INJECTION, SOLUTION INTRAMUSCULAR; INTRAVENOUS at 09:19

## 2017-11-29 RX ADMIN — FENTANYL CITRATE 50 MCG: 50 INJECTION, SOLUTION INTRAMUSCULAR; INTRAVENOUS at 10:07

## 2017-11-29 RX ADMIN — ACETAMINOPHEN 1000 MG: 500 TABLET ORAL at 23:56

## 2017-11-29 RX ADMIN — OXYCODONE HYDROCHLORIDE 10 MG: 10 TABLET ORAL at 15:26

## 2017-11-29 RX ADMIN — KETOROLAC TROMETHAMINE 30 MG: 30 INJECTION, SOLUTION INTRAMUSCULAR at 09:00

## 2017-11-29 RX ADMIN — CITALOPRAM HYDROBROMIDE 20 MG: 20 TABLET ORAL at 15:24

## 2017-11-29 RX ADMIN — INSULIN HUMAN 2 UNITS: 100 INJECTION, SOLUTION PARENTERAL at 19:54

## 2017-11-29 RX ADMIN — OXYCODONE HYDROCHLORIDE 10 MG: 5 SOLUTION ORAL at 13:02

## 2017-11-29 RX ADMIN — FENTANYL CITRATE 50 MCG: 50 INJECTION, SOLUTION INTRAMUSCULAR; INTRAVENOUS at 11:35

## 2017-11-29 RX ADMIN — FENTANYL CITRATE 50 MCG: 50 INJECTION, SOLUTION INTRAMUSCULAR; INTRAVENOUS at 09:29

## 2017-11-29 RX ADMIN — FENTANYL CITRATE 50 MCG: 50 INJECTION, SOLUTION INTRAMUSCULAR; INTRAVENOUS at 09:04

## 2017-11-29 RX ADMIN — OXYCODONE HYDROCHLORIDE 10 MG: 5 SOLUTION ORAL at 09:04

## 2017-11-29 RX ADMIN — ACETAMINOPHEN 1000 MG: 500 TABLET ORAL at 17:26

## 2017-11-29 RX ADMIN — HYDROMORPHONE HYDROCHLORIDE 0.2 MG: 2 INJECTION INTRAMUSCULAR; INTRAVENOUS; SUBCUTANEOUS at 12:35

## 2017-11-29 RX ADMIN — CALCIUM CARBONATE-CHOLECALCIFEROL TAB 250 MG-125 UNIT 2 TABLET: 250-125 TAB at 15:23

## 2017-11-29 ASSESSMENT — PAIN SCALES - GENERAL
PAINLEVEL_OUTOF10: 9
PAINLEVEL_OUTOF10: 3
PAINLEVEL_OUTOF10: 9
PAINLEVEL_OUTOF10: 5
PAINLEVEL_OUTOF10: 10
PAINLEVEL_OUTOF10: 0
PAINLEVEL_OUTOF10: 3
PAINLEVEL_OUTOF10: 3
PAINLEVEL_OUTOF10: 10
PAINLEVEL_OUTOF10: 7
PAINLEVEL_OUTOF10: 8
PAINLEVEL_OUTOF10: 6
PAINLEVEL_OUTOF10: 7
PAINLEVEL_OUTOF10: 3
PAINLEVEL_OUTOF10: 3
PAINLEVEL_OUTOF10: 8
PAINLEVEL_OUTOF10: 7
PAINLEVEL_OUTOF10: ASSUMED PAIN PRESENT
PAINLEVEL_OUTOF10: 9
PAINLEVEL_OUTOF10: 4
PAINLEVEL_OUTOF10: 8
PAINLEVEL_OUTOF10: 5
PAINLEVEL_OUTOF10: 10

## 2017-11-29 ASSESSMENT — LIFESTYLE VARIABLES
EVER FELT BAD OR GUILTY ABOUT YOUR DRINKING: NO
TOTAL SCORE: 0
EVER_SMOKED: NEVER
HAVE YOU EVER FELT YOU SHOULD CUT DOWN ON YOUR DRINKING: NO
CONSUMPTION TOTAL: NEGATIVE
EVER HAD A DRINK FIRST THING IN THE MORNING TO STEADY YOUR NERVES TO GET RID OF A HANGOVER: NO
TOTAL SCORE: 0
ALCOHOL_USE: YES
HAVE PEOPLE ANNOYED YOU BY CRITICIZING YOUR DRINKING: NO
AVERAGE NUMBER OF DAYS PER WEEK YOU HAVE A DRINK CONTAINING ALCOHOL: 1
TOTAL SCORE: 0
HOW MANY TIMES IN THE PAST YEAR HAVE YOU HAD 5 OR MORE DRINKS IN A DAY: 0
ON A TYPICAL DAY WHEN YOU DRINK ALCOHOL HOW MANY DRINKS DO YOU HAVE: 1

## 2017-11-29 NOTE — OR NURSING
0853  RECEIVED PATIENT FROM OR.  REPORT FROM DR. BLANCHARD.  NO AIRWAY IN PLACE.  RESPIRATIONS ARE EVEN AND UNLABORED.  ANTERIOR NECK INCISION COVERED WITH DERMA DELANEY IS CDI.      0900  MEDICATED WITH IV KETOROLAC FOR C/O NECK PAIN 9.5.    0904  MEDICATED WITH IV AND PO PAIN MEDICATION FOR C/O PAIN 9.5    0919  MEDICATED WITH IV FENTANYL FOR C/O HEAD PAIN 10.    0929  MEDICATED WITH IV FENTANYL FOR C/O PAIN 10.    0941  NURSE PRACTIONER ROGELIO NOTIFIED THAT [facility]  SHE WILL NOTIFY DR. HANNON.    1007  MEDICATED WITH IV FENTANYL FOR C/O HEAD PAIN 9.    1038  REPORTS HEAD PAIN 7.  DECLINES FURTHER PAIN MEDICATION AT THIS TIME.     1135  MEDICATED WITH IV FENTANYL FOR C/O HEAD PAIN 8.    1153  PATIENT REPORTS PAIN 9.  DECLINES PAIN MEDICATION AT THIS TIME.  EATING CRACKERS.    1235   MEDICATED WITH IV DILAUDID FOR C/O PAIN 10.    1240  MEDICATED WITH IV DILAUDID FOR C/O PAIN 10.    1250  PATIENT REPORTS PAIN 8.5.  DECLINES PAIN MEDICATION AT THIS TIME.    1300  REPORT TO JUAN IL.

## 2017-11-29 NOTE — OR NURSING
1300 Report from Lillie LI.    1302 Pt medicated with PO oxycodone per order.  Will attempt to call report again shortly.   1326 Report called to Vaishali LI on [facility]. Awaiting pt transport.   1335 Report to Lillie LI.

## 2017-11-29 NOTE — OP REPORT
DATE OF SERVICE:  11/29/2017    PREOPERATIVE DIAGNOSIS:  Multinodular goiter.    POSTOPERATIVE DIAGNOSIS:  Multinodular goiter.    PROCEDURE PERFORMED:  Completion thyroidectomy.    SURGEON:  Cameron Marcelino MD    ASSISTANT:  INDIA Li    ANESTHESIOLOGIST:  Jose Urban MD    ANESTHESIA:  General endotracheal.    SPECIMEN:  Right thyroid lobe.    ESTIMATED BLOOD LOSS:  5 mL    COMPLICATIONS:  Possible traction injury on recurrent laryngeal nerves.    INDICATIONS:  The patient is a 77-year-old female who presented to my office   with a multinodular goiter.  She had had previous left thyroidectomy in the   past.  She was symptomatic with dysphagia.  She had a 3 cm nodule, which was   shown to be benign.  Preoperatively, laryngoscopy was performed that showed   normal vocal cord function.  The patient was symptomatic and of course, she   desired completion thyroidectomy.  We discussed risks including bleeding,   infection, recurrent laryngeal nerve injury, and permanent hypoparathyroidism.    She understood these risks and is willing to proceed.    FINDINGS:  The right thyroid lobe was mildly enlarged with multiple soft   nodules.  There was some moderate amount of scar tissue extending towards the   right upper lobe.  The parathyroid glands were not replacing; however, fat pad   is likely containing them were seen and preserved.  The recurrent laryngeal   nerve was physically intact; however, on NIM stimulation at the end of the   case, there was a distinct point of loss of signal indicating possible   traction injury.    PROCEDURE IN DETAIL:  Informed consent was obtained.  The patient was brought   to the operating room and placed in supine position on the operating table.    General anesthesia was induced and the patient's neck was prepped and draped   in usual sterile fashion.  Time-out procedure was performed.  After   infiltration of local anesthetic, a skin incision was made and dissected down    through the subcutaneous tissues and platysma.  Subplatysmal flaps were raised   in the cranial and caudal directions.  The strap muscles were then split at   the median raphe.  There was some scar tissue present.  We then elevated the   strap muscles off the right thyroid lobe.  There was some scar tissue at the   superior pole limiting dissection in this area.  We were able to develop the   paratracheal space.  The recurrent laryngeal nerve was encountered and found   to be intact.  The inferior pole vessels were taken down.  We then took down   the superior lobe partially by taking vessels close to the thyroid capsule.    We followed the nerve as it coursed up towards the cricothyroid interval.    This appeared to course up onto the thyroid lobe.  We were able to take down   the overlying vessels and directing away from the thyroid lobe.  After   dissecting the final attachments immediately over the nerve, the remainder of   the thyroid lobe and isthmus were dissected off the trachea.  The specimen was   passed off.  I inspected the area for hemostasis achieved with   electrocautery.  The NIM stimulator was used to probe the nerve.  There was a   distinct area where there was loss of signal proximally indicating possible   traction injury.  There was no obvious injury in this area and the nerve was   physically completely intact.  The surgical assistant had monitored the   integrity of the nerve throughout the case.  We then placed Surgicel   fibrillar.  The strap muscles were then reapproximated in the median raphe   with 3-0 Vicryl suture, platysma was closed with 3-0 Vicryl sutures.  The skin   incision was closed with running subcuticular Prolene and dressed with   Dermabond.  Once the Dermabond was dried, the Prolene was removed.  The   patient tolerated the procedure well, was extubated, and taken to recovery   unit in a stable condition.       ____________________________________     Cameron Marcelino  MD    ARC / WANDA    DD:  11/29/2017 08:56:40  DT:  11/29/2017 09:46:51    D#:  [id number]  Job#:  [id number]

## 2017-11-29 NOTE — OR SURGEON
Immediate Post OP Note    PreOp Diagnosis: multinodular goiter    PostOp Diagnosis: same    Procedure(s):  THYROIDECTOMY COMPLETION, RECURRENT LARYNGEAL NERVE MONITORING - Wound Class: Clean    Surgeon(s):  Cameron Marcelino M.D.    Anesthesiologist/Type of Anesthesia:  Anesthesiologist: Jose Urban M.D./General    Surgical Staff:  Assistant: Patricia Alarcon  Circulator: Alissa Ibarra R.N.  Scrub Person: Kelli Webb    Specimens:  * No specimens in log *    Estimated Blood Loss: 5 cc    Findings: multiple right thyroid nodules    Complications: ? Traction on recurrent laryngeal nerve        11/29/2017 8:57 AM Cameron Marcelino

## 2017-11-29 NOTE — OR NURSING
1335  RECEIVED REPORT FROM JUAN LI.  RESUMED CARE OF PATIENT.  PATIENT REPORTS PAIN 3.    1416  UP TO THE BATHROOM.    1432  TRANSFERRED TO [place] [address] BED ONE, VIA [facility], ON 02 1  LITER PER NASAL CANNULA, WITH TRANSPORT.  ALL BELONGINGS WITH PATIENT.  ANTERIOR NECK INCISION, COVERED WITH DERMA DELANEY REMAINS CDI.  REPORT TO XIOMARA LI.

## 2017-11-30 LAB
CALCIUM SERPL-MCNC: 9.2 MG/DL (ref 8.5–10.5)
GLUCOSE BLD-MCNC: 108 MG/DL (ref 65–99)
GLUCOSE BLD-MCNC: 114 MG/DL (ref 65–99)
GLUCOSE BLD-MCNC: 125 MG/DL (ref 65–99)
GLUCOSE BLD-MCNC: 127 MG/DL (ref 65–99)
GLUCOSE BLD-MCNC: 181 MG/DL (ref 65–99)
PTH-INTACT SERPL-MCNC: 3.2 PG/ML (ref 14–72)

## 2017-11-30 PROCEDURE — 700111 HCHG RX REV CODE 636 W/ 250 OVERRIDE (IP): Performed by: SURGERY

## 2017-11-30 PROCEDURE — A9270 NON-COVERED ITEM OR SERVICE: HCPCS | Performed by: SURGERY

## 2017-11-30 PROCEDURE — 96374 THER/PROPH/DIAG INJ IV PUSH: CPT

## 2017-11-30 PROCEDURE — 83970 ASSAY OF PARATHORMONE: CPT

## 2017-11-30 PROCEDURE — 36415 COLL VENOUS BLD VENIPUNCTURE: CPT

## 2017-11-30 PROCEDURE — A9270 NON-COVERED ITEM OR SERVICE: HCPCS | Performed by: NURSE PRACTITIONER

## 2017-11-30 PROCEDURE — G0378 HOSPITAL OBSERVATION PER HR: HCPCS

## 2017-11-30 PROCEDURE — 700102 HCHG RX REV CODE 250 W/ 637 OVERRIDE(OP): Performed by: SURGERY

## 2017-11-30 PROCEDURE — 700102 HCHG RX REV CODE 250 W/ 637 OVERRIDE(OP): Performed by: NURSE PRACTITIONER

## 2017-11-30 PROCEDURE — 82962 GLUCOSE BLOOD TEST: CPT | Mod: 91

## 2017-11-30 RX ORDER — IBUPROFEN 200 MG
200 TABLET ORAL 3 TIMES DAILY PRN
Status: DISCONTINUED | OUTPATIENT
Start: 2017-11-30 | End: 2017-12-01 | Stop reason: HOSPADM

## 2017-11-30 RX ORDER — LORAZEPAM 1 MG/1
2 TABLET ORAL NIGHTLY PRN
Status: DISCONTINUED | OUTPATIENT
Start: 2017-11-30 | End: 2017-12-01 | Stop reason: HOSPADM

## 2017-11-30 RX ADMIN — CALCIUM CARBONATE-CHOLECALCIFEROL TAB 250 MG-125 UNIT 2 TABLET: 250-125 TAB at 19:34

## 2017-11-30 RX ADMIN — ACETAMINOPHEN 1000 MG: 500 TABLET ORAL at 05:28

## 2017-11-30 RX ADMIN — ONDANSETRON 4 MG: 2 INJECTION INTRAMUSCULAR; INTRAVENOUS at 19:45

## 2017-11-30 RX ADMIN — CALCIUM CARBONATE-CHOLECALCIFEROL TAB 250 MG-125 UNIT 2 TABLET: 250-125 TAB at 07:39

## 2017-11-30 RX ADMIN — OXYCODONE HYDROCHLORIDE 10 MG: 10 TABLET ORAL at 07:40

## 2017-11-30 RX ADMIN — LORAZEPAM 2 MG: 1 TABLET ORAL at 19:34

## 2017-11-30 RX ADMIN — LEVOTHYROXINE SODIUM 150 MCG: 150 TABLET ORAL at 05:29

## 2017-11-30 RX ADMIN — ACETAMINOPHEN 1000 MG: 500 TABLET ORAL at 17:48

## 2017-11-30 RX ADMIN — IBUPROFEN 200 MG: 200 TABLET, FILM COATED ORAL at 16:22

## 2017-11-30 RX ADMIN — ACETAMINOPHEN 1000 MG: 500 TABLET ORAL at 23:10

## 2017-11-30 RX ADMIN — CALCIUM CARBONATE-CHOLECALCIFEROL TAB 250 MG-125 UNIT 2 TABLET: 250-125 TAB at 16:22

## 2017-11-30 RX ADMIN — INSULIN HUMAN 2 UNITS: 100 INJECTION, SOLUTION PARENTERAL at 19:43

## 2017-11-30 RX ADMIN — CITALOPRAM HYDROBROMIDE 20 MG: 20 TABLET ORAL at 07:39

## 2017-11-30 RX ADMIN — OXYCODONE HYDROCHLORIDE 10 MG: 10 TABLET ORAL at 01:52

## 2017-11-30 RX ADMIN — VITAMIN D, TAB 1000IU (100/BT) 5000 UNITS: 25 TAB at 07:40

## 2017-11-30 RX ADMIN — ACETAMINOPHEN 1000 MG: 500 TABLET ORAL at 11:52

## 2017-11-30 RX ADMIN — OXYCODONE HYDROCHLORIDE 10 MG: 10 TABLET ORAL at 13:39

## 2017-11-30 ASSESSMENT — PAIN SCALES - GENERAL
PAINLEVEL_OUTOF10: 4
PAINLEVEL_OUTOF10: 6
PAINLEVEL_OUTOF10: 6
PAINLEVEL_OUTOF10: 7
PAINLEVEL_OUTOF10: 8
PAINLEVEL_OUTOF10: 5
PAINLEVEL_OUTOF10: 8
PAINLEVEL_OUTOF10: 5
PAINLEVEL_OUTOF10: 5

## 2017-11-30 ASSESSMENT — ENCOUNTER SYMPTOMS
EYES NEGATIVE: 1
NEUROLOGICAL NEGATIVE: 1
FEVER: 0
NAUSEA: 0
NECK PAIN: 1
CONSTITUTIONAL NEGATIVE: 1
SHORTNESS OF BREATH: 0
VOMITING: 0
CHILLS: 0

## 2017-11-30 NOTE — CARE PLAN
Problem: Safety  Goal: Will remain free from falls  Outcome: PROGRESSING AS EXPECTED  Patient educated on the importance of calling a staff member before getting out of bed. Patient verbalizes understanding and patient calling appropriately. Bed in lowest position, call light and other belongings within reach, treaded socks on, and hourly rounding in place.     Problem: Pain Management  Goal: Pain level will decrease to patient's comfort goal  Outcome: PROGRESSING AS EXPECTED  Pt educated on the 1-10 pain scale used. Pt verbalizes understanding and participates in this assessment appropriately. Pt states that current drug regimen is effective in controlling pain. Requests to take the minimal amount of narcotics as possible.

## 2017-11-30 NOTE — CARE PLAN
Problem: Safety  Goal: Will remain free from falls  Outcome: PROGRESSING AS EXPECTED  Pt has bed alarm in place, settings checked. Call light is within reach, treaded socks in place, bed in lowest position, hourly rounding in place, personal belonging close, and pt ambulates with a x1 assist and FWW.    Problem: Pain Management  Goal: Pain level will decrease to patient's comfort goal  Outcome: PROGRESSING AS EXPECTED  Pt medicated per MAR for pain of 7/10. Nonpharmacologic distraction techniques discussed with pt.

## 2017-11-30 NOTE — PROGRESS NOTES
Report received.  Assumed Care of pt at 0715.  Pt is AOx4, responds appropriately. Pt has pain of a 7/10 on a scale of 1-10, pt medicated per  MAR. Pt denies any SOB and is on RA, chest pain, or new onset of numbness/ tingling. Pt has neck incision that is well approximated and closed with dermabond. There is slight swelling and bruising, pt educated to apply ice in Q15 min increments. Pt is tolerating regular diet with no complaints of N/V. Pt is voiding adequetly, + bowel sounds, + flatus, pt last BM was 11/28/2017. Pt states she takes 4 mg of ativan at night to sleep and requested she have it if she needed to stay another night, Patricia ANP consulted about ativan, orders received. Pt is ambulating with a x1 assist and FWW. Plan of care discussed.  Explained importance of calling before getting OOB and pt verbalizes understanding.  Call light and belongings within reach, treaded slipper socks on, bed alarm in use, bed in lowest position, all needs have been met at this time and hourly rounding is in place.

## 2017-11-30 NOTE — PROGRESS NOTES
Surgical Progress Note    Author: Patricia Alarcon Date & Time created: 2017   8:41 AM     Interval Events:  POD#1 S/P Completion thyroidectomy.  Doing well, but does complain of generalized body pains, headache and feeling unsteady.  Patient fell and hit her head prior to surgery yesterday.  Denies any paresthesias.  Feels she needs another day.    Review of Systems   Constitutional: Negative.  Negative for chills and fever.   HENT: Negative.         Denies any paresthesias   Eyes: Negative.    Respiratory: Negative for shortness of breath.         C/o flem   Cardiovascular: Negative for chest pain.   Gastrointestinal: Negative for nausea and vomiting.   Genitourinary: Negative for dysuria.   Musculoskeletal: Positive for neck pain.   Skin: Negative.    Neurological: Negative.         C/o weakness and feeling unsteady     Hemodynamics:  Temp (24hrs), Av.3 °C (97.3 °F), Min:36.1 °C (97 °F), Max:36.4 °C (97.6 °F)  Temperature: 36.4 °C (97.5 °F)  Pulse  Av.3  Min: 66  Max: 89Heart Rate (Monitored): 85  Blood Pressure : 123/63, NIBP: 127/70     Respiratory:    Respiration: 17, Pulse Oximetry: 98 %        RUL Breath Sounds: Diminished, RML Breath Sounds: Clear, RLL Breath Sounds: Clear, JERED Breath Sounds: Clear, LLL Breath Sounds: Diminished  Neuro:  GCS       Fluids:    Intake/Output Summary (Last 24 hours) at 17 0841  Last data filed at 17 0801   Gross per 24 hour   Intake             2732 ml   Output               20 ml   Net             2712 ml        Current Diet Order   Procedures   • Diet Order     Physical Exam   Constitutional: She is oriented to person, place, and time. She appears well-developed and well-nourished.   Neck:   Incision CDI with generalized bruising   Cardiovascular: Normal rate and regular rhythm.    Pulmonary/Chest: Effort normal. No respiratory distress.   Neurological: She is alert and oriented to person, place, and time. She exhibits normal muscle tone.  Coordination normal.   Skin: Skin is warm and dry.     Labs:  Recent Results (from the past 24 hour(s))   ACCU-CHEK GLUCOSE    Collection Time: 11/29/17  5:30 PM   Result Value Ref Range    Glucose - Accu-Ck 203 (H) 65 - 99 mg/dL   ACCU-CHEK GLUCOSE    Collection Time: 11/29/17  7:53 PM   Result Value Ref Range    Glucose - Accu-Ck 171 (H) 65 - 99 mg/dL   ACCU-CHEK GLUCOSE    Collection Time: 11/30/17 12:06 AM   Result Value Ref Range    Glucose - Accu-Ck 127 (H) 65 - 99 mg/dL   PTH WITH CALCIUM    Collection Time: 11/30/17  3:28 AM   Result Value Ref Range    Calcium 9.2 8.5 - 10.5 mg/dL    Pth, Intact 3.2 (L) 14.0 - 72.0 pg/mL   ACCU-CHEK GLUCOSE    Collection Time: 11/30/17  5:32 AM   Result Value Ref Range    Glucose - Accu-Ck 114 (H) 65 - 99 mg/dL     Medical Decision Making, by Problem:  Active Hospital Problems    Diagnosis   • Nontoxic uninodular goiter [E04.1]     Plan:  - Regular diet as tolerated  - Ambulate with assist  - IS Q one hour while awake  - Normal neuro exam, nurse to monitor  - motrin for body aches and headache  - Calcium stable  - Will plan for d/c tomorrow if neuro exam remains normal and pt has relief of headache    Quality Measures:    Reviewed items::  Labs reviewed and Medications reviewed  Oglesby catheter::  No Oglesby  DVT prophylaxis pharmacological::  Not indicated at this time, ambulatory  DVT prophylaxis - mechanical:  SCDs      Discussed patient condition with RN and Patient

## 2017-11-30 NOTE — PROGRESS NOTES
AOx4    Pain rated 6/10- medicated per MAR      Skin: neck incision is well approximated with dermabond. Slight swelling and redness present. Pt feels the swelling when swallowing. Slight bruising around incision.    Bowel sounds are hypoactive x4. Negative for flatus. LBM- yesterday 11-28    Pt up and urinating fine since surgery. Sipping on fluids frequently. Pt saline locked and taking adequate sips of water.     Pt on 0.5L nasal cannula. Wearing for comfort.       SCD's on. Bed in lowest position. POC discussed and all questions answered at this time.

## 2017-11-30 NOTE — PROGRESS NOTES
Pt admitted to room T424-1 via transport in [facility] from same day surgery at 1530.  Pt vs are stable, pt is AOX4, pain reported at an 8/10 on a scale of 0-10, pt medicated per MAR. Pt is tolerating clear liquid diet and has no complaints of N/V.    2 RN skin check performed.    Oriented to room call light and smoking policy.  Reviewed plan of care (equipment, incentive spirometer, sequential compression devices, medications, activity, diet, fall precautions, skin care, and pain) with patient and family. Call light is with reach, bed is in lowest position, all needs have been met at this time and hourly rounding is in place.

## 2017-11-30 NOTE — RESPIRATORY CARE
COPD EDUCATION by COPD CLINICAL EDUCATOR  11/30/2017 at 6:52 AM by Felicia Ramos     Patient reviewed by COPD education team. Patient does not qualify for COPD program.

## 2017-11-30 NOTE — PROGRESS NOTES
Pt is coughing and having difficulty swallowing after drinking thin liquids Medications crushed and administered in applesauce. Pt is tolerating thickened liquids without coughing or difficulty swallowing. Patricia MONIQUE paged for updates, orders to continue thickened liquids, and reevaluation in the AM.

## 2017-12-01 VITALS
DIASTOLIC BLOOD PRESSURE: 75 MMHG | HEART RATE: 69 BPM | BODY MASS INDEX: 31.53 KG/M2 | OXYGEN SATURATION: 92 % | SYSTOLIC BLOOD PRESSURE: 127 MMHG | TEMPERATURE: 97.7 F | WEIGHT: 196.21 LBS | HEIGHT: 66 IN | RESPIRATION RATE: 18 BRPM

## 2017-12-01 LAB — GLUCOSE BLD-MCNC: 129 MG/DL (ref 65–99)

## 2017-12-01 PROCEDURE — 82962 GLUCOSE BLOOD TEST: CPT

## 2017-12-01 PROCEDURE — G0378 HOSPITAL OBSERVATION PER HR: HCPCS

## 2017-12-01 PROCEDURE — 96376 TX/PRO/DX INJ SAME DRUG ADON: CPT

## 2017-12-01 PROCEDURE — A9270 NON-COVERED ITEM OR SERVICE: HCPCS | Performed by: SURGERY

## 2017-12-01 PROCEDURE — 700111 HCHG RX REV CODE 636 W/ 250 OVERRIDE (IP): Performed by: SURGERY

## 2017-12-01 PROCEDURE — 700102 HCHG RX REV CODE 250 W/ 637 OVERRIDE(OP): Performed by: SURGERY

## 2017-12-01 RX ORDER — LEVOTHYROXINE SODIUM 0.15 MG/1
150 TABLET ORAL
Qty: 30 TAB | Status: SHIPPED | OUTPATIENT
Start: 2017-12-02 | End: 2018-02-06 | Stop reason: SDUPTHER

## 2017-12-01 RX ADMIN — ONDANSETRON 4 MG: 2 INJECTION INTRAMUSCULAR; INTRAVENOUS at 04:34

## 2017-12-01 RX ADMIN — ACETAMINOPHEN 1000 MG: 500 TABLET ORAL at 04:34

## 2017-12-01 RX ADMIN — CALCIUM CARBONATE-CHOLECALCIFEROL TAB 250 MG-125 UNIT 2 TABLET: 250-125 TAB at 09:28

## 2017-12-01 RX ADMIN — VITAMIN D, TAB 1000IU (100/BT) 5000 UNITS: 25 TAB at 09:27

## 2017-12-01 RX ADMIN — LEVOTHYROXINE SODIUM 150 MCG: 150 TABLET ORAL at 06:00

## 2017-12-01 RX ADMIN — ONDANSETRON 4 MG: 2 INJECTION INTRAMUSCULAR; INTRAVENOUS at 10:39

## 2017-12-01 RX ADMIN — CITALOPRAM HYDROBROMIDE 20 MG: 20 TABLET ORAL at 09:28

## 2017-12-01 ASSESSMENT — PAIN SCALES - GENERAL
PAINLEVEL_OUTOF10: ASSUMED PAIN PRESENT
PAINLEVEL_OUTOF10: 4
PAINLEVEL_OUTOF10: ASSUMED PAIN PRESENT
PAINLEVEL_OUTOF10: 5

## 2017-12-01 NOTE — DISCHARGE INSTRUCTIONS
Discharge Instructions    Discharged to home by car with friend. Discharged via wheelchair, hospital escort: Yes.  Special equipment needed: Not Applicable    Be sure to schedule a follow-up appointment with your primary care doctor or any specialists as instructed.     Discharge Plan:   Diet Plan: Discussed  Activity Level: Discussed  Confirmed Follow up Appointment: Patient to Call and Schedule Appointment  Confirmed Symptoms Management: Discussed  Medication Reconciliation Updated: Yes  Influenza Vaccine Indication: Not indicated: Previously immunized this influenza season and > 8 years of age    I understand that a diet low in cholesterol, fat, and sodium is recommended for good health. Unless I have been given specific instructions below for another diet, I accept this instruction as my diet prescription.   Other diet: Regular     Special Instructions: None    · Is patient discharged on Warfarin / Coumadin?   No     · Is patient Post Blood Transfusion?  No    Thyroidectomy, Care After  Refer to this sheet in the next few weeks. These instructions provide you with information about caring for yourself after your procedure. Your health care provider may also give you more specific instructions. Your treatment has been planned according to current medical practices, but problems sometimes occur. Call your health care provider if you have any problems or questions after your procedure.  WHAT TO EXPECT AFTER THE PROCEDURE  After your procedure, it is typical to have:  · Mild pain in the neck or upper body, especially when swallowing.  · A sore throat.  · A weak voice.  HOME CARE INSTRUCTIONS   · Take medicines only as directed by your health care provider.  · If your entire thyroid gland was removed, you may need to take thyroid hormone medicine from now on.  · Do not take medicines that contain aspirin and ibuprofen until your health care provider says that you can. These medicines can increase your risk of  bleeding.  · Some pain medicines cause constipation. Drink enough fluid to keep your urine clear or pale yellow. This can help to prevent constipation.  · Start slowly with eating. You may need to have only liquids and soft foods for a few days or as directed by your health care provider.  · Do not take baths, swim, or use a hot tub until your health care provider approves.  · There are many different ways to close and cover an incision, including stitches (sutures), skin glue, and adhesive strips. Follow your health care provider's instructions for:  ¨ Incision care.  ¨ Bandage (dressing) changes and removal.  ¨ Incision closure removal.  · Resume your usual activities as directed by your health care provider.  · For the first 10 days after the procedure or as instructed by your health care provider:  ¨ Do not lift anything heavier than 20 lb (9.1 kg).  ¨ Do not jog, swim, or do other strenuous exercises.  ¨ Do not play contact sports.  · Keep all follow-up visits as directed by your health care provider. This is important.  SEEK MEDICAL CARE IF:  · The soreness in your throat gets worse.  · You have increased pain at your incision or incisions.  · You have increased bleeding from an incision.  · Your incision becomes infected. Watch for:  ¨ Swelling.  ¨ Redness.  ¨ Warmth.  ¨ Pus.  · You notice a bad smell coming from an incision or dressing.  · You have a fever.  · You feel lightheaded or faint.  · You have numbness, tingling, or muscle spasms in your:  ¨ Arms.  ¨ Hands.  ¨ Feet.  ¨ Face.  · You have trouble swallowing.  SEEK IMMEDIATE MEDICAL CARE IF:   · You develop a rash.  · You have difficulty breathing.  · You hear whistling noises coming from your chest.  · You develop a cough that gets worse.  · Your speech changes, or you have hoarseness that gets worse.     This information is not intended to replace advice given to you by your health care provider. Make sure you discuss any questions you have with your  health care provider.     Document Released: 07/07/2006 Document Revised: 01/08/2016 Document Reviewed: 05/20/2015  [employer] Interactive Patient Education ©2016 [employer]    Fall Prevention in the Home   Falls can cause injuries and can affect people from all age groups. There are many simple things that you can do to make your home safe and to help prevent falls.  WHAT CAN I DO ON THE OUTSIDE OF MY HOME?  · Regularly repair the edges of walkways and driveways and fix any cracks.  · Remove high doorway thresholds.  · Trim any shrubbery on the main path into your home.  · Use bright outdoor lighting.  · Clear walkways of debris and clutter, including tools and rocks.  · Regularly check that handrails are securely fastened and in good repair. Both sides of any steps should have handrails.  · Install guardrails along the edges of any raised decks or porches.  · Have leaves, snow, and ice cleared regularly.  · Use sand or salt on walkways during winter months.  · In the garage, clean up any spills right away, including grease or oil spills.  WHAT CAN I DO IN THE BATHROOM?  · Use night lights.  · Install grab bars by the toilet and in the tub and shower. Do not use towel bars as grab bars.  · Use non-skid mats or decals on the floor of the tub or shower.  · If you need to sit down while you are in the shower, use a plastic, non-slip stool..  · Keep the floor dry. Immediately clean up any water that spills on the floor.  · Remove soap buildup in the tub or shower on a regular basis.  · Attach bath mats securely with double-sided non-slip rug tape.  · Remove throw rugs and other tripping hazards from the floor.  WHAT CAN I DO IN THE BEDROOM?  · Use night lights.  · Make sure that a bedside light is easy to reach.  · Do not use oversized bedding that drapes onto the floor.  · Have a firm chair that has side arms to use for getting dressed.  · Remove throw rugs and other tripping hazards from the floor.  WHAT CAN I DO IN  THE KITCHEN?   · Clean up any spills right away.  · Avoid walking on wet floors.  · Place frequently used items in easy-to-reach places.  · If you need to reach for something above you, use a sturdy step stool that has a grab bar.  · Keep electrical cables out of the way.  · Do not use floor polish or wax that makes floors slippery. If you have to use wax, make sure that it is non-skid floor wax.  · Remove throw rugs and other tripping hazards from the floor.  WHAT CAN I DO IN THE STAIRWAYS?  · Do not leave any items on the stairs.  · Make sure that there are handrails on both sides of the stairs. Fix handrails that are broken or loose. Make sure that handrails are as long as the stairways.  · Check any carpeting to make sure that it is firmly attached to the stairs. Fix any carpet that is loose or worn.  · Avoid having throw rugs at the top or bottom of stairways, or secure the rugs with carpet tape to prevent them from moving.  · Make sure that you have a light switch at the top of the stairs and the bottom of the stairs. If you do not have them, have them installed.  WHAT ARE SOME OTHER FALL PREVENTION TIPS?  · Wear closed-toe shoes that fit well and support your feet. Wear shoes that have rubber soles or low heels.  · When you use a stepladder, make sure that it is completely opened and that the sides are firmly locked. Have someone hold the ladder while you are using it. Do not climb a closed stepladder.  · Add color or contrast paint or tape to grab bars and handrails in your home. Place contrasting color strips on the first and last steps.  · Use mobility aids as needed, such as canes, walkers, scooters, and crutches.  · Turn on lights if it is dark. Replace any light bulbs that burn out.  · Set up furniture so that there are clear paths. Keep the furniture in the same spot.  · Fix any uneven floor surfaces.  · Choose a carpet design that does not hide the edge of steps of a stairway.  · Be aware of any and  all pets.  · Review your medicines with your healthcare provider. Some medicines can cause dizziness or changes in blood pressure, which increase your risk of falling.  Talk with your health care provider about other ways that you can decrease your risk of falls. This may include working with a physical therapist or  to improve your strength, balance, and endurance.     This information is not intended to replace advice given to you by your health care provider. Make sure you discuss any questions you have with your health care provider.     Document Released: 12/08/2003 Document Revised: 05/03/2016 Document Reviewed: 01/22/2016  [employer] Interactive Patient Education ©2016 [employer]        Depression / Suicide Risk    As you are discharged from this [facility] facility, it is important to learn how to keep safe from harming yourself.    Recognize the warning signs:  · Abrupt changes in personality, positive or negative- including increase in energy   · Giving away possessions  · Change in eating patterns- significant weight changes-  positive or negative  · Change in sleeping patterns- unable to sleep or sleeping all the time   · Unwillingness or inability to communicate  · Depression  · Unusual sadness, discouragement and loneliness  · Talk of wanting to die  · Neglect of personal appearance   · Rebelliousness- reckless behavior  · Withdrawal from people/activities they love  · Confusion- inability to concentrate     If you or a loved one observes any of these behaviors or has concerns about self-harm, here's what you can do:  · Talk about it- your feelings and reasons for harming yourself  · Remove any means that you might use to hurt yourself (examples: pills, rope, extension cords, firearm)  · Get professional help from the community (Mental Health, Substance Abuse, psychological counseling)  · Do not be alone:Call your Safe Contact- someone whom you trust who will be there for you.  · Call your  local CRISIS HOTLINE 129-8723 or 390-298-8341  · Call your local Children's Mobile Crisis Response Team [place] (832) 258-9131 or www.[place]  · Call the toll free National Suicide Prevention Hotlines   · National Suicide Prevention Lifeline 611-237-AIJE (6335)  · National A-TEX Line Network 800-SUICIDE (664-4443)

## 2017-12-01 NOTE — PROGRESS NOTES
Received pt resting well in bed/stable with no apparent distress noted, no AMS/A&Ox4, VSS. Per pt pain is at 6/10, refused PRN offered, educated pt regarding pain control measures, pt okay with ice packs at this time. Requested PRN ativan as pt claims that she hasnt had good sleep since admission. Breathing even/unlabored, noted dim/clear lung sounds, on continuous o2 at 1Lpm per NC at HS at pt was sating at mid 80's, educated pt to get OOB/ and use IS frequently, able to pull 250 at this time. Surgical incision/IZABELLA/dermabonded, noted generalized swelling/redness. Abdomen soft/nontender, (+)hypoBSx4, c/o slight nausea/no active vomiting/medicated per MAR, per pt able to pass flatus, last BM was PTA, educated pt to ambulate frequently. SLPIV. SBA with FWW on ambulation, bladder continent. Care provided. Needs attended. Bed alarm in place. POC discussed.

## 2017-12-01 NOTE — DISCHARGE SUMMARY
CHIEF COMPLAINT ON ADMISSION  No chief complaint on file.  multinodular goiter    CODE STATUS  Full Code    HPI & HOSPITAL COURSE  This is a 77 y.o. female here with a multinodular goiter with residual right thyroid nodules. She presented for completion thyroidectomy. She was admitted for routine post-operative care. Of note, she had a fall the morning of surgery. She had normal calcium the day after surgery. She did have some headache, likeley from her fall, but no neurologic deficits. She was kept an extra day for pain control and monitoring. On POD 2, her headache was resolved, and she continued to not have any neurologic deficits.      Therefore, she is discharged in good and stable condition with close outpatient follow-up.    SPECIFIC OUTPATIENT FOLLOW-UP  Dr. Marcelino 1 week    DISCHARGE PROBLEM LIST  Active Problems:    Nontoxic uninodular goiter POA: Unknown  Resolved Problems:    * No resolved hospital problems. *      FOLLOW UP  Future Appointments  Date Time Provider Department Center   12/20/2017 10:00 AM [facility] US 1 SMUS REGINA MarianoOlmos   1/19/2018 2:00 PM BETSY Cross None   1/30/2018 2:00 PM Siva Smart M.D. 25 TRACIE Zhanga   2/13/2018 11:40 AM [facility]     Cameron Marcelino M.D.  [address] [address]  [phone]    Schedule an appointment as soon as possible for a visit in 1 week        MEDICATIONS ON DISCHARGE   Clarisse Reeves   Home Medication Instructions SARAH:[id number]    Printed on:12/01/17 0838   Medication Information                      aspirin (ASA) 81 MG Chew Tab chewable tablet  Take 1 Tab by mouth every day.             Cholecalciferol (VITAMIN D-3) 5000 UNITS Tab  Take 5,000 Units by mouth every day.             citalopram (CELEXA) 20 MG Tab  Take 1 Tab by mouth every day.             fenofibrate (TRIGLIDE) 160 MG tablet  Take 1 Tab by mouth every day.             glipiZIDE (GLUCOTROL) 5 MG Tab  Take 1 Tab by mouth  every day.             levothyroxine (SYNTHROID) 150 MCG Tab  Take 1 Tab by mouth Every morning on an empty stomach.             lorazepam (ATIVAN) 2 MG tablet  Take 2 Tabs by mouth at bedtime as needed for Anxiety.                 DIET  Orders Placed This Encounter   Procedures   • Diet Order     Standing Status:   Standing     Number of Occurrences:   1     Order Specific Question:   Diet:     Answer:   Regular [1]     Order Specific Question:   Consistency/Fluid modifications:     Answer:   Nectar Thick [2]       ACTIVITY  As tolerated.  Weight bearing as tolerated      CONSULTATIONS  none    PROCEDURES  Completion thyroidectomy    LABORATORY  Lab Results   Component Value Date/Time    SODIUM 137 11/27/2017 04:24 PM    POTASSIUM 3.7 11/27/2017 04:24 PM    CHLORIDE 102 11/27/2017 04:24 PM    CO2 27 11/27/2017 04:24 PM    GLUCOSE 100 (H) 11/27/2017 04:24 PM    BUN 17 11/27/2017 04:24 PM    CREATININE 0.86 11/27/2017 04:24 PM    CREATININE 0.76 04/12/2013 11:17 AM        Lab Results   Component Value Date/Time    WBC 29.4 (H) 11/27/2017 04:24 PM    WBC 32.8 (HH) 04/12/2013 11:17 AM    HEMOGLOBIN 11.6 (L) 11/27/2017 04:24 PM    HEMATOCRIT 37.9 11/27/2017 04:24 PM    PLATELETCT 405 11/27/2017 04:24 PM        Total time of the discharge process exceeds 31 minutes     Discharge instructions after thyroidectomy:    You had surgery called thyroidectomy. This means that part or all of your thyroid gland was removed. The main job of the thyroid gland is to make thyroid hormone. This hormone controls your body’s metabolism. This is the way your body creates and uses energy. Removing the thyroid gland removes your body’s source of thyroid hormone. So after the surgery, you will need to take thyroid hormone pills daily. This helps keep the level of thyroid hormone in your body steady. This sheet tells you more about how to care for yourself after surgery.    Recovering After Surgery:    Get plenty of rest.    Care for your  incision as directed.    Avoid heavy lifting and strenuous activity for 2 weeks.    Walk a few times daily. But don’t push yourself too hard. Slowly increase your pace and distance, as you feel able.    Return to work when your doctor says it’s okay.    Pain Control:  Most patients do well after thyroid surgery. You may take ibuprofen or Tylenol as needed for pain. If you need stronger pain medication, call your doctor's office.     Taking Your Thyroid Medication:    Take your medication as directed.    Use a pillbox labeled with the days of the week. This will help you remember whether you’ve taken your medication each day.    Take your medication with a liquid,  avoid taking it with soy milk. Soy milk can affect how well your body absorbs the medication. The pill needs to reach your stomach and not dissolve in your throat.    Try to take your medication with the same types and amounts of food and liquid each day. This helps control the amount of thyroid hormone in your system.    After taking your medication:    Wait 4 hours before eating or drinking anything that contains soy.    Wait 4 hours before taking certain medications. These include:     Iron supplements     Calcium supplements     Antacids that contain either calcium or aluminum hydroxide     Medications that lower your cholesterol    Do not stop taking your medication even if you become pregnant.    Never stop taking medications on your own.    Keeping Your Doctor’s Appointments:    See your doctor for regular visits. These are needed to monitor your health.    Have routine blood tests done. These check the level of thyroid hormone in your body. This helps your doctor know whether to adjust the dosage of your medication if needed.    Tell your doctor about any signs of further thyroid problems.    Signs that you may have too much thyroid hormone in your body include:     Restlessness     Rapid weight loss     Sweating     Signs that you may have too  little thyroid hormone in your body include:     Fatigue or sluggishness     Puffy hands, face, or feet     Hoarseness     Muscle pain     Slow pulse (less than 60 beats per minute)    When to Call Your Doctor:  Call your doctor right away if you have any of the following:  Fever above 101°F  Swelling or bleeding at the incision site  Choking  Trouble breathing  A sore throat that lasts longer than 7 days  Tingling or cramps in your hands, feet, or lips    Calcium Supplementation  After thyroidectomy, you may have low calcium levels. Symptoms of low calcium levels include tingling or numbness in the fingers or lips, or muscle spasms.  To prevent low calcium levels, you should take Citracal Max. The usual dosage is 2 tablets three times daily. You may buy this OTC. If you have symptoms of low calcium, take two extra tablets and call your doctor's office.     Your doctor may also prescribe Rocaltrol to help with your calcium levels. Take this as prescribed.    FOLLOW-UP:  ? Please call my office at [phone] to make an appointment in 1 week    Office address:  [place] [address]    Cameron Marcelino M.D.  [facility]  [phone]

## 2017-12-01 NOTE — CARE PLAN
Problem: Communication  Goal: The ability to communicate needs accurately and effectively will improve  POC discussed with pt this shift.    Problem: Safety  Goal: Will remain free from injury  Safety teaching done, fall precautions in place.

## 2017-12-01 NOTE — PROGRESS NOTES
Discharging Patient home per physician order.  Discharged with friend.  Demonstrated understanding of discharge instructions, follow up appointments, home medications, prescriptions, home care for surgical wound, and nursing care instructions given.  Ambulating with no assistance, voiding without difficulty, pain well controlled, tolerating oral medications, oxygen saturation greater than 90% , tolerating diet.   Educational handouts given and discussed.  Verbalized understanding of discharge instructions and educational handouts.  All questions answered. PIV removed.  Belongings with patient at time of discharge.

## 2017-12-08 ENCOUNTER — HOSPITAL ENCOUNTER (OUTPATIENT)
Dept: LAB | Facility: MEDICAL CENTER | Age: 77
End: 2017-12-08
Attending: SURGERY
Payer: MEDICARE

## 2017-12-08 LAB
CALCIUM SERPL-MCNC: 9.3 MG/DL (ref 8.5–10.5)
PTH-INTACT SERPL-MCNC: 23.7 PG/ML (ref 14–72)

## 2017-12-08 PROCEDURE — 36415 COLL VENOUS BLD VENIPUNCTURE: CPT

## 2017-12-08 PROCEDURE — 83970 ASSAY OF PARATHORMONE: CPT

## 2017-12-20 ENCOUNTER — APPOINTMENT (OUTPATIENT)
Dept: RADIOLOGY | Facility: MEDICAL CENTER | Age: 77
End: 2017-12-20
Attending: INTERNAL MEDICINE
Payer: MEDICARE

## 2018-01-13 ENCOUNTER — HOSPITAL ENCOUNTER (OUTPATIENT)
Dept: LAB | Facility: MEDICAL CENTER | Age: 78
End: 2018-01-13
Attending: INTERNAL MEDICINE
Payer: MEDICARE

## 2018-01-13 DIAGNOSIS — E55.9 VITAMIN D INSUFFICIENCY: ICD-10-CM

## 2018-01-13 DIAGNOSIS — E11.8 CONTROLLED TYPE 2 DIABETES MELLITUS WITH COMPLICATION, WITHOUT LONG-TERM CURRENT USE OF INSULIN (HCC): ICD-10-CM

## 2018-01-13 DIAGNOSIS — E78.2 MIXED HYPERLIPIDEMIA: ICD-10-CM

## 2018-01-13 DIAGNOSIS — E03.4 HYPOTHYROIDISM DUE TO ACQUIRED ATROPHY OF THYROID: ICD-10-CM

## 2018-01-13 LAB
25(OH)D3 SERPL-MCNC: 20 NG/ML (ref 30–100)
ALBUMIN SERPL BCP-MCNC: 4.2 G/DL (ref 3.2–4.9)
ALBUMIN/GLOB SERPL: 1.7 G/DL
ALP SERPL-CCNC: 72 U/L (ref 30–99)
ALT SERPL-CCNC: 13 U/L (ref 2–50)
ANION GAP SERPL CALC-SCNC: 8 MMOL/L (ref 0–11.9)
AST SERPL-CCNC: 12 U/L (ref 12–45)
BASOPHILS # BLD AUTO: 0.9 % (ref 0–1.8)
BASOPHILS # BLD: 0.17 K/UL (ref 0–0.12)
BILIRUB SERPL-MCNC: 0.3 MG/DL (ref 0.1–1.5)
BUN SERPL-MCNC: 13 MG/DL (ref 8–22)
CALCIUM SERPL-MCNC: 8.4 MG/DL (ref 8.5–10.5)
CHLORIDE SERPL-SCNC: 103 MMOL/L (ref 96–112)
CHOLEST SERPL-MCNC: 131 MG/DL (ref 100–199)
CO2 SERPL-SCNC: 26 MMOL/L (ref 20–33)
CREAT SERPL-MCNC: 0.67 MG/DL (ref 0.5–1.4)
CREAT UR-MCNC: 83.2 MG/DL
EOSINOPHIL # BLD AUTO: 0.5 K/UL (ref 0–0.51)
EOSINOPHIL NFR BLD: 2.6 % (ref 0–6.9)
ERYTHROCYTE [DISTWIDTH] IN BLOOD BY AUTOMATED COUNT: 44.6 FL (ref 35.9–50)
EST. AVERAGE GLUCOSE BLD GHB EST-MCNC: 111 MG/DL
GIANT PLATELETS BLD QL SMEAR: NORMAL
GLOBULIN SER CALC-MCNC: 2.5 G/DL (ref 1.9–3.5)
GLUCOSE SERPL-MCNC: 97 MG/DL (ref 65–99)
HBA1C MFR BLD: 5.5 % (ref 0–5.6)
HCT VFR BLD AUTO: 38.2 % (ref 37–47)
HDLC SERPL-MCNC: 42 MG/DL
HGB BLD-MCNC: 12 G/DL (ref 12–16)
LDLC SERPL CALC-MCNC: 74 MG/DL
LYMPHOCYTES # BLD AUTO: 12.26 K/UL (ref 1–4.8)
LYMPHOCYTES NFR BLD: 63.2 % (ref 22–41)
MANUAL DIFF BLD: NORMAL
MCH RBC QN AUTO: 28.2 PG (ref 27–33)
MCHC RBC AUTO-ENTMCNC: 31.4 G/DL (ref 33.6–35)
MCV RBC AUTO: 89.7 FL (ref 81.4–97.8)
MICROALBUMIN UR-MCNC: <0.7 MG/DL
MICROALBUMIN/CREAT UR: NORMAL MG/G (ref 0–30)
MONOCYTES # BLD AUTO: 0.68 K/UL (ref 0–0.85)
MONOCYTES NFR BLD AUTO: 3.5 % (ref 0–13.4)
MORPHOLOGY BLD-IMP: NORMAL
NEUTROPHILS # BLD AUTO: 5.78 K/UL (ref 2–7.15)
NEUTROPHILS NFR BLD: 29.8 % (ref 44–72)
NRBC # BLD AUTO: 0 K/UL
NRBC BLD-RTO: 0 /100 WBC
PLATELET # BLD AUTO: 341 K/UL (ref 164–446)
PLATELET BLD QL SMEAR: NORMAL
PMV BLD AUTO: 10.1 FL (ref 9–12.9)
POTASSIUM SERPL-SCNC: 4.4 MMOL/L (ref 3.6–5.5)
PROT SERPL-MCNC: 6.7 G/DL (ref 6–8.2)
RBC # BLD AUTO: 4.26 M/UL (ref 4.2–5.4)
RBC BLD AUTO: PRESENT
SODIUM SERPL-SCNC: 137 MMOL/L (ref 135–145)
TRIGL SERPL-MCNC: 74 MG/DL (ref 0–149)
TSH SERPL DL<=0.005 MIU/L-ACNC: 0.39 UIU/ML (ref 0.38–5.33)
VARIANT LYMPHS BLD QL SMEAR: NORMAL
WBC # BLD AUTO: 19.4 K/UL (ref 4.8–10.8)

## 2018-01-13 PROCEDURE — 84443 ASSAY THYROID STIM HORMONE: CPT

## 2018-01-13 PROCEDURE — 82043 UR ALBUMIN QUANTITATIVE: CPT

## 2018-01-13 PROCEDURE — 80053 COMPREHEN METABOLIC PANEL: CPT

## 2018-01-13 PROCEDURE — 82570 ASSAY OF URINE CREATININE: CPT

## 2018-01-13 PROCEDURE — 36415 COLL VENOUS BLD VENIPUNCTURE: CPT

## 2018-01-13 PROCEDURE — 82306 VITAMIN D 25 HYDROXY: CPT

## 2018-01-13 PROCEDURE — 85007 BL SMEAR W/DIFF WBC COUNT: CPT

## 2018-01-13 PROCEDURE — 80061 LIPID PANEL: CPT

## 2018-01-13 PROCEDURE — 85027 COMPLETE CBC AUTOMATED: CPT

## 2018-01-13 PROCEDURE — 83036 HEMOGLOBIN GLYCOSYLATED A1C: CPT

## 2018-01-14 ENCOUNTER — APPOINTMENT (OUTPATIENT)
Dept: RADIOLOGY | Facility: MEDICAL CENTER | Age: 78
End: 2018-01-14
Attending: INTERNAL MEDICINE
Payer: MEDICARE

## 2018-01-15 ENCOUNTER — OFFICE VISIT (OUTPATIENT)
Dept: MEDICAL GROUP | Age: 78
End: 2018-01-15
Payer: MEDICARE

## 2018-01-15 VITALS
HEIGHT: 66 IN | TEMPERATURE: 98.2 F | WEIGHT: 195 LBS | DIASTOLIC BLOOD PRESSURE: 70 MMHG | OXYGEN SATURATION: 97 % | HEART RATE: 64 BPM | SYSTOLIC BLOOD PRESSURE: 112 MMHG | BODY MASS INDEX: 31.34 KG/M2

## 2018-01-15 DIAGNOSIS — E03.4 HYPOTHYROIDISM DUE TO ACQUIRED ATROPHY OF THYROID: ICD-10-CM

## 2018-01-15 DIAGNOSIS — E78.2 MIXED HYPERLIPIDEMIA: ICD-10-CM

## 2018-01-15 DIAGNOSIS — N63.25 BREAST LUMP ON LEFT SIDE AT 3 O'CLOCK POSITION: ICD-10-CM

## 2018-01-15 DIAGNOSIS — C91.10 CLL (CHRONIC LYMPHOCYTIC LEUKEMIA) (HCC): ICD-10-CM

## 2018-01-15 DIAGNOSIS — N63.20 LEFT BREAST LUMP: ICD-10-CM

## 2018-01-15 DIAGNOSIS — E11.8 CONTROLLED TYPE 2 DIABETES MELLITUS WITH COMPLICATION, WITHOUT LONG-TERM CURRENT USE OF INSULIN (HCC): ICD-10-CM

## 2018-01-15 DIAGNOSIS — I10 ESSENTIAL HYPERTENSION: ICD-10-CM

## 2018-01-15 DIAGNOSIS — E66.9 OBESITY (BMI 30-39.9): ICD-10-CM

## 2018-01-15 DIAGNOSIS — E55.9 VITAMIN D INSUFFICIENCY: ICD-10-CM

## 2018-01-15 PROCEDURE — 99214 OFFICE O/P EST MOD 30 MIN: CPT | Performed by: INTERNAL MEDICINE

## 2018-01-15 RX ORDER — GLIPIZIDE 5 MG/1
5 TABLET ORAL DAILY
Qty: 90 TAB | Refills: 4 | Status: SHIPPED | OUTPATIENT
Start: 2018-01-15 | End: 2018-11-06

## 2018-01-15 RX ORDER — FENOFIBRATE 160 MG/1
160 TABLET ORAL DAILY
Qty: 90 TAB | Refills: 4 | Status: SHIPPED | OUTPATIENT
Start: 2018-01-15 | End: 2018-07-17

## 2018-01-15 RX ORDER — LOSARTAN POTASSIUM 25 MG/1
25 TABLET ORAL DAILY
Qty: 90 TAB | Refills: 4 | Status: ON HOLD | OUTPATIENT
Start: 2018-01-15 | End: 2018-03-15

## 2018-01-15 ASSESSMENT — ENCOUNTER SYMPTOMS
EYES NEGATIVE: 1
RESPIRATORY NEGATIVE: 1
CONSTITUTIONAL NEGATIVE: 1
CARDIOVASCULAR NEGATIVE: 1
GASTROINTESTINAL NEGATIVE: 1
NEUROLOGICAL NEGATIVE: 1
PSYCHIATRIC NEGATIVE: 1
MUSCULOSKELETAL NEGATIVE: 1

## 2018-01-15 NOTE — PROGRESS NOTES
"Subjective:      Clarisse Reeves is a 77 y.o. female who presents with Breast Mass (on left breast yesterday, that is painful )  for 1 wk no discharge. Neg screening mammo aug 2017  And  The patient is here for followup of chronic medical problems listed below. The patient is compliant with medications and having no side effects from them. Denies chest pain, abdominal pain, dyspnea, myalgias, or cough.              HPI    Review of Systems   Constitutional: Negative.    HENT: Negative.    Eyes: Negative.    Respiratory: Negative.    Cardiovascular: Negative.    Gastrointestinal: Negative.    Genitourinary: Negative.    Musculoskeletal: Negative.    Skin: Negative.    Neurological: Negative.    Endo/Heme/Allergies: Negative.    Psychiatric/Behavioral: Negative.           Objective:     /70   Pulse 64   Temp 36.8 °C (98.2 °F)   Ht 1.669 m (5' 5.71\")   Wt 88.5 kg (195 lb)   SpO2 97%   BMI 31.75 kg/m²      Physical Exam   Constitutional: She is oriented to person, place, and time. She appears well-developed and well-nourished. No distress.   HENT:   Head: Normocephalic and atraumatic.   Right Ear: External ear normal.   Left Ear: External ear normal.   Nose: Nose normal.   Mouth/Throat: Oropharynx is clear and moist. No oropharyngeal exudate.   Eyes: Conjunctivae and EOM are normal. Pupils are equal, round, and reactive to light. Right eye exhibits no discharge. Left eye exhibits no discharge. No scleral icterus.   Neck: Normal range of motion. Neck supple. No JVD present. No tracheal deviation present. No thyromegaly present.   Cardiovascular: Normal rate, regular rhythm, normal heart sounds and intact distal pulses.  Exam reveals no gallop and no friction rub.    No murmur heard.  Pulmonary/Chest: Effort normal and breath sounds normal. No stridor. No respiratory distress. She has no wheezes. She has no rales. She exhibits no tenderness.   Abdominal: Soft. Bowel sounds are normal. She exhibits no " distension and no mass. There is no tenderness. There is no rebound and no guarding.   Genitourinary:   Genitourinary Comments: Breast exam- 1cm mobile tender lump at 3 oclock left breast; no adenopathy; no discharge from nipple; no retraction   Musculoskeletal: Normal range of motion. She exhibits no edema or tenderness.   Lymphadenopathy:     She has no cervical adenopathy.   Neurological: She is alert and oriented to person, place, and time. She has normal reflexes. She displays normal reflexes. No cranial nerve deficit. She exhibits normal muscle tone. Coordination normal.   Skin: Skin is warm and dry. No rash noted. She is not diaphoretic. No erythema. No pallor.   Psychiatric: She has a normal mood and affect. Her behavior is normal. Judgment and thought content normal.   Vitals reviewed.    Hospital Outpatient Visit on 01/13/2018   Component Date Value   • TSH 01/13/2018 0.390    • Sodium 01/13/2018 137    • Potassium 01/13/2018 4.4    • Chloride 01/13/2018 103    • Co2 01/13/2018 26    • Anion Gap 01/13/2018 8.0    • Glucose 01/13/2018 97    • Bun 01/13/2018 13    • Creatinine 01/13/2018 0.67    • Calcium 01/13/2018 8.4*   • AST(SGOT) 01/13/2018 12    • ALT(SGPT) 01/13/2018 13    • Alkaline Phosphatase 01/13/2018 72    • Total Bilirubin 01/13/2018 0.3    • Albumin 01/13/2018 4.2    • Total Protein 01/13/2018 6.7    • Globulin 01/13/2018 2.5    • A-G Ratio 01/13/2018 1.7    • Cholesterol,Tot 01/13/2018 131    • Triglycerides 01/13/2018 74    • HDL 01/13/2018 42    • LDL 01/13/2018 74    • WBC 01/13/2018 19.4*   • RBC 01/13/2018 4.26    • Hemoglobin 01/13/2018 12.0    • Hematocrit 01/13/2018 38.2    • MCV 01/13/2018 89.7    • MCH 01/13/2018 28.2    • MCHC 01/13/2018 31.4*   • RDW 01/13/2018 44.6    • Platelet Count 01/13/2018 341    • MPV 01/13/2018 10.1    • Nucleated RBC 01/13/2018 0.00    • NRBC (Absolute) 01/13/2018 0.00    • Neutrophils-Polys 01/13/2018 29.80*   • Lymphocytes 01/13/2018 63.20*   •  Monocytes 01/13/2018 3.50    • Eosinophils 01/13/2018 2.60    • Basophils 01/13/2018 0.90    • Neutrophils (Absolute) 01/13/2018 5.78    • Lymphs (Absolute) 01/13/2018 12.26*   • Monos (Absolute) 01/13/2018 0.68    • Eos (Absolute) 01/13/2018 0.50    • Baso (Absolute) 01/13/2018 0.17*   • Glycohemoglobin 01/13/2018 5.5    • Est Avg Glucose 01/13/2018 111    • Creatinine, Urine 01/13/2018 83.20    • Microalbumin, Urine Rand* 01/13/2018 <0.7    • Micro Alb Creat Ratio 01/13/2018 see below    • 25-Hydroxy   Vitamin D 25 01/13/2018 20*   • GFR If  01/13/2018 >60    • GFR If Non  Ameri* 01/13/2018 >60    • Manual Diff Status 01/13/2018 PERFORMED    • Peripheral Smear Review 01/13/2018 see below    • Plt Estimation 01/13/2018 Normal    • RBC Morphology 01/13/2018 Present    • Giant Platelets 01/13/2018 1+    • Reactive Lymphocytes 01/13/2018 Few       Lab Results   Component Value Date/Time    HBA1C 5.5 01/13/2018 10:16 AM    HBA1C 6.2 (H) 09/25/2017 09:47 AM     Lab Results   Component Value Date/Time    SODIUM 137 01/13/2018 10:16 AM    POTASSIUM 4.4 01/13/2018 10:16 AM    CHLORIDE 103 01/13/2018 10:16 AM    CO2 26 01/13/2018 10:16 AM    GLUCOSE 97 01/13/2018 10:16 AM    BUN 13 01/13/2018 10:16 AM    CREATININE 0.67 01/13/2018 10:16 AM    CREATININE 0.76 04/12/2013 11:17 AM    BUNCREATRAT 19 11/17/2014 10:29 AM    BUNCREATRAT 25 04/12/2013 11:17 AM    ALKPHOSPHAT 72 01/13/2018 10:16 AM    ASTSGOT 12 01/13/2018 10:16 AM    ALTSGPT 13 01/13/2018 10:16 AM    TBILIRUBIN 0.3 01/13/2018 10:16 AM     Lab Results   Component Value Date/Time    INR 0.95 06/01/2017 06:23 AM    INR 0.93 12/16/2015 03:17 PM    INR 1.01 02/13/2015 12:45 AM     Lab Results   Component Value Date/Time    CHOLSTRLTOT 131 01/13/2018 10:16 AM    LDL 74 01/13/2018 10:16 AM    HDL 42 01/13/2018 10:16 AM    TRIGLYCERIDE 74 01/13/2018 10:16 AM       No results found for: TESTOSTERONE  Lab Results   Component Value Date/Time    TSH  0.617 04/30/2014 10:35 AM    TSH 0.383 (L) 02/06/2014 01:26 PM     Lab Results   Component Value Date/Time    FREET4 1.34 08/08/2017 11:35 AM    FREET4 1.28 03/22/2017 12:21 PM     No results found for: URICACID  No components found for: VITB12  Lab Results   Component Value Date/Time    25HYDROXY 20 (L) 01/13/2018 10:16 AM    25HYDROXY 24 (L) 09/25/2017 09:47 AM               Assessment/Plan:     1. Breast lump on left side at 3 o'clock position- new problem     - MA-MAMMO DIAGNOSTIC UNILAT W/TAINA W/CAD LEFT; Future  - US-BREAST COMPLETE-LEFT; Future  - REFERRAL TO GENERAL SURGERY     3. Vitamin D insufficiency    Under good control. Continue same regimen.    - VITAMIN D,25 HYDROXY; Future    4. Controlled type 2 diabetes mellitus with complication, without long-term current use of insulin (CMS-HCC)      Under good control. Continue same regimen.- glipiZIDE (GLUCOTROL) 5 MG Tab; Take 1 Tab by mouth every day.  Dispense: 90 Tab; Refill: 4  - COMP METABOLIC PANEL; Future  - LIPID PROFILE; Future  - CBC WITH DIFFERENTIAL; Future  - HEMOGLOBIN A1C; Future  - MICROALBUMIN CREAT RATIO URINE; Future    5. Essential hypertension     Under good control. Continue same regimen.- losartan (COZAAR) 25 MG Tab; Take 1 Tab by mouth every day.  Dispense: 90 Tab; Refill: 4    6. CLL (chronic lymphocytic leukemia)- wbc= 20k-30k, since 2006; no rx; oncologist to follow      Under good control. Continue same regimen.    7. Mixed hyperlipidemi  Under good control. Continue same regimen      - fenofibrate (TRIGLIDE) 160 MG tablet; Take 1 Tab by mouth every day.  Dispense: 90 Tab; Refill: 4  - CRP HIGH SENSITIVE (CARDIAC); Future    8. Hypothyroidism due to acquired atrophy of thyroid      Under good control. Continue same regimen.  - TSH; Future    9. Obesity (BMI 30-39.9)    diet/exercise/lose 15 lbs.; patient counseled      30 minute face-to-face encounter took place today.  More than half of this time was spent in the coordination of  care of the above problems, as well as counseling.

## 2018-01-29 ENCOUNTER — TELEPHONE (OUTPATIENT)
Dept: HEMATOLOGY ONCOLOGY | Facility: MEDICAL CENTER | Age: 78
End: 2018-01-29

## 2018-01-29 NOTE — TELEPHONE ENCOUNTER
Called patient to reschedule her 6 month f/v with Dr. Barroso . Dr. Barroso will be on vacation . LVM

## 2018-01-30 ENCOUNTER — APPOINTMENT (OUTPATIENT)
Dept: RADIOLOGY | Facility: MEDICAL CENTER | Age: 78
DRG: 304 | End: 2018-01-30
Attending: SPECIALIST
Payer: MEDICARE

## 2018-01-30 ENCOUNTER — APPOINTMENT (OUTPATIENT)
Dept: RADIOLOGY | Facility: MEDICAL CENTER | Age: 78
DRG: 304 | End: 2018-01-30
Attending: EMERGENCY MEDICINE
Payer: MEDICARE

## 2018-01-30 ENCOUNTER — RESOLUTE PROFESSIONAL BILLING HOSPITAL PROF FEE (OUTPATIENT)
Dept: HOSPITALIST | Facility: MEDICAL CENTER | Age: 78
End: 2018-01-30
Payer: MEDICARE

## 2018-01-30 ENCOUNTER — HOSPITAL ENCOUNTER (INPATIENT)
Facility: MEDICAL CENTER | Age: 78
LOS: 3 days | DRG: 304 | End: 2018-02-02
Attending: EMERGENCY MEDICINE | Admitting: INTERNAL MEDICINE
Payer: MEDICARE

## 2018-01-30 ENCOUNTER — APPOINTMENT (OUTPATIENT)
Dept: RADIOLOGY | Facility: MEDICAL CENTER | Age: 78
DRG: 304 | End: 2018-01-30
Attending: INTERNAL MEDICINE
Payer: MEDICARE

## 2018-01-30 DIAGNOSIS — J96.01 ACUTE RESPIRATORY FAILURE WITH HYPOXIA (HCC): ICD-10-CM

## 2018-01-30 DIAGNOSIS — C91.10 CLL (CHRONIC LYMPHOCYTIC LEUKEMIA) (HCC): ICD-10-CM

## 2018-01-30 DIAGNOSIS — R47.81 SLURRED SPEECH: ICD-10-CM

## 2018-01-30 DIAGNOSIS — G43.009 ATYPICAL MIGRAINE: ICD-10-CM

## 2018-01-30 DIAGNOSIS — R51.9 ACUTE NONINTRACTABLE HEADACHE, UNSPECIFIED HEADACHE TYPE: ICD-10-CM

## 2018-01-30 DIAGNOSIS — G43.109 COMPLICATED MIGRAINE: ICD-10-CM

## 2018-01-30 PROBLEM — J18.9 CAP (COMMUNITY ACQUIRED PNEUMONIA): Status: ACTIVE | Noted: 2018-01-30

## 2018-01-30 PROBLEM — A41.9 SEPSIS (HCC): Status: ACTIVE | Noted: 2018-01-30

## 2018-01-30 PROBLEM — G45.9 TIA (TRANSIENT ISCHEMIC ATTACK): Status: ACTIVE | Noted: 2018-01-30

## 2018-01-30 PROBLEM — J45.909 RAD (REACTIVE AIRWAY DISEASE) WITH WHEEZING: Status: ACTIVE | Noted: 2018-01-30

## 2018-01-30 PROBLEM — I16.1 HYPERTENSIVE EMERGENCY: Status: ACTIVE | Noted: 2018-01-30

## 2018-01-30 LAB
ALBUMIN SERPL BCP-MCNC: 4.6 G/DL (ref 3.2–4.9)
ALBUMIN/GLOB SERPL: 2.1 G/DL
ALP SERPL-CCNC: 67 U/L (ref 30–99)
ALT SERPL-CCNC: 11 U/L (ref 2–50)
ANION GAP SERPL CALC-SCNC: 8 MMOL/L (ref 0–11.9)
APPEARANCE UR: CLEAR
APPEARANCE UR: CLEAR
APTT PPP: 26.7 SEC (ref 24.7–36)
AST SERPL-CCNC: 12 U/L (ref 12–45)
BACTERIA #/AREA URNS HPF: NEGATIVE /HPF
BACTERIA #/AREA URNS HPF: NEGATIVE /HPF
BASOPHILS # BLD AUTO: 0 % (ref 0–1.8)
BASOPHILS # BLD: 0 K/UL (ref 0–0.12)
BILIRUB SERPL-MCNC: 0.4 MG/DL (ref 0.1–1.5)
BILIRUB UR QL STRIP.AUTO: NEGATIVE
BILIRUB UR QL STRIP.AUTO: NEGATIVE
BNP SERPL-MCNC: 110 PG/ML (ref 0–100)
BUN SERPL-MCNC: 13 MG/DL (ref 8–22)
CALCIUM SERPL-MCNC: 9.2 MG/DL (ref 8.5–10.5)
CHLORIDE SERPL-SCNC: 104 MMOL/L (ref 96–112)
CO2 SERPL-SCNC: 25 MMOL/L (ref 20–33)
COLOR UR: YELLOW
COLOR UR: YELLOW
CREAT SERPL-MCNC: 0.63 MG/DL (ref 0.5–1.4)
EOSINOPHIL # BLD AUTO: 0.24 K/UL (ref 0–0.51)
EOSINOPHIL NFR BLD: 0.9 % (ref 0–6.9)
EPI CELLS #/AREA URNS HPF: ABNORMAL /HPF
EPI CELLS #/AREA URNS HPF: NEGATIVE /HPF
ERYTHROCYTE [DISTWIDTH] IN BLOOD BY AUTOMATED COUNT: 44.7 FL (ref 35.9–50)
ERYTHROCYTE [SEDIMENTATION RATE] IN BLOOD BY WESTERGREN METHOD: 15 MM/HOUR (ref 0–30)
EST. AVERAGE GLUCOSE BLD GHB EST-MCNC: 117 MG/DL
FLUAV RNA SPEC QL NAA+PROBE: NEGATIVE
FLUBV RNA SPEC QL NAA+PROBE: NEGATIVE
GLOBULIN SER CALC-MCNC: 2.2 G/DL (ref 1.9–3.5)
GLUCOSE BLD-MCNC: 147 MG/DL (ref 65–99)
GLUCOSE SERPL-MCNC: 86 MG/DL (ref 65–99)
GLUCOSE UR STRIP.AUTO-MCNC: NEGATIVE MG/DL
GLUCOSE UR STRIP.AUTO-MCNC: NEGATIVE MG/DL
HBA1C MFR BLD: 5.7 % (ref 0–5.6)
HCT VFR BLD AUTO: 38.7 % (ref 37–47)
HGB BLD-MCNC: 12 G/DL (ref 12–16)
HYALINE CASTS #/AREA URNS LPF: ABNORMAL /LPF
HYALINE CASTS #/AREA URNS LPF: ABNORMAL /LPF
INR PPP: 0.99 (ref 0.87–1.13)
KETONES UR STRIP.AUTO-MCNC: NEGATIVE MG/DL
KETONES UR STRIP.AUTO-MCNC: NEGATIVE MG/DL
LACTATE BLD-SCNC: 1.5 MMOL/L (ref 0.5–2)
LEUKOCYTE ESTERASE UR QL STRIP.AUTO: ABNORMAL
LEUKOCYTE ESTERASE UR QL STRIP.AUTO: ABNORMAL
LV EJECT FRACT  99904: 65
LV EJECT FRACT MOD 2C 99903: 67.91
LV EJECT FRACT MOD 4C 99902: 71.39
LV EJECT FRACT MOD BP 99901: 69.34
LYMPHOCYTES # BLD AUTO: 22.05 K/UL (ref 1–4.8)
LYMPHOCYTES NFR BLD: 82.6 % (ref 22–41)
MANUAL DIFF BLD: NORMAL
MCH RBC QN AUTO: 27.5 PG (ref 27–33)
MCHC RBC AUTO-ENTMCNC: 31 G/DL (ref 33.6–35)
MCV RBC AUTO: 88.6 FL (ref 81.4–97.8)
MICRO URNS: ABNORMAL
MICRO URNS: ABNORMAL
MONOCYTES # BLD AUTO: 0.24 K/UL (ref 0–0.85)
MONOCYTES NFR BLD AUTO: 0.9 % (ref 0–13.4)
MORPHOLOGY BLD-IMP: NORMAL
NEUTROPHILS # BLD AUTO: 4.17 K/UL (ref 2–7.15)
NEUTROPHILS NFR BLD: 15.6 % (ref 44–72)
NITRITE UR QL STRIP.AUTO: NEGATIVE
NITRITE UR QL STRIP.AUTO: NEGATIVE
NRBC # BLD AUTO: 0 K/UL
NRBC BLD-RTO: 0 /100 WBC
PH UR STRIP.AUTO: 5 [PH]
PH UR STRIP.AUTO: 7.5 [PH]
PLATELET # BLD AUTO: 360 K/UL (ref 164–446)
PLATELET BLD QL SMEAR: NORMAL
PMV BLD AUTO: 9.6 FL (ref 9–12.9)
POTASSIUM SERPL-SCNC: 3.7 MMOL/L (ref 3.6–5.5)
PROCALCITONIN SERPL-MCNC: <0.05 NG/ML
PROT SERPL-MCNC: 6.8 G/DL (ref 6–8.2)
PROT UR QL STRIP: NEGATIVE MG/DL
PROT UR QL STRIP: NEGATIVE MG/DL
PROTHROMBIN TIME: 12.8 SEC (ref 12–14.6)
RBC # BLD AUTO: 4.37 M/UL (ref 4.2–5.4)
RBC # URNS HPF: ABNORMAL /HPF
RBC # URNS HPF: ABNORMAL /HPF
RBC BLD AUTO: PRESENT
RBC UR QL AUTO: NEGATIVE
RBC UR QL AUTO: NEGATIVE
SMUDGE CELLS BLD QL SMEAR: NORMAL
SODIUM SERPL-SCNC: 137 MMOL/L (ref 135–145)
SP GR UR STRIP.AUTO: 1.01
SP GR UR STRIP.AUTO: 1.04
TROPONIN I SERPL-MCNC: <0.01 NG/ML (ref 0–0.04)
UROBILINOGEN UR STRIP.AUTO-MCNC: 0.2 MG/DL
UROBILINOGEN UR STRIP.AUTO-MCNC: 0.2 MG/DL
WBC # BLD AUTO: 26.7 K/UL (ref 4.8–10.8)
WBC #/AREA URNS HPF: ABNORMAL /HPF
WBC #/AREA URNS HPF: ABNORMAL /HPF

## 2018-01-30 PROCEDURE — A9270 NON-COVERED ITEM OR SERVICE: HCPCS | Performed by: INTERNAL MEDICINE

## 2018-01-30 PROCEDURE — 96374 THER/PROPH/DIAG INJ IV PUSH: CPT

## 2018-01-30 PROCEDURE — 99285 EMERGENCY DEPT VISIT HI MDM: CPT

## 2018-01-30 PROCEDURE — 70498 CT ANGIOGRAPHY NECK: CPT

## 2018-01-30 PROCEDURE — 700117 HCHG RX CONTRAST REV CODE 255: Performed by: SPECIALIST

## 2018-01-30 PROCEDURE — 83880 ASSAY OF NATRIURETIC PEPTIDE: CPT

## 2018-01-30 PROCEDURE — 82962 GLUCOSE BLOOD TEST: CPT

## 2018-01-30 PROCEDURE — 87040 BLOOD CULTURE FOR BACTERIA: CPT

## 2018-01-30 PROCEDURE — A9270 NON-COVERED ITEM OR SERVICE: HCPCS | Performed by: STUDENT IN AN ORGANIZED HEALTH CARE EDUCATION/TRAINING PROGRAM

## 2018-01-30 PROCEDURE — 85007 BL SMEAR W/DIFF WBC COUNT: CPT

## 2018-01-30 PROCEDURE — 85652 RBC SED RATE AUTOMATED: CPT

## 2018-01-30 PROCEDURE — 700102 HCHG RX REV CODE 250 W/ 637 OVERRIDE(OP): Performed by: STUDENT IN AN ORGANIZED HEALTH CARE EDUCATION/TRAINING PROGRAM

## 2018-01-30 PROCEDURE — 83036 HEMOGLOBIN GLYCOSYLATED A1C: CPT

## 2018-01-30 PROCEDURE — 700111 HCHG RX REV CODE 636 W/ 250 OVERRIDE (IP): Performed by: EMERGENCY MEDICINE

## 2018-01-30 PROCEDURE — 700102 HCHG RX REV CODE 250 W/ 637 OVERRIDE(OP): Performed by: HOSPITALIST

## 2018-01-30 PROCEDURE — 96365 THER/PROPH/DIAG IV INF INIT: CPT

## 2018-01-30 PROCEDURE — 770020 HCHG ROOM/CARE - TELE (206)

## 2018-01-30 PROCEDURE — 0042T CT-CEREBRAL PERFUSION ANALYSIS: CPT

## 2018-01-30 PROCEDURE — 96375 TX/PRO/DX INJ NEW DRUG ADDON: CPT

## 2018-01-30 PROCEDURE — 93005 ELECTROCARDIOGRAM TRACING: CPT | Performed by: INTERNAL MEDICINE

## 2018-01-30 PROCEDURE — A9270 NON-COVERED ITEM OR SERVICE: HCPCS | Performed by: HOSPITALIST

## 2018-01-30 PROCEDURE — 85610 PROTHROMBIN TIME: CPT

## 2018-01-30 PROCEDURE — 93306 TTE W/DOPPLER COMPLETE: CPT

## 2018-01-30 PROCEDURE — 70553 MRI BRAIN STEM W/O & W/DYE: CPT

## 2018-01-30 PROCEDURE — 700102 HCHG RX REV CODE 250 W/ 637 OVERRIDE(OP): Performed by: INTERNAL MEDICINE

## 2018-01-30 PROCEDURE — 84484 ASSAY OF TROPONIN QUANT: CPT

## 2018-01-30 PROCEDURE — 93010 ELECTROCARDIOGRAM REPORT: CPT | Performed by: INTERNAL MEDICINE

## 2018-01-30 PROCEDURE — 700111 HCHG RX REV CODE 636 W/ 250 OVERRIDE (IP): Performed by: INTERNAL MEDICINE

## 2018-01-30 PROCEDURE — 83605 ASSAY OF LACTIC ACID: CPT

## 2018-01-30 PROCEDURE — 87502 INFLUENZA DNA AMP PROBE: CPT

## 2018-01-30 PROCEDURE — G0378 HOSPITAL OBSERVATION PER HR: HCPCS

## 2018-01-30 PROCEDURE — 70450 CT HEAD/BRAIN W/O DYE: CPT

## 2018-01-30 PROCEDURE — 99291 CRITICAL CARE FIRST HOUR: CPT | Performed by: INTERNAL MEDICINE

## 2018-01-30 PROCEDURE — A9579 GAD-BASE MR CONTRAST NOS,1ML: HCPCS | Performed by: SPECIALIST

## 2018-01-30 PROCEDURE — 84145 PROCALCITONIN (PCT): CPT

## 2018-01-30 PROCEDURE — 85730 THROMBOPLASTIN TIME PARTIAL: CPT

## 2018-01-30 PROCEDURE — 36415 COLL VENOUS BLD VENIPUNCTURE: CPT

## 2018-01-30 PROCEDURE — 81001 URINALYSIS AUTO W/SCOPE: CPT

## 2018-01-30 PROCEDURE — 99223 1ST HOSP IP/OBS HIGH 75: CPT | Performed by: HOSPITALIST

## 2018-01-30 PROCEDURE — 80053 COMPREHEN METABOLIC PANEL: CPT

## 2018-01-30 PROCEDURE — 700111 HCHG RX REV CODE 636 W/ 250 OVERRIDE (IP): Performed by: HOSPITALIST

## 2018-01-30 PROCEDURE — 700105 HCHG RX REV CODE 258: Performed by: INTERNAL MEDICINE

## 2018-01-30 PROCEDURE — 93306 TTE W/DOPPLER COMPLETE: CPT | Mod: 26 | Performed by: INTERNAL MEDICINE

## 2018-01-30 PROCEDURE — 71045 X-RAY EXAM CHEST 1 VIEW: CPT

## 2018-01-30 PROCEDURE — 700117 HCHG RX CONTRAST REV CODE 255: Performed by: EMERGENCY MEDICINE

## 2018-01-30 PROCEDURE — 94640 AIRWAY INHALATION TREATMENT: CPT

## 2018-01-30 PROCEDURE — 85027 COMPLETE CBC AUTOMATED: CPT

## 2018-01-30 PROCEDURE — 70496 CT ANGIOGRAPHY HEAD: CPT

## 2018-01-30 RX ORDER — ASPIRIN 300 MG/1
300 SUPPOSITORY RECTAL DAILY
Status: DISCONTINUED | OUTPATIENT
Start: 2018-01-30 | End: 2018-02-01

## 2018-01-30 RX ORDER — ACETAMINOPHEN 325 MG/1
650 TABLET ORAL EVERY 4 HOURS PRN
Status: DISCONTINUED | OUTPATIENT
Start: 2018-01-30 | End: 2018-02-02 | Stop reason: HOSPADM

## 2018-01-30 RX ORDER — LORAZEPAM 2 MG/ML
1 INJECTION INTRAMUSCULAR EVERY 4 HOURS PRN
Status: DISCONTINUED | OUTPATIENT
Start: 2018-01-30 | End: 2018-02-02 | Stop reason: HOSPADM

## 2018-01-30 RX ORDER — FUROSEMIDE 10 MG/ML
40 INJECTION INTRAMUSCULAR; INTRAVENOUS ONCE
Status: COMPLETED | OUTPATIENT
Start: 2018-01-30 | End: 2018-01-30

## 2018-01-30 RX ORDER — L. ACIDOPHILUS/L.BULGARICUS 100MM CELL
1 GRANULES IN PACKET (EA) ORAL
Status: DISCONTINUED | OUTPATIENT
Start: 2018-01-30 | End: 2018-02-02 | Stop reason: HOSPADM

## 2018-01-30 RX ORDER — ASPIRIN 81 MG/1
324 TABLET, CHEWABLE ORAL DAILY
Status: DISCONTINUED | OUTPATIENT
Start: 2018-01-30 | End: 2018-02-01

## 2018-01-30 RX ORDER — LORAZEPAM 1 MG/1
2 TABLET ORAL ONCE
Status: COMPLETED | OUTPATIENT
Start: 2018-01-30 | End: 2018-01-30

## 2018-01-30 RX ORDER — LEVOTHYROXINE SODIUM 0.07 MG/1
150 TABLET ORAL
Status: DISCONTINUED | OUTPATIENT
Start: 2018-01-31 | End: 2018-02-02 | Stop reason: HOSPADM

## 2018-01-30 RX ORDER — CITALOPRAM 20 MG/1
20 TABLET ORAL DAILY
Status: DISCONTINUED | OUTPATIENT
Start: 2018-01-31 | End: 2018-02-02 | Stop reason: HOSPADM

## 2018-01-30 RX ORDER — SODIUM CHLORIDE 9 MG/ML
500 INJECTION, SOLUTION INTRAVENOUS
Status: DISCONTINUED | OUTPATIENT
Start: 2018-01-30 | End: 2018-02-02 | Stop reason: HOSPADM

## 2018-01-30 RX ORDER — ASPIRIN 325 MG
325 TABLET ORAL DAILY
Status: DISCONTINUED | OUTPATIENT
Start: 2018-01-30 | End: 2018-02-01

## 2018-01-30 RX ORDER — HYDRALAZINE HYDROCHLORIDE 20 MG/ML
10 INJECTION INTRAMUSCULAR; INTRAVENOUS EVERY 4 HOURS PRN
Status: DISCONTINUED | OUTPATIENT
Start: 2018-01-30 | End: 2018-02-02 | Stop reason: HOSPADM

## 2018-01-30 RX ORDER — HYDROMORPHONE HYDROCHLORIDE 2 MG/ML
0.5 INJECTION, SOLUTION INTRAMUSCULAR; INTRAVENOUS; SUBCUTANEOUS ONCE
Status: COMPLETED | OUTPATIENT
Start: 2018-01-30 | End: 2018-01-30

## 2018-01-30 RX ORDER — DOXYCYCLINE 100 MG/1
100 TABLET ORAL EVERY 12 HOURS
Status: DISCONTINUED | OUTPATIENT
Start: 2018-01-30 | End: 2018-02-01

## 2018-01-30 RX ORDER — ONDANSETRON 2 MG/ML
4 INJECTION INTRAMUSCULAR; INTRAVENOUS EVERY 4 HOURS PRN
Status: DISCONTINUED | OUTPATIENT
Start: 2018-01-30 | End: 2018-02-02 | Stop reason: HOSPADM

## 2018-01-30 RX ORDER — GLIPIZIDE 5 MG/1
5 TABLET ORAL DAILY
Status: DISCONTINUED | OUTPATIENT
Start: 2018-01-31 | End: 2018-02-02 | Stop reason: HOSPADM

## 2018-01-30 RX ORDER — BUTALBITAL, ACETAMINOPHEN AND CAFFEINE 50; 325; 40 MG/1; MG/1; MG/1
1 TABLET ORAL EVERY 6 HOURS PRN
Status: DISCONTINUED | OUTPATIENT
Start: 2018-01-30 | End: 2018-02-02 | Stop reason: HOSPADM

## 2018-01-30 RX ORDER — FENOFIBRATE 67 MG/1
134 CAPSULE ORAL DAILY
Status: DISCONTINUED | OUTPATIENT
Start: 2018-01-30 | End: 2018-02-02 | Stop reason: HOSPADM

## 2018-01-30 RX ORDER — METHYLPREDNISOLONE SODIUM SUCCINATE 40 MG/ML
40 INJECTION, POWDER, LYOPHILIZED, FOR SOLUTION INTRAMUSCULAR; INTRAVENOUS EVERY 12 HOURS
Status: DISCONTINUED | OUTPATIENT
Start: 2018-01-30 | End: 2018-01-31

## 2018-01-30 RX ADMIN — GADODIAMIDE 20 ML: 287 INJECTION INTRAVENOUS at 21:53

## 2018-01-30 RX ADMIN — RACEPINEPHRINE HYDROCHLORIDE 0.5 ML: 11.25 SOLUTION RESPIRATORY (INHALATION) at 18:10

## 2018-01-30 RX ADMIN — FUROSEMIDE 40 MG: 10 INJECTION, SOLUTION INTRAMUSCULAR; INTRAVENOUS at 17:48

## 2018-01-30 RX ADMIN — BUTALBITAL, ACETAMINOPHEN, AND CAFFEINE 1 TABLET: 50; 325; 40 TABLET ORAL at 18:40

## 2018-01-30 RX ADMIN — PROCHLORPERAZINE EDISYLATE 10 MG: 5 INJECTION INTRAMUSCULAR; INTRAVENOUS at 11:49

## 2018-01-30 RX ADMIN — ACETAMINOPHEN 650 MG: 325 TABLET, FILM COATED ORAL at 15:43

## 2018-01-30 RX ADMIN — IOHEXOL 100 ML: 350 INJECTION, SOLUTION INTRAVENOUS at 11:31

## 2018-01-30 RX ADMIN — ONDANSETRON 4 MG: 2 INJECTION INTRAMUSCULAR; INTRAVENOUS at 18:19

## 2018-01-30 RX ADMIN — DOXYCYCLINE 100 MG: 100 TABLET ORAL at 20:57

## 2018-01-30 RX ADMIN — AMPICILLIN SODIUM AND SULBACTAM SODIUM 3 G: 2; 1 INJECTION, POWDER, FOR SOLUTION INTRAMUSCULAR; INTRAVENOUS at 18:41

## 2018-01-30 RX ADMIN — FENOFIBRATE 134 MG: 67 CAPSULE ORAL at 18:40

## 2018-01-30 RX ADMIN — IOHEXOL 40 ML: 350 INJECTION, SOLUTION INTRAVENOUS at 11:29

## 2018-01-30 RX ADMIN — ASPIRIN 325 MG: 325 TABLET ORAL at 15:43

## 2018-01-30 RX ADMIN — LORAZEPAM 1 MG: 2 INJECTION INTRAMUSCULAR; INTRAVENOUS at 20:49

## 2018-01-30 RX ADMIN — LORAZEPAM 2 MG: 1 TABLET ORAL at 22:51

## 2018-01-30 RX ADMIN — METHYLPREDNISOLONE SODIUM SUCCINATE 40 MG: 40 INJECTION, POWDER, FOR SOLUTION INTRAMUSCULAR; INTRAVENOUS at 20:59

## 2018-01-30 RX ADMIN — LACTOBACILLUS ACIDOPHILUS / LACTOBACILLUS BULGARICUS 1 PACKET: 100 MILLION CFU STRENGTH GRANULES at 18:41

## 2018-01-30 RX ADMIN — HYDROMORPHONE HYDROCHLORIDE 0.5 MG: 2 INJECTION, SOLUTION INTRAMUSCULAR; INTRAVENOUS; SUBCUTANEOUS at 11:50

## 2018-01-30 ASSESSMENT — PAIN SCALES - GENERAL
PAINLEVEL_OUTOF10: 8
PAINLEVEL_OUTOF10: 6
PAINLEVEL_OUTOF10: 4
PAINLEVEL_OUTOF10: 7
PAINLEVEL_OUTOF10: 10
PAINLEVEL_OUTOF10: 10
PAINLEVEL_OUTOF10: 5
PAINLEVEL_OUTOF10: 5
PAINLEVEL_OUTOF10: 9

## 2018-01-30 ASSESSMENT — LIFESTYLE VARIABLES
EVER_SMOKED: YES
ALCOHOL_USE: NO
DO YOU DRINK ALCOHOL: NO

## 2018-01-30 NOTE — DISCHARGE PLANNING
Care Transition Team Assessment    Information Source  Orientation : Oriented x 4  Information Given By: Patient  Who is responsible for making decisions for patient? : Patient    Readmission Evaluation  Is this a readmission?: No    Elopement Risk  Legal Hold: No  Ambulatory or Self Mobile in Wheelchair: No-Not an Elopement Risk    Interdisciplinary Discharge Planning  Does Admitting Nurse Feel This Could be a Complex Discharge?: No  Primary Care Physician: Bing  Lives with - Patient's Self Care Capacity: Alone and Able to Care For Self  Support Systems: Friends / Neighbors  Housing / Facility: 2 Story Apartment / Condo  Do You Take your Prescribed Medications Regularly: Yes  Able to Return to Previous ADL's: Yes  Mobility Issues: No  Prior Services: None  Patient Expects to be Discharged to:: home  Assistance Needed: Unknown at this Time    Discharge Preparedness  What is your plan after discharge?: Uncertain - pending medical team collaboration  Prior Functional Level: Ambulatory  Difficulity with ADLs: None  Difficulity with IADLs: None    Functional Assesment  Prior Functional Level: Ambulatory    Finances  Financial Barriers to Discharge: No         Values / Beliefs / Concerns  Values / Beliefs Concerns : No    Advance Directive  Advance Directive?: None    Domestic Abuse  Have you ever been the victim of abuse or violence?: No         Discharge Risks or Barriers  Discharge risks or barriers?: No

## 2018-01-30 NOTE — ED NOTES
Pt BIB EMS as stoke pre alert. Pt reports at 0940 she developed slurred speech, right sided weakness and left facial droop. Pt now AAO x 4, but slow to respond

## 2018-01-30 NOTE — ED PROVIDER NOTES
ED Provider Note    Scribed for Pipe Poe M.D. by Archie Yun. 1/30/2018  10:54 AM    Primary care provider: Siva Smart M.D.  Means of arrival: EMS  History obtained from: EMS  History limited by: None    CHIEF COMPLAINT  Chief Complaint   Patient presents with   • Possible Stroke       HPI  Clarisse Reeves is a 77 y.o. female who presents to the Emergency Department as a stroke alert with symptoms occurring 20 minutes prior to arrival. EMS states patient was on a computer at work when coworkers witnessed patient having difficulty speaking and confusion. EMS reports patient with right sided weakness and unequal smile. On exam, patient reports associated 9/10 headache. Patient does not report any recent fevers.       REVIEW OF SYSTEMS  Pertinent positives include difficulty speaking, confusion, right sided weakness, headache.   Pertinent negatives include no recent fevers.    All other systems reviewed and negative.   C     PAST MEDICAL HISTORY   has a past medical history of Cancer (CMS-HCC) (2006); Cancer (CMS-HCC) (2016); Carcinoma in situ of respiratory system (2016); Cataract; CLL (chronic lymphoblastic leukemia); Cold; Cutaneous skin tags (1/29/2015); DM (diabetes mellitus) (CMS-HCC); Hiatus hernia syndrome; Hypertension; Indigestion; MEDICAL HOME; Personal history of colonic polyps (11/26/2012); Pneumonia (2014); Pneumonia (06/2017); Thyroid disease; and Type II or unspecified type diabetes mellitus without mention of complication, not stated as uncontrolled.    SURGICAL HISTORY   has a past surgical history that includes us-needle core bx-breast panel; vaginal hysterectomy total; recovery (12/18/2015); other orthopedic surgery (1990); appendectomy (1955); cholecystectomy (1995); other (2001); other (1984); other (1990); thoracoscopy (Right, 2/1/2016); node dissection (Right, 2/1/2016); cataract extraction with iol; tonsillectomy; and thyroidectomy (N/A, 11/29/2017).    SOCIAL  "HISTORY  Social History   Substance Use Topics   • Smoking status: Former Smoker     Packs/day: 2.00     Years: 50.00     Types: Cigarettes     Quit date: 5/6/2006   • Smokeless tobacco: Never Used      Comment: quit smoking 2002   • Alcohol use 0.0 oz/week      Comment: ocassionaly      History   Drug Use No       FAMILY HISTORY  Family History   Problem Relation Age of Onset   • Cancer Mother    • Lung Disease Father    • Cancer Father        CURRENT MEDICATIONS  Current Facility-Administered Medications on File Prior to Encounter   Medication Dose Route Frequency Provider Last Rate Last Dose   • cyanocobalamin (VITAMIN B-12) injection 1,000 mcg  1,000 mcg Intramuscular Q30 DAYS Siva Smart M.D.   1,000 mcg at 10/30/17 1143     Current Outpatient Prescriptions on File Prior to Encounter   Medication Sig Dispense Refill   • glipiZIDE (GLUCOTROL) 5 MG Tab Take 1 Tab by mouth every day. 90 Tab 4   • fenofibrate (TRIGLIDE) 160 MG tablet Take 1 Tab by mouth every day. 90 Tab 4   • losartan (COZAAR) 25 MG Tab Take 1 Tab by mouth every day. 90 Tab 4   • levothyroxine (SYNTHROID) 150 MCG Tab Take 1 Tab by mouth Every morning on an empty stomach. 30 Tab o   • lorazepam (ATIVAN) 2 MG tablet Take 2 Tabs by mouth at bedtime as needed for Anxiety. 60 Tab 3   • citalopram (CELEXA) 20 MG Tab Take 1 Tab by mouth every day. 90 Tab 4   • aspirin (ASA) 81 MG Chew Tab chewable tablet Take 1 Tab by mouth every day. 100 Tab 11      ALLERGIES  Allergies   Allergen Reactions   • Codeine Hives and Nausea     RXN=40 years ago   • Lipitor [Atorvastatin Calcium] Myalgia     PEO=3110     • Lovastatin Myalgia     Muscle aches  FQV=8771   • Zocor [Simvastatin - High Dose] Myalgia     Severe myalgias  LVP=1198   • Lisinopril Cough   • Metformin      Diarrhea         PHYSICAL EXAM  VITAL SIGNS: /74   Pulse 76   Temp 36.8 °C (98.2 °F)   Resp 18   Ht 1.676 m (5' 6\")   Wt 81.6 kg (180 lb)   BMI 29.05 kg/m²     Nursing note and " vitals reviewed.  Constitutional: Well-developed and well-nourished. No distress.   HENT: Head is normocephalic and atraumatic. Oropharynx is clear and moist without exudate or erythema.   Eyes: Pupils are equal, round, and reactive to light. Conjunctiva are normal.   Cardiovascular: Normal rate and regular rhythm. No murmur heard. Normal radial pulses.  Pulmonary/Chest: Breath sounds normal. No wheezes or rales.   Abdominal: Soft and non-tender. No distention    Musculoskeletal: Extremities exhibit normal range of motion without edema or tenderness.   Neurological: Awake, alert and oriented to person, place, and time. No focal deficits noted. NIH 0  Skin: Skin is warm and dry. No rash.   Psychiatric: Normal mood and affect. Appropriate for clinical situation    DIAGNOSTIC STUDIES / PROCEDURES    LABS  Results for orders placed or performed during the hospital encounter of 01/30/18   URINALYSIS   Result Value Ref Range    Color Yellow     Character Clear     Specific Gravity 1.045 <1.035    Ph 7.5 5.0 - 8.0    Glucose Negative Negative mg/dL    Ketones Negative Negative mg/dL    Protein Negative Negative mg/dL    Bilirubin Negative Negative    Urobilinogen, Urine 0.2 Negative    Nitrite Negative Negative    Leukocyte Esterase Trace (A) Negative    Occult Blood Negative Negative    Micro Urine Req Microscopic    PROTHROMBIN TIME   Result Value Ref Range    PT 12.8 12.0 - 14.6 sec    INR 0.99 0.87 - 1.13   APTT   Result Value Ref Range    APTT 26.7 24.7 - 36.0 sec   CBC WITH DIFFERENTIAL   Result Value Ref Range    WBC 26.7 (H) 4.8 - 10.8 K/uL    RBC 4.37 4.20 - 5.40 M/uL    Hemoglobin 12.0 12.0 - 16.0 g/dL    Hematocrit 38.7 37.0 - 47.0 %    MCV 88.6 81.4 - 97.8 fL    MCH 27.5 27.0 - 33.0 pg    MCHC 31.0 (L) 33.6 - 35.0 g/dL    RDW 44.7 35.9 - 50.0 fL    Platelet Count 360 164 - 446 K/uL    MPV 9.6 9.0 - 12.9 fL    Nucleated RBC 0.00 /100 WBC    NRBC (Absolute) 0.00 K/uL    Neutrophils-Polys 15.60 (L) 44.00 - 72.00 %     Lymphocytes 82.60 (H) 22.00 - 41.00 %    Monocytes 0.90 0.00 - 13.40 %    Eosinophils 0.90 0.00 - 6.90 %    Basophils 0.00 0.00 - 1.80 %    Neutrophils (Absolute) 4.17 2.00 - 7.15 K/uL    Lymphs (Absolute) 22.05 (H) 1.00 - 4.80 K/uL    Monos (Absolute) 0.24 0.00 - 0.85 K/uL    Eos (Absolute) 0.24 0.00 - 0.51 K/uL    Baso (Absolute) 0.00 0.00 - 0.12 K/uL   COMP METABOLIC PANEL   Result Value Ref Range    Sodium 137 135 - 145 mmol/L    Potassium 3.7 3.6 - 5.5 mmol/L    Chloride 104 96 - 112 mmol/L    Co2 25 20 - 33 mmol/L    Anion Gap 8.0 0.0 - 11.9    Glucose 86 65 - 99 mg/dL    Bun 13 8 - 22 mg/dL    Creatinine 0.63 0.50 - 1.40 mg/dL    Calcium 9.2 8.5 - 10.5 mg/dL    AST(SGOT) 12 12 - 45 U/L    ALT(SGPT) 11 2 - 50 U/L    Alkaline Phosphatase 67 30 - 99 U/L    Total Bilirubin 0.4 0.1 - 1.5 mg/dL    Albumin 4.6 3.2 - 4.9 g/dL    Total Protein 6.8 6.0 - 8.2 g/dL    Globulin 2.2 1.9 - 3.5 g/dL    A-G Ratio 2.1 g/dL   TROPONIN   Result Value Ref Range    Troponin I <0.01 0.00 - 0.04 ng/mL   ESTIMATED GFR   Result Value Ref Range    GFR If African American >60 >60 mL/min/1.73 m 2    GFR If Non African American >60 >60 mL/min/1.73 m 2   WESTERGREN SED RATE   Result Value Ref Range    Sed Rate Westergren 15 0 - 30 mm/hour   DIFFERENTIAL MANUAL   Result Value Ref Range    Manual Diff Status PERFORMED    PERIPHERAL SMEAR REVIEW   Result Value Ref Range    Peripheral Smear Review see below    PLATELET ESTIMATE   Result Value Ref Range    Plt Estimation Normal    MORPHOLOGY   Result Value Ref Range    RBC Morphology Present     Smudge Cells Moderate    URINE MICROSCOPIC (W/UA)   Result Value Ref Range    WBC 0-2 /hpf    RBC 2-5 (A) /hpf    Bacteria Negative None /hpf    Epithelial Cells Few /hpf    Hyaline Cast 0-2 /lpf      All labs reviewed by me.    RADIOLOGY  DX-CHEST-LIMITED (1 VIEW)   Final Result         Mild interstitial prominence could relate to chronic change or mild edema.   Patchy bibasilar opacities could  relate to mild atelectasis.      CT-CTA NECK WITH & W/O-POST PROCESSING   Final Result      CT angiogram of the neck within normal limits.      CT-CTA HEAD WITH & W/O-POST PROCESS   Final Result         1. No hemodynamically significant narrowing of the major intracranial vessels.      2. There is 2 mm prominence of the distal MARISELA which could relate to mild ectasia or aneurysm. Consultation to neural interventional radiology on nonemergent basis if clinically indicated.      CT-CEREBRAL PERFUSION ANALYSIS   Final Result      1.  CT perfusion examination over the limited section of brain reveals 0 mL of brain parenchyma has less than 30% of cerebral blood flow (CBF).      2.  Please note that the cerebral perfusion was performed on the limited brain tissue around the basal ganglia region. Infarct/ischemia outside the CT perfusion sections can be missed in this study.      CT-HEAD W/O   Final Result         1. No acute intracranial abnormality. No evidence of acute intracranial hemorrhage or mass lesion.         2. Please note, hyperacute ischemia can remain occult on CT. If ischemia is clinically suspected, MRI is suggested for further evaluation.      MR-BRAIN-WITH & W/O    (Results Pending)   ECHOCARDIOGRAM COMP W/O CONT    (Results Pending)     The radiologist's interpretation of all radiological studies have been reviewed by me.    COURSE & MEDICAL DECISION MAKING  Nursing notes, VS, PMSFHx reviewed in chart.     Review of past medical records shows patient has a history of CLL. She was admitted 12/2017 for thyroidectomy.    10:54 AM - Patient seen and examined at bedside. Patient was already evaluated by Dr. Bacon (neurology) who initiated full stroke protocol. Patient taken to CT. Patient will be treated with dilaudid 0.5 mg, compazine 10 mg. Ordered CT cerebral perfusion analysis, DX chest, CT head, CT-CTA head with and w/o post process, CT-CTA neck with and w/o post processing, [facility] sed rate,  urinalysis, PT, APTT, CBC, CMP, troponin, estimated GFR to evaluate her symptoms. The differential diagnoses include but are not limited to: CVA, ICH     11:38 AM - I discussed the patient's case and the above findings with Dr. Bacon (neurology). He states the patient is not a candidate for IV Alteplase for stroke, as he determined patient's NIH to be 0. He states patient's main complaint is the headache, and he will order an MRI.    12:46 PM Patient reevaluated at bedside. Patient reports having a pounding headache which started this morning. She has been getting these brief episodes of headaches 1-2 times per week since having a thyroidectomy on 12/2017. Advised patient to follow up with neurology for evaluation of an abnormal finding on CTA brain. Discussed lab and radiology results as seen above. Discussed further plan of care which includes MRI evaluation and pain medication. Patient understands and agrees.     12:54 PM Paged hospitalist    1:00 PM I discussed the patient's case and the above findings with Dr. Apple (hospitalist) who will admit the patient.         DISPOSITION:  Patient will be admitted to Dr. Apple in stable condition.     FINAL IMPRESSION  1. Slurred speech    2. Acute nonintractable headache, unspecified headache type    3. CLL (chronic lymphocytic leukemia) (CMS-[facility])          Archie MEADOWS (Scribe), am scribing for, and in the presence of, Pipe Poe M.D..    Electronically signed by: Archie Yun (Marcial), 1/30/2018    Pipe MEADOWS M.D. personally performed the services described in this documentation, as scribed by Archie Yun in my presence, and it is both accurate and complete.    The note accurately reflects work and decisions made by me.  Pipe Poe  1/30/2018  3:18 PM

## 2018-01-30 NOTE — CONSULTS
DATE OF SERVICE:  01/30/2018    CHIEF COMPLAINT:  Headache.    HISTORY OF PRESENT ILLNESS:  I am asked by Dr. Pipe Poe in [facility]   emergency room to see the patient.  This is a 77-year-old woman who was   brought in as a code stroke.  She was at work at approximately 10:30 a.m. and   developed a severe left temporal headache.  She had difficulty with speech and   was felt to be weak diffusely.  When initially seen prior to the CT scan, she   had difficulty following commands and her speech seems somewhat garbled.  She   complained of a headache.  CT scan revealed no acute intracranial   abnormality.  CTA and CT perfusion scans are pending.  Upon being brought back   to the emergency room, her speech had cleared.  She had normal speech and her   NIH stroke scale was 0.  The patient has a history of CLL and has elevated   white count.  She recently was diagnosed with a breast mass, which is being   worked up.    PAST MEDICAL HISTORY:  1.  CLL.  2.  Breast mass in process of being worked up.  3.  Status post thyroidectomy, November 2017.  4.  Lung cancer 2 years ago.  5.  Type 2 diabetes.  6.  Hypertension.    MEDICATIONS:  Aspirin, vitamin D, Celexa, Glucotrol, Synthroid, and Ativan.    ALLERGIES:  CODEINE, LIPITOR, LOVASTATIN, ZOCOR, LISINOPRIL, AND METFORMIN.    SOCIAL HISTORY:  She does not smoke.    FAMILY HISTORY:  Noncontributory.    REVIEW OF SYSTEMS:  As above.    PHYSICAL EXAMINATION:  GENERAL:  The patient is a cooperative woman who is alert.  Her speech is   fluent and she follows simple commands well.  She complains of a left temporal   headache.  HEENT:  Normocephalic.  Pupils equal, round, and reactive.  EOMs are full.    Visual fields are full and optic disks are flat.  NECK:  Temporal arteries were normal to palpation.  TMJ exam is negative.  NEUROLOGIC:  She has no drift and no dysmetria.  Motor testing reveals normal   bulk, tone, and strength throughout.  Sensory testing is symmetric.  Reflexes    are symmetric and normoactive with downgoing toes.  Cranial nerves are   unremarkable.    IMPRESSION:  The patient is a 77-year-old woman who presented as a code   stroke.  She developed an intense acute onset headache earlier today while at   work.  Currently, her speech is normal.  Her NIH stroke scale is 0.  She has a   2 mm prominence of the distal anterior cerebral artery on CTA of the [place]   [place], which was felt to represent a mild ectasia or aneurysm.  Consult to   neural interventional radiology on a non-emergent basis was recommended.  She   does not have subarachnoid blood on her CT scan.  She will have a sed rate.    She will have an MRI of the brain with and without gadolinium.  She has a   history of multiple malignancies, but the onset of the headache was acute and   not consistent with meningeal carcinomatosis or another meningeal process.  I   have discussed the case with Dr. Poe.       ____________________________________     G MD BENNY GODOY / WANDA    DD:  01/30/2018 11:47:21  DT:  01/30/2018 13:00:01    D#:  [id number]  Job#:  [id number]

## 2018-01-30 NOTE — ED NOTES
Tolerating mouth swabs, instructed to use only on lips until swallow eval complete. Pending swallow eval for PO meds.

## 2018-01-30 NOTE — DISCHARGE PLANNING
MSW was notified that pt came in from work with possible stroke symptoms. Pt didn't have her cell phone and states she would like her friend Brittany Miles contacted. In previous chart Brittany's phone number is listed #[phone]. MSW called Brittany and she is already aware that pt is here and she is on her way to the ER.

## 2018-01-30 NOTE — ED NOTES
Able to assist to bedside commode, slightly unsteady on her feet, but able to move all extremities equally. Speech slow, but answering questions.

## 2018-01-31 PROBLEM — G43.009 ATYPICAL MIGRAINE: Status: ACTIVE | Noted: 2018-01-30

## 2018-01-31 LAB
ANION GAP SERPL CALC-SCNC: 9 MMOL/L (ref 0–11.9)
BUN SERPL-MCNC: 17 MG/DL (ref 8–22)
CALCIUM SERPL-MCNC: 8.4 MG/DL (ref 8.5–10.5)
CHLORIDE SERPL-SCNC: 103 MMOL/L (ref 96–112)
CHOLEST SERPL-MCNC: 145 MG/DL (ref 100–199)
CO2 SERPL-SCNC: 27 MMOL/L (ref 20–33)
CREAT SERPL-MCNC: 0.77 MG/DL (ref 0.5–1.4)
DEPRECATED D DIMER PPP IA-ACNC: 223 NG/ML(D-DU)
EKG IMPRESSION: NORMAL
ERYTHROCYTE [DISTWIDTH] IN BLOOD BY AUTOMATED COUNT: 43.8 FL (ref 35.9–50)
GLUCOSE SERPL-MCNC: 74 MG/DL (ref 65–99)
HCT VFR BLD AUTO: 36.5 % (ref 37–47)
HDLC SERPL-MCNC: 43 MG/DL
HGB BLD-MCNC: 12 G/DL (ref 12–16)
LDLC SERPL CALC-MCNC: 92 MG/DL
MCH RBC QN AUTO: 29.1 PG (ref 27–33)
MCHC RBC AUTO-ENTMCNC: 32.9 G/DL (ref 33.6–35)
MCV RBC AUTO: 88.6 FL (ref 81.4–97.8)
PLATELET # BLD AUTO: 336 K/UL (ref 164–446)
PMV BLD AUTO: 10 FL (ref 9–12.9)
POTASSIUM SERPL-SCNC: 3.6 MMOL/L (ref 3.6–5.5)
RBC # BLD AUTO: 4.12 M/UL (ref 4.2–5.4)
SODIUM SERPL-SCNC: 139 MMOL/L (ref 135–145)
TRIGL SERPL-MCNC: 49 MG/DL (ref 0–149)
WBC # BLD AUTO: 28.2 K/UL (ref 4.8–10.8)

## 2018-01-31 PROCEDURE — 302135 SEQUENTIAL COMPRESSION MACHINE: Performed by: HOSPITALIST

## 2018-01-31 PROCEDURE — G8998 SWALLOW D/C STATUS: HCPCS | Mod: CH

## 2018-01-31 PROCEDURE — 770020 HCHG ROOM/CARE - TELE (206)

## 2018-01-31 PROCEDURE — 700111 HCHG RX REV CODE 636 W/ 250 OVERRIDE (IP): Performed by: INTERNAL MEDICINE

## 2018-01-31 PROCEDURE — 96367 TX/PROPH/DG ADDL SEQ IV INF: CPT

## 2018-01-31 PROCEDURE — 700105 HCHG RX REV CODE 258: Performed by: SPECIALIST

## 2018-01-31 PROCEDURE — 700102 HCHG RX REV CODE 250 W/ 637 OVERRIDE(OP): Performed by: INTERNAL MEDICINE

## 2018-01-31 PROCEDURE — G8978 MOBILITY CURRENT STATUS: HCPCS | Mod: CJ

## 2018-01-31 PROCEDURE — 700111 HCHG RX REV CODE 636 W/ 250 OVERRIDE (IP): Performed by: SPECIALIST

## 2018-01-31 PROCEDURE — 92610 EVALUATE SWALLOWING FUNCTION: CPT

## 2018-01-31 PROCEDURE — 700102 HCHG RX REV CODE 250 W/ 637 OVERRIDE(OP): Performed by: HOSPITALIST

## 2018-01-31 PROCEDURE — G8987 SELF CARE CURRENT STATUS: HCPCS | Mod: CJ

## 2018-01-31 PROCEDURE — G8997 SWALLOW GOAL STATUS: HCPCS | Mod: CH

## 2018-01-31 PROCEDURE — 80061 LIPID PANEL: CPT

## 2018-01-31 PROCEDURE — A9270 NON-COVERED ITEM OR SERVICE: HCPCS | Performed by: INTERNAL MEDICINE

## 2018-01-31 PROCEDURE — G8996 SWALLOW CURRENT STATUS: HCPCS | Mod: CH

## 2018-01-31 PROCEDURE — A9270 NON-COVERED ITEM OR SERVICE: HCPCS | Performed by: HOSPITALIST

## 2018-01-31 PROCEDURE — 700111 HCHG RX REV CODE 636 W/ 250 OVERRIDE (IP): Performed by: HOSPITALIST

## 2018-01-31 PROCEDURE — G8979 MOBILITY GOAL STATUS: HCPCS | Mod: CI

## 2018-01-31 PROCEDURE — 97161 PT EVAL LOW COMPLEX 20 MIN: CPT

## 2018-01-31 PROCEDURE — 85379 FIBRIN DEGRADATION QUANT: CPT

## 2018-01-31 PROCEDURE — 97166 OT EVAL MOD COMPLEX 45 MIN: CPT

## 2018-01-31 PROCEDURE — 99233 SBSQ HOSP IP/OBS HIGH 50: CPT | Performed by: INTERNAL MEDICINE

## 2018-01-31 PROCEDURE — 700105 HCHG RX REV CODE 258: Performed by: INTERNAL MEDICINE

## 2018-01-31 PROCEDURE — G8988 SELF CARE GOAL STATUS: HCPCS | Mod: CI

## 2018-01-31 PROCEDURE — 85027 COMPLETE CBC AUTOMATED: CPT

## 2018-01-31 PROCEDURE — 80048 BASIC METABOLIC PNL TOTAL CA: CPT

## 2018-01-31 PROCEDURE — 96366 THER/PROPH/DIAG IV INF ADDON: CPT

## 2018-01-31 RX ORDER — AMOXICILLIN AND CLAVULANATE POTASSIUM 875; 125 MG/1; MG/1
1 TABLET, FILM COATED ORAL EVERY 12 HOURS
Status: DISCONTINUED | OUTPATIENT
Start: 2018-01-31 | End: 2018-02-01

## 2018-01-31 RX ORDER — SENNOSIDES A AND B 8.6 MG/1
8.6 TABLET, FILM COATED ORAL
Status: DISCONTINUED | OUTPATIENT
Start: 2018-01-31 | End: 2018-02-02 | Stop reason: HOSPADM

## 2018-01-31 RX ORDER — PREDNISONE 20 MG/1
20 TABLET ORAL DAILY
Status: DISCONTINUED | OUTPATIENT
Start: 2018-01-31 | End: 2018-02-01

## 2018-01-31 RX ORDER — DOCUSATE SODIUM 100 MG/1
100 CAPSULE, LIQUID FILLED ORAL 2 TIMES DAILY
Status: DISCONTINUED | OUTPATIENT
Start: 2018-02-01 | End: 2018-02-02 | Stop reason: HOSPADM

## 2018-01-31 RX ADMIN — PREDNISONE 20 MG: 20 TABLET ORAL at 10:10

## 2018-01-31 RX ADMIN — AMPICILLIN SODIUM AND SULBACTAM SODIUM 3 G: 2; 1 INJECTION, POWDER, FOR SOLUTION INTRAMUSCULAR; INTRAVENOUS at 00:13

## 2018-01-31 RX ADMIN — ACETAMINOPHEN 650 MG: 325 TABLET, FILM COATED ORAL at 12:26

## 2018-01-31 RX ADMIN — CITALOPRAM HYDROBROMIDE 20 MG: 20 TABLET ORAL at 10:10

## 2018-01-31 RX ADMIN — AMOXICILLIN AND CLAVULANATE POTASSIUM 1 TABLET: 875; 125 TABLET, FILM COATED ORAL at 10:10

## 2018-01-31 RX ADMIN — DOXYCYCLINE 100 MG: 100 TABLET ORAL at 10:10

## 2018-01-31 RX ADMIN — AMPICILLIN SODIUM AND SULBACTAM SODIUM 3 G: 2; 1 INJECTION, POWDER, FOR SOLUTION INTRAMUSCULAR; INTRAVENOUS at 05:55

## 2018-01-31 RX ADMIN — VALPROATE SODIUM 259 MG: 100 INJECTION, SOLUTION INTRAVENOUS at 20:37

## 2018-01-31 RX ADMIN — LACTOBACILLUS ACIDOPHILUS / LACTOBACILLUS BULGARICUS 1 PACKET: 100 MILLION CFU STRENGTH GRANULES at 18:03

## 2018-01-31 RX ADMIN — GLIPIZIDE 5 MG: 5 TABLET ORAL at 10:11

## 2018-01-31 RX ADMIN — LORAZEPAM 1 MG: 2 INJECTION INTRAMUSCULAR; INTRAVENOUS at 20:37

## 2018-01-31 RX ADMIN — FENOFIBRATE 134 MG: 67 CAPSULE ORAL at 10:11

## 2018-01-31 RX ADMIN — BUTALBITAL, ACETAMINOPHEN, AND CAFFEINE 1 TABLET: 50; 325; 40 TABLET ORAL at 10:11

## 2018-01-31 RX ADMIN — BUTALBITAL, ACETAMINOPHEN, AND CAFFEINE 1 TABLET: 50; 325; 40 TABLET ORAL at 04:13

## 2018-01-31 RX ADMIN — ASPIRIN 325 MG: 325 TABLET ORAL at 10:11

## 2018-01-31 RX ADMIN — LEVOTHYROXINE SODIUM 150 MCG: 75 TABLET ORAL at 05:54

## 2018-01-31 RX ADMIN — BUTALBITAL, ACETAMINOPHEN, AND CAFFEINE 1 TABLET: 50; 325; 40 TABLET ORAL at 17:59

## 2018-01-31 RX ADMIN — VALPROATE SODIUM 259 MG: 100 INJECTION, SOLUTION INTRAVENOUS at 15:39

## 2018-01-31 RX ADMIN — LACTOBACILLUS ACIDOPHILUS / LACTOBACILLUS BULGARICUS 1 PACKET: 100 MILLION CFU STRENGTH GRANULES at 10:11

## 2018-01-31 RX ADMIN — LACTOBACILLUS ACIDOPHILUS / LACTOBACILLUS BULGARICUS 1 PACKET: 100 MILLION CFU STRENGTH GRANULES at 13:30

## 2018-01-31 RX ADMIN — AMOXICILLIN AND CLAVULANATE POTASSIUM 1 TABLET: 875; 125 TABLET, FILM COATED ORAL at 20:37

## 2018-01-31 RX ADMIN — DOXYCYCLINE 100 MG: 100 TABLET ORAL at 20:36

## 2018-01-31 ASSESSMENT — COGNITIVE AND FUNCTIONAL STATUS - GENERAL
WALKING IN HOSPITAL ROOM: A LITTLE
DRESSING REGULAR UPPER BODY CLOTHING: A LITTLE
TOILETING: A LITTLE
EATING MEALS: A LITTLE
DRESSING REGULAR LOWER BODY CLOTHING: A LITTLE
MOVING FROM LYING ON BACK TO SITTING ON SIDE OF FLAT BED: A LITTLE
MOBILITY SCORE: 20
SUGGESTED CMS G CODE MODIFIER DAILY ACTIVITY: CK
CLIMB 3 TO 5 STEPS WITH RAILING: A LITTLE
PERSONAL GROOMING: A LITTLE
DAILY ACTIVITIY SCORE: 18
SUGGESTED CMS G CODE MODIFIER MOBILITY: CJ
HELP NEEDED FOR BATHING: A LITTLE
STANDING UP FROM CHAIR USING ARMS: A LITTLE

## 2018-01-31 ASSESSMENT — ACTIVITIES OF DAILY LIVING (ADL): TOILETING: INDEPENDENT

## 2018-01-31 ASSESSMENT — PAIN SCALES - GENERAL
PAINLEVEL_OUTOF10: 3
PAINLEVEL_OUTOF10: 0
PAINLEVEL_OUTOF10: 3
PAINLEVEL_OUTOF10: 6

## 2018-01-31 ASSESSMENT — COPD QUESTIONNAIRES
COPD SCREENING SCORE: 4
DO YOU EVER COUGH UP ANY MUCUS OR PHLEGM?: NO/ONLY WITH OCCASIONAL COLDS OR INFECTIONS
HAVE YOU SMOKED AT LEAST 100 CIGARETTES IN YOUR ENTIRE LIFE: YES
DURING THE PAST 4 WEEKS HOW MUCH DID YOU FEEL SHORT OF BREATH: NONE/LITTLE OF THE TIME

## 2018-01-31 ASSESSMENT — GAIT ASSESSMENTS
DISTANCE (FEET): 150
DEVIATION: ATAXIC;DECREASED BASE OF SUPPORT;SHUFFLED GAIT;DECREASED HEEL STRIKE;DECREASED TOE OFF
GAIT LEVEL OF ASSIST: MINIMAL ASSIST
ASSISTIVE DEVICE: FRONT WHEEL WALKER

## 2018-01-31 ASSESSMENT — LIFESTYLE VARIABLES: EVER_SMOKED: YES

## 2018-01-31 NOTE — THERAPY
"  Speech Language Therapy Clinical Swallow Evaluation completed.  Functional Status: Fxnl swallow for a regular diet without restrictions in a pt with resolving TIA and episode of acute resp failure on 1/30.  Of note, pt with recent thryoidectomy w/ 'monitor of recurrent laryngeal nerve.'  Pt ed re s/s of dysphagia.  Recommendations - Diet: Diet / Liquid Recommendation: Regular                          Strategies: Head of Bed at 90 Degrees                          Medication Administration: Medication Administration : Whole with Liquid Wash  Plan of Care: Patient with no further skilled SLP needs in the acute care setting at this time  Post-Acute Therapy: Currently anticipate no further skilled therapy needs once patient is discharged from the inpatient setting.    See \"Rehab Therapy-Acute\" Patient Summary Report for complete documentation.   "

## 2018-01-31 NOTE — PROGRESS NOTES
Pt ambulated to bathroom w/ walker and then back to room. SPO2 on room air while ambulating sustained greater than 92%. Pt became dizzy a couple of times while walking. RN notified.

## 2018-01-31 NOTE — ASSESSMENT & PLAN NOTE
Could also be with stridor  Wheezing  No formal diagnosis of asthma or COPD  Rapid was called  Resp and O2 per protocol  Start Solumedrol for now  Improved now, switch to PO prednisone and prob just do a burst course

## 2018-01-31 NOTE — THERAPY
"Occupational Therapy Evaluation completed.   Functional Status:  CG  Plan of Care: Will benefit from Occupational Therapy 3 times per week  Discharge Recommendations:  Equipment: Front-Wheel Walker, shower chair. Post-acute therapy Discharge to home with outpatient or home health for additional skilled therapy services.    Patient presents with TIA at work comprised of speech, visual, and facial deficits and increased confusion. Hospital stay complicated by rapid response 2/2 SOB , low o2, and increased BP. Patient reports I with ADLs, IADLs, and mobility PTA. Upon eval, patient presents with decreased balance, endurance, tolerance, L side strength, ADLs and mobility necessitating CG with ADLs and mobility with FWW. Patient would benefit from skilled OT in this setting followed by home with assist and services.     Patient agreeable to arrange schedule with friends for S/A and reports unable to have someone stay with her due to small apartment space. May be open to consider staying with friends.     See \"Rehab Therapy-Acute\" Patient Summary Report for complete documentation.    "

## 2018-01-31 NOTE — PROGRESS NOTES
[facility] Hospitalist Progress Note    Date of Service: 2018    Chief Complaint  77 y.o. female admitted 2018 with Possible Stroke    Interval Problem Update  Feeling better today from a respiratory standpoint  So far morning blood pressures ok    Consultants/Specialty      Disposition  PT/OT felt she needs 24/7 assist at home, so work for SNF.       Review of Systems   Unable to perform ROS: Medical condition      Physical Exam  Laboratory/Imaging   Hemodynamics  Temp (24hrs), Av.4 °C (97.5 °F), Min:36.1 °C (96.9 °F), Max:36.8 °C (98.2 °F)   Temperature: 36.7 °C (98.1 °F)  Pulse  Av.5  Min: 60  Max: 102 Heart Rate (Monitored): 66  Blood Pressure : 108/57, NIBP: 107/49      Respiratory      Respiration: 16, Pulse Oximetry: 96 %, O2 Daily Delivery Respiratory : Room Air with O2 Available     Given By:: Mouthpiece, Work Of Breathing / Effort: Increased Work of Breathing  RUL Breath Sounds: Absent, RML Breath Sounds: Clear, RLL Breath Sounds: Diminished, JERED Breath Sounds: Clear, LLL Breath Sounds: Diminished    Fluids    Intake/Output Summary (Last 24 hours) at 18 1020  Last data filed at 18 1900   Gross per 24 hour   Intake              300 ml   Output                0 ml   Net              300 ml       Nutrition  Orders Placed This Encounter   Procedures   • DIET ORDER     Standing Status:   Standing     Number of Occurrences:   1     Order Specific Question:   Diet:     Answer:   2 Gram Sodium [7]     Order Specific Question:   Macronutrient modifications:     Answer:   Low Cholesterol [7]     Physical Exam   Constitutional: She appears well-developed and well-nourished.   HENT:   Head: Normocephalic and atraumatic.   Eyes: Conjunctivae and EOM are normal. No scleral icterus.   Neck: Normal range of motion. Neck supple.   Cardiovascular: Normal rate and regular rhythm.  Exam reveals no gallop and no friction rub.    No murmur heard.  Pulmonary/Chest: Effort normal. No respiratory  distress. She has wheezes (occasional now). She has no rales.   Abdominal: Soft. Bowel sounds are normal. She exhibits no distension. There is no tenderness. There is no rebound and no guarding.   Musculoskeletal: She exhibits no edema or tenderness.   Neurological: She is alert.   Skin: Skin is warm.   Psychiatric: She has a normal mood and affect. Her behavior is normal.       Recent Labs      01/30/18   1050   WBC  26.7*   RBC  4.37   HEMOGLOBIN  12.0   HEMATOCRIT  38.7   MCV  88.6   MCH  27.5   MCHC  31.0*   RDW  44.7   PLATELETCT  360   MPV  9.6     Recent Labs      01/30/18   1050   SODIUM  137   POTASSIUM  3.7   CHLORIDE  104   CO2  25   GLUCOSE  86   BUN  13   CREATININE  0.63   CALCIUM  9.2     Recent Labs      01/30/18   1050   APTT  26.7   INR  0.99     Recent Labs      01/30/18   1852   BNPBTYPENAT  110*     Recent Labs      01/31/18   0409   TRIGLYCERIDE  49   HDL  43   LDL  92          Assessment/Plan     * TIA (transient ischemic attack)- (present on admission)   Assessment & Plan    Presented with facial droop and slurred speech  Complicated by stridor and/or wheezing necessitating rapid response later in the day  Found to become suddenly hypertenisve. Admission CXR might have edema.  Continue TIA work-up but ease up on permissive hypertension as hypertensive emergency        Hypertensive emergency- (present on admission)   Assessment & Plan     and above  Admission CXR did show opacities which can be edema or infiltrates  Repeat CXR showed interstitial edema, Echo normal EF, flash pulmonary edema consistent with hypertensive emergency  Organ dysfunction is acute respiratory failure and mild pulmonary edema  On my eval, does not appear to be active stroke; discontinue permissive hypertension   PRN hydralazine and one dose Lasix          Sepsis (CMS-HCC)- (present on admission)   Assessment & Plan    This is severe sepsis with the following associated acute organ dysfunction(s): acute  respiratory failure.   May have aspirated, some findings of pneumonia  NOT hypotensive, avoid IVF; may have hypertensive emergency  Antibiotics, blood cultures.  Procalcitonin low.  Resolved.        Acute respiratory failure with hypoxia (CMS-HCC)- (present on admission)   Assessment & Plan    Rapid called. Stridor and wheezing  Resp and O2 per protocol        RAD (reactive airway disease) with wheezing- (present on admission)   Assessment & Plan    Could also be with stridor  Wheezing  No formal diagnosis of asthma or COPD  Rapid was called  Resp and O2 per protocol  Start Solumedrol for now  Improved now, switch to PO prednisone and prob just do a burst course        CAP (community acquired pneumonia)- (present on admission)   Assessment & Plan    Findings on admission CXR  Profound leukocytosis  At the time of the rapid, she is NOT hypotensive. She is very hypertensive. Giving fluids will worsen hypertensive emergency.   Could have aspirated  Repeat CXR show possible edema rather than consolidation, procalcitonin was actually 0.05  Downgrade antibiotics to PO and do only 5d course        CLL (chronic lymphocytic leukemia)- wbc= 20k-30k, since 2006; no rx; oncologist to follow- (present on admission)   Assessment & Plan    Baseline leukocytosis is in the 20s.  Currently she is at [facility] but treating possible infection nonetheless          I spent 36 minutes, reviewing the chart, notes, vitals, labs, imaging, ordering labs, evaluating Clarisse Reeves for assessment, enacting the plan above. 50% of the time was spent in counseling Clarisse Reeves andanswering questions. Time was devoted to counseling and coordinating care including review of records, pertinent lab data and studies, as well as discussing diagnostic evaluation and work up, planned therapeutic interventions and future disposition of care. Where indicated, the assessment and plan reflect discussion of patient with consultants, other healthcare  providers, family members, and additional research needed to obtain further information in formulating the plan of care for Clarisse Reeves.     Quality-Core Measures   Pharmacologic

## 2018-01-31 NOTE — PROGRESS NOTES
Neurology Progress Note               Author: SIDDHARTH Bacon Date & Time created: 2018  1:16 PM     Interval History:  No new events.  Now says she had a headache with visual scinillations with the headache yesterday.     MRI is unremarkable.  CTA and ECHO are ok.    Review of Systems:  ROS    Physical Exam:  Physical Exam   Neurological:   Alert, conversant.  PERRL, VF full, full eom's.  Motor, are =. DTR       Labs:        Invalid input(s): [id number]  Recent Labs      18   1050  18   1852   TROPONINI  <0.01   --    BNPBTYPENAT   --   110*     Recent Labs      18   1050   SODIUM  137   POTASSIUM  3.7   CHLORIDE  104   CO2  25   BUN  13   CREATININE  0.63   CALCIUM  9.2     Recent Labs      18   1050   ALTSGPT  11   ASTSGOT  12   ALKPHOSPHAT  67   TBILIRUBIN  0.4   GLUCOSE  86     Recent Labs      18   1050   RBC  4.37   HEMOGLOBIN  12.0   HEMATOCRIT  38.7   PLATELETCT  360   PROTHROMBTM  12.8   APTT  26.7   INR  0.99     Recent Labs      18   1050   WBC  26.7*   NEUTSPOLYS  15.60*   LYMPHOCYTES  82.60*   MONOCYTES  0.90   EOSINOPHILS  0.90   BASOPHILS  0.00   ASTSGOT  12   ALTSGPT  11   ALKPHOSPHAT  67   TBILIRUBIN  0.4     Recent Labs      18   1050   SODIUM  137   POTASSIUM  3.7   CHLORIDE  104   CO2  25   GLUCOSE  86   BUN  13   CREATININE  0.63   CALCIUM  9.2     Hemodynamics:  Temp (24hrs), Av.3 °C (97.4 °F), Min:36.1 °C (96.9 °F), Max:36.7 °C (98.1 °F)  Temperature: 36.7 °C (98.1 °F)  Pulse  Av.5  Min: 60  Max: 102Heart Rate (Monitored): 66  Blood Pressure : 133/63, NIBP: 107/49     Respiratory:    Respiration: 14, Pulse Oximetry: 97 %, O2 Daily Delivery Respiratory : Room Air with O2 Available     Given By:: Mouthpiece, Work Of Breathing / Effort: Increased Work of Breathing  RUL Breath Sounds: Absent, RML Breath Sounds: Clear, RLL Breath Sounds: Diminished, JERED Breath Sounds: Clear, LLL Breath Sounds: Diminished  Fluids:    Intake/Output Summary  (Last 24 hours) at 01/31/18 1316  Last data filed at 01/30/18 1900   Gross per 24 hour   Intake              300 ml   Output                0 ml   Net              300 ml     Weight: 88.6 kg (195 lb 5.2 oz)  GI/Nutrition:  Orders Placed This Encounter   Procedures   • DIET ORDER     Standing Status:   Standing     Number of Occurrences:   1     Order Specific Question:   Diet:     Answer:   2 Gram Sodium [7]     Order Specific Question:   Macronutrient modifications:     Answer:   Low Cholesterol [7]     Medical Decision Making, by Problem:  Active Hospital Problems    Diagnosis   • *TIA (transient ischemic attack) [G45.9]   • Hypertensive emergency [I16.1]   • CLL (chronic lymphocytic leukemia)- wbc= 20k-30k, since 2006; no rx; oncologist to follow [C91.10]   • CAP (community acquired pneumonia) [J18.9]   • RAD (reactive airway disease) with wheezing [J45.909]   • Acute respiratory failure with hypoxia (CMS-HCC) [J96.01]   • Sepsis (CMS-[facility], located [facility]) [A41.9]       Plan:  Episode yesterday may well have been a migraine with visual scinillations.  Stroke w/u is negative.  Residual headache, will start valproate in hopes of breaking the migraine.    Quality-Core Measures

## 2018-01-31 NOTE — ASSESSMENT & PLAN NOTE
- Improved, blood pressure better controlled.  - Resume losartan, and continue as needed IV hydralazine for significant hypertension. Continue to monitor blood pressure trend, adjust medications or add another agent if blood pressure increases again.

## 2018-01-31 NOTE — ASSESSMENT & PLAN NOTE
- No seven consolidation/infiltrates on chest x-ray, with improvement with Lasix. Procalcitonin low. No symptoms of pneumonia. Leukocytosis likely from her CLL.   - Discontinue antibiotics.

## 2018-01-31 NOTE — RESPIRATORY CARE
COPD EDUCATION by COPD CLINICAL EDUCATOR  1/31/2018 at 6:55 AM by Alissa Méndez     Patient reviewed by COPD education team. Patient does not qualify for COPD program.

## 2018-01-31 NOTE — THERAPY
"Physical Therapy Evaluation completed.   Bed Mobility:  Supine to Sit: Supervised  Transfers: Sit to Stand: Stand by Assist  Gait: Level Of Assist: Minimal Assist (without FWW), Contact Guard Assist with Front-Wheel Walker       Plan of Care: Will benefit from Physical Therapy 3 times per week and Plan to complete next treatment by Friday 2/2  Discharge Recommendations: Equipment: Front-Wheel Walker. Post-acute therapy Discharge to home with outpatient or home health for additional skilled therapy services.    Pt is a 77 year old female admitted to the hospital for weakness, facial droop and difficulty speaking. Pt reports that prior to admit, she was independent with mobility and ADl's. At time of initial evaluation, pt presents with decreased functional mobility, decreased functional strength, decreased coordination, decreased static and dynamic balance, difficulty ambulating and poor activity tolerance. Pt ambulated without FWW with narrow DO, ataxic gait. Pt was unable to maintain balance with visual scanning of environment or directional changes. Significant improvements with balance with FWW. Pt would benefit from skilled PT intervention while in the acute care setting to address the listed deficits and improve mobility prior to DC. Pt will need post acute therapy, home health vs outpatient. At this point, feel DC home is safe only if pt has 24/7 assist at home due to balance and coordination deficits. Pt will need FWW for home    See \"Rehab Therapy-Acute\" Patient Summary Report for complete documentation.     "

## 2018-01-31 NOTE — ASSESSMENT & PLAN NOTE
- With note of pulmonary edema, with significantly elevated blood pressure initially. Resolved with Lasix. Initial wheezing can be explained by pulmonary edema as well. I don't think she has reactive airway disease.  - Resolved. Doing well on room air. Stop prednisone.  - Controlled blood pressure, resume losartan.  - Continue as needed respiratory support with RT protocol, and as needed oxygen supplement.

## 2018-01-31 NOTE — CODE DOCUMENTATION
RRT team to bedside, pt going into periods of upper airway difficulties. PT with stridor. Pt with 10/10 headache on rt side exacerbated by bouts of stridor. MD at bedside. RRT to bedside.

## 2018-01-31 NOTE — ASSESSMENT & PLAN NOTE
- This is ruled out, no signs of infection. Profound leukocytosis from CLL.  - Discontinue antibiotics.

## 2018-01-31 NOTE — H&P
"Hospital Medicine History and Physical    Date of Service  1/30/2018    Chief Complaint  Chief Complaint   Patient presents with   • Possible Stroke       History of Presenting Illness  77 y.o. female who presented 1/30/2018 with facial droop and slurred speech  See Dr. Apple's obs HnP for TIA work-up  She was being ruled out for TIA with permissive hypertension.  Not actively having facial droop or dysphasia  Rapid called at 530PM, wheezing, acute hypoxia  SBP 200s  I assessed her myself  Of note, admission CXR showed some opacities and white count was 27K  Audible wheezes and she seemed somewhat cyanotic  Neurologically she seems to be nonfocal  Friend or family at bedside  I ordered Lasix and she improved but still had intermittent bouts of wheezing/stridor.    Primary Care Physician  Siva Smart M.D.    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  Denies symptoms.   • DM (diabetes mellitus) (CMS-HCC)    • Hiatus hernia syndrome     no surgery   • Hypertension     \"In the past\"   • Indigestion    • MEDICAL HOME    • Thyroid disease    • Type II or unspecified type diabetes mellitus without mention of complication, not stated as uncontrolled     pre-diabetic       Surgical History  Past Surgical History:   Procedure Laterality Date   • THYROIDECTOMY N/A 11/29/2017    Procedure: THYROIDECTOMY COMPLETION, RECURRENT LARYNGEAL NERVE MONITORING;  Surgeon: Cameron Marcelino M.D.;  Location: " SURGERY SAME DAY [facility];  Service: General   • THORACOSCOPY Right 2/1/2016    Procedure: THORACOSCOPY Upper Lobectomy ;  Surgeon: John H Ganser, M.D.;  Location: SURGERY [facility];  Service:    • NODE DISSECTION Right 2/1/2016    Procedure: NODE DISSECTION;  Surgeon: John H Ganser, M.D.;  Location: SURGERY [facility];  Service:    • RECOVERY  12/18/2015    Procedure: CT-SCP-RUL LUNG BIOPSY-;  Surgeon: Recoveryon Surgery;  [place] [facility] [place]:    • OTHER  2001    Lower Left segment of lung removed    • CHOLECYSTECTOMY  1995   • OTHER ORTHOPEDIC SURGERY  1990    bakers cyst removed in right knee, multiple scopes   • OTHER  1990    hemmroid removal   • OTHER  1984    left Thyroid removed, might remove right side in the future   • APPENDECTOMY  1955   • CATARACT EXTRACTION WITH IOL     • TONSILLECTOMY     • US-NEEDLE CORE BX-BREAST PANEL     • VAGINAL HYSTERECTOMY TOTAL      Hysterectomy,Total Vaginal       Medications  No current facility-administered medications on file prior to encounter.      Current Outpatient Prescriptions on File Prior to Encounter   Medication Sig Dispense Refill   • glipiZIDE (GLUCOTROL) 5 MG Tab Take 1 Tab by mouth every day. 90 Tab 4   • fenofibrate (TRIGLIDE) 160 MG tablet Take 1 Tab by mouth every day. 90 Tab 4   • losartan (COZAAR) 25 MG Tab Take 1 Tab by mouth every day. 90 Tab 4   • levothyroxine (SYNTHROID) 150 MCG Tab Take 1 Tab by mouth Every morning on an empty stomach. 30 Tab o   • lorazepam (ATIVAN) 2 MG tablet Take 2 Tabs by mouth at bedtime as needed for Anxiety. 60 Tab 3   • citalopram (CELEXA) 20 MG Tab Take 1 Tab by mouth every day. 90 Tab 4   • aspirin (ASA) 81 MG Chew Tab chewable tablet Take 1 Tab by mouth every day. 100 Tab 11       Family History  Family History   Problem Relation Age of Onset   • Cancer Mother    • Lung Disease Father    • Cancer Father        Social History  Social History   Substance Use Topics    • Smoking status: Former Smoker     Packs/day: 2.00     Years: 50.00     Types: Cigarettes     Quit date: 2006   • Smokeless tobacco: Never Used      Comment: quit smoking    • Alcohol use 0.0 oz/week      Comment: ocassionaly       Allergies  Allergies   Allergen Reactions   • Codeine Hives and Nausea     RXN=40 years ago   • Lipitor [Atorvastatin Calcium] Myalgia     WRK=9110     • Lovastatin Myalgia     Muscle aches  UGU=9630   • Zocor [Simvastatin - High Dose] Myalgia     Severe myalgias  MFL=9023   • Lisinopril Cough   • Metformin      Diarrhea          Physical Exam  Laboratory   Hemodynamics  Temp (24hrs), Av.5 °C (97.7 °F), Min:36.1 °C (96.9 °F), Max:36.8 °C (98.2 °F)   Temperature: 36.1 °C (96.9 °F)  Pulse  Av.3  Min: 60  Max: 102 Heart Rate (Monitored): 66  Blood Pressure : 158/85, NIBP: 107/49      Respiratory      Respiration: (!) 25, Pulse Oximetry: 100 %             Physical Exam   Constitutional: She appears well-developed and well-nourished.   HENT:   Head: Normocephalic and atraumatic.   Eyes: Conjunctivae and EOM are normal. No scleral icterus.   Neck: Normal range of motion. Neck supple.   Cardiovascular: Normal rate and regular rhythm.  Exam reveals no gallop and no friction rub.    No murmur heard.  Pulmonary/Chest: She is in respiratory distress. She has wheezes. She has no rales.   Abdominal: Soft. Bowel sounds are normal. She exhibits no distension. There is no tenderness. There is no rebound and no guarding.   Musculoskeletal: She exhibits no edema or tenderness.   Neurological: She is alert.   Grossly seems to be nonfocal   Skin: Skin is warm. There is pallor (more slightly cyanotic).   Psychiatric: She has a normal mood and affect. Her behavior is normal.   anxious       Recent Labs      18   1050   WBC  26.7*   RBC  4.37   HEMOGLOBIN  12.0   HEMATOCRIT  38.7   MCV  88.6   MCH  27.5   MCHC  31.0*   RDW  44.7   PLATELETCT  360   MPV  9.6     Recent Labs       01/30/18   1050   SODIUM  137   POTASSIUM  3.7   CHLORIDE  104   CO2  25   GLUCOSE  86   BUN  13   CREATININE  0.63   CALCIUM  9.2     Recent Labs      01/30/18   1050   ALTSGPT  11   ASTSGOT  12   ALKPHOSPHAT  67   TBILIRUBIN  0.4   GLUCOSE  86     Recent Labs      01/30/18   1050   APTT  26.7   INR  0.99             Lab Results   Component Value Date    TROPONINI <0.01 01/30/2018     Urinalysis:    Lab Results  Component Value Date/Time   SPECGRAVITY 1.045 01/30/2018 1225   GLUCOSEUR Negative 01/30/2018 1225   KETONES Negative 01/30/2018 1225   NITRITE Negative 01/30/2018 1225   WBCURINE 0-2 01/30/2018 1225   RBCURINE 2-5 (A) 01/30/2018 1225   BACTERIA Negative 01/30/2018 1225   EPITHELCELL Few 01/30/2018 1225        Imaging  Ct-cta Head With & W/o-post Process    Result Date: 1/30/2018 1/30/2018 10:59 AM HISTORY/REASON FOR EXAM:  Right sided hand weakness, and slurred speech TECHNIQUE/EXAM DESCRIPTION: CT angiogram of the [place] without and with contrast.  Initial precontrast images were obtained of the head from the skull base through the vertex.  Postcontrast images were obtained of the [place] following the power injection of nonionic contrast at 5.0 mL/sec. Thin-section helical images were obtained with overlapping reconstruction interval. Coronal and sagittal multiplanar volume reformats were generated.  3D angiographic images were reviewed on PACS.  Maximum intensity projection (MIP) images were generated and reviewed. 100 mL of Omnipaque 350 nonionic contrast was injected intravenously. Low dose optimization technique was utilized for this CT exam including automated exposure control and adjustment of the mA and/or kV according to patient size. COMPARISON:  CT head 1/30/2018. FINDINGS: The posterior circulation shows the distal vertebral arteries to be patent. The vertebrobasilar confluence is intact. The basilar artery is patent. No aneurysm or occlusive lesion is evident. The anterior  circulation shows no stenotic or occlusive lesion. No aneurysm is evident about the [place]. There is 2 mm prominence of the distal MARISELA which could relate to mild ectasia or aneurysm (image 31 series 800 and image 169 series 8). ___________________________________     1. No hemodynamically significant narrowing of the major intracranial vessels. 2. There is 2 mm prominence of the distal MARISELA which could relate to mild ectasia or aneurysm. Consultation to neural interventional radiology on nonemergent basis if clinically indicated.    Ct-cta Neck With & W/o-post Processing    Result Date: 1/30/2018 1/30/2018 10:59 AM HISTORY/REASON FOR EXAM:  Right-sided weakness TECHNIQUE/EXAM DESCRIPTION: CT angiogram of the neck with contrast. Postcontrast images were obtained of the neck from the great vessels through the skull base following the power injection of nonionic contrast at 5.0 mL/sec. Thin-section helical images were obtained with overlapping reconstruction interval. Coronal and oblique multiplanar volume reformats were generated. Cervical internal carotid artery percent stenosis is calculated using the standard method according to the NASCET criteria wherein a segment of uniform caliber mid or distal cervical internal carotid is used as the reference denominator. 3D angiographic images were reviewed on PACS.  Maximum intensity projection (MIP) images were generated and reviewed 100 mL of Omnipaque 350 nonionic contrast was injected intravenously. Low dose optimization technique was utilized for this CT exam including automated exposure control and adjustment of the mA and/or kV according to patient size. COMPARISON:  None. FINDINGS: The arch origins of the great vessels appear intact. There is mild calcified plaque aortic arch. The right common carotid artery, cervical carotid bifurcation, and cervical internal carotid artery are within normal limits. There is no evidence of significant stenosis, thrombus,  or other filling defect or ulceration. There is no evidence of dissection or aneurysm. The left common carotid artery, cervical carotid bifurcation, and cervical internal carotid artery are within normal limits. There is no evidence of significant stenosis, thrombus, or other filling defect or ulceration. There is no evidence of dissection  or aneurysm. The cervical vertebral arteries are normal bilaterally. The neck soft tissues  in the field of view are unremarkable. Moderate cervical spine degenerative changes.     CT angiogram of the neck within normal limits.    Ct-head W/o    Result Date: 1/30/2018 1/30/2018 10:59 AM HISTORY/REASON FOR EXAM:  Right sided hand weakness, and slurred speech TECHNIQUE/EXAM DESCRIPTION AND NUMBER OF VIEWS: CT of the head without contrast. The study was performed on a helical multidetector CT scanner. Contiguous 2.5 mm axial sections were obtained from the skull base through the vertex. Up to date radiation dose reduction adjustments have been utilized to meet ALARA standards for radiation dose reduction. COMPARISON:  None available FINDINGS: There is no evidence of acute intracranial hemorrhage, mass, mass-effect or shift of midline structures. Gray-white matter differentiation is grossly preserved. Ventricle size and brain parenchymal volume are appropriate for this patient's stated age. The basal cisterns are patent. There are no abnormal extra-axial fluid collections. No depressed or widely  calvarial fracture is seen. The visualized paranasal sinuses and temporal bone structures are aerated. ___________________________________     1. No acute intracranial abnormality. No evidence of acute intracranial hemorrhage or mass lesion. 2. Please note, hyperacute ischemia can remain occult on CT. If ischemia is clinically suspected, MRI is suggested for further evaluation.    Dx-chest-limited (1 View)    Result Date: 1/30/2018 1/30/2018 12:11 PM HISTORY/REASON FOR EXAM:   Stroke symptoms. TECHNIQUE/EXAM DESCRIPTION AND NUMBER OF VIEWS: Single portable view of the chest. COMPARISON: 8/15/2017 FINDINGS: Mild interstitial prominence. Patchy bibasilar opacities. No pleural effusion. No pneumothorax. Stable cardiopericardial silhouette.     Mild interstitial prominence could relate to chronic change or mild edema. Patchy bibasilar opacities could relate to mild atelectasis.    Ct-cerebral Perfusion Analysis    Result Date: 1/30/2018 1/30/2018 10:59 AM HISTORY/REASON FOR EXAM:  STROKE. TECHNIQUE/EXAM DESCRIPTION AND NUMBER OF VIEWS: CT Cerebral Perfusion Analysis. The study was performed on a 128 slice [employer] Multidetector CT scanner. Perfusion data and corresponding time-activity curves are processed and displayed as color-coded maps in the axial plane for Cerebral Blood Flow (CBF), Cerebral Blood Volume  (CBV), T Max and Mean Transit Time (MTT) and are post processed on the Ischemia view-RAPID virtual . 100 mL of Omnipaque 350 nonionic contrast was injected intravenously. Low dose optimization technique was utilized for this CT exam including automated exposure control and adjustment of the mA and/or kV according to patient size. COMPARISON:  None. FINDINGS: CT perfusion examination over the limited sections of brain reveal that 0 mL of brain parenchyma has less than 30% of cerebral blood flow (CBF < 30%). T max greater than 6 seconds: 0 mL Mismatched volume: 0 mL T max: Greater than 10 seconds: 0 mL     1.  CT perfusion examination over the limited section of brain reveals 0 mL of brain parenchyma has less than 30% of cerebral blood flow (CBF). 2.  Please note that the cerebral perfusion was performed on the limited brain tissue around the basal ganglia region. Infarct/ischemia outside the CT perfusion sections can be missed in this study.    Echocardiogram Comp W/o Cont    Result Date: 1/30/2018  Transthoracic Echo Report Echocardiography Laboratory CONCLUSIONS Left  ventricular ejection fraction is visually estimated to be 65%. Normal regional wall motion. Indeterminate diastolic function. Mild mitral regurgitation. Estimated right ventricular systolic pressure is 38 mmHg. Echo report from 17 showed normal left ventricle function. Right ventricular systolic pressure 30 mmHg. GREG DEMARCO Exam Date:         2018                    15:24 Exam Location:     Inpatient Priority:          Routine Ordering Physician:        SIDDHARTH CASTLE Referring Physician:       TIN Johnson Sonographer:               Vlad Thornton RDCS Age:    77     Gender:    F MRN:    [id number] :    1940 BSA:    1.91   Ht (in):    66     Wt (lb):    180 Exam Type:     Complete Indications:     TIA ICD Codes:       435.9 CPT Codes:       89188 BP:   153    /   65     HR: Technical Quality:       Good MEASUREMENTS  (Male / Female) Normal Values 2D ECHO LV Diastolic Diameter PLAX        4.4 cm                4.2 - 5.9 / 3.9 - 5.3 cm LV Systolic Diameter PLAX         2.8 cm                2.1 - 4.0 cm IVS Diastolic Thickness           0.89 cm               LVPW Diastolic Thickness          0.86 cm               LVOT Diameter                     2 cm                  Estimated LV Ejection Fraction    65 %                  LV Ejection Fraction MOD BP       69.3 %                >= 55  % LV Ejection Fraction MOD 4C       71.4 %                LV Ejection Fraction MOD 2C       67.9 %                DOPPLER AV Peak Velocity                  1.7 m/s               AV Peak Gradient                  11.5 mmHg             AV Mean Gradient                  6.3 mmHg              LVOT Peak Velocity                1.1 m/s               AV Area Cont Eq vti               2.1 cm²               MV Velocity Time Integral         45.1 cm               Mitral E Point Velocity           1.1 m/s               Mitral E to A Ratio               1.1                   Mitral A Duration                 125 ms                 MV Pressure Half Time             79 ms                 MV Area PHT                       2.8 cm²               MV Deceleration Time              273 ms                TR Peak Velocity                  276 cm/s              * Indicates values subject to auto-interpretation LV EF:  65    % FINDINGS Left Ventricle Normal left ventricular chamber size. Normal left ventricular wall thickness. Normal left ventricular systolic function. Left ventricular ejection fraction is visually estimated to be 65%. Normal regional wall motion. Indeterminate diastolic function. Right Ventricle Normal right ventricular size and systolic function. Right Atrium Normal right atrial size. Normal inferior vena cava size and inspiratory collapse. Left Atrium Moderately dilated left atrium. Left atrial volume index is 46 mL/sq m. Mitral Valve Structurally normal mitral valve. No mitral stenosis. Mild mitral regurgitation. Aortic Valve Structurally normal aortic valve. No aortic stenosis. No aortic insufficiency. Tricuspid Valve Structurally normal tricuspid valve. No tricuspid stenosis. Mild tricuspid regurgitation. Estimated right ventricular systolic pressure is 38 mmHg. Pulmonic Valve Structurally normal pulmonic valve. No pulmonic stenosis. Trace pulmonic insufficiency. Pericardium No pericardial effusion seen. Aorta Normal aortic root for body surface area. Ascending aorta diameter is 2.9 cm. Charlee Cruz M.D. (Electronically Signed) Final Date:     30 January 2018                 17:16     Assessment/Plan     I anticipate this patient will require at least two midnights for appropriate medical management, necessitating inpatient admission.    * TIA (transient ischemic attack)   Assessment & Plan    Presented with facial droop and slurred speech  Complicated by stridor and/or wheezing necessitating rapid response later in the day  Found to become suddenly hypertenisve. CXR initially might have edema.  Continue TIA work-up but ease up  on permissive hypertension (hypertensive emergency overrides)        Sepsis (CMS-HCC)   Assessment & Plan    This is severe sepsis with the following associated acute organ dysfunction(s): acute respiratory failure.   May have aspirated, some findings of pneumonia  NOT hypotensive, avoid IVF; may have hypertensive emergency  Antibiotics, blood cultures.        Acute respiratory failure with hypoxia (CMS-HCC)   Assessment & Plan    Rapid called. Stridor and wheezing        RAD (reactive airway disease) with wheezing   Assessment & Plan    Could also be with stridor  Wheezing  No formal diagnosis of asthma or COPD  Rapid was called  Resp and O2 per protocol  Start Solumedrol for now        CAP (community acquired pneumonia)   Assessment & Plan    Findings on CXR  Profound leukocytosis  At the time of the rapid, she is NOT hypotensive. She is very hypertensive. Giving fluids will worsen hypertensive emergency.   Could have aspirated.        Hypertensive emergency   Assessment & Plan     and above  CXR did show opacities which can be edema or infiltrates  Here for TIA. To me she doesn;t appear to be actively stroking  If there is hypertensive emergency and no evidence of acute stroke, will ease up on permissive hypertension  PRN hydralazine and one dose Lasix            VTE prophylaxis: pharmacolpogic.    I spent 50 minutes critical care, reviewing the chart, notes, vitals, labs, imaging, ordering labs, evaluating Clarisse Reeves for assessment, enacting the plan above. 50% of the time was spent in counseling Clarisse Reeves and family answering questions. Discussed with Dr. Apple. Medical decision making is therefore complex. Time was devoted to counseling and coordinating care including review of records, pertinent lab data and studies, as well as discussing diagnostic evaluation and work up, planned therapeutic interventions and future disposition of care. Where indicated, the assessment and plan reflect  discussion of patient with consultants, other healthcare providers, family members, and additional research needed to obtain further information in formulating the plan of care for Clarisse Reeves.

## 2018-01-31 NOTE — PROGRESS NOTES
RRT called this evening. SOB Wheezing. Low O2 sats. Elevated BP. Pt has improved. Medicated for HA. Will pass care to NOC RN @ [facility].

## 2018-02-01 LAB
ANION GAP SERPL CALC-SCNC: 10 MMOL/L (ref 0–11.9)
BUN SERPL-MCNC: 18 MG/DL (ref 8–22)
CALCIUM SERPL-MCNC: 8.1 MG/DL (ref 8.5–10.5)
CHLORIDE SERPL-SCNC: 103 MMOL/L (ref 96–112)
CO2 SERPL-SCNC: 27 MMOL/L (ref 20–33)
CREAT SERPL-MCNC: 0.8 MG/DL (ref 0.5–1.4)
ERYTHROCYTE [DISTWIDTH] IN BLOOD BY AUTOMATED COUNT: 44.5 FL (ref 35.9–50)
GLUCOSE SERPL-MCNC: 96 MG/DL (ref 65–99)
HCT VFR BLD AUTO: 35 % (ref 37–47)
HGB BLD-MCNC: 11.1 G/DL (ref 12–16)
MCH RBC QN AUTO: 28.8 PG (ref 27–33)
MCHC RBC AUTO-ENTMCNC: 31.7 G/DL (ref 33.6–35)
MCV RBC AUTO: 90.7 FL (ref 81.4–97.8)
PLATELET # BLD AUTO: 278 K/UL (ref 164–446)
PMV BLD AUTO: 10.9 FL (ref 9–12.9)
POTASSIUM SERPL-SCNC: 3.8 MMOL/L (ref 3.6–5.5)
PROCALCITONIN SERPL-MCNC: <0.05 NG/ML
RBC # BLD AUTO: 3.86 M/UL (ref 4.2–5.4)
SODIUM SERPL-SCNC: 140 MMOL/L (ref 135–145)
WBC # BLD AUTO: 30.6 K/UL (ref 4.8–10.8)

## 2018-02-01 PROCEDURE — 51798 US URINE CAPACITY MEASURE: CPT

## 2018-02-01 PROCEDURE — 84145 PROCALCITONIN (PCT): CPT

## 2018-02-01 PROCEDURE — A9270 NON-COVERED ITEM OR SERVICE: HCPCS | Performed by: INTERNAL MEDICINE

## 2018-02-01 PROCEDURE — 700111 HCHG RX REV CODE 636 W/ 250 OVERRIDE (IP): Performed by: SPECIALIST

## 2018-02-01 PROCEDURE — A9270 NON-COVERED ITEM OR SERVICE: HCPCS | Performed by: HOSPITALIST

## 2018-02-01 PROCEDURE — 85027 COMPLETE CBC AUTOMATED: CPT

## 2018-02-01 PROCEDURE — 80048 BASIC METABOLIC PNL TOTAL CA: CPT

## 2018-02-01 PROCEDURE — 700102 HCHG RX REV CODE 250 W/ 637 OVERRIDE(OP): Performed by: INTERNAL MEDICINE

## 2018-02-01 PROCEDURE — 700102 HCHG RX REV CODE 250 W/ 637 OVERRIDE(OP): Performed by: HOSPITALIST

## 2018-02-01 PROCEDURE — 99233 SBSQ HOSP IP/OBS HIGH 50: CPT | Performed by: INTERNAL MEDICINE

## 2018-02-01 PROCEDURE — 700105 HCHG RX REV CODE 258: Performed by: SPECIALIST

## 2018-02-01 PROCEDURE — 770020 HCHG ROOM/CARE - TELE (206)

## 2018-02-01 PROCEDURE — 700111 HCHG RX REV CODE 636 W/ 250 OVERRIDE (IP): Performed by: INTERNAL MEDICINE

## 2018-02-01 PROCEDURE — 700111 HCHG RX REV CODE 636 W/ 250 OVERRIDE (IP): Performed by: HOSPITALIST

## 2018-02-01 PROCEDURE — 700112 HCHG RX REV CODE 229: Performed by: INTERNAL MEDICINE

## 2018-02-01 PROCEDURE — 36415 COLL VENOUS BLD VENIPUNCTURE: CPT

## 2018-02-01 RX ORDER — IBUPROFEN 800 MG/1
400 TABLET ORAL EVERY 6 HOURS PRN
Status: DISCONTINUED | OUTPATIENT
Start: 2018-02-01 | End: 2018-02-02 | Stop reason: HOSPADM

## 2018-02-01 RX ORDER — LOSARTAN POTASSIUM 25 MG/1
25 TABLET ORAL DAILY
Status: DISCONTINUED | OUTPATIENT
Start: 2018-02-01 | End: 2018-02-02 | Stop reason: HOSPADM

## 2018-02-01 RX ORDER — LORAZEPAM 1 MG/1
4 TABLET ORAL NIGHTLY PRN
Status: DISCONTINUED | OUTPATIENT
Start: 2018-02-01 | End: 2018-02-02 | Stop reason: HOSPADM

## 2018-02-01 RX ADMIN — PREDNISONE 20 MG: 20 TABLET ORAL at 08:31

## 2018-02-01 RX ADMIN — ONDANSETRON 4 MG: 2 INJECTION INTRAMUSCULAR; INTRAVENOUS at 19:19

## 2018-02-01 RX ADMIN — ONDANSETRON 4 MG: 2 INJECTION INTRAMUSCULAR; INTRAVENOUS at 11:48

## 2018-02-01 RX ADMIN — VALPROATE SODIUM 259 MG: 100 INJECTION, SOLUTION INTRAVENOUS at 08:32

## 2018-02-01 RX ADMIN — VALPROATE SODIUM 259 MG: 100 INJECTION, SOLUTION INTRAVENOUS at 15:49

## 2018-02-01 RX ADMIN — LACTOBACILLUS ACIDOPHILUS / LACTOBACILLUS BULGARICUS 1 PACKET: 100 MILLION CFU STRENGTH GRANULES at 11:46

## 2018-02-01 RX ADMIN — LORAZEPAM 1 MG: 2 INJECTION INTRAMUSCULAR; INTRAVENOUS at 00:47

## 2018-02-01 RX ADMIN — DOXYCYCLINE 100 MG: 100 TABLET ORAL at 08:31

## 2018-02-01 RX ADMIN — IBUPROFEN 400 MG: 800 TABLET, FILM COATED ORAL at 17:59

## 2018-02-01 RX ADMIN — VALPROATE SODIUM 259 MG: 100 INJECTION, SOLUTION INTRAVENOUS at 21:26

## 2018-02-01 RX ADMIN — LEVOTHYROXINE SODIUM 150 MCG: 75 TABLET ORAL at 06:42

## 2018-02-01 RX ADMIN — DOCUSATE SODIUM 100 MG: 100 CAPSULE ORAL at 21:26

## 2018-02-01 RX ADMIN — GLIPIZIDE 5 MG: 5 TABLET ORAL at 08:31

## 2018-02-01 RX ADMIN — BUTALBITAL, ACETAMINOPHEN, AND CAFFEINE 1 TABLET: 50; 325; 40 TABLET ORAL at 08:32

## 2018-02-01 RX ADMIN — SENNA 8.6 MG: 8.6 TABLET, COATED ORAL at 12:01

## 2018-02-01 RX ADMIN — CITALOPRAM HYDROBROMIDE 20 MG: 20 TABLET ORAL at 08:32

## 2018-02-01 RX ADMIN — AMOXICILLIN AND CLAVULANATE POTASSIUM 1 TABLET: 875; 125 TABLET, FILM COATED ORAL at 08:31

## 2018-02-01 RX ADMIN — BUTALBITAL, ACETAMINOPHEN, AND CAFFEINE 1 TABLET: 50; 325; 40 TABLET ORAL at 21:26

## 2018-02-01 RX ADMIN — LORAZEPAM 4 MG: 1 TABLET ORAL at 21:26

## 2018-02-01 RX ADMIN — FENOFIBRATE 134 MG: 67 CAPSULE ORAL at 10:28

## 2018-02-01 RX ADMIN — LACTOBACILLUS ACIDOPHILUS / LACTOBACILLUS BULGARICUS 1 PACKET: 100 MILLION CFU STRENGTH GRANULES at 08:32

## 2018-02-01 RX ADMIN — ASPIRIN 325 MG: 325 TABLET ORAL at 08:31

## 2018-02-01 RX ADMIN — IBUPROFEN 400 MG: 800 TABLET, FILM COATED ORAL at 11:48

## 2018-02-01 RX ADMIN — DOCUSATE SODIUM 100 MG: 100 CAPSULE ORAL at 08:32

## 2018-02-01 RX ADMIN — LOSARTAN POTASSIUM 25 MG: 25 TABLET, FILM COATED ORAL at 10:28

## 2018-02-01 RX ADMIN — BUTALBITAL, ACETAMINOPHEN, AND CAFFEINE 1 TABLET: 50; 325; 40 TABLET ORAL at 15:49

## 2018-02-01 RX ADMIN — LACTOBACILLUS ACIDOPHILUS / LACTOBACILLUS BULGARICUS 1 PACKET: 100 MILLION CFU STRENGTH GRANULES at 17:59

## 2018-02-01 ASSESSMENT — PAIN SCALES - GENERAL
PAINLEVEL_OUTOF10: 4
PAINLEVEL_OUTOF10: 7
PAINLEVEL_OUTOF10: 4
PAINLEVEL_OUTOF10: 8
PAINLEVEL_OUTOF10: 3
PAINLEVEL_OUTOF10: 6
PAINLEVEL_OUTOF10: 3
PAINLEVEL_OUTOF10: 5
PAINLEVEL_OUTOF10: 4

## 2018-02-01 NOTE — CARE PLAN
Problem: Safety  Goal: Will remain free from injury  Outcome: PROGRESSING AS EXPECTED  Pt A&O x 4, calling appropriately    Problem: Bowel/Gastric:  Goal: Normal bowel function is maintained or improved  Outcome: PROGRESSING SLOWER THAN EXPECTED  Last BM 1/29 per pt, PRN medications given

## 2018-02-01 NOTE — PROGRESS NOTES
Pt A&O x 4, c/o HA, constant. PRN Fioricet given with scheduled meds and discussed with Md. Pt states slight numbness and tingling to L fingers. Up SBA to bathroom voiding well. Plan of care discussed and pt resting at this time.

## 2018-02-01 NOTE — PROGRESS NOTES
Pt transferred to floor, tele box on and rhythm verified, assessment complete, pt has generalized weakness, personal belongings with pt, pt up to bed side commode, VSS, treaded socks on, bed in lowest and locked position call within reach.

## 2018-02-01 NOTE — PROGRESS NOTES
"Pt still has complaints of a head but refuses any interventions, says \"hopefully I can sleep and then I will be okay\"  "

## 2018-02-01 NOTE — PROGRESS NOTES
[facility] Hospitalist Progress Note    Date of Service: 2/1/2018    Chief Complaint  77 y.o. female with history of CLL, type II diabetes mellitus, hypertension, admitted 1/30/2018 with expressive aphasia, right-sided weakness, and facial symmetry one hour prior to presentation. CT of the head did not show any acute CVA, CT of the neck was normal. Initial labs showed WBC of 26,000, troponin less than 0.01. Started an antiplatelets, statin. Further TIA/CVA workup pursued. Had wheezing and hypoxia, blood pressure went up to 200s. Given Lasix, and started on Solu-Medrol for presumed reactive airway disease with improvement. Initially thought to have pneumonia, as x-ray showed patchy bibasilar opacities, however procalcitonin remained low. Repeat chest x-ray showed mild reticular opacification. Neurology recommended against TPA, felt that symptoms might be migraine with visual scintillations. Started the Depakote.    Interval Problem Update  2/1/2018 - no overnight events. Remains hemodynamically stable and afebrile. Blood pressure well controlled. Stable on RA.   WBC 30,600. BMP unimpressive. D-dimer low. LDL 92, HDL 43, total cholesterol 145, triglycerides 49. Brain MRI showed no acute abnormality, with mild cerebral atrophy, and mild chronic microvascular ischemic disease. Procalcitonin low. Blood cultures remained negative. PT/OT recommended DC to home only with 24-hour assistance at home due to balance and coordination deficits, and front-wheeled walker, along with home health services.    > Seen and examined. Still with frontal HA. Slight nausea, no vomiting. No CP, SOB, abd pain, diarrhea. No focal weakness/numbness. Refuses HHC, but amenable to outpatient PT/OT.      Consultants/Specialty  Neurology    Disposition  Monitor on telemetry. Outpatient PT/OT.        ROS     Pertinent positives/negatives as mentioned above.     A complete review of systems was done. All other systems were negative.      Physical Exam   Laboratory/Imaging   Hemodynamics  Temp (24hrs), Av.7 °C (98.1 °F), Min:36.2 °C (97.1 °F), Max:37.5 °C (99.5 °F)   Temperature: 36.4 °C (97.5 °F)  Pulse  Av.2  Min: 60  Max: 102    Blood Pressure : 111/57      Respiratory      Respiration: 17, Pulse Oximetry: 93 %, O2 Daily Delivery Respiratory : Room Air with O2 Available     Work Of Breathing / Effort: Increased Work of Breathing (with ambulation)  RUL Breath Sounds: Clear, RML Breath Sounds: Clear, RLL Breath Sounds: Diminished, JERED Breath Sounds: Clear, LLL Breath Sounds: Diminished    Fluids    Intake/Output Summary (Last 24 hours) at 18 0829  Last data filed at 18 2200   Gross per 24 hour   Intake                0 ml   Output              400 ml   Net             -400 ml       Nutrition  Orders Placed This Encounter   Procedures   • DIET ORDER     Standing Status:   Standing     Number of Occurrences:   1     Order Specific Question:   Diet:     Answer:   2 Gram Sodium [7]     Order Specific Question:   Macronutrient modifications:     Answer:   Low Cholesterol [7]     Physical Exam   Constitutional: She is oriented to person, place, and time. She appears well-developed and well-nourished. No distress.   HENT:   Head: Normocephalic and atraumatic.   Mouth/Throat: No oropharyngeal exudate.   Eyes: Conjunctivae are normal. Pupils are equal, round, and reactive to light. No scleral icterus.   Neck: Normal range of motion. Neck supple.   Cardiovascular: Normal rate and regular rhythm.  Exam reveals no gallop and no friction rub.    No murmur heard.  Pulmonary/Chest: Effort normal and breath sounds normal. No respiratory distress. She has no wheezes. She has no rales. She exhibits no tenderness.   Abdominal: Soft. Bowel sounds are normal. She exhibits no distension. There is no tenderness. There is no rebound and no guarding.   Musculoskeletal: She exhibits no tenderness.   Lymphadenopathy:     She has no cervical adenopathy.   Neurological:  She is alert and oriented to person, place, and time. No cranial nerve deficit. Coordination normal.   Skin: Skin is warm and dry. No rash noted. She is not diaphoretic. No erythema. No pallor.   Psychiatric: She has a normal mood and affect. Her behavior is normal. Judgment and thought content normal.   Nursing note and vitals reviewed.      Recent Labs      01/30/18   1050  01/31/18   1254  02/01/18   0204   WBC  26.7*  28.2*  30.6*   RBC  4.37  4.12*  3.86*   HEMOGLOBIN  12.0  12.0  11.1*   HEMATOCRIT  38.7  36.5*  35.0*   MCV  88.6  88.6  90.7   MCH  27.5  29.1  28.8   MCHC  31.0*  32.9*  31.7*   RDW  44.7  43.8  44.5   PLATELETCT  360  336  278   MPV  9.6  10.0  10.9     Recent Labs      01/30/18   1050  01/31/18   1254  02/01/18   0204   SODIUM  137  139  140   POTASSIUM  3.7  3.6  3.8   CHLORIDE  104  103  103   CO2  25  27  27   GLUCOSE  86  74  96   BUN  13  17  18   CREATININE  0.63  0.77  0.80   CALCIUM  9.2  8.4*  8.1*     Recent Labs      01/30/18   1050   APTT  26.7   INR  0.99     Recent Labs      01/30/18   1852   BNPBTYPENAT  110*     Recent Labs      01/31/18   0409   TRIGLYCERIDE  49   HDL  43   LDL  92          Assessment/Plan     * 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 No further neurologic symptoms.  - Continue Depakote per neurology recommendations. Continue as needed fioricet, and antiemetics. I will add NSAIDs (PRN ibuprofen).   - Will need to follow up with neurology as outpatient.        Hypertensive emergency- (present on admission)   Assessment & Plan    - Improved, blood pressure better controlled.  - Resume losartan, and continue as needed IV hydralazine for significant hypertension. Continue to monitor blood pressure trend, adjust medications or add another agent if blood pressure increases again.          Acute respiratory failure with hypoxia due to flash pulmonary edema from hypertensive urgency (CMS-[facility])    Assessment & Plan    - With note of pulmonary edema, with significantly elevated blood pressure initially. Resolved with Lasix. Initial wheezing can be explained by pulmonary edema as well. I don't think she has reactive airway disease.  - Resolved. Doing well on room air. Stop prednisone.  - Controlled blood pressure, resume losartan.  - Continue as needed respiratory support with RT protocol, and as needed oxygen supplement.        Sepsis RULED-OUT- (present on admission)   Assessment & Plan    - This is ruled out, no signs of infection. Profound leukocytosis from CLL.  - Discontinue antibiotics.        CAP (community acquired pneumonia), RULED OUT- (present on admission)   Assessment & Plan    - No sveen consolidation/infiltrates on chest x-ray, with improvement with Lasix. Procalcitonin low. No symptoms of pneumonia. Leukocytosis likely from her CLL.   - Discontinue antibiotics.        CLL (chronic lymphocytic leukemia)- wbc= 20k-30k, since 2006; no rx; oncologist to follow- (present on admission)   Assessment & Plan    - Has chronic leukocytosis. Outpatient follow-up with hematology.            Reviewed items::  Labs reviewed, Medications reviewed and Radiology images reviewed  Oglesby catheter::  No Oglesby  DVT prophylaxis - mechanical:  SCDs  Ulcer Prophylaxis::  Not indicated

## 2018-02-01 NOTE — PROGRESS NOTES
Pt was starting to become restless and anxious, a second dose of 1mg IV ativan administered per MAR.

## 2018-02-01 NOTE — PROGRESS NOTES
Lab called with critical lab read by Corry, WBC of 30.6, critical lab read back. MD paged for updates/orders

## 2018-02-01 NOTE — DISCHARGE PLANNING
Medical Social Work    Met with pt at bedside to obtain choice for HH and DME. Pt at this time not agreeable to HH. Pt states she has enough support at home through four of her friends that will be helping. Pt states she prefers to go to outpatient therapy. Pt states she'll be returning to work early next week. Pt also refusing walker. States she's been ambulating in her room and will continue with nursing staff today.     [facility] delivered

## 2018-02-01 NOTE — CARE PLAN
Problem: Safety  Goal: Will remain free from injury  Outcome: PROGRESSING AS EXPECTED  Call light within reach with education and demonstration with teach back on calling for assistance, bed in lowest locked position, hourly rounding in place. Tele box on and rhythm verified. Bed alarm on. Treaded socks on. Appropriate signs placed. Mobility assessed pt is standby assist.     Problem: Venous Thromboembolism (VTW)/Deep Vein Thrombosis (DVT) Prevention:  Goal: Patient will participate in Venous Thrombosis (VTE)/Deep Vein Thrombosis (DVT)Prevention Measures  Outcome: PROGRESSING SLOWER THAN EXPECTED  Pt has SCDs but has refused them stating that they are making her agitated, education has been provided.

## 2018-02-01 NOTE — PROGRESS NOTES
MD was read and updated about critical lab WBC of 30.6. No new orders received per pt a afebrile and currently on antibiotics.

## 2018-02-01 NOTE — FACE TO FACE
Face to Face Supporting Documentation - Home Health    The encounter with this patient was in whole or in part the primary reason for home health admission.    Date of encounter:   Patient:                    MRN:                       YOB: 2017  Clarisse Reeves  [id number]  1940     Home health to see patient for:  Skilled Nursing care for assessment, interventions & education, Physical Therapy evaluation and treatment and Occupational therapy evaluation and treatment    Skilled need for:  New Onset Medical Diagnosis completed migraine with visual scintillations    Skilled nursing interventions to include:  Comment:   Skilled nursing care for assessment, intervention and education.       Homebound status evidenced by:  Needs the assistance of another person in order to leave the home. Leaving home requires a considerable and taxing effort. There is a normal inability to leave the home.    Community Physician to provide follow up care: Siva Smart M.D.     Optional Interventions? No      I certify the face to face encounter for this home health care referral meets the CMS requirements and the encounter/clinical assessment with the patient was, in whole, or in part, for the medical condition(s) listed above, which is the primary reason for home health care. Based on my clinical findings: the service(s) are medically necessary, support the need for home health care, and the homebound criteria are met.  I certify that this patient has had a face to face encounter by myself.  Basil Rodriguez M.D. - NPI: [id number]

## 2018-02-01 NOTE — PROGRESS NOTES
"Alert, still with mild headache, \"3/5\".  PERRL, full EOM's.  Motor and DTR are =.    Noted to have a pneumonia on rx.    Migraine likely provoked by a pneumonia.  Will stop valproate.  "

## 2018-02-02 ENCOUNTER — APPOINTMENT (OUTPATIENT)
Dept: RADIOLOGY | Facility: MEDICAL CENTER | Age: 78
DRG: 304 | End: 2018-02-02
Attending: INTERNAL MEDICINE
Payer: MEDICARE

## 2018-02-02 VITALS
TEMPERATURE: 98.3 F | RESPIRATION RATE: 17 BRPM | SYSTOLIC BLOOD PRESSURE: 138 MMHG | HEIGHT: 66 IN | DIASTOLIC BLOOD PRESSURE: 62 MMHG | HEART RATE: 72 BPM | BODY MASS INDEX: 32.03 KG/M2 | WEIGHT: 199.3 LBS | OXYGEN SATURATION: 95 %

## 2018-02-02 PROBLEM — J96.01 ACUTE RESPIRATORY FAILURE WITH HYPOXIA (HCC): Status: RESOLVED | Noted: 2018-01-30 | Resolved: 2018-02-02

## 2018-02-02 PROBLEM — A41.9 SEPSIS (HCC): Status: RESOLVED | Noted: 2018-01-30 | Resolved: 2018-02-02

## 2018-02-02 PROBLEM — I16.1 HYPERTENSIVE EMERGENCY: Status: RESOLVED | Noted: 2018-01-30 | Resolved: 2018-02-02

## 2018-02-02 PROBLEM — J18.9 CAP (COMMUNITY ACQUIRED PNEUMONIA): Status: RESOLVED | Noted: 2018-01-30 | Resolved: 2018-02-02

## 2018-02-02 LAB
ERYTHROCYTE [DISTWIDTH] IN BLOOD BY AUTOMATED COUNT: 44.8 FL (ref 35.9–50)
HCT VFR BLD AUTO: 37.8 % (ref 37–47)
HGB BLD-MCNC: 11.9 G/DL (ref 12–16)
MCH RBC QN AUTO: 28.5 PG (ref 27–33)
MCHC RBC AUTO-ENTMCNC: 31.5 G/DL (ref 33.6–35)
MCV RBC AUTO: 90.4 FL (ref 81.4–97.8)
PLATELET # BLD AUTO: 317 K/UL (ref 164–446)
PMV BLD AUTO: 9.9 FL (ref 9–12.9)
RBC # BLD AUTO: 4.18 M/UL (ref 4.2–5.4)
WBC # BLD AUTO: 26.4 K/UL (ref 4.8–10.8)

## 2018-02-02 PROCEDURE — 99239 HOSP IP/OBS DSCHRG MGMT >30: CPT | Performed by: INTERNAL MEDICINE

## 2018-02-02 PROCEDURE — A9270 NON-COVERED ITEM OR SERVICE: HCPCS | Performed by: INTERNAL MEDICINE

## 2018-02-02 PROCEDURE — A9270 NON-COVERED ITEM OR SERVICE: HCPCS | Performed by: HOSPITALIST

## 2018-02-02 PROCEDURE — 700101 HCHG RX REV CODE 250: Performed by: INTERNAL MEDICINE

## 2018-02-02 PROCEDURE — 700102 HCHG RX REV CODE 250 W/ 637 OVERRIDE(OP): Performed by: INTERNAL MEDICINE

## 2018-02-02 PROCEDURE — 74018 RADEX ABDOMEN 1 VIEW: CPT

## 2018-02-02 PROCEDURE — 700105 HCHG RX REV CODE 258: Performed by: SPECIALIST

## 2018-02-02 PROCEDURE — 700112 HCHG RX REV CODE 229: Performed by: INTERNAL MEDICINE

## 2018-02-02 PROCEDURE — 85027 COMPLETE CBC AUTOMATED: CPT

## 2018-02-02 PROCEDURE — 700102 HCHG RX REV CODE 250 W/ 637 OVERRIDE(OP): Performed by: HOSPITALIST

## 2018-02-02 PROCEDURE — 700111 HCHG RX REV CODE 636 W/ 250 OVERRIDE (IP): Performed by: SPECIALIST

## 2018-02-02 PROCEDURE — 36415 COLL VENOUS BLD VENIPUNCTURE: CPT

## 2018-02-02 RX ORDER — PROCHLORPERAZINE MALEATE 10 MG
10 TABLET ORAL EVERY 6 HOURS PRN
Qty: 30 TAB | Refills: 0 | Status: SHIPPED | OUTPATIENT
Start: 2018-02-02 | End: 2018-07-05

## 2018-02-02 RX ORDER — ENEMA 19; 7 G/133ML; G/133ML
1 ENEMA RECTAL ONCE
Status: COMPLETED | OUTPATIENT
Start: 2018-02-02 | End: 2018-02-02

## 2018-02-02 RX ORDER — POLYETHYLENE GLYCOL 3350 17 G/17G
1 POWDER, FOR SOLUTION ORAL
Status: DISCONTINUED | OUTPATIENT
Start: 2018-02-02 | End: 2018-02-02 | Stop reason: HOSPADM

## 2018-02-02 RX ORDER — IBUPROFEN 400 MG/1
400 TABLET ORAL EVERY 6 HOURS PRN
Qty: 30 TAB | Refills: 1 | Status: SHIPPED | OUTPATIENT
Start: 2018-02-02 | End: 2018-03-08

## 2018-02-02 RX ORDER — AMOXICILLIN 250 MG
2 CAPSULE ORAL 2 TIMES DAILY
Status: DISCONTINUED | OUTPATIENT
Start: 2018-02-02 | End: 2018-02-02 | Stop reason: HOSPADM

## 2018-02-02 RX ORDER — BISACODYL 10 MG
10 SUPPOSITORY, RECTAL RECTAL
Status: DISCONTINUED | OUTPATIENT
Start: 2018-02-02 | End: 2018-02-02 | Stop reason: HOSPADM

## 2018-02-02 RX ADMIN — VALPROATE SODIUM 259 MG: 100 INJECTION, SOLUTION INTRAVENOUS at 09:20

## 2018-02-02 RX ADMIN — LACTOBACILLUS ACIDOPHILUS / LACTOBACILLUS BULGARICUS 1 PACKET: 100 MILLION CFU STRENGTH GRANULES at 10:53

## 2018-02-02 RX ADMIN — FENOFIBRATE 134 MG: 67 CAPSULE ORAL at 09:17

## 2018-02-02 RX ADMIN — CITALOPRAM HYDROBROMIDE 20 MG: 20 TABLET ORAL at 09:15

## 2018-02-02 RX ADMIN — BUTALBITAL, ACETAMINOPHEN, AND CAFFEINE 1 TABLET: 50; 325; 40 TABLET ORAL at 17:55

## 2018-02-02 RX ADMIN — LACTOBACILLUS ACIDOPHILUS / LACTOBACILLUS BULGARICUS 1 PACKET: 100 MILLION CFU STRENGTH GRANULES at 09:14

## 2018-02-02 RX ADMIN — BISACODYL 10 MG: 10 SUPPOSITORY RECTAL at 10:50

## 2018-02-02 RX ADMIN — DOCUSATE SODIUM 100 MG: 100 CAPSULE ORAL at 09:15

## 2018-02-02 RX ADMIN — POLYETHYLENE GLYCOL 3350 1 PACKET: 17 POWDER, FOR SOLUTION ORAL at 12:19

## 2018-02-02 RX ADMIN — MAGNESIUM HYDROXIDE 30 ML: 400 SUSPENSION ORAL at 12:19

## 2018-02-02 RX ADMIN — SODIUM PHOSPHATE, DIBASIC AND SODIUM PHOSPHATE, MONOBASIC 133 ML: 7; 19 ENEMA RECTAL at 17:30

## 2018-02-02 RX ADMIN — LEVOTHYROXINE SODIUM 150 MCG: 75 TABLET ORAL at 06:18

## 2018-02-02 RX ADMIN — STANDARDIZED SENNA CONCENTRATE AND DOCUSATE SODIUM 2 TABLET: 8.6; 5 TABLET, FILM COATED ORAL at 10:53

## 2018-02-02 RX ADMIN — LOSARTAN POTASSIUM 25 MG: 25 TABLET, FILM COATED ORAL at 09:15

## 2018-02-02 RX ADMIN — GLIPIZIDE 5 MG: 5 TABLET ORAL at 09:15

## 2018-02-02 ASSESSMENT — PAIN SCALES - GENERAL
PAINLEVEL_OUTOF10: ASSUMED PAIN PRESENT
PAINLEVEL_OUTOF10: 3
PAINLEVEL_OUTOF10: 0
PAINLEVEL_OUTOF10: 2
PAINLEVEL_OUTOF10: ASSUMED PAIN PRESENT

## 2018-02-02 NOTE — CARE PLAN
Problem: Respiratory:  Goal: Respiratory status will improve    Intervention: Assess and monitor pulmonary status   01/31/18 2000 02/01/18 2000 02/02/18 0400   Vitals   Respiration --  --  16   Pulse Oximetry --  --  95 %   OTHER   O2 (LPM) --  --  0   Work Of Breathing / Effort Increased Work of Breathing  (with ambulation) --  --    RUL Breath Sounds --  Clear --    RML Breath Sounds --  Clear --    RLL Breath Sounds --  Diminished --    JERED Breath Sounds --  Clear --    LLL Breath Sounds --  Diminished --

## 2018-02-02 NOTE — PROGRESS NOTES
"Bedside shift report completed. Chart reviewed. Patient alert and oriented. No reports of pain this morning. Would like to have BM today. Possible DC today. Will continue to monitor. Blood pressure 123/79, pulse 64, temperature 36.7 °C (98 °F), resp. rate 16, height 1.676 m (5' 6\"), weight 90.4 kg (199 lb 4.7 oz), SpO2 95 %, not currently breastfeeding.      "

## 2018-02-02 NOTE — PROGRESS NOTES
Monitor Summary    SB - SR 55 - 78  Rare PAC, Occasional PVC  1 episode of 6 sec of SVT up the 190s  .14/.08/.38

## 2018-02-02 NOTE — DISCHARGE SUMMARY
HOSPITAL MEDICINE DISCHARGE SUMMARY    Name: Clarisse Reeves  MRN: [id number]  : 1940  Admit Date: 2018  Discharge Date: 2018  Attending Provider: Basil Rodriguez M.D.  Admitting Provider: Ibrahima Apple M.D.  Primary Care Physician: Siva Smart M.D.    DISCHARGE DIAGNOSES:   Principal Problem:    Atypical migraine POA: Yes  Active Problems:    CLL (chronic lymphocytic leukemia)- wbc= 20k-30k, since ; no rx; oncologist to follow POA: Yes    HTN (hypertension) POA: Yes  Resolved Problems:    Hypertensive emergency POA: Yes    Acute respiratory failure with hypoxia due to flash pulmonary edema from hypertensive urgency (CMS-HCC) POA: No    CAP (community acquired pneumonia), RULED OUT POA: Yes    Sepsis RULED-OUT POA: Yes      SUMMARY OF EVENTS LEADING TO ADMISSION:   77 y.o. female with history of CLL, type II diabetes mellitus, hypertension, admitted 2018 with expressive aphasia, right-sided weakness, and facial symmetry one hour prior to presentation.       For further details of history of present illness, past medical/social/family histories, allergies and medication, please refer to copy of admission note by Dr. Ibrahima Apple M.D.    HOSPITAL COURSE:   The patient was admitted to the hospitalist service after being initially evaluated in the ED where CT of the head did not show any acute CVA, CT of the neck was normal. Initial labs showed WBC of 26,000, and troponin less than 0.01. She was started an antiplatelets, and statin. Further TIA/CVA workup was pursued. She had wheezing and hypoxia initially after her blood pressure went up to 200s. D-dimer low. She was given lasix for flash pulmonary edema likely from hypertensive emergency, and started on Solu-Medrol for presumed reactive airway disease with improvement. She was initially thought to have pneumonia, as x-ray showed patchy bibasilar opacities, and so she was given antibiotics. However her  procalcitonin was low. Repeat chest x-ray only showed mild reticular opacification. Neurology was consulted, and recommended against tPA, as they felt that the patient's symptoms might be migraine with visual scintillations. She was started on Depakote, but later stopped by neurology. Further workup showed lipid profile at goal, and brain MRI showed no acute abnormality, with mild cerebral atrophy, and mild chronic microvascular ischemic disease.     Her procalcitonin remained low. Blood cultures remained negative. She was felt to not have pneumonia, but rather flash pulmonary edema which has resolved, and antibiotics were stopped. PT/OT recommended DC to home only with 24-hour assistance at home due to balance and coordination deficits, and front-wheeled walker, along with home health services, but patient refused home health services, and states that she has a lot of assistance at home.    Her headaches improved. She did not have any further recurrence of her aphasia and right-sided weakness. She did well with as needed ibuprofen, and antiemetics.    With  her clinical improvement, she was deemed ready to be discharged from the hospital as she did not have any further inpatient needs. Patient felt comfortable going home. The discharge plan was discussed with the patient, and she was agreeable to it.     The patient was subsequently sent home in improved and stable condition.     DISCHARGE DISPOSITION: recovered as expected.     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         HTN (hypertension)- (present on admission)   Assessment & Plan    - Continue losartan. Follow-up with ECP, and continue outpatient blood pressure monitoring.        CLL (chronic lymphocytic leukemia)- wbc= 20k-30k, since 2006; no rx; oncologist to follow- (present on admission)   Assessment & Plan    - Has chronic leukocytosis. She will continue with  outpatient follow-up with hematology.               CHANGES IN MANAGEMENT OF CHRONIC CONDITION: As above.     PENDING TESTS: none    FOLLOW-UP TESTS ORDERED POST DISCHARGE: none    DISCHARGE MEDICATIONS:   Medication Sig Start Date End Date   ibuprofen (MOTRIN) 400 MG Tab Take 1 Tab by mouth every 6 hours as needed (headache/migraine). 2/2/18    prochlorperazine (COMPAZINE) 10 MG Tab Take 1 Tab by mouth every 6 hours as needed (nausea, vomiting, migraine). 2/2/18    vitamin D (CHOLECALCIFEROL) 1000 UNIT Tab Take 3,000 Units by mouth 2 Times a Day.     glipiZIDE (GLUCOTROL) 5 MG Tab Take 1 Tab by mouth every day. 1/15/18    fenofibrate (TRIGLIDE) 160 MG tablet Take 1 Tab by mouth every day. 1/15/18    losartan (COZAAR) 25 MG Tab Take 1 Tab by mouth every day. 1/15/18    levothyroxine (SYNTHROID) 150 MCG Tab Take 1 Tab by mouth Every morning on an empty stomach. 12/2/17    lorazepam (ATIVAN) 2 MG tablet Take 2 Tabs by mouth at bedtime as needed for Anxiety. 10/30/17    citalopram (CELEXA) 20 MG Tab Take 1 Tab by mouth every day. 10/30/17    aspirin (ASA) 81 MG Chew Tab chewable tablet Take 1 Tab by mouth every day. 12/3/15           FOLLOW-UP APPOINTMENTS:   [address] [id number] [phone]    Schedule an appointment as soon as possible for a visit in 1 week  Hospital follow-up        For further details on discharge medications, patient education, diet, and activity, please refer to electronic copy of discharge instructions.       TIME SPENT: 42 minutes, with greater than 50% of the time spent on face-to-face encounter, addressing medical issues, coordination of care, counseling, discharge planning, medication reconciliation, and documentation.

## 2018-02-02 NOTE — PROGRESS NOTES
Pt resting comfortably, stating VOSS is much improved this evening from this morning. PRN medications in use as available. Pt states no BM at this time, continuing to monitor.

## 2018-02-02 NOTE — PROGRESS NOTES
Feels better, mild headache.  PERRL, VF are full.  Motor and DTR are =.    Probable complicated migraine.  Will sign off.

## 2018-02-03 NOTE — CARE PLAN
Problem: Bowel/Gastric:  Goal: Normal bowel function is maintained or improved  Outcome: MET Date Met: 02/02/18  D: Patient admitted for TIA. Patient constipated.   A: Many stool softeners, laxatives, suppositories, and an enema given today.   R: Patient had a BM at 1800 and was discharged.

## 2018-02-03 NOTE — PROGRESS NOTES
Patient stable. Still reporting low grade headache. Awaiting Bm. More miralax and milk of mag given. Suppository given. Patient ambulated. Will continue to monitor.

## 2018-02-03 NOTE — CARE PLAN
Problem: Discharge Barriers/Planning  Goal: Patient's continuum of care needs will be met  Outcome: MET Date Met: 02/02/18  D: Patient admitted for TIA. Patient stable and ready for Dc.   A: discharge instructions and packet provided to patient.   R: patient will follow up in outpatient setting.

## 2018-02-03 NOTE — PROGRESS NOTES
Patient stable. Patient awaiting Bm. Xray of abdomen ordered to determine if bowel blockage. If none, will give enema. Will continue to monitor.

## 2018-02-03 NOTE — PROGRESS NOTES
Discharge instructions reviewed with pt. IV and tele box removed. Pt discharge home with all belongings. All questions answered.

## 2018-02-03 NOTE — DISCHARGE INSTRUCTIONS
2Discharge Instructions    Discharged to home by car with friend. Discharged via wheelchair, hospital escort: Yes.  Special equipment needed: Not Applicable    Be sure to schedule a follow-up appointment with your primary care doctor or any specialists as instructed.     Discharge Plan:   Diet Plan: Discussed  Activity Level: Discussed  Confirmed Follow up Appointment: Patient to Call and Schedule Appointment  Confirmed Symptoms Management: Discussed  Medication Reconciliation Updated: Yes  Influenza Vaccine Indication: Not indicated: Previously immunized this influenza season and > 8 years of age    I understand that a diet low in cholesterol, fat, and sodium is recommended for good health. Unless I have been given specific instructions below for another diet, I accept this instruction as my diet prescription.   Other diet: 2g Sodium Diet    Special Instructions: Take over the counter stool softener as needed    · Is patient discharged on Warfarin / Coumadin?   No     Depression / Suicide Risk    As you are discharged from this [facility] facility, it is important to learn how to keep safe from harming yourself.    Recognize the warning signs:  · Abrupt changes in personality, positive or negative- including increase in energy   · Giving away possessions  · Change in eating patterns- significant weight changes-  positive or negative  · Change in sleeping patterns- unable to sleep or sleeping all the time   · Unwillingness or inability to communicate  · Depression  · Unusual sadness, discouragement and loneliness  · Talk of wanting to die  · Neglect of personal appearance   · Rebelliousness- reckless behavior  · Withdrawal from people/activities they love  · Confusion- inability to concentrate     If you or a loved one observes any of these behaviors or has concerns about self-harm, here's what you can do:  · Talk about it- your feelings and reasons for harming yourself  · Remove any means that you might use to  hurt yourself (examples: pills, rope, extension cords, firearm)  · Get professional help from the community (Mental Health, Substance Abuse, psychological counseling)  · Do not be alone:Call your Safe Contact- someone whom you trust who will be there for you.  · Call your local CRISIS HOTLINE 781-4469 or 748-172-4328  · Call your local Children's Mobile Crisis Response Team [place] (410) 316-1903 or www.[employer]  · Call the toll free National Suicide Prevention Hotlines   · National Suicide Prevention Lifeline 307-663-MHWE (5120)  · National Hope Line Network 800-SUICIDE (925-3465)

## 2018-02-04 ENCOUNTER — APPOINTMENT (OUTPATIENT)
Dept: RADIOLOGY | Facility: MEDICAL CENTER | Age: 78
End: 2018-02-04
Attending: INTERNAL MEDICINE
Payer: MEDICARE

## 2018-02-05 ENCOUNTER — PATIENT OUTREACH (OUTPATIENT)
Dept: HEALTH INFORMATION MANAGEMENT | Facility: OTHER | Age: 78
End: 2018-02-05

## 2018-02-06 RX ORDER — LEVOTHYROXINE SODIUM 0.15 MG/1
150 TABLET ORAL
Qty: 90 TAB | Refills: 4 | Status: SHIPPED
Start: 2018-02-06 | End: 2019-01-23 | Stop reason: SDUPTHER

## 2018-02-07 ENCOUNTER — APPOINTMENT (OUTPATIENT)
Dept: MEDICAL GROUP | Age: 78
End: 2018-02-07
Payer: MEDICARE

## 2018-02-08 ENCOUNTER — TELEPHONE (OUTPATIENT)
Dept: MEDICAL GROUP | Age: 78
End: 2018-02-08

## 2018-02-08 ENCOUNTER — HOSPITAL ENCOUNTER (OUTPATIENT)
Dept: RADIOLOGY | Facility: MEDICAL CENTER | Age: 78
End: 2018-02-08
Attending: INTERNAL MEDICINE
Payer: MEDICARE

## 2018-02-08 DIAGNOSIS — N63.25 BREAST LUMP ON LEFT SIDE AT 3 O'CLOCK POSITION: ICD-10-CM

## 2018-02-08 PROCEDURE — 76642 ULTRASOUND BREAST LIMITED: CPT | Mod: LT

## 2018-02-08 PROCEDURE — G0279 TOMOSYNTHESIS, MAMMO: HCPCS | Mod: LT

## 2018-02-08 NOTE — TELEPHONE ENCOUNTER
Phone Number Called: [phone] (home)     Message: Left message for the patient to call us back regarding the note below.    Left Message for patient to call back: yes

## 2018-02-08 NOTE — TELEPHONE ENCOUNTER
Siva Smart M.D. routed conversation to [address] [place] [address] 3 minutes ago (10:58 AM)      Siva Smart M.D. 4 minutes ago (10:57 AM)         Call pt to make sure she has seen a surgeon for breast biopsy for her abnormal mammogram

## 2018-02-08 NOTE — TELEPHONE ENCOUNTER
----- Message from Siva Smart M.D. sent at 2/8/2018 10:56 AM PST -----  Call and tell pt she has abnormal mammogram which is suspicious for cancer and that she needs to see a general surgeon for biopsy within the next 2 wks, if she hasn't already. A referral was placed 3 wks ago

## 2018-02-09 ENCOUNTER — HOSPITAL ENCOUNTER (OUTPATIENT)
Dept: RADIOLOGY | Facility: MEDICAL CENTER | Age: 78
End: 2018-02-09
Attending: INTERNAL MEDICINE
Payer: MEDICARE

## 2018-02-09 DIAGNOSIS — R92.8 ABNORMAL FINDINGS ON DIAGNOSTIC IMAGING OF BREAST: ICD-10-CM

## 2018-02-09 PROCEDURE — 88305 TISSUE EXAM BY PATHOLOGIST: CPT | Mod: 59

## 2018-02-09 PROCEDURE — 38505 NEEDLE BIOPSY LYMPH NODES: CPT

## 2018-02-09 PROCEDURE — 88342 IMHCHEM/IMCYTCHM 1ST ANTB: CPT | Mod: XU

## 2018-02-09 PROCEDURE — A4648 IMPLANTABLE TISSUE MARKER: HCPCS

## 2018-02-09 PROCEDURE — 88360 TUMOR IMMUNOHISTOCHEM/MANUAL: CPT

## 2018-02-09 PROCEDURE — 88341 IMHCHEM/IMCYTCHM EA ADD ANTB: CPT | Mod: XU

## 2018-02-09 NOTE — TELEPHONE ENCOUNTER
PT went and saw the mammogram yesterday, and they biopsied the masses and lymphnodes today. She will see the surgeon, Dr. Crandall on Tuesday. Pt also wanted to inform you that she had been in the hospital for hypertension, her speech was impaired and slurred, they stated that she had pneumonia. She will make a fv appt after she sees the surgeon.     Thanks

## 2018-02-12 ENCOUNTER — TELEPHONE (OUTPATIENT)
Dept: RADIOLOGY | Facility: MEDICAL CENTER | Age: 78
End: 2018-02-12

## 2018-02-12 ENCOUNTER — TELEPHONE (OUTPATIENT)
Dept: MEDICAL GROUP | Age: 78
End: 2018-02-12

## 2018-02-12 DIAGNOSIS — N63.20 LEFT BREAST MASS: ICD-10-CM

## 2018-02-12 DIAGNOSIS — F41.9 ANXIETY: ICD-10-CM

## 2018-02-12 RX ORDER — LORAZEPAM 0.5 MG/1
0.5 TABLET ORAL EVERY 4 HOURS PRN
Qty: 30 TAB | Refills: 0 | Status: SHIPPED
Start: 2018-02-12 | End: 2018-03-02

## 2018-02-12 NOTE — TELEPHONE ENCOUNTER
1. Caller Name: [facility]                      Call Back Number: N/a    2. Message: Breast center left message requesting STAT referral to be placed to surgeon for positive breast cancer.     3. Patient approves office to leave a detailed voicemail/MyChart message: N\A

## 2018-02-12 NOTE — TELEPHONE ENCOUNTER
Lm for Bing[employer]chilango bro requesting an urgent referral to a breast surgeon/let them know pt received results from rad/jls

## 2018-02-12 NOTE — TELEPHONE ENCOUNTER
1. Caller Name: Pt                       Call Back Number: [phone] (home)     2. Message: Pt called and when she discontinued Lisinopril, her cough went away. Now after starting the Losartan, the cough came back. She went ahead and discontinued the Losartan. Her BP today was 116/72.    She also is wondering if there is anything you can prescribe her for her stress. She is very anxious about everything that is going on and her breast surgery.      3. Patient approves office to leave a detailed voicemail/MyChart message: N\A

## 2018-02-12 NOTE — TELEPHONE ENCOUNTER
----- Message from Siva Smart M.D. sent at 2/11/2018  2:56 PM PST -----  Please tell the patient breast biopsy does confirm breast cancer, and that it has spread to her lymph node. She needs needs to follow-up with the breast surgeon as scheduled for further management

## 2018-02-13 ENCOUNTER — OFFICE VISIT (OUTPATIENT)
Dept: HEMATOLOGY ONCOLOGY | Facility: MEDICAL CENTER | Age: 78
End: 2018-02-13
Payer: MEDICARE

## 2018-02-13 VITALS
RESPIRATION RATE: 16 BRPM | HEART RATE: 89 BPM | TEMPERATURE: 98.4 F | DIASTOLIC BLOOD PRESSURE: 68 MMHG | WEIGHT: 199.85 LBS | OXYGEN SATURATION: 97 % | SYSTOLIC BLOOD PRESSURE: 130 MMHG | BODY MASS INDEX: 32.12 KG/M2 | HEIGHT: 66 IN

## 2018-02-13 DIAGNOSIS — C50.412 MALIGNANT NEOPLASM OF UPPER-OUTER QUADRANT OF LEFT BREAST IN FEMALE, ESTROGEN RECEPTOR NEGATIVE (HCC): ICD-10-CM

## 2018-02-13 DIAGNOSIS — Z17.1 MALIGNANT NEOPLASM OF UPPER-OUTER QUADRANT OF LEFT BREAST IN FEMALE, ESTROGEN RECEPTOR NEGATIVE (HCC): ICD-10-CM

## 2018-02-13 PROCEDURE — 99215 OFFICE O/P EST HI 40 MIN: CPT | Performed by: INTERNAL MEDICINE

## 2018-02-13 ASSESSMENT — PAIN SCALES - GENERAL: PAINLEVEL: NO PAIN

## 2018-02-14 ENCOUNTER — TELEPHONE (OUTPATIENT)
Dept: HEMATOLOGY ONCOLOGY | Facility: MEDICAL CENTER | Age: 78
End: 2018-02-14

## 2018-02-14 NOTE — TELEPHONE ENCOUNTER
Called patient regarding her PICC line appointment that is scheduled for 2/21/18 [id number] check in for a 1300 at [facility] ([id number]) I also scheduled patient for her f/v with Dr. Barroso 2/23/18 1040. Patient verbally understood all appointments.

## 2018-02-14 NOTE — PROGRESS NOTES
Consult Note: Oncology    Date of consultation: 2/13/2018      Referring provider: Esperanza Crandall M.D.    Reason for consultation:   CC: Breast cancer    History of presenting illness:  Breast cancer  February 2018. Diagnosed on diagnostic mammogram after self palpating a lump    Adenocarcinoma in situ   Dec 18, 2015 .Right upper lobe lung nodule, 6 cores: Adenocarcinoma-in-situ in fibroelastotic scar tissue, with foci suspicious for invasive adenocarcinoma.  Feb 1, 2016. Right thoracoscopy with anterior segmentectomy of the right upper lobe and removal of a portion of the right middle lobe.    Fibroadenoma of right breast  Nov 12, 2014  Stereotactic biopsy: Benign fibrofatty breast tissue with discrete nodules of hyalinized fibroadenoma with microcalcifications. No atypia or malignancy.    CLL  2006 diagnosed with CLL in [place] after being admitted to the hospital for an infection and found to have leucocytosis.  Patient followed by Dr. Foster until 2014 until her insurance changed. Has been followed by her PCP since.       Clarisse Reeves  is a 76 y.o. year old female who presents to [facility] care. She denies any acute complaints at this time except for some chronic fatigue which has been worse for the past past 3-4 months. She states she had some drenching night sweats last night but none in the past 6 months. She has also lost about 27 lbs in the past 3-4 months or so, but she has been trying to watch her diet and lose weight.    She was started on Celexa for depression last year(2016) after she lost her daughter who 51 to massive MI.      Interval History:  -August 2017 patient had a normal screening mammogram  -February 2018 patient self palpated a lump in the left breast  -February 8, 2018. Diagnostic mammogram positive for suspicious mass in the left breast upper-outer quadrant.  -February 9, 2018. Needle biopsy positive for invasive ductal carcinoma. Final pathology pending    Initially seen in our  "clinic on 7/19/2017   Clarisse Reeves is a 77 y.o. Female who is seen in clinic for CLL. 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  Denies symptoms.   • Cutaneous skin tags 1/29/2015   • DM (diabetes mellitus) (CMS-HCC)    • Hiatus hernia syndrome     no surgery   • Hypertension     \"In the past\"   • Indigestion    • MEDICAL HOME    • Personal history of colonic polyps 11/26/2012   • Pneumonia 2014   • Pneumonia 06/2017   • Thyroid disease    • Type II or unspecified type diabetes mellitus without mention of complication, not stated as uncontrolled     pre-diabetic       Past surgical history:    Past Surgical History:   Procedure Laterality Date   • THYROIDECTOMY N/A 11/29/2017    Procedure: THYROIDECTOMY COMPLETION, RECURRENT LARYNGEAL NERVE MONITORING;  Surgeon: Cameron Marcelino M.D.;  Location: SURGERY SAME DAY [facility];  Service: General   • THORACOSCOPY Right 2/1/2016    Procedure: THORACOSCOPY Upper Lobectomy ;  Surgeon: John H Ganser, M.D.;  Location: SURGERY [facility];  Service:    • NODE DISSECTION Right 2/1/2016    Procedure: NODE DISSECTION;  Surgeon: John H Ganser, M.D.;  Location: SURGERY [facility];  Service:    • RECOVERY  12/18/2015    Procedure: CT-SCP-RUL LUNG BIOPSY-;  Surgeon: Recoveryonleslie Surgery;  Location: SURGERY PRE-POST [facility];  Service:    • " OTHER  2001    Lower Left segment of lung removed    • CHOLECYSTECTOMY  1995   • OTHER ORTHOPEDIC SURGERY  1990    bakers cyst removed in right knee, multiple scopes   • OTHER  1990    hemmroid removal   • OTHER  1984    left Thyroid removed, might remove right side in the future   • APPENDECTOMY  1955   • CATARACT EXTRACTION WITH IOL     • TONSILLECTOMY     • US-NEEDLE CORE BX-BREAST PANEL     • VAGINAL HYSTERECTOMY TOTAL      Hysterectomy,Total Vaginal       Allergies:  Codeine; Lipitor; Lovastatin; and Zocor    Medications:    Current Outpatient Prescriptions   Medication Sig Dispense Refill   • LORazepam (ATIVAN) 0.5 MG Tab Take 1 Tab by mouth every four hours as needed for Anxiety for up to 30 days. 30 Tab 0   • levothyroxine (SYNTHROID) 150 MCG Tab Take 1 Tab by mouth Every morning on an empty stomach. 90 Tab 4   • vitamin D (CHOLECALCIFEROL) 1000 UNIT Tab Take 3,000 Units by mouth 2 Times a Day.     • glipiZIDE (GLUCOTROL) 5 MG Tab Take 1 Tab by mouth every day. 90 Tab 4   • fenofibrate (TRIGLIDE) 160 MG tablet Take 1 Tab by mouth every day. 90 Tab 4   • lorazepam (ATIVAN) 2 MG tablet Take 2 Tabs by mouth at bedtime as needed for Anxiety. 60 Tab 3   • citalopram (CELEXA) 20 MG Tab Take 1 Tab by mouth every day. 90 Tab 4   • ibuprofen (MOTRIN) 400 MG Tab Take 1 Tab by mouth every 6 hours as needed (headache/migraine). 30 Tab 1   • prochlorperazine (COMPAZINE) 10 MG Tab Take 1 Tab by mouth every 6 hours as needed (nausea, vomiting, migraine). 30 Tab 0   • losartan (COZAAR) 25 MG Tab Take 1 Tab by mouth every day. 90 Tab 4   • aspirin (ASA) 81 MG Chew Tab chewable tablet Take 1 Tab by mouth every day. 100 Tab 11     Current Facility-Administered Medications   Medication Dose Route Frequency Provider Last Rate Last Dose   • cyanocobalamin (VITAMIN B-12) injection 1,000 mcg  1,000 mcg Intramuscular Q30 DAYS Siva Smart M.D.   1,000 mcg at 10/30/17 1143       Social History:     Social History     Social  "History   • Marital status: Single     Spouse name: N/A   • Number of children: N/A   • Years of education: N/A     Occupational History   • 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     Social History Main Topics   • Smoking status: Former Smoker     Packs/day: 2.00     Years: 50.00     Types: Cigarettes     Quit date: 5/6/2006   • Smokeless tobacco: Never Used      Comment: quit smoking 2002   • Alcohol use No      Comment: ocassionaly   • Drug use: No   • Sexual activity: Not Currently     Partners: Male     Other Topics Concern   • Not on file     Social History Narrative   • No narrative on file     She i    Family History:     Family History   Problem Relation Age of Onset   • Cancer Mother    • Lung Disease Father    • Cancer Father        Review of Systems:    All other review of systems are negative except what was mentioned above in the HPI.      Physical Exam:  Vitals:   /68   Pulse 89   Temp 36.9 °C (98.4 °F)   Resp 16   Ht 1.676 m (5' 6\")   Wt 90.6 kg (199 lb 13.6 oz)   SpO2 97%   Breastfeeding? No   BMI 32.26 kg/m²     General: Not in acute distress, alert and oriented x 3  HEENT: No pallor, icterus. Oropharynx clear.   Neck: Supple, no palpable masses.  Lymph nodes: No palpable cervical, supraclavicular, axillary or inguinal lymphadenopathy.    CVS: regular rate and rhythm, no rubs or gallops  RESP: Clear to auscultate bilaterally, no wheezing or crackles.   ABD: Soft, RUQ tender to deep palpation, non distended, positive bowel sounds, no palpable organomegaly  EXT: No edema or cyanosis  CNS: Alert and oriented x3, No focal deficits.  Skin- No rash  Breast- right breast does not have any obvious distortion. No masses palpable no lymphadenopathy. Left breast has visible bruising at the 2:00 position at the site of needle biopsy-tender to palpation in the upper outer quadrant. Questionable lymph nodes palpable in the axilla, swelling because of biopsy noted      Labs:   Results for , " GREG DAMON (MRN [id number]) as of 2/13/2018 17:57   2/2/2018 02:53   WBC 26.4 (H)   RBC 4.18 (L)   Hemoglobin 11.9 (L)   Hematocrit 37.8   MCV 90.4   MCH 28.5   MCHC 31.5 (L)   RDW 44.8   Platelet Count 317   MPV 9.9   Results for GREG DEMARCO (MRN [id number]) as of 2/13/2018 17:57   2/1/2018 02:04   Sodium 140   Potassium 3.8   Chloride 103   Co2 27   Anion Gap 10.0   Glucose 96   Bun 18   Creatinine 0.80   GFR If  >60   GFR If Non African American >60   Calcium 8.1 (L)     Assessment and Plan:  -Breast cancer  -New problem  -Left side upper outer quadrant  -Invasive ductal  -Stage II [cT2, pN1a, M0]. ER/TX negative HER-2 pending.  -Histologic grade 3  -All of patient's pathology reports, imaging studies and labs were discussed and reviewed with the patient.  -PET/CT scan ordered for complete staging  -Case discussed with Dr. Crandall  -Orders placed for PICC line insertion  -Echocardiogram from January 13, 2018 reviewed  -We'll plan for neoadjuvant chemotherapy    - CLL  - The patient is currently Coon Stage 0 with lymphocytois only. She has some constitutional complaints which are unclear if tied to the CLL.   - She is currently under active observation  - She states she did have some night sweats 6 months ago but none since then.   - She also has significant weight loss HOWEVER the patient states she has been trying to lose weight.   - She has also complained of fatigues which is not severe and temporally related to her recent hospitalization.  - On physical exam RUQ tenderness was elicited and USG abdomen has been requested to see if the CLL stage has changed.  - If the patients symptoms do not resolve we will repeat flow cytometry and FISH to evaluate for cytogenetics before starting therapy.    - Fibroadenoma of right breast  - Stereotactic biopsy Nov 12, 2014  Showed benign fibrofatty breast tissue with discrete nodules of hyalinized fibroadenoma with microcalcifications. No atypia  or malignancy.  - The patient continues to have annual mammograms, as ordered by PCP  - Self breast exam was encouraged    - Adenocarcinoma in situ   - Diagnosed Dec 18, 2015 in the right upper lobe lung. biopsy of  nodule, 6 showed Adenocarcinoma-in-situ in fibroelastotic scar tissue, with foci suspicious for invasive adenocarcinoma.  - Feb 1, 2016. Right thoracoscopy with anterior segmentectomy of the right upper lobe and removal of a portion of the right middle lobe.Pathology report states The adenocarcinoma in situ focally demonstrates areas of central fibrosis which surrounds the adenocarcinoma in situ glands, which makes evaluation for invasion difficult. However, there are a few scattered clusters of malignant cells and small foci suspicious for invasive adenocarcinoma          She agreed and verbalized her agreement and understanding with the current plan.  I answered all questions and concerns she has at this time.   Dear  Esperanza Crandall M.D.., Thank you very much for allowing me to see  Clarisse Reeves today .     Please note that this dictation was created using voice recognition software. I have made every reasonable attempt to correct obvious errors, but I expect that there are errors of grammar and possibly content that I did not discover before finalizing the note.      SIGNATURES:  Denise Barroso    CC:  Siva Smart M.D.  Siva Smart M.D. Wright, Sharon, M.D.

## 2018-02-15 ENCOUNTER — TELEPHONE (OUTPATIENT)
Dept: PULMONOLOGY | Facility: HOSPICE | Age: 78
End: 2018-02-15

## 2018-02-15 NOTE — TELEPHONE ENCOUNTER
Patient has a PET scan scheduled for 2/19/18 ordered by Dr. Denise Barroso M.D. She also has a CT that was ordered by our office scheduled for  2/24/18. She would like to know if the appointment scheduled on 2/19/18 would show the needed information or if she would still need to complete the second scan for our office. Please advise.

## 2018-02-15 NOTE — TELEPHONE ENCOUNTER
Discussed with Dr. Cooper. The PET scan should be sufficient. She does not require CT if she completes PET scan

## 2018-02-16 DIAGNOSIS — E11.9 TYPE 2 DIABETES MELLITUS WITHOUT COMPLICATION, WITHOUT LONG-TERM CURRENT USE OF INSULIN (HCC): ICD-10-CM

## 2018-02-19 ENCOUNTER — HOSPITAL ENCOUNTER (OUTPATIENT)
Dept: RADIOLOGY | Facility: MEDICAL CENTER | Age: 78
End: 2018-02-19
Attending: INTERNAL MEDICINE
Payer: MEDICARE

## 2018-02-19 DIAGNOSIS — Z17.1 MALIGNANT NEOPLASM OF UPPER-OUTER QUADRANT OF LEFT BREAST IN FEMALE, ESTROGEN RECEPTOR NEGATIVE (HCC): ICD-10-CM

## 2018-02-19 DIAGNOSIS — C50.412 MALIGNANT NEOPLASM OF UPPER-OUTER QUADRANT OF LEFT BREAST IN FEMALE, ESTROGEN RECEPTOR NEGATIVE (HCC): ICD-10-CM

## 2018-02-19 PROCEDURE — A9552 F18 FDG: HCPCS

## 2018-02-20 DIAGNOSIS — E11.9 TYPE 2 DIABETES MELLITUS WITHOUT COMPLICATION, WITHOUT LONG-TERM CURRENT USE OF INSULIN (HCC): ICD-10-CM

## 2018-02-21 ENCOUNTER — HOSPITAL ENCOUNTER (OUTPATIENT)
Dept: RADIOLOGY | Facility: MEDICAL CENTER | Age: 78
End: 2018-02-21
Attending: INTERNAL MEDICINE
Payer: MEDICARE

## 2018-02-21 DIAGNOSIS — Z17.1 MALIGNANT NEOPLASM OF UPPER-OUTER QUADRANT OF LEFT BREAST IN FEMALE, ESTROGEN RECEPTOR NEGATIVE (HCC): ICD-10-CM

## 2018-02-21 DIAGNOSIS — C50.412 MALIGNANT NEOPLASM OF UPPER-OUTER QUADRANT OF LEFT BREAST IN FEMALE, ESTROGEN RECEPTOR NEGATIVE (HCC): ICD-10-CM

## 2018-02-21 PROCEDURE — 36569 INSJ PICC 5 YR+ W/O IMAGING: CPT

## 2018-02-23 ENCOUNTER — OFFICE VISIT (OUTPATIENT)
Dept: HEMATOLOGY ONCOLOGY | Facility: MEDICAL CENTER | Age: 78
End: 2018-02-23
Payer: MEDICARE

## 2018-02-23 VITALS
DIASTOLIC BLOOD PRESSURE: 80 MMHG | HEIGHT: 66 IN | TEMPERATURE: 97.9 F | WEIGHT: 200.07 LBS | OXYGEN SATURATION: 96 % | SYSTOLIC BLOOD PRESSURE: 118 MMHG | BODY MASS INDEX: 32.15 KG/M2 | HEART RATE: 86 BPM | RESPIRATION RATE: 16 BRPM

## 2018-02-23 DIAGNOSIS — Z17.1 MALIGNANT NEOPLASM OF UPPER-OUTER QUADRANT OF LEFT BREAST IN FEMALE, ESTROGEN RECEPTOR NEGATIVE (HCC): ICD-10-CM

## 2018-02-23 DIAGNOSIS — C50.412 MALIGNANT NEOPLASM OF UPPER-OUTER QUADRANT OF LEFT BREAST IN FEMALE, ESTROGEN RECEPTOR NEGATIVE (HCC): ICD-10-CM

## 2018-02-23 PROCEDURE — 99214 OFFICE O/P EST MOD 30 MIN: CPT | Performed by: INTERNAL MEDICINE

## 2018-02-23 ASSESSMENT — PAIN SCALES - GENERAL: PAINLEVEL: 3=SLIGHT PAIN

## 2018-02-23 NOTE — PROGRESS NOTES
Consult Note: Oncology    Date of consultation: 2/23/2018      Referring provider: Esperanza Crandall M.D.    Reason for consultation:   CC: Breast cancer    History of presenting illness:  Breast cancer  -August 2017 patient had a normal screening mammogram  -January 30, 2018. Echocardiogram shows normal systolic function. Ejection fraction of 65%  -February 2018 patient self palpated a lump in the left breast  -February 8, 2018. Diagnostic mammogram positive for suspicious mass in the left breast upper-outer quadrant.  -February 9, 2018. Needle biopsy shows triple negative infiltrating ductal carcinoma. Ki-67 is 30-50%  -February 19, 2018. PET CT scan. . Hypermetabolic 1.7 cm mass in the left breast, in keeping with known malignancy.2. Multiple hypermetabolic remi metastases in the left axilla.3. Nonenlarged lymph nodes in the left supraclavicular and retroclavicular region with mild uptake, suspicious for early metastasis.4. Mild uptake in the inferior endplate of T6 is nonspecific but could relate to degenerative change. Attention on follow-up exam is recommended    Adenocarcinoma in situ   Dec 18, 2015 .Right upper lobe lung nodule, 6 cores: Adenocarcinoma-in-situ in fibroelastotic scar tissue, with foci suspicious for invasive adenocarcinoma.  Feb 1, 2016. Right thoracoscopy with anterior segmentectomy of the right upper lobe and removal of a portion of the right middle lobe.    Fibroadenoma of right breast  Nov 12, 2014  Stereotactic biopsy: Benign fibrofatty breast tissue with discrete nodules of hyalinized fibroadenoma with microcalcifications. No atypia or malignancy.    CLL  2006 diagnosed with CLL in [place] after being admitted to the hospital for an infection and found to have leucocytosis.  Patient followed by Dr. Foster until 2014 until her insurance changed. Has been followed by her PCP since.       Clarisse Reeves  is a 76 y.o. year old female who presents to [facility] care. She denies any acute  "complaints at this time except for some chronic fatigue which has been worse for the past past 3-4 months. She states she had some drenching night sweats last night but none in the past 6 months. She has also lost about 27 lbs in the past 3-4 months or so, but she has been trying to watch her diet and lose weight.    She was started on Celexa for depression last year(2016) after she lost her daughter who 51 to massive MI.      Interval History:  Initially seen in our clinic on 7/19/2017   Clarisse Reeves is a 77 y.o. Female who is seen in clinic for triple negative breast cancer she is accompanied by her friend who provided some of the history. Patient is here to follow-up on the results of a PET/CT scan.    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  Denies symptoms.   • Cutaneous skin tags 1/29/2015   • DM (diabetes mellitus) (CMS-[facility], located [facility])    • Hiatus hernia syndrome     no surgery   • Hypertension     \"In the past\"   • Indigestion    • MEDICAL HOME    • Personal history of colonic polyps 11/26/2012   • Pneumonia 2014   • Pneumonia 06/2017   • Thyroid disease    • Type II or unspecified type diabetes mellitus without mention of complication, not stated as uncontrolled     pre-diabetic       Past surgical history:    Past Surgical History:   Procedure Laterality Date   • THYROIDECTOMY N/A 11/29/2017    Procedure: THYROIDECTOMY COMPLETION, RECURRENT LARYNGEAL " NERVE MONITORING;  Surgeon: Cameron Marcelino M.D.;  Location: SURGERY SAME DAY [facility];  Service: General   • THORACOSCOPY Right 2/1/2016    Procedure: THORACOSCOPY Upper Lobectomy ;  Surgeon: John H Ganser, M.D.;  Location: SURGERY [facility];  Service:    • NODE DISSECTION Right 2/1/2016    Procedure: NODE DISSECTION;  Surgeon: John H Ganser, M.D.;  Location: SURGERY [facility];  Service:    • RECOVERY  12/18/2015    Procedure: CT-SCP-RUL LUNG BIOPSY-;  Surgeon: Recoveryonly Surgery;  Location: SURGERY PRE-POST PROC UNIT [facility];  Service:    • OTHER  2001    Lower Left segment of lung removed    • CHOLECYSTECTOMY  1995   • OTHER ORTHOPEDIC SURGERY  1990    bakers cyst removed in right knee, multiple scopes   • OTHER  1990    hemmroid removal   • OTHER  1984    left Thyroid removed, might remove right side in the future   • APPENDECTOMY  1955   • CATARACT EXTRACTION WITH IOL     • TONSILLECTOMY     • US-NEEDLE CORE BX-BREAST PANEL     • VAGINAL HYSTERECTOMY TOTAL      Hysterectomy,Total Vaginal       Allergies:  Codeine; Lipitor; Lovastatin; and Zocor    Medications:    Current Outpatient Prescriptions   Medication Sig Dispense Refill   • levothyroxine (SYNTHROID) 150 MCG Tab Take 1 Tab by mouth Every morning on an empty stomach. 90 Tab 4   • glipiZIDE (GLUCOTROL) 5 MG Tab Take 1 Tab by mouth every day. 90 Tab 4   • fenofibrate (TRIGLIDE) 160 MG tablet Take 1 Tab by mouth every day. 90 Tab 4   • citalopram (CELEXA) 20 MG Tab Take 1 Tab by mouth every day. 90 Tab 4   • ONE TOUCH ULTRA TEST strip 1 Strip by Other route 2 Times a Day. 200 Strip 1   • Blood Glucose Monitoring Suppl Supplies Misc One Touch ultra glucometer strips for blood sugar check up to 2 times a day, 90 day supply 200 Each 3   • LORazepam (ATIVAN) 0.5 MG Tab Take 1 Tab by mouth every four hours as needed for Anxiety for up to 30 days. 30 Tab 0   • ibuprofen (MOTRIN) 400 MG Tab Take 1 Tab by mouth every 6 hours as needed  "(headache/migraine). 30 Tab 1   • prochlorperazine (COMPAZINE) 10 MG Tab Take 1 Tab by mouth every 6 hours as needed (nausea, vomiting, migraine). 30 Tab 0   • vitamin D (CHOLECALCIFEROL) 1000 UNIT Tab Take 3,000 Units by mouth 2 Times a Day.     • losartan (COZAAR) 25 MG Tab Take 1 Tab by mouth every day. 90 Tab 4   • lorazepam (ATIVAN) 2 MG tablet Take 2 Tabs by mouth at bedtime as needed for Anxiety. 60 Tab 3   • aspirin (ASA) 81 MG Chew Tab chewable tablet Take 1 Tab by mouth every day. 100 Tab 11     Current Facility-Administered Medications   Medication Dose Route Frequency Provider Last Rate Last Dose   • cyanocobalamin (VITAMIN B-12) injection 1,000 mcg  1,000 mcg Intramuscular Q30 DAYS Siva Smart M.D.   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     Social History Main Topics   • Smoking status: Former Smoker     Packs/day: 2.00     Years: 50.00     Types: Cigarettes     Quit date: 5/6/2006   • Smokeless tobacco: Never Used      Comment: quit smoking 2002   • Alcohol use No      Comment: ocassionaly   • Drug use: No   • Sexual activity: Not Currently     Partners: Male     Other Topics Concern   • Not on file     Social History Narrative   • No narrative on file     She i    Family History:     Family History   Problem Relation Age of Onset   • Cancer Mother    • Lung Disease Father    • Cancer Father        Review of Systems:    All other review of systems are negative except what was mentioned above in the HPI.      Physical Exam:  Vitals:   /80   Pulse 86   Temp 36.6 °C (97.9 °F)   Resp 16   Ht 1.676 m (5' 6\")   Wt 90.8 kg (200 lb 1.1 oz)   SpO2 96%   BMI 32.29 kg/m²     General: Not in acute distress, alert and oriented x 3  HEENT: No pallor, icterus. Oropharynx clear. "   Neck: Supple, no palpable masses.  Lymph nodes: No palpable cervical, supraclavicular, axillary or inguinal lymphadenopathy.    CVS: regular rate and rhythm, no rubs or gallops  RESP: Clear to auscultate bilaterally, no wheezing or crackles.   ABD: Soft, RUQ tender to deep palpation, non distended, positive bowel sounds, no palpable organomegaly  EXT: No edema or cyanosis  CNS: Alert and oriented x3, No focal deficits.  Skin- No rash  Breast- right breast does not have any obvious distortion. No masses palpable no lymphadenopathy. Left breast has visible bruising at the 2:00 position at the site of needle biopsy-tender to palpation in the upper outer quadrant. Questionable lymph nodes palpable in the axilla, swelling because of biopsy noted      Labs:   Results for GREG DEMARCO (MRN [id number]) as of 2/13/2018 17:57   2/2/2018 02:53   WBC 26.4 (H)   RBC 4.18 (L)   Hemoglobin 11.9 (L)   Hematocrit 37.8   MCV 90.4   MCH 28.5   MCHC 31.5 (L)   RDW 44.8   Platelet Count 317   MPV 9.9   Results for GREG DEMARCO (MRN [id number]) as of 2/13/2018 17:57   2/1/2018 02:04   Sodium 140   Potassium 3.8   Chloride 103   Co2 27   Anion Gap 10.0   Glucose 96   Bun 18   Creatinine 0.80   GFR If  >60   GFR If Non African American >60   Calcium 8.1 (L)     Imaging  February 19, 2018. PET CT scan  1. Hypermetabolic 1.7 cm mass in the left breast, in keeping with known malignancy.  2. Multiple hypermetabolic remi metastases in the left axilla.  3. Nonenlarged lymph nodes in the left supraclavicular and retroclavicular region with mild uptake, suspicious for early metastasis.  4. Mild uptake in the inferior endplate of T6 is nonspecific but could relate to degenerative change. Attention on follow-up exam is recommended.    Assessment and Plan:  -Breast cancer  -Left side   -Upper outer quadrant  -Invasive ductal  -Stage IIIc [cT2, cN3c, M0]. ER/OK negative HER-2 negative  -Histologic grade 3  -All of patient's  pathology reports, imaging studies and labs were discussed and reviewed with the patient.  -PET/CT scan from February 19, 2018 was reviewed. Left supraclavicular and retroclavicular lymph nodes nonenlarged but mild uptake suspicious for early metastasis.  -MRI of the spine ordered to evaluate uptake seen on the T6  -Plan for starting neoadjuvant dose dense Adriamycin with cyclophosphamide followed by Taxol. Chemotherapy requiring intensive monitoring for toxicity.  -We'll do an MRI of the breast after completing neoadjuvant chemotherapy  -May be a candidate for adjuvant Xeloda based on The Capecitabine for Residual Cancer as Adjuvant Therapy (CREATE-X) trial     -CLL  -Coon stage 0 with lymphocytosis only   -She is currently under active observation  -PET/CT scan February 2018 did not show evidence of lymph node disease    -Fibroadenoma of breast  -Right breast  -Stereotactic biopsy Nov 12, 2014  Showed benign fibrofatty breast tissue with discrete nodules of hyalinized fibroadenoma with microcalcifications.No atypia or malignancy.    - Adenocarcinoma in situ   - Diagnosed Dec 18, 2015 in the right upper lobe lung. biopsy of  nodule, 6 showed Adenocarcinoma-in-situ in fibroelastotic scar tissue, with foci suspicious for invasive adenocarcinoma.  - Feb 1, 2016. Right thoracoscopy with anterior segmentectomy of the right upper lobe and removal of a portion of the right middle lobe.Pathology report states The adenocarcinoma in situ focally demonstrates areas of central fibrosis which surrounds the adenocarcinoma in situ glands, which makes evaluation for invasion difficult. However, there are a few scattered clusters of malignant cells and small foci suspicious for invasive adenocarcinoma  - PET/CT scan February 2018 did not show any evidence of disease          She agreed and verbalized her agreement and understanding with the current plan.  I answered all questions and concerns she has at this time.   Dear  Raz,  BETSY Mata., Thank you very much for allowing me to see  Clarisse Reeves today .     Please note that this dictation was created using voice recognition software. I have made every reasonable attempt to correct obvious errors, but I expect that there are errors of grammar and possibly content that I did not discover before finalizing the note.      SIGNATURES:  Denise Barroso    CC:  BETSY Guerra Michael D, M.D. Wright, Sharon, M.D.

## 2018-02-26 ENCOUNTER — HOSPITAL ENCOUNTER (OUTPATIENT)
Facility: MEDICAL CENTER | Age: 78
End: 2018-02-26
Attending: INTERNAL MEDICINE
Payer: MEDICARE

## 2018-02-26 ENCOUNTER — NON-PROVIDER VISIT (OUTPATIENT)
Dept: HEMATOLOGY ONCOLOGY | Facility: MEDICAL CENTER | Age: 78
End: 2018-02-26
Payer: MEDICARE

## 2018-02-26 VITALS
SYSTOLIC BLOOD PRESSURE: 128 MMHG | BODY MASS INDEX: 32.51 KG/M2 | WEIGHT: 202.27 LBS | TEMPERATURE: 97.7 F | DIASTOLIC BLOOD PRESSURE: 82 MMHG | RESPIRATION RATE: 16 BRPM | OXYGEN SATURATION: 96 % | HEART RATE: 66 BPM | HEIGHT: 66 IN

## 2018-02-26 DIAGNOSIS — C91.10 CLL (CHRONIC LYMPHOCYTIC LEUKEMIA) (HCC): ICD-10-CM

## 2018-02-26 DIAGNOSIS — Z51.11 ENCOUNTER FOR ANTINEOPLASTIC CHEMOTHERAPY: ICD-10-CM

## 2018-02-26 LAB
ALBUMIN SERPL BCP-MCNC: 4.4 G/DL (ref 3.2–4.9)
ALBUMIN/GLOB SERPL: 2.1 G/DL
ALP SERPL-CCNC: 59 U/L (ref 30–99)
ALT SERPL-CCNC: 9 U/L (ref 2–50)
ANION GAP SERPL CALC-SCNC: 10 MMOL/L (ref 0–11.9)
AST SERPL-CCNC: 11 U/L (ref 12–45)
BASOPHILS # BLD AUTO: 0 % (ref 0–1.8)
BASOPHILS # BLD: 0 K/UL (ref 0–0.12)
BILIRUB SERPL-MCNC: 0.4 MG/DL (ref 0.1–1.5)
BUN SERPL-MCNC: 16 MG/DL (ref 8–22)
CALCIUM SERPL-MCNC: 8.8 MG/DL (ref 8.5–10.5)
CHLORIDE SERPL-SCNC: 103 MMOL/L (ref 96–112)
CO2 SERPL-SCNC: 24 MMOL/L (ref 20–33)
CREAT SERPL-MCNC: 0.74 MG/DL (ref 0.5–1.4)
EOSINOPHIL # BLD AUTO: 0.4 K/UL (ref 0–0.51)
EOSINOPHIL NFR BLD: 2 % (ref 0–6.9)
ERYTHROCYTE [DISTWIDTH] IN BLOOD BY AUTOMATED COUNT: 47.8 FL (ref 35.9–50)
GIANT PLATELETS BLD QL SMEAR: NORMAL
GLOBULIN SER CALC-MCNC: 2.1 G/DL (ref 1.9–3.5)
GLUCOSE SERPL-MCNC: 90 MG/DL (ref 65–99)
HCT VFR BLD AUTO: 35.5 % (ref 37–47)
HGB BLD-MCNC: 11.5 G/DL (ref 12–16)
LYMPHOCYTES # BLD AUTO: 12.8 K/UL (ref 1–4.8)
LYMPHOCYTES NFR BLD: 64 % (ref 22–41)
MANUAL DIFF BLD: NORMAL
MCH RBC QN AUTO: 28.9 PG (ref 27–33)
MCHC RBC AUTO-ENTMCNC: 32.4 G/DL (ref 33.6–35)
MCV RBC AUTO: 89.2 FL (ref 81.4–97.8)
MONOCYTES # BLD AUTO: 0.6 K/UL (ref 0–0.85)
MONOCYTES NFR BLD AUTO: 3 % (ref 0–13.4)
MORPHOLOGY BLD-IMP: NORMAL
NEUTROPHILS # BLD AUTO: 6.2 K/UL (ref 2–7.15)
NEUTROPHILS NFR BLD: 31 % (ref 44–72)
NRBC # BLD AUTO: 0 K/UL
NRBC BLD-RTO: 0 /100 WBC
OVALOCYTES BLD QL SMEAR: NORMAL
PLATELET # BLD AUTO: 309 K/UL (ref 164–446)
PLATELET BLD QL SMEAR: NORMAL
PMV BLD AUTO: 9.9 FL (ref 9–12.9)
POTASSIUM SERPL-SCNC: 4.1 MMOL/L (ref 3.6–5.5)
PROT SERPL-MCNC: 6.5 G/DL (ref 6–8.2)
RBC # BLD AUTO: 3.98 M/UL (ref 4.2–5.4)
RBC BLD AUTO: PRESENT
SMUDGE CELLS BLD QL SMEAR: NORMAL
SODIUM SERPL-SCNC: 137 MMOL/L (ref 135–145)
VARIANT LYMPHS BLD QL SMEAR: NORMAL
WBC # BLD AUTO: 20 K/UL (ref 4.8–10.8)

## 2018-02-26 PROCEDURE — 36592 COLLECT BLOOD FROM PICC: CPT | Performed by: INTERNAL MEDICINE

## 2018-02-26 PROCEDURE — 85007 BL SMEAR W/DIFF WBC COUNT: CPT

## 2018-02-26 PROCEDURE — 85027 COMPLETE CBC AUTOMATED: CPT

## 2018-02-26 PROCEDURE — 80053 COMPREHEN METABOLIC PANEL: CPT

## 2018-02-26 ASSESSMENT — PAIN SCALES - GENERAL: PAINLEVEL: 4=SLIGHT-MODERATE PAIN

## 2018-02-26 NOTE — PROGRESS NOTES
Clarisse Reeves is a 77 y.o. female here for a non-provider visit for: Complaint of blisters on right upper arm and central line lab draw on 2/26/2018 at 11 AM.    Pt has two fluid filled blisters on her upper outer arm located away from the PICC line and tegaderm of unknown etiology.  She states she has had the blisters for a couple days and one popped and scabbed over. She states they do itch. She denies any burning or chemical exposure.  Denies fever. IRENA Villarreal, assessed and recommended pt keep an eye on them and to use topical antihistamine cream for itching. Pt will call med onc if her symptoms worsen or do not improve.    Discussed plan of care to draw labs and perform PICC line dressing change.   Procedure Performed: PICC Line Dressing Change  and lab draw-  Verified blood return in both lumens.  Prechemo labs drawn per treatment plan. Both lumens flushed with 20ml NS and claves changed. Performed dressing change per protocol.    Anatomical site: Right upper arm    Equipment used: PICC dressing change kit, normal saline flushes    Labs drawn: CBC w/diff and CMP    Ordering Provider: Dr. LOGAN Barroso    Pradeep By: Jose C Hayes R.N.

## 2018-02-28 ENCOUNTER — OFFICE VISIT (OUTPATIENT)
Dept: HEMATOLOGY ONCOLOGY | Facility: MEDICAL CENTER | Age: 78
End: 2018-02-28
Payer: MEDICARE

## 2018-02-28 VITALS
TEMPERATURE: 98.5 F | HEIGHT: 66 IN | BODY MASS INDEX: 32.37 KG/M2 | HEART RATE: 73 BPM | DIASTOLIC BLOOD PRESSURE: 77 MMHG | SYSTOLIC BLOOD PRESSURE: 140 MMHG | WEIGHT: 201.39 LBS | OXYGEN SATURATION: 97 % | RESPIRATION RATE: 16 BRPM

## 2018-02-28 DIAGNOSIS — C91.10 CLL (CHRONIC LYMPHOCYTIC LEUKEMIA) (HCC): ICD-10-CM

## 2018-02-28 DIAGNOSIS — C50.412 MALIGNANT NEOPLASM OF UPPER-OUTER QUADRANT OF LEFT BREAST IN FEMALE, ESTROGEN RECEPTOR NEGATIVE (HCC): ICD-10-CM

## 2018-02-28 DIAGNOSIS — F41.9 ANXIETY: ICD-10-CM

## 2018-02-28 DIAGNOSIS — Z17.1 MALIGNANT NEOPLASM OF UPPER-OUTER QUADRANT OF LEFT BREAST IN FEMALE, ESTROGEN RECEPTOR NEGATIVE (HCC): ICD-10-CM

## 2018-02-28 DIAGNOSIS — Z51.11 ENCOUNTER FOR ANTINEOPLASTIC CHEMOTHERAPY: ICD-10-CM

## 2018-02-28 PROCEDURE — 99354 PR PROLONGED SVC OUTPATIENT SETTING 1ST HOUR: CPT | Performed by: NURSE PRACTITIONER

## 2018-02-28 PROCEDURE — 99215 OFFICE O/P EST HI 40 MIN: CPT | Mod: 25 | Performed by: NURSE PRACTITIONER

## 2018-02-28 RX ORDER — ONDANSETRON 8 MG/1
8 TABLET, ORALLY DISINTEGRATING ORAL
Status: CANCELLED | OUTPATIENT
Start: 2018-03-22

## 2018-02-28 RX ORDER — ONDANSETRON 2 MG/ML
4 INJECTION INTRAMUSCULAR; INTRAVENOUS
Status: CANCELLED | OUTPATIENT
Start: 2018-03-02

## 2018-02-28 RX ORDER — 0.9 % SODIUM CHLORIDE 0.9 %
VIAL (ML) INJECTION PRN
Status: CANCELLED | OUTPATIENT
Start: 2018-03-22

## 2018-02-28 RX ORDER — 0.9 % SODIUM CHLORIDE 0.9 %
5 VIAL (ML) INJECTION PRN
Status: CANCELLED | OUTPATIENT
Start: 2018-03-21

## 2018-02-28 RX ORDER — 0.9 % SODIUM CHLORIDE 0.9 %
VIAL (ML) INJECTION PRN
Status: CANCELLED | OUTPATIENT
Start: 2018-03-21

## 2018-02-28 RX ORDER — 0.9 % SODIUM CHLORIDE 0.9 %
5 VIAL (ML) INJECTION PRN
Status: CANCELLED | OUTPATIENT
Start: 2018-03-02

## 2018-02-28 RX ORDER — 0.9 % SODIUM CHLORIDE 0.9 %
VIAL (ML) INJECTION PRN
Status: CANCELLED | OUTPATIENT
Start: 2018-03-02

## 2018-02-28 RX ORDER — 0.9 % SODIUM CHLORIDE 0.9 %
VIAL (ML) INJECTION PRN
Status: CANCELLED | OUTPATIENT
Start: 2018-03-01

## 2018-02-28 RX ORDER — 0.9 % SODIUM CHLORIDE 0.9 %
5 VIAL (ML) INJECTION PRN
Status: CANCELLED | OUTPATIENT
Start: 2018-03-22

## 2018-02-28 RX ORDER — PROCHLORPERAZINE MALEATE 10 MG
10 TABLET ORAL EVERY 6 HOURS PRN
Qty: 30 TAB | Refills: 6 | Status: SHIPPED | OUTPATIENT
Start: 2018-02-28 | End: 2018-03-02

## 2018-02-28 RX ORDER — LIDOCAINE AND PRILOCAINE 25; 25 MG/G; MG/G
CREAM TOPICAL
Qty: 1 TUBE | Refills: 3 | Status: SHIPPED | OUTPATIENT
Start: 2018-02-28 | End: 2018-03-02

## 2018-02-28 RX ORDER — SODIUM CHLORIDE 9 MG/ML
INJECTION, SOLUTION INTRAVENOUS CONTINUOUS
Status: CANCELLED | OUTPATIENT
Start: 2018-03-02

## 2018-02-28 RX ORDER — ONDANSETRON 8 MG/1
8 TABLET, ORALLY DISINTEGRATING ORAL
Status: CANCELLED | OUTPATIENT
Start: 2018-03-02

## 2018-02-28 RX ORDER — PROCHLORPERAZINE MALEATE 10 MG
10 TABLET ORAL EVERY 6 HOURS PRN
Status: CANCELLED | OUTPATIENT
Start: 2018-03-22

## 2018-02-28 RX ORDER — DIAZEPAM 5 MG/1
TABLET ORAL
Qty: 2 TAB | Refills: 0 | Status: SHIPPED | OUTPATIENT
Start: 2018-02-28 | End: 2018-03-02

## 2018-02-28 RX ORDER — PROCHLORPERAZINE MALEATE 10 MG
10 TABLET ORAL EVERY 6 HOURS PRN
Status: CANCELLED | OUTPATIENT
Start: 2018-03-02

## 2018-02-28 RX ORDER — ONDANSETRON 4 MG/1
4 TABLET, FILM COATED ORAL EVERY 4 HOURS PRN
Qty: 30 TAB | Refills: 6 | Status: SHIPPED | OUTPATIENT
Start: 2018-02-28 | End: 2018-07-05

## 2018-02-28 RX ORDER — SODIUM CHLORIDE 9 MG/ML
INJECTION, SOLUTION INTRAVENOUS CONTINUOUS
Status: CANCELLED | OUTPATIENT
Start: 2018-03-22

## 2018-02-28 RX ORDER — 0.9 % SODIUM CHLORIDE 0.9 %
5 VIAL (ML) INJECTION PRN
Status: CANCELLED | OUTPATIENT
Start: 2018-03-01

## 2018-02-28 RX ORDER — ONDANSETRON 2 MG/ML
4 INJECTION INTRAMUSCULAR; INTRAVENOUS
Status: CANCELLED | OUTPATIENT
Start: 2018-03-22

## 2018-02-28 ASSESSMENT — ENCOUNTER SYMPTOMS
DOUBLE VISION: 0
TINGLING: 1
NERVOUS/ANXIOUS: 1
NAUSEA: 0
VOMITING: 0
CONSTIPATION: 1
INSOMNIA: 1
CHILLS: 0
DIARRHEA: 1
FEVER: 0
BLURRED VISION: 0

## 2018-02-28 ASSESSMENT — PAIN SCALES - GENERAL: PAINLEVEL: NO PAIN

## 2018-02-28 NOTE — PROGRESS NOTES
Subjective:      Clarisse Reeves is a 77 y.o. female who presents for Chemo Education (chemo ed (ac) devon)    HPI   Patient is established with Dr. Barroso for treatment of stage IIIc [cT2, cN3c, M0], ER/ME negative, HER-2 negative, grade 3, invasive ductal carcinoma of the left upper outer breast.  She will be starting dose dense AC (adriamycin/cytoxan) every 2 weeks x 4 cycles followed by dose dense Taxol. She presents today for a pre-chemotherapy teaching appointment. Patient is accompanied by her friend.     She reports history of CLL, dx 2006, with associated leukocytosis (~20-30k is baseline).      Allergies   Allergen Reactions   • Codeine Hives and Nausea     RXN=40 years ago   • Lipitor [Atorvastatin Calcium] Myalgia     KSD=[id number]     • Lovastatin Myalgia     Muscle aches  IYY=[id number]   • Zocor [Simvastatin - High Dose] Myalgia     Severe myalgias  INV=[id number]   • Lisinopril Cough   • Metformin      Diarrhea         Current Outpatient Prescriptions on File Prior to Visit   Medication Sig Dispense Refill   • levothyroxine (SYNTHROID) 150 MCG Tab Take 1 Tab by mouth Every morning on an empty stomach. 90 Tab 4   • vitamin D (CHOLECALCIFEROL) 1000 UNIT Tab Take 3,000 Units by mouth 2 Times a Day.     • ONE TOUCH ULTRA TEST strip 1 Strip by Other route 2 Times a Day. 200 Strip 1   • Blood Glucose Monitoring Suppl Supplies Misc One Touch ultra glucometer strips for blood sugar check up to 2 times a day, 90 day supply 200 Each 3   • LORazepam (ATIVAN) 0.5 MG Tab Take 1 Tab by mouth every four hours as needed for Anxiety for up to 30 days. 30 Tab 0   • ibuprofen (MOTRIN) 400 MG Tab Take 1 Tab by mouth every 6 hours as needed (headache/migraine). 30 Tab 1   • prochlorperazine (COMPAZINE) 10 MG Tab Take 1 Tab by mouth every 6 hours as needed (nausea, vomiting, migraine). 30 Tab 0   • glipiZIDE (GLUCOTROL) 5 MG Tab Take 1 Tab by mouth every day. 90 Tab 4   • fenofibrate (TRIGLIDE) 160 MG tablet Take 1 Tab by mouth  "every day. 90 Tab 4   • losartan (COZAAR) 25 MG Tab Take 1 Tab by mouth every day. 90 Tab 4   • lorazepam (ATIVAN) 2 MG tablet Take 2 Tabs by mouth at bedtime as needed for Anxiety. 60 Tab 3   • citalopram (CELEXA) 20 MG Tab Take 1 Tab by mouth every day. 90 Tab 4   • aspirin (ASA) 81 MG Chew Tab chewable tablet Take 1 Tab by mouth every day. 100 Tab 11     Current Facility-Administered Medications on File Prior to Visit   Medication Dose Route Frequency Provider Last Rate Last Dose   • cyanocobalamin (VITAMIN B-12) injection 1,000 mcg  1,000 mcg Intramuscular Q30 DAYS Siva Smart M.D.   1,000 mcg at 10/30/17 1143       Review of Systems   Constitutional: Negative for chills and fever.   HENT: Negative for tinnitus.    Eyes: Negative for blurred vision and double vision.   Gastrointestinal: Positive for constipation (no pattern) and diarrhea (no pattern). Negative for nausea and vomiting.        Very susceptible to/hx of C Dif    Musculoskeletal:        Mildly tender at RUE - s/p PICC placement   Neurological: Positive for tingling (baseline - grade 1: bilateral hands (DM)).   Psychiatric/Behavioral: The patient is nervous/anxious and has insomnia.         Objective:     /77   Pulse 73   Temp 36.9 °C (98.5 °F)   Resp 16   Ht 1.676 m (5' 5.98\")   Wt 91.3 kg (201 lb 6.2 oz)   SpO2 97%   BMI 32.52 kg/m²      Physical Exam   Constitutional: She is oriented to person, place, and time. She appears well-developed.   Baseline alopecia- wears a wig   Pulmonary/Chest: Effort normal.   Abdominal: Soft.   Neurological: She is alert and oriented to person, place, and time.   Skin: Skin is warm and dry.   Psychiatric:   Anxious - especially about upcoming MRI   Vitals reviewed.    No visits with results within 1 Day(s) from this visit.   Latest known visit with results is:   Hospital Outpatient Visit on 02/26/2018   Component Date Value Ref Range Status   • WBC 02/26/2018 20.0* 4.8 - 10.8 K/uL Final   • RBC " 02/26/2018 3.98* 4.20 - 5.40 M/uL Final   • Hemoglobin 02/26/2018 11.5* 12.0 - 16.0 g/dL Final   • Hematocrit 02/26/2018 35.5* 37.0 - 47.0 % Final   • MCV 02/26/2018 89.2  81.4 - 97.8 fL Final   • MCH 02/26/2018 28.9  27.0 - 33.0 pg Final   • MCHC 02/26/2018 32.4* 33.6 - 35.0 g/dL Final   • RDW 02/26/2018 47.8  35.9 - 50.0 fL Final   • Platelet Count 02/26/2018 309  164 - 446 K/uL Final   • MPV 02/26/2018 9.9  9.0 - 12.9 fL Final   • Nucleated RBC 02/26/2018 0.00  /100 WBC Final   • NRBC (Absolute) 02/26/2018 0.00  K/uL Final   • Neutrophils-Polys 02/26/2018 31.00* 44.00 - 72.00 % Final   • Lymphocytes 02/26/2018 64.00* 22.00 - 41.00 % Final   • Monocytes 02/26/2018 3.00  0.00 - 13.40 % Final   • Eosinophils 02/26/2018 2.00  0.00 - 6.90 % Final   • Basophils 02/26/2018 0.00  0.00 - 1.80 % Final   • Neutrophils (Absolute) 02/26/2018 6.20  2.00 - 7.15 K/uL Final   • Lymphs (Absolute) 02/26/2018 12.80* 1.00 - 4.80 K/uL Final   • Monos (Absolute) 02/26/2018 0.60  0.00 - 0.85 K/uL Final   • Eos (Absolute) 02/26/2018 0.40  0.00 - 0.51 K/uL Final   • Baso (Absolute) 02/26/2018 0.00  0.00 - 0.12 K/uL Final   • Sodium 02/26/2018 137  135 - 145 mmol/L Final   • Potassium 02/26/2018 4.1  3.6 - 5.5 mmol/L Final   • Chloride 02/26/2018 103  96 - 112 mmol/L Final   • Co2 02/26/2018 24  20 - 33 mmol/L Final   • Anion Gap 02/26/2018 10.0  0.0 - 11.9 Final   • Glucose 02/26/2018 90  65 - 99 mg/dL Final   • Bun 02/26/2018 16  8 - 22 mg/dL Final   • Creatinine 02/26/2018 0.74  0.50 - 1.40 mg/dL Final   • Calcium 02/26/2018 8.8  8.5 - 10.5 mg/dL Final   • AST(SGOT) 02/26/2018 11* 12 - 45 U/L Final   • ALT(SGPT) 02/26/2018 9  2 - 50 U/L Final   • Alkaline Phosphatase 02/26/2018 59  30 - 99 U/L Final   • Total Bilirubin 02/26/2018 0.4  0.1 - 1.5 mg/dL Final   • Albumin 02/26/2018 4.4  3.2 - 4.9 g/dL Final   • Total Protein 02/26/2018 6.5  6.0 - 8.2 g/dL Final   • Globulin 02/26/2018 2.1  1.9 - 3.5 g/dL Final   • A-G Ratio 02/26/2018 2.1   g/dL Final   • GFR If  02/26/2018 >60  >60 mL/min/1.73 m 2 Final   • GFR If Non  02/26/2018 >60  >60 mL/min/1.73 m 2 Final   • Manual Diff Status 02/26/2018 PERFORMED   Final   • Peripheral Smear Review 02/26/2018 see below   Final    Comment: Due to instrument suspect flags, further review of peripheral smear is  indicated on this patient sample. This review may or may not result in  abnormal findings.     • Plt Estimation 02/26/2018 Normal   Final   • RBC Morphology 02/26/2018 Present   Final   • Giant Platelets 02/26/2018 1+   Final   • Ovalocytes 02/26/2018 1+   Final   • Smudge Cells 02/26/2018 Moderate   Final   • Reactive Lymphocytes 02/26/2018 Few   Final          Assessment/Plan:     1. Malignant neoplasm of upper-outer quadrant of left breast in female, estrogen receptor negative (CMS-HCC)  REFERRAL TO ONCOLOGY NUTRITION SERVICES   2. Anxiety  diazePAM (VALIUM) 5 MG Tab   3. CLL (chronic lymphocytic leukemia)- wbc= 20k-30k, since 2006; no rx; oncologist to follow     4. Encounter for antineoplastic chemotherapy  REFERRAL TO ONCOLOGY NUTRITION SERVICES         Patient has stage IIIc [cT2, cN3c, M0], ER/CA negative, HER-2 negative, grade 3, invasive ductal carcinoma of the left upper outer breast and will be starting treatment consisting of dose dense AC on 3/2/2018.    Patient was educated on chemotherapy including but not limited to: Infusion Policies and procedures, cycling of chemotherapy, length of treatment, alopecia, myelosuppression, infection prevention, fatigue, GI distress, use of specific nausea medications, avoidance of alcoholic beverages and supplement medications, use of sunscreen, chemotherapy precautions at home, and invasive procedures. Patient was provided with drug specific handouts, [facility] chemotherapy and you book, [facility] eating hints book, [facility] side effects sheet. Patient was given tour of Infusion Services and shown where to park and check-in.       Additional teaching includes:  1.  MRI due 3/3/2018 - patient provided pre-procedure valium for anxiety  2.  Valium provided for MRI - patient aware NOT to take until after she signs the consent      The following appointments, labs, and medications have been provided to the patient:  Line: PICC placed 2/21/2018 - will need weekly dressing changes  ECHO: completed 1/30/2018 (LVEF 65%)  Labs: CBC, CMP  OnPro: n/a  Lab Appointments: weekly with PICC dressing change and pre-chemo  Follow up appts: tox check 3/9/2018  Chemotherapy class: completed today  Nausea medication: zofran and compazine  Pain medication: n/a  Nurse ayla: Letter from [facility] on 2/14/2018  Dietician:  Referred per policy  SW: n/a      Spent 75 minutes of continuous, non-interrupted, face-to-face patient contact in which greater than 50% of the visit was spent counseling and coordinating of care.

## 2018-03-02 ENCOUNTER — OUTPATIENT INFUSION SERVICES (OUTPATIENT)
Dept: ONCOLOGY | Facility: MEDICAL CENTER | Age: 78
End: 2018-03-02
Attending: INTERNAL MEDICINE
Payer: MEDICARE

## 2018-03-02 VITALS
OXYGEN SATURATION: 98 % | WEIGHT: 201.28 LBS | TEMPERATURE: 98 F | BODY MASS INDEX: 33.54 KG/M2 | SYSTOLIC BLOOD PRESSURE: 132 MMHG | DIASTOLIC BLOOD PRESSURE: 64 MMHG | HEIGHT: 65 IN | RESPIRATION RATE: 18 BRPM | HEART RATE: 77 BPM

## 2018-03-02 DIAGNOSIS — Z17.1 MALIGNANT NEOPLASM OF UPPER-OUTER QUADRANT OF LEFT BREAST IN FEMALE, ESTROGEN RECEPTOR NEGATIVE (HCC): ICD-10-CM

## 2018-03-02 DIAGNOSIS — C50.412 MALIGNANT NEOPLASM OF UPPER-OUTER QUADRANT OF LEFT BREAST IN FEMALE, ESTROGEN RECEPTOR NEGATIVE (HCC): ICD-10-CM

## 2018-03-02 PROCEDURE — 700111 HCHG RX REV CODE 636 W/ 250 OVERRIDE (IP): Performed by: INTERNAL MEDICINE

## 2018-03-02 PROCEDURE — 96367 TX/PROPH/DG ADDL SEQ IV INF: CPT

## 2018-03-02 PROCEDURE — 700105 HCHG RX REV CODE 258: Performed by: INTERNAL MEDICINE

## 2018-03-02 PROCEDURE — 96413 CHEMO IV INFUSION 1 HR: CPT

## 2018-03-02 PROCEDURE — 96417 CHEMO IV INFUS EACH ADDL SEQ: CPT

## 2018-03-02 PROCEDURE — 700111 HCHG RX REV CODE 636 W/ 250 OVERRIDE (IP): Mod: JG

## 2018-03-02 PROCEDURE — 96375 TX/PRO/DX INJ NEW DRUG ADDON: CPT

## 2018-03-02 RX ORDER — DEXAMETHASONE SODIUM PHOSPHATE 4 MG/ML
12 INJECTION, SOLUTION INTRA-ARTICULAR; INTRALESIONAL; INTRAMUSCULAR; INTRAVENOUS; SOFT TISSUE ONCE
Status: COMPLETED | OUTPATIENT
Start: 2018-03-02 | End: 2018-03-02

## 2018-03-02 RX ORDER — SODIUM CHLORIDE 9 MG/ML
INJECTION, SOLUTION INTRAVENOUS CONTINUOUS
Status: DISCONTINUED | OUTPATIENT
Start: 2018-03-02 | End: 2018-03-02 | Stop reason: HOSPADM

## 2018-03-02 RX ADMIN — CYCLOPHOSPHAMIDE 1230 MG: 2 INJECTION, POWDER, FOR SOLUTION INTRAVENOUS; ORAL at 11:59

## 2018-03-02 RX ADMIN — DEXAMETHASONE SODIUM PHOSPHATE 12 MG: 4 INJECTION, SOLUTION INTRAMUSCULAR; INTRAVENOUS at 10:08

## 2018-03-02 RX ADMIN — SODIUM CHLORIDE 150 MG: 9 INJECTION, SOLUTION INTRAVENOUS at 10:45

## 2018-03-02 RX ADMIN — ONDANSETRON HYDROCHLORIDE 16 MG: 2 INJECTION, SOLUTION INTRAMUSCULAR; INTRAVENOUS at 10:26

## 2018-03-02 RX ADMIN — DOXORUBICIN HYDROCHLORIDE 123 MG: 2 INJECTION, SOLUTION INTRAVENOUS at 11:25

## 2018-03-02 RX ADMIN — SODIUM CHLORIDE: 9 INJECTION, SOLUTION INTRAVENOUS at 10:08

## 2018-03-02 ASSESSMENT — PAIN SCALES - GENERAL: PAINLEVEL: 2=MINIMAL-SLIGHT

## 2018-03-02 NOTE — PROGRESS NOTES
Chemotherapy Verification - PRIMARY RN      Height = 165.1 cm  Weight = 91.3 kg BSA = 2.05 m2       Medication: Doxorubicin  Dose: 60 mg/m2 Calculated Dose: 123 mg                            Medication: Cyclophosphamide  Dose: 600 mg/m2  Calculated Dose: 1,230 mg                       I confirm this process was performed independently with the BSA and all final chemotherapy dosing calculations congruent.  Any discrepancies of 5% or greater have been addressed with the chemotherapy pharmacist. The resolution of the discrepancy has been documented in the EPIC progress notes.

## 2018-03-02 NOTE — PROGRESS NOTES
"Pt ambulated into department for Day 1/ Cycle 1 of dose dense AC. Pt had press and seal Saran wrap wrapped around PICC, and the top and bottom of Saran wrap taped to skin. When RN removed Saran wrap the top of PICC dressing had peeled off and was open to air. Pt told RN that she put lidocaine cream over PICC dressing this morning because this what Delmy OSBORN told her to do. RN called Delmy OSBORN and explained situation, and provider told nurse that she explained to Pt that she would apply EMLA if she had a port, but does not. RN explained to Pt the difference between a PICC and a port, and since she does not have a port she will no longer need to use the EMLA cream. RN also educated Pt on PICC line care, and explained that she would be getting weekly dressing changes in OPI or in Dr. Barroso's office during her lab draw. RN encouraged Pt to call [facility] if she has any problems with her dressing if it is before her weekly dressing changed. Pt was receptive to education. PICC dressing changed in a sterile manner, no S/S of infection noted.      Pt declined having any new or acute symptoms since seeing her provider on Monday. Pt's WBC's and ANC elevated due to CLL, provider aware (see note from Delmy OSBORN from 2/28/18). Dual lumen PICC had + blood return prior, flushed briskly. Pt given pre-medications and Doxorubicin as prescribed. Cytoxan given over 1 hour. Pt tolerated infusions well and without incident. Messages left for dietician and nurse navigator to reach out to patient when able. Dual lumen PICC had + blood return after, flushed per [facility] policy. Pt's appointment tomorrow for Neulasta at 13:30 confirmed with Pt prior to leaving. RN discussed the option of \"On-Pro\" with Pt, but since Pt is allergic to Latex she is not a candidate. Pt left department with friend \"Brittany\" appearing in good spirits and NAD.   "

## 2018-03-02 NOTE — PROGRESS NOTES
Chemotherapy Verification - SECONDARY RN       Height = 65 in  Weight = 201 lb  BSA = 2.05 m2       Medication: Adriamycin  Dose: 60 mg/m2  Calculated Dose: 123 mg / LVEF = 65% on 1/30/2018                             (In mg/m2, AUC, mg/kg)     Medication: Cytoxan  Dose: 600 mg/m2  Calculated Dose: 1230 mg                             (In mg/m2, AUC, mg/kg)      I confirm that this process was performed independently.

## 2018-03-02 NOTE — PROGRESS NOTES
"Pharmacy Chemotherapy Calculations    Dx: Breast CA  Cycle: 1 (Previous treatment = n/a)  Regimen and Dosing Reference  DDAC (Dose Dense Doxorubicin + Cyclophosphamide) followed by Paclitaxel  Doxorubicin 60 mg/m2 IV on Day 1  Cyclophosphamide 600 mg/m2 IV over 30 min on Day 1  14 day cycle for 4 cycles  NCCN Guidelines for Breast Cancer V.2.2017  Jamila ML et al J Clin Oncol. 2003:21(8):1431-9    Blood pressure 132/64, pulse 77, temperature 36.7 °C (98 °F), resp. rate 18, height 1.651 m (5' 5\"), weight 91.3 kg (201 lb 4.5 oz), SpO2 98 %.  Body surface area is 2.05 meters squared.      2/26/18 ANC ~ 6200     Plt = 309 k  Hgb = 11.5   Scr =  0.74      Crcl ~93 ml/min    LFT = AST/ALT/AlkPhos/Tbili = 11/9/59/0.4  1/30/18 ECHO LVEF = 65%      Doxorubicin 60 mg/m2 x 2.05 m2 = 123 mg  <5% diff, ok to treat with final written dose =  123 mg    Cyclophosphamide 600 mg/m2 x 2.05 m2 = 1230 mg  <5% diff, ok to treat with final written dose =  1230 mg    Funmilayo Gorman, PharmD      "

## 2018-03-02 NOTE — PROGRESS NOTES
"Pharmacy Chemotherapy Note    Second Check    Patient Name: GREG DEMARCO  MRN: [id number]    Oncologist: Denise Barroso M.D.    Protocol: OP DD AC       Doxorubicin 60 mg/m2 IV push on day 1  Cyclophosphamide 600 mg/m2 IV over 30 min on day 1    14 day cycle x 4 cycles    Followed By    Paclitaxel 80 mg/m2 IV over 60 min on day 1    Weekly course x 12 weeks     *Dosing Reference*  NCCN Guidelines for Breast Cancer V.1.2017  Jamila ML, et al. J Clin Oncol. 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    LABS 2/26/2018:  ANC: 6200      WBC: 20.0     Plt: 309k   Hgb/Hct: 11.5/35.5     SCr: 0.74 mg/dL CrCl: 92 mL/min   K: 4.1  Na: 137          Glu: 90    LFT's: 11/9/59  TBili = 0.4     1/30/2018: ECHO LVEF: 65%     Drug Order   (Drug name, dose, route, IV Fluid & volume, frequency, number of doses) Cycle: 1 D1      Previous treatment: NONE     Medication = Doxorubicin   Base Dose =  60 mg/m2  Calc Dose: Base Dose x Body surface area is 2.05 meters squared.   m2 = 123 mg  Final Dose = 123 mg  Route = IV  Conc. & Volume = 2 mg/mL = 61.5 mL  Admin Duration = Over 20 mins          <5% difference, OK to treat with final dose    Medication = Cyclophosphamide  Base Dose= 600 mg/m2  Calc Dose: Base Dose x Body surface area is 2.05 meters squared. m2 = 1230  Final Dose = 1230  Route = IV  Fluid & Volume =  mL  Admin Duration = Over 30 min          <5% difference, OK to treat with final dose      By my signature below, I confirm this process was performed independently with the BSA and all final chemotherapy dosing calculations " congruent. I have reviewed the above chemotherapy order and that my calculation of the final dose and BSA (when applicable) corroborate those calculations of the  pharmacist. Discrepancies of 5% or greater in the written dose have been addressed and documented within the EPIC Progress notes.    ANDREIA Turcios, PharmD

## 2018-03-03 ENCOUNTER — HOSPITAL ENCOUNTER (OUTPATIENT)
Dept: RADIOLOGY | Facility: MEDICAL CENTER | Age: 78
End: 2018-03-03
Attending: INTERNAL MEDICINE
Payer: MEDICARE

## 2018-03-03 ENCOUNTER — OUTPATIENT INFUSION SERVICES (OUTPATIENT)
Dept: ONCOLOGY | Facility: MEDICAL CENTER | Age: 78
End: 2018-03-03
Attending: INTERNAL MEDICINE
Payer: MEDICARE

## 2018-03-03 VITALS
TEMPERATURE: 98.5 F | SYSTOLIC BLOOD PRESSURE: 112 MMHG | HEART RATE: 81 BPM | BODY MASS INDEX: 34.34 KG/M2 | HEIGHT: 65 IN | DIASTOLIC BLOOD PRESSURE: 51 MMHG | RESPIRATION RATE: 16 BRPM | OXYGEN SATURATION: 96 % | WEIGHT: 206.13 LBS

## 2018-03-03 DIAGNOSIS — Z17.1 MALIGNANT NEOPLASM OF UPPER-OUTER QUADRANT OF LEFT BREAST IN FEMALE, ESTROGEN RECEPTOR NEGATIVE (HCC): ICD-10-CM

## 2018-03-03 DIAGNOSIS — C50.412 MALIGNANT NEOPLASM OF UPPER-OUTER QUADRANT OF LEFT BREAST IN FEMALE, ESTROGEN RECEPTOR NEGATIVE (HCC): ICD-10-CM

## 2018-03-03 DIAGNOSIS — Z17.1 ESTROGEN RECEPTOR NEGATIVE: ICD-10-CM

## 2018-03-03 DIAGNOSIS — C50.412 MALIGNANT NEOPLASM OF UPPER-OUTER QUADRANT OF LEFT FEMALE BREAST, UNSPECIFIED ESTROGEN RECEPTOR STATUS (HCC): ICD-10-CM

## 2018-03-03 PROCEDURE — 72157 MRI CHEST SPINE W/O & W/DYE: CPT

## 2018-03-03 PROCEDURE — 700117 HCHG RX CONTRAST REV CODE 255: Performed by: INTERNAL MEDICINE

## 2018-03-03 PROCEDURE — A9579 GAD-BASE MR CONTRAST NOS,1ML: HCPCS | Performed by: INTERNAL MEDICINE

## 2018-03-03 PROCEDURE — 96372 THER/PROPH/DIAG INJ SC/IM: CPT

## 2018-03-03 PROCEDURE — 700111 HCHG RX REV CODE 636 W/ 250 OVERRIDE (IP): Mod: JG | Performed by: INTERNAL MEDICINE

## 2018-03-03 RX ADMIN — GADODIAMIDE 19 ML: 287 INJECTION INTRAVENOUS at 09:51

## 2018-03-03 RX ADMIN — PEGFILGRASTIM 6 MG: 6 INJECTION SUBCUTANEOUS at 13:43

## 2018-03-03 ASSESSMENT — PAIN SCALES - GENERAL: PAINLEVEL: 6=MODERATE PAIN

## 2018-03-05 ENCOUNTER — TELEPHONE (OUTPATIENT)
Dept: HEMATOLOGY ONCOLOGY | Facility: MEDICAL CENTER | Age: 78
End: 2018-03-05

## 2018-03-05 NOTE — TELEPHONE ENCOUNTER
"Patient called regarding:   Pain \"all over\"  Onset: since Saturday \"after Neulasta injection\"  Pain level 7 currently   She also states she is nauseous.   She is taking oral nausea medication Zofran and Compazine.   She took an Ibuprofen 400mg this morning.   Patient denies fever.   This is her first Neulasta injection following her first round of chemotherapy.   I informed the patient I will relay the message to Nancy Cruz RN and ask her to return the call. Patient agreed. She states if she does not answer we may leave a detailed message.   "

## 2018-03-05 NOTE — TELEPHONE ENCOUNTER
Spoke to Clarisse regarding her pain symptoms.  Patient last received dose dense AC on 3/2/2018 and neulasta on 3/3/2018.    CC: Lower back bone pain and nausea    Bone pain- Patient has taken 400 mg of Ibu, Tylenol and two tabs of Claritin with no relief. This RN recommended straying warm and applying heat to the affected area. Also recommended a bath or warm shower. Patient agrees to try the above.    Nausea-Patient states that she is currently alternating her Zofran and compazine Q 3 hours and has only had one episode of emesis yesterday. Pt states that she has been drinking lots of water as per usual (1/2 case of water) and had yogurt this AM. Educated to continue the antiemetics Q 3 hours and encouraged patient to try the BRAT diet. Patient agrees to the above.    Further discussed recommendations with IRENA Nix whom also agrees with the above.    Educated patient to call back if she experiences continuous pain and, or N/V despite the recommended interventions. Patient verbally agrees.

## 2018-03-06 ENCOUNTER — PATIENT OUTREACH (OUTPATIENT)
Dept: HEALTH INFORMATION MANAGEMENT | Facility: OTHER | Age: 78
End: 2018-03-06

## 2018-03-06 ENCOUNTER — PATIENT OUTREACH (OUTPATIENT)
Dept: OTHER | Facility: MEDICAL CENTER | Age: 78
End: 2018-03-06

## 2018-03-06 ENCOUNTER — TELEPHONE (OUTPATIENT)
Dept: MEDICAL GROUP | Age: 78
End: 2018-03-06

## 2018-03-06 DIAGNOSIS — R05.9 COUGH: ICD-10-CM

## 2018-03-06 PROBLEM — N63.25 BREAST LUMP ON LEFT SIDE AT 3 O'CLOCK POSITION: Status: RESOLVED | Noted: 2018-01-15 | Resolved: 2018-03-06

## 2018-03-06 NOTE — TELEPHONE ENCOUNTER
1. Caller Name: Clarisse Reeves                                           Call Back Number: [phone] (home)         Patient approves a detailed voicemail message: yes    Spoke with patient and she states that she is having a hard time with Chemo and wants to know if you could switch her blood pressure medication  form lorsarten to something differnt as this medication is giving her a bad cough. please advise

## 2018-03-06 NOTE — PROGRESS NOTES
F/U call placed.  Pt reports she experiencing bone pain.  She does state that her nausea is being better managed with alternating nausea meds.  She has questions about the frequency of her PICC line dressing changes and where she will go to have this done.  She states she is scheduled at the medical oncologist clinic this week and will clarify this then.      DX:  IIIc [cT2, cN3c, M0], L  -/-/-  grade 3, IDC     POC:  DDACT    FAMHX:           BARRIERS ASSESSMENT:    TRANSPORTATION  Denies barrier  EMPLOYMENT   retired  FINANCIAL  Working with [employer] representative to obtain resources for co pays  INSURANCE  [employer] [facility] [employer]  SUPPORT SYSTEM  Pt lives alone.  Her child passed two yrs ago of a heart attack.  He has friends locally.  She states her \bst friend is a  and is very supportive.  PSYCHOLOGICAL   Feeling down r/t her sx at this time.  Denies needs.       INTERVENTION:  Phoned Lynda from [employer].  She is communicating with her regularly & helping her to find resources for co pay assistance.  Pt does not have a computer and Lynda has been mailing applications.  Pt admits she has not filled them out.  Lynda has sent a list of support services from [employer] site, a [employer] application and [employer] co pay relief information. She plans to send her to [facility] for diabetic supplies.  Discussed FRA program through the [employer] [facility].  Made Lynda aware that they had also contacted the pt and stated I would forward this information on to them.  Pt denies barriers/needs from ONN at this time.  Provided contact information and requested she call should anything arise.

## 2018-03-06 NOTE — TELEPHONE ENCOUNTER
Left a detailed message with Pt notifying of all information as well as to not stop the medication. Notified Pt to contact the office to schedule apt if not willing to be seen with Pulmonology.

## 2018-03-07 ENCOUNTER — APPOINTMENT (OUTPATIENT)
Dept: MEDICAL GROUP | Age: 78
End: 2018-03-07
Payer: MEDICARE

## 2018-03-07 ENCOUNTER — HOSPITAL ENCOUNTER (INPATIENT)
Facility: MEDICAL CENTER | Age: 78
LOS: 8 days | DRG: 809 | End: 2018-03-15
Attending: EMERGENCY MEDICINE | Admitting: HOSPITALIST
Payer: MEDICARE

## 2018-03-07 ENCOUNTER — APPOINTMENT (OUTPATIENT)
Dept: RADIOLOGY | Facility: MEDICAL CENTER | Age: 78
DRG: 809 | End: 2018-03-07
Attending: EMERGENCY MEDICINE
Payer: MEDICARE

## 2018-03-07 ENCOUNTER — RESOLUTE PROFESSIONAL BILLING HOSPITAL PROF FEE (OUTPATIENT)
Dept: HOSPITALIST | Facility: MEDICAL CENTER | Age: 78
End: 2018-03-07
Payer: MEDICARE

## 2018-03-07 DIAGNOSIS — D84.9 IMMUNOCOMPROMISED PATIENT (HCC): ICD-10-CM

## 2018-03-07 DIAGNOSIS — Z17.1 MALIGNANT NEOPLASM OF UPPER-OUTER QUADRANT OF LEFT BREAST IN FEMALE, ESTROGEN RECEPTOR NEGATIVE (HCC): ICD-10-CM

## 2018-03-07 DIAGNOSIS — R07.81 PLEURITIC CHEST PAIN: ICD-10-CM

## 2018-03-07 DIAGNOSIS — F41.8 ANXIETY ABOUT HEALTH: ICD-10-CM

## 2018-03-07 DIAGNOSIS — R50.9 FEVER, UNSPECIFIED FEVER CAUSE: ICD-10-CM

## 2018-03-07 DIAGNOSIS — R09.02 HYPOXIA: ICD-10-CM

## 2018-03-07 DIAGNOSIS — C50.412 MALIGNANT NEOPLASM OF UPPER-OUTER QUADRANT OF LEFT BREAST IN FEMALE, ESTROGEN RECEPTOR NEGATIVE (HCC): ICD-10-CM

## 2018-03-07 LAB
ALBUMIN SERPL BCP-MCNC: 4.1 G/DL (ref 3.2–4.9)
ALBUMIN/GLOB SERPL: 1.7 G/DL
ALP SERPL-CCNC: 75 U/L (ref 30–99)
ALT SERPL-CCNC: 9 U/L (ref 2–50)
ANION GAP SERPL CALC-SCNC: 9 MMOL/L (ref 0–11.9)
ANISOCYTOSIS BLD QL SMEAR: ABNORMAL
AST SERPL-CCNC: 12 U/L (ref 12–45)
BASOPHILS # BLD AUTO: 0 % (ref 0–1.8)
BASOPHILS # BLD: 0 K/UL (ref 0–0.12)
BILIRUB SERPL-MCNC: 0.5 MG/DL (ref 0.1–1.5)
BNP SERPL-MCNC: 78 PG/ML (ref 0–100)
BUN SERPL-MCNC: 18 MG/DL (ref 8–22)
CALCIUM SERPL-MCNC: 8.8 MG/DL (ref 8.5–10.5)
CHLORIDE SERPL-SCNC: 99 MMOL/L (ref 96–112)
CO2 SERPL-SCNC: 24 MMOL/L (ref 20–33)
CREAT SERPL-MCNC: 0.67 MG/DL (ref 0.5–1.4)
EKG IMPRESSION: NORMAL
EOSINOPHIL # BLD AUTO: 0.08 K/UL (ref 0–0.51)
EOSINOPHIL NFR BLD: 1 % (ref 0–6.9)
ERYTHROCYTE [DISTWIDTH] IN BLOOD BY AUTOMATED COUNT: 46.5 FL (ref 35.9–50)
GLOBULIN SER CALC-MCNC: 2.4 G/DL (ref 1.9–3.5)
GLUCOSE SERPL-MCNC: 189 MG/DL (ref 65–99)
HCT VFR BLD AUTO: 34.1 % (ref 37–47)
HGB BLD-MCNC: 11.1 G/DL (ref 12–16)
LYMPHOCYTES # BLD AUTO: 6.83 K/UL (ref 1–4.8)
LYMPHOCYTES NFR BLD: 86.5 % (ref 22–41)
MACROCYTES BLD QL SMEAR: ABNORMAL
MANUAL DIFF BLD: NORMAL
MCH RBC QN AUTO: 28.8 PG (ref 27–33)
MCHC RBC AUTO-ENTMCNC: 32.6 G/DL (ref 33.6–35)
MCV RBC AUTO: 88.3 FL (ref 81.4–97.8)
MONOCYTES # BLD AUTO: 0 K/UL (ref 0–0.85)
MONOCYTES NFR BLD AUTO: 0 % (ref 0–13.4)
MORPHOLOGY BLD-IMP: NORMAL
NEUTROPHILS # BLD AUTO: 0.99 K/UL (ref 2–7.15)
NEUTROPHILS NFR BLD: 12.5 % (ref 44–72)
NRBC # BLD AUTO: 0 K/UL
NRBC BLD-RTO: 0 /100 WBC
PLATELET # BLD AUTO: 171 K/UL (ref 164–446)
PLATELET BLD QL SMEAR: NORMAL
PMV BLD AUTO: 10 FL (ref 9–12.9)
POTASSIUM SERPL-SCNC: 4.1 MMOL/L (ref 3.6–5.5)
PROT SERPL-MCNC: 6.5 G/DL (ref 6–8.2)
RBC # BLD AUTO: 3.86 M/UL (ref 4.2–5.4)
RBC BLD AUTO: PRESENT
SMUDGE CELLS BLD QL SMEAR: NORMAL
SODIUM SERPL-SCNC: 132 MMOL/L (ref 135–145)
TROPONIN I SERPL-MCNC: <0.01 NG/ML (ref 0–0.04)
WBC # BLD AUTO: 7.9 K/UL (ref 4.8–10.8)

## 2018-03-07 PROCEDURE — 85027 COMPLETE CBC AUTOMATED: CPT

## 2018-03-07 PROCEDURE — 85007 BL SMEAR W/DIFF WBC COUNT: CPT

## 2018-03-07 PROCEDURE — 700117 HCHG RX CONTRAST REV CODE 255: Performed by: EMERGENCY MEDICINE

## 2018-03-07 PROCEDURE — 700105 HCHG RX REV CODE 258: Performed by: EMERGENCY MEDICINE

## 2018-03-07 PROCEDURE — 80053 COMPREHEN METABOLIC PANEL: CPT

## 2018-03-07 PROCEDURE — 87040 BLOOD CULTURE FOR BACTERIA: CPT | Mod: 91

## 2018-03-07 PROCEDURE — 83880 ASSAY OF NATRIURETIC PEPTIDE: CPT

## 2018-03-07 PROCEDURE — 71275 CT ANGIOGRAPHY CHEST: CPT

## 2018-03-07 PROCEDURE — 84484 ASSAY OF TROPONIN QUANT: CPT

## 2018-03-07 PROCEDURE — 96374 THER/PROPH/DIAG INJ IV PUSH: CPT

## 2018-03-07 PROCEDURE — 93005 ELECTROCARDIOGRAM TRACING: CPT

## 2018-03-07 PROCEDURE — 770020 HCHG ROOM/CARE - TELE (206)

## 2018-03-07 PROCEDURE — 700111 HCHG RX REV CODE 636 W/ 250 OVERRIDE (IP): Performed by: EMERGENCY MEDICINE

## 2018-03-07 PROCEDURE — 36415 COLL VENOUS BLD VENIPUNCTURE: CPT

## 2018-03-07 PROCEDURE — 96361 HYDRATE IV INFUSION ADD-ON: CPT

## 2018-03-07 PROCEDURE — 99223 1ST HOSP IP/OBS HIGH 75: CPT | Performed by: HOSPITALIST

## 2018-03-07 PROCEDURE — 83036 HEMOGLOBIN GLYCOSYLATED A1C: CPT

## 2018-03-07 PROCEDURE — 770021 HCHG ROOM/CARE - ISO PRIVATE

## 2018-03-07 PROCEDURE — 99285 EMERGENCY DEPT VISIT HI MDM: CPT

## 2018-03-07 PROCEDURE — 93005 ELECTROCARDIOGRAM TRACING: CPT | Performed by: EMERGENCY MEDICINE

## 2018-03-07 RX ORDER — SODIUM CHLORIDE 9 MG/ML
INJECTION, SOLUTION INTRAVENOUS CONTINUOUS
Status: DISCONTINUED | OUTPATIENT
Start: 2018-03-07 | End: 2018-03-09

## 2018-03-07 RX ORDER — ACETAMINOPHEN 325 MG/1
650 TABLET ORAL EVERY 6 HOURS PRN
Status: DISCONTINUED | OUTPATIENT
Start: 2018-03-07 | End: 2018-03-15 | Stop reason: HOSPADM

## 2018-03-07 RX ORDER — MORPHINE SULFATE 4 MG/ML
4 INJECTION, SOLUTION INTRAMUSCULAR; INTRAVENOUS ONCE
Status: COMPLETED | OUTPATIENT
Start: 2018-03-07 | End: 2018-03-07

## 2018-03-07 RX ORDER — LEVOTHYROXINE SODIUM 0.07 MG/1
150 TABLET ORAL
Status: DISCONTINUED | OUTPATIENT
Start: 2018-03-08 | End: 2018-03-15 | Stop reason: HOSPADM

## 2018-03-07 RX ORDER — LORAZEPAM 1 MG/1
4 TABLET ORAL NIGHTLY PRN
Status: DISCONTINUED | OUTPATIENT
Start: 2018-03-07 | End: 2018-03-15 | Stop reason: HOSPADM

## 2018-03-07 RX ORDER — DEXTROSE MONOHYDRATE 25 G/50ML
25 INJECTION, SOLUTION INTRAVENOUS
Status: DISCONTINUED | OUTPATIENT
Start: 2018-03-07 | End: 2018-03-15 | Stop reason: HOSPADM

## 2018-03-07 RX ORDER — FENOFIBRATE 134 MG/1
134 CAPSULE ORAL DAILY
Status: DISCONTINUED | OUTPATIENT
Start: 2018-03-08 | End: 2018-03-10

## 2018-03-07 RX ORDER — POLYETHYLENE GLYCOL 3350 17 G/17G
1 POWDER, FOR SOLUTION ORAL
Status: DISCONTINUED | OUTPATIENT
Start: 2018-03-07 | End: 2018-03-15 | Stop reason: HOSPADM

## 2018-03-07 RX ORDER — ASPIRIN 81 MG/1
81 TABLET, CHEWABLE ORAL DAILY
Status: DISCONTINUED | OUTPATIENT
Start: 2018-03-08 | End: 2018-03-15 | Stop reason: HOSPADM

## 2018-03-07 RX ORDER — HALOPERIDOL 5 MG/ML
5 INJECTION INTRAMUSCULAR EVERY 4 HOURS PRN
Status: DISCONTINUED | OUTPATIENT
Start: 2018-03-07 | End: 2018-03-15 | Stop reason: HOSPADM

## 2018-03-07 RX ORDER — ONDANSETRON 2 MG/ML
4 INJECTION INTRAMUSCULAR; INTRAVENOUS EVERY 4 HOURS PRN
Status: DISCONTINUED | OUTPATIENT
Start: 2018-03-07 | End: 2018-03-15 | Stop reason: HOSPADM

## 2018-03-07 RX ORDER — SODIUM CHLORIDE 9 MG/ML
1000 INJECTION, SOLUTION INTRAVENOUS ONCE
Status: COMPLETED | OUTPATIENT
Start: 2018-03-07 | End: 2018-03-07

## 2018-03-07 RX ORDER — AMOXICILLIN 250 MG
2 CAPSULE ORAL 2 TIMES DAILY
Status: DISCONTINUED | OUTPATIENT
Start: 2018-03-07 | End: 2018-03-15 | Stop reason: HOSPADM

## 2018-03-07 RX ORDER — ONDANSETRON 4 MG/1
4 TABLET, ORALLY DISINTEGRATING ORAL ONCE
Status: COMPLETED | OUTPATIENT
Start: 2018-03-07 | End: 2018-03-07

## 2018-03-07 RX ORDER — CEFTRIAXONE 2 G/1
2 INJECTION, POWDER, FOR SOLUTION INTRAMUSCULAR; INTRAVENOUS ONCE
Status: DISCONTINUED | OUTPATIENT
Start: 2018-03-07 | End: 2018-03-07

## 2018-03-07 RX ORDER — CITALOPRAM 20 MG/1
20 TABLET ORAL DAILY
Status: DISCONTINUED | OUTPATIENT
Start: 2018-03-08 | End: 2018-03-15 | Stop reason: HOSPADM

## 2018-03-07 RX ORDER — ONDANSETRON 4 MG/1
4 TABLET, ORALLY DISINTEGRATING ORAL EVERY 4 HOURS PRN
Status: DISCONTINUED | OUTPATIENT
Start: 2018-03-07 | End: 2018-03-15 | Stop reason: HOSPADM

## 2018-03-07 RX ORDER — FENOFIBRATE 160 MG/1
160 TABLET ORAL DAILY
Status: DISCONTINUED | OUTPATIENT
Start: 2018-03-08 | End: 2018-03-07

## 2018-03-07 RX ORDER — BISACODYL 10 MG
10 SUPPOSITORY, RECTAL RECTAL
Status: DISCONTINUED | OUTPATIENT
Start: 2018-03-07 | End: 2018-03-15 | Stop reason: HOSPADM

## 2018-03-07 RX ADMIN — IOHEXOL 75 ML: 350 INJECTION, SOLUTION INTRAVENOUS at 21:53

## 2018-03-07 RX ADMIN — MORPHINE SULFATE 4 MG: 4 INJECTION INTRAVENOUS at 21:14

## 2018-03-07 RX ADMIN — ONDANSETRON 4 MG: 4 TABLET, ORALLY DISINTEGRATING ORAL at 21:14

## 2018-03-07 RX ADMIN — SODIUM CHLORIDE 1000 ML: 9 INJECTION, SOLUTION INTRAVENOUS at 20:04

## 2018-03-07 ASSESSMENT — LIFESTYLE VARIABLES: DO YOU DRINK ALCOHOL: NO

## 2018-03-07 ASSESSMENT — PAIN SCALES - GENERAL
PAINLEVEL_OUTOF10: 9
PAINLEVEL_OUTOF10: 9
PAINLEVEL_OUTOF10: 8

## 2018-03-08 ENCOUNTER — TELEPHONE (OUTPATIENT)
Dept: HEMATOLOGY ONCOLOGY | Facility: MEDICAL CENTER | Age: 78
End: 2018-03-08

## 2018-03-08 PROBLEM — R50.81 FEBRILE NEUTROPENIA (HCC): Status: ACTIVE | Noted: 2018-03-08

## 2018-03-08 PROBLEM — D70.9 NEUTROPENIA (HCC): Status: ACTIVE | Noted: 2018-03-08

## 2018-03-08 LAB
ANION GAP SERPL CALC-SCNC: 7 MMOL/L (ref 0–11.9)
ANISOCYTOSIS BLD QL SMEAR: ABNORMAL
BASOPHILS # BLD AUTO: 0 % (ref 0–1.8)
BASOPHILS # BLD: 0 K/UL (ref 0–0.12)
BUN SERPL-MCNC: 13 MG/DL (ref 8–22)
CALCIUM SERPL-MCNC: 7.7 MG/DL (ref 8.5–10.5)
CHLORIDE SERPL-SCNC: 103 MMOL/L (ref 96–112)
CO2 SERPL-SCNC: 25 MMOL/L (ref 20–33)
CREAT SERPL-MCNC: 0.72 MG/DL (ref 0.5–1.4)
EOSINOPHIL # BLD AUTO: 0.13 K/UL (ref 0–0.51)
EOSINOPHIL NFR BLD: 4.5 % (ref 0–6.9)
ERYTHROCYTE [DISTWIDTH] IN BLOOD BY AUTOMATED COUNT: 49.3 FL (ref 35.9–50)
ERYTHROCYTE [SEDIMENTATION RATE] IN BLOOD BY WESTERGREN METHOD: 35 MM/HOUR (ref 0–30)
EST. AVERAGE GLUCOSE BLD GHB EST-MCNC: 123 MG/DL
GLUCOSE BLD-MCNC: 106 MG/DL (ref 65–99)
GLUCOSE BLD-MCNC: 109 MG/DL (ref 65–99)
GLUCOSE BLD-MCNC: 137 MG/DL (ref 65–99)
GLUCOSE BLD-MCNC: 92 MG/DL (ref 65–99)
GLUCOSE SERPL-MCNC: 147 MG/DL (ref 65–99)
HBA1C MFR BLD: 5.9 % (ref 0–5.6)
HCT VFR BLD AUTO: 30.1 % (ref 37–47)
HGB BLD-MCNC: 9.4 G/DL (ref 12–16)
LYMPHOCYTES # BLD AUTO: 2.6 K/UL (ref 1–4.8)
LYMPHOCYTES NFR BLD: 92.8 % (ref 22–41)
MACROCYTES BLD QL SMEAR: ABNORMAL
MANUAL DIFF BLD: NORMAL
MCH RBC QN AUTO: 28.7 PG (ref 27–33)
MCHC RBC AUTO-ENTMCNC: 31.2 G/DL (ref 33.6–35)
MCV RBC AUTO: 91.8 FL (ref 81.4–97.8)
MONOCYTES # BLD AUTO: 0.03 K/UL (ref 0–0.85)
MONOCYTES NFR BLD AUTO: 0.9 % (ref 0–13.4)
MORPHOLOGY BLD-IMP: NORMAL
MRSA DNA SPEC QL NAA+PROBE: NORMAL
NEUTROPHILS # BLD AUTO: 0.05 K/UL (ref 2–7.15)
NEUTROPHILS NFR BLD: 1.8 % (ref 44–72)
NRBC # BLD AUTO: 0 K/UL
NRBC BLD-RTO: 0 /100 WBC
PLATELET # BLD AUTO: 125 K/UL (ref 164–446)
PLATELET BLD QL SMEAR: NORMAL
PMV BLD AUTO: 10 FL (ref 9–12.9)
POTASSIUM SERPL-SCNC: 3.7 MMOL/L (ref 3.6–5.5)
RBC # BLD AUTO: 3.28 M/UL (ref 4.2–5.4)
RBC BLD AUTO: PRESENT
SIGNIFICANT IND 70042: NORMAL
SITE SITE: NORMAL
SMUDGE CELLS BLD QL SMEAR: NORMAL
SODIUM SERPL-SCNC: 135 MMOL/L (ref 135–145)
SOURCE SOURCE: NORMAL
WBC # BLD AUTO: 2.8 K/UL (ref 4.8–10.8)

## 2018-03-08 PROCEDURE — 700105 HCHG RX REV CODE 258: Performed by: HOSPITALIST

## 2018-03-08 PROCEDURE — 82962 GLUCOSE BLOOD TEST: CPT | Mod: 91

## 2018-03-08 PROCEDURE — 87641 MR-STAPH DNA AMP PROBE: CPT

## 2018-03-08 PROCEDURE — 80048 BASIC METABOLIC PNL TOTAL CA: CPT

## 2018-03-08 PROCEDURE — 93005 ELECTROCARDIOGRAM TRACING: CPT | Performed by: HOSPITALIST

## 2018-03-08 PROCEDURE — A9270 NON-COVERED ITEM OR SERVICE: HCPCS | Performed by: HOSPITALIST

## 2018-03-08 PROCEDURE — 36620 INSERTION CATHETER ARTERY: CPT

## 2018-03-08 PROCEDURE — 700111 HCHG RX REV CODE 636 W/ 250 OVERRIDE (IP): Performed by: HOSPITALIST

## 2018-03-08 PROCEDURE — 96375 TX/PRO/DX INJ NEW DRUG ADDON: CPT

## 2018-03-08 PROCEDURE — 700102 HCHG RX REV CODE 250 W/ 637 OVERRIDE(OP): Performed by: HOSPITALIST

## 2018-03-08 PROCEDURE — G8978 MOBILITY CURRENT STATUS: HCPCS | Mod: CK

## 2018-03-08 PROCEDURE — 97166 OT EVAL MOD COMPLEX 45 MIN: CPT

## 2018-03-08 PROCEDURE — 85652 RBC SED RATE AUTOMATED: CPT

## 2018-03-08 PROCEDURE — 85007 BL SMEAR W/DIFF WBC COUNT: CPT

## 2018-03-08 PROCEDURE — 93010 ELECTROCARDIOGRAM REPORT: CPT | Performed by: INTERNAL MEDICINE

## 2018-03-08 PROCEDURE — G8988 SELF CARE GOAL STATUS: HCPCS | Mod: CI

## 2018-03-08 PROCEDURE — 85027 COMPLETE CBC AUTOMATED: CPT

## 2018-03-08 PROCEDURE — 770021 HCHG ROOM/CARE - ISO PRIVATE

## 2018-03-08 PROCEDURE — G8979 MOBILITY GOAL STATUS: HCPCS | Mod: CI

## 2018-03-08 PROCEDURE — 97162 PT EVAL MOD COMPLEX 30 MIN: CPT

## 2018-03-08 PROCEDURE — G8987 SELF CARE CURRENT STATUS: HCPCS | Mod: CJ

## 2018-03-08 RX ORDER — LORAZEPAM 2 MG/1
4 TABLET ORAL
COMMUNITY
End: 2018-03-17 | Stop reason: SDUPTHER

## 2018-03-08 RX ORDER — MORPHINE SULFATE 4 MG/ML
2 INJECTION, SOLUTION INTRAMUSCULAR; INTRAVENOUS EVERY 4 HOURS PRN
Status: DISCONTINUED | OUTPATIENT
Start: 2018-03-08 | End: 2018-03-11

## 2018-03-08 RX ADMIN — FENOFIBRATE 134 MG: 134 CAPSULE ORAL at 08:41

## 2018-03-08 RX ADMIN — MORPHINE SULFATE 2 MG: 4 INJECTION INTRAVENOUS at 10:17

## 2018-03-08 RX ADMIN — ONDANSETRON HYDROCHLORIDE 4 MG: 2 INJECTION, SOLUTION INTRAMUSCULAR; INTRAVENOUS at 06:03

## 2018-03-08 RX ADMIN — SODIUM CHLORIDE: 9 INJECTION, SOLUTION INTRAVENOUS at 01:08

## 2018-03-08 RX ADMIN — MORPHINE SULFATE 2 MG: 4 INJECTION INTRAVENOUS at 22:46

## 2018-03-08 RX ADMIN — STANDARDIZED SENNA CONCENTRATE AND DOCUSATE SODIUM 2 TABLET: 8.6; 5 TABLET, FILM COATED ORAL at 20:37

## 2018-03-08 RX ADMIN — ENOXAPARIN SODIUM 40 MG: 100 INJECTION SUBCUTANEOUS at 08:41

## 2018-03-08 RX ADMIN — ONDANSETRON HYDROCHLORIDE 4 MG: 2 INJECTION, SOLUTION INTRAMUSCULAR; INTRAVENOUS at 14:33

## 2018-03-08 RX ADMIN — LORAZEPAM 4 MG: 1 TABLET ORAL at 02:22

## 2018-03-08 RX ADMIN — ONDANSETRON HYDROCHLORIDE 4 MG: 2 INJECTION, SOLUTION INTRAMUSCULAR; INTRAVENOUS at 18:34

## 2018-03-08 RX ADMIN — STANDARDIZED SENNA CONCENTRATE AND DOCUSATE SODIUM 2 TABLET: 8.6; 5 TABLET, FILM COATED ORAL at 08:42

## 2018-03-08 RX ADMIN — ONDANSETRON HYDROCHLORIDE 4 MG: 2 INJECTION, SOLUTION INTRAMUSCULAR; INTRAVENOUS at 00:21

## 2018-03-08 RX ADMIN — CEFEPIME 2 G: 2 INJECTION, POWDER, FOR SOLUTION INTRAMUSCULAR; INTRAVENOUS at 14:34

## 2018-03-08 RX ADMIN — CEFEPIME 2 G: 2 INJECTION, POWDER, FOR SOLUTION INTRAMUSCULAR; INTRAVENOUS at 01:08

## 2018-03-08 RX ADMIN — MORPHINE SULFATE 2 MG: 4 INJECTION INTRAVENOUS at 01:10

## 2018-03-08 RX ADMIN — ONDANSETRON HYDROCHLORIDE 4 MG: 2 INJECTION, SOLUTION INTRAMUSCULAR; INTRAVENOUS at 22:45

## 2018-03-08 RX ADMIN — CEFEPIME 2 G: 2 INJECTION, POWDER, FOR SOLUTION INTRAMUSCULAR; INTRAVENOUS at 21:51

## 2018-03-08 RX ADMIN — LORAZEPAM 4 MG: 1 TABLET ORAL at 20:52

## 2018-03-08 RX ADMIN — LEVOTHYROXINE SODIUM 150 MCG: 75 TABLET ORAL at 06:03

## 2018-03-08 RX ADMIN — MORPHINE SULFATE 2 MG: 4 INJECTION INTRAVENOUS at 18:34

## 2018-03-08 RX ADMIN — ASPIRIN 81 MG: 81 TABLET, CHEWABLE ORAL at 08:41

## 2018-03-08 RX ADMIN — SODIUM CHLORIDE: 9 INJECTION, SOLUTION INTRAVENOUS at 18:33

## 2018-03-08 RX ADMIN — ONDANSETRON HYDROCHLORIDE 4 MG: 2 INJECTION, SOLUTION INTRAMUSCULAR; INTRAVENOUS at 10:17

## 2018-03-08 RX ADMIN — CITALOPRAM HYDROBROMIDE 20 MG: 20 TABLET ORAL at 08:41

## 2018-03-08 RX ADMIN — VANCOMYCIN HYDROCHLORIDE 2200 MG: 100 INJECTION, POWDER, LYOPHILIZED, FOR SOLUTION INTRAVENOUS at 01:15

## 2018-03-08 RX ADMIN — MORPHINE SULFATE 2 MG: 4 INJECTION INTRAVENOUS at 14:33

## 2018-03-08 RX ADMIN — MORPHINE SULFATE 2 MG: 4 INJECTION INTRAVENOUS at 06:03

## 2018-03-08 ASSESSMENT — PAIN SCALES - GENERAL
PAINLEVEL_OUTOF10: 7
PAINLEVEL_OUTOF10: 8
PAINLEVEL_OUTOF10: 8
PAINLEVEL_OUTOF10: 5
PAINLEVEL_OUTOF10: 8
PAINLEVEL_OUTOF10: 6
PAINLEVEL_OUTOF10: 0
PAINLEVEL_OUTOF10: 8
PAINLEVEL_OUTOF10: 8
PAINLEVEL_OUTOF10: 7
PAINLEVEL_OUTOF10: 8
PAINLEVEL_OUTOF10: 6

## 2018-03-08 ASSESSMENT — ENCOUNTER SYMPTOMS
PSYCHIATRIC NEGATIVE: 1
NEUROLOGICAL NEGATIVE: 1
ABDOMINAL PAIN: 1
VOMITING: 1
FEVER: 1
NAUSEA: 1
SHORTNESS OF BREATH: 1
CARDIOVASCULAR NEGATIVE: 1
MUSCULOSKELETAL NEGATIVE: 1
EYES NEGATIVE: 1

## 2018-03-08 ASSESSMENT — PATIENT HEALTH QUESTIONNAIRE - PHQ9
1. LITTLE INTEREST OR PLEASURE IN DOING THINGS: NOT AT ALL
SUM OF ALL RESPONSES TO PHQ QUESTIONS 1-9: 0
SUM OF ALL RESPONSES TO PHQ9 QUESTIONS 1 AND 2: 0
2. FEELING DOWN, DEPRESSED, IRRITABLE, OR HOPELESS: NOT AT ALL

## 2018-03-08 ASSESSMENT — COGNITIVE AND FUNCTIONAL STATUS - GENERAL
HELP NEEDED FOR BATHING: A LITTLE
SUGGESTED CMS G CODE MODIFIER MOBILITY: CK
MOVING FROM LYING ON BACK TO SITTING ON SIDE OF FLAT BED: A LITTLE
TOILETING: A LITTLE
MOBILITY SCORE: 21
STANDING UP FROM CHAIR USING ARMS: A LITTLE
HELP NEEDED FOR BATHING: A LITTLE
DRESSING REGULAR LOWER BODY CLOTHING: A LITTLE
DAILY ACTIVITIY SCORE: 21
MOVING TO AND FROM BED TO CHAIR: A LITTLE
TOILETING: A LITTLE
SUGGESTED CMS G CODE MODIFIER DAILY ACTIVITY: CJ
WALKING IN HOSPITAL ROOM: A LITTLE
WALKING IN HOSPITAL ROOM: A LITTLE
SUGGESTED CMS G CODE MODIFIER DAILY ACTIVITY: CJ
DRESSING REGULAR LOWER BODY CLOTHING: A LITTLE
SUGGESTED CMS G CODE MODIFIER MOBILITY: CJ
STANDING UP FROM CHAIR USING ARMS: A LITTLE
MOBILITY SCORE: 19
CLIMB 3 TO 5 STEPS WITH RAILING: A LITTLE
CLIMB 3 TO 5 STEPS WITH RAILING: A LITTLE
DAILY ACTIVITIY SCORE: 21

## 2018-03-08 ASSESSMENT — COPD QUESTIONNAIRES
DO YOU EVER COUGH UP ANY MUCUS OR PHLEGM?: NO/ONLY WITH OCCASIONAL COLDS OR INFECTIONS
DURING THE PAST 4 WEEKS HOW MUCH DID YOU FEEL SHORT OF BREATH: NONE/LITTLE OF THE TIME
HAVE YOU SMOKED AT LEAST 100 CIGARETTES IN YOUR ENTIRE LIFE: YES
COPD SCREENING SCORE: 4

## 2018-03-08 ASSESSMENT — GAIT ASSESSMENTS
DISTANCE (FEET): 15
DEVIATION: BRADYKINETIC
GAIT LEVEL OF ASSIST: CONTACT GUARD ASSIST
ASSISTIVE DEVICE: OTHER (COMMENTS)

## 2018-03-08 ASSESSMENT — LIFESTYLE VARIABLES
EVER_SMOKED: YES
ALCOHOL_USE: NO
EVER_SMOKED: YES

## 2018-03-08 ASSESSMENT — ACTIVITIES OF DAILY LIVING (ADL): TOILETING: INDEPENDENT

## 2018-03-08 NOTE — ASSESSMENT & PLAN NOTE
Likely due to chronic disease, presence of CLL.  No current evidence of bleed.  Hemoglobin stable in low 8's  - Transfuse if needed for hemoglobin less than 7 gm/dL

## 2018-03-08 NOTE — PROGRESS NOTES
2 RN skin assessment completed.    bilat ears and elbows blanchable and intact.  Heels dry and intact, calluses on toes.

## 2018-03-08 NOTE — ASSESSMENT & PLAN NOTE
Patient reports recent chemotherapy, but is unsure of regimen. Followed by Dr. Barroso.  Patient remains weak, sore, fatigued and nauseated.  - evaluated by heme/onc  - plan for dose reduction and postponing next infusion x1 week

## 2018-03-08 NOTE — H&P
" Hospital Medicine History and Physical    Date of Service  3/8/2018    Chief Complaint  Chief Complaint   Patient presents with   • N/V     this afternoon, pt actively vomiting in triage. pt has CLL and was sent by MD   • Fever   • Chest Pain       History of Presenting Illness  77 y.o. Female with history of breast cancer, CLL, and lung cancer, with apparent recent chemotherapy for the breast cancer, was in her usual state of health until the day of admission. She noted the onset of shortness of breath, nausea, vomiting, and fever of 103.1. She felt generally unwell, and as the symptoms were persistent, she presented to the emergency department for further evaluation.  She currently continues to be short of breath, has weakness, fever, without chills, and no other complaints.    Primary Care Physician  Siva Smart M.D.    Consultants  None    Code Status  Full code    Review of Systems  Review of Systems   Constitutional: Positive for fever and malaise/fatigue.   HENT: Negative.    Eyes: Negative.    Respiratory: Positive for shortness of breath.    Cardiovascular: Negative.    Gastrointestinal: Positive for abdominal pain, nausea and vomiting.   Genitourinary: Negative.    Musculoskeletal: Negative.    Skin: Negative.    Neurological: Negative.    Endo/Heme/Allergies: Negative.    Psychiatric/Behavioral: Negative.         Past Medical History  Past Medical History:   Diagnosis Date   • Pneumonia 06/2017   • 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   • Cancer (CMS-HCC) 2016    lung   • Cutaneous skin tags 1/29/2015   • Pneumonia 2014   • Personal history of colonic polyps 11/26/2012   • Cancer (CMS-HCC) 2006    CLL   • Cataract     right  and  left  IOL   • CLL (chronic lymphoblastic leukemia)    • Cold     11/27/17 - \"Three weeks ago\".  Denies symptoms.   • DM (diabetes mellitus) (CMS-HCC)    • Hiatus hernia syndrome     no surgery   • Hypertension     \"In the past\"   • Indigestion "    • MEDICAL HOME    • Thyroid disease    • Type II or unspecified type diabetes mellitus without mention of complication, not stated as uncontrolled     pre-diabetic       Surgical History  Past Surgical History:   Procedure Laterality Date   • THYROIDECTOMY N/A 11/29/2017    Procedure: THYROIDECTOMY COMPLETION, RECURRENT LARYNGEAL NERVE MONITORING;  Surgeon: Cameron Marcelino M.D.;  Location: SURGERY SAME DAY [facility];  Service: General   • THORACOSCOPY Right 2/1/2016    Procedure: THORACOSCOPY Upper Lobectomy ;  Surgeon: John H Ganser, M.D.;  Location: SURGERY [facility];  Service:    • NODE DISSECTION Right 2/1/2016    Procedure: NODE DISSECTION;  Surgeon: John H Ganser, M.D.;  Location: SURGERY [facility];  Service:    • RECOVERY  12/18/2015    Procedure: CT-SCP-RUL LUNG BIOPSY-;  Surgeon: [facility];  [facility] [facility];  Service:    • OTHER  2001    Lower Left segment of lung removed    • CHOLECYSTECTOMY  1995   • OTHER ORTHOPEDIC SURGERY  1990    bakers cyst removed in right knee, multiple scopes   • OTHER  1990    hemmroid removal   • OTHER  1984    left Thyroid removed, might remove right side in the future   • APPENDECTOMY  1955   • CATARACT EXTRACTION WITH IOL     • TONSILLECTOMY     • US-NEEDLE CORE BX-BREAST PANEL     • VAGINAL HYSTERECTOMY TOTAL      Hysterectomy,Total Vaginal       Medications  No current facility-administered medications on file prior to encounter.      Current Outpatient Prescriptions on File Prior to Encounter   Medication Sig Dispense Refill   • ondansetron (ZOFRAN) 4 MG Tab tablet Take 1 Tab by mouth every four hours as needed for Nausea/Vomiting (for nausea, vomiting). 30 Tab 6   • levothyroxine (SYNTHROID) 150 MCG Tab Take 1 Tab by mouth Every morning on an empty stomach. 90 Tab 4   • prochlorperazine (COMPAZINE) 10 MG Tab Take 1 Tab by mouth every 6 hours as needed (nausea, vomiting, migraine). 30 Tab 0   • glipiZIDE  (GLUCOTROL) 5 MG Tab Take 1 Tab by mouth every day. 90 Tab 4   • fenofibrate (TRIGLIDE) 160 MG tablet Take 1 Tab by mouth every day. 90 Tab 4   • losartan (COZAAR) 25 MG Tab Take 1 Tab by mouth every day. 90 Tab 4   • citalopram (CELEXA) 20 MG Tab Take 1 Tab by mouth every day. 90 Tab 4   • aspirin (ASA) 81 MG Chew Tab chewable tablet Take 1 Tab by mouth every day. 100 Tab 11       Family History  Family History   Problem Relation Age of Onset   • Cancer Mother    • Lung Disease Father    • Cancer Father        Social History  Social History   Substance Use Topics   • Smoking status: Former Smoker     Packs/day: 2.00     Years: 50.00     Types: Cigarettes     Quit date: 2006   • Smokeless tobacco: Never Used      Comment: quit smoking    • Alcohol use 0.0 oz/week      Comment: ocassionaly       Allergies  Allergies   Allergen Reactions   • Codeine Hives and Nausea     RXN=40 years ago   • Lipitor [Atorvastatin Calcium] Myalgia     MSB=4624     • Lovastatin Myalgia     Muscle aches  JPR=[id number]   • Zocor [Simvastatin - High Dose] Myalgia     Severe myalgias  FHO=0738   • Lisinopril Cough   • Metformin      Diarrhea          Physical Exam  Laboratory   Hemodynamics  Temp (24hrs), Av °C (98.6 °F), Min:36.4 °C (97.6 °F), Max:37.6 °C (99.6 °F)   Temperature: 36.4 °C (97.6 °F)  Pulse  Av.4  Min: 85  Max: 105 Heart Rate (Monitored): 85  Blood Pressure : 126/65, NIBP: 108/55      Respiratory      Respiration: 18, Pulse Oximetry: 97 %     Work Of Breathing / Effort: Increased Work of Breathing  RUL Breath Sounds: Diminished, RML Breath Sounds: Diminished, RLL Breath Sounds: Diminished, JERED Breath Sounds: Diminished, LLL Breath Sounds: Diminished    Physical Exam   Constitutional: She is oriented to person, place, and time. She appears well-developed and well-nourished. No distress.   Frontal alopecia   HENT:   Head: Normocephalic and atraumatic.   Eyes: Pupils are equal, round, and reactive to light.   Neck:  Normal range of motion. Neck supple. No tracheal deviation present. No thyromegaly present.   Postsurgical changes noted   Cardiovascular: Normal rate, regular rhythm and normal heart sounds.  Exam reveals no gallop and no friction rub.    No murmur heard.  Pulmonary/Chest: Effort normal and breath sounds normal. No respiratory distress. She has no wheezes. She has no rales.   Abdominal: Soft. Bowel sounds are normal. She exhibits no distension. There is no tenderness. There is no rebound.   Musculoskeletal: Normal range of motion. She exhibits no edema.   Lymphadenopathy:     She has no cervical adenopathy.   Neurological: She is alert and oriented to person, place, and time. No cranial nerve deficit.   Skin: Skin is warm and dry. She is not diaphoretic.   Psychiatric: She has a normal mood and affect.   Nursing note and vitals reviewed.      Recent Labs      03/07/18 2005   WBC  7.9   RBC  3.86*   HEMOGLOBIN  11.1*   HEMATOCRIT  34.1*   MCV  88.3   MCH  28.8   MCHC  32.6*   RDW  46.5   PLATELETCT  171   MPV  10.0     Recent Labs      03/07/18 2005   SODIUM  132*   POTASSIUM  4.1   CHLORIDE  99   CO2  24   GLUCOSE  189*   BUN  18   CREATININE  0.67   CALCIUM  8.8     Recent Labs      03/07/18 2005   ALTSGPT  9   ASTSGOT  12   ALKPHOSPHAT  75   TBILIRUBIN  0.5   GLUCOSE  189*         Recent Labs      03/07/18 2005   BNPBTYPENAT  78         Lab Results   Component Value Date    TROPONINI <0.01 03/07/2018     Urinalysis:    Lab Results  Component Value Date/Time   SPECGRAVITY 1.013 01/30/2018 1840   GLUCOSEUR Negative 01/30/2018 1840   KETONES Negative 01/30/2018 1840   NITRITE Negative 01/30/2018 1840   WBCURINE 5-10 (A) 01/30/2018 1840   RBCURINE 2-5 (A) 01/30/2018 1840   BACTERIA Negative 01/30/2018 1840   EPITHELCELL Negative 01/30/2018 1840        Imaging  CTA chest with no evidence of large PE noted, right upper lobe post-surgical changes       Assessment/Plan     I anticipate this patient will require  at least two midnights for appropriate medical management, necessitating inpatient admission.    * Neutropenia (CMS-HCC)   Assessment & Plan    With associated fever of unclear source, absolute neutrophil count is 990, so not technically a neutropenic fever, however will continue with broad-spectrum antibiotics including coverage for MRSA, pending any cultures.        Essential hypertension- (present on admission)   Assessment & Plan    Controlled.  Monitor.         Malignant neoplasm of upper-outer quadrant of left breast in female, estrogen receptor negative (CMS-HCC)- (present on admission)   Assessment & Plan    Patient reports recent chemotherapy, but is unsure of regimen.  Continue outpatient follow-up for the same.         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, 2/1/2016-  partial lobectomy RUL/RML- Dr Ganser- folowed by [facility]- (present on admission)   Assessment & Plan    Stable.  Monitor.        Normocytic anemia- (present on admission)   Assessment & Plan    Likely due to chronic disease, presence of CLL.  No current evidence of bleed.  Transfuse if needed for hemoglobin less than 7 gm/dL          Obesity (BMI 30-39.9)- (present on admission)   Assessment & Plan    Body mass index is 33.53 kg/m².          Hypothyroidism due to acquired atrophy of thyroid- (present on admission)   Assessment & Plan    Continue replacement therapy.         CLL (chronic lymphocytic leukemia)- wbc= 20k-30k, since 2006; no rx; oncologist to follow- (present on admission)   Assessment & Plan    Stable.  Outpatient follow-up for the same.             VTE prophylaxis: SCD Lovenox.

## 2018-03-08 NOTE — ED NOTES
20g IV placed in left AC. Labs drawn by RN off PIV and tubed to lab. Bolus started. Pt offered water and new emesis bag.

## 2018-03-08 NOTE — ED PROVIDER NOTES
ED Provider Note    Scribed for Richard Kaye M.D. by Sarahy Li. 3/7/2018  7:33 PM    Primary care provider: Siva Smart M.D.  Means of arrival: walk in   History obtained from: Patient  History limited by: None    CHIEF COMPLAINT  Chief Complaint   Patient presents with   • N/V     this afternoon, pt actively vomiting in triage. pt has CLL and was sent by MD   • Fever   • Chest Pain       HPI  Clarisse Reeves is a 77 y.o. female who presents to the Emergency Department with complaints of nausea and vomiting onset this morning. She had a fever this afternoon of 103.1°. Patient reports associated abdominal discomfort, pleuritic type chest pain, and bilateral leg pain that is worsened at night. Her chest pain is exacerbated with deep inhalation and is located on the left side of her chest. She reports a medical history of metastatic breast cancer. Patient received chemotherapy one week ago. Patient denies a history of a blood clots.  No other acute complaints or concerns.     REVIEW OF SYSTEMS  Pertinent negatives include no fevers.   As above, all other systems reviewed and are negative.   See HPI for further details.   C    PAST MEDICAL HISTORY   has a past medical history of Cancer (CMS-[facility]) (2006); Cancer (CMS-[facility]) (2016); Carcinoma in situ of respiratory system (2016); Cataract; CLL (chronic lymphoblastic leukemia); Cold; Cutaneous skin tags (1/29/2015); DM (diabetes mellitus) (CMS-HCC); Hiatus hernia syndrome; Hypertension; Indigestion; MEDICAL HOME; Personal history of colonic polyps (11/26/2012); Pneumonia (2014); Pneumonia (06/2017); Thyroid disease; and Type II or unspecified type diabetes mellitus without mention of complication, not stated as uncontrolled.    SURGICAL HISTORY   has a past surgical history that includes us-needle core bx-breast panel; vaginal hysterectomy total; recovery (12/18/2015); other orthopedic surgery (1990); appendectomy (1955); cholecystectomy (1995); other (2001); other  (1984); other (1990); thoracoscopy (Right, 2/1/2016); node dissection (Right, 2/1/2016); cataract extraction with iol; tonsillectomy; and thyroidectomy (N/A, 11/29/2017).    SOCIAL HISTORY  Social History   Substance Use Topics   • Smoking status: Former Smoker     Packs/day: 2.00     Years: 50.00     Types: Cigarettes     Quit date: 5/6/2006   • Smokeless tobacco: Never Used      Comment: quit smoking 2002   • Alcohol use No      Comment: ocassionaly      History   Drug Use No       FAMILY HISTORY  Family History   Problem Relation Age of Onset   • Cancer Mother    • Lung Disease Father    • Cancer Father        CURRENT MEDICATIONS  Home Medications     Reviewed by Dc Salinas R.N. (Registered Nurse) on 03/07/18 at 1916  Med List Status: Partial   Medication Last Dose Status   aspirin (ASA) 81 MG Chew Tab chewable tablet 3/1/2018 Active   Blood Glucose Monitoring Suppl Supplies Misc 3/1/2018 Active   citalopram (CELEXA) 20 MG Tab 3/1/2018 Active   cyanocobalamin (VITAMIN B-12) injection 1,000 mcg  Active   fenofibrate (TRIGLIDE) 160 MG tablet 3/1/2018 Active   glipiZIDE (GLUCOTROL) 5 MG Tab 3/1/2018 Active   ibuprofen (MOTRIN) 400 MG Tab > Month Active   levothyroxine (SYNTHROID) 150 MCG Tab 3/2/2018 Active   lorazepam (ATIVAN) 2 MG tablet 3/1/2018 Active   losartan (COZAAR) 25 MG Tab > Month Active   ondansetron (ZOFRAN) 4 MG Tab tablet Unknown Active   ONE TOUCH ULTRA TEST strip 3/1/2018 Active   prochlorperazine (COMPAZINE) 10 MG Tab Unknown Active   vitamin D (CHOLECALCIFEROL) 1000 UNIT Tab 2/23/2018 Active                ALLERGIES  Allergies   Allergen Reactions   • Codeine Hives and Nausea     RXN=40 years ago   • Lipitor [Atorvastatin Calcium] Myalgia     TSF=6400     • Lovastatin Myalgia     Muscle aches  PIY=6297   • Zocor [Simvastatin - High Dose] Myalgia     Severe myalgias  WNW=5890   • Lisinopril Cough   • Metformin      Diarrhea         PHYSICAL EXAM  VITAL SIGNS: /59   Pulse (!) 105    Temp 37.6 °C (99.6 °F) (Temporal)   Resp 16   SpO2 97%     Constitutional: Patient appears uncomfortable and concerned.   HENT: Normocephalic, Atraumatic, Bilateral external ears normal, Oropharynx is clear mucous membranes are moist. No oral exudates or nasal discharge.   Eyes: Pupils are equal round and reactive, EOMI, Conjunctiva normal, No discharge.   Neck: Normal range of motion, No tenderness, Supple, No stridor. No meningismus.  Lymphatic: No lymphadenopathy noted.   Cardiovascular: Tachycardic. Regular rhythm without murmur rub or gallop.  Thorax & Lungs: Clear breath sounds bilaterally without wheezes, rhonchi or rales. There is no chest wall tenderness.   Abdomen: Soft, non-distended. Epigastric tenderness. There is no rebound or guarding. No organomegaly is appreciated. Bowel sounds are normal.  Skin: Normal without rash. Patient has a PIC line in her right upper extremity.  Back: No CVA or spinal tenderness.   Extremities: Intact distal pulses, No edema, No cyanosis, No clubbing. Capillary refill is less than 2 seconds. Patient has a PIC line in her right upper extremity. No tenderness or swelling to lower extremities.  Musculoskeletal: Good range of motion in all major joints. No major deformities noted.   Neurologic: Alert & oriented x 3, Normal motor function, Normal sensory function, No focal deficits noted. Reflexes are normal.  Psychiatric: Affect normal, Judgment normal, Mood normal. There is no suicidal ideation or patient reported hallucinations.     DIAGNOSTIC STUDIES / PROCEDURES    LABS  Labs Reviewed   COMP METABOLIC PANEL - Abnormal; Notable for the following:        Result Value    Sodium 132 (*)     Glucose 189 (*)     All other components within normal limits   CBC WITH DIFFERENTIAL   TROPONIN   BTYPE NATRIURETIC PEPTIDE   ESTIMATED GFR      All labs reviewed by me.    EKG Interpretation:  EKG Interpretation    Interpreted by emergency department physician    Rhythm: normal sinus    Rate: normal  Axis: normal  Ectopy: none  Conduction: normal  ST Segments: no acute change  T Waves: no acute change  Q Waves: nonspecific    Clinical Impression: non-specific EKG        RADIOLOGY  CT-CTA CHEST PULMONARY ARTERY W/ RECONS   Final Result         1.  No large central pulmonary embolism appreciated, respiratory motion artifacts limit evaluation of the distal segmental and subsegmental pulmonary arterial branches.   2.  Linear density in the right upper lobe, appears to correspond with postsurgical changes of upper lobe resection. Correlate with history. Stable since prior.   3.  Moderate size hiatal hernia   4.  Atherosclerosis and atherosclerotic coronary artery disease   5.  Hepatomegaly        The radiologist's interpretation of all radiological studies have been reviewed by me.    COURSE & MEDICAL DECISION MAKING  Nursing notes, VS, PMSFHx reviewed in chart.    7:33 PM Patient seen and examined at bedside. Ordered for Ct-CTA chest pulmonary, EKG, and labs to evaluate. The patient will be resuscitated with 1L NS IV for rehydration and secondary to her tachycardia.     Was concern about the possibility of infection versus clot. She was afebrile here in the emergency department but seemed to be mildly hypoxic in the low 90s. I suggested doing a CAT scan of the chest to rule out both pneumonia and pulmonary embolism which she agreed to. She is status post chemotherapy one week ago and is possibly at her donald    Laboratory evaluation reveals normal serum electrolytes, but does have elevated glucose at 189. White blood cell count is normal but there is neutropenia with an absolute neutrophil count of just under 1000    Patient remained hypoxic in the upper 80s to low 90s but corrected on oxygen to greater than 96% on 2 L. CAT scan shows no evidence of pneumonia or pulmonary embolism. There is a hiatal hernia. Given her fever we cultured her blood and this is pending and gave her Rocephin ×2 g.     She  is admitted to the hospitalist in guarded condition to oncologic floor. We will attempt to call her current oncologist to inform them of need for consultation in hospital. Patient is up-to-date on her plan of care and appreciate her need for admission    DISPOSITION:  Patient will be discharged home in stable condition.    FINAL IMPRESSION  1. Pleuritic chest pain    2. Hypoxia    3. Fever, unspecified fever cause    4. Immunocompromised patient (CMS-HCC)          Sarahy MEADOWS (Scribe), am scribing for, and in the presence of, Ricahrd Kaye M.D..    Electronically signed by: Sarahy Li (Scribe), 3/7/2018    Richard MEADOWS M.D. personally performed the services described in this documentation, as scribed by Sarahy Li in my presence, and it is both accurate and complete.    The note accurately reflects work and decisions made by me.  Richard Kaye  3/7/2018  10:24 PM

## 2018-03-08 NOTE — PROGRESS NOTES
"Pharmacy Kinetics 77 y.o. female on vancomycin day # 2  3/8/2018    Currently Dose: Vancomycin 1400 mg iv q24hr (~15 mg/kg)  Received Load Dose: Yes    Indication for Treatment: neutropenic fever  ID Service Following: No    Pertinent history per medical record: Admitted on 3/7/2018 for neutropenic fever. History of metastatic disease (breast cancer) with concurrent chemotherapy approximately 1 week prior to arrival. Admitted to telemetry.     Other antibiotics: cefepime 2 gm iv q8h    Allergies: Codeine; Lipitor [atorvastatin calcium]; Lovastatin; Zocor [simvastatin - high dose]; Lisinopril; and Metformin     List concerns for accumulation of vancomycin: age ~77, electrolyte derangement, BUN:SCr ~20:1, BMI ~34    Pertinent cultures to date:   Results     Procedure Component Value Units Date/Time    BLOOD CULTURE x2 [[id number]] Collected:  03/07/18 2239    Order Status:  Completed Specimen:  Blood from Peripheral Updated:  03/08/18 0813     Significant Indicator NEG     Source BLD     Site PERIPHERAL     Blood Culture No Growth    Note: Blood cultures are incubated for 5 days and  are monitored continuously.Positive blood cultures  are called to the RN and reported as soon as  they are identified.      Narrative:       Per Hospital Policy: Only change Specimen Src: to \"Line\" if  specified by physician order.    BLOOD CULTURE x2 [[id number]] Collected:  03/07/18 2239    Order Status:  Completed Specimen:  Blood from Peripheral Updated:  03/08/18 0813     Significant Indicator NEG     Source BLD     Site PERIPHERAL     Blood Culture No Growth    Note: Blood cultures are incubated for 5 days and  are monitored continuously.Positive blood cultures  are called to the RN and reported as soon as  they are identified.      Narrative:       Per Hospital Policy: Only change Specimen Src: to \"Line\" if  specified by physician order.        Recent Labs      03/07/18 2005   WBC  7.9   NEUTSPOLYS  12.50*     Recent Labs      " 18   BUN  18   CREATININE  0.67   ALBUMIN  4.1     Intake/Output Summary (Last 24 hours) at 18 0835  Last data filed at 18 0612   Gross per 24 hour   Intake                0 ml   Output             1100 ml   Net            -1100 ml      Blood pressure (!) 96/49, pulse 68, temperature 36.7 °C (98 °F), resp. rate 18, weight 91.4 kg (201 lb 8 oz), SpO2 99 %, not currently breastfeeding. Temp (24hrs), Av.9 °C (98.4 °F), Min:36.4 °C (97.6 °F), Max:37.6 °C (99.6 °F)    Estimated Creatinine Clearance: 79.8 mL/min (by C-G formula based on SCr of 0.67 mg/dL).    A/P   1. Vancomycin dose change: not indicated   2. Next vancomycin level: 3/10/18 @0030  3. Goal trough: 16-20 mcg/mL  4. Comments: Hypotensive. Afebrile. ANC 0.99 and below prior draw. CrCl ~80 mL/min. Microbiology pending. Recommend MRSA nares. Factors for accumulation noted. Trough placed 3/10/18 to monitor clearance. Pharmacy will follow and continue to adjust as appropriate. Would consider heme-onc consult.    Renan Johnston, PharmD

## 2018-03-08 NOTE — ED TRIAGE NOTES
Chief Complaint   Patient presents with   • N/V     this afternoon, pt actively vomiting in triage. pt has CLL and was sent by MD   • Fever   • Chest Pain     Pt has neutropenia score of 4. Charge RN notified.   Pt to R2.  Blood pressure 147/59, pulse (!) 105, temperature 37.6 °C (99.6 °F), temperature source Temporal, resp. rate 16, SpO2 97 %.

## 2018-03-08 NOTE — PROGRESS NOTES
Pharmacy Kinetics 77 y.o. female on vancomycin day # 1 3/8/2018    Currently on Vancomycin 2200 mg iv loading dose followed by Vancomycin 1400 mg q24 hours    Indication for Treatment: Neutropenic fever    Pertinent history per medical record: Admitted on 3/7/2018 for neutropenic fever.  Patient presents with nausea and vomiting with a fever of 103F.  She has metastatic breast cancer and received chemotherapy one week ago.  Empiric antibiotics started.      Other antibiotics: Cefepime 2 grams q12 hours    Allergies: Codeine; Lipitor [atorvastatin calcium]; Lovastatin; Zocor [simvastatin - high dose]; Lisinopril; and Metformin     List concerns for renal function: BUN/SCr > 20:1, age     Pertinent cultures to date:     Blood culture x2 -- in process    Recent Labs      18   WBC  7.9   NEUTSPOLYS  12.50*     Recent Labs      18   BUN  18   CREATININE  0.67   ALBUMIN  4.1     No results for input(s): VANCOTROUGH, VANCOPEAK, VANCORANDOM in the last 72 hours.No intake or output data in the 24 hours ending 18 0145   Blood pressure 126/65, pulse 85, temperature 36.4 °C (97.6 °F), resp. rate 18, weight 91.4 kg (201 lb 8 oz), SpO2 97 %. Temp (24hrs), Av °C (98.6 °F), Min:36.4 °C (97.6 °F), Max:37.6 °C (99.6 °F)      A/P   1. Vancomycin dose change: New start  2. Next vancomycin level: Prior to 3rd dose (not ordered)  3. Goal trough: 16-20 mcg/mL  4. Comments: Vancomycin started per protocol.  Patient has some risk for accumulation but will start a maintenance dose.  Rounding pharmacist to follow.     Davon Cazares, PharmD

## 2018-03-08 NOTE — THERAPY
"Occupational Therapy Evaluation completed.   Functional Status:  SBA supine>sit EOB, min A LB dressing for socks, SBA w/sit>stand walking in room w/use of IV pole (refusing fww), CGA w/toilet txf, 1 LOB posterior at toilet c/o dizziness, BP stable. Completed clothing management and alayna care w/spv, stood at sink w/close SBA and then returned to EOB and BTB w/spv. Pt does appear to have decreased balance and activity tolerance.  Plan of Care: Will benefit from Occupational Therapy 3 times per week  Discharge Recommendations:  Equipment: Will Continue to Assess for Equipment Needs. - see assessment     See \"Rehab Therapy-Acute\" Patient Summary Report for complete documentation.      77 yr old female w/complex medical hx including recent CA- CLL dx, pt recently completed first chemo treatment, pt has a hx of TIA, is demonstrating decreased dynamic balance and activity tolerance impacting safety w/independent living. Pt reports \"people\" check on her often in her home, concern for fatigue and living I'ly. Pt will benefit from SW for community resouces and likely home assist d/t unknown complexity and complications/side effects of current disease process and treatment. Pt will benefit from acute OT and currently recommend HH   "

## 2018-03-08 NOTE — ASSESSMENT & PLAN NOTE
With associated fever of unclear source possibly early cellulitis on left chest wall under her left breast. Cultures remain negative; no fevers and off antibiotics over 48 hours. Neutrophils improved over 1000. Flu swab negative  - improved but still symptomatic

## 2018-03-08 NOTE — ED NOTES
Report given to Tele RN at bedside. Pt in no sign of distress. Vitals obtained. All belonging with patient. Pt transported in [facility] on [address].

## 2018-03-08 NOTE — TELEPHONE ENCOUNTER
Received after hours answering service from Cassie Huber. This RN verified that patient is currently admitted on Telemetry. Will update Dr. Barroso on patient condition.

## 2018-03-08 NOTE — TELEPHONE ENCOUNTER
After hours answering service received call at: 03/07/2018 6:03pm From patient at [phone] stating:  Fever 103, Chills, Sore throat and chest

## 2018-03-08 NOTE — ED TRIAGE NOTES
"Pt brought to room in wheelchair by tech. Pt hooked up to the monitor, vitals obtained. Pt complaining of feeling ill, nauseous, \"hurts to breath.\" ERP notified of patients arrival.   "

## 2018-03-08 NOTE — RESPIRATORY CARE
COPD EDUCATION by COPD CLINICAL EDUCATOR  3/8/2018 at 6:49 AM by Felicia Ramos     Patient reviewed by COPD education team. Patient does not qualify for COPD program.

## 2018-03-09 ENCOUNTER — TELEPHONE (OUTPATIENT)
Dept: ENDOCRINOLOGY | Facility: MEDICAL CENTER | Age: 78
End: 2018-03-09

## 2018-03-09 ENCOUNTER — APPOINTMENT (OUTPATIENT)
Dept: HEMATOLOGY ONCOLOGY | Facility: MEDICAL CENTER | Age: 78
End: 2018-03-09
Payer: MEDICARE

## 2018-03-09 DIAGNOSIS — E11.9 TYPE 2 DIABETES MELLITUS WITHOUT COMPLICATION, WITHOUT LONG-TERM CURRENT USE OF INSULIN (HCC): ICD-10-CM

## 2018-03-09 LAB
ANION GAP SERPL CALC-SCNC: 5 MMOL/L (ref 0–11.9)
BASOPHILS # BLD AUTO: 0 % (ref 0–1.8)
BASOPHILS # BLD: 0 K/UL (ref 0–0.12)
BUN SERPL-MCNC: 12 MG/DL (ref 8–22)
CALCIUM SERPL-MCNC: 7.5 MG/DL (ref 8.5–10.5)
CHLORIDE SERPL-SCNC: 105 MMOL/L (ref 96–112)
CO2 SERPL-SCNC: 25 MMOL/L (ref 20–33)
CREAT SERPL-MCNC: 0.67 MG/DL (ref 0.5–1.4)
EKG IMPRESSION: NORMAL
EOSINOPHIL # BLD AUTO: 0.14 K/UL (ref 0–0.51)
EOSINOPHIL NFR BLD: 5.5 % (ref 0–6.9)
ERYTHROCYTE [DISTWIDTH] IN BLOOD BY AUTOMATED COUNT: 48.3 FL (ref 35.9–50)
GLUCOSE BLD-MCNC: 106 MG/DL (ref 65–99)
GLUCOSE BLD-MCNC: 88 MG/DL (ref 65–99)
GLUCOSE BLD-MCNC: 91 MG/DL (ref 65–99)
GLUCOSE BLD-MCNC: 99 MG/DL (ref 65–99)
GLUCOSE SERPL-MCNC: 131 MG/DL (ref 65–99)
HCT VFR BLD AUTO: 27.9 % (ref 37–47)
HGB BLD-MCNC: 8.5 G/DL (ref 12–16)
LYMPHOCYTES # BLD AUTO: 2.41 K/UL (ref 1–4.8)
LYMPHOCYTES NFR BLD: 92.7 % (ref 22–41)
MANUAL DIFF BLD: NORMAL
MCH RBC QN AUTO: 27.9 PG (ref 27–33)
MCHC RBC AUTO-ENTMCNC: 30.5 G/DL (ref 33.6–35)
MCV RBC AUTO: 91.5 FL (ref 81.4–97.8)
MONOCYTES # BLD AUTO: 0.02 K/UL (ref 0–0.85)
MONOCYTES NFR BLD AUTO: 0.9 % (ref 0–13.4)
MORPHOLOGY BLD-IMP: NORMAL
NEUTROPHILS # BLD AUTO: 0.02 K/UL (ref 2–7.15)
NEUTROPHILS NFR BLD: 0.9 % (ref 44–72)
NRBC # BLD AUTO: 0 K/UL
NRBC BLD-RTO: 0 /100 WBC
OVALOCYTES BLD QL SMEAR: NORMAL
PLATELET # BLD AUTO: 114 K/UL (ref 164–446)
PLATELET BLD QL SMEAR: NORMAL
PMV BLD AUTO: 10.2 FL (ref 9–12.9)
POIKILOCYTOSIS BLD QL SMEAR: NORMAL
POTASSIUM SERPL-SCNC: 3.9 MMOL/L (ref 3.6–5.5)
RBC # BLD AUTO: 3.05 M/UL (ref 4.2–5.4)
RBC BLD AUTO: PRESENT
SMUDGE CELLS BLD QL SMEAR: NORMAL
SODIUM SERPL-SCNC: 135 MMOL/L (ref 135–145)
WBC # BLD AUTO: 2.6 K/UL (ref 4.8–10.8)

## 2018-03-09 PROCEDURE — 700105 HCHG RX REV CODE 258: Performed by: HOSPITALIST

## 2018-03-09 PROCEDURE — 700102 HCHG RX REV CODE 250 W/ 637 OVERRIDE(OP): Performed by: HOSPITALIST

## 2018-03-09 PROCEDURE — 700111 HCHG RX REV CODE 636 W/ 250 OVERRIDE (IP): Performed by: HOSPITALIST

## 2018-03-09 PROCEDURE — A9270 NON-COVERED ITEM OR SERVICE: HCPCS | Performed by: HOSPITALIST

## 2018-03-09 PROCEDURE — 85027 COMPLETE CBC AUTOMATED: CPT

## 2018-03-09 PROCEDURE — 85007 BL SMEAR W/DIFF WBC COUNT: CPT

## 2018-03-09 PROCEDURE — 80048 BASIC METABOLIC PNL TOTAL CA: CPT

## 2018-03-09 PROCEDURE — 82962 GLUCOSE BLOOD TEST: CPT | Mod: 91

## 2018-03-09 PROCEDURE — 770021 HCHG ROOM/CARE - ISO PRIVATE

## 2018-03-09 PROCEDURE — 99233 SBSQ HOSP IP/OBS HIGH 50: CPT | Performed by: HOSPITALIST

## 2018-03-09 RX ORDER — HYDROCODONE BITARTRATE AND ACETAMINOPHEN 7.5; 325 MG/1; MG/1
1 TABLET ORAL EVERY 6 HOURS PRN
Status: DISCONTINUED | OUTPATIENT
Start: 2018-03-09 | End: 2018-03-11

## 2018-03-09 RX ADMIN — SODIUM CHLORIDE: 9 INJECTION, SOLUTION INTRAVENOUS at 11:27

## 2018-03-09 RX ADMIN — ASPIRIN 81 MG: 81 TABLET, CHEWABLE ORAL at 08:49

## 2018-03-09 RX ADMIN — FENOFIBRATE 134 MG: 134 CAPSULE ORAL at 08:47

## 2018-03-09 RX ADMIN — ONDANSETRON HYDROCHLORIDE 4 MG: 2 INJECTION, SOLUTION INTRAMUSCULAR; INTRAVENOUS at 08:48

## 2018-03-09 RX ADMIN — MORPHINE SULFATE 2 MG: 4 INJECTION INTRAVENOUS at 21:03

## 2018-03-09 RX ADMIN — CEFEPIME 2 G: 2 INJECTION, POWDER, FOR SOLUTION INTRAMUSCULAR; INTRAVENOUS at 21:36

## 2018-03-09 RX ADMIN — CEFEPIME 2 G: 2 INJECTION, POWDER, FOR SOLUTION INTRAMUSCULAR; INTRAVENOUS at 16:52

## 2018-03-09 RX ADMIN — MAGNESIUM HYDROXIDE 30 ML: 400 SUSPENSION ORAL at 20:43

## 2018-03-09 RX ADMIN — LORAZEPAM 4 MG: 1 TABLET ORAL at 21:02

## 2018-03-09 RX ADMIN — ONDANSETRON HYDROCHLORIDE 4 MG: 2 INJECTION, SOLUTION INTRAMUSCULAR; INTRAVENOUS at 21:03

## 2018-03-09 RX ADMIN — MORPHINE SULFATE 2 MG: 4 INJECTION INTRAVENOUS at 16:52

## 2018-03-09 RX ADMIN — LEVOTHYROXINE SODIUM 150 MCG: 75 TABLET ORAL at 06:10

## 2018-03-09 RX ADMIN — ENOXAPARIN SODIUM 40 MG: 100 INJECTION SUBCUTANEOUS at 08:48

## 2018-03-09 RX ADMIN — VANCOMYCIN HYDROCHLORIDE 1400 MG: 100 INJECTION, POWDER, LYOPHILIZED, FOR SOLUTION INTRAVENOUS at 00:19

## 2018-03-09 RX ADMIN — CITALOPRAM HYDROBROMIDE 20 MG: 20 TABLET ORAL at 08:48

## 2018-03-09 RX ADMIN — CEFEPIME 2 G: 2 INJECTION, POWDER, FOR SOLUTION INTRAMUSCULAR; INTRAVENOUS at 05:17

## 2018-03-09 RX ADMIN — ONDANSETRON 4 MG: 4 TABLET, ORALLY DISINTEGRATING ORAL at 14:54

## 2018-03-09 RX ADMIN — MORPHINE SULFATE 2 MG: 4 INJECTION INTRAVENOUS at 08:48

## 2018-03-09 RX ADMIN — STANDARDIZED SENNA CONCENTRATE AND DOCUSATE SODIUM 2 TABLET: 8.6; 5 TABLET, FILM COATED ORAL at 20:43

## 2018-03-09 ASSESSMENT — ENCOUNTER SYMPTOMS
WEIGHT LOSS: 0
COUGH: 0
LOSS OF CONSCIOUSNESS: 0
PSYCHIATRIC NEGATIVE: 1
RESPIRATORY NEGATIVE: 1
MYALGIAS: 0
CHILLS: 1
NERVOUS/ANXIOUS: 0
FLANK PAIN: 0
MUSCULOSKELETAL NEGATIVE: 1
BRUISES/BLEEDS EASILY: 0
NEUROLOGICAL NEGATIVE: 1
WEAKNESS: 0
ABDOMINAL PAIN: 0
GASTROINTESTINAL NEGATIVE: 1
FEVER: 1
DEPRESSION: 0
VOMITING: 0
NAUSEA: 0
DIZZINESS: 0
BACK PAIN: 0
SHORTNESS OF BREATH: 0

## 2018-03-09 ASSESSMENT — PAIN SCALES - GENERAL
PAINLEVEL_OUTOF10: 7
PAINLEVEL_OUTOF10: 8
PAINLEVEL_OUTOF10: 5
PAINLEVEL_OUTOF10: 0
PAINLEVEL_OUTOF10: 0
PAINLEVEL_OUTOF10: 5

## 2018-03-09 NOTE — PROGRESS NOTES
Pharmacy Kinetics 77 y.o. female on vancomycin day # 3 3/9/2018    Currently Dose: Vancomycin 1400 mg iv q24hr (0100)    Indication for Treatment: neutropenic fever  ID Service Following: No     Pertinent history per medical record: Admitted on 3/7/2018 for neutropenic fever. History of metastatic disease (breast cancer) with concurrent chemotherapy approximately 1 week prior to arrival. Admitted to telemetry.   PMHx:Lung Ca, CCL, HTN, DM, Hypothyroidism, recent chemo hx  Other antibiotics: cefepime 2 gm iv q8h     Allergies: Codeine; Lipitor [atorvastatin calcium]; Lovastatin; Zocor [simvastatin - high dose]; Lisinopril; and Metformin      List concerns for accumulation of vancomycin: BMI>34    Pertinent cultures to date:   18:PBCx2:NGTD  18:Nares, Resp:NGTD    Recent Labs      18   0225   WBC  7.9  2.8*  2.6*   NEUTSPOLYS  12.50*  1.80*  0.90*     Recent Labs      18   0225   BUN  18  13  12   CREATININE  0.67  0.72  0.67   ALBUMIN  4.1   --    --      No results for input(s): VANCOTROUGH, VANCOPEAK, VANCORANDOM in the last 72 hours.  Intake/Output Summary (Last 24 hours) at 18 0824  Last data filed at 18 0632   Gross per 24 hour   Intake                0 ml   Output             2250 ml   Net            -2250 ml      Blood pressure 111/62, pulse 75, temperature 36.3 °C (97.4 °F), resp. rate 18, weight 93.2 kg (205 lb 7.5 oz), SpO2 100 %, not currently breastfeeding. Temp (24hrs), Av.5 °C (97.7 °F), Min:36.3 °C (97.4 °F), Max:36.6 °C (97.9 °F)      A/P   1. Vancomycin dose change: No change   2. Next vancomycin level: 03/10/18@0030  3. Goal trough: 16-20 mcg/mL   4. Comments: ANC 0.02. Cx NGTD, afebrile over interval. Continue current dose , level ordered.     Delio MagallanesD BCPS

## 2018-03-09 NOTE — PROGRESS NOTES
Patient Clarisse Reeves discharged 2/2 with a diagnosis of Breast Cancer. IHD confirmed this patient completed several diagnostic appointments including her 2/9 appointment for a needle biopsy and completed a follow up with her Surgeon, Esperanza Crandall. The patient also completed her initial Chemotherapy Infusion appointment and follow up appointment with her Oncologist.   [facility] assisted the patient with her recent diagnosis of Breast Cancer by researching and mailing out several resources for Cancer financial assistance and co-payment assistance. [facility] also mailed out [employer] application for patient’s diabetic supplies/medications. [facility] encouraged patient to reach out to her Cancer RN Navigator through [employer] and reached out to Riki and left a voicemail explaining patient’s interest and request for patient outreach call.   The patient was extremely appreciative of [facility] services and support provided by Patient Advocate.  Clarisse has recurring future appointments scheduled for Chemotherapy 3/16 and 3/30 and future appointment scheduled with Oncology 3/9 and Pulmonology scheduled 5/2.  PPS Score completed.

## 2018-03-09 NOTE — PROGRESS NOTES
Critical low Absolute Neutrophils = 0.02 per lab.    Hospitalist Dr. Izquierdo paged at 0350.     Dr. Izquierdo on call Hospitalist notified at 0358 per hospital protocol. No new orders.

## 2018-03-09 NOTE — PROGRESS NOTES
Bedside report received from day shift nurse. Pt resting comfortably in bed. Pt denies pain/nausea at this time. 2.5 liters NC in place to keep sats >90%. NS infusing at 100ml/hr, night shift nurse changed infusion to 83ml/hr per orders.

## 2018-03-09 NOTE — TELEPHONE ENCOUNTER
[facility] would like to switch test strips to [facility] per insurance. Please send new Rx. Thank you.

## 2018-03-09 NOTE — CARE PLAN
Problem: Knowledge Deficit  Goal: Knowledge of disease process/condition, treatment plan, diagnostic tests, and medications will improve  Outcome: PROGRESSING AS EXPECTED  Educated and updated on POC, pt verbalizes understandings of treatment and medications    Problem: Mobility  Goal: Risk for activity intolerance will decrease  Outcome: PROGRESSING AS EXPECTED  EOB for meals, one person assist to BR - able to turn self in bed, voices wanting to be more independent with walking

## 2018-03-09 NOTE — CARE PLAN
Problem: Communication  Goal: The ability to communicate needs accurately and effectively will improve  Outcome: PROGRESSING AS EXPECTED  Uses call light appropriately, all questions answered, updated on POC    Problem: Mobility  Goal: Risk for activity intolerance will decrease  Outcome: PROGRESSING AS EXPECTED  EOB for meals, up to BR

## 2018-03-09 NOTE — THERAPY
"Physical Therapy Evaluation completed.   Bed Mobility:  Supine to Sit: Stand by Assist  Transfers: Sit to Stand: Stand by Assist  Gait: Level Of Assist: Contact Guard Assist with Iv pole   Plan of Care: Will benefit from Physical Therapy 3 times per week  Discharge Recommendations: Equipment: Will Continue to Assess for Equipment Needs.     Pt presents with impaired activity tolerance, dynamic balance and gait mechanics associated with nausea and vomiting. Pt is most limited by nausea and fatigue; loss of balance with short distance ambulation, declining use of an assistive device but would benefit from its use. Anticipate pt to need more assist in her daily life, but pt verbalizing enough assist from her friends. Would benefit from home health at d/c to ensure home safety accuracy. Will follow; pt is a fall risk.     See \"Rehab Therapy-Acute\" Patient Summary Report for complete documentation.     "

## 2018-03-09 NOTE — ASSESSMENT & PLAN NOTE
Pain and tenderness under left breast reminiscent of pain from pericarditis that she had when she was in her 30s workup including EKG not indicative of pericarditis, troponin negative. Sedimentation rate normal. ? Early cellulitis  - Patient is improving, oral pain medication as needed

## 2018-03-09 NOTE — PROGRESS NOTES
Patient admitted early this morning by a colleague for febrile neutropenia. No fever has recurred. Patient is complaining of pain underneath her left breast. This is tender to palpation. It is reminiscent of pain that she had previously with pericarditis in her 30s. I reviewed the EKG it is not indicative of pericarditis, check sedimentation rate and repeat EKG. Troponin negative. Continue empiric antibiotics follow cultures.

## 2018-03-10 PROBLEM — D69.6 THROMBOCYTOPENIA (HCC): Status: ACTIVE | Noted: 2018-03-10

## 2018-03-10 LAB
BASOPHILS # BLD AUTO: 0 % (ref 0–1.8)
BASOPHILS # BLD: 0 K/UL (ref 0–0.12)
EOSINOPHIL # BLD AUTO: 0.11 K/UL (ref 0–0.51)
EOSINOPHIL NFR BLD: 4.4 % (ref 0–6.9)
ERYTHROCYTE [DISTWIDTH] IN BLOOD BY AUTOMATED COUNT: 46.3 FL (ref 35.9–50)
GLUCOSE BLD-MCNC: 106 MG/DL (ref 65–99)
GLUCOSE BLD-MCNC: 114 MG/DL (ref 65–99)
GLUCOSE BLD-MCNC: 89 MG/DL (ref 65–99)
HCT VFR BLD AUTO: 27.8 % (ref 37–47)
HGB BLD-MCNC: 8.8 G/DL (ref 12–16)
LYMPHOCYTES # BLD AUTO: 2.26 K/UL (ref 1–4.8)
LYMPHOCYTES NFR BLD: 90.3 % (ref 22–41)
MANUAL DIFF BLD: NORMAL
MCH RBC QN AUTO: 28.4 PG (ref 27–33)
MCHC RBC AUTO-ENTMCNC: 31.7 G/DL (ref 33.6–35)
MCV RBC AUTO: 89.7 FL (ref 81.4–97.8)
MONOCYTES # BLD AUTO: 0.11 K/UL (ref 0–0.85)
MONOCYTES NFR BLD AUTO: 4.4 % (ref 0–13.4)
MORPHOLOGY BLD-IMP: NORMAL
NEUTROPHILS # BLD AUTO: 0.02 K/UL (ref 2–7.15)
NEUTROPHILS NFR BLD: 0.9 % (ref 44–72)
NRBC # BLD AUTO: 0 K/UL
NRBC BLD-RTO: 0 /100 WBC
OVALOCYTES BLD QL SMEAR: NORMAL
PLATELET # BLD AUTO: 118 K/UL (ref 164–446)
PLATELET BLD QL SMEAR: NORMAL
PMV BLD AUTO: 10.5 FL (ref 9–12.9)
POIKILOCYTOSIS BLD QL SMEAR: NORMAL
RBC # BLD AUTO: 3.1 M/UL (ref 4.2–5.4)
RBC BLD AUTO: PRESENT
SMUDGE CELLS BLD QL SMEAR: NORMAL
VANCOMYCIN TROUGH SERPL-MCNC: 7.7 UG/ML (ref 10–20)
WBC # BLD AUTO: 2.5 K/UL (ref 4.8–10.8)

## 2018-03-10 PROCEDURE — 700102 HCHG RX REV CODE 250 W/ 637 OVERRIDE(OP): Performed by: HOSPITALIST

## 2018-03-10 PROCEDURE — 85007 BL SMEAR W/DIFF WBC COUNT: CPT

## 2018-03-10 PROCEDURE — 80202 ASSAY OF VANCOMYCIN: CPT

## 2018-03-10 PROCEDURE — 700105 HCHG RX REV CODE 258: Performed by: HOSPITALIST

## 2018-03-10 PROCEDURE — A9270 NON-COVERED ITEM OR SERVICE: HCPCS | Performed by: HOSPITALIST

## 2018-03-10 PROCEDURE — 99233 SBSQ HOSP IP/OBS HIGH 50: CPT | Performed by: HOSPITALIST

## 2018-03-10 PROCEDURE — 770021 HCHG ROOM/CARE - ISO PRIVATE

## 2018-03-10 PROCEDURE — 85027 COMPLETE CBC AUTOMATED: CPT

## 2018-03-10 PROCEDURE — 700111 HCHG RX REV CODE 636 W/ 250 OVERRIDE (IP): Performed by: HOSPITALIST

## 2018-03-10 PROCEDURE — 82962 GLUCOSE BLOOD TEST: CPT | Mod: 91

## 2018-03-10 PROCEDURE — 700111 HCHG RX REV CODE 636 W/ 250 OVERRIDE (IP): Mod: JG | Performed by: HOSPITALIST

## 2018-03-10 RX ORDER — CLONAZEPAM 0.5 MG/1
0.5 TABLET ORAL 2 TIMES DAILY
Status: DISCONTINUED | OUTPATIENT
Start: 2018-03-10 | End: 2018-03-15 | Stop reason: HOSPADM

## 2018-03-10 RX ORDER — PROCHLORPERAZINE MALEATE 10 MG
10 TABLET ORAL EVERY 6 HOURS PRN
Status: DISCONTINUED | OUTPATIENT
Start: 2018-03-10 | End: 2018-03-15 | Stop reason: HOSPADM

## 2018-03-10 RX ADMIN — STANDARDIZED SENNA CONCENTRATE AND DOCUSATE SODIUM 2 TABLET: 8.6; 5 TABLET, FILM COATED ORAL at 08:39

## 2018-03-10 RX ADMIN — ONDANSETRON HYDROCHLORIDE 4 MG: 2 INJECTION, SOLUTION INTRAMUSCULAR; INTRAVENOUS at 05:25

## 2018-03-10 RX ADMIN — FENOFIBRATE 134 MG: 134 CAPSULE ORAL at 08:39

## 2018-03-10 RX ADMIN — CLONAZEPAM 0.5 MG: 0.5 TABLET ORAL at 21:59

## 2018-03-10 RX ADMIN — MORPHINE SULFATE 2 MG: 4 INJECTION INTRAVENOUS at 22:02

## 2018-03-10 RX ADMIN — CEFEPIME 2 G: 2 INJECTION, POWDER, FOR SOLUTION INTRAMUSCULAR; INTRAVENOUS at 22:01

## 2018-03-10 RX ADMIN — POLYETHYLENE GLYCOL 3350 1 PACKET: 17 POWDER, FOR SOLUTION ORAL at 08:41

## 2018-03-10 RX ADMIN — MORPHINE SULFATE 2 MG: 4 INJECTION INTRAVENOUS at 11:37

## 2018-03-10 RX ADMIN — LEVOTHYROXINE SODIUM 150 MCG: 75 TABLET ORAL at 06:35

## 2018-03-10 RX ADMIN — MORPHINE SULFATE 2 MG: 4 INJECTION INTRAVENOUS at 16:56

## 2018-03-10 RX ADMIN — TBO-FILGRASTIM 480 MCG: 480 INJECTION, SOLUTION SUBCUTANEOUS at 17:51

## 2018-03-10 RX ADMIN — ONDANSETRON HYDROCHLORIDE 4 MG: 2 INJECTION, SOLUTION INTRAMUSCULAR; INTRAVENOUS at 11:37

## 2018-03-10 RX ADMIN — ONDANSETRON HYDROCHLORIDE 4 MG: 2 INJECTION, SOLUTION INTRAMUSCULAR; INTRAVENOUS at 22:14

## 2018-03-10 RX ADMIN — CEFEPIME 2 G: 2 INJECTION, POWDER, FOR SOLUTION INTRAMUSCULAR; INTRAVENOUS at 05:24

## 2018-03-10 RX ADMIN — ENOXAPARIN SODIUM 40 MG: 100 INJECTION SUBCUTANEOUS at 08:39

## 2018-03-10 RX ADMIN — CITALOPRAM HYDROBROMIDE 20 MG: 20 TABLET ORAL at 08:39

## 2018-03-10 RX ADMIN — MORPHINE SULFATE 2 MG: 4 INJECTION INTRAVENOUS at 05:25

## 2018-03-10 RX ADMIN — LORAZEPAM 4 MG: 1 TABLET ORAL at 21:59

## 2018-03-10 RX ADMIN — VANCOMYCIN HYDROCHLORIDE 1400 MG: 100 INJECTION, POWDER, LYOPHILIZED, FOR SOLUTION INTRAVENOUS at 00:22

## 2018-03-10 RX ADMIN — CEFEPIME 2 G: 2 INJECTION, POWDER, FOR SOLUTION INTRAMUSCULAR; INTRAVENOUS at 14:24

## 2018-03-10 RX ADMIN — CLONAZEPAM 0.5 MG: 0.5 TABLET ORAL at 11:37

## 2018-03-10 RX ADMIN — ONDANSETRON HYDROCHLORIDE 4 MG: 2 INJECTION, SOLUTION INTRAMUSCULAR; INTRAVENOUS at 16:56

## 2018-03-10 RX ADMIN — ASPIRIN 81 MG: 81 TABLET, CHEWABLE ORAL at 08:39

## 2018-03-10 ASSESSMENT — ENCOUNTER SYMPTOMS
SHORTNESS OF BREATH: 0
WEAKNESS: 0
BRUISES/BLEEDS EASILY: 0
MUSCULOSKELETAL NEGATIVE: 1
FLANK PAIN: 0
DEPRESSION: 0
NERVOUS/ANXIOUS: 0
RESPIRATORY NEGATIVE: 1
LOSS OF CONSCIOUSNESS: 0
MYALGIAS: 0
VOMITING: 0
COUGH: 0
WEIGHT LOSS: 0
ABDOMINAL PAIN: 0
NEUROLOGICAL NEGATIVE: 1
CHILLS: 1
NAUSEA: 1
BACK PAIN: 0
DIZZINESS: 0
PSYCHIATRIC NEGATIVE: 1
FEVER: 0
DIAPHORESIS: 1

## 2018-03-10 ASSESSMENT — PAIN SCALES - GENERAL
PAINLEVEL_OUTOF10: 8
PAINLEVEL_OUTOF10: 6
PAINLEVEL_OUTOF10: 6

## 2018-03-10 NOTE — PROGRESS NOTES
[facility] Hospitalist Progress Note    Date of Service: 3/9/2018    Chief Complaint  77 y.o. female admitted 3/7/2018 with fever and borderline neutropenia on chemotherapy for treatment of chronic leukemia.    Interval Problem Update  She states the pain underneath her left breast and firmness of the area or improving.  Nursing has not measured any fevers however the patient has woken up in sweats and felt hot.    Consultants/Specialty  None    Disposition  Home when medically cleared        Review of Systems   Constitutional: Positive for chills and fever. Negative for malaise/fatigue and weight loss.   HENT: Negative.    Respiratory: Negative.  Negative for cough and shortness of breath.    Cardiovascular: Positive for chest pain (chest wall). Negative for leg swelling.   Gastrointestinal: Negative.  Negative for abdominal pain, nausea and vomiting.   Genitourinary: Negative.  Negative for dysuria and flank pain.   Musculoskeletal: Negative.  Negative for back pain and myalgias.   Neurological: Negative.  Negative for dizziness, loss of consciousness and weakness.   Endo/Heme/Allergies: Negative.  Does not bruise/bleed easily.   Psychiatric/Behavioral: Negative.  Negative for depression. The patient is not nervous/anxious.    All other systems reviewed and are negative.     Physical Exam  Laboratory/Imaging   Hemodynamics  Temp (24hrs), Av.4 °C (97.6 °F), Min:36.1 °C (97 °F), Max:36.8 °C (98.2 °F)   Temperature: 36.8 °C (98.2 °F)  Pulse  Av.1  Min: 68  Max: 105    Blood Pressure : 110/62      Respiratory      Respiration: 18, Pulse Oximetry: 92 %        RUL Breath Sounds: Diminished, RML Breath Sounds: Diminished, RLL Breath Sounds: Diminished, JERED Breath Sounds: Diminished, LLL Breath Sounds: Diminished    Fluids    Intake/Output Summary (Last 24 hours) at 18 1832  Last data filed at 18 1800   Gross per 24 hour   Intake              720 ml   Output             2475 ml   Net            -1755 ml        Nutrition  Orders Placed This Encounter   Procedures   • Diet Order     Standing Status:   Standing     Number of Occurrences:   1     Order Specific Question:   Diet:     Answer:   Consistent Carbohydrate [4]     Physical Exam   Constitutional: She appears well-developed and well-nourished. No distress.   HENT:   Nose: Nose normal.   Mouth/Throat: Oropharynx is clear and moist. No oropharyngeal exudate.   Eyes: Conjunctivae are normal. Right eye exhibits no discharge. Left eye exhibits no discharge. No scleral icterus.   Neck: No JVD present. No tracheal deviation present.   Cardiovascular: Normal rate, regular rhythm and normal heart sounds.    Pulmonary/Chest: Effort normal and breath sounds normal. No stridor. No respiratory distress. She has no wheezes. She has no rales. She exhibits tenderness (firm area underneath the left breast tender to palpation no erythema and feels softer today).   Abdominal: Soft. Bowel sounds are normal. She exhibits no distension. There is no tenderness.   Musculoskeletal: She exhibits no edema or tenderness.   Neurological: She is alert. No cranial nerve deficit. She exhibits normal muscle tone.   Skin: Skin is warm and dry. She is not diaphoretic. No pallor.   Psychiatric: She has a normal mood and affect. Her behavior is normal.   Nursing note and vitals reviewed.      Recent Labs      03/07/18 2005 03/08/18 1849 03/09/18 0225   WBC  7.9  2.8*  2.6*   RBC  3.86*  3.28*  3.05*   HEMOGLOBIN  11.1*  9.4*  8.5*   HEMATOCRIT  34.1*  30.1*  27.9*   MCV  88.3  91.8  91.5   MCH  28.8  28.7  27.9   MCHC  32.6*  31.2*  30.5*   RDW  46.5  49.3  48.3   PLATELETCT  171  125*  114*   MPV  10.0  10.0  10.2     Recent Labs      03/07/18 2005 03/08/18 1849 03/09/18 0225   SODIUM  132*  135  135   POTASSIUM  4.1  3.7  3.9   CHLORIDE  99  103  105   CO2  24  25  25   GLUCOSE  189*  147*  131*   BUN  18  13  12   CREATININE  0.67  0.72  0.67   CALCIUM  8.8  7.7*  7.5*          Recent Labs      03/07/18 2005   BNPBTYPENAT  78              Assessment/Plan     * Febrile neutropenia (CMS-HCC)   Assessment & Plan    With associated fever of unclear source possibly early cellulitis on left chest wall under her left breast   however will continue with broad-spectrum antibiotics including coverage for MRSA  CBC shows decrease in neutrophils again, continue to monitor patient is high risk for relapse and worsening neutropenic fever.          Chest wall pain- (present on admission)   Assessment & Plan    Pain and tenderness under left breast reminiscent of pain from pericarditis that she had when she was in her 30s.  Initial EKG not indicative of pericarditis, troponin negative.  Sedimentation rate normal  Follow-up EKG normal  Patient is improving, oral pain medication as needed  Firm area under left breast is improving she states with the antibiotics, perhaps an early cellulitis may have been present.  Patient has had this pain apparently in the past but never this severe.        Essential hypertension- (present on admission)   Assessment & Plan    Controlled.  Monitor.         Malignant neoplasm of upper-outer quadrant of left breast in female, estrogen receptor negative (CMS-HCC)- (present on admission)   Assessment & Plan    Patient reports recent chemotherapy, but is unsure of regimen.  Continue outpatient follow-up for the same.         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  Monitor.        Normocytic anemia- (present on admission)   Assessment & Plan    Likely due to chronic disease, presence of CLL.  No current evidence of bleed.  Transfuse if needed for hemoglobin less than 7 gm/dL          Obesity (BMI 30-39.9)- (present on admission)   Assessment & Plan    Body mass index is 33.53 kg/m².          Hypothyroidism due to acquired  atrophy of thyroid- (present on admission)   Assessment & Plan    Continue replacement therapy.         CLL (chronic lymphocytic leukemia)- wbc= 20k-30k, since 2006; no rx; oncologist to follow- (present on admission)   Assessment & Plan    Stable.  Outpatient follow-up for the same.           Quality-Core Measures

## 2018-03-10 NOTE — PROGRESS NOTES
[facility]ist Progress Note    Date of Service: 3/10/2018    Chief Complaint  77 y.o. female admitted 3/7/2018 with fever and borderline neutropenia on chemotherapy for treatment of chronic leukemia.    Interval Problem Update  She complains of malaise, chills, diaphoresis. No fever measured. Area under left breast has improved and is less tender.  Some nausea.    Consultants/Specialty  None    Disposition  Home when medically cleared        Review of Systems   Constitutional: Positive for chills, diaphoresis and malaise/fatigue. Negative for fever and weight loss.   HENT: Negative.    Respiratory: Negative.  Negative for cough and shortness of breath.    Cardiovascular: Positive for chest pain (chest wall). Negative for leg swelling.   Gastrointestinal: Positive for nausea. Negative for abdominal pain and vomiting.   Genitourinary: Negative.  Negative for dysuria and flank pain.   Musculoskeletal: Negative.  Negative for back pain and myalgias.   Neurological: Negative.  Negative for dizziness, loss of consciousness and weakness.   Endo/Heme/Allergies: Negative.  Does not bruise/bleed easily.   Psychiatric/Behavioral: Negative.  Negative for depression. The patient is not nervous/anxious.    All other systems reviewed and are negative.     Physical Exam  Laboratory/Imaging   Hemodynamics  Temp (24hrs), Av.6 °C (97.9 °F), Min:36.2 °C (97.1 °F), Max:37.2 °C (99 °F)   Temperature: 36.6 °C (97.8 °F)  Pulse  Av.5  Min: 68  Max: 105    Blood Pressure : 117/62      Respiratory      Respiration: 16, Pulse Oximetry: 94 %        RUL Breath Sounds: Diminished, RML Breath Sounds: Diminished, RLL Breath Sounds: Diminished, JERED Breath Sounds: Diminished, LLL Breath Sounds: Diminished    Fluids    Intake/Output Summary (Last 24 hours) at 03/10/18 1527  Last data filed at 03/10/18 0502   Gross per 24 hour   Intake              240 ml   Output              975 ml   Net             -735 ml       Nutrition  Orders  Placed This Encounter   Procedures   • Diet Order     Standing Status:   Standing     Number of Occurrences:   1     Order Specific Question:   Diet:     Answer:   Consistent Carbohydrate [4]     Physical Exam   Constitutional: She appears well-developed and well-nourished. No distress.   HENT:   Nose: Nose normal.   Mouth/Throat: Oropharynx is clear and moist. No oropharyngeal exudate.   Eyes: Conjunctivae are normal. Right eye exhibits no discharge. Left eye exhibits no discharge. No scleral icterus.   Neck: No JVD present. No tracheal deviation present.   Cardiovascular: Normal rate, regular rhythm and normal heart sounds.    Pulmonary/Chest: Effort normal and breath sounds normal. No stridor. No respiratory distress. She has no wheezes. She has no rales. She exhibits tenderness (firm area underneath the left breast tender to palpation no erythema and feels softer today).   Abdominal: Soft. Bowel sounds are normal. She exhibits no distension. There is no tenderness.   Musculoskeletal: She exhibits no edema or tenderness.   Neurological: She is alert. No cranial nerve deficit. She exhibits normal muscle tone.   Skin: Skin is warm and dry. She is not diaphoretic. No pallor.   Psychiatric: She has a normal mood and affect. Her behavior is normal.   Nursing note and vitals reviewed.      Recent Labs      03/08/18   1849  03/09/18   0225  03/10/18   0240   WBC  2.8*  2.6*  2.5*   RBC  3.28*  3.05*  3.10*   HEMOGLOBIN  9.4*  8.5*  8.8*   HEMATOCRIT  30.1*  27.9*  27.8*   MCV  91.8  91.5  89.7   MCH  28.7  27.9  28.4   MCHC  31.2*  30.5*  31.7*   RDW  49.3  48.3  46.3   PLATELETCT  125*  114*  118*   MPV  10.0  10.2  10.5     Recent Labs      03/07/18 2005 03/08/18 1849 03/09/18   0225   SODIUM  132*  135  135   POTASSIUM  4.1  3.7  3.9   CHLORIDE  99  103  105   CO2  24  25  25   GLUCOSE  189*  147*  131*   BUN  18  13  12   CREATININE  0.67  0.72  0.67   CALCIUM  8.8  7.7*  7.5*         Recent Labs       03/07/18 2005   BNPBTYPENAT  78              Assessment/Plan     * Febrile neutropenia (CMS-[facility])   Assessment & Plan    With associated fever of unclear source possibly early cellulitis on left chest wall under her left breast   however will continue with broad-spectrum antibiotics, this is clinically improving  CBC shows decrease in neutrophils again, start Granix, continue to monitor patient is high risk for relapse and worsening neutropenic fever.          Chest wall pain- (present on admission)   Assessment & Plan    Pain and tenderness under left breast reminiscent of pain from pericarditis that she had when she was in her 30s.  Initial EKG not indicative of pericarditis, troponin negative.  Sedimentation rate normal  Follow-up EKG normal  Patient is improving, oral pain medication as needed  Firm area under left breast is improving she states with the antibiotics, perhaps an early cellulitis may have been present.  Patient has had this pain apparently in the past but never this severe.        Essential hypertension- (present on admission)   Assessment & Plan    Controlled.  Monitor.         Thrombocytopenia (CMS-[facility])   Assessment & Plan    118, due to bone marrow suppression from chemotherapy.  No signs of bleeding, monitor.        Malignant neoplasm of upper-outer quadrant of left breast in female, estrogen receptor negative (CMS-[facility])- (present on admission)   Assessment & Plan    Patient reports recent chemotherapy, but is unsure of regimen.        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  Monitor.        Normocytic anemia- (present on admission)   Assessment & Plan    Likely due to chronic disease, presence of CLL.  No current evidence of bleed.  Transfuse if needed for hemoglobin less than 7 gm/dL          Obesity (BMI 30-39.9)- (present on admission)    Assessment & Plan    Body mass index is 33.53 kg/m².          Hypothyroidism due to acquired atrophy of thyroid- (present on admission)   Assessment & Plan    Continue replacement therapy.         CLL (chronic lymphocytic leukemia)- wbc= 20k-30k, since 2006; no rx; oncologist to follow- (present on admission)   Assessment & Plan      Outpatient follow-up followed by Dharmesh  Due to neutropenia give Granix injection and follow CBC          Quality-Core Measures

## 2018-03-10 NOTE — PROGRESS NOTES
Pharmacy Kinetics 77 y.o. female on vancomycin day # 4 3/10/2018    Currently Dose: Vancomycin 1400 mg iv q24hr (0100)     Indication for Treatment: Neutropenic fever  ID Service Following: No     Pertinent history per medical record: Admitted on 3/7/2018 for neutropenic fever. History of metastatic disease (breast cancer) with concurrent chemotherapy approximately 1 week prior to arrival. Admitted to telemetry.   PMHx:Lung Ca, CCL, HTN, DM, Hypothyroidism, recent chemo hx    Other antibiotics: cefepime 2 gm iv q8h     Allergies: Codeine; Lipitor [atorvastatin calcium]; Lovastatin; Zocor [simvastatin - high dose]; Lisinopril; and Metformin      List concerns for accumulation of vancomycin: BMI>34     Pertinent cultures to date:   18:PBCx2:NGTD  18:Nares, Resp:NGTD    Recent Labs      18   0225  03/10/18   0240   WBC  7.9  2.8*  2.6*  2.5*   NEUTSPOLYS  12.50*  1.80*  0.90*  0.90*     Recent Labs      189  18   0225   BUN  18  13  12   CREATININE  0.67  0.72  0.67   ALBUMIN  4.1   --    --      Recent Labs      03/10/18   0022   VANCOTROUGH  7.7*     Intake/Output Summary (Last 24 hours) at 03/10/18 0934  Last data filed at 03/10/18 0531   Gross per 24 hour   Intake              480 ml   Output             1675 ml   Net            -1195 ml      Blood pressure 117/62, pulse 69, temperature 36.2 °C (97.1 °F), resp. rate 16, weight 93.1 kg (205 lb 4 oz), SpO2 89 %, not currently breastfeeding. Temp (24hrs), Av.6 °C (97.9 °F), Min:36.2 °C (97.1 °F), Max:37.2 °C (99 °F)       A/P        1. Vancomycin dose change: Vancomycin 1400 mg iv q12hr (0100 1300)   2. Next vancomycin level: 18@1230  3. Goal trough: 16-20 mcg/mL   4. Comments: ANC 0.02. Cx NGTD>48hr, afebrile>48hr. Renal indices wnl. Dose increased , level ordered. Consider abx de escalation.      Delio MagallanesD BCPS

## 2018-03-10 NOTE — CARE PLAN
Problem: Safety  Goal: Will remain free from injury  Outcome: PROGRESSING AS EXPECTED  Using FWW to mobilize to Br    Problem: Venous Thromboembolism (VTW)/Deep Vein Thrombosis (DVT) Prevention:  Goal: Patient will participate in Venous Thrombosis (VTE)/Deep Vein Thrombosis (DVT)Prevention Measures  Outcome: PROGRESSING AS EXPECTED  Educated and verbalizes understanding of VTE prophylaxis

## 2018-03-10 NOTE — PROGRESS NOTES
Bedside report received from day shift nurse. Pt resting in bed, HOB elevated. IV to left AC infiltrated, IV Abx infusion stopped. Day nurse d/c'd IV access, applied heat pack, and elevated arm onto pillow. Pt currently on room air. Pt denies pain at this time.     Charge nurse (veronica) notified of no IV access. Pt will require new ultra sound guided IV access.

## 2018-03-11 LAB
ANION GAP SERPL CALC-SCNC: 6 MMOL/L (ref 0–11.9)
BASOPHILS # BLD AUTO: 1.1 % (ref 0–1.8)
BASOPHILS # BLD: 0.03 K/UL (ref 0–0.12)
BUN SERPL-MCNC: 9 MG/DL (ref 8–22)
CALCIUM SERPL-MCNC: 8.1 MG/DL (ref 8.5–10.5)
CHLORIDE SERPL-SCNC: 100 MMOL/L (ref 96–112)
CO2 SERPL-SCNC: 31 MMOL/L (ref 20–33)
COMMENT 1642: NORMAL
CREAT SERPL-MCNC: 0.73 MG/DL (ref 0.5–1.4)
EOSINOPHIL # BLD AUTO: 0.07 K/UL (ref 0–0.51)
EOSINOPHIL NFR BLD: 2.5 % (ref 0–6.9)
ERYTHROCYTE [DISTWIDTH] IN BLOOD BY AUTOMATED COUNT: 45.2 FL (ref 35.9–50)
FLUAV RNA SPEC QL NAA+PROBE: NEGATIVE
FLUBV RNA SPEC QL NAA+PROBE: NEGATIVE
GLUCOSE BLD-MCNC: 105 MG/DL (ref 65–99)
GLUCOSE BLD-MCNC: 111 MG/DL (ref 65–99)
GLUCOSE BLD-MCNC: 124 MG/DL (ref 65–99)
GLUCOSE BLD-MCNC: 163 MG/DL (ref 65–99)
GLUCOSE BLD-MCNC: 96 MG/DL (ref 65–99)
GLUCOSE SERPL-MCNC: 113 MG/DL (ref 65–99)
HCT VFR BLD AUTO: 27.5 % (ref 37–47)
HGB BLD-MCNC: 8.5 G/DL (ref 12–16)
IMM GRANULOCYTES # BLD AUTO: 0.03 K/UL (ref 0–0.11)
IMM GRANULOCYTES NFR BLD AUTO: 1.1 % (ref 0–0.9)
LYMPHOCYTES # BLD AUTO: 2.12 K/UL (ref 1–4.8)
LYMPHOCYTES NFR BLD: 74.4 % (ref 22–41)
MCH RBC QN AUTO: 27.7 PG (ref 27–33)
MCHC RBC AUTO-ENTMCNC: 30.9 G/DL (ref 33.6–35)
MCV RBC AUTO: 89.6 FL (ref 81.4–97.8)
MONOCYTES # BLD AUTO: 0.18 K/UL (ref 0–0.85)
MONOCYTES NFR BLD AUTO: 6.3 % (ref 0–13.4)
MORPHOLOGY BLD-IMP: NORMAL
NEUTROPHILS # BLD AUTO: 0.42 K/UL (ref 2–7.15)
NEUTROPHILS NFR BLD: 14.6 % (ref 44–72)
NRBC # BLD AUTO: 0 K/UL
NRBC BLD-RTO: 0 /100 WBC
PLATELET # BLD AUTO: 112 K/UL (ref 164–446)
PLATELET BLD QL SMEAR: NORMAL
PMV BLD AUTO: 10.4 FL (ref 9–12.9)
POLYCHROMASIA BLD QL SMEAR: NORMAL
POTASSIUM SERPL-SCNC: 3.8 MMOL/L (ref 3.6–5.5)
RBC # BLD AUTO: 3.07 M/UL (ref 4.2–5.4)
RBC BLD AUTO: PRESENT
SODIUM SERPL-SCNC: 137 MMOL/L (ref 135–145)
WBC # BLD AUTO: 2.9 K/UL (ref 4.8–10.8)

## 2018-03-11 PROCEDURE — 700111 HCHG RX REV CODE 636 W/ 250 OVERRIDE (IP): Performed by: HOSPITALIST

## 2018-03-11 PROCEDURE — A9270 NON-COVERED ITEM OR SERVICE: HCPCS | Performed by: HOSPITALIST

## 2018-03-11 PROCEDURE — 700102 HCHG RX REV CODE 250 W/ 637 OVERRIDE(OP): Performed by: HOSPITALIST

## 2018-03-11 PROCEDURE — 770021 HCHG ROOM/CARE - ISO PRIVATE

## 2018-03-11 PROCEDURE — 99233 SBSQ HOSP IP/OBS HIGH 50: CPT | Performed by: HOSPITALIST

## 2018-03-11 PROCEDURE — 82962 GLUCOSE BLOOD TEST: CPT | Mod: 91

## 2018-03-11 PROCEDURE — 87502 INFLUENZA DNA AMP PROBE: CPT

## 2018-03-11 PROCEDURE — 700105 HCHG RX REV CODE 258: Performed by: HOSPITALIST

## 2018-03-11 PROCEDURE — 80048 BASIC METABOLIC PNL TOTAL CA: CPT

## 2018-03-11 PROCEDURE — 85025 COMPLETE CBC W/AUTO DIFF WBC: CPT

## 2018-03-11 RX ORDER — HYDROMORPHONE HYDROCHLORIDE 2 MG/ML
0.5 INJECTION, SOLUTION INTRAMUSCULAR; INTRAVENOUS; SUBCUTANEOUS EVERY 4 HOURS PRN
Status: DISCONTINUED | OUTPATIENT
Start: 2018-03-11 | End: 2018-03-15 | Stop reason: HOSPADM

## 2018-03-11 RX ORDER — HYDROMORPHONE HYDROCHLORIDE 2 MG/1
2 TABLET ORAL EVERY 4 HOURS PRN
Status: DISCONTINUED | OUTPATIENT
Start: 2018-03-11 | End: 2018-03-15 | Stop reason: HOSPADM

## 2018-03-11 RX ADMIN — ONDANSETRON HYDROCHLORIDE 4 MG: 2 INJECTION, SOLUTION INTRAMUSCULAR; INTRAVENOUS at 21:08

## 2018-03-11 RX ADMIN — CEFEPIME 2 G: 2 INJECTION, POWDER, FOR SOLUTION INTRAMUSCULAR; INTRAVENOUS at 05:54

## 2018-03-11 RX ADMIN — HYDROMORPHONE HYDROCHLORIDE 2 MG: 2 TABLET ORAL at 14:45

## 2018-03-11 RX ADMIN — HYDROMORPHONE HYDROCHLORIDE 0.5 MG: 2 INJECTION INTRAMUSCULAR; INTRAVENOUS; SUBCUTANEOUS at 21:08

## 2018-03-11 RX ADMIN — MORPHINE SULFATE 2 MG: 4 INJECTION INTRAVENOUS at 10:07

## 2018-03-11 RX ADMIN — MORPHINE SULFATE 2 MG: 4 INJECTION INTRAVENOUS at 05:54

## 2018-03-11 RX ADMIN — STANDARDIZED SENNA CONCENTRATE AND DOCUSATE SODIUM 2 TABLET: 8.6; 5 TABLET, FILM COATED ORAL at 08:43

## 2018-03-11 RX ADMIN — LORAZEPAM 4 MG: 1 TABLET ORAL at 22:36

## 2018-03-11 RX ADMIN — ASPIRIN 81 MG: 81 TABLET, CHEWABLE ORAL at 08:43

## 2018-03-11 RX ADMIN — ENOXAPARIN SODIUM 40 MG: 100 INJECTION SUBCUTANEOUS at 08:43

## 2018-03-11 RX ADMIN — LEVOTHYROXINE SODIUM 150 MCG: 75 TABLET ORAL at 05:54

## 2018-03-11 RX ADMIN — CEFEPIME 2 G: 2 INJECTION, POWDER, FOR SOLUTION INTRAMUSCULAR; INTRAVENOUS at 14:46

## 2018-03-11 RX ADMIN — CITALOPRAM HYDROBROMIDE 20 MG: 20 TABLET ORAL at 08:43

## 2018-03-11 RX ADMIN — HYDROMORPHONE HYDROCHLORIDE 0.5 MG: 2 INJECTION INTRAMUSCULAR; INTRAVENOUS; SUBCUTANEOUS at 16:16

## 2018-03-11 RX ADMIN — CLONAZEPAM 0.5 MG: 0.5 TABLET ORAL at 22:36

## 2018-03-11 RX ADMIN — CLONAZEPAM 0.5 MG: 0.5 TABLET ORAL at 08:43

## 2018-03-11 RX ADMIN — ONDANSETRON HYDROCHLORIDE 4 MG: 2 INJECTION, SOLUTION INTRAMUSCULAR; INTRAVENOUS at 14:40

## 2018-03-11 RX ADMIN — ONDANSETRON HYDROCHLORIDE 4 MG: 2 INJECTION, SOLUTION INTRAMUSCULAR; INTRAVENOUS at 10:07

## 2018-03-11 ASSESSMENT — PAIN SCALES - GENERAL
PAINLEVEL_OUTOF10: 8
PAINLEVEL_OUTOF10: 7
PAINLEVEL_OUTOF10: 9
PAINLEVEL_OUTOF10: 7
PAINLEVEL_OUTOF10: 8
PAINLEVEL_OUTOF10: 8
PAINLEVEL_OUTOF10: 7
PAINLEVEL_OUTOF10: 7

## 2018-03-11 ASSESSMENT — ENCOUNTER SYMPTOMS
DIZZINESS: 0
COUGH: 0
DIAPHORESIS: 1
WEIGHT LOSS: 0
CHILLS: 1
FLANK PAIN: 0
PSYCHIATRIC NEGATIVE: 1
RESPIRATORY NEGATIVE: 1
BRUISES/BLEEDS EASILY: 0
FEVER: 0
NAUSEA: 1
DEPRESSION: 0
LOSS OF CONSCIOUSNESS: 0
WEAKNESS: 0
BACK PAIN: 0
SORE THROAT: 1
SHORTNESS OF BREATH: 0
NERVOUS/ANXIOUS: 0
NEUROLOGICAL NEGATIVE: 1
VOMITING: 0
MYALGIAS: 1
ABDOMINAL PAIN: 0

## 2018-03-11 NOTE — PROGRESS NOTES
Report received. Care assumed. Patient A&Ox4. Pt reports feeling 8/10 generalized pain, no SOB at this time.  Pt currently has no further needs.Discussed POC for the day with Pt. Call light within reach, encouraged pt to call for assistance.

## 2018-03-11 NOTE — PROGRESS NOTES
[facility] Hospitalist Progress Note    Date of Service: 3/11/2018    Chief Complaint  77 y.o. female admitted 3/7/2018 with fever and borderline neutropenia on chemotherapy for treatment of chronic leukemia.    Interval Problem Update  Complaining of chest wall pain, myalgias, chills and sore throat.  No fevers, cultures still negative.    Consultants/Specialty  None    Disposition  Home when medically cleared        Review of Systems   Constitutional: Positive for chills, diaphoresis and malaise/fatigue. Negative for fever and weight loss.   HENT: Positive for sore throat.    Respiratory: Negative.  Negative for cough and shortness of breath.    Cardiovascular: Positive for chest pain (chest wall). Negative for leg swelling.   Gastrointestinal: Positive for nausea. Negative for abdominal pain and vomiting.   Genitourinary: Negative.  Negative for dysuria and flank pain.   Musculoskeletal: Positive for myalgias. Negative for back pain.   Neurological: Negative.  Negative for dizziness, loss of consciousness and weakness.   Endo/Heme/Allergies: Negative.  Does not bruise/bleed easily.   Psychiatric/Behavioral: Negative.  Negative for depression. The patient is not nervous/anxious.    All other systems reviewed and are negative.     Physical Exam  Laboratory/Imaging   Hemodynamics  Temp (24hrs), Av.6 °C (97.8 °F), Min:36.3 °C (97.4 °F), Max:36.8 °C (98.2 °F)   Temperature: 36.8 °C (98.2 °F)  Pulse  Av.1  Min: 68  Max: 105    Blood Pressure : 100/85      Respiratory      Respiration: 16, Pulse Oximetry: 92 %        RUL Breath Sounds: Diminished, RML Breath Sounds: Diminished, RLL Breath Sounds: Diminished, JERED Breath Sounds: Diminished, LLL Breath Sounds: Diminished    Fluids  No intake or output data in the 24 hours ending 18 0904    Nutrition  Orders Placed This Encounter   Procedures   • Diet Order     Standing Status:   Standing     Number of Occurrences:   1     Order Specific Question:   Diet:      Answer:   Consistent Carbohydrate [4]     Physical Exam   Constitutional: She appears well-developed and well-nourished. No distress.   HENT:   Nose: Nose normal.   Mouth/Throat: Oropharynx is clear and moist. No oropharyngeal exudate.   Eyes: Conjunctivae are normal. Right eye exhibits no discharge. Left eye exhibits no discharge. No scleral icterus.   Neck: No JVD present. No tracheal deviation present.   Cardiovascular: Normal rate, regular rhythm and normal heart sounds.    Pulmonary/Chest: Effort normal and breath sounds normal. No stridor. No respiratory distress. She has no wheezes. She has no rales. She exhibits tenderness (firm area underneath the left breast tender to palpation no erythema and feels softer today).   Abdominal: Soft. Bowel sounds are normal. She exhibits no distension. There is no tenderness.   Musculoskeletal: She exhibits no edema or tenderness.   Neurological: She is alert. No cranial nerve deficit. She exhibits normal muscle tone.   Skin: Skin is warm and dry. She is not diaphoretic. No pallor.   Psychiatric: She has a normal mood and affect. Her behavior is normal.   Nursing note and vitals reviewed.      Recent Labs      03/09/18   0225  03/10/18   0240  03/11/18   0552   WBC  2.6*  2.5*  2.9*   RBC  3.05*  3.10*  3.07*   HEMOGLOBIN  8.5*  8.8*  8.5*   HEMATOCRIT  27.9*  27.8*  27.5*   MCV  91.5  89.7  89.6   MCH  27.9  28.4  27.7   MCHC  30.5*  31.7*  30.9*   RDW  48.3  46.3  45.2   PLATELETCT  114*  118*  112*   MPV  10.2  10.5  10.4     Recent Labs      03/08/18   1849  03/09/18 0225 03/11/18   0552   SODIUM  135  135  137   POTASSIUM  3.7  3.9  3.8   CHLORIDE  103  105  100   CO2  25  25  31   GLUCOSE  147*  131*  113*   BUN  13  12  9   CREATININE  0.72  0.67  0.73   CALCIUM  7.7*  7.5*  8.1*                      Assessment/Plan     * Febrile neutropenia (CMS-HCC)   Assessment & Plan    With associated fever of unclear source possibly early cellulitis on left chest wall under  her left breast   however will continue with broad-spectrum antibiotics, this is clinically improving  CBC shows decrease in neutrophils again, start Granix, continue to monitor patient is high risk for relapse and worsening neutropenic fever.          Chest wall pain- (present on admission)   Assessment & Plan    Pain and tenderness under left breast reminiscent of pain from pericarditis that she had when she was in her 30s.  Initial EKG not indicative of pericarditis, troponin negative.  Sedimentation rate normal  Follow-up EKG normal  Patient is improving, oral pain medication as needed  Firm area under left breast is improving she states with the antibiotics, perhaps an early cellulitis may have been present.  Patient has had this pain apparently in the past but never this severe.        Essential hypertension- (present on admission)   Assessment & Plan    Controlled.  Monitor.         Thrombocytopenia (CMS-HCC)   Assessment & Plan    118, due to bone marrow suppression from chemotherapy.  No signs of bleeding, monitor.        Malignant neoplasm of upper-outer quadrant of left breast in female, estrogen receptor negative (CMS-HCC)- (present on admission)   Assessment & Plan    Patient reports recent chemotherapy, but is unsure of regimen.        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  Monitor.        Normocytic anemia- (present on admission)   Assessment & Plan    Likely due to chronic disease, presence of CLL.  No current evidence of bleed.  Transfuse if needed for hemoglobin less than 7 gm/dL          Obesity (BMI 30-39.9)- (present on admission)   Assessment & Plan    Body mass index is 33.53 kg/m².          Hypothyroidism due to acquired atrophy of thyroid- (present on admission)   Assessment & Plan    Continue replacement therapy.         CLL (chronic  lymphocytic leukemia)- wbc= 20k-30k, since 2006; no rx; oncologist to follow- (present on admission)   Assessment & Plan      Outpatient follow-up followed by Dharmesh  Due to neutropenia give Granix injection and follow CBC          Quality-Core Measures

## 2018-03-12 ENCOUNTER — APPOINTMENT (OUTPATIENT)
Dept: RADIOLOGY | Facility: MEDICAL CENTER | Age: 78
DRG: 809 | End: 2018-03-12
Attending: HOSPITALIST
Payer: MEDICARE

## 2018-03-12 LAB
ANISOCYTOSIS BLD QL SMEAR: ABNORMAL
BASOPHILS # BLD AUTO: 1.9 % (ref 0–1.8)
BASOPHILS # BLD: 0.13 K/UL (ref 0–0.12)
EOSINOPHIL # BLD AUTO: 0.06 K/UL (ref 0–0.51)
EOSINOPHIL NFR BLD: 0.9 % (ref 0–6.9)
ERYTHROCYTE [DISTWIDTH] IN BLOOD BY AUTOMATED COUNT: 45.2 FL (ref 35.9–50)
GLUCOSE BLD-MCNC: 109 MG/DL (ref 65–99)
GLUCOSE BLD-MCNC: 122 MG/DL (ref 65–99)
GLUCOSE BLD-MCNC: 166 MG/DL (ref 65–99)
HCT VFR BLD AUTO: 26.6 % (ref 37–47)
HGB BLD-MCNC: 8.2 G/DL (ref 12–16)
LYMPHOCYTES # BLD AUTO: 4.88 K/UL (ref 1–4.8)
LYMPHOCYTES NFR BLD: 72.9 % (ref 22–41)
MACROCYTES BLD QL SMEAR: ABNORMAL
MANUAL DIFF BLD: NORMAL
MCH RBC QN AUTO: 27.8 PG (ref 27–33)
MCHC RBC AUTO-ENTMCNC: 30.8 G/DL (ref 33.6–35)
MCV RBC AUTO: 90.2 FL (ref 81.4–97.8)
MONOCYTES # BLD AUTO: 0.25 K/UL (ref 0–0.85)
MONOCYTES NFR BLD AUTO: 3.7 % (ref 0–13.4)
MORPHOLOGY BLD-IMP: NORMAL
MYELOCYTES NFR BLD MANUAL: 0.9 %
NEUTROPHILS # BLD AUTO: 1.32 K/UL (ref 2–7.15)
NEUTROPHILS NFR BLD: 17.8 % (ref 44–72)
NEUTS BAND NFR BLD MANUAL: 1.9 % (ref 0–10)
NRBC # BLD AUTO: 0.02 K/UL
NRBC BLD-RTO: 0.3 /100 WBC
PLATELET # BLD AUTO: 138 K/UL (ref 164–446)
PLATELET BLD QL SMEAR: NORMAL
PMV BLD AUTO: 10.6 FL (ref 9–12.9)
RBC # BLD AUTO: 2.95 M/UL (ref 4.2–5.4)
RBC BLD AUTO: PRESENT
SMUDGE CELLS BLD QL SMEAR: NORMAL
WBC # BLD AUTO: 6.7 K/UL (ref 4.8–10.8)

## 2018-03-12 PROCEDURE — A9270 NON-COVERED ITEM OR SERVICE: HCPCS | Performed by: HOSPITALIST

## 2018-03-12 PROCEDURE — 770021 HCHG ROOM/CARE - ISO PRIVATE

## 2018-03-12 PROCEDURE — 99233 SBSQ HOSP IP/OBS HIGH 50: CPT | Performed by: HOSPITALIST

## 2018-03-12 PROCEDURE — 700102 HCHG RX REV CODE 250 W/ 637 OVERRIDE(OP): Performed by: HOSPITALIST

## 2018-03-12 PROCEDURE — 85007 BL SMEAR W/DIFF WBC COUNT: CPT

## 2018-03-12 PROCEDURE — 85027 COMPLETE CBC AUTOMATED: CPT

## 2018-03-12 PROCEDURE — 82962 GLUCOSE BLOOD TEST: CPT

## 2018-03-12 PROCEDURE — 700111 HCHG RX REV CODE 636 W/ 250 OVERRIDE (IP): Performed by: HOSPITALIST

## 2018-03-12 RX ADMIN — ONDANSETRON 4 MG: 4 TABLET, ORALLY DISINTEGRATING ORAL at 15:06

## 2018-03-12 RX ADMIN — ASPIRIN 81 MG: 81 TABLET, CHEWABLE ORAL at 08:24

## 2018-03-12 RX ADMIN — HYDROMORPHONE HYDROCHLORIDE 2 MG: 2 TABLET ORAL at 04:13

## 2018-03-12 RX ADMIN — HYDROMORPHONE HYDROCHLORIDE 2 MG: 2 TABLET ORAL at 22:48

## 2018-03-12 RX ADMIN — HYDROMORPHONE HYDROCHLORIDE 2 MG: 2 TABLET ORAL at 15:04

## 2018-03-12 RX ADMIN — ENOXAPARIN SODIUM 40 MG: 100 INJECTION SUBCUTANEOUS at 08:25

## 2018-03-12 RX ADMIN — ONDANSETRON 4 MG: 4 TABLET, ORALLY DISINTEGRATING ORAL at 08:34

## 2018-03-12 RX ADMIN — HYDROMORPHONE HYDROCHLORIDE 2 MG: 2 TABLET ORAL at 08:34

## 2018-03-12 RX ADMIN — CITALOPRAM HYDROBROMIDE 20 MG: 20 TABLET ORAL at 08:25

## 2018-03-12 RX ADMIN — LEVOTHYROXINE SODIUM 150 MCG: 75 TABLET ORAL at 06:13

## 2018-03-12 RX ADMIN — LORAZEPAM 4 MG: 1 TABLET ORAL at 22:48

## 2018-03-12 RX ADMIN — CLONAZEPAM 0.5 MG: 0.5 TABLET ORAL at 08:25

## 2018-03-12 RX ADMIN — CLONAZEPAM 0.5 MG: 0.5 TABLET ORAL at 21:00

## 2018-03-12 ASSESSMENT — ENCOUNTER SYMPTOMS
LOSS OF CONSCIOUSNESS: 0
VOMITING: 0
WEAKNESS: 0
MYALGIAS: 1
NERVOUS/ANXIOUS: 0
NAUSEA: 1
CHILLS: 1
BACK PAIN: 0
COUGH: 0
RESPIRATORY NEGATIVE: 1
NEUROLOGICAL NEGATIVE: 1
BRUISES/BLEEDS EASILY: 0
DIAPHORESIS: 1
SHORTNESS OF BREATH: 0
PSYCHIATRIC NEGATIVE: 1
DEPRESSION: 0
ABDOMINAL PAIN: 0
SORE THROAT: 1
WEIGHT LOSS: 0
DIZZINESS: 0
FLANK PAIN: 0
FEVER: 0

## 2018-03-12 ASSESSMENT — PAIN SCALES - GENERAL
PAINLEVEL_OUTOF10: 9
PAINLEVEL_OUTOF10: 8
PAINLEVEL_OUTOF10: 8
PAINLEVEL_OUTOF10: 3
PAINLEVEL_OUTOF10: 0
PAINLEVEL_OUTOF10: 7

## 2018-03-12 ASSESSMENT — PATIENT HEALTH QUESTIONNAIRE - PHQ9
2. FEELING DOWN, DEPRESSED, IRRITABLE, OR HOPELESS: NOT AT ALL
SUM OF ALL RESPONSES TO PHQ9 QUESTIONS 1 AND 2: 0
SUM OF ALL RESPONSES TO PHQ QUESTIONS 1-9: 0
1. LITTLE INTEREST OR PLEASURE IN DOING THINGS: NOT AT ALL

## 2018-03-12 ASSESSMENT — COPD QUESTIONNAIRES
COPD SCREENING SCORE: 4
DO YOU EVER COUGH UP ANY MUCUS OR PHLEGM?: NO/ONLY WITH OCCASIONAL COLDS OR INFECTIONS
DURING THE PAST 4 WEEKS HOW MUCH DID YOU FEEL SHORT OF BREATH: NONE/LITTLE OF THE TIME
HAVE YOU SMOKED AT LEAST 100 CIGARETTES IN YOUR ENTIRE LIFE: YES

## 2018-03-12 ASSESSMENT — LIFESTYLE VARIABLES: DO YOU DRINK ALCOHOL: NO

## 2018-03-12 NOTE — DISCHARGE PLANNING
SW requested RN to ask pt if she completed AD packet. Per RN, spoke with pt, hasn't completed AD yet.

## 2018-03-12 NOTE — DISCHARGE PLANNING
Transitional Care Navigator:    Chart reviewed for post acute needs.  Pt has limited mobility and a LACE score of 76. Agree with inpt therapies recommendation for home health follow up.  Please consider an order for HH referral.

## 2018-03-12 NOTE — THERAPY
OT tx attempted pt had c/o nausea asking for meds, asked if pt would be willing to try after meds pt declined asked for tx in am. Will try OT tx tomorrow AM

## 2018-03-12 NOTE — PROGRESS NOTES
Bedside report received, assumed pt care @4762. Pt A&Ox4. She c/o generalized pain; medicated pt per mar. POC discussed, she verbalized understanding. Call light within reach

## 2018-03-12 NOTE — PROGRESS NOTES
[facility] Hospitalist Progress Note    Date of Service: 3/12/2018    Chief Complaint  77 y.o. female admitted 3/7/2018 with fever and borderline neutropenia on chemotherapy for treatment of chronic leukemia.    Interval Problem Update  Still feels generally ill and malaise c/o diaphoresis profusely  Nausea no vomiting    Consultants/Specialty  None    Disposition  Home when medically cleared  Home health ordered        Review of Systems   Constitutional: Positive for chills, diaphoresis and malaise/fatigue. Negative for fever and weight loss.   HENT: Positive for sore throat.    Respiratory: Negative.  Negative for cough and shortness of breath.    Cardiovascular: Positive for chest pain (chest wall). Negative for leg swelling.   Gastrointestinal: Positive for nausea. Negative for abdominal pain and vomiting.   Genitourinary: Negative.  Negative for dysuria and flank pain.   Musculoskeletal: Positive for myalgias. Negative for back pain.   Neurological: Negative.  Negative for dizziness, loss of consciousness and weakness.   Endo/Heme/Allergies: Negative.  Does not bruise/bleed easily.   Psychiatric/Behavioral: Negative.  Negative for depression. The patient is not nervous/anxious.    All other systems reviewed and are negative.     Physical Exam  Laboratory/Imaging   Hemodynamics  Temp (24hrs), Av °C (98.6 °F), Min:36.6 °C (97.8 °F), Max:37.3 °C (99.1 °F)   Temperature: 37.3 °C (99.1 °F)  Pulse  Av  Min: 68  Max: 105    Blood Pressure :  (pt is medical)      Respiratory      Respiration: 14, Pulse Oximetry: 95 %     Work Of Breathing / Effort: Mild  RUL Breath Sounds: Absent ((lobectomy)), RML Breath Sounds: Absent ((lobectomy)), RLL Breath Sounds: Diminished, JERED Breath Sounds: Diminished, LLL Breath Sounds: Absent ((lobectomy))    Fluids    Intake/Output Summary (Last 24 hours) at 18 1321  Last data filed at 18 0900   Gross per 24 hour   Intake              540 ml   Output              400 ml    Net              140 ml       Nutrition  Orders Placed This Encounter   Procedures   • Diet Order     Standing Status:   Standing     Number of Occurrences:   1     Order Specific Question:   Diet:     Answer:   Consistent Carbohydrate [4]     Physical Exam   Constitutional: She appears well-developed and well-nourished. No distress.   HENT:   Nose: Nose normal.   Mouth/Throat: Oropharynx is clear and moist. No oropharyngeal exudate.   Eyes: Conjunctivae are normal. Right eye exhibits no discharge. Left eye exhibits no discharge. No scleral icterus.   Neck: No JVD present. No tracheal deviation present.   Cardiovascular: Normal rate, regular rhythm and normal heart sounds.    Pulmonary/Chest: Effort normal and breath sounds normal. No stridor. No respiratory distress. She has no wheezes. She has no rales. She exhibits tenderness (firm area underneath the left breast tender to palpation no erythema and feels softer today).   Abdominal: Soft. Bowel sounds are normal. She exhibits no distension. There is no tenderness.   Musculoskeletal: She exhibits no edema or tenderness.   Neurological: She is alert. No cranial nerve deficit. She exhibits normal muscle tone.   Skin: Skin is warm and dry. She is not diaphoretic. No pallor.   Psychiatric: She has a normal mood and affect. Her behavior is normal.   Nursing note and vitals reviewed.      Recent Labs      03/10/18   0240  03/11/18   0552  03/12/18   0243   WBC  2.5*  2.9*  6.7   RBC  3.10*  3.07*  2.95*   HEMOGLOBIN  8.8*  8.5*  8.2*   HEMATOCRIT  27.8*  27.5*  26.6*   MCV  89.7  89.6  90.2   MCH  28.4  27.7  27.8   MCHC  31.7*  30.9*  30.8*   RDW  46.3  45.2  45.2   PLATELETCT  118*  112*  138*   MPV  10.5  10.4  10.6     Recent Labs      03/11/18   0552   SODIUM  137   POTASSIUM  3.8   CHLORIDE  100   CO2  31   GLUCOSE  113*   BUN  9   CREATININE  0.73   CALCIUM  8.1*                      Assessment/Plan     * Febrile neutropenia (CMS-HCC)   Assessment & Plan    With  associated fever of unclear source possibly early cellulitis on left chest wall under her left breast  Cultures remain negative; no fevers off antibiotics over 24 hours  Neutrophils improved over 1000  flu swab negative            Chest wall pain- (present on admission)   Assessment & Plan    Pain and tenderness under left breast reminiscent of pain from pericarditis that she had when she was in her 30s workup including EKG not indicative of pericarditis, troponin negative.  Sedimentation rate normal  Follow-up EKG normal  Patient is improving, oral pain medication as needed  Firm area under left breast is improving she states with the antibiotics, perhaps an early cellulitis may have been present or pain from breast cancer.        Essential hypertension- (present on admission)   Assessment & Plan    Controlled.  Monitor.         Thrombocytopenia (CMS-HCC)   Assessment & Plan    improved, due to bone marrow suppression from chemotherapy.  No signs of bleeding, monitor.        Malignant neoplasm of upper-outer quadrant of left breast in female, estrogen receptor negative (CMS-HCC)- (present on admission)   Assessment & Plan    Patient reports recent chemotherapy, but is unsure of regimen.  Followed by Dr. Barroso.  Patient remains weak, sore, fatigued and nauseated.  Discussed with dr. Barroso he is going to see the patient and make recommendations; current symptoms may be due to the chemotherapy itself.        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  Monitor.        Normocytic anemia- (present on admission)   Assessment & Plan    Likely due to chronic disease, presence of CLL.  No current evidence of bleed.  Transfuse if needed for hemoglobin less than 7 gm/dL          Obesity (BMI 30-39.9)- (present on admission)   Assessment & Plan    Body mass index is 33.53  kg/m².          Hypothyroidism due to acquired atrophy of thyroid- (present on admission)   Assessment & Plan    Continue replacement therapy.         CLL (chronic lymphocytic leukemia)- wbc= 20k-30k, since 2006; no rx; oncologist to follow- (present on admission)   Assessment & Plan      Outpatient follow-up followed by [facility]            Quality-Core Measures

## 2018-03-12 NOTE — CONSULTS
"Consult Note: Hematology/ Oncology    Date of consultation: 3/12/2018     Referring provider: Chantal Luna M.D.    Reason for consultation: Breast cancer.     HISTORY OF PRESENT ILLNESS:  The patient is a 77-year-old female well known to   me from clinic.  She has recently diagnosed left-sided invasive ductal   carcinoma of the left breast for which she was started on neoadjuvant   chemotherapy.  The patient received first cycle of Adriamycin and   cyclophosphamide on 03/02/2018.  However, patient presented to the emergency   room with shortness of breath, nausea, vomiting and a fever of 103.1.  She was   admitted and had an absolute neutrophil count of 200 before recovery.  The   patient is seen in consultation for further evaluation.     Review of Systems:  Positive for malaise, fatigue, myalgia, pain  All other review of systems are negative except what was mentioned above in the HPI.    Past Medical History:    Past Medical History:   Diagnosis Date   • Cancer (CMS-HCC) 2006    CLL   • Cancer (CMS-HCC) 2016    lung   • 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   • Cataract     right  and  left  IOL   • CLL (chronic lymphoblastic leukemia)    • Cold     11/27/17 - \"Three weeks ago\".  Denies symptoms.   • Cutaneous skin tags 1/29/2015   • DM (diabetes mellitus) (CMS-HCC)    • Hiatus hernia syndrome     no surgery   • Hypertension     \"In the past\"   • Indigestion    • MEDICAL HOME    • Personal history of colonic polyps 11/26/2012   • Pneumonia 2014   • Pneumonia 06/2017   • Thyroid disease    • Type II or unspecified type diabetes mellitus without mention of complication, not stated as uncontrolled     pre-diabetic       Past surgical history:    Past Surgical History:   Procedure Laterality Date   • THYROIDECTOMY N/A 11/29/2017    Procedure: THYROIDECTOMY COMPLETION, RECURRENT LARYNGEAL NERVE MONITORING;  Surgeon: Cameron Marcelino M.D.;  Location: SURGERY SAME DAY [facility];  " Service: General   • THORACOSCOPY Right 2/1/2016    Procedure: THORACOSCOPY Upper Lobectomy ;  Surgeon: John H Ganser, M.D.;  Location: SURGERY [facility];  Service:    • NODE DISSECTION Right 2/1/2016    Procedure: NODE DISSECTION;  Surgeon: John H Ganser, M.D.;  Location: SURGERY [facility];  Service:    • RECOVERY  12/18/2015    Procedure: CT-SCP-RUL LUNG BIOPSY-;  Surgeon: [facility];  Location: SURG[place] PRE-POST PROC UNIT [facility];  Service:    • OTHER  2001    Lower Left segment of lung removed    • CHOLECYSTECTOMY  1995   • OTHER ORTHOPEDIC SURGERY  1990    bakers cyst removed in right knee, multiple scopes   • OTHER  1990    hemmroid removal   • OTHER  1984    left Thyroid removed, might remove right side in the future   • APPENDECTOMY  1955   • CATARACT EXTRACTION WITH IOL     • TONSILLECTOMY     • US-NEEDLE CORE BX-BREAST PANEL     • VAGINAL HYSTERECTOMY TOTAL      Hysterectomy,Total Vaginal       Allergies:  Codeine; Lipitor [atorvastatin calcium]; Lovastatin; Zocor [simvastatin - high dose]; Lisinopril; and Metformin    Medications:    Current Facility-Administered Medications   Medication Dose Route Frequency Provider Last Rate Last Dose   • HYDROmorphone (DILAUDID) tablet 2 mg  2 mg Oral Q4HRS PRN Chantal Luna M.D.   2 mg at 03/12/18 1504   • HYDROmorphone (DILAUDID) injection 0.5 mg  0.5 mg Intravenous Q4HRS PRN Chantal Luna M.D.   0.5 mg at 03/11/18 2108   • prochlorperazine (COMPAZINE) tablet 10 mg  10 mg Oral Q6HRS PRN Chantal Luna M.D.       • clonazePAM (KLONOPIN) tablet 0.5 mg  0.5 mg Oral BID Chantal Luna M.D.   0.5 mg at 03/12/18 0825   • aspirin (ASA) chewable tab 81 mg  81 mg Oral DAILY Maurisio Stapleton M.D.   81 mg at 03/12/18 0824   • citalopram (CELEXA) tablet 20 mg  20 mg Oral DAILY Maurisio Stapleton M.D.   20 mg at 03/12/18 0825   • levothyroxine (SYNTHROID) tablet 150 mcg  150 mcg Oral AM ES Maurisio Stapleton M.D.   150 mcg at 03/12/18 0613   • LORazepam  (ATIVAN) tablet 4 mg  4 mg Oral HS PRN Maurisio Stapleton M.D.   4 mg at 03/11/18 2236   • enoxaparin (LOVENOX) inj 40 mg  40 mg Subcutaneous DAILY Maurisio Stapleton M.D.   40 mg at 03/12/18 0825   • ondansetron (ZOFRAN) syringe/vial injection 4 mg  4 mg Intravenous Q4HRS PRN Maurisio Stapleton M.D.   4 mg at 03/11/18 2108   • ondansetron (ZOFRAN ODT) dispertab 4 mg  4 mg Oral Q4HRS PRN Maurisio Stapleton M.D.   4 mg at 03/12/18 1506   • haloperidol lactate (HALDOL) injection 5 mg  5 mg Intravenous Q4HRS PRN Maurisio Stapleton M.D.       • senna-docusate (PERICOLACE or SENOKOT S) 8.6-50 MG per tablet 2 Tab  2 Tab Oral BID Maurisio Stapleton M.D.   2 Tab at 03/11/18 0843    And   • polyethylene glycol/lytes (MIRALAX) PACKET 1 Packet  1 Packet Oral QDAY PRN Maurisio Stapleton M.D.   1 Packet at 03/10/18 0841    And   • magnesium hydroxide (MILK OF MAGNESIA) suspension 30 mL  30 mL Oral QDAY PRN Maurisio Stapleton M.D.   30 mL at 03/09/18 2043    And   • bisacodyl (DULCOLAX) suppository 10 mg  10 mg Rectal QDAY PRN Maurisio Stapleton M.D.       • acetaminophen (TYLENOL) tablet 650 mg  650 mg Oral Q6HRS PRN Maurisio Stapleton M.D.       • Respiratory Care per Protocol   Nebulization Continuous RT Maurisio Stapleton M.D.       • insulin regular (HUMULIN R) injection 1-6 Units  1-6 Units Subcutaneous 4X/DAY ACHLILIYA Stapleton M.D.   Stopped at 03/08/18 0700    And   • glucose 4 g chewable tablet 16 g  16 g Oral Q15 MIN PRN Maurisio Stapleton M.D.        And   • dextrose 50% (D50W) injection 25 mL  25 mL Intravenous Q15 MIN PRN Maurisio Stapleton M.D.           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     Social History Main Topics   • Smoking status: Former Smoker     Packs/day: 2.00     Years: 50.00     Types: Cigarettes     Quit date: 5/6/2006   • Smokeless tobacco: Never  Used      Comment: quit smoking 2002   • Alcohol use 0.0 oz/week      Comment: ocassionaly   • Drug use: No   • Sexual activity: Not Currently     Partners: Male     Other Topics Concern   • Not on file     Social History Narrative   • No narrative on file       Family History:     Family History   Problem Relation Age of Onset   • Cancer Mother    • Lung Disease Father    • Cancer Father        Physical Exam:  Vitals:   /55   Pulse 77   Temp 37.3 °C (99.1 °F)   Resp 14   Wt 96.3 kg (212 lb 4.9 oz)   SpO2 95%   Breastfeeding? No   BMI 35.33 kg/m²     General: Not in acute distress,   HEENT: No pallor, icterus. Oropharynx clear.   Neck: Supple, no palpable masses.  Lymph nodes: No palpable cervical, supraclavicular, axillary or inguinal lymphadenopathy.    CVS: regular rate and rhythm, no rubs or gallops  RESP: Clear to auscultate bilaterally, no wheezing or crackles.   ABD: Soft, non tender, non distended, positive bowel sounds, no palpable organomegaly  EXT: No edema or cyanosis. PICC line in right arm. Peripheral IV and left arm  CNS: Alert and oriented x3, No focal deficits.  Skin- No rash      Labs:   Results for GREG DEMARCO (MRN [id number]) as of 3/12/2018 19:08   3/12/2018 02:43   WBC 6.7   RBC 2.95 (L)   Hemoglobin 8.2 (L)   Hematocrit 26.6 (L)   MCV 90.2   MCH 27.8   MCHC 30.8 (L)   RDW 45.2   Platelet Count 138 (L)   MPV 10.6   Neutrophils-Polys 17.80 (L)   Neutrophils (Absolute) 1.32 (L)   Bands-Stabs 1.90   Lymphocytes 72.90 (H)   Lymphs (Absolute) 4.88 (H)   Monocytes 3.70   Monos (Absolute) 0.25   Eosinophils 0.90   Eos (Absolute) 0.06   Basophils 1.90 (H)   Baso (Absolute) 0.13 (H)   Myelocytes 0.90   Nucleated RBC 0.30   NRBC (Absolute) 0.02   Plt Estimation Decreased   RBC Morphology Present   Anisocytosis 1+   Macrocytosis 1+   Smudge Cells Few   Peripheral Smear Review see below   Manual Diff Status PERFORMED       ASSESSMENT AND PLAN:  This is a 77-year-old female with left-sided  breast   cancer, undergoing neoadjuvant chemotherapy, admitted to the hospital with   neutropenic fever.      Neutropenic fever  No source of infection seen.    Neutropenia resolved today. Hold all antibiotics  No fevers in 24 hours    Breast cancer  Patient is due for her chemotherapy this Friday; however, we will defer for 1 week.  We will consider   dose reduction chemotherapy with the next cycle.  If the patient is   intolerant to Adriamycin and cyclophosphamide, we will skip cycle #3 and 4 and   proceed directly to weekly paclitaxel.      Anemia  Stable  Secondary to chemotherapy. Transfuse if hemoglobin less than 7.5    Bone pain/myalgia  Patient is to continue taking her   Claritin as I feel that some of the bone pains that she is experiencing may be secondary to the Neulasta itself.    Peripheral access  Patient states that she is a difficult stick.  I will discuss the case with   the on-call hospitalist that her PICC line may be used for all IV access,   including blood draws and administration of fluids, antibiotics, and   medications as needed.  No need for second IV access while PICC is in place.    Continue routine PICC care while in the hospital and while PICC is in use.     We will make the patient a followup appointment to be seen in 1 week.    -All medical records available in [facility] were reviewed and are summarized above.  -All labs and/or imaging studies mentioned in the note above were reviewed with and explained to the patient as a pertain to medical decision making.    [id number]            She agreed and verbalized her agreement and understanding with the current plan.  I answered all questions and concerns she has at this time.      Please note that this dictation was created using voice recognition software. I have made every reasonable attempt to correct obvious errors, but I expect that there are errors of grammar and possibly content that I did not discover before finalizing the  note.      SIGNATURES:  Denise Barroso

## 2018-03-12 NOTE — FACE TO FACE
Face to Face Supporting Documentation - Home Health    The encounter with this patient was in whole or in part the primary reason for home health admission.    Date of encounter:   Patient:                    MRN:                       YOB: 2017  Clarisse Reeves  [id number]  1940     Home health to see patient for:  Skilled Nursing care for assessment, interventions & education    Skilled need for:  Exacerbation of Chronic Disease State breast cancer    Skilled nursing interventions to include:  Comment: nursing care    Homebound status evidenced by:  Needs the assistance of another person in order to leave the home. Leaving home requires a considerable and taxing effort. There is a normal inability to leave the home.    Community Physician to provide follow up care: Siva Smart M.D.     Optional Interventions? No      I certify the face to face encounter for this home health care referral meets the CMS requirements and the encounter/clinical assessment with the patient was, in whole, or in part, for the medical condition(s) listed above, which is the primary reason for home health care. Based on my clinical findings: the service(s) are medically necessary, support the need for home health care, and the homebound criteria are met.  I certify that this patient has had a face to face encounter by myself.  Chantal Luna M.D. - NPI: [id number]

## 2018-03-12 NOTE — PROGRESS NOTES
Pt sleeping - prior pt reported nausea resolved and pain better.   Pt assisted to BR at 0200.  Pt c/o sore throat. Contacted lab to draw pt - per AM RN we are not allowed to access her PICC per Onc. (need communication order).

## 2018-03-12 NOTE — CARE PLAN
Problem: Safety  Goal: Will remain free from falls  Discussed w/ pt need to assess orthostatic hypotension, calling for assistance before ambulating, bed in lowest/locked position.

## 2018-03-12 NOTE — CONSULTS
DATE OF SERVICE:  03/12/2018    REASON FOR CONSULTATION:  Breast cancer.    HISTORY OF PRESENT ILLNESS:  The patient is a 77-year-old female well known to   me from clinic.  She has recently diagnosed left-sided invasive ductal   carcinoma of the left breast for which she was started on neoadjuvant   chemotherapy.  The patient received first cycle of Adriamycin and   cyclophosphamide on 03/02/2018.  However, patient presented to the emergency   room with shortness of breath, nausea, vomiting and a fever of 103.1.  She was   admitted and had an absolute neutrophil count of 200 before recovery.  The   patient is seen in consultation for further evaluation.    ASSESSMENT AND PLAN:  This is a 77-year-old female with left-sided breast   cancer, undergoing neoadjuvant chemotherapy, admitted to the hospital with   neutropenic fever.  No source of infection seen.  Patient is due for her   chemotherapy this Friday; however, we will defer for 1 week.  We will consider   dose reduction chemotherapy with the next cycle.  If the patient is   intolerant to Adriamycin and cyclophosphamide, we will skip cycle #3 and 4 and   proceed directly to weekly paclitaxel.  Patient is to continue taking her   Claritin as I feel that some of the bone pains that she is experiencing may be   secondary to the Neulasta itself.    Patient states that she is a difficult stick.  I will discuss the case with   the on-call hospitalist that her PICC line may be used for all IV access,   including blood draws and administration of fluids, antibiotics, and   medications as needed.  No need for second IV access while PICC is in place.    Continue routine PICC care while in the hospital and while PICC is in use.    We will make the patient a followup appointment to be seen in 1 week.       ____________________________________     MD MARITZA Reyes / WANDA    DD:  03/12/2018 16:27:26  DT:  03/12/2018 16:47:59    D#:  [id number]  Job#:  [id number]

## 2018-03-13 ENCOUNTER — HOME HEALTH ADMISSION (OUTPATIENT)
Dept: HOME HEALTH SERVICES | Facility: HOME HEALTHCARE | Age: 78
End: 2018-03-13
Payer: MEDICARE

## 2018-03-13 ENCOUNTER — APPOINTMENT (OUTPATIENT)
Dept: RADIOLOGY | Facility: MEDICAL CENTER | Age: 78
DRG: 809 | End: 2018-03-13
Attending: HOSPITALIST
Payer: MEDICARE

## 2018-03-13 ENCOUNTER — TELEPHONE (OUTPATIENT)
Dept: HEMATOLOGY ONCOLOGY | Facility: MEDICAL CENTER | Age: 78
End: 2018-03-13

## 2018-03-13 ENCOUNTER — TELEPHONE (OUTPATIENT)
Dept: MEDICAL GROUP | Age: 78
End: 2018-03-13

## 2018-03-13 LAB
BACTERIA BLD CULT: NORMAL
BACTERIA BLD CULT: NORMAL
BASOPHILS # BLD AUTO: 0 % (ref 0–1.8)
BASOPHILS # BLD: 0 K/UL (ref 0–0.12)
EOSINOPHIL # BLD AUTO: 0.18 K/UL (ref 0–0.51)
EOSINOPHIL NFR BLD: 1.8 % (ref 0–6.9)
ERYTHROCYTE [DISTWIDTH] IN BLOOD BY AUTOMATED COUNT: 45.4 FL (ref 35.9–50)
GLUCOSE BLD-MCNC: 111 MG/DL (ref 65–99)
GLUCOSE BLD-MCNC: 126 MG/DL (ref 65–99)
GLUCOSE BLD-MCNC: 127 MG/DL (ref 65–99)
GLUCOSE BLD-MCNC: 142 MG/DL (ref 65–99)
GLUCOSE BLD-MCNC: 98 MG/DL (ref 65–99)
HCT VFR BLD AUTO: 25.5 % (ref 37–47)
HGB BLD-MCNC: 8.1 G/DL (ref 12–16)
LYMPHOCYTES # BLD AUTO: 3.18 K/UL (ref 1–4.8)
LYMPHOCYTES NFR BLD: 31.8 % (ref 22–41)
MANUAL DIFF BLD: NORMAL
MCH RBC QN AUTO: 28.5 PG (ref 27–33)
MCHC RBC AUTO-ENTMCNC: 31.8 G/DL (ref 33.6–35)
MCV RBC AUTO: 89.8 FL (ref 81.4–97.8)
METAMYELOCYTES NFR BLD MANUAL: 1.8 %
MONOCYTES # BLD AUTO: 0.36 K/UL (ref 0–0.85)
MONOCYTES NFR BLD AUTO: 3.6 % (ref 0–13.4)
MORPHOLOGY BLD-IMP: NORMAL
MYELOCYTES NFR BLD MANUAL: 0.9 %
NEUTROPHILS # BLD AUTO: 6.01 K/UL (ref 2–7.15)
NEUTROPHILS NFR BLD: 55.5 % (ref 44–72)
NEUTS BAND NFR BLD MANUAL: 4.6 % (ref 0–10)
NRBC # BLD AUTO: 0.03 K/UL
NRBC BLD-RTO: 0.3 /100 WBC
PLATELET # BLD AUTO: 148 K/UL (ref 164–446)
PLATELET BLD QL SMEAR: NORMAL
PMV BLD AUTO: 10.2 FL (ref 9–12.9)
RBC # BLD AUTO: 2.84 M/UL (ref 4.2–5.4)
RBC BLD AUTO: PRESENT
SIGNIFICANT IND 70042: NORMAL
SIGNIFICANT IND 70042: NORMAL
SITE SITE: NORMAL
SITE SITE: NORMAL
SOURCE SOURCE: NORMAL
SOURCE SOURCE: NORMAL
TOXIC GRANULES BLD QL SMEAR: SLIGHT
WBC # BLD AUTO: 10 K/UL (ref 4.8–10.8)

## 2018-03-13 PROCEDURE — 97535 SELF CARE MNGMENT TRAINING: CPT

## 2018-03-13 PROCEDURE — 700102 HCHG RX REV CODE 250 W/ 637 OVERRIDE(OP): Performed by: HOSPITALIST

## 2018-03-13 PROCEDURE — A9270 NON-COVERED ITEM OR SERVICE: HCPCS | Performed by: HOSPITALIST

## 2018-03-13 PROCEDURE — 85007 BL SMEAR W/DIFF WBC COUNT: CPT

## 2018-03-13 PROCEDURE — 36584 COMPL RPLCMT PICC RS&I: CPT

## 2018-03-13 PROCEDURE — 99232 SBSQ HOSP IP/OBS MODERATE 35: CPT | Performed by: HOSPITALIST

## 2018-03-13 PROCEDURE — 82962 GLUCOSE BLOOD TEST: CPT | Mod: 91

## 2018-03-13 PROCEDURE — 85027 COMPLETE CBC AUTOMATED: CPT

## 2018-03-13 PROCEDURE — 700111 HCHG RX REV CODE 636 W/ 250 OVERRIDE (IP): Performed by: HOSPITALIST

## 2018-03-13 PROCEDURE — 770021 HCHG ROOM/CARE - ISO PRIVATE

## 2018-03-13 RX ADMIN — ENOXAPARIN SODIUM 40 MG: 100 INJECTION SUBCUTANEOUS at 09:56

## 2018-03-13 RX ADMIN — ONDANSETRON 4 MG: 4 TABLET, ORALLY DISINTEGRATING ORAL at 11:53

## 2018-03-13 RX ADMIN — LEVOTHYROXINE SODIUM 150 MCG: 75 TABLET ORAL at 06:27

## 2018-03-13 RX ADMIN — HYDROMORPHONE HYDROCHLORIDE 2 MG: 2 TABLET ORAL at 22:35

## 2018-03-13 RX ADMIN — LORAZEPAM 4 MG: 1 TABLET ORAL at 22:35

## 2018-03-13 RX ADMIN — HYDROMORPHONE HYDROCHLORIDE 2 MG: 2 TABLET ORAL at 11:53

## 2018-03-13 RX ADMIN — ONDANSETRON 4 MG: 4 TABLET, ORALLY DISINTEGRATING ORAL at 18:22

## 2018-03-13 RX ADMIN — HYDROMORPHONE HYDROCHLORIDE 2 MG: 2 TABLET ORAL at 18:22

## 2018-03-13 RX ADMIN — CLONAZEPAM 0.5 MG: 0.5 TABLET ORAL at 22:35

## 2018-03-13 RX ADMIN — CITALOPRAM HYDROBROMIDE 20 MG: 20 TABLET ORAL at 09:56

## 2018-03-13 RX ADMIN — ASPIRIN 81 MG: 81 TABLET, CHEWABLE ORAL at 09:56

## 2018-03-13 RX ADMIN — CLONAZEPAM 0.5 MG: 0.5 TABLET ORAL at 09:58

## 2018-03-13 ASSESSMENT — PAIN SCALES - GENERAL
PAINLEVEL_OUTOF10: 4
PAINLEVEL_OUTOF10: 6
PAINLEVEL_OUTOF10: 9
PAINLEVEL_OUTOF10: 0
PAINLEVEL_OUTOF10: 8
PAINLEVEL_OUTOF10: 0
PAINLEVEL_OUTOF10: 8
PAINLEVEL_OUTOF10: 4

## 2018-03-13 ASSESSMENT — ENCOUNTER SYMPTOMS
LOSS OF CONSCIOUSNESS: 0
ABDOMINAL PAIN: 0
FEVER: 0
SORE THROAT: 1
NAUSEA: 1
MYALGIAS: 1
COUGH: 0
DIAPHORESIS: 1
BACK PAIN: 0
NERVOUS/ANXIOUS: 0
SHORTNESS OF BREATH: 0
FLANK PAIN: 0
CHILLS: 1
BRUISES/BLEEDS EASILY: 0
DEPRESSION: 0
WEIGHT LOSS: 0
VOMITING: 0
DIZZINESS: 0
WEAKNESS: 0

## 2018-03-13 ASSESSMENT — COGNITIVE AND FUNCTIONAL STATUS - GENERAL
DRESSING REGULAR LOWER BODY CLOTHING: A LITTLE
TOILETING: A LITTLE
DAILY ACTIVITIY SCORE: 20
PERSONAL GROOMING: A LITTLE
SUGGESTED CMS G CODE MODIFIER DAILY ACTIVITY: CJ
HELP NEEDED FOR BATHING: A LITTLE

## 2018-03-13 NOTE — CARE PLAN
Problem: Safety  Goal: Will remain free from falls  Educated pt on importance of standby assist when ambulating, bed alarm engaged, bed locked in lowest position, walker out of site.     Problem: Psychosocial Needs:  Goal: Level of anxiety will decrease  One on one discussion with pt to alleviate anxiety - time spent with pt to discuss current feelings/needs.

## 2018-03-13 NOTE — DISCHARGE PLANNING
Transitional Care Navigator:    Met with the patient regarding home health follow up. The patient is agreeable. Choice form completed and faxed.  SW is aware.

## 2018-03-13 NOTE — TELEPHONE ENCOUNTER
Daun from infusion calling back, she is unable to push pt chemo another week, there is no room, she is still working on this and wanted to let us know.

## 2018-03-13 NOTE — PROCEDURES
PICC Insertion Procedure Note    Consents confirmed, vessel patency confirmed with ultrasound, real time ultrasound image printed and placed in patient chart. Risks and benefits of procedure explained to patient/family and education regarding central line associated bloodstream infections provided. Questions answered.     PICC exchanged in RUE per MD order with ultrasound guidance. 5 Fr, dual lumen PICC placed in brachial vein after 1 attempt(s). 5 cc's of 1% lidocaine injected intradermally, 21 gauge microintroducer needle and modified Seldinger technique used. 43 cm total catheter length, 1 cm external measurement inserted with good blood return. Each lumen flushed without resistance with 10 mL 0.9% normal saline. PICC line secured with Biopatch and Tegaderm.    PICC tip location in the SVC confirmed by ECG technology. Pt tolerated procedure well.  Patient condition relayed to unit RN or ordering physician via this post procedure note in the EMR.     [employer] Power PICC ref # [id number], Lot # [id number]

## 2018-03-13 NOTE — PROGRESS NOTES
Assumed care of pt, call light w/in reach, and fall prec in place.  Bed locked in lowest position. Pt instructed to call for assistance before getting out of bed.  All questions answered, no needs at this time.

## 2018-03-13 NOTE — THERAPY
Physical Therapy Contact Note    Pt declining session x 2 attempts today; reporting complete exhaustion after PICC line; agreeable to session at 9:30 tomorrow;    Marisabel Haney, PT, DPT Pager: [phone]

## 2018-03-13 NOTE — THERAPY
"Occupational Therapy Treatment completed with focus on ADLs, ADL transfers and patient education.  Functional Status:  SBA supine>sit EOB, sat EOb ~3 min prior to standing d/t c/o dizziness VSS (/57). SBA w/sit>stand, walking w/fww and CGA poor use of fww, CGA w/standing at sink for grooming, leaning against sink for balance unable to take hands off surface and had increasing c/o fatigue. Returned to EOB sat up EOB to drink coffee. Alarm on call light w/in reach. Educated pt on increasing activity through out day as pt spends most of her day in bed. Discussed role of exercise and activity in reducing fatigue and improving sleep. Pt has poor insight into level of assist she is currently receiving in the hospital and returning to independent living, concern for high fall risk and low activity. Pt reports \"many people have keys to my house and they come by through out the day everyday\" pt is very resistant to post acute needs. Pt needs to increase daily activity to up to chair for all meals ambulating to bathroom and in hallway w/no LOB. Will discuss w/RN   Plan of Care: Will benefit from Occupational Therapy 3 times per week  Discharge Recommendations:  Equipment Will Continue to Assess for Equipment Needs. Post-acute therapy, concern that post acute placement puts patient at greater risk for infection, however concern for high fall risk and inability to care for her self I'ly at this time may benefit from placement to increase strength and endurance to return to safe independent living     See \"Rehab Therapy-Acute\" Patient Summary Report for complete documentation.     Pt seen for OT tx pt appears to have decreased endurance, balance and continued poor insight into current medical situation pt will continue to benefit from acute OT and if decline continues pt will need post acute placement   "

## 2018-03-13 NOTE — TELEPHONE ENCOUNTER
Arash called back, they will be able to put her on on Thursday the 22nd, they don't have a time yet but pt will be scheduled.

## 2018-03-13 NOTE — THERAPY
Physical Therapy Contact Note    Session attempted; pt reporting fatigue from OT session this am; will attempt in PM; of note, pt appears to have little insight into safety concerns/needs; circular reasoning when discussing balance concerns and living alone in current condition    Marisabel Haney, PT, DPT Pager: [phone]

## 2018-03-13 NOTE — TELEPHONE ENCOUNTER
1. Caller Name: Clarisse Reeves                                         Call Back Number: [phone] (home)         Patient approves a detailed voicemail message: N\A    Left message for patient to call regarding rescheduling cancelled  appt on 3/7/18. If patient calls please transfur to Ext [phone].

## 2018-03-13 NOTE — PROGRESS NOTES
Pt PICC placement does not appear to be in appropriate place - need confirmation from MD - Pt's peripheral IV was accidentally removed by pt.  After consultation w/ CN and ICU Rns - ok to use as Midline cath - but await MD approval or decision if need for further advancement.  Reinforcing w/ pt importance of calling for assistance before ambulating.  Pt not sleeping well tonight despite medication to treat pain and anxiety.  Provided pt w/ reassurance.  Pt woke up again tonight in drenched in sweat - last temp was 99.4.  Pt eager to discharge and educated pt on importance of discussing plan of care w/ Md.  Discussed w/ pt current plan of care.  Pt struggled to get off of toilet w/ CNA at standby - reinforced w/ pt importance of seeking assistance before ambulating.  Bed alarm engaged.

## 2018-03-14 LAB
BASOPHILS # BLD AUTO: 0.9 % (ref 0–1.8)
BASOPHILS # BLD: 0.1 K/UL (ref 0–0.12)
EOSINOPHIL # BLD AUTO: 0.19 K/UL (ref 0–0.51)
EOSINOPHIL NFR BLD: 1.7 % (ref 0–6.9)
ERYTHROCYTE [DISTWIDTH] IN BLOOD BY AUTOMATED COUNT: 45.6 FL (ref 35.9–50)
GLUCOSE BLD-MCNC: 113 MG/DL (ref 65–99)
GLUCOSE BLD-MCNC: 135 MG/DL (ref 65–99)
GLUCOSE BLD-MCNC: 150 MG/DL (ref 65–99)
GLUCOSE BLD-MCNC: 93 MG/DL (ref 65–99)
HCT VFR BLD AUTO: 27.4 % (ref 37–47)
HGB BLD-MCNC: 8.3 G/DL (ref 12–16)
LG PLATELETS BLD QL SMEAR: NORMAL
LYMPHOCYTES # BLD AUTO: 2.96 K/UL (ref 1–4.8)
LYMPHOCYTES NFR BLD: 27.2 % (ref 22–41)
MANUAL DIFF BLD: NORMAL
MCH RBC QN AUTO: 27.4 PG (ref 27–33)
MCHC RBC AUTO-ENTMCNC: 30.3 G/DL (ref 33.6–35)
MCV RBC AUTO: 90.4 FL (ref 81.4–97.8)
METAMYELOCYTES NFR BLD MANUAL: 0.9 %
MONOCYTES # BLD AUTO: 0.19 K/UL (ref 0–0.85)
MONOCYTES NFR BLD AUTO: 1.7 % (ref 0–13.4)
MORPHOLOGY BLD-IMP: NORMAL
NEUTROPHILS # BLD AUTO: 7.37 K/UL (ref 2–7.15)
NEUTROPHILS NFR BLD: 62.3 % (ref 44–72)
NEUTS BAND NFR BLD MANUAL: 5.3 % (ref 0–10)
NRBC # BLD AUTO: 0 K/UL
NRBC BLD-RTO: 0 /100 WBC
PLATELET # BLD AUTO: 170 K/UL (ref 164–446)
PLATELET BLD QL SMEAR: NORMAL
PMV BLD AUTO: 9.9 FL (ref 9–12.9)
RBC # BLD AUTO: 3.03 M/UL (ref 4.2–5.4)
RBC BLD AUTO: PRESENT
TOXIC GRANULES BLD QL SMEAR: SLIGHT
WBC # BLD AUTO: 10.9 K/UL (ref 4.8–10.8)

## 2018-03-14 PROCEDURE — 700102 HCHG RX REV CODE 250 W/ 637 OVERRIDE(OP): Performed by: HOSPITALIST

## 2018-03-14 PROCEDURE — 99232 SBSQ HOSP IP/OBS MODERATE 35: CPT | Performed by: HOSPITALIST

## 2018-03-14 PROCEDURE — 85007 BL SMEAR W/DIFF WBC COUNT: CPT

## 2018-03-14 PROCEDURE — 770021 HCHG ROOM/CARE - ISO PRIVATE

## 2018-03-14 PROCEDURE — 82962 GLUCOSE BLOOD TEST: CPT

## 2018-03-14 PROCEDURE — A9270 NON-COVERED ITEM OR SERVICE: HCPCS | Performed by: HOSPITALIST

## 2018-03-14 PROCEDURE — 700111 HCHG RX REV CODE 636 W/ 250 OVERRIDE (IP): Performed by: HOSPITALIST

## 2018-03-14 PROCEDURE — 85027 COMPLETE CBC AUTOMATED: CPT

## 2018-03-14 PROCEDURE — 97530 THERAPEUTIC ACTIVITIES: CPT

## 2018-03-14 RX ADMIN — HYDROMORPHONE HYDROCHLORIDE 2 MG: 2 TABLET ORAL at 17:10

## 2018-03-14 RX ADMIN — LORAZEPAM 4 MG: 1 TABLET ORAL at 21:05

## 2018-03-14 RX ADMIN — CLONAZEPAM 0.5 MG: 0.5 TABLET ORAL at 21:05

## 2018-03-14 RX ADMIN — CLONAZEPAM 0.5 MG: 0.5 TABLET ORAL at 08:57

## 2018-03-14 RX ADMIN — ONDANSETRON HYDROCHLORIDE 4 MG: 2 INJECTION, SOLUTION INTRAMUSCULAR; INTRAVENOUS at 09:04

## 2018-03-14 RX ADMIN — HYDROMORPHONE HYDROCHLORIDE 2 MG: 2 TABLET ORAL at 21:05

## 2018-03-14 RX ADMIN — ENOXAPARIN SODIUM 40 MG: 100 INJECTION SUBCUTANEOUS at 08:57

## 2018-03-14 RX ADMIN — LEVOTHYROXINE SODIUM 150 MCG: 75 TABLET ORAL at 06:42

## 2018-03-14 RX ADMIN — HYDROMORPHONE HYDROCHLORIDE 2 MG: 2 TABLET ORAL at 09:11

## 2018-03-14 RX ADMIN — ASPIRIN 81 MG: 81 TABLET, CHEWABLE ORAL at 08:57

## 2018-03-14 RX ADMIN — CITALOPRAM HYDROBROMIDE 20 MG: 20 TABLET ORAL at 08:57

## 2018-03-14 ASSESSMENT — ENCOUNTER SYMPTOMS
NERVOUS/ANXIOUS: 0
DIAPHORESIS: 1
WEIGHT LOSS: 0
DEPRESSION: 0
LOSS OF CONSCIOUSNESS: 0
BACK PAIN: 0
BRUISES/BLEEDS EASILY: 0
CHILLS: 1
FEVER: 0
SORE THROAT: 0
SHORTNESS OF BREATH: 0
FLANK PAIN: 0
VOMITING: 0
DIZZINESS: 0
NAUSEA: 1
COUGH: 0
ABDOMINAL PAIN: 0
WEAKNESS: 0
MYALGIAS: 1

## 2018-03-14 ASSESSMENT — GAIT ASSESSMENTS
GAIT LEVEL OF ASSIST: STAND BY ASSIST
DEVIATION: BRADYKINETIC
DISTANCE (FEET): 400
ASSISTIVE DEVICE: OTHER (COMMENTS)

## 2018-03-14 ASSESSMENT — PAIN SCALES - GENERAL
PAINLEVEL_OUTOF10: 5
PAINLEVEL_OUTOF10: 0
PAINLEVEL_OUTOF10: 0
PAINLEVEL_OUTOF10: 5
PAINLEVEL_OUTOF10: 2
PAINLEVEL_OUTOF10: 0
PAINLEVEL_OUTOF10: 0
PAINLEVEL_OUTOF10: 2
PAINLEVEL_OUTOF10: 7
PAINLEVEL_OUTOF10: 1
PAINLEVEL_OUTOF10: 0

## 2018-03-14 ASSESSMENT — COGNITIVE AND FUNCTIONAL STATUS - GENERAL
CLIMB 3 TO 5 STEPS WITH RAILING: A LITTLE
MOBILITY SCORE: 22
MOVING FROM LYING ON BACK TO SITTING ON SIDE OF FLAT BED: A LITTLE
SUGGESTED CMS G CODE MODIFIER MOBILITY: CJ

## 2018-03-14 NOTE — PROGRESS NOTES
Received bedside report from [facility] JEN Barraza and JEN Satnoyo at 1900. Plan of care discussed. Safety measures in place. Pt sitting at the edge of the bed, no complaints or needs as of this time.     Assessment completed at 2000. Pt c/o generalized chronic pain, SOB upon exertion, white patches inside her mouth, but denies N/V or any other complaints. Right PICC examined, flushes and draws back blood ok; however, there is an abrasion/scab caused by the pigtail of the PICC line. Cream barrier provided, pt refused a band-aid and promised not to scratch it to make it worse.  Pt got up to use the bathroom, gait steady with standby assist with a walker.     FSBG checked at 2130, insulin dose not required. Scheduled medications administered around 2230 per pt request together with the next dose of dilaudid 2mg PO PRN for pain. Will reassess pt's pain per unit protocol.     Pt currently resting in bed comfortably, no other needs identified. Plan of care discussed. Educated pt on fall prevention and the importance of call light use when needing assistance. Pt verbalized understanding. Fall precautions in place. Will continue to monitor.

## 2018-03-14 NOTE — CARE PLAN
Problem: Pain Management  Goal: Pain level will decrease to patient's comfort goal  Outcome: PROGRESSING AS EXPECTED   03/14/18 0132   OTHER   Nurse Pain Scale 0 - 10  0   Non Verbal Scale  Calm   Comfort Goal Comfort at Rest;Comfort with Movement;Sleep Comfortably;Stay Alert     Assessed pt's pain per unit protocol and as needed. Medicated pt with PRN medications per eMAR. Also educated pt on non-pharmacological strategies and techniques to help reduce pain. Pt verbalized understanding.    Problem: Infection  Goal: Will remain free from infection  Outcome: PROGRESSING AS EXPECTED    Intervention: Assess signs and symptoms of infection  Monitored pt's temperatures. Assessed pt's PICC line for any signs of infection and other open spots/wounds. Educated pt on signs and symptoms to look out for that indicate infection. Pt verbalized understanding.

## 2018-03-14 NOTE — PROGRESS NOTES
Bedside report received, assumed pt care @0311. Pt A&Ox4. She c/o generalized pain; medicated pt per mar. POC discussed, she verbalized understanding. Per X-ray ordered yesterday, PICC line not in right spot; Dr Lopez notified. Pt to have PICC exchange today. Call light within reach

## 2018-03-14 NOTE — PROGRESS NOTES
Bedside report received from night shift RN. Assumed care. Pt is A&Ox4. Pt is in bed. Pt denies pain at this time. Pt was updated on the plan of care for the day. All questions answered. Pt has call light within reach, bed is in lowest position and bed alarm is on. All fall precautions in place. Pt has non-slip socks on, and appropriate signs are on the door. Pt has no other needs at this time.

## 2018-03-14 NOTE — PROGRESS NOTES
[facility] Hospitalist Progress Note    Date of Service: 3/13/2018    Chief Complaint  77 y.o. female admitted 3/7/2018 with fever and borderline neutropenia on chemotherapy for treatment of chronic leukemia.    Interval Problem Update  Still feels generally ill and malaise c/o diaphoresis profusely, afebrile overnight though.  Nausea no vomiting.    Consultants/Specialty  Heme/Onc    Disposition  Home, likely tomorrow.  Home health ordered        Review of Systems   Constitutional: Positive for chills, diaphoresis and malaise/fatigue. Negative for fever and weight loss.   HENT: Positive for sore throat.    Respiratory: Negative for cough and shortness of breath.    Cardiovascular: Negative for chest pain and leg swelling.   Gastrointestinal: Positive for nausea. Negative for abdominal pain and vomiting.   Genitourinary: Negative for dysuria and flank pain.   Musculoskeletal: Positive for myalgias. Negative for back pain.   Neurological: Negative for dizziness, loss of consciousness and weakness.   Endo/Heme/Allergies: Does not bruise/bleed easily.   Psychiatric/Behavioral: Negative for depression. The patient is not nervous/anxious.    All other systems reviewed and are negative.     Physical Exam  Laboratory/Imaging   Hemodynamics  Temp (24hrs), Av.8 °C (98.3 °F), Min:36.3 °C (97.4 °F), Max:37.4 °C (99.4 °F)   Temperature: 36.5 °C (97.7 °F)  Pulse  Av.8  Min: 68  Max: 105    Blood Pressure : 103/68      Respiratory      Respiration: 20, Pulse Oximetry: 94 %     Work Of Breathing / Effort: Mild  RUL Breath Sounds: Absent (lobectomy), RML Breath Sounds: Absent (lobectomy), RLL Breath Sounds: Diminished, JERED Breath Sounds: Diminished, LLL Breath Sounds: Absent (lobectomy)    Fluids    Intake/Output Summary (Last 24 hours) at 18 2101  Last data filed at 18 0200   Gross per 24 hour   Intake              120 ml   Output                0 ml   Net              120 ml       Nutrition  Orders Placed This  Encounter   Procedures   • Diet Order     Standing Status:   Standing     Number of Occurrences:   1     Order Specific Question:   Diet:     Answer:   Consistent Carbohydrate [4]     Physical Exam   Constitutional: She is oriented to person, place, and time. She appears well-developed. No distress.   HENT:   Head: Normocephalic.   Mouth/Throat: Oropharynx is clear and moist.   Eyes: EOM are normal. Right eye exhibits no discharge. Left eye exhibits no discharge. No scleral icterus.   Neck: Normal range of motion. Neck supple. No JVD present.   Cardiovascular: Normal rate, regular rhythm and intact distal pulses.    Pulmonary/Chest: Effort normal and breath sounds normal. No stridor. No respiratory distress. She has no wheezes. She has no rales. She exhibits tenderness (firm area underneath the left breast tender to palpation no erythema ).   Abdominal: Soft. She exhibits no distension. There is no tenderness.   Musculoskeletal: She exhibits no edema or tenderness.   Neurological: She is alert and oriented to person, place, and time. She exhibits normal muscle tone.   Skin: Skin is warm. She is diaphoretic. No pallor.   Picc in place   Psychiatric: She has a normal mood and affect. Her behavior is normal.   Vitals reviewed.      Recent Labs      03/11/18   0552  03/12/18   0243  03/13/18   0452   WBC  2.9*  6.7  10.0   RBC  3.07*  2.95*  2.84*   HEMOGLOBIN  8.5*  8.2*  8.1*   HEMATOCRIT  27.5*  26.6*  25.5*   MCV  89.6  90.2  89.8   MCH  27.7  27.8  28.5   MCHC  30.9*  30.8*  31.8*   RDW  45.2  45.2  45.4   PLATELETCT  112*  138*  148*   MPV  10.4  10.6  10.2     Recent Labs      03/11/18   0552   SODIUM  137   POTASSIUM  3.8   CHLORIDE  100   CO2  31   GLUCOSE  113*   BUN  9   CREATININE  0.73   CALCIUM  8.1*                      Assessment/Plan     * Febrile neutropenia (CMS-HCC)   Assessment & Plan    With associated fever of unclear source possibly early cellulitis on left chest wall under her left breast.  Cultures remain negative; no fevers and off antibiotics over 24 hours. Neutrophils improved over 1000. Flu swab negative  - improved but still symptomatic        Chest wall pain- (present on admission)   Assessment & Plan    Pain and tenderness under left breast reminiscent of pain from pericarditis that she had when she was in her 30s workup including EKG not indicative of pericarditis, troponin negative. Sedimentation rate normal. ? Early cellulitis  - Patient is improving, oral pain medication as needed        Essential hypertension- (present on admission)   Assessment & Plan    Controlled.  Monitor.   - on home losartan        Thrombocytopenia (CMS-HCC)   Assessment & Plan    Improved, due to bone marrow suppression from chemotherapy.  No signs of bleeding, monitor.        Malignant neoplasm of upper-outer quadrant of left breast in female, estrogen receptor negative (CMS-HCC)- (present on admission)   Assessment & Plan    Patient reports recent chemotherapy, but is unsure of regimen. Followed by Dr. Barroso.  Patient remains weak, sore, fatigued and nauseated.  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  Monitor.        Normocytic anemia- (present on admission)   Assessment & Plan    Likely due to chronic disease, presence of CLL.  No current evidence of bleed.  Transfuse if needed for hemoglobin less than 7 gm/dL          Hypothyroidism due to acquired atrophy of thyroid- (present on admission)   Assessment & Plan    TSH was low 2 months prior.  - Continue home regimen        CLL (chronic lymphocytic leukemia)- wbc= 20k-30k, since 2006; no rx; oncologist to follow- (present on admission)   Assessment & Plan      Outpatient follow-up followed by Dharmesh             Quality-Core Measures   Reviewed items::  Radiology images reviewed, Labs reviewed and Medications reviewed  Oglesby catheter::  No Oglesby  DVT prophylaxis pharmacological::  Enoxaparin (Lovenox)  DVT prophylaxis - mechanical:  SCDs  Ulcer Prophylaxis::  Not indicated

## 2018-03-14 NOTE — PROGRESS NOTES
12 hour chart check.    Bedside report given to dayshift JEN Farley. Plan of care discussed. All questions addressed. No complaints or needs from patient as of this time. Safety measures in place.

## 2018-03-15 ENCOUNTER — APPOINTMENT (OUTPATIENT)
Dept: HEMATOLOGY ONCOLOGY | Facility: MEDICAL CENTER | Age: 78
End: 2018-03-15
Payer: MEDICARE

## 2018-03-15 VITALS
TEMPERATURE: 97.2 F | WEIGHT: 209 LBS | OXYGEN SATURATION: 96 % | BODY MASS INDEX: 34.78 KG/M2 | RESPIRATION RATE: 18 BRPM | HEART RATE: 74 BPM | DIASTOLIC BLOOD PRESSURE: 56 MMHG | SYSTOLIC BLOOD PRESSURE: 117 MMHG

## 2018-03-15 PROBLEM — R50.81 FEBRILE NEUTROPENIA (HCC): Status: RESOLVED | Noted: 2018-03-08 | Resolved: 2018-03-15

## 2018-03-15 PROBLEM — D70.9 FEBRILE NEUTROPENIA (HCC): Status: RESOLVED | Noted: 2018-03-08 | Resolved: 2018-03-15

## 2018-03-15 LAB
BASOPHILS # BLD AUTO: 0 % (ref 0–1.8)
BASOPHILS # BLD: 0 K/UL (ref 0–0.12)
EOSINOPHIL # BLD AUTO: 0.1 K/UL (ref 0–0.51)
EOSINOPHIL NFR BLD: 0.9 % (ref 0–6.9)
ERYTHROCYTE [DISTWIDTH] IN BLOOD BY AUTOMATED COUNT: 46.8 FL (ref 35.9–50)
GLUCOSE BLD-MCNC: 94 MG/DL (ref 65–99)
HCT VFR BLD AUTO: 25.7 % (ref 37–47)
HGB BLD-MCNC: 8.1 G/DL (ref 12–16)
LYMPHOCYTES # BLD AUTO: 2.01 K/UL (ref 1–4.8)
LYMPHOCYTES NFR BLD: 18.6 % (ref 22–41)
MANUAL DIFF BLD: NORMAL
MCH RBC QN AUTO: 28.7 PG (ref 27–33)
MCHC RBC AUTO-ENTMCNC: 31.5 G/DL (ref 33.6–35)
MCV RBC AUTO: 91.1 FL (ref 81.4–97.8)
METAMYELOCYTES NFR BLD MANUAL: 3.5 %
MONOCYTES # BLD AUTO: 0.48 K/UL (ref 0–0.85)
MONOCYTES NFR BLD AUTO: 4.4 % (ref 0–13.4)
MORPHOLOGY BLD-IMP: NORMAL
MYELOCYTES NFR BLD MANUAL: 4.4 %
NEUTROPHILS # BLD AUTO: 7.27 K/UL (ref 2–7.15)
NEUTROPHILS NFR BLD: 64.6 % (ref 44–72)
NEUTS BAND NFR BLD MANUAL: 2.7 % (ref 0–10)
NRBC # BLD AUTO: 0.02 K/UL
NRBC BLD-RTO: 0.2 /100 WBC
PLATELET # BLD AUTO: 173 K/UL (ref 164–446)
PLATELET BLD QL SMEAR: NORMAL
PMV BLD AUTO: 9.9 FL (ref 9–12.9)
PROMYELOCYTES NFR BLD MANUAL: 0.9 %
RBC # BLD AUTO: 2.82 M/UL (ref 4.2–5.4)
RBC BLD AUTO: NORMAL
WBC # BLD AUTO: 10.8 K/UL (ref 4.8–10.8)

## 2018-03-15 PROCEDURE — 82962 GLUCOSE BLOOD TEST: CPT

## 2018-03-15 PROCEDURE — 700102 HCHG RX REV CODE 250 W/ 637 OVERRIDE(OP): Performed by: HOSPITALIST

## 2018-03-15 PROCEDURE — 99239 HOSP IP/OBS DSCHRG MGMT >30: CPT | Performed by: HOSPITALIST

## 2018-03-15 PROCEDURE — A9270 NON-COVERED ITEM OR SERVICE: HCPCS | Performed by: HOSPITALIST

## 2018-03-15 PROCEDURE — 700111 HCHG RX REV CODE 636 W/ 250 OVERRIDE (IP): Performed by: HOSPITALIST

## 2018-03-15 PROCEDURE — 85027 COMPLETE CBC AUTOMATED: CPT

## 2018-03-15 PROCEDURE — 85007 BL SMEAR W/DIFF WBC COUNT: CPT

## 2018-03-15 RX ORDER — CLONAZEPAM 0.5 MG/1
0.5 TABLET ORAL 2 TIMES DAILY
Qty: 30 TAB | Refills: 0 | Status: SHIPPED | OUTPATIENT
Start: 2018-03-15 | End: 2018-03-21

## 2018-03-15 RX ADMIN — ASPIRIN 81 MG: 81 TABLET, CHEWABLE ORAL at 08:05

## 2018-03-15 RX ADMIN — CITALOPRAM HYDROBROMIDE 20 MG: 20 TABLET ORAL at 08:05

## 2018-03-15 RX ADMIN — LIDOCAINE HYDROCHLORIDE 5 ML: 20 SOLUTION OROPHARYNGEAL at 11:14

## 2018-03-15 RX ADMIN — ENOXAPARIN SODIUM 40 MG: 100 INJECTION SUBCUTANEOUS at 08:06

## 2018-03-15 RX ADMIN — LEVOTHYROXINE SODIUM 150 MCG: 75 TABLET ORAL at 06:00

## 2018-03-15 RX ADMIN — HYDROMORPHONE HYDROCHLORIDE 2 MG: 2 TABLET ORAL at 03:41

## 2018-03-15 RX ADMIN — CLONAZEPAM 0.5 MG: 0.5 TABLET ORAL at 08:05

## 2018-03-15 ASSESSMENT — PAIN SCALES - GENERAL
PAINLEVEL_OUTOF10: 7
PAINLEVEL_OUTOF10: 0
PAINLEVEL_OUTOF10: 0

## 2018-03-15 NOTE — DISCHARGE INSTRUCTIONS
Discharge Instructions    Discharged to home by car with self. Discharged via wheelchair, hospital escort: Yes.  Special equipment needed: Not Applicable    Be sure to schedule a follow-up appointment with your primary care doctor or any specialists as instructed.     Discharge Plan:   Diet Plan: Discussed  Activity Level: Discussed  Confirmed Follow up Appointment: Appointment Scheduled  Confirmed Symptoms Management: Discussed  Medication Reconciliation Updated: Yes  Influenza Vaccine Indication: Not indicated: Previously immunized this influenza season and > 8 years of age    I understand that a diet low in cholesterol, fat, and sodium is recommended for good health. Unless I have been given specific instructions below for another diet, I accept this instruction as my diet prescription.   Other diet: Consistent carb      Special Instructions: None    · Is patient discharged on Warfarin / Coumadin?   No     Neutropenic Fever  Neutropenic fever is a type of fever that can develop in someone who has a very low number of a certain kind of white blood cells called neutrophils (neutropenia). These blood cells are important for fighting infections caused by bacteria and fungi. When you have neutropenia, you could be in danger of a severe infection. You may need to start taking antibiotic medicines.  CAUSES  Neutrophils are made in the spongy tissue inside your bones (bone marrow). Anything that damages your bone marrow or damages neutrophils after they leave your bone marrow can cause neutropenia. Once you have a dangerously low level of neutrophils, you are at risk for infection and neutropenic fever.  Causes of neutropenia may include:  · Cancer treatments.  · Bone marrow cancer.  · Cancer of the white blood cells (leukemia or myeloma).  · Severe infection.  · Bone marrow failure (aplastic anemia).  · Many types of medicines.  · Diseases of the body's defense system (autoimmune diseases).  · Inherited genes that cause  neutropenia.  · Vitamin B deficiency.  · Spleen enlargement in rheumatoid arthritis (Felty syndrome).  SIGNS AND SYMPTOMS  Fever is the main symptom of neutropenic fever. Other signs and symptoms may include:  · Chills.  · Fatigue.  · Painful mouth ulcers.  · Cough.  · Shortness of breath.  · Swollen glands (lymph nodes).  · Sore throat.  · Sinus and ear infections.  · Gum disease.  · Skin infection.  · Burning and frequent urination.  · Rectal infections.  · Vaginal discharge or itching.  DIAGNOSIS   Your health care provider may diagnose neutropenic fever if your neutrophil count is less than 500 neutrophils per microliter of blood and you have a fever of at least 100.4°F (38.0°C).   · Blood tests and other tests that measure neutrophils will be done. These may include:  ¨ A complete blood count (CBC) and a differential white blood count (WBC).  ¨ Peripheral smear. This test involves checking a blood sample under a microscope.  · Other types of tests may also be done, including:  ¨ Chest X-rays.  ¨ Cultures of blood and body fluids to look for a source of infection.  Your health care provider will also determine if your neutropenic fever is high risk or low risk.  · You may have high-risk neutropenic fever if:  ¨ Your neutrophil count is less than 100 neutrophils per microliter of blood.  ¨ You have also been diagnosed with pneumonia or another serious medical problem.  ¨ Your condition requires you to be treated in the hospital.  · You may have low-risk neutropenic fever if:  ¨ Your neutrophil count is more than 100 neutrophils per microliter of blood.  ¨ Your chest X-ray is normal.  ¨ You do not have an active illness or any other problems that require you to be in the hospital.  TREATMENT   You may start treatment as soon as you get diagnosed with neutropenic fever, even if your health care provider is still looking for the source of infection.  · Treatment for high-risk neutropenic fever is antibiotic medicine  given through an IV access tube. This is done in the hospital. You may be given a single antibiotic or a combination of antibiotics.  · Low-risk neutropenic fever may be treated at home. You may have to take one or two different oral antibiotics. In some cases, you may need to be treated with IV antibiotics that are given by a home health care provider who visits your home.  · If your health care provider finds a specific cause of infection, you may be switched to the antibiotics that work best against those particular bacteria.  · If a fungal infection is found, your medicine will be changed to an antifungal medicine.  · If the fever goes away in 3-5 days, you may have to take medicine for about 7 days. If the fever is not responding, you may have to take medicine longer.  · You may have to stop taking any medicine that could be causing neutropenic fever.  · If you have neutropenic fever from cancer treatment drugs (chemotherapy), you may need to take a type of medicine called white blood cell growth factors. This medicine can help prevent fever.  HOME CARE INSTRUCTIONS  · Only take medicines as directed by your health care provider.  ¨ If you are being treated with oral antibiotics at home, you may need to return to your health care provider every day to have your CBC checked. You may have to do this until your fever responds.  ¨ Take your antibiotics as directed. Make sure you finish them even if you start to feel better.  · Preventing infection is important when you have neutropenia. Here are some ways to prevent infections:  ¨ Avoid sick friends and family members.  ¨ Wash your hands often.  ¨ Do not eat uncooked or undercooked meats.  ¨ Wash all fruits and vegetables.  ¨ Do not eat or drink unpasteurized dairy products.  ¨ Get regular dental care, and maintain good dental hygiene.  ¨ Get a flu shot. Ask your health care provider whether you need any other vaccines.  ¨ Wear gloves when gardening.  · Follow up  "with your health care provider as directed.  SEEK MEDICAL CARE IF:  · You have chills.  · You have a fever.  · You have signs or symptoms of infection.  SEEK IMMEDIATE MEDICAL CARE IF:  · You have trouble breathing.  · You have chest pain.  This information is not intended to replace advice given to you by your health care provider. Make sure you discuss any questions you have with your health care provider.  Document Released: 12/23/2014 Document Reviewed: 12/23/2014  [employer] Interactive Patient Education © 2017 [employer]      PICC Home Guide  A peripherally inserted central catheter (PICC) is a long, thin, flexible tube that is inserted into a vein in the upper arm. It is a form of intravenous (IV) access. It is considered to be a \"central\" line because the tip of the PICC ends in a large vein in your chest. This large vein is called the superior vena cava (SVC). The PICC tip ends in the SVC because there is a lot of blood flow in the SVC. This allows medicines and IV fluids to be quickly distributed throughout the body. The PICC is inserted using a sterile technique by a specially trained nurse or physician. After the PICC is inserted, a chest X-ray exam is done to be sure it is in the correct place.   A PICC may be placed for different reasons, such as:  · To give medicines and liquid nutrition that can only be given through a central line. Examples are:  ¨ Certain antibiotic treatments.  ¨ Chemotherapy.  ¨ Total parenteral nutrition (TPN).  · To take frequent blood samples.  · To give IV fluids and blood products.  · If there is difficulty placing a peripheral intravenous (PIV) catheter.  If taken care of properly, a PICC can remain in place for several months. A PICC can also allow a person to go home from the hospital early. Medicine and PICC care can be managed at home by a family member or home health care team.  WHAT PROBLEMS CAN HAPPEN WHEN I HAVE A PICC?  Problems with a PICC can occasionally occur. " "These may include the following:  · A blood clot (thrombus) forming in or at the tip of the PICC. This can cause the PICC to become clogged. A clot-dissolving medicine called tissue plasminogen activator (tPA) can be given through the PICC to help break up the clot.  · Inflammation of the vein (phlebitis) in which the PICC is placed. Signs of inflammation may include redness, pain at the insertion site, red streaks, or being able to feel a \"cord\" in the vein where the PICC is located.  · Infection in the PICC or at the insertion site. Signs of infection may include fever, chills, redness, swelling, or pus drainage from the PICC insertion site.  · PICC movement (malposition). The PICC tip may move from its original position due to excessive physical activity, forceful coughing, sneezing, or vomiting.  · A break or cut in the PICC. It is important to not use scissors near the PICC.  · Nerve or tendon irritation or injury during PICC insertion.  WHAT SHOULD I KEEP IN MIND ABOUT ACTIVITIES WHEN I HAVE A PICC?  · You may bend your arm and move it freely. If your PICC is near or at the bend of your elbow, avoid activity with repeated motion at the elbow.  · Rest at home for the remainder of the day following PICC line insertion.  · Avoid lifting heavy objects as instructed by your health care provider.  · Avoid using a crutch with the arm on the same side as your PICC. You may need to use a walker.  WHAT SHOULD I KNOW ABOUT MY PICC DRESSING?  · Keep your PICC bandage (dressing) clean and dry to prevent infection.  ¨ Ask your health care provider when you may shower. Ask your health care provider to teach you how to wrap the PICC when you do take a shower.  · Change the PICC dressing as instructed by your health care provider.  · Change your PICC dressing if it becomes loose or wet.  WHAT SHOULD I KNOW ABOUT PICC CARE?  · Check the PICC insertion site daily for leakage, redness, swelling, or pain.  · Do not take a bath, " "swim, or use hot tubs when you have a PICC. Cover PICC line with clear plastic wrap and tape to keep it dry while showering.  · Flush the PICC as directed by your health care provider. Let your health care provider know right away if the PICC is difficult to flush or does not flush. Do not use force to flush the PICC.  · Do not use a syringe that is less than 10 mL to flush the PICC.  · Never pull or tug on the PICC.  · Avoid blood pressure checks on the arm with the PICC.  · Keep your PICC identification card with you at all times.  · Do not take the PICC out yourself. Only a trained clinical professional should remove the PICC.  SEEK IMMEDIATE MEDICAL CARE IF:  · Your PICC is accidentally pulled all the way out. If this happens, cover the insertion site with a bandage or gauze dressing. Do not throw the PICC away. Your health care provider will need to inspect it.  · Your PICC was tugged or pulled and has partially come out. Do not  push the PICC back in.  · There is any type of drainage, redness, or swelling where the PICC enters the skin.  · You cannot flush the PICC, it is difficult to flush, or the PICC leaks around the insertion site when it is flushed.  · You hear a \"flushing\" sound when the PICC is flushed.  · You have pain, discomfort, or numbness in your arm, shoulder, or jaw on the same side as the PICC.  · You feel your heart \"racing\" or skipping beats.  · You notice a hole or tear in the PICC.  · You develop chills or a fever.  MAKE SURE YOU:   · Understand these instructions.  · Will watch your condition.  · Will get help right away if you are not doing well or get worse.     This information is not intended to replace advice given to you by your health care provider. Make sure you discuss any questions you have with your health care provider.     Document Released: 06/23/2004 Document Revised: 01/08/2016 Document Reviewed: 08/25/2014  Elsevier Interactive Patient Education ©2016 [employer] " Inc[employer]    Clonazepam tablets  What is this medicine?  CLONAZEPAM (kloe NA ze abby) is a benzodiazepine. It is used to treat certain types of seizures. It is also used to treat panic disorder.  This medicine may be used for other purposes; ask your health care provider or pharmacist if you have questions.  COMMON BRAND NAME(S): Marichuy Wellington  What should I tell my health care provider before I take this medicine?  They need to know if you have any of these conditions:  -an alcohol or drug abuse problem  -bipolar disorder, depression, psychosis or other mental health condition  -glaucoma  -kidney or liver disease  -lung or breathing disease  -myasthenia gravis  -Parkinson's disease  -porphyria  -seizures or a history of seizures  -suicidal thoughts  -an unusual or allergic reaction to clonazepam, other benzodiazepines, foods, dyes, or preservatives  -pregnant or trying to get pregnant  -breast-feeding  How should I use this medicine?  Take this medicine by mouth with a glass of water. Follow the directions on the prescription label. If it upsets your stomach, take it with food or milk. Take your medicine at regular intervals. Do not take it more often than directed. Do not stop taking or change the dose except on the advice of your doctor or health care professional.  A special MedGuide will be given to you by the pharmacist with each prescription and refill. Be sure to read this information carefully each time.  Talk to your pediatrician regarding the use of this medicine in children. Special care may be needed.  Overdosage: If you think you have taken too much of this medicine contact a poison control center or emergency room at once.  NOTE: This medicine is only for you. Do not share this medicine with others.  What if I miss a dose?  If you miss a dose, take it as soon as you can. If it is almost time for your next dose, take only that dose. Do not take double or extra doses.  What may interact with this  medicine?  Do not take this medication with any of the following medicines:  -narcotic medicines for cough  -sodium oxybate  This medicine may also interact with the following medications:  -alcohol  -antihistamines for allergy, cough and cold  -antiviral medicines for HIV or AIDS  -certain medicines for anxiety or sleep  -certain medicines for depression, like amitriptyline, fluoxetine, sertraline  -certain medicines for fungal infections like ketoconazole and itraconazole  -certain medicines for seizures like carbamazepine, phenobarbital, phenytoin, primidone  -general anesthetics like halothane, isoflurane, methoxyflurane, propofol  -local anesthetics like lidocaine, pramoxine, tetracaine  -medicines that relax muscles for surgery  -narcotic medicines for pain  -phenothiazines like chlorpromazine, mesoridazine, prochlorperazine, thioridazine  This list may not describe all possible interactions. Give your health care provider a list of all the medicines, herbs, non-prescription drugs, or dietary supplements you use. Also tell them if you smoke, drink alcohol, or use illegal drugs. Some items may interact with your medicine.  What should I watch for while using this medicine?  Tell your doctor or health care professional if your symptoms do not start to get better or if they get worse.  Do not stop taking except on your doctor's advice. You may develop a severe reaction. Your doctor will tell you how much medicine to take.  You may get drowsy or dizzy. Do not drive, use machinery, or do anything that needs mental alertness until you know how this medicine affects you. To reduce the risk of dizzy and fainting spells, do not stand or sit up quickly, especially if you are an older patient. Alcohol may increase dizziness and drowsiness. Avoid alcoholic drinks.  If you are taking another medicine that also causes drowsiness, you may have more side effects. Give your health care provider a list of all medicines you use.  Your doctor will tell you how much medicine to take. Do not take more medicine than directed. Call emergency for help if you have problems breathing or unusual sleepiness.  The use of this medicine may increase the chance of suicidal thoughts or actions. Pay special attention to how you are responding while on this medicine. Any worsening of mood, or thoughts of suicide or dying should be reported to your health care professional right away.  What side effects may I notice from receiving this medicine?  Side effects that you should report to your doctor or health care professional as soon as possible:  -allergic reactions like skin rash, itching or hives, swelling of the face, lips, or tongue  -breathing problems  -confusion  -loss of balance or coordination  -signs and symptoms of low blood pressure like dizziness; feeling faint or lightheaded, falls; unusually weak or tired  -suicidal thoughts or mood changes  Side effects that usually do not require medical attention (report to your doctor or health care professional if they continue or are bothersome):  -dizziness  -headache  -tiredness  -upset stomach  This list may not describe all possible side effects. Call your doctor for medical advice about side effects. You may report side effects to FDA at 5-739-FDA-4412.  Where should I keep my medicine?  Keep out of the reach of children. This medicine can be abused. Keep your medicine in a safe place to protect it from theft. Do not share this medicine with anyone. Selling or giving away this medicine is dangerous and against the law.  This medicine may cause accidental overdose and death if taken by other adults, children, or pets. Mix any unused medicine with a substance like cat litter or coffee grounds. Then throw the medicine away in a sealed container like a sealed bag or a coffee can with a lid. Do not use the medicine after the expiration date.  Store at room temperature between 15 and 30 degrees C (59 and 86  degrees F). Protect from light. Keep container tightly closed.  NOTE: This sheet is a summary. It may not cover all possible information. If you have questions about this medicine, talk to your doctor, pharmacist, or health care provider.  © 2018 Elsevier/[employer] Standard (2017-05-26 18:46:32)    Depression / Suicide Risk    As you are discharged from this [facility] facility, it is important to learn how to keep safe from harming yourself.    Recognize the warning signs:  · Abrupt changes in personality, positive or negative- including increase in energy   · Giving away possessions  · Change in eating patterns- significant weight changes-  positive or negative  · Change in sleeping patterns- unable to sleep or sleeping all the time   · Unwillingness or inability to communicate  · Depression  · Unusual sadness, discouragement and loneliness  · Talk of wanting to die  · Neglect of personal appearance   · Rebelliousness- reckless behavior  · Withdrawal from people/activities they love  · Confusion- inability to concentrate     If you or a loved one observes any of these behaviors or has concerns about self-harm, here's what you can do:  · Talk about it- your feelings and reasons for harming yourself  · Remove any means that you might use to hurt yourself (examples: pills, rope, extension cords, firearm)  · Get professional help from the community (Mental Health, Substance Abuse, psychological counseling)  · Do not be alone:Call your Safe Contact- someone whom you trust who will be there for you.  · Call your local CRISIS HOTLINE 284-9790 or 568-079-4671  · Call your local Children's Mobile Crisis Response Team [place] (517) 119-6538 or www.Eat  · Call the toll free National Suicide Prevention Hotlines   · National Suicide Prevention Lifeline 732-245-ZVGQ (7725)  · National Hope Line Network 800-SUICIDE (726-8451)

## 2018-03-15 NOTE — DISCHARGE SUMMARY
CHIEF COMPLAINT ON ADMISSION  Chief Complaint   Patient presents with   • N/V     this afternoon, pt actively vomiting in triage. pt has CLL and was sent by MD   • Fever   • Chest Pain       CODE STATUS  Full Code    HPI & HOSPITAL COURSE  This is a 77 y.o. Female with breast cancer and CLL here with fever, N/V. She had just undergone chemotherapy with adriamycin/cyclophosphamide and developed subsequent neutropenia with fever. There was no source of infection found and this was thought to be secondary to her recent round of chemo. Heme/onc was consulted during her hospitalization and it was decided that her next round of chemotherapy would be delayed for a week and be done at a lower dose. She was placed on protective isolation with telemetry and given supportive care. Neutropenia and fevers resolved. She remained hemodynamically stable throughout her hospitalization. Neutropenia and chest wall pain (under left breast) resolved. Her strength improved and she was able to ambulate without issue prior to discharge.     Of note, during her hospitalization it came to our attention that her previously placed PICC line was slightly withdrawn so a new one was placed by our PICC team without issue.    The patient met 2-midnight criteria for an inpatient stay at the time of discharge.    Therefore, she is discharged in good and stable condition with close outpatient follow-up.    SPECIFIC OUTPATIENT FOLLOW-UP  Follow up as noted below.  No pending cultures at the time of discharge: blood cultures negative at 5 days.    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-  partial lobectomy RUL/RML- Dr Ganser- folowed by PMA POA: Yes    Malignant neoplasm of upper-outer quadrant of left breast in female, estrogen receptor negative (CMS-HCC) POA: Yes    Thrombocytopenia (CMS-HCC) POA: Yes  Resolved Problems:    Chest wall pain POA: Yes      FOLLOW UP  Future Appointments  Date Time Provider Department Center   3/20/2018 3:00 PM CHERRY [facility]   3/22/2018 11:30 AM RN 8 ON [facility]   3/23/2018 4:00 PM RN 2 ON [facility]   4/6/2018 3:00 PM RN 4 ON [facility]   4/7/2018 5:00 PM RN 5 ON [facility]   5/2/2018 1:00 PM CHERRY [facility] None     Siva Smart M.D.  [address]  [place] [id number]  [phone]    Schedule an appointment as soon as possible for a visit in 1 week  Follow up appointment      MEDICATIONS ON DISCHARGE   Clarisse Reeves   Home Medication Instructions SARAH:[id number]    Printed on:03/15/18 5383   Medication Information                      aspirin (ASA) 81 MG Chew Tab chewable tablet  Take 1 Tab by mouth every day.             Blood Glucose Monitoring Suppl Supplies Misc  Contour Next glucometer strips for blood sugar check once a day             citalopram (CELEXA) 20 MG Tab  Take 1 Tab by mouth every day.             clonazePAM (KLONOPIN) 0.5 MG Tab  Take 1 Tab by mouth 2 Times a Day for 15 days.             fenofibrate (TRIGLIDE) 160 MG tablet  Take 1 Tab by mouth every day.             glipiZIDE (GLUCOTROL) 5 MG Tab  Take 1 Tab by mouth every day.             levothyroxine (SYNTHROID) 150 MCG Tab  Take 1 Tab by mouth Every morning on an empty stomach.             lorazepam (ATIVAN) 2 MG tablet  Take 4 mg by mouth every bedtime.             ondansetron (ZOFRAN) 4 MG Tab tablet  Take 1 Tab by mouth every four hours as needed for Nausea/Vomiting (for nausea, vomiting).             prochlorperazine (COMPAZINE) 10 MG Tab  Take 1 Tab by mouth every 6 hours as needed  (nausea, vomiting, migraine).    Synthroid 150mcg                 DIET  Orders Placed This Encounter   Procedures   • Diet Order     Standing Status:   Standing     Number of Occurrences:   1     Order Specific Question:   Diet:     Answer:   Consistent Carbohydrate [4]       ACTIVITY  As tolerated.  Weight bearing as tolerated      CONSULTATIONS  Heme/onc    PROCEDURES  CTA-    1.  No large central pulmonary embolism appreciated, respiratory motion artifacts limit evaluation of the distal segmental and subsegmental pulmonary arterial branches.  2.  Linear density in the right upper lobe, appears to correspond with postsurgical changes of upper lobe resection. Correlate with history. Stable since prior.  3.  Moderate size hiatal hernia  4.  Atherosclerosis and atherosclerotic coronary artery disease  5.  Hepatomegaly    CXR-  1.  RIGHT arm PICC line tip in the medial subclavian region.  2.  No other significant change from prior exam.    IR-  Ultrasound-guided PICC placement performed by qualified nursing staff.     LABORATORY  Lab Results   Component Value Date/Time    SODIUM 137 03/11/2018 05:52 AM    POTASSIUM 3.8 03/11/2018 05:52 AM    CHLORIDE 100 03/11/2018 05:52 AM    CO2 31 03/11/2018 05:52 AM    GLUCOSE 113 (H) 03/11/2018 05:52 AM    BUN 9 03/11/2018 05:52 AM    CREATININE 0.73 03/11/2018 05:52 AM    CREATININE 0.76 04/12/2013 11:17 AM        Lab Results   Component Value Date/Time    WBC 10.8 03/15/2018 03:37 AM    WBC 32.8 (HH) 04/12/2013 11:17 AM    HEMOGLOBIN 8.1 (L) 03/15/2018 03:37 AM    HEMATOCRIT 25.7 (L) 03/15/2018 03:37 AM    PLATELETCT 173 03/15/2018 03:37 AM        Total time of the discharge process exceeds 32 minutes

## 2018-03-15 NOTE — THERAPY
"Physical Therapy Treatment completed.   Bed Mobility:  Supine to Sit:  (up with CNA)  Transfers: Sit to Stand: Supervised  Gait: Level Of Assist: Stand by Assist with No Equipment Needed       Plan of Care: Will benefit from Physical Therapy 3 times per week  Discharge Recommendations: Equipment: No Equipment Needed  (would benefit from FWW but refusing to utilize)    Pt with improving activity tolerance, improved affect continues to have slightly diminished functional safety awareness but improved; discussed aerobic exercise as it relates to reducing cancer related fatigue as well as soft tissue stretching for myalgia release. Pt will remain a fall risk in the setting of variable physcial abilities related to chemotherapy. Anticipate home d/c when medically appropriate, would benefit from home health PT to ensure home safety however pt is currently refusing.    See \"Rehab Therapy-Acute\" Patient Summary Report for complete documentation.       "

## 2018-03-15 NOTE — CARE PLAN
Problem: Communication  Goal: The ability to communicate needs accurately and effectively will improve  Outcome: PROGRESSING AS EXPECTED  Pt able to make needs known.    Problem: Safety  Goal: Will remain free from injury  Outcome: PROGRESSING AS EXPECTED  Pt call light within reach and uses when needing help with any cares.

## 2018-03-15 NOTE — PROGRESS NOTES
Pt Alert and oriented with some pain, medications covering pain well at this time.  Assessment completed and all medications given with no issues to note. Skin intact. Bed alarm refused. Pt now resting in bed with call light within reach and is able to make all needs known to staff.

## 2018-03-15 NOTE — PROGRESS NOTES
[facility] Hospitalist Progress Note    Date of Service: 3/14/2018    Chief Complaint  77 y.o. female admitted 3/7/2018 with fever and borderline neutropenia on chemotherapy for treatment of chronic leukemia.    Interval Problem Update  Still feels generally ill and malaise. Nervous about going home. Continues to be afebrile.    Consultants/Specialty  Heme/Onc    Disposition  Home tomorrow.  Home health ordered        Review of Systems   Constitutional: Positive for chills, diaphoresis and malaise/fatigue. Negative for fever and weight loss.   HENT: Negative for sore throat.    Respiratory: Negative for cough and shortness of breath.    Cardiovascular: Negative for chest pain and leg swelling.   Gastrointestinal: Positive for nausea. Negative for abdominal pain and vomiting.   Genitourinary: Negative for dysuria and flank pain.   Musculoskeletal: Positive for myalgias. Negative for back pain.   Neurological: Negative for dizziness, loss of consciousness and weakness.   Endo/Heme/Allergies: Does not bruise/bleed easily.   Psychiatric/Behavioral: Negative for depression. The patient is not nervous/anxious.    All other systems reviewed and are negative.     Physical Exam  Laboratory/Imaging   Hemodynamics  Temp (24hrs), Av.2 °C (97.2 °F), Min:36.1 °C (97 °F), Max:36.4 °C (97.5 °F)   Temperature: 36.3 °C (97.4 °F)  Pulse  Av.3  Min: 64  Max: 105    Blood Pressure : 107/55      Respiratory      Respiration: 16, Pulse Oximetry: 98 %     Work Of Breathing / Effort: Mild  RUL Breath Sounds: Absent, RML Breath Sounds: Absent, RLL Breath Sounds: Diminished, JERED Breath Sounds: Clear, LLL Breath Sounds: Absent    Fluids    Intake/Output Summary (Last 24 hours) at 18 5248  Last data filed at 18 1600   Gross per 24 hour   Intake             1490 ml   Output             2000 ml   Net             -510 ml       Nutrition  Orders Placed This Encounter   Procedures   • Diet Order     Standing Status:   Standing      Number of Occurrences:   1     Order Specific Question:   Diet:     Answer:   Consistent Carbohydrate [4]     Physical Exam   Constitutional: She is oriented to person, place, and time. She appears well-developed. No distress.   HENT:   Head: Normocephalic.   Mouth/Throat: Oropharynx is clear and moist.   Eyes: EOM are normal. Right eye exhibits no discharge. Left eye exhibits no discharge. No scleral icterus.   Neck: Normal range of motion. Neck supple. No JVD present.   Cardiovascular: Normal rate, regular rhythm and intact distal pulses.    Pulmonary/Chest: Effort normal. No stridor. No respiratory distress. She has no rales. She exhibits tenderness (firm area underneath the left breast tender to palpation no erythema ).   Abdominal: Soft. She exhibits no distension. There is no tenderness.   Musculoskeletal: She exhibits no edema or tenderness.   Neurological: She is alert and oriented to person, place, and time. She exhibits normal muscle tone.   Skin: Skin is warm. She is not diaphoretic.   Picc in place   Psychiatric: She has a normal mood and affect. Her behavior is normal.   Vitals reviewed.      Recent Labs      03/12/18   0243  03/13/18   0452  03/14/18   0340   WBC  6.7  10.0  10.9*   RBC  2.95*  2.84*  3.03*   HEMOGLOBIN  8.2*  8.1*  8.3*   HEMATOCRIT  26.6*  25.5*  27.4*   MCV  90.2  89.8  90.4   MCH  27.8  28.5  27.4   MCHC  30.8*  31.8*  30.3*   RDW  45.2  45.4  45.6   PLATELETCT  138*  148*  170   MPV  10.6  10.2  9.9                          Assessment/Plan     * Febrile neutropenia (CMS-HCC)- (present on admission)   Assessment & Plan    With associated fever of unclear source possibly early cellulitis on left chest wall under her left breast. Cultures remain negative; no fevers and off antibiotics over 48 hours. Neutrophils improved over 1000. Flu swab negative  - improved but still symptomatic        Chest wall pain- (present on admission)   Assessment & Plan    Pain and tenderness under left  breast reminiscent of pain from pericarditis that she had when she was in her 30s workup including EKG not indicative of pericarditis, troponin negative. Sedimentation rate normal. ? Early cellulitis  - Patient is improving, oral pain medication as needed        Essential hypertension- (present on admission)   Assessment & Plan    Controlled.  Monitor.   - on home losartan        Thrombocytopenia (CMS-HCC)   Assessment & Plan    Resolved, due to bone marrow suppression from chemotherapy.  No signs of bleeding, monitor.        Malignant neoplasm of upper-outer quadrant of left breast in female, estrogen receptor negative (CMS-HCC)- (present on admission)   Assessment & Plan    Patient reports recent chemotherapy, but is unsure of regimen. Followed by Dr. Barroso.  Patient remains weak, sore, fatigued and nauseated.  - evaluated by heme/onc  - plan for dose reduction and postponing next infusion x1 week        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, 2/1/2016-  partial lobectomy RUL/RML- Dr Ganser- folowed by PMA- (present on admission)   Assessment & Plan    Stable.  Monitor.        Normocytic anemia- (present on admission)   Assessment & Plan    Likely due to chronic disease, presence of CLL.  No current evidence of bleed.  Hemoglobin stable in low 8's  - Transfuse if needed for hemoglobin less than 7 gm/dL        Hypothyroidism due to acquired atrophy of thyroid- (present on admission)   Assessment & Plan    TSH was low 2 months prior.  - Continue home regimen        CLL (chronic lymphocytic leukemia)- wbc= 20k-30k, since 2006; no rx; oncologist to follow- (present on admission)   Assessment & Plan      Outpatient follow-up followed by Dharmesh            Quality-Core Measures   Reviewed items::  Labs reviewed and Medications reviewed  Oglesby catheter::  No Oglesby  DVT prophylaxis pharmacological::  Enoxaparin (Lovenox)  DVT prophylaxis - mechanical:  SCDs  Ulcer Prophylaxis::  Not  indicated

## 2018-03-15 NOTE — CARE PLAN
Problem: Pain Management  Goal: Pain level will decrease to patient's comfort goal  Outcome: PROGRESSING AS EXPECTED  Educated on 0 - 10 pain rating scale, pt medicated per MAR for pain    Problem: Mobility  Goal: Risk for activity intolerance will decrease  Outcome: PROGRESSING AS EXPECTED  Pt ambulated 400 ft in hallway with PT today. Pt tolerated well.

## 2018-03-16 ENCOUNTER — PATIENT OUTREACH (OUTPATIENT)
Dept: HEALTH INFORMATION MANAGEMENT | Facility: OTHER | Age: 78
End: 2018-03-16

## 2018-03-16 NOTE — PROGRESS NOTES
Discharge instructions provided to pt. Discussed diet, activity, follow up, prescriptions and symptom management. Pt states understanding. Pt states all questions have been answered. Copy of discharge provided to pt. Signed copy in chart. Per MD pt okay to drive self home. Pt states that all personal belongings are in possession. Pt wheeled off floor without incident.

## 2018-03-17 ENCOUNTER — APPOINTMENT (OUTPATIENT)
Dept: ONCOLOGY | Facility: MEDICAL CENTER | Age: 78
End: 2018-03-17
Attending: INTERNAL MEDICINE
Payer: MEDICARE

## 2018-03-17 ENCOUNTER — HOME CARE VISIT (OUTPATIENT)
Dept: HOME HEALTH SERVICES | Facility: HOME HEALTHCARE | Age: 78
End: 2018-03-17

## 2018-03-17 DIAGNOSIS — F51.01 PRIMARY INSOMNIA: ICD-10-CM

## 2018-03-19 RX ORDER — LORAZEPAM 2 MG/1
TABLET ORAL
Qty: 60 TAB | Refills: 2 | Status: SHIPPED
Start: 2018-03-19 | End: 2018-04-17 | Stop reason: SDUPTHER

## 2018-03-21 ENCOUNTER — OFFICE VISIT (OUTPATIENT)
Dept: HEMATOLOGY ONCOLOGY | Facility: MEDICAL CENTER | Age: 78
End: 2018-03-21
Payer: MEDICARE

## 2018-03-21 ENCOUNTER — NON-PROVIDER VISIT (OUTPATIENT)
Dept: HEMATOLOGY ONCOLOGY | Facility: MEDICAL CENTER | Age: 78
End: 2018-03-21
Payer: MEDICARE

## 2018-03-21 ENCOUNTER — OFFICE VISIT (OUTPATIENT)
Dept: MEDICAL GROUP | Age: 78
End: 2018-03-21
Payer: MEDICARE

## 2018-03-21 ENCOUNTER — HOSPITAL ENCOUNTER (OUTPATIENT)
Facility: MEDICAL CENTER | Age: 78
End: 2018-03-21
Attending: INTERNAL MEDICINE
Payer: MEDICARE

## 2018-03-21 VITALS
WEIGHT: 197.53 LBS | RESPIRATION RATE: 16 BRPM | SYSTOLIC BLOOD PRESSURE: 128 MMHG | TEMPERATURE: 97.1 F | HEIGHT: 65 IN | HEART RATE: 86 BPM | DIASTOLIC BLOOD PRESSURE: 72 MMHG | OXYGEN SATURATION: 94 % | BODY MASS INDEX: 32.91 KG/M2

## 2018-03-21 VITALS
WEIGHT: 197.8 LBS | SYSTOLIC BLOOD PRESSURE: 130 MMHG | OXYGEN SATURATION: 93 % | HEIGHT: 65 IN | BODY MASS INDEX: 32.96 KG/M2 | TEMPERATURE: 98.4 F | HEART RATE: 85 BPM | DIASTOLIC BLOOD PRESSURE: 78 MMHG

## 2018-03-21 VITALS
DIASTOLIC BLOOD PRESSURE: 72 MMHG | BODY MASS INDEX: 32.91 KG/M2 | RESPIRATION RATE: 16 BRPM | SYSTOLIC BLOOD PRESSURE: 128 MMHG | HEART RATE: 86 BPM | TEMPERATURE: 97.1 F | WEIGHT: 197.53 LBS | OXYGEN SATURATION: 94 % | HEIGHT: 65 IN

## 2018-03-21 DIAGNOSIS — F33.1 MODERATE EPISODE OF RECURRENT MAJOR DEPRESSIVE DISORDER (HCC): ICD-10-CM

## 2018-03-21 DIAGNOSIS — Z45.2 PICC (PERIPHERALLY INSERTED CENTRAL CATHETER) IN PLACE: ICD-10-CM

## 2018-03-21 DIAGNOSIS — I10 ESSENTIAL HYPERTENSION: ICD-10-CM

## 2018-03-21 DIAGNOSIS — Z17.1 MALIGNANT NEOPLASM OF UPPER-OUTER QUADRANT OF LEFT BREAST IN FEMALE, ESTROGEN RECEPTOR NEGATIVE (HCC): ICD-10-CM

## 2018-03-21 DIAGNOSIS — C50.412 MALIGNANT NEOPLASM OF UPPER-OUTER QUADRANT OF LEFT BREAST IN FEMALE, ESTROGEN RECEPTOR NEGATIVE (HCC): ICD-10-CM

## 2018-03-21 DIAGNOSIS — C91.10 CLL (CHRONIC LYMPHOCYTIC LEUKEMIA) (HCC): ICD-10-CM

## 2018-03-21 DIAGNOSIS — E78.2 MIXED HYPERLIPIDEMIA: ICD-10-CM

## 2018-03-21 DIAGNOSIS — E55.9 VITAMIN D INSUFFICIENCY: ICD-10-CM

## 2018-03-21 DIAGNOSIS — Z82.49 FAMILY HISTORY OF HEART ATTACK: ICD-10-CM

## 2018-03-21 DIAGNOSIS — Z09 HOSPITAL DISCHARGE FOLLOW-UP: ICD-10-CM

## 2018-03-21 DIAGNOSIS — E03.4 HYPOTHYROIDISM DUE TO ACQUIRED ATROPHY OF THYROID: ICD-10-CM

## 2018-03-21 DIAGNOSIS — K76.0 FATTY INFILTRATION OF LIVER: ICD-10-CM

## 2018-03-21 DIAGNOSIS — E11.8 CONTROLLED TYPE 2 DIABETES MELLITUS WITH COMPLICATION, WITHOUT LONG-TERM CURRENT USE OF INSULIN (HCC): ICD-10-CM

## 2018-03-21 DIAGNOSIS — E04.1 NONTOXIC UNINODULAR GOITER: ICD-10-CM

## 2018-03-21 DIAGNOSIS — D64.9 NORMOCYTIC ANEMIA: ICD-10-CM

## 2018-03-21 DIAGNOSIS — E66.9 OBESITY (BMI 30-39.9): ICD-10-CM

## 2018-03-21 DIAGNOSIS — D70.9 NEUTROPENIC FEVER (HCC): ICD-10-CM

## 2018-03-21 DIAGNOSIS — J45.20 MILD INTERMITTENT ASTHMA WITHOUT COMPLICATION: ICD-10-CM

## 2018-03-21 DIAGNOSIS — G43.009 ATYPICAL MIGRAINE: ICD-10-CM

## 2018-03-21 DIAGNOSIS — K21.00 GASTROESOPHAGEAL REFLUX DISEASE WITH ESOPHAGITIS: ICD-10-CM

## 2018-03-21 DIAGNOSIS — F51.01 PRIMARY INSOMNIA: ICD-10-CM

## 2018-03-21 DIAGNOSIS — C34.11 MALIGNANT NEOPLASM OF RIGHT UPPER LOBE OF LUNG (HCC): ICD-10-CM

## 2018-03-21 DIAGNOSIS — E04.2 MULTIPLE THYROID NODULES: ICD-10-CM

## 2018-03-21 DIAGNOSIS — R50.81 NEUTROPENIC FEVER (HCC): ICD-10-CM

## 2018-03-21 DIAGNOSIS — D75.839 THROMBOCYTOSIS: ICD-10-CM

## 2018-03-21 DIAGNOSIS — F41.9 ANXIETY: ICD-10-CM

## 2018-03-21 PROBLEM — D69.6 THROMBOCYTOPENIA (HCC): Status: RESOLVED | Noted: 2018-03-10 | Resolved: 2018-03-21

## 2018-03-21 PROBLEM — Z66 DNR (DO NOT RESUSCITATE): Status: RESOLVED | Noted: 2017-08-16 | Resolved: 2018-03-21

## 2018-03-21 PROBLEM — R05.9 COUGH: Status: RESOLVED | Noted: 2018-03-06 | Resolved: 2018-03-21

## 2018-03-21 LAB
ALBUMIN SERPL BCP-MCNC: 4.1 G/DL (ref 3.2–4.9)
ALBUMIN/GLOB SERPL: 1.7 G/DL
ALP SERPL-CCNC: 69 U/L (ref 30–99)
ALT SERPL-CCNC: 12 U/L (ref 2–50)
ANION GAP SERPL CALC-SCNC: 8 MMOL/L (ref 0–11.9)
AST SERPL-CCNC: 10 U/L (ref 12–45)
BASOPHILS # BLD AUTO: 0.7 % (ref 0–1.8)
BASOPHILS # BLD: 0.1 K/UL (ref 0–0.12)
BILIRUB SERPL-MCNC: 0.3 MG/DL (ref 0.1–1.5)
BUN SERPL-MCNC: 11 MG/DL (ref 8–22)
CALCIUM SERPL-MCNC: 8.8 MG/DL (ref 8.5–10.5)
CHLORIDE SERPL-SCNC: 104 MMOL/L (ref 96–112)
CO2 SERPL-SCNC: 24 MMOL/L (ref 20–33)
CREAT SERPL-MCNC: 0.65 MG/DL (ref 0.5–1.4)
EOSINOPHIL # BLD AUTO: 0.05 K/UL (ref 0–0.51)
EOSINOPHIL NFR BLD: 0.4 % (ref 0–6.9)
ERYTHROCYTE [DISTWIDTH] IN BLOOD BY AUTOMATED COUNT: 47.5 FL (ref 35.9–50)
GLOBULIN SER CALC-MCNC: 2.4 G/DL (ref 1.9–3.5)
GLUCOSE SERPL-MCNC: 103 MG/DL (ref 65–99)
HCT VFR BLD AUTO: 32.2 % (ref 37–47)
HGB BLD-MCNC: 10.1 G/DL (ref 12–16)
IMM GRANULOCYTES # BLD AUTO: 0.53 K/UL (ref 0–0.11)
IMM GRANULOCYTES NFR BLD AUTO: 3.9 % (ref 0–0.9)
LYMPHOCYTES # BLD AUTO: 5.24 K/UL (ref 1–4.8)
LYMPHOCYTES NFR BLD: 38.9 % (ref 22–41)
MCH RBC QN AUTO: 28.2 PG (ref 27–33)
MCHC RBC AUTO-ENTMCNC: 31.4 G/DL (ref 33.6–35)
MCV RBC AUTO: 89.9 FL (ref 81.4–97.8)
MONOCYTES # BLD AUTO: 0.84 K/UL (ref 0–0.85)
MONOCYTES NFR BLD AUTO: 6.2 % (ref 0–13.4)
NEUTROPHILS # BLD AUTO: 6.71 K/UL (ref 2–7.15)
NEUTROPHILS NFR BLD: 49.9 % (ref 44–72)
NRBC # BLD AUTO: 0.02 K/UL
NRBC BLD-RTO: 0.1 /100 WBC
PLATELET # BLD AUTO: 548 K/UL (ref 164–446)
PMV BLD AUTO: 9 FL (ref 9–12.9)
POTASSIUM SERPL-SCNC: 4.1 MMOL/L (ref 3.6–5.5)
PROT SERPL-MCNC: 6.5 G/DL (ref 6–8.2)
RBC # BLD AUTO: 3.58 M/UL (ref 4.2–5.4)
SODIUM SERPL-SCNC: 136 MMOL/L (ref 135–145)
WBC # BLD AUTO: 13.5 K/UL (ref 4.8–10.8)

## 2018-03-21 PROCEDURE — 85025 COMPLETE CBC W/AUTO DIFF WBC: CPT

## 2018-03-21 PROCEDURE — 99214 OFFICE O/P EST MOD 30 MIN: CPT | Performed by: INTERNAL MEDICINE

## 2018-03-21 PROCEDURE — 99215 OFFICE O/P EST HI 40 MIN: CPT | Performed by: INTERNAL MEDICINE

## 2018-03-21 PROCEDURE — 36592 COLLECT BLOOD FROM PICC: CPT | Performed by: INTERNAL MEDICINE

## 2018-03-21 PROCEDURE — 80053 COMPREHEN METABOLIC PANEL: CPT

## 2018-03-21 RX ORDER — ALPRAZOLAM 0.25 MG/1
0.25 TABLET ORAL
Qty: 30 TAB | Refills: 5 | Status: SHIPPED | OUTPATIENT
Start: 2018-03-21 | End: 2018-04-18 | Stop reason: SDUPTHER

## 2018-03-21 RX ORDER — LOSARTAN POTASSIUM 25 MG/1
TABLET ORAL
COMMUNITY
Start: 2018-01-15 | End: 2018-03-21

## 2018-03-21 RX ORDER — LIDOCAINE AND PRILOCAINE 25; 25 MG/G; MG/G
CREAM TOPICAL
COMMUNITY
Start: 2018-02-28 | End: 2018-03-21

## 2018-03-21 RX ORDER — IBUPROFEN 400 MG/1
TABLET ORAL
COMMUNITY
Start: 2018-02-08 | End: 2018-03-21

## 2018-03-21 ASSESSMENT — ENCOUNTER SYMPTOMS
CONSTITUTIONAL NEGATIVE: 1
EYES NEGATIVE: 1
MUSCULOSKELETAL NEGATIVE: 1
CARDIOVASCULAR NEGATIVE: 1
RESPIRATORY NEGATIVE: 1
PSYCHIATRIC NEGATIVE: 1
NEUROLOGICAL NEGATIVE: 1
GASTROINTESTINAL NEGATIVE: 1

## 2018-03-21 ASSESSMENT — PAIN SCALES - GENERAL
PAINLEVEL: 5=MODERATE PAIN
PAINLEVEL: 5=MODERATE PAIN

## 2018-03-21 NOTE — PROGRESS NOTES
"Pharmacy Chemotherapy Note    Second Check    Patient Name: GREG DEMARCO  Dx: ER/KS negative HER-2 negative Breast Cancer        Protocol: DDAC     *Dosing Reference*  Doxorubicin 60 mg/m2 IV push on day 1   -- 3/22/18: dose decreased to 48 mg/m2 for side effect  Cyclophosphamide 600 mg/m2 IV over 30 min on day 1   -- 3/22/18: dose decreased to 480 mg/m2 for side effects  14-day cycle x 4 cycles  ~followed by~  Paclitaxel 80 mg/m2 IV over 60 min on day 1  Weekly course x 12 weeks  NCCN Guidelines for Breast Cancer V.1.2017  Jamila DARLING, et al. J Clin Oncol. 2003:21(8):1431-9     SIGNIFICANT EVENTS:  Also has CLL diagnosed in 2006, no chemo, under observation  Adenocarcinoma in situ of right upper lobe of lung, diagnosed in 2015    /56   Pulse 79   Temp 36.3 °C (97.3 °F)   Resp 18   Ht 1.651 m (5' 5\")   Wt 89.4 kg (197 lb 1.5 oz)   SpO2 93%   BMI 32.80 kg/m²  Body surface area is 2.02 meters squared.    1/30/2018: ECHO LVEF: 65%    Labs 3/21/18:  ANC~ 6710   Plt = 548 k     Hgb = 10.1     SCr = 0.65 mg/dL  CrCl ~ 96 mL/min (min SCr 0.7 used)  AST/ALT/AP = 10/12/69 TBili = 0.3        Drug Order   (Drug name, dose, route, IV Fluid & volume, frequency, number of doses) Cycle 2 (delayed for hospitalization)  Previous treatment: C1 on 3/2/18     Medication = Doxorubicin (Adriamycin)  Base Dose =  48 mg/m2  Calc Dose: Base Dose x 2.02 m2 = 97 mg  Final Dose = 97 mg  Route = IV  Conc. & Volume = 2 mg/mL = 48.48 mL  Admin Duration = Over 20 mins          <5% difference, OK to treat with final dose    Medication = Cyclophosphamide (Cytoxan)  Base Dose= 480 mg/m2  Calc Dose: Base Dose x 2.02 m2 = 970 mg  Final Dose = 969.6 mg  Route = IV  Fluid & Volume =  mL  Admin Duration = Over 30-60 min          <5% difference, OK to treat with final dose      By my signature below, I confirm this process was performed independently with the BSA and all final chemotherapy dosing calculations congruent. I have " reviewed the above chemotherapy order and that my calculation of the final dose and BSA (when applicable) corroborate those calculations of the  pharmacist. Discrepancies of 5% or greater in the written dose have been addressed and documented within the EPIC Progress notes.      Alison Brandon, PharmD

## 2018-03-21 NOTE — PROGRESS NOTES
Consult Note: Oncology    Date of consultation: 3/21/2018      Referring provider: Esperanza Crandall M.D.    Reason for consultation:   CC: Breast cancer    History of presenting illness:  Breast cancer  -August 2017 patient had a normal screening mammogram  -January 30, 2018. Echocardiogram shows normal systolic function. Ejection fraction of 65%  -February 2018 patient self palpated a lump in the left breast  -February 8, 2018. Diagnostic mammogram positive for suspicious mass in the left breast upper-outer quadrant.  -February 9, 2018. Needle biopsy shows triple negative infiltrating ductal carcinoma. Ki-67 is 30-50%  -February 19, 2018. PET CT scan. . Hypermetabolic 1.7 cm mass in the left breast, in keeping with known malignancy.2. Multiple hypermetabolic remi metastases in the left axilla.3. Nonenlarged lymph nodes in the left supraclavicular and retroclavicular region with mild uptake, suspicious for early metastasis.4. Mild uptake in the inferior endplate of T6 is nonspecific but could relate to degenerative change. Attention on follow-up exam is recommended  -March 3, 2018. MRI of the thoracic spine.1.  No evidence of metastatic disease the thoracic spine. Specifically, there is no suspicious lesion seen at T6.  -March 1, 2018. Cycle 1 day 1 of Adriamycin and cyclophosphamide.  -March 22, 2018. Plan for cycle 2 day 1 of Adriamycin and cyclophosphamide.    Adenocarcinoma in situ   Dec 18, 2015 .Right upper lobe lung nodule, 6 cores: Adenocarcinoma-in-situ in fibroelastotic scar tissue, with foci suspicious for invasive adenocarcinoma.  Feb 1, 2016. Right thoracoscopy with anterior segmentectomy of the right upper lobe and removal of a portion of the right middle lobe.    Fibroadenoma of right breast  Nov 12, 2014  Stereotactic biopsy: Benign fibrofatty breast tissue with discrete nodules of hyalinized fibroadenoma with microcalcifications. No atypia or malignancy.    CLL  2006 diagnosed with CLL in [place]  "after being admitted to the hospital for an infection and found to have leucocytosis.  Patient followed by Dr. Foster until 2014 until her insurance changed. Has been followed by her PCP since.       Clarisse Reeves  is a 76 y.o. year old female who presents to \[facility]\"" care. She denies any acute complaints at this time except for some chronic fatigue which has been worse for the past past 3-4 months. She states she had some drenching night sweats last night but none in the past 6 months. She has also lost about 27 lbs in the past 3-4 months or so, but she has been trying to watch her diet and lose weight.    She was started on Celexa for depression last year(2016) after she lost her daughter who 51 to massive MI.      Interval History:  Initially seen in our clinic on 7/19/2017   Clarisse Reeves is a 77 y.o. Female who is seen in clinic for triple negative breast cancer. She was recently hospitalized after cycle 1 for neutropenic fever. No source of infection was seen and her cycle 2 of chemotherapy was delayed. She is here for a posthospitalization follow-up and for pre-chemo evaluation.    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  Denies symptoms.   • Cutaneous skin tags 1/29/2015   • DM (diabetes mellitus) (CMS-HCC)    • Hiatus hernia syndrome     no surgery   • Hypertension     \"In the past\"   • Indigestion    • MEDICAL HOME    • Personal " history of colonic polyps 11/26/2012   • Pneumonia 2014   • Pneumonia 06/2017   • Thyroid disease    • Type II or unspecified type diabetes mellitus without mention of complication, not stated as uncontrolled     pre-diabetic       Past surgical history:    Past Surgical History:   Procedure Laterality Date   • THYROIDECTOMY N/A 11/29/2017    Procedure: THYROIDECTOMY COMPLETION, RECURRENT LARYNGEAL NERVE MONITORING;  Surgeon: Cameron Marcelino M.D.;  Location: SURGERY SAME DAY [facility];  Service: General   • THORACOSCOPY Right 2/1/2016    Procedure: THORACOSCOPY Upper Lobectomy ;  Surgeon: John H Ganser, M.D.;  Location: SURGERY [facility];  Service:    • NODE DISSECTION Right 2/1/2016    Procedure: NODE DISSECTION;  Surgeon: John H Ganser, M.D.;  Location: SURGERY [facility];  Service:    • RECOVERY  12/18/2015    Procedure: CT-SCP-RUL LUNG BIOPSY-;  Surgeon: Recoveryon Surgery;  [place] [facility] [place]  Service:    • OTHER  2001    Lower Left segment of lung removed    • CHOLECYSTECTOMY  1995   • OTHER ORTHOPEDIC SURGERY  1990    bakers cyst removed in right knee, multiple scopes   • OTHER  1990    hemmroid removal   • OTHER  1984    left Thyroid removed, might remove right side in the future   • APPENDECTOMY  1955   • CATARACT EXTRACTION WITH IOL     • TONSILLECTOMY     • US-NEEDLE CORE BX-BREAST PANEL     • VAGINAL HYSTERECTOMY TOTAL      Hysterectomy,Total Vaginal       Allergies:  Codeine; Lipitor; Lovastatin; and Zocor    Medications:    Current Outpatient Prescriptions   Medication Sig Dispense Refill   • lorazepam (ATIVAN) 2 MG tablet TAKE TWO TABLETS BY MOUTH EVERY NIGHT AT BEDTIME AS NEEDED FOR ANXIETY 60 Tab 2   • clonazePAM (KLONOPIN) 0.5 MG Tab Take 1 Tab by mouth 2 Times a Day for 15 days. 30 Tab 0   • maalox plus-benadryl-visc lidocaine (MAGIC MOUTHWASH) Take 5 mL by mouth every 6 hours as needed. 1 Bottle 0   • Blood Glucose Monitoring Suppl Supplies Misc  Contour Next glucometer strips for blood sugar check once a day 100 Each 2   • ondansetron (ZOFRAN) 4 MG Tab tablet Take 1 Tab by mouth every four hours as needed for Nausea/Vomiting (for nausea, vomiting). 30 Tab 6   • levothyroxine (SYNTHROID) 150 MCG Tab Take 1 Tab by mouth Every morning on an empty stomach. 90 Tab 4   • prochlorperazine (COMPAZINE) 10 MG Tab Take 1 Tab by mouth every 6 hours as needed (nausea, vomiting, migraine). 30 Tab 0   • glipiZIDE (GLUCOTROL) 5 MG Tab Take 1 Tab by mouth every day. 90 Tab 4   • fenofibrate (TRIGLIDE) 160 MG tablet Take 1 Tab by mouth every day. 90 Tab 4   • citalopram (CELEXA) 20 MG Tab Take 1 Tab by mouth every day. 90 Tab 4   • aspirin (ASA) 81 MG Chew Tab chewable tablet Take 1 Tab by mouth every day. 100 Tab 11     Current Facility-Administered Medications   Medication Dose Route Frequency Provider Last Rate Last Dose   • cyanocobalamin (VITAMIN B-12) injection 1,000 mcg  1,000 mcg Intramuscular Q30 DAYS Siva Smart M.D.   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     Social History Main Topics   • Smoking status: Former Smoker     Packs/day: 2.00     Years: 50.00     Types: Cigarettes     Quit date: 5/6/2006   • Smokeless tobacco: Never Used      Comment: quit smoking 2002   • Alcohol use 0.0 oz/week      Comment: ocassionaly   • Drug use: No   • Sexual activity: Not Currently     Partners: Male     Other Topics Concern   • Not on file     Social History Narrative   • No narrative on file     She i    Family History:     Family History   Problem Relation Age of Onset   • Cancer Mother    • Lung Disease Father    • Cancer Father        Review of Systems:    All other review of systems are negative except what was mentioned above in the  "HPI.      Physical Exam:  Vitals:   /72   Pulse 86   Temp 36.2 °C (97.1 °F)   Resp 16   Ht 1.651 m (5' 5\")   Wt 89.6 kg (197 lb 8.5 oz)   SpO2 94%   Breastfeeding? No   BMI 32.87 kg/m²     General: Not in acute distress, alert and oriented x 3  HEENT: No pallor, icterus. Oropharynx clear.   Neck: Supple, no palpable masses.  Lymph nodes: No palpable cervical, supraclavicular, axillary or inguinal lymphadenopathy.    CVS: regular rate and rhythm, no rubs or gallops  RESP: Clear to auscultate bilaterally, no wheezing or crackles.   ABD: Soft, RUQ tender to deep palpation, non distended, positive bowel sounds, no palpable organomegaly  EXT: No edema or cyanosis  CNS: Alert and oriented x3, No focal deficits.  Skin- No rash  Breast- right breast does not have any obvious distortion. No masses palpable no lymphadenopathy. Left breast has visible bruising at the 2:00 position at the site of needle biopsy-tender to palpation in the upper outer quadrant. Questionable lymph nodes palpable in the axilla, swelling because of biopsy noted      Labs: Results for GREG DEMARCO (MRN [id number]) as of 3/21/2018 12:19   3/21/2018 10:57   WBC 13.5 (H)   RBC 3.58 (L)   Hemoglobin 10.1 (L)   Hematocrit 32.2 (L)   MCV 89.9   MCH 28.2   MCHC 31.4 (L)   RDW 47.5   Platelet Count 548 (H)   MPV 9.0   Neutrophils-Polys 49.90   Neutrophils (Absolute) 6.71   Lymphocytes 38.90   Lymphs (Absolute) 5.24 (H)   Monocytes 6.20   Monos (Absolute) 0.84   Eosinophils 0.40   Eos (Absolute) 0.05   Basophils 0.70   Baso (Absolute) 0.10   Immature Granulocytes 3.90 (H)   Immature Granulocytes (abs) 0.53 (H)   Nucleated RBC 0.10   NRBC (Absolute) 0.02   Sodium 136   Potassium 4.1   Chloride 104   Co2 24   Anion Gap 8.0   Glucose 103 (H)   Bun 11   Creatinine 0.65   GFR If  >60   GFR If Non African American >60   Calcium 8.8   AST(SGOT) 10 (L)   ALT(SGPT) 12   Alkaline Phosphatase 69   Total Bilirubin 0.3   Albumin 4.1 "   Total Protein 6.5   Globulin 2.4   A-G Ratio 1.7       Imaging  March 3, 2018. MRI of the thoracic spine  1.  No evidence of metastatic disease the thoracic spine. Specifically, there is no suspicious lesion seen at T6.  2.  Mild thoracic spondylosis without high-grade impingement on the neural axis  3.  Hiatal hernia    February 19, 2018. PET CT scan  1. Hypermetabolic 1.7 cm mass in the left breast, in keeping with known malignancy.  2. Multiple hypermetabolic remi metastases in the left axilla.  3. Nonenlarged lymph nodes in the left supraclavicular and retroclavicular region with mild uptake, suspicious for early metastasis.  4. Mild uptake in the inferior endplate of T6 is nonspecific but could relate to degenerative change. Attention on follow-up exam is recommended.    Assessment and Plan:  -Breast cancer  -Left side   -Upper outer quadrant  -Invasive ductal  -Stage IIIc [cT2, cN3c, M0]. ER/TN negative HER-2 negative  -Histologic grade 3  -PET/CT scan from February 19, 2018 was reviewed. Left supraclavicular and retroclavicular lymph nodes nonenlarged but mild uptake suspicious for early metastasis.  -May be a candidate for adjuvant Xeloda based on The Capecitabine for Residual Cancer as Adjuvant Therapy (CREATE-X) trial   3/21/2018  -MRI of the spine March 3, no suspicious lesion seen at T6.  -March 1, 2018. Cycle 1 day 1 of Adriamycin and cyclophosphamide. Patient needed to be admitted to the hospital for neutropenic fever.  -March 22, 2018. Plan for cycle 2 day 1 of Adriamycin and cyclophosphamide. Will plan for 20% dose reduction because of patient's severe fatigue and prolonged anemia.      -Anemia  -New problem, no further workup  -Secondary to chemotherapy  -Continue to monitor  -Dose reduce chemotherapy  -Transfuse for hemoglobin less than 7.5 or symptomatic    -CLL  -Established, stable, chronic  -Coon stage 0 with lymphocytosis only   -She is currently under active observation  -PET/CT scan  February 2018 did not show evidence of lymph node disease    -Fibroadenoma of breast  -Right breast  -Stereotactic biopsy Nov 12, 2014  Showed benign fibrofatty breast tissue with discrete nodules of hyalinized fibroadenoma with microcalcifications.No atypia or malignancy.    - Adenocarcinoma in situ   - Diagnosed Dec 18, 2015 in the right upper lobe lung. biopsy of  nodule, 6 showed Adenocarcinoma-in-situ in fibroelastotic scar tissue, with foci suspicious for invasive adenocarcinoma.  - Feb 1, 2016. Right thoracoscopy with anterior segmentectomy of the right upper lobe and removal of a portion of the right middle lobe.Pathology report states The adenocarcinoma in situ focally demonstrates areas of central fibrosis which surrounds the adenocarcinoma in situ glands, which makes evaluation for invasion difficult. However, there are a few scattered clusters of malignant cells and small foci suspicious for invasive adenocarcinoma  - PET/CT scan February 2018 did not show any evidence of disease          She agreed and verbalized her agreement and understanding with the current plan.  I answered all questions and concerns she has at this time.   Dear  Esperanza Crandall M.D.., Thank you very much for allowing me to see  Clarisse Reeves today .     Please note that this dictation was created using voice recognition software. I have made every reasonable attempt to correct obvious errors, but I expect that there are errors of grammar and possibly content that I did not discover before finalizing the note.      SIGNATURES:  Denise Barroso    CC:  BETSY Guerra Michael D, M.D. Wright, Sharon, M.D.

## 2018-03-22 ENCOUNTER — OUTPATIENT INFUSION SERVICES (OUTPATIENT)
Dept: ONCOLOGY | Facility: MEDICAL CENTER | Age: 78
End: 2018-03-22
Attending: INTERNAL MEDICINE
Payer: MEDICARE

## 2018-03-22 VITALS
BODY MASS INDEX: 32.84 KG/M2 | HEART RATE: 79 BPM | RESPIRATION RATE: 18 BRPM | SYSTOLIC BLOOD PRESSURE: 136 MMHG | HEIGHT: 65 IN | WEIGHT: 197.09 LBS | DIASTOLIC BLOOD PRESSURE: 56 MMHG | TEMPERATURE: 97.3 F | OXYGEN SATURATION: 93 %

## 2018-03-22 DIAGNOSIS — C50.412 MALIGNANT NEOPLASM OF UPPER-OUTER QUADRANT OF LEFT BREAST IN FEMALE, ESTROGEN RECEPTOR NEGATIVE (HCC): ICD-10-CM

## 2018-03-22 DIAGNOSIS — Z17.1 MALIGNANT NEOPLASM OF UPPER-OUTER QUADRANT OF LEFT BREAST IN FEMALE, ESTROGEN RECEPTOR NEGATIVE (HCC): ICD-10-CM

## 2018-03-22 PROCEDURE — 96417 CHEMO IV INFUS EACH ADDL SEQ: CPT

## 2018-03-22 PROCEDURE — 96413 CHEMO IV INFUSION 1 HR: CPT

## 2018-03-22 PROCEDURE — 700111 HCHG RX REV CODE 636 W/ 250 OVERRIDE (IP): Mod: JG

## 2018-03-22 PROCEDURE — 700111 HCHG RX REV CODE 636 W/ 250 OVERRIDE (IP): Mod: JG | Performed by: INTERNAL MEDICINE

## 2018-03-22 PROCEDURE — 96375 TX/PRO/DX INJ NEW DRUG ADDON: CPT

## 2018-03-22 PROCEDURE — 96367 TX/PROPH/DG ADDL SEQ IV INF: CPT

## 2018-03-22 PROCEDURE — 700105 HCHG RX REV CODE 258: Performed by: INTERNAL MEDICINE

## 2018-03-22 PROCEDURE — 700105 HCHG RX REV CODE 258

## 2018-03-22 RX ADMIN — DOXORUBICIN HYDROCHLORIDE 97 MG: 2 INJECTION, SOLUTION INTRAVENOUS at 13:20

## 2018-03-22 RX ADMIN — SODIUM CHLORIDE 150 MG: 9 INJECTION, SOLUTION INTRAVENOUS at 12:30

## 2018-03-22 RX ADMIN — DEXAMETHASONE SODIUM PHOSPHATE 12 MG: 4 INJECTION, SOLUTION INTRAMUSCULAR; INTRAVENOUS at 12:10

## 2018-03-22 RX ADMIN — ONDANSETRON HYDROCHLORIDE 16 MG: 2 INJECTION, SOLUTION INTRAMUSCULAR; INTRAVENOUS at 11:50

## 2018-03-22 RX ADMIN — CYCLOPHOSPHAMIDE 969.6 MG: 2 INJECTION, POWDER, FOR SOLUTION INTRAVENOUS; ORAL at 14:03

## 2018-03-22 ASSESSMENT — PAIN SCALES - GENERAL: PAINLEVEL: NO PAIN

## 2018-03-22 NOTE — PROGRESS NOTES
Patient arrived for Day 1 Cycle 2 AC; pt reports no significant concerns.  Reviewed plan of care; due to hospitalization post Cycle 1, MD dose reduced chemo.  Reviewed lab results, within parameters.  PICC line flushed, blood return noted to both lumens.  Pre-meds given.  AC infused per MAR. Pt tolerated well.  Pt declined Onpro due to tape allergy. Returns tomorrow for Neulasta injection.  Pt discharged home in great spirits under no apparent distress.

## 2018-03-22 NOTE — PROGRESS NOTES
Subjective:      Clarisse Reeves is a 77 y.o. female who presents with Frequent Infections (Bacterial infection from chemo)  HOSP F/U VISIT FOR  FOR NEUTROPOENIC FEVERS FOLLOWING INITAL CHEMORX FOR ADV BREAST CA- D/C'D LAST WK.     AND  The patient is here for followup of chronic medical problems listed below. The patient is compliant with medications and having no side effects from them. Denies chest pain, abdominal pain, dyspnea, myalgias, or cough.   Patient Active Problem List    Diagnosis Date Noted   • Essential hypertension 2015     Priority: Medium   • Controlled type 2 diabetes mellitus with complication, without long-term current use of insulin (CMS-[facility]) 2015     Priority: Medium   • Normocytic anemia 2017     Priority: Low   • Hypothyroidism due to acquired atrophy of thyroid 2015     Priority: Low   • Obesity (BMI 30-39.9) 2015     Priority: Low   • Gastroesophageal reflux disease with esophagitis 2013     Priority: Low   • Vitamin D insufficiency 2013     Priority: Low   • Mixed hyperlipidemia 2012     Priority: Low   • CLL (chronic lymphocytic leukemia)- wbc= 20k-30k, since ; no rx; oncologist to follow 2012     Priority: Low   • Thrombocytosis (CMS-[facility]) 2018   • Anxiety 2018   • PICC (peripherally inserted central catheter) in place 2018   • Malignant neoplasm of upper-outer quadrant of left breast in female, estrogen receptor negative (CMS-HCC)-  1 positive node; dr. 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,  2/1/2016-  partial lobectomy RUL/RML- Dr Ganser- folowed by PMA 02/01/2016   • Multiple thyroid nodules- dr jaeger, neg FNA 2015; us no change 2017 04/21/2015   • Fatty infiltration of liver 12/11/2012     Allergies   Allergen Reactions   • Codeine Hives and Nausea     RXN=40 years ago   • Lipitor [Atorvastatin Calcium] Myalgia     JEV=[id number]     • Lovastatin Myalgia     Muscle aches  QOB=[id number]   • Zocor [Simvastatin - High Dose] Myalgia     Severe myalgias  QHS=1183   • Lisinopril Cough   • Metformin      Diarrhea       Outpatient Medications Prior to Visit   Medication Sig Dispense Refill   • lorazepam (ATIVAN) 2 MG tablet TAKE TWO TABLETS BY MOUTH EVERY NIGHT AT BEDTIME AS NEEDED FOR ANXIETY 60 Tab 2   • maalox plus-benadryl-visc lidocaine (MAGIC MOUTHWASH) Take 5 mL by mouth every 6 hours as needed. 1 Bottle 0   • Blood Glucose Monitoring Suppl Supplies Misc Contour Next glucometer strips for blood sugar check once a day 100 Each 2   • ondansetron (ZOFRAN) 4 MG Tab tablet Take 1 Tab by mouth every four hours as needed for Nausea/Vomiting (for nausea, vomiting). 30 Tab 6   • prochlorperazine (COMPAZINE) 10 MG Tab Take 1 Tab by mouth every 6 hours as needed (nausea, vomiting, migraine). 30 Tab 0   • glipiZIDE (GLUCOTROL) 5 MG Tab Take 1 Tab by mouth every day. 90 Tab 4   • fenofibrate (TRIGLIDE) 160 MG tablet Take 1 Tab by mouth every day. 90 Tab 4   • citalopram (CELEXA) 20 MG Tab Take 1 Tab by mouth every day. 90 Tab 4   • aspirin (ASA) 81 MG Chew Tab chewable tablet Take 1 Tab by mouth every day. 100 Tab 11   • clonazePAM (KLONOPIN) 0.5 MG Tab Take 1 Tab by mouth 2 Times a Day for 15 days. 30 Tab 0   • levothyroxine (SYNTHROID) 150 MCG Tab Take 1 Tab by mouth Every morning on an empty stomach. 90 Tab 4     Facility-Administered Medications Prior to Visit   Medication Dose Route Frequency Provider Last Rate Last Dose   • cyanocobalamin (VITAMIN B-12) injection 1,000 mcg  1,000 mcg Intramuscular Q30 DAYS Siva NIÑO  "BETSY Smart   1,000 mcg at 10/30/17 1143               HPI    Review of Systems   Constitutional: Negative.    HENT: Negative.    Eyes: Negative.    Respiratory: Negative.    Cardiovascular: Negative.    Gastrointestinal: Negative.    Genitourinary: Negative.    Musculoskeletal: Negative.    Skin: Negative.    Neurological: Negative.    Endo/Heme/Allergies: Negative.    Psychiatric/Behavioral: Negative.           Objective:     /78   Pulse 85   Temp 36.9 °C (98.4 °F)   Ht 1.651 m (5' 5\")   Wt 89.7 kg (197 lb 12.8 oz)   SpO2 93%   BMI 32.92 kg/m²      Physical Exam   Constitutional: She is oriented to person, place, and time. She appears well-developed and well-nourished. No distress.   HENT:   Head: Normocephalic and atraumatic.   Right Ear: External ear normal.   Left Ear: External ear normal.   Nose: Nose normal.   Mouth/Throat: Oropharynx is clear and moist. No oropharyngeal exudate.   Eyes: Conjunctivae and EOM are normal. Pupils are equal, round, and reactive to light. Right eye exhibits no discharge. Left eye exhibits no discharge. No scleral icterus.   Neck: Normal range of motion. Neck supple. No JVD present. No tracheal deviation present. No thyromegaly present.   Cardiovascular: Normal rate, regular rhythm, normal heart sounds and intact distal pulses.  Exam reveals no gallop and no friction rub.    No murmur heard.  Pulmonary/Chest: Effort normal and breath sounds normal. No stridor. No respiratory distress. She has no wheezes. She has no rales. She exhibits no tenderness.   Abdominal: Soft. Bowel sounds are normal. She exhibits no distension and no mass. There is no tenderness. There is no rebound and no guarding.   Musculoskeletal: Normal range of motion. She exhibits no edema or tenderness.   Lymphadenopathy:     She has no cervical adenopathy.   Neurological: She is alert and oriented to person, place, and time. She has normal reflexes. She displays normal reflexes. No cranial nerve " deficit. She exhibits normal muscle tone. Coordination normal.   Skin: Skin is warm and dry. No rash noted. She is not diaphoretic. No erythema. No pallor.   Psychiatric: She has a normal mood and affect. Her behavior is normal. Judgment and thought content normal.   Vitals reviewed.         Hospital Outpatient Visit on 03/21/2018   Component Date Value   • WBC 03/21/2018 13.5*   • RBC 03/21/2018 3.58*   • Hemoglobin 03/21/2018 10.1*   • Hematocrit 03/21/2018 32.2*   • MCV 03/21/2018 89.9    • MCH 03/21/2018 28.2    • MCHC 03/21/2018 31.4*   • RDW 03/21/2018 47.5    • Platelet Count 03/21/2018 548*   • MPV 03/21/2018 9.0    • Neutrophils-Polys 03/21/2018 49.90    • Lymphocytes 03/21/2018 38.90    • Monocytes 03/21/2018 6.20    • Eosinophils 03/21/2018 0.40    • Basophils 03/21/2018 0.70    • Immature Granulocytes 03/21/2018 3.90*   • Nucleated RBC 03/21/2018 0.10    • Neutrophils (Absolute) 03/21/2018 6.71    • Lymphs (Absolute) 03/21/2018 5.24*   • Monos (Absolute) 03/21/2018 0.84    • Eos (Absolute) 03/21/2018 0.05    • Baso (Absolute) 03/21/2018 0.10    • Immature Granulocytes (a* 03/21/2018 0.53*   • NRBC (Absolute) 03/21/2018 0.02    • Sodium 03/21/2018 136    • Potassium 03/21/2018 4.1    • Chloride 03/21/2018 104    • Co2 03/21/2018 24    • Anion Gap 03/21/2018 8.0    • Glucose 03/21/2018 103*   • Bun 03/21/2018 11    • Creatinine 03/21/2018 0.65    • Calcium 03/21/2018 8.8    • AST(SGOT) 03/21/2018 10*   • ALT(SGPT) 03/21/2018 12    • Alkaline Phosphatase 03/21/2018 69    • Total Bilirubin 03/21/2018 0.3    • Albumin 03/21/2018 4.1    • Total Protein 03/21/2018 6.5    • Globulin 03/21/2018 2.4    • A-G Ratio 03/21/2018 1.7    • GFR If  03/21/2018 >60    • GFR If Non  [employer]* 03/21/2018 >60    Admission on 03/07/2018, Discharged on 03/15/2018   No results displayed because visit has over 200 results.      Hospital Outpatient Visit on 02/26/2018   Component Date Value   • WBC 02/26/2018  20.0*   • RBC 02/26/2018 3.98*   • Hemoglobin 02/26/2018 11.5*   • Hematocrit 02/26/2018 35.5*   • MCV 02/26/2018 89.2    • MCH 02/26/2018 28.9    • MCHC 02/26/2018 32.4*   • RDW 02/26/2018 47.8    • Platelet Count 02/26/2018 309    • MPV 02/26/2018 9.9    • Nucleated RBC 02/26/2018 0.00    • NRBC (Absolute) 02/26/2018 0.00    • Neutrophils-Polys 02/26/2018 31.00*   • Lymphocytes 02/26/2018 64.00*   • Monocytes 02/26/2018 3.00    • Eosinophils 02/26/2018 2.00    • Basophils 02/26/2018 0.00    • Neutrophils (Absolute) 02/26/2018 6.20    • Lymphs (Absolute) 02/26/2018 12.80*   • Monos (Absolute) 02/26/2018 0.60    • Eos (Absolute) 02/26/2018 0.40    • Baso (Absolute) 02/26/2018 0.00    • Sodium 02/26/2018 137    • Potassium 02/26/2018 4.1    • Chloride 02/26/2018 103    • Co2 02/26/2018 24    • Anion Gap 02/26/2018 10.0    • Glucose 02/26/2018 90    • Bun 02/26/2018 16    • Creatinine 02/26/2018 0.74    • Calcium 02/26/2018 8.8    • AST(SGOT) 02/26/2018 11*   • ALT(SGPT) 02/26/2018 9    • Alkaline Phosphatase 02/26/2018 59    • Total Bilirubin 02/26/2018 0.4    • Albumin 02/26/2018 4.4    • Total Protein 02/26/2018 6.5    • Globulin 02/26/2018 2.1    • A-G Ratio 02/26/2018 2.1    • GFR If  02/26/2018 >60    • GFR If Non  [employer]meri* 02/26/2018 >60    • Manual Diff Status 02/26/2018 PERFORMED    • Peripheral Smear Review 02/26/2018 see below    • Plt Estimation 02/26/2018 Normal    • RBC Morphology 02/26/2018 Present    • Giant Platelets 02/26/2018 1+    • Ovalocytes 02/26/2018 1+    • Smudge Cells 02/26/2018 Moderate    • Reactive Lymphocytes 02/26/2018 Few       Lab Results   Component Value Date/Time    HBA1C 5.9 (H) 03/07/2018 08:05 PM    HBA1C 5.7 (H) 01/30/2018 10:50 AM     Lab Results   Component Value Date/Time    SODIUM 136 03/21/2018 10:57 AM    POTASSIUM 4.1 03/21/2018 10:57 AM    CHLORIDE 104 03/21/2018 10:57 AM    CO2 24 03/21/2018 10:57 AM    GLUCOSE 103 (H) 03/21/2018 10:57 AM    BUN 11  03/21/2018 10:57 AM    CREATININE 0.65 03/21/2018 10:57 AM    CREATININE 0.76 04/12/2013 11:17 AM    BUNCREATRAT 19 11/17/2014 10:29 AM    BUNCREATRAT 25 04/12/2013 11:17 AM    ALKPHOSPHAT 69 03/21/2018 10:57 AM    ASTSGOT 10 (L) 03/21/2018 10:57 AM    ALTSGPT 12 03/21/2018 10:57 AM    TBILIRUBIN 0.3 03/21/2018 10:57 AM     Lab Results   Component Value Date/Time    INR 0.99 01/30/2018 10:50 AM    INR 0.95 06/01/2017 06:23 AM    INR 0.93 12/16/2015 03:17 PM     Lab Results   Component Value Date/Time    CHOLSTRLTOT 145 01/31/2018 04:09 AM    LDL 92 01/31/2018 04:09 AM    HDL 43 01/31/2018 04:09 AM    TRIGLYCERIDE 49 01/31/2018 04:09 AM       No results found for: TESTOSTERONE  Lab Results   Component Value Date/Time    TSH 0.617 04/30/2014 10:35 AM    TSH 0.383 (L) 02/06/2014 01:26 PM     Lab Results   Component Value Date/Time    FREET4 1.34 08/08/2017 11:35 AM    FREET4 1.28 03/22/2017 12:21 PM     No results found for: URICACID  No components found for: VITB12  Lab Results   Component Value Date/Time    25HYDROXY 20 (L) 01/13/2018 10:16 AM    25HYDROXY 24 (L) 09/25/2017 09:47 AM          Assessment/Plan:     1. Hospital discharge follow-up      DOING MUCH BETTER- NO FEVERS OF CHILLS;      2. Neutropenic fever (CMS-HCC)- RESOLVED   RESOLVED D/T CHEMORX  F[facility]U [facility] ON[facility]    3. Malignant neoplasm of upper-outer quadrant of left breast in female, estrogen receptor negative (CMS-HCC)-  1 positive node; dr. koo; dr sharpe-  chemorx 1st, then surgery, then RT     Under good control. Continue same regimen.    4. CLL (chronic lymphocytic leukemia)- wbc= 20k-30k, since 2006; no rx; oncologist to follow     Under good control. Continue same regimen.    5. Mixed hyperlipidemia     Under good control. Continue same regimen.  - TSH; Future  - COMP METABOLIC PANEL; Future  - LIPID PROFILE; Future  - CBC WITH DIFFERENTIAL; Future    6. Fatty infiltration of liver    diet/exercise/lose 15 lbs.; patient counseled      7.  Vitamin D insufficiency      Under good control. Continue same regimen.    8. Gastroesophageal reflux disease with esophagitis       Under good control. Continue same regimen.  9. Multiple thyroid nodules- dr jaeger, neg FNA 2015; us no change 2017     Under good control. Continue same regimen.    10. Essential hypertension      Under good control. Continue same regimen.    11. Controlled type 2 diabetes mellitus with complication, without long-term current use of insulin (CMS-HCC)     Under good control. Continue same regimen.  - HEMOGLOBIN A1C; Future  - MICROALBUMIN CREAT RATIO URINE; Future    12. Hypothyroidism due to acquired atrophy of thyroid     Under good control. Continue same regimen.  - TSH; Future    13. Obesity (BMI 30-39.9)    diet/exercise/lose 15 lbs.; patient counseled      14. 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 Continue same regimen.     16. Moderate episode of recurrent major depressive disorder (CMS-[facility])     Under good control. Continue same regimen.    17. Primary insomnia     Under good control. Continue same regimen.    18. Normocytic anemia      Under good control. Continue same regimen.  19. Mild intermittent asthma without complication- pma;  albuterol inhaler prn      Under good control. Continue same regimen.            21. Nontoxic uninodular goiter     Under good control. Continue same regimen.    22. Atypical migraine- RESOLVED     Under good control. Continue same regimen.  23. Thrombocytosis (CMS-HCC)      Under good control. Continue same regimen.    24. Anxiety  Under good control. Continue same regimen.      Under good control. Continue same regimen.  - ALPRAZolam (XANAX) 0.25 MG Tab; Take 1 Tab by mouth 1 time daily as needed for Sleep for up to 30 days.  Dispense: 30 Tab; Refill: 5    25. PICC (peripherally inserted central catheter) in place  Under  good control. Continue same regimen.       40 minute face-to-face encounter took place today.  More than half of this time was spent in the coordination of care of the above problems, as well as counseling.

## 2018-03-22 NOTE — PROGRESS NOTES
"Pharmacy Chemotherapy Calculations    Dx: Breast CA    Cycle: 2 delayed due to hospitalization neutropenia(Previous treatment = cycle 1 on 3/2/18    Regimen and Dosing Reference  DDAC (Dose Dense Doxorubicin + Cyclophosphamide) followed by Paclitaxel  Doxorubicin 60 mg/m2 IV on Day 1--dose reduced to 48 mg/m2 due to tolerance  Cyclophosphamide 600 mg/m2 IV over 30 min on Day 1--dose reduced to 480 mg/m2 due to tolerance  14 day cycle for 4 cycles  NCCN Guidelines for Breast Cancer V.2.2017  Jamila ML et al J Clin Oncol. 2003:21(8):1431-9    Blood pressure 136/56, pulse 79, temperature 36.3 °C (97.3 °F), resp. rate 18, height 1.651 m (5' 5\"), weight 89.4 kg (197 lb 1.5 oz), SpO2 93 %.  Body surface area is 2.02 meters squared.      3/21/18  ANC ~ 6710     Plt = 548 k  Hgb = 10.1   Scr =  0.65      Crcl ~96 ml/min    LFT = AST/ALT/AlkPhos/Tbili = 10/12/69/0.3  1/30/18 ECHO LVEF = 65%      Doxorubicin 48 mg/m2 x 2.02 m2 = 96.96 mg  <5% diff, ok to treat with final written dose =  97 mg    Cyclophosphamide 480 mg/m2 x 2.02 m2 = 969.6 mg  <5% diff, ok to treat with final written dose =  969.6mg    Funmilayo Gorman, PharmD      "

## 2018-03-22 NOTE — PROGRESS NOTES
Chemotherapy Verification - SECONDARY RN       Height = 165.1 cm  Weight = 89.4 kg  BSA = 2.02 m2       Medication: Doxorubicin  Dose: 48 mg/m2  Calculated Dose: 96.96 mg                                 Medication: Cyclophosphamide  Dose: 480 mg/m2  Calculated Dose: 969.6 mg                               I confirm that this process was performed independently.

## 2018-03-23 ENCOUNTER — OUTPATIENT INFUSION SERVICES (OUTPATIENT)
Dept: ONCOLOGY | Facility: MEDICAL CENTER | Age: 78
End: 2018-03-23
Attending: INTERNAL MEDICINE
Payer: MEDICARE

## 2018-03-23 VITALS
BODY MASS INDEX: 33.39 KG/M2 | WEIGHT: 200.4 LBS | OXYGEN SATURATION: 98 % | SYSTOLIC BLOOD PRESSURE: 130 MMHG | HEIGHT: 65 IN | RESPIRATION RATE: 18 BRPM | HEART RATE: 81 BPM | TEMPERATURE: 98 F | DIASTOLIC BLOOD PRESSURE: 49 MMHG

## 2018-03-23 DIAGNOSIS — C50.412 MALIGNANT NEOPLASM OF UPPER-OUTER QUADRANT OF LEFT BREAST IN FEMALE, ESTROGEN RECEPTOR NEGATIVE (HCC): ICD-10-CM

## 2018-03-23 DIAGNOSIS — Z17.1 MALIGNANT NEOPLASM OF UPPER-OUTER QUADRANT OF LEFT BREAST IN FEMALE, ESTROGEN RECEPTOR NEGATIVE (HCC): ICD-10-CM

## 2018-03-23 PROCEDURE — 700111 HCHG RX REV CODE 636 W/ 250 OVERRIDE (IP): Mod: JG | Performed by: INTERNAL MEDICINE

## 2018-03-23 PROCEDURE — 96372 THER/PROPH/DIAG INJ SC/IM: CPT

## 2018-03-23 RX ADMIN — PEGFILGRASTIM 6 MG: 6 INJECTION SUBCUTANEOUS at 16:31

## 2018-03-23 ASSESSMENT — PAIN SCALES - GENERAL: PAINLEVEL: NO PAIN

## 2018-03-23 NOTE — PROGRESS NOTES
Pt to infusion services ambulatory per self.  Pt here for scheduled Neulasta post chemotherapy yesterday (last chemo finished at 1503).  Plan of care reviewed.  Pt verbalizes understanding.  Pt tells me she has received Neulasta in the past and familiar with medication.  Assessment complete.  Orders reviewed.  Neulasta administered per MD orders to L/LQ abdomen via subcutaneous injection.  Pt tolerated well.  Pt declines band aid today.  Pt released to self care in no apparent distress after completion of treatment, ambulatory.  Pt will be having PICC dressing changed in MD office next week; list of future appointments provided.

## 2018-03-26 ENCOUNTER — DOCUMENTATION (OUTPATIENT)
Dept: HEMATOLOGY ONCOLOGY | Facility: MEDICAL CENTER | Age: 78
End: 2018-03-26

## 2018-03-26 NOTE — PROGRESS NOTES
"Nutrition services: RD consultation  Clarisse Reeves is a 77 y.o. female receiving chemotherapy 2' dx of ER+ breast cancer + CLL w/ extensive past medical hx significant for DM, PNA, and HTN per H&P. Met w/ pt during her scheduled infusion to discuss current intake and nutrition status. Pt reports that her appetite is good w/ no current c/o GI distress nor swallowing difficulty. Pt reports slight taste changes only to coffee; while every thing else taste \"normal.\" Pt w/ no questions regarding nutrition at this time; however, was receptive when discussing proper nutrition intake during cancer treatment and for survivorship.     Assessment:  Ht: 65\" Weight: 90kg (198#) BMI: 33   *wt stable at CBW over time     Recommendations/Plan:  1. Provided pt w/ tips to ensure adequate intake if side effects of treatment should occur.   2. Also provided pt w/ general nutrition guidelines for improved health, as well as healthy snack ideas and a list of high protein foods to preserve muscle mass throughout the course of treatment.    Pt receptive and reports understanding. RD to monitor ongoing PO adequacy and wt status to leave further recommendations accordingly.      Nutrition Needs Assessment:     Total Calorie Needs per HBE x.1.1 1750 1950   Total Protein Needs (1.0 - 1.2g/kg of CBW) 90 108   Daily Fluids (30ml/kg of CBW) 2700          "

## 2018-03-28 ENCOUNTER — NON-PROVIDER VISIT (OUTPATIENT)
Dept: HEMATOLOGY ONCOLOGY | Facility: MEDICAL CENTER | Age: 78
End: 2018-03-28
Payer: MEDICARE

## 2018-03-28 VITALS
HEIGHT: 65 IN | WEIGHT: 196.43 LBS | TEMPERATURE: 97.9 F | OXYGEN SATURATION: 99 % | BODY MASS INDEX: 32.73 KG/M2 | SYSTOLIC BLOOD PRESSURE: 118 MMHG | DIASTOLIC BLOOD PRESSURE: 70 MMHG | HEART RATE: 100 BPM | RESPIRATION RATE: 18 BRPM

## 2018-03-28 ASSESSMENT — PAIN SCALES - GENERAL: PAINLEVEL: NO PAIN

## 2018-03-28 NOTE — PROGRESS NOTES
Clarisse Reeves is a 77 y.o. female here for a non-provider visit for: Lab Draws  on 3/28/2018 at 1:23 PM    Procedure Performed: PICC Line Dressing Change     Anatomical site: Right PICC Line    Equipment used: PICC Access Kit     Labs drawn: None drawn    Ordering Provider: Dr. LOGAN Fernández By: Nancy Cruz R.N.    Next PICC dressing change 4/5/2018. OK per Camila Mejia, APRN

## 2018-03-29 ENCOUNTER — APPOINTMENT (OUTPATIENT)
Dept: HEMATOLOGY ONCOLOGY | Facility: MEDICAL CENTER | Age: 78
End: 2018-03-29
Payer: MEDICARE

## 2018-03-30 ENCOUNTER — TELEPHONE (OUTPATIENT)
Dept: ENDOCRINOLOGY | Facility: MEDICAL CENTER | Age: 78
End: 2018-03-30

## 2018-03-30 DIAGNOSIS — E11.9 TYPE 2 DIABETES MELLITUS WITHOUT COMPLICATION, WITHOUT LONG-TERM CURRENT USE OF INSULIN (HCC): ICD-10-CM

## 2018-03-30 RX ORDER — 0.9 % SODIUM CHLORIDE 0.9 %
VIAL (ML) INJECTION PRN
Status: CANCELLED | OUTPATIENT
Start: 2018-04-04

## 2018-03-30 RX ORDER — 0.9 % SODIUM CHLORIDE 0.9 %
VIAL (ML) INJECTION PRN
Status: CANCELLED | OUTPATIENT
Start: 2018-04-05

## 2018-03-30 RX ORDER — PROCHLORPERAZINE MALEATE 10 MG
10 TABLET ORAL EVERY 6 HOURS PRN
Status: CANCELLED | OUTPATIENT
Start: 2018-04-05

## 2018-03-30 RX ORDER — 0.9 % SODIUM CHLORIDE 0.9 %
5 VIAL (ML) INJECTION PRN
Status: CANCELLED | OUTPATIENT
Start: 2018-04-04

## 2018-03-30 RX ORDER — SODIUM CHLORIDE 9 MG/ML
INJECTION, SOLUTION INTRAVENOUS CONTINUOUS
Status: CANCELLED | OUTPATIENT
Start: 2018-04-05

## 2018-03-30 RX ORDER — 0.9 % SODIUM CHLORIDE 0.9 %
5 VIAL (ML) INJECTION PRN
Status: CANCELLED | OUTPATIENT
Start: 2018-04-05

## 2018-03-30 RX ORDER — ONDANSETRON 2 MG/ML
4 INJECTION INTRAMUSCULAR; INTRAVENOUS
Status: CANCELLED | OUTPATIENT
Start: 2018-04-05

## 2018-03-30 RX ORDER — ONDANSETRON 8 MG/1
8 TABLET, ORALLY DISINTEGRATING ORAL
Status: CANCELLED | OUTPATIENT
Start: 2018-04-05

## 2018-03-30 RX ORDER — LANCETS
EACH MISCELLANEOUS
Qty: 100 EACH | Refills: 3 | Status: SHIPPED | OUTPATIENT
Start: 2018-03-30 | End: 2019-06-20

## 2018-03-30 NOTE — TELEPHONE ENCOUNTER
Pt needs an order for microlet lancets sent to [facility] pharmacy please. Thank you.

## 2018-04-04 DIAGNOSIS — Z17.1 MALIGNANT NEOPLASM OF UPPER-OUTER QUADRANT OF LEFT BREAST IN FEMALE, ESTROGEN RECEPTOR NEGATIVE (HCC): ICD-10-CM

## 2018-04-04 DIAGNOSIS — C50.412 MALIGNANT NEOPLASM OF UPPER-OUTER QUADRANT OF LEFT BREAST IN FEMALE, ESTROGEN RECEPTOR NEGATIVE (HCC): ICD-10-CM

## 2018-04-05 ENCOUNTER — OFFICE VISIT (OUTPATIENT)
Dept: HEMATOLOGY ONCOLOGY | Facility: MEDICAL CENTER | Age: 78
End: 2018-04-05
Payer: MEDICARE

## 2018-04-05 ENCOUNTER — HOSPITAL ENCOUNTER (OUTPATIENT)
Facility: MEDICAL CENTER | Age: 78
End: 2018-04-05
Attending: NURSE PRACTITIONER
Payer: MEDICARE

## 2018-04-05 ENCOUNTER — NON-PROVIDER VISIT (OUTPATIENT)
Dept: HEMATOLOGY ONCOLOGY | Facility: MEDICAL CENTER | Age: 78
End: 2018-04-05
Payer: MEDICARE

## 2018-04-05 VITALS
RESPIRATION RATE: 18 BRPM | SYSTOLIC BLOOD PRESSURE: 120 MMHG | TEMPERATURE: 97.7 F | DIASTOLIC BLOOD PRESSURE: 76 MMHG | BODY MASS INDEX: 32.54 KG/M2 | HEIGHT: 65 IN | HEART RATE: 84 BPM | OXYGEN SATURATION: 95 % | WEIGHT: 195.33 LBS

## 2018-04-05 VITALS
RESPIRATION RATE: 18 BRPM | TEMPERATURE: 97.7 F | SYSTOLIC BLOOD PRESSURE: 120 MMHG | OXYGEN SATURATION: 95 % | BODY MASS INDEX: 32.56 KG/M2 | HEIGHT: 65 IN | HEART RATE: 84 BPM | DIASTOLIC BLOOD PRESSURE: 76 MMHG | WEIGHT: 195.44 LBS

## 2018-04-05 DIAGNOSIS — R11.2 CHEMOTHERAPY INDUCED NAUSEA AND VOMITING: ICD-10-CM

## 2018-04-05 DIAGNOSIS — R53.0 NEOPLASTIC MALIGNANT RELATED FATIGUE: ICD-10-CM

## 2018-04-05 DIAGNOSIS — C50.412 MALIGNANT NEOPLASM OF UPPER-OUTER QUADRANT OF LEFT BREAST IN FEMALE, ESTROGEN RECEPTOR NEGATIVE (HCC): ICD-10-CM

## 2018-04-05 DIAGNOSIS — Z17.1 MALIGNANT NEOPLASM OF UPPER-OUTER QUADRANT OF LEFT BREAST IN FEMALE, ESTROGEN RECEPTOR NEGATIVE (HCC): ICD-10-CM

## 2018-04-05 DIAGNOSIS — T45.1X5A CHEMOTHERAPY INDUCED NAUSEA AND VOMITING: ICD-10-CM

## 2018-04-05 DIAGNOSIS — M79.10 MYALGIA: ICD-10-CM

## 2018-04-05 LAB
ALBUMIN SERPL BCP-MCNC: 4.3 G/DL (ref 3.2–4.9)
ALBUMIN/GLOB SERPL: 2 G/DL
ALP SERPL-CCNC: 66 U/L (ref 30–99)
ALT SERPL-CCNC: 11 U/L (ref 2–50)
ANION GAP SERPL CALC-SCNC: 9 MMOL/L (ref 0–11.9)
AST SERPL-CCNC: 13 U/L (ref 12–45)
BASOPHILS # BLD AUTO: 1 % (ref 0–1.8)
BASOPHILS # BLD: 0.11 K/UL (ref 0–0.12)
BILIRUB SERPL-MCNC: 0.3 MG/DL (ref 0.1–1.5)
BUN SERPL-MCNC: 10 MG/DL (ref 8–22)
CALCIUM SERPL-MCNC: 8.9 MG/DL (ref 8.5–10.5)
CHLORIDE SERPL-SCNC: 101 MMOL/L (ref 96–112)
CO2 SERPL-SCNC: 24 MMOL/L (ref 20–33)
COMMENT 1642: NORMAL
CREAT SERPL-MCNC: 0.66 MG/DL (ref 0.5–1.4)
EOSINOPHIL # BLD AUTO: 0.25 K/UL (ref 0–0.51)
EOSINOPHIL NFR BLD: 2.3 % (ref 0–6.9)
ERYTHROCYTE [DISTWIDTH] IN BLOOD BY AUTOMATED COUNT: 52.9 FL (ref 35.9–50)
GLOBULIN SER CALC-MCNC: 2.2 G/DL (ref 1.9–3.5)
GLUCOSE SERPL-MCNC: 104 MG/DL (ref 65–99)
HCT VFR BLD AUTO: 32.9 % (ref 37–47)
HGB BLD-MCNC: 10.5 G/DL (ref 12–16)
IMM GRANULOCYTES # BLD AUTO: 0.44 K/UL (ref 0–0.11)
IMM GRANULOCYTES NFR BLD AUTO: 4.1 % (ref 0–0.9)
LYMPHOCYTES # BLD AUTO: 3.74 K/UL (ref 1–4.8)
LYMPHOCYTES NFR BLD: 34.5 % (ref 22–41)
MCH RBC QN AUTO: 29.1 PG (ref 27–33)
MCHC RBC AUTO-ENTMCNC: 31.9 G/DL (ref 33.6–35)
MCV RBC AUTO: 91.1 FL (ref 81.4–97.8)
MONOCYTES # BLD AUTO: 0.87 K/UL (ref 0–0.85)
MONOCYTES NFR BLD AUTO: 8 % (ref 0–13.4)
MORPHOLOGY BLD-IMP: NORMAL
NEUTROPHILS # BLD AUTO: 5.44 K/UL (ref 2–7.15)
NEUTROPHILS NFR BLD: 50.1 % (ref 44–72)
NRBC # BLD AUTO: 0 K/UL
NRBC BLD-RTO: 0 /100 WBC
PLATELET # BLD AUTO: 147 K/UL (ref 164–446)
PMV BLD AUTO: 10.4 FL (ref 9–12.9)
POTASSIUM SERPL-SCNC: 4 MMOL/L (ref 3.6–5.5)
PROT SERPL-MCNC: 6.5 G/DL (ref 6–8.2)
RBC # BLD AUTO: 3.61 M/UL (ref 4.2–5.4)
SODIUM SERPL-SCNC: 134 MMOL/L (ref 135–145)
WBC # BLD AUTO: 10.9 K/UL (ref 4.8–10.8)

## 2018-04-05 PROCEDURE — 99214 OFFICE O/P EST MOD 30 MIN: CPT | Performed by: NURSE PRACTITIONER

## 2018-04-05 PROCEDURE — 36592 COLLECT BLOOD FROM PICC: CPT | Performed by: INTERNAL MEDICINE

## 2018-04-05 PROCEDURE — 80053 COMPREHEN METABOLIC PANEL: CPT

## 2018-04-05 PROCEDURE — 85025 COMPLETE CBC W/AUTO DIFF WBC: CPT

## 2018-04-05 RX ORDER — DEXAMETHASONE 4 MG/1
8 TABLET ORAL ONCE
Status: CANCELLED
Start: 2018-04-08 | End: 2018-04-06

## 2018-04-05 RX ORDER — DEXAMETHASONE 4 MG/1
8 TABLET ORAL DAILY
Qty: 4 TAB | Refills: 0 | Status: SHIPPED | OUTPATIENT
Start: 2018-04-05 | End: 2018-07-05

## 2018-04-05 RX ORDER — PALONOSETRON 0.05 MG/ML
0.25 INJECTION, SOLUTION INTRAVENOUS ONCE
Status: CANCELLED
Start: 2018-04-05 | End: 2018-04-05

## 2018-04-05 ASSESSMENT — ENCOUNTER SYMPTOMS
FEVER: 0
DIZZINESS: 1
PALPITATIONS: 1
CONSTIPATION: 0
WEIGHT LOSS: 0
SHORTNESS OF BREATH: 1
COUGH: 0
MYALGIAS: 1
WHEEZING: 0
CHILLS: 0
HEADACHES: 0
NAUSEA: 1
VOMITING: 1
INSOMNIA: 1
DIARRHEA: 1

## 2018-04-05 ASSESSMENT — PAIN SCALES - GENERAL
PAINLEVEL: 5=MODERATE PAIN

## 2018-04-05 NOTE — PROGRESS NOTES
Clarisse Reeves is a 77 y.o. female here for a non-provider visit for: Labs on 4/5/2018 at 11:45 PM    Procedure Performed: PICC Line Dressing Change  and labs    Anatomical site: Right upper arm double lumen PICC     Labs drawn: CBC w/diff and CMP    Ordering Provider: IRENA Nix    Pradeep By: Jose C Hayes R.N.     Performed lab drawn and PICC line dressing change per protocol. Hypoallergenic tegaderm used. Verified blood return in both lumens, labs drawn and lumens flushed per protocol.  Performed sterile clave change.

## 2018-04-05 NOTE — PROGRESS NOTES
"Subjective:      Clarisse Reeves is a 77 y.o. female who presents for Follow-Up (prechemo) for breast cancer.         HPI    Patient seen today in follow-up for invasive ductal carcinoma of the left breast, ER/MD negative, HER 2 neg.  patient is scheduled to receive cycle #3 of dose dense AC tomorrow.     Patient did experience neutropenic fevers and was hospitalized after cycle one. Dr. Barroso did dose reduce her treatment for cycle #2 and she did tolerate it better than her first cycle.    Patient denies any fevers, chills or weight loss with this last cycle. She does have persistent fatigue. Patient did have significant fatigue for the first 3-4 days following her chemotherapy. Patient believes this is due to her Neulasta. She stated she was unable to get out of bed for those first 3 days due to fatigue as well as nausea and vomiting. Patient was taking her Zofran and Compazine every 3 hours staggering them as prescribed but still having significant nausea and vomiting for those first 3 days. Patient also with a complaint of significant myalgias after the Neulasta. She did take Claritin daily with no significant relief. She also has been taking Tylenol every once in a while, also with very minimal relief.    Patient denies any coughing, wheezing, but states she does have some shortness of breath was severe exertion. She does notice every once in a while very mild palpitations but denies any chest pain or lower extremity edema. She has been having normal bowel movements with the exception of last night she had an episode of uncontrollable diarrhea. As mentioned above she did have nausea and vomiting for the first few days after chemotherapy, and since then she has been feeling slightly \"queezy\" at times. However she has been able to maintain her weight and has a fair appetite. She is voiding without difficulty. She denies any headaches but states she has some mild dizziness or feeling off balance at times. " Patient does have some insomnia. She has been given Ativan for some anxiety which has also helped her sleep at night. She does take Ativan, which does help with sleeping as well as for the myalgias she experiences as well.    Patient did state to me today that she feels like the breast mass is decreasing in size since starting chemotherapy.    Allergies   Allergen Reactions   • Codeine Hives and Nausea     RXN=40 years ago   • Lipitor [Atorvastatin Calcium] Myalgia     [id number]     • Lovastatin Myalgia     Muscle aches  FLF=[id number]   • Zocor [Simvastatin - High Dose] Myalgia     Severe myalgias  ZZE=9504   • Lisinopril Cough   • Metformin      Diarrhea     • Tape      Current Outpatient Prescriptions on File Prior to Visit   Medication Sig Dispense Refill   • MICROLET LANCETS Misc For BG check once a day 100 Each 3   • lorazepam (ATIVAN) 2 MG tablet TAKE TWO TABLETS BY MOUTH EVERY NIGHT AT BEDTIME AS NEEDED FOR ANXIETY 60 Tab 2   • Blood Glucose Monitoring Suppl Supplies Misc Contour Next glucometer strips for blood sugar check once a day 100 Each 2   • levothyroxine (SYNTHROID) 150 MCG Tab Take 1 Tab by mouth Every morning on an empty stomach. 90 Tab 4   • glipiZIDE (GLUCOTROL) 5 MG Tab Take 1 Tab by mouth every day. 90 Tab 4   • fenofibrate (TRIGLIDE) 160 MG tablet Take 1 Tab by mouth every day. 90 Tab 4   • citalopram (CELEXA) 20 MG Tab Take 1 Tab by mouth every day. 90 Tab 4   • aspirin (ASA) 81 MG Chew Tab chewable tablet Take 1 Tab by mouth every day. 100 Tab 11   • ALPRAZolam (XANAX) 0.25 MG Tab Take 1 Tab by mouth 1 time daily as needed for Sleep for up to 30 days. 30 Tab 5   • maalox plus-benadryl-visc lidocaine (MAGIC MOUTHWASH) Take 5 mL by mouth every 6 hours as needed. 1 Bottle 0   • ondansetron (ZOFRAN) 4 MG Tab tablet Take 1 Tab by mouth every four hours as needed for Nausea/Vomiting (for nausea, vomiting). 30 Tab 6   • prochlorperazine (COMPAZINE) 10 MG Tab Take 1 Tab by mouth every 6 hours as needed  "(nausea, vomiting, migraine). 30 Tab 0     No current facility-administered medications on file prior to visit.        Review of Systems   Constitutional: Positive for malaise/fatigue. Negative for chills, fever and weight loss.   Respiratory: Positive for shortness of breath (with exertion). Negative for cough and wheezing.    Cardiovascular: Positive for palpitations (very mild). Negative for chest pain and leg swelling.   Gastrointestinal: Positive for diarrhea (last night she did have some uncontrollable diarrhea'), nausea and vomiting (first few days after chemo). Negative for constipation.   Genitourinary: Negative for dysuria.   Musculoskeletal: Positive for myalgias (after Neulasta).   Neurological: Positive for dizziness (mild at times). Negative for headaches.   Psychiatric/Behavioral: The patient has insomnia (she does take Ativan at night and it helps her sleep).           Objective:     /76   Pulse 84   Temp 36.5 °C (97.7 °F)   Resp 18   Ht 1.651 m (5' 5\")   Wt 88.6 kg (195 lb 7 oz)   SpO2 95%   Breastfeeding? No   BMI 32.52 kg/m²      Physical Exam   Constitutional: She is oriented to person, place, and time. She appears well-developed and well-nourished. No distress.   HENT:   Head: Normocephalic and atraumatic.   Mouth/Throat: Oropharynx is clear and moist. No oropharyngeal exudate.   Eyes: Conjunctivae and EOM are normal. Pupils are equal, round, and reactive to light. Right eye exhibits no discharge. Left eye exhibits no discharge. No scleral icterus.   Cardiovascular: Normal rate, regular rhythm, normal heart sounds and intact distal pulses.  Exam reveals no gallop and no friction rub.    No murmur heard.  Pulmonary/Chest: Effort normal and breath sounds normal. No respiratory distress. She has no wheezes.   Abdominal: Soft. Bowel sounds are normal. She exhibits no distension. There is no tenderness.   Musculoskeletal: Normal range of motion. She exhibits no edema or tenderness. "   Neurological: She is alert and oriented to person, place, and time.   Skin: Skin is warm and dry. No rash noted. She is not diaphoretic. No erythema. No pallor.   Psychiatric: She has a normal mood and affect. Her behavior is normal.   Vitals reviewed.    Hospital Outpatient Visit on 04/05/2018   Component Date Value Ref Range Status   • WBC 04/05/2018 10.9* 4.8 - 10.8 K/uL Final   • RBC 04/05/2018 3.61* 4.20 - 5.40 M/uL Final   • Hemoglobin 04/05/2018 10.5* 12.0 - 16.0 g/dL Final   • Hematocrit 04/05/2018 32.9* 37.0 - 47.0 % Final   • MCV 04/05/2018 91.1  81.4 - 97.8 fL Final   • MCH 04/05/2018 29.1  27.0 - 33.0 pg Final   • MCHC 04/05/2018 31.9* 33.6 - 35.0 g/dL Final   • RDW 04/05/2018 52.9* 35.9 - 50.0 fL Final   • Platelet Count 04/05/2018 147* 164 - 446 K/uL Final    Comment: Platelet clumping confirmed on smear review.  If a more accurate platelet  count is required, please request recollection.     • MPV 04/05/2018 10.4  9.0 - 12.9 fL Final   • Neutrophils-Polys 04/05/2018 50.10  44.00 - 72.00 % Final   • Lymphocytes 04/05/2018 34.50  22.00 - 41.00 % Final   • Monocytes 04/05/2018 8.00  0.00 - 13.40 % Final   • Eosinophils 04/05/2018 2.30  0.00 - 6.90 % Final   • Basophils 04/05/2018 1.00  0.00 - 1.80 % Final   • Immature Granulocytes 04/05/2018 4.10* 0.00 - 0.90 % Final   • Nucleated RBC 04/05/2018 0.00  /100 WBC Final   • Lymphs (Absolute) 04/05/2018 3.74  1.00 - 4.80 K/uL Final   • Monos (Absolute) 04/05/2018 0.87* 0.00 - 0.85 K/uL Final   • Eos (Absolute) 04/05/2018 0.25  0.00 - 0.51 K/uL Final   • Baso (Absolute) 04/05/2018 0.11  0.00 - 0.12 K/uL Final   • Immature Granulocytes (abs) 04/05/2018 0.44* 0.00 - 0.11 K/uL Final   • NRBC (Absolute) 04/05/2018 0.00  K/uL Final   • Neutrophils (Absolute) 04/05/2018 5.44  2.00 - 7.15 K/uL Final   • Sodium 04/05/2018 134* 135 - 145 mmol/L Final   • Potassium 04/05/2018 4.0  3.6 - 5.5 mmol/L Final   • Chloride 04/05/2018 101  96 - 112 mmol/L Final   • Co2  04/05/2018 24  20 - 33 mmol/L Final   • Anion Gap 04/05/2018 9.0  0.0 - 11.9 Final   • Glucose 04/05/2018 104* 65 - 99 mg/dL Final   • Bun 04/05/2018 10  8 - 22 mg/dL Final   • Creatinine 04/05/2018 0.66  0.50 - 1.40 mg/dL Final   • Calcium 04/05/2018 8.9  8.5 - 10.5 mg/dL Final   • AST(SGOT) 04/05/2018 13  12 - 45 U/L Final   • ALT(SGPT) 04/05/2018 11  2 - 50 U/L Final   • Alkaline Phosphatase 04/05/2018 66  30 - 99 U/L Final   • Total Bilirubin 04/05/2018 0.3  0.1 - 1.5 mg/dL Final   • Albumin 04/05/2018 4.3  3.2 - 4.9 g/dL Final   • Total Protein 04/05/2018 6.5  6.0 - 8.2 g/dL Final   • Globulin 04/05/2018 2.2  1.9 - 3.5 g/dL Final   • A-G Ratio 04/05/2018 2.0  g/dL Final   • GFR If  04/05/2018 >60  >60 mL/min/1.73 m 2 Final   • GFR If Non  04/05/2018 >60  >60 mL/min/1.73 m 2 Final   • Peripheral Smear Review 04/05/2018 see below   Final    Comment: Due to instrument suspect flags, further review of peripheral smear is  indicated on this patient sample. This review may or may not result in  abnormal findings.     • Comments-Diff 04/05/2018 see below   Final            Assessment/Plan:     1. Malignant neoplasm of upper-outer quadrant of left breast in female, estrogen receptor negative (CMS-HCC)-  1 positive node; dr. koo; dr sharpe-  chemorx 1st, then surgery, then RT  dexamethasone (DECADRON) 4 MG Tab   2. Chemotherapy induced nausea and vomiting  dexamethasone (DECADRON) 4 MG Tab   3. Myalgia     4. Neoplastic malignant related fatigue       Plan  1. I did review patient's CBC and CMP in her labs are appropriate to proceed with treatment tomorrow. She will be due for cycle #3 of dose dense AC. We will continue with the 20% dose reduction that patient received on cycle 2 per Dr. Barroso's orders.    2. Patient has been experiencing significant chemotherapy induced nausea and vomiting for the first 3-4 days following chemotherapy. I discussed this Dr. Barroso today. He has  discontinued the premedication of Zofran added Aloxi for tomorrow. We will also give patient 8 mg by mouth dexamethasone on days 2 and day 3. I discussed the plan of care with the patient today over the phone after further discussion with Dr. Barroso and she verbalized understanding.    3. Patient does experience significant myalgias as it relates to Neulasta. I have encouraged her to continue to take the Claritin daily. I also did speak with Dr. Barroso and patient is okay to take small doses of ibuprofen as needed for the pain. I encouraged her to increase her hydration and take with food. Patient verbalized understanding.    4. Patient continues to have chemotherapy induced fatigue. This is fairly tolerable per patient and we will continue to monitor.    5. Patient is to follow-up again in 2 weeks or sooner if needed.

## 2018-04-05 NOTE — PROGRESS NOTES
"Pharmacy Chemotherapy Verification  Patient Name: Clarisse Reeves  Dx: Breast CA    Cycle: 3 Previous treatment = C2 on 3/22/18    Regimen and Dosing Reference  DDAC (Dose Dense Doxorubicin + Cyclophosphamide) followed by Paclitaxel  DOXOrubicin 60 mg/m2 IV on Day 1--dose reduced to 48 mg/m2 due to tolerance  Cyclophosphamide 600 mg/m2 IV over 30 min on Day 1--dose reduced to 480 mg/m2 due to tolerance  14 day cycle for 4 cycles  NCCN Guidelines for Breast Cancer V.2.2017  Jamila ML et al J Clin Oncol. 2003:21(8):1431-9    Allergies:Codeine; Lipitor [atorvastatin calcium]; Lovastatin; Zocor [simvastatin - high dose]; Lisinopril; Metformin; and Tape  Blood pressure 127/74, pulse 98, temperature 37.1 °C (98.8 °F), resp. rate 18, height 1.651 m (5' 5\"), weight 88.5 kg (195 lb 1.7 oz), SpO2 98 %.  Body surface area is 2.01 meters squared.    Labs 4/5/18  ANC ~ 5440   Plt = 147 k  Hgb = 10.5   SCr = 0.66      CrCl ~94.5 ml/min    AST/ALT/AlkPhos = 13/11/66 Tbili = 0.3    ECHO 1/30/18  LVEF = 65%    DOXOrubicin 48 mg/m2 x 2.01 m2 = 96.48 mg   <5% diff, OK to treat with final written dose = 96.5 mg IV    Cyclophosphamide 480 mg/m2 x 2.01 m2 = 964.8 mg   <5% diff, OK to treat with final written dose =  964.8 mg IV    Rozina Rangel, PharmD, BCOP        "

## 2018-04-06 ENCOUNTER — OUTPATIENT INFUSION SERVICES (OUTPATIENT)
Dept: ONCOLOGY | Facility: MEDICAL CENTER | Age: 78
End: 2018-04-06
Attending: INTERNAL MEDICINE
Payer: MEDICARE

## 2018-04-06 ENCOUNTER — DOCUMENTATION (OUTPATIENT)
Dept: HEMATOLOGY ONCOLOGY | Facility: MEDICAL CENTER | Age: 78
End: 2018-04-06

## 2018-04-06 VITALS
RESPIRATION RATE: 18 BRPM | HEIGHT: 65 IN | DIASTOLIC BLOOD PRESSURE: 74 MMHG | OXYGEN SATURATION: 98 % | HEART RATE: 98 BPM | SYSTOLIC BLOOD PRESSURE: 127 MMHG | BODY MASS INDEX: 32.51 KG/M2 | TEMPERATURE: 98.8 F | WEIGHT: 195.11 LBS

## 2018-04-06 DIAGNOSIS — Z17.1 MALIGNANT NEOPLASM OF UPPER-OUTER QUADRANT OF LEFT BREAST IN FEMALE, ESTROGEN RECEPTOR NEGATIVE (HCC): ICD-10-CM

## 2018-04-06 DIAGNOSIS — C50.412 MALIGNANT NEOPLASM OF UPPER-OUTER QUADRANT OF LEFT BREAST IN FEMALE, ESTROGEN RECEPTOR NEGATIVE (HCC): ICD-10-CM

## 2018-04-06 PROCEDURE — 96367 TX/PROPH/DG ADDL SEQ IV INF: CPT

## 2018-04-06 PROCEDURE — 700111 HCHG RX REV CODE 636 W/ 250 OVERRIDE (IP): Mod: JG | Performed by: INTERNAL MEDICINE

## 2018-04-06 PROCEDURE — 96413 CHEMO IV INFUSION 1 HR: CPT

## 2018-04-06 PROCEDURE — 96375 TX/PRO/DX INJ NEW DRUG ADDON: CPT

## 2018-04-06 PROCEDURE — 96417 CHEMO IV INFUS EACH ADDL SEQ: CPT

## 2018-04-06 PROCEDURE — 700105 HCHG RX REV CODE 258: Performed by: INTERNAL MEDICINE

## 2018-04-06 PROCEDURE — 700105 HCHG RX REV CODE 258

## 2018-04-06 PROCEDURE — 700111 HCHG RX REV CODE 636 W/ 250 OVERRIDE (IP): Mod: JG

## 2018-04-06 RX ORDER — PALONOSETRON 0.05 MG/ML
0.25 INJECTION, SOLUTION INTRAVENOUS ONCE
Status: COMPLETED | OUTPATIENT
Start: 2018-04-06 | End: 2018-04-06

## 2018-04-06 RX ADMIN — PALONOSETRON HYDROCHLORIDE 0.25 MG: 0.25 INJECTION INTRAVENOUS at 15:23

## 2018-04-06 RX ADMIN — DEXAMETHASONE SODIUM PHOSPHATE 12 MG: 4 INJECTION, SOLUTION INTRAMUSCULAR; INTRAVENOUS at 15:23

## 2018-04-06 RX ADMIN — SODIUM CHLORIDE 150 MG: 9 INJECTION, SOLUTION INTRAVENOUS at 15:57

## 2018-04-06 RX ADMIN — CYCLOPHOSPHAMIDE 964.8 MG: 2 INJECTION, POWDER, FOR SOLUTION INTRAVENOUS; ORAL at 17:16

## 2018-04-06 RX ADMIN — DOXORUBICIN HYDROCHLORIDE 96.5 MG: 2 INJECTION, SOLUTION INTRAVENOUS at 16:37

## 2018-04-06 ASSESSMENT — PAIN SCALES - GENERAL: PAINLEVEL: NO PAIN

## 2018-04-06 NOTE — PROGRESS NOTES
Nutrition Services: Update  I visited with patient chair-side this afternoon.  Patients weight today is 195 pounds, this is a decrease of ~ 3 pounds/1.5% in the last week which is significant.  Patient reports that overall she has had a normal appetite and has been trying to eat regularly.  She does note that after initial chemo infusion she was sick for ~3 days and had nausea, vomiting and was unable to keep a lot of foods down.  After 3 days however she was back to normal.  I encouraged adequate PO with small frequent meals and snacks with bland foods should nausea become a problem.  Patient also has prescribed zofran and compazine available PRN. Currently patient reports no GI related symptoms and that she is feeling pretty great overall.     I encouraged patient to contact RD should questions or concerns arise.   RD will continue to monitor and follow-up

## 2018-04-06 NOTE — PROGRESS NOTES
"Pharmacy Chemotherapy Note    Patient Name: GREG DEMARCO  Dx: ER/IA negative HER-2 negative Breast Cancer        Protocol: DDAC     *Dosing Reference*  Doxorubicin 60 mg/m2 IV push on day 1   -- 3/22/18: dose decreased to 48 mg/m2 for side effect  Cyclophosphamide 600 mg/m2 IV over 30 min on day 1   -- 3/22/18: dose decreased to 480 mg/m2 for side effects  14-day cycle x 4 cycles  ~followed by~  Paclitaxel 80 mg/m2 IV over 60 min on day 1  Weekly course x 12 weeks  NCCN Guidelines for Breast Cancer V.1.2017  Jamila DARLING, et al. J Clin Oncol. 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      Labs 4/5/18  ANC ~5440   Plt = 147k     Hgb = 10.5     SCr = 0.66 mg/dL  CrCl ~96 mL/min (min SCr 0.7 used)  AST/ALT/AP = WNL TBili = 0.3     1/30/2018: ECHO LVEF: 65%     Drug Order   (Drug name, dose, route, IV Fluid & volume, frequency, number of doses) Cycle 3   Previous treatment: C2 on 3/22/18     Medication = Doxorubicin (Adriamycin)  Base Dose =  48 mg/m2  Calc Dose: Base Dose x 2.01 m2 = 96.5 mg  Final Dose = 96.5 mg  Route = IV  Conc. & Volume = 2 mg/mL = 48.24 mL  Admin Duration = Over 20 mins          <5% difference, OK to treat with final dose    Medication = Cyclophosphamide (Cytoxan)  Base Dose= 480 mg/m2  Calc Dose: Base Dose x 2.01 m2 = 964.8 mg  Final Dose = 964.8 mg  Route = IV  Fluid & Volume =  mL  Admin Duration = Over 30-60 min          <5% difference, OK to treat with final dose      By my signature below, I confirm this process was performed independently with the BSA and all final chemotherapy dosing calculations congruent. I have reviewed the above chemotherapy order and that my " calculation of the final dose and BSA (when applicable) corroborate those calculations of the  pharmacist. Discrepancies of 5% or greater in the written dose have been addressed and documented within the EPIC Progress notes.      Sona ButtD

## 2018-04-06 NOTE — PROGRESS NOTES
Chemotherapy Verification - PRIMARY RN      Height = 65.5 in  Weight = 88.5 kg  BSA = 2.01 m2       Medication: Doxorubicin  Dose: 48 mg/m2  Calculated Dose: 96.48 mg                             (In mg/m2, AUC, mg/kg)     Medication: Cytoxan  Dose: 480 mg/m2  Calculated Dose: 964.8 mg                             (In mg/m2, AUC, mg/kg)    I confirm this process was performed independently with the BSA and all final chemotherapy dosing calculations congruent.  Any discrepancies of 5% or greater have been addressed with the chemotherapy pharmacist. The resolution of the discrepancy has been documented in the EPIC progress notes.

## 2018-04-06 NOTE — PROGRESS NOTES
Chemotherapy Verification - SECONDARY RN       Height = 65 in  Weight = 195 lb  BSA = 2.01 m2       Medication: Adriamycin  Dose: 60 mg/m2  Calculated Dose: 96.48 mg ( 20% reduction due to tolerance ) LVEF = 65% on 1/30/2018                             (In mg/m2, AUC, mg/kg)     Medication: Cytoxan  Dose: 600 mg/m2  Calculated Dose: 964.8 mg ( 20% due to tolerance)                              (In mg/m2, AUC, mg/kg)      I confirm that this process was performed independently.

## 2018-04-08 ENCOUNTER — OUTPATIENT INFUSION SERVICES (OUTPATIENT)
Dept: ONCOLOGY | Facility: MEDICAL CENTER | Age: 78
End: 2018-04-08
Attending: INTERNAL MEDICINE
Payer: MEDICARE

## 2018-04-08 VITALS
BODY MASS INDEX: 33.68 KG/M2 | HEIGHT: 65 IN | TEMPERATURE: 99 F | SYSTOLIC BLOOD PRESSURE: 110 MMHG | WEIGHT: 202.16 LBS | HEART RATE: 88 BPM | DIASTOLIC BLOOD PRESSURE: 76 MMHG | OXYGEN SATURATION: 99 % | RESPIRATION RATE: 18 BRPM

## 2018-04-08 DIAGNOSIS — C50.412 MALIGNANT NEOPLASM OF UPPER-OUTER QUADRANT OF LEFT BREAST IN FEMALE, ESTROGEN RECEPTOR NEGATIVE (HCC): ICD-10-CM

## 2018-04-08 DIAGNOSIS — Z17.1 MALIGNANT NEOPLASM OF UPPER-OUTER QUADRANT OF LEFT BREAST IN FEMALE, ESTROGEN RECEPTOR NEGATIVE (HCC): ICD-10-CM

## 2018-04-08 PROCEDURE — A9270 NON-COVERED ITEM OR SERVICE: HCPCS | Performed by: INTERNAL MEDICINE

## 2018-04-08 PROCEDURE — 96372 THER/PROPH/DIAG INJ SC/IM: CPT

## 2018-04-08 PROCEDURE — 700102 HCHG RX REV CODE 250 W/ 637 OVERRIDE(OP): Performed by: INTERNAL MEDICINE

## 2018-04-08 PROCEDURE — 700111 HCHG RX REV CODE 636 W/ 250 OVERRIDE (IP): Mod: JG | Performed by: INTERNAL MEDICINE

## 2018-04-08 RX ORDER — DEXAMETHASONE 4 MG/1
8 TABLET ORAL ONCE
Status: COMPLETED | OUTPATIENT
Start: 2018-04-08 | End: 2018-04-08

## 2018-04-08 RX ADMIN — PEGFILGRASTIM 6 MG: 6 INJECTION SUBCUTANEOUS at 14:44

## 2018-04-08 RX ADMIN — DEXAMETHASONE 8 MG: 4 TABLET ORAL at 14:44

## 2018-04-08 ASSESSMENT — PAIN SCALES - GENERAL: PAINLEVEL: NO PAIN

## 2018-04-08 NOTE — PROGRESS NOTES
"Patient arrived ambulatory to the [facility] for Neulasta. Reviewed vital signs, labs, and physician order. Patient denies S&S of infection or mouth sores. Pt reports taking oral steroids yesterday \"due to issues in the past with Neulasta\", pt to receive dose today in [facility], pt has final dose at home tomorrow, pt able to state plan and verbalized understanding. Neulasta administered to right lower abdomen, denies request for band aid dressing. Confirmed upcoming appointment date and time with patient. Patient left the [facility] ambulatory in no sign of distress.   "

## 2018-04-11 ENCOUNTER — NON-PROVIDER VISIT (OUTPATIENT)
Dept: HEMATOLOGY ONCOLOGY | Facility: MEDICAL CENTER | Age: 78
End: 2018-04-11
Payer: MEDICARE

## 2018-04-11 ENCOUNTER — APPOINTMENT (OUTPATIENT)
Dept: ONCOLOGY | Facility: MEDICAL CENTER | Age: 78
End: 2018-04-11
Attending: INTERNAL MEDICINE
Payer: MEDICARE

## 2018-04-11 VITALS
BODY MASS INDEX: 33.44 KG/M2 | HEIGHT: 65 IN | DIASTOLIC BLOOD PRESSURE: 76 MMHG | OXYGEN SATURATION: 95 % | RESPIRATION RATE: 16 BRPM | SYSTOLIC BLOOD PRESSURE: 118 MMHG | HEART RATE: 85 BPM | WEIGHT: 200.73 LBS | TEMPERATURE: 97.9 F

## 2018-04-11 ASSESSMENT — PAIN SCALES - GENERAL: PAINLEVEL: 5=MODERATE PAIN

## 2018-04-11 NOTE — PROGRESS NOTES
Clarisse Reeves is a 77 y.o. female here for a non-provider visit for: PICC line dressing change on 4/11/2018 at 10:51 AM. VSS     Per Dr. Barroso pt no longer needs weekly labs as her counts have been stable. Labs will be checked at her next prechemo visit.  Both lumens patent and positive blood return verified. Flushed lumens and changed claves per protocol.  Performed PICC line dressing change per protocol.  Applied two hypoallergenic tegaderm over PICC.  No s/s of infection noted.

## 2018-04-16 ENCOUNTER — PATIENT MESSAGE (OUTPATIENT)
Dept: HEALTH INFORMATION MANAGEMENT | Facility: OTHER | Age: 78
End: 2018-04-16

## 2018-04-17 DIAGNOSIS — F51.01 PRIMARY INSOMNIA: ICD-10-CM

## 2018-04-17 RX ORDER — LORAZEPAM 2 MG/1
TABLET ORAL
Qty: 60 TAB | Refills: 5 | Status: SHIPPED | OUTPATIENT
Start: 2018-04-17 | End: 2018-04-18 | Stop reason: SDUPTHER

## 2018-04-18 ENCOUNTER — TELEPHONE (OUTPATIENT)
Dept: MEDICAL GROUP | Age: 78
End: 2018-04-18

## 2018-04-18 ENCOUNTER — TELEPHONE (OUTPATIENT)
Dept: HEMATOLOGY ONCOLOGY | Facility: MEDICAL CENTER | Age: 78
End: 2018-04-18

## 2018-04-18 DIAGNOSIS — C50.412 MALIGNANT NEOPLASM OF UPPER-OUTER QUADRANT OF LEFT BREAST IN FEMALE, ESTROGEN RECEPTOR NEGATIVE (HCC): ICD-10-CM

## 2018-04-18 DIAGNOSIS — F51.01 PRIMARY INSOMNIA: ICD-10-CM

## 2018-04-18 DIAGNOSIS — Z17.1 MALIGNANT NEOPLASM OF UPPER-OUTER QUADRANT OF LEFT BREAST IN FEMALE, ESTROGEN RECEPTOR NEGATIVE (HCC): ICD-10-CM

## 2018-04-18 DIAGNOSIS — F41.9 ANXIETY: ICD-10-CM

## 2018-04-18 RX ORDER — LORAZEPAM 2 MG/1
2 TABLET ORAL NIGHTLY PRN
Qty: 60 TAB | Refills: 5 | Status: SHIPPED
Start: 2018-04-18 | End: 2018-04-19 | Stop reason: SDUPTHER

## 2018-04-18 RX ORDER — ALPRAZOLAM 0.25 MG/1
0.25 TABLET ORAL
Qty: 30 TAB | Refills: 5 | Status: SHIPPED
Start: 2018-04-18 | End: 2018-05-18

## 2018-04-19 ENCOUNTER — HOSPITAL ENCOUNTER (OUTPATIENT)
Facility: MEDICAL CENTER | Age: 78
End: 2018-04-19
Attending: INTERNAL MEDICINE
Payer: MEDICARE

## 2018-04-19 ENCOUNTER — OFFICE VISIT (OUTPATIENT)
Dept: HEMATOLOGY ONCOLOGY | Facility: MEDICAL CENTER | Age: 78
End: 2018-04-19
Payer: MEDICARE

## 2018-04-19 ENCOUNTER — NON-PROVIDER VISIT (OUTPATIENT)
Dept: HEMATOLOGY ONCOLOGY | Facility: MEDICAL CENTER | Age: 78
End: 2018-04-19
Payer: MEDICARE

## 2018-04-19 ENCOUNTER — APPOINTMENT (OUTPATIENT)
Dept: HEMATOLOGY ONCOLOGY | Facility: MEDICAL CENTER | Age: 78
End: 2018-04-19
Payer: MEDICARE

## 2018-04-19 VITALS
DIASTOLIC BLOOD PRESSURE: 64 MMHG | HEIGHT: 64 IN | TEMPERATURE: 98.6 F | SYSTOLIC BLOOD PRESSURE: 118 MMHG | OXYGEN SATURATION: 97 % | HEART RATE: 77 BPM | WEIGHT: 199 LBS | RESPIRATION RATE: 16 BRPM | BODY MASS INDEX: 33.97 KG/M2

## 2018-04-19 VITALS
HEIGHT: 64 IN | SYSTOLIC BLOOD PRESSURE: 118 MMHG | HEART RATE: 77 BPM | WEIGHT: 199.74 LBS | RESPIRATION RATE: 16 BRPM | DIASTOLIC BLOOD PRESSURE: 64 MMHG | TEMPERATURE: 98.6 F | OXYGEN SATURATION: 97 % | BODY MASS INDEX: 34.1 KG/M2

## 2018-04-19 DIAGNOSIS — F41.9 ANXIETY: ICD-10-CM

## 2018-04-19 DIAGNOSIS — C50.412 MALIGNANT NEOPLASM OF UPPER-OUTER QUADRANT OF LEFT BREAST IN FEMALE, ESTROGEN RECEPTOR NEGATIVE (HCC): ICD-10-CM

## 2018-04-19 DIAGNOSIS — Z17.1 MALIGNANT NEOPLASM OF UPPER-OUTER QUADRANT OF LEFT BREAST IN FEMALE, ESTROGEN RECEPTOR NEGATIVE (HCC): ICD-10-CM

## 2018-04-19 DIAGNOSIS — C91.10 CLL (CHRONIC LYMPHOCYTIC LEUKEMIA) (HCC): ICD-10-CM

## 2018-04-19 LAB
ALBUMIN SERPL BCP-MCNC: 4.2 G/DL (ref 3.2–4.9)
ALBUMIN/GLOB SERPL: 1.8 G/DL
ALP SERPL-CCNC: 76 U/L (ref 30–99)
ALT SERPL-CCNC: 13 U/L (ref 2–50)
ANION GAP SERPL CALC-SCNC: 9 MMOL/L (ref 0–11.9)
ANISOCYTOSIS BLD QL SMEAR: ABNORMAL
AST SERPL-CCNC: 11 U/L (ref 12–45)
BASOPHILS # BLD AUTO: 1.3 % (ref 0–1.8)
BASOPHILS # BLD: 0.13 K/UL (ref 0–0.12)
BILIRUB SERPL-MCNC: 0.3 MG/DL (ref 0.1–1.5)
BUN SERPL-MCNC: 13 MG/DL (ref 8–22)
CALCIUM SERPL-MCNC: 8.9 MG/DL (ref 8.5–10.5)
CHLORIDE SERPL-SCNC: 100 MMOL/L (ref 96–112)
CO2 SERPL-SCNC: 25 MMOL/L (ref 20–33)
CREAT SERPL-MCNC: 0.77 MG/DL (ref 0.5–1.4)
EOSINOPHIL # BLD AUTO: 0.22 K/UL (ref 0–0.51)
EOSINOPHIL NFR BLD: 2.2 % (ref 0–6.9)
ERYTHROCYTE [DISTWIDTH] IN BLOOD BY AUTOMATED COUNT: 58.6 FL (ref 35.9–50)
GLOBULIN SER CALC-MCNC: 2.3 G/DL (ref 1.9–3.5)
GLUCOSE SERPL-MCNC: 93 MG/DL (ref 65–99)
HCT VFR BLD AUTO: 30.5 % (ref 37–47)
HGB BLD-MCNC: 10.1 G/DL (ref 12–16)
LYMPHOCYTES # BLD AUTO: 1.81 K/UL (ref 1–4.8)
LYMPHOCYTES NFR BLD: 17.7 % (ref 22–41)
MACROCYTES BLD QL SMEAR: ABNORMAL
MANUAL DIFF BLD: NORMAL
MCH RBC QN AUTO: 30.1 PG (ref 27–33)
MCHC RBC AUTO-ENTMCNC: 33.1 G/DL (ref 33.6–35)
MCV RBC AUTO: 90.8 FL (ref 81.4–97.8)
METAMYELOCYTES NFR BLD MANUAL: 0.9 %
MONOCYTES # BLD AUTO: 0.58 K/UL (ref 0–0.85)
MONOCYTES NFR BLD AUTO: 5.7 % (ref 0–13.4)
MORPHOLOGY BLD-IMP: NORMAL
MYELOCYTES NFR BLD MANUAL: 1.8 %
NEUTROPHILS # BLD AUTO: 7.18 K/UL (ref 2–7.15)
NEUTROPHILS NFR BLD: 69.5 % (ref 44–72)
NEUTS BAND NFR BLD MANUAL: 0.9 % (ref 0–10)
NRBC # BLD AUTO: 0 K/UL
NRBC BLD-RTO: 0 /100 WBC
OVALOCYTES BLD QL SMEAR: NORMAL
PLATELET # BLD AUTO: 181 K/UL (ref 164–446)
PLATELET BLD QL SMEAR: NORMAL
PMV BLD AUTO: 9.8 FL (ref 9–12.9)
POIKILOCYTOSIS BLD QL SMEAR: NORMAL
POTASSIUM SERPL-SCNC: 4.2 MMOL/L (ref 3.6–5.5)
PROT SERPL-MCNC: 6.5 G/DL (ref 6–8.2)
RBC # BLD AUTO: 3.36 M/UL (ref 4.2–5.4)
RBC BLD AUTO: PRESENT
SODIUM SERPL-SCNC: 134 MMOL/L (ref 135–145)
WBC # BLD AUTO: 10.2 K/UL (ref 4.8–10.8)

## 2018-04-19 PROCEDURE — 85007 BL SMEAR W/DIFF WBC COUNT: CPT

## 2018-04-19 PROCEDURE — 99214 OFFICE O/P EST MOD 30 MIN: CPT | Performed by: INTERNAL MEDICINE

## 2018-04-19 PROCEDURE — 36592 COLLECT BLOOD FROM PICC: CPT | Performed by: INTERNAL MEDICINE

## 2018-04-19 PROCEDURE — 80053 COMPREHEN METABOLIC PANEL: CPT

## 2018-04-19 PROCEDURE — 85027 COMPLETE CBC AUTOMATED: CPT

## 2018-04-19 RX ORDER — 0.9 % SODIUM CHLORIDE 0.9 %
5 VIAL (ML) INJECTION PRN
Status: CANCELLED | OUTPATIENT
Start: 2018-04-19

## 2018-04-19 RX ORDER — 0.9 % SODIUM CHLORIDE 0.9 %
VIAL (ML) INJECTION PRN
Status: CANCELLED | OUTPATIENT
Start: 2018-04-20

## 2018-04-19 RX ORDER — PALONOSETRON 0.05 MG/ML
0.25 INJECTION, SOLUTION INTRAVENOUS ONCE
Status: CANCELLED
Start: 2018-04-20 | End: 2018-04-20

## 2018-04-19 RX ORDER — 0.9 % SODIUM CHLORIDE 0.9 %
VIAL (ML) INJECTION PRN
Status: CANCELLED | OUTPATIENT
Start: 2018-04-19

## 2018-04-19 RX ORDER — ONDANSETRON 2 MG/ML
4 INJECTION INTRAMUSCULAR; INTRAVENOUS
Status: CANCELLED | OUTPATIENT
Start: 2018-04-20

## 2018-04-19 RX ORDER — LORAZEPAM 2 MG/1
4 TABLET ORAL NIGHTLY PRN
Qty: 60 TAB | Refills: 5 | Status: SHIPPED
Start: 2018-04-19 | End: 2018-05-19

## 2018-04-19 RX ORDER — SODIUM CHLORIDE 9 MG/ML
INJECTION, SOLUTION INTRAVENOUS CONTINUOUS
Status: CANCELLED | OUTPATIENT
Start: 2018-04-20

## 2018-04-19 RX ORDER — ONDANSETRON 8 MG/1
8 TABLET, ORALLY DISINTEGRATING ORAL
Status: CANCELLED | OUTPATIENT
Start: 2018-04-20

## 2018-04-19 RX ORDER — PROCHLORPERAZINE MALEATE 10 MG
10 TABLET ORAL EVERY 6 HOURS PRN
Status: CANCELLED | OUTPATIENT
Start: 2018-04-20

## 2018-04-19 RX ORDER — 0.9 % SODIUM CHLORIDE 0.9 %
5 VIAL (ML) INJECTION PRN
Status: CANCELLED | OUTPATIENT
Start: 2018-04-20

## 2018-04-19 RX ORDER — DEXAMETHASONE 4 MG/1
8 TABLET ORAL ONCE
Status: CANCELLED
Start: 2018-04-21 | End: 2018-04-21

## 2018-04-19 ASSESSMENT — PAIN SCALES - GENERAL
PAINLEVEL: 7=MODERATE-SEVERE PAIN
PAINLEVEL: 7=MODERATE-SEVERE PAIN

## 2018-04-19 NOTE — TELEPHONE ENCOUNTER
"[facility] sent back a fax saying they need a new Rx with a \"days\" supply for pts Lorazepam  Thank you   "

## 2018-04-19 NOTE — TELEPHONE ENCOUNTER
CHANGED RX TO  PRN ANXIETY  
Lvm informing pt of change to Rx  
Pt wanted Dr richter to know that her insurance wouldn't cover her prescription for Xanax because it was written for sleep and not anxiety. Please advise.   Thank you   
operating room

## 2018-04-19 NOTE — PROGRESS NOTES
Clarisse Reeves is a 77 y.o. female here for a non-provider visit for: PICC line dressing change and labs.  VSS.  Pt states she is doing well. She has been wrapping her PICC line with plastic wrap and tape at home when she bathes.  Dressing is clean, dry and intact. Pt has scab about dressing on inner upper arm from previous blister. No s/s of infection noted. Both lumens + for blood return.  Labs drawn per orders from Dr. Rebolledo from red [employer].  Both lumens flushed per protocol.  Performed PICC line dressing change per protocol with biopatch.  Applied hypoallergenic tegaderm x 2 over site.

## 2018-04-19 NOTE — PROGRESS NOTES
Prescription faxed to:    [facility] #[id number] - [place], [place] - [address]  [address] [place] [address]  Phone: [phone] Fax: [phone]  .

## 2018-04-19 NOTE — PROGRESS NOTES
"Date of visit: 4/19/2018  1:23 PM      Chief Complaint- prechemotherapy visit.- -Stage IIIc [cT2, cN3c, M0]. ER/ND negative HER-2 negative, triple negative breast carcinoma      Identification/Prior relevant history: Clarisse Reeves  is a 77 y.o. year old female who is currently undergoing neoadjuvant dose dense chemotherapy for her high-risk triple negative breast carcinoma. She required dose reduction after she developed neutropenic fever and anemia.    Interim history-she tolerated the third dose better. She did have the usual fatigue and some nausea issues. She is currently feeling better. She tends to develop a lot of Neulasta-induced discomfort.    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  Denies symptoms.   • Cutaneous skin tags 1/29/2015   • DM (diabetes mellitus) (CMS-[facility])    • Hiatus hernia syndrome     no surgery   • Hypertension     \"In the past\"   • Indigestion    • MEDICAL HOME    • Personal history of colonic polyps 11/26/2012   • Pneumonia 2014   • Pneumonia 06/2017   • Thyroid disease    • Type II or unspecified type diabetes mellitus without mention of complication, not stated as uncontrolled     pre-diabetic       Past surgical history:       Past Surgical History:   Procedure Laterality Date   • THYROIDECTOMY N/A 11/29/2017    Procedure: THYROIDECTOMY COMPLETION, RECURRENT LARYNGEAL NERVE MONITORING;  Surgeon: Cameron Marcelino M.D.;  Location: SURGERY SAME DAY [facility] ORS;  Service: General   • THORACOSCOPY Right 2/1/2016    Procedure: THORACOSCOPY Upper Lobectomy ;  Surgeon: John H Ganser, M.D.;  Location: SURGERY [facility];  Service:    • NODE DISSECTION Right 2/1/2016    Procedure: NODE DISSECTION;  " Surgeon: John H Ganser, M.D.;  Location: SURGERY [facility];  Service:    • RECOVERY  12/18/2015    Procedure: CT-SCP-RUL LUNG BIOPSY-;  Surgeon: Recoveryonly Surgery;  Location: SURGERY PRE-POST PROC UNIT [facility];  Service:    • OTHER  2001    Lower Left segment of lung removed    • CHOLECYSTECTOMY  1995   • OTHER ORTHOPEDIC SURGERY  1990    bakers cyst removed in right knee, multiple scopes   • OTHER  1990    hemmroid removal   • OTHER  1984    left Thyroid removed, might remove right side in the future   • APPENDECTOMY  1955   • CATARACT EXTRACTION WITH IOL     • TONSILLECTOMY     • US-NEEDLE CORE BX-BREAST PANEL     • VAGINAL HYSTERECTOMY TOTAL      Hysterectomy,Total Vaginal       Allergies:       Codeine; Lipitor [atorvastatin calcium]; Lovastatin; Zocor [simvastatin - high dose]; Lisinopril; Metformin; and Tape    Medications:         Current Outpatient Prescriptions   Medication Sig Dispense Refill   • ALPRAZolam (XANAX) 0.25 MG Tab Take 1 Tab by mouth 1 time daily as needed for Anxiety for up to 30 days. 30 Tab 5   • lorazepam (ATIVAN) 2 MG tablet Take 1 Tab by mouth at bedtime as needed for Anxiety for up to 30 days. 60 Tab 5   • dexamethasone (DECADRON) 4 MG Tab Take 2 Tabs by mouth every day. Take 8 mg (2 tabs) on day 2 or 3 after chemotherapy. Do not take day of Neulasta. 4 Tab 0   • MICROLET LANCETS [facility] For BG check once a day 100 Each 3   • maalox plus-benadryl-visc lidocaine (MAGIC MOUTHWASH) Take 5 mL by mouth every 6 hours as needed. 1 Bottle 0   • Blood Glucose Monitoring Suppl Supplies Misc Contour Next glucometer strips for blood sugar check once a day 100 Each 2   • ondansetron (ZOFRAN) 4 MG Tab tablet Take 1 Tab by mouth every four hours as needed for Nausea/Vomiting (for nausea, vomiting). 30 Tab 6   • levothyroxine (SYNTHROID) 150 MCG Tab Take 1 Tab by mouth Every morning on an empty stomach. 90 Tab 4   • prochlorperazine (COMPAZINE) 10 MG Tab Take 1 Tab by mouth every 6 hours as  needed (nausea, vomiting, migraine). 30 Tab 0   • glipiZIDE (GLUCOTROL) 5 MG Tab Take 1 Tab by mouth every day. 90 Tab 4   • fenofibrate (TRIGLIDE) 160 MG tablet Take 1 Tab by mouth every day. 90 Tab 4   • citalopram (CELEXA) 20 MG Tab Take 1 Tab by mouth every day. 90 Tab 4   • aspirin (ASA) 81 MG Chew Tab chewable tablet Take 1 Tab by mouth every day. 100 Tab 11     No current facility-administered medications for this visit.          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     Social History Main Topics   • Smoking status: Former Smoker     Packs/day: 2.00     Years: 50.00     Types: Cigarettes     Quit date: 5/6/2006   • Smokeless tobacco: Never Used      Comment: quit smoking 2002   • Alcohol use 0.0 oz/week      Comment: ocassionaly   • Drug use: No   • Sexual activity: Not Currently     Partners: Male     Other Topics Concern   • Not on file     Social History Narrative   • No narrative on file       Family History:      Family History   Problem Relation Age of Onset   • Cancer Mother    • Lung Disease Father    • Cancer Father        Review of Systems:  All other review of systems are negative except what was mentioned above in the HPI.    Constitutional: Negative for fever, chills, weight loss and improving malaise/fatigue.    HEENT: No new auditory or visual complaints. No sore throat and neck pain.     Respiratory: Negative for cough, sputum production, shortness of breath and wheezing.    Cardiovascular: Negative for chest pain, palpitations, orthopnea and leg swelling.    Gastrointestinal: Negative for heartburn, nausea, vomiting and abdominal pain.    Genitourinary: Negative for dysuria, hematuria    Musculoskeletal: No new arthralgias or myalgias . As above  Skin: Negative for rash and itching.    Neurological: Negative  "for focal weakness and headaches.    Endo/Heme/Allergies: No abnormal bleed/bruise.    Psychiatric/Behavioral: No new depression/anxiety.    Physical Exam:  Vitals: /64   Pulse 77   Temp 37 °C (98.6 °F)   Resp 16   Ht 1.626 m (5' 4\")   Wt 90.3 kg (199 lb)   SpO2 97%   Breastfeeding? No   BMI 34.16 kg/m²      General: Not in acute distress, alert and oriented x 3  HEENT: No pallor, icterus. Oropharynx clear.   Neck: Supple, no palpable masses.  Lymph nodes: No palpable cervical, supraclavicular, axillary or inguinal lymphadenopathy.    CVS: regular rate and rhythm, no rubs or gallops  RESP: Clear to auscultate bilaterally, no wheezing or crackles.   ABD: Soft, non tender, non distended, positive bowel sounds, no palpable organomegaly  EXT: No edema or cyanosis  CNS: Alert and oriented x3, No focal deficits.  Skin- No rash  Breast-the left breast masses not very well palpable. No significant adenopathy on exam    Labs:   Hospital Outpatient Visit on 04/19/2018   Component Date Value Ref Range Status   • WBC 04/19/2018 10.2  4.8 - 10.8 K/uL Final   • RBC 04/19/2018 3.36* 4.20 - 5.40 M/uL Final   • Hemoglobin 04/19/2018 10.1* 12.0 - 16.0 g/dL Final   • Hematocrit 04/19/2018 30.5* 37.0 - 47.0 % Final   • MCV 04/19/2018 90.8  81.4 - 97.8 fL Final   • MCH 04/19/2018 30.1  27.0 - 33.0 pg Final   • MCHC 04/19/2018 33.1* 33.6 - 35.0 g/dL Final   • RDW 04/19/2018 58.6* 35.9 - 50.0 fL Final   • Platelet Count 04/19/2018 181  164 - 446 K/uL Final   • MPV 04/19/2018 9.8  9.0 - 12.9 fL Final   • Nucleated RBC 04/19/2018 0.00  /100 WBC Final   • NRBC (Absolute) 04/19/2018 0.00  K/uL Final   • Sodium 04/19/2018 134* 135 - 145 mmol/L Final   • Potassium 04/19/2018 4.2  3.6 - 5.5 mmol/L Final   • Chloride 04/19/2018 100  96 - 112 mmol/L Final   • Co2 04/19/2018 25  20 - 33 mmol/L Final   • Anion Gap 04/19/2018 9.0  0.0 - 11.9 Final   • Glucose 04/19/2018 93  65 - 99 mg/dL Final   • Bun 04/19/2018 13  8 - 22 mg/dL Final "   • Creatinine 04/19/2018 0.77  0.50 - 1.40 mg/dL Final   • Calcium 04/19/2018 8.9  8.5 - 10.5 mg/dL Final   • AST(SGOT) 04/19/2018 11* 12 - 45 U/L Final   • ALT(SGPT) 04/19/2018 13  2 - 50 U/L Final   • Alkaline Phosphatase 04/19/2018 76  30 - 99 U/L Final   • Total Bilirubin 04/19/2018 0.3  0.1 - 1.5 mg/dL Final   • Albumin 04/19/2018 4.2  3.2 - 4.9 g/dL Final   • Total Protein 04/19/2018 6.5  6.0 - 8.2 g/dL Final   • Globulin 04/19/2018 2.3  1.9 - 3.5 g/dL Final   • A-G Ratio 04/19/2018 1.8  g/dL Final   • GFR If  04/19/2018 >60  >60 mL/min/1.73 m 2 Final   • GFR If Non  04/19/2018 >60  >60 mL/min/1.73 m 2 Final             Assessment and Plan:  Stage IIIc [cT2, cN3c, M0]. ER/AR negative HER-2 negative, triple negative breast carcinoma- she had better tolerance with a 20% dose reduction with the third cycle. She will receive cycle #4 of AC chemotherapy. Reviewed her labs which are decayed. She did not develop significant neutropenia or anemia at this time. She is not often Neulasta-induced discomfort. Discussed the option of weekly paclitaxel to avoid the Neulasta use or she does not want to prolong her chemotherapy to 12 weeks. She will proceed with the current treatment plan and return to clinic in 2 weeks to start Taxol. Exam of her breast does not reveal any enlarging breast mass or adenopathy, and we will hold off on doing any restaging ultrasound.    Complex patient requiring complex decision making. Reviewed the laboratory data and images with the patient. Antineoplastic therapy requiring close monitoring and decision-making.. She had a lot of questions today and everything was answered over the course of 30 minute visit.  Please note that this dictation was created using voice recognition software. I have made every reasonable attempt to correct obvious errors, but I expect that there are errors of grammar and possibly content that I did not discover before finalizing the  note.      SIGNATURES:  Rajan Rebolledo    CC:  Siva Smart M.D.  No ref. provider found

## 2018-04-19 NOTE — TELEPHONE ENCOUNTER
"Lorazepam  (ATIVAN) 2 MG tablet    Pharmacy sent fax stating the following:    \"Pt has been taking 2 qhs are you changing to 1\"    Please advise.  "

## 2018-04-20 ENCOUNTER — OUTPATIENT INFUSION SERVICES (OUTPATIENT)
Dept: ONCOLOGY | Facility: MEDICAL CENTER | Age: 78
End: 2018-04-20
Attending: INTERNAL MEDICINE
Payer: MEDICARE

## 2018-04-20 ENCOUNTER — DOCUMENTATION (OUTPATIENT)
Dept: HEMATOLOGY ONCOLOGY | Facility: MEDICAL CENTER | Age: 78
End: 2018-04-20

## 2018-04-20 ENCOUNTER — PATIENT OUTREACH (OUTPATIENT)
Dept: HEALTH INFORMATION MANAGEMENT | Facility: OTHER | Age: 78
End: 2018-04-20

## 2018-04-20 VITALS
HEIGHT: 65 IN | RESPIRATION RATE: 18 BRPM | WEIGHT: 200.4 LBS | BODY MASS INDEX: 33.39 KG/M2 | SYSTOLIC BLOOD PRESSURE: 131 MMHG | DIASTOLIC BLOOD PRESSURE: 67 MMHG | HEART RATE: 79 BPM | OXYGEN SATURATION: 98 % | TEMPERATURE: 97.3 F

## 2018-04-20 DIAGNOSIS — C50.412 MALIGNANT NEOPLASM OF UPPER-OUTER QUADRANT OF LEFT BREAST IN FEMALE, ESTROGEN RECEPTOR NEGATIVE (HCC): ICD-10-CM

## 2018-04-20 DIAGNOSIS — Z17.1 MALIGNANT NEOPLASM OF UPPER-OUTER QUADRANT OF LEFT BREAST IN FEMALE, ESTROGEN RECEPTOR NEGATIVE (HCC): ICD-10-CM

## 2018-04-20 PROCEDURE — 700105 HCHG RX REV CODE 258: Performed by: INTERNAL MEDICINE

## 2018-04-20 PROCEDURE — 96375 TX/PRO/DX INJ NEW DRUG ADDON: CPT

## 2018-04-20 PROCEDURE — 96367 TX/PROPH/DG ADDL SEQ IV INF: CPT

## 2018-04-20 PROCEDURE — 700105 HCHG RX REV CODE 258

## 2018-04-20 PROCEDURE — 96413 CHEMO IV INFUSION 1 HR: CPT

## 2018-04-20 PROCEDURE — 96417 CHEMO IV INFUS EACH ADDL SEQ: CPT

## 2018-04-20 PROCEDURE — 700111 HCHG RX REV CODE 636 W/ 250 OVERRIDE (IP): Performed by: INTERNAL MEDICINE

## 2018-04-20 PROCEDURE — 700111 HCHG RX REV CODE 636 W/ 250 OVERRIDE (IP): Mod: JG

## 2018-04-20 RX ORDER — PALONOSETRON 0.05 MG/ML
0.25 INJECTION, SOLUTION INTRAVENOUS ONCE
Status: COMPLETED | OUTPATIENT
Start: 2018-04-20 | End: 2018-04-20

## 2018-04-20 RX ORDER — SODIUM CHLORIDE 9 MG/ML
INJECTION, SOLUTION INTRAVENOUS CONTINUOUS
Status: DISCONTINUED | OUTPATIENT
Start: 2018-04-20 | End: 2018-04-20 | Stop reason: HOSPADM

## 2018-04-20 RX ADMIN — PALONOSETRON HYDROCHLORIDE 0.25 MG: 0.25 INJECTION INTRAVENOUS at 10:18

## 2018-04-20 RX ADMIN — SODIUM CHLORIDE: 9 INJECTION, SOLUTION INTRAVENOUS at 10:18

## 2018-04-20 RX ADMIN — DOXORUBICIN HYDROCHLORIDE 97.9 MG: 2 INJECTION, SOLUTION INTRAVENOUS at 11:49

## 2018-04-20 RX ADMIN — CYCLOPHOSPHAMIDE 979.2 MG: 2 INJECTION, POWDER, FOR SOLUTION INTRAVENOUS; ORAL at 12:24

## 2018-04-20 RX ADMIN — SODIUM CHLORIDE 150 MG: 9 INJECTION, SOLUTION INTRAVENOUS at 10:40

## 2018-04-20 RX ADMIN — DEXAMETHASONE SODIUM PHOSPHATE 12 MG: 4 INJECTION, SOLUTION INTRAMUSCULAR; INTRAVENOUS at 10:22

## 2018-04-20 ASSESSMENT — PAIN SCALES - GENERAL: PAINLEVEL: 6=MODERATE PAIN

## 2018-04-20 NOTE — PROGRESS NOTES
"Pharmacy Chemotherapy Verification  Patient Name: Clarisse Reeves  Dx: Breast CA    Cycle: 4 Previous treatment = C3 on 4/6/18    Regimen and Dosing Reference  DDAC (Dose Dense Doxorubicin + Cyclophosphamide) followed by Paclitaxel  DOXOrubicin 60 mg/m2 IV on Day 1--dose reduced to 48 mg/m2 due to tolerance  Cyclophosphamide 600 mg/m2 IV over 30 min on Day 1--dose reduced to 480 mg/m2 due to tolerance  14 day cycle for 4 cycles  NCCN Guidelines for Breast Cancer V.2.2017  Jamila ML et al J Clin Oncol. 2003:21(8):1431-9    Allergies:Codeine; Lipitor [atorvastatin calcium]; Lovastatin; Zocor [simvastatin - high dose]; Lisinopril; Metformin; and Tape  Blood pressure 131/67, pulse 79, temperature 36.3 °C (97.3 °F), resp. rate 18, height 1.65 m (5' 4.96\"), weight 90.9 kg (200 lb 6.4 oz), SpO2 98 %.  Body surface area is 2.04 meters squared.    Labs 4/19/18  ANC ~ 7180   Plt = 181 k  Hgb = 10.1   SCr = 0.77      CrCl ~89 ml/min    AST/ALT/AlkPhos = 11/13/76 Tbili = 0.3    ECHO 1/30/18  LVEF = 65%    DOXOrubicin 48 mg/m2 x 2.04 m2 = 97.9 mg   <5% diff, OK to treat with final written dose = 97.9 mg IV    Cyclophosphamide 480 mg/m2 x 2.04 m2 = 979.2 mg   <5% diff, OK to treat with final written dose =  979.2 mg IV    Funmilayo Gorman, PharmD          "

## 2018-04-20 NOTE — PROGRESS NOTES
Nutrition Services: Brief Update  Pt stated her appetite has been great the last couple of weeks; she eats small frequent meals throughout the day and a larger meal for dinner. Pt reports she is constantly hungry and eats a varied diet including fish 5 times a week. Body weight has been stable since 3/23 per chart report. Pt has been experiencing some nausea after her last treatment but not specifically before or after eating. Pt is conscious of the use of nausea medication and uses it accordingly. Pt doesn’t have any complaints regarding diarrhea or constipation at this time. However, pt reports she has had some taste changes throughout treatment; coffee and cantaloupe seem to be the only two food items she doesn’t taste anymore. Provided tips to enhance food flavor and diminish metallic taste in mouth. Otherwise, pt appears well nourished at this time. RD to sign off, please re-consult us if any changes in weight or intake occur. RD available PRN.

## 2018-04-20 NOTE — PROGRESS NOTES
Chemotherapy Verification - PRIMARY RN      Height = 165 cm  Weight = 90.9 kg  BSA = 2.04 m2       Medication: Doxorubicin  Dose: 48 mg/m2  Calculated Dose: 97.92 mg                                  Medication: Cyclophosphamide  Dose: 480 mg/m2  Calculated Dose: 979.2 mg                               I confirm this process was performed independently with the BSA and all final chemotherapy dosing calculations congruent.  Any discrepancies of 5% or greater have been addressed with the chemotherapy pharmacist. The resolution of the discrepancy has been documented in the EPIC progress notes.

## 2018-04-20 NOTE — PROGRESS NOTES
Patient discharged 2/2 and kept appointment with PCP and completed a mammogram and ultrasound of the breast. Patient was diagnosed with breast cancer and began chemotherapy treatment. The patient readmitted for neutopenic fever after treatment. The patient kept several appointments with Oncology and the Infusion center for chemotherapy injections. [facility] mailed patient application for [employer] to financially assist with diabetic medication and supplies. [facility] also mailed out Patient [facility] Co-Pay Relief information and assisted patient filling out online application due to patient’s inability to access a computer. The patient has been approved and requires additional documentation to support her diagnosis and proof of expenditures sent to the [facility]. This information has been requested from patient’s Oncologist, Infusion Center, and [facility] Billing department. [facility] spoke with her Cancer Care Navigator Riki on a few occasions and encouraged patient to reach out to her to benefit from services provided going forward. The patient was thankful for Patient Dru's assistance and pleased with [facility] services.  The patient has future appointments scheduled for Chemotherpy infusions, Oncology, Pulmonology, and PCP.  PPS Score Completed.

## 2018-04-20 NOTE — PROGRESS NOTES
Pt arrived to [facility] ambulatory, here for C4 AC (Doxirubicin, Cyclophosphamide) for breast cancer. Pt with R DL PICC in place, dressing and labs done yesterday. Line flushed and blood return observed to both lumens. Lab results reviewed and WNL for chemo to proceed. Premeds given as ordered. Chem infused over prescribed durations. Pt neyda well. Line flushed clear, blood return observed. Pt knows to return tomorrow for Neulasta. Pt considering not doing q week Taxol - encouraged pt to discuss with her MD. Pt hoping doing the q3 week Taxol will be better tolerated. Claves changed and PICC wrapped. Pt discharged home under self care in no apparent distress.

## 2018-04-20 NOTE — PROGRESS NOTES
Chemotherapy Verification - PRIMARY RN      Height = 165cm  Weight = 90.9kg  BSA = 2.04m2       Medication: Doxorubicin  Dose: 48mg/m2  Calculated Dose: 97.9mg                             (In mg/m2, AUC, mg/kg)     Medication: Cytoxan  Dose: 480mg/m2  Calculated Dose: 979.2mg                             (In mg/m2, AUC, mg/kg)        I confirm this process was performed independently with the BSA and all final chemotherapy dosing calculations congruent.  Any discrepancies of 5% or greater have been addressed with the chemotherapy pharmacist. The resolution of the discrepancy has been documented in the EPIC progress notes.

## 2018-04-20 NOTE — PROGRESS NOTES
"Pharmacy Chemotherapy Note    Patient Name: GREG DEMARCO  Dx: ER/VT negative HER-2 negative Breast Cancer        Protocol: DDAC     *Dosing Reference*  Doxorubicin 60 mg/m2 IV push on day 1   -- 3/22/18: dose decreased to 48 mg/m2 for side effect  Cyclophosphamide 600 mg/m2 IV over 30 min on day 1   -- 3/22/18: dose decreased to 480 mg/m2 for side effects  14-day cycle x 4 cycles  ~followed by~  Paclitaxel 80 mg/m2 IV over 60 min on day 1  Weekly course x 12 weeks  NCCN Guidelines for Breast Cancer V.1.2017  Jamila DARLING, et al. J Clin Oncol. 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      Labs 4/19/18  ANC ~7180   Plt = 181k     Hgb/Hct = 10.1/30.5     SCr = 0.77 mg/dL  CrCl ~88.1 mL/min   AST/ALT/AP = 11/13/76 TBili = 0.3     1/30/2018: ECHO LVEF: 65%     Drug Order   (Drug name, dose, route, IV Fluid & volume, frequency, number of doses) Cycle 4   Previous treatment: C3 on 4/6/2018     Medication = Doxorubicin (Adriamycin)  Base Dose =  48 mg/m2  Calc Dose: Base Dose x Body surface area is 2.04 meters squared.   m2 = 97.9 mg  Final Dose = 97.9 mg  Route = IV  Conc. & Volume = 2 mg/mL = 48.96 mL  Admin Duration = Over 20 mins          <5% difference, OK to treat with final dose    Medication = Cyclophosphamide (Cytoxan)  Base Dose= 480 mg/m2  Calc Dose: Base Dose x Body surface area is 2.04 meters squared. h0=719.2  Final Dose = 979.2 mg  Route = IV  Fluid & Volume =  mL  Admin Duration = Over 30-60 min          <5% difference, OK to treat with final dose      By my signature below, I confirm this process was performed independently with the BSA and all final chemotherapy dosing " calculations congruent. I have reviewed the above chemotherapy order and that my calculation of the final dose and BSA (when applicable) corroborate those calculations of the  pharmacist. Discrepancies of 5% or greater in the written dose have been addressed and documented within the EPIC Progress notes.      ANDREIA Turcios, PharmD

## 2018-04-21 ENCOUNTER — OUTPATIENT INFUSION SERVICES (OUTPATIENT)
Dept: ONCOLOGY | Facility: MEDICAL CENTER | Age: 78
End: 2018-04-21
Attending: INTERNAL MEDICINE
Payer: MEDICARE

## 2018-04-21 VITALS
DIASTOLIC BLOOD PRESSURE: 76 MMHG | HEART RATE: 92 BPM | TEMPERATURE: 97.4 F | WEIGHT: 202.16 LBS | SYSTOLIC BLOOD PRESSURE: 141 MMHG | BODY MASS INDEX: 33.68 KG/M2 | RESPIRATION RATE: 16 BRPM | OXYGEN SATURATION: 98 %

## 2018-04-21 DIAGNOSIS — C50.412 MALIGNANT NEOPLASM OF UPPER-OUTER QUADRANT OF LEFT BREAST IN FEMALE, ESTROGEN RECEPTOR NEGATIVE (HCC): ICD-10-CM

## 2018-04-21 DIAGNOSIS — Z17.1 MALIGNANT NEOPLASM OF UPPER-OUTER QUADRANT OF LEFT BREAST IN FEMALE, ESTROGEN RECEPTOR NEGATIVE (HCC): ICD-10-CM

## 2018-04-21 PROCEDURE — 96372 THER/PROPH/DIAG INJ SC/IM: CPT

## 2018-04-21 PROCEDURE — A9270 NON-COVERED ITEM OR SERVICE: HCPCS | Performed by: INTERNAL MEDICINE

## 2018-04-21 PROCEDURE — 700102 HCHG RX REV CODE 250 W/ 637 OVERRIDE(OP): Performed by: INTERNAL MEDICINE

## 2018-04-21 PROCEDURE — 700111 HCHG RX REV CODE 636 W/ 250 OVERRIDE (IP): Mod: JG | Performed by: INTERNAL MEDICINE

## 2018-04-21 RX ORDER — DEXAMETHASONE 4 MG/1
8 TABLET ORAL ONCE
Status: COMPLETED | OUTPATIENT
Start: 2018-04-21 | End: 2018-04-21

## 2018-04-21 RX ADMIN — PEGFILGRASTIM 6 MG: 6 INJECTION SUBCUTANEOUS at 15:20

## 2018-04-21 RX ADMIN — DEXAMETHASONE 8 MG: 4 TABLET ORAL at 15:09

## 2018-04-21 ASSESSMENT — PAIN SCALES - GENERAL: PAINLEVEL: 5=MODERATE PAIN

## 2018-04-21 NOTE — PROGRESS NOTES
Returns for neulasta after chemotherapy yesterday.  Denies nausea, but states gets nauseated after neulasta, so decadron started with good control.  No other issues reported.  Injection without rx.  DC to self care.

## 2018-04-25 ENCOUNTER — NON-PROVIDER VISIT (OUTPATIENT)
Dept: HEMATOLOGY ONCOLOGY | Facility: MEDICAL CENTER | Age: 78
End: 2018-04-25
Payer: MEDICARE

## 2018-04-25 ENCOUNTER — HOSPITAL ENCOUNTER (OUTPATIENT)
Facility: MEDICAL CENTER | Age: 78
End: 2018-04-25
Attending: NURSE PRACTITIONER
Payer: MEDICARE

## 2018-04-25 ENCOUNTER — TELEPHONE (OUTPATIENT)
Dept: HEMATOLOGY ONCOLOGY | Facility: MEDICAL CENTER | Age: 78
End: 2018-04-25

## 2018-04-25 VITALS
DIASTOLIC BLOOD PRESSURE: 68 MMHG | SYSTOLIC BLOOD PRESSURE: 140 MMHG | WEIGHT: 200.84 LBS | HEIGHT: 66 IN | TEMPERATURE: 98.4 F | RESPIRATION RATE: 18 BRPM | HEART RATE: 91 BPM | OXYGEN SATURATION: 97 % | BODY MASS INDEX: 32.28 KG/M2

## 2018-04-25 DIAGNOSIS — C34.11 MALIGNANT NEOPLASM OF RIGHT UPPER LOBE OF LUNG (HCC): ICD-10-CM

## 2018-04-25 DIAGNOSIS — C50.412 MALIGNANT NEOPLASM OF UPPER-OUTER QUADRANT OF LEFT BREAST IN FEMALE, ESTROGEN RECEPTOR NEGATIVE (HCC): ICD-10-CM

## 2018-04-25 DIAGNOSIS — Z17.1 MALIGNANT NEOPLASM OF UPPER-OUTER QUADRANT OF LEFT BREAST IN FEMALE, ESTROGEN RECEPTOR NEGATIVE (HCC): ICD-10-CM

## 2018-04-25 LAB
ANISOCYTOSIS BLD QL SMEAR: ABNORMAL
BASOPHILS # BLD AUTO: 0 % (ref 0–1.8)
BASOPHILS # BLD: 0 K/UL (ref 0–0.12)
EOSINOPHIL # BLD AUTO: 0 K/UL (ref 0–0.51)
EOSINOPHIL NFR BLD: 0 % (ref 0–6.9)
ERYTHROCYTE [DISTWIDTH] IN BLOOD BY AUTOMATED COUNT: 60.8 FL (ref 35.9–50)
HCT VFR BLD AUTO: 28.5 % (ref 37–47)
HGB BLD-MCNC: 9.4 G/DL (ref 12–16)
LYMPHOCYTES # BLD AUTO: 1.17 K/UL (ref 1–4.8)
LYMPHOCYTES NFR BLD: 13.3 % (ref 22–41)
MACROCYTES BLD QL SMEAR: ABNORMAL
MANUAL DIFF BLD: NORMAL
MCH RBC QN AUTO: 30.1 PG (ref 27–33)
MCHC RBC AUTO-ENTMCNC: 33 G/DL (ref 33.6–35)
MCV RBC AUTO: 91.3 FL (ref 81.4–97.8)
MONOCYTES # BLD AUTO: 0.08 K/UL (ref 0–0.85)
MONOCYTES NFR BLD AUTO: 0.9 % (ref 0–13.4)
MORPHOLOGY BLD-IMP: NORMAL
NEUTROPHILS # BLD AUTO: 7.55 K/UL (ref 2–7.15)
NEUTROPHILS NFR BLD: 85.8 % (ref 44–72)
NRBC # BLD AUTO: 0 K/UL
NRBC BLD-RTO: 0 /100 WBC
OVALOCYTES BLD QL SMEAR: NORMAL
PLATELET # BLD AUTO: 200 K/UL (ref 164–446)
PLATELET BLD QL SMEAR: NORMAL
PMV BLD AUTO: 9.8 FL (ref 9–12.9)
POIKILOCYTOSIS BLD QL SMEAR: NORMAL
RBC # BLD AUTO: 3.12 M/UL (ref 4.2–5.4)
RBC BLD AUTO: PRESENT
WBC # BLD AUTO: 8.8 K/UL (ref 4.8–10.8)

## 2018-04-25 PROCEDURE — 85027 COMPLETE CBC AUTOMATED: CPT

## 2018-04-25 PROCEDURE — 85007 BL SMEAR W/DIFF WBC COUNT: CPT

## 2018-04-25 PROCEDURE — 36592 COLLECT BLOOD FROM PICC: CPT | Performed by: NURSE PRACTITIONER

## 2018-04-25 NOTE — PROGRESS NOTES
"Pt arrived ambulatory to RN visit for PICC line change.  Pt states she has not felt well since Saturday.  She states she feels weak and light headed. She states she feels like \"she has a full head.\"    Reported to IRENA Nix and received verbal orders to draw a CBC w/diff.  Patient in agreement with plan to draw labs and perform PICC dressing change. Verified blood return on both lumens of left upper arm PICC. CBC w/diff drawn from left upper arm PICC line. Flushed per protocol.  Performed dressing change in sterile fashion.  No s/s of infection noted.  Pt tolerated well.      Patient met with Kelli Uriarte, , in consultation room.  Patient released ambulatory in no apparent distress. RN will call her with lab results.   "

## 2018-04-25 NOTE — TELEPHONE ENCOUNTER
Patient called RN for update on labs.  RN informed her that her labs were reviewed by IRENA Nix, and they are stable. Informed her that her hgb is 9.4, plts, 200, ANC 7.55.  Encouraged patient to try to rest and stay well hydrated and maintain good nutrition.  Patient agreed and had no further questions or concerns.

## 2018-05-01 DIAGNOSIS — Z17.1 MALIGNANT NEOPLASM OF UPPER-OUTER QUADRANT OF LEFT BREAST IN FEMALE, ESTROGEN RECEPTOR NEGATIVE (HCC): ICD-10-CM

## 2018-05-01 DIAGNOSIS — C50.412 MALIGNANT NEOPLASM OF UPPER-OUTER QUADRANT OF LEFT BREAST IN FEMALE, ESTROGEN RECEPTOR NEGATIVE (HCC): ICD-10-CM

## 2018-05-02 ENCOUNTER — NON-PROVIDER VISIT (OUTPATIENT)
Dept: HEMATOLOGY ONCOLOGY | Facility: MEDICAL CENTER | Age: 78
End: 2018-05-02
Payer: MEDICARE

## 2018-05-02 ENCOUNTER — HOSPITAL ENCOUNTER (OUTPATIENT)
Facility: MEDICAL CENTER | Age: 78
End: 2018-05-02
Attending: NURSE PRACTITIONER
Payer: MEDICARE

## 2018-05-02 ENCOUNTER — OFFICE VISIT (OUTPATIENT)
Dept: HEMATOLOGY ONCOLOGY | Facility: MEDICAL CENTER | Age: 78
End: 2018-05-02
Payer: MEDICARE

## 2018-05-02 VITALS
SYSTOLIC BLOOD PRESSURE: 122 MMHG | TEMPERATURE: 99 F | DIASTOLIC BLOOD PRESSURE: 68 MMHG | WEIGHT: 202.6 LBS | BODY MASS INDEX: 32.56 KG/M2 | RESPIRATION RATE: 18 BRPM | HEART RATE: 86 BPM | OXYGEN SATURATION: 97 % | HEIGHT: 66 IN

## 2018-05-02 VITALS
SYSTOLIC BLOOD PRESSURE: 122 MMHG | HEART RATE: 86 BPM | WEIGHT: 202 LBS | TEMPERATURE: 97.7 F | HEIGHT: 66 IN | RESPIRATION RATE: 18 BRPM | BODY MASS INDEX: 32.47 KG/M2 | DIASTOLIC BLOOD PRESSURE: 68 MMHG | OXYGEN SATURATION: 97 %

## 2018-05-02 DIAGNOSIS — Z51.11 ENCOUNTER FOR ANTINEOPLASTIC CHEMOTHERAPY: ICD-10-CM

## 2018-05-02 DIAGNOSIS — C50.412 MALIGNANT NEOPLASM OF UPPER-OUTER QUADRANT OF LEFT BREAST IN FEMALE, ESTROGEN RECEPTOR NEGATIVE (HCC): ICD-10-CM

## 2018-05-02 DIAGNOSIS — Z17.1 MALIGNANT NEOPLASM OF UPPER-OUTER QUADRANT OF LEFT BREAST IN FEMALE, ESTROGEN RECEPTOR NEGATIVE (HCC): ICD-10-CM

## 2018-05-02 DIAGNOSIS — T45.1X5A CHEMOTHERAPY INDUCED NEUTROPENIA (HCC): ICD-10-CM

## 2018-05-02 DIAGNOSIS — Z45.2 PICC (PERIPHERALLY INSERTED CENTRAL CATHETER) IN PLACE: ICD-10-CM

## 2018-05-02 DIAGNOSIS — B35.4 TINEA CORPORIS: ICD-10-CM

## 2018-05-02 DIAGNOSIS — D70.1 CHEMOTHERAPY INDUCED NEUTROPENIA (HCC): ICD-10-CM

## 2018-05-02 DIAGNOSIS — M54.31 SCIATICA OF RIGHT SIDE: ICD-10-CM

## 2018-05-02 LAB
ALBUMIN SERPL BCP-MCNC: 4.2 G/DL (ref 3.2–4.9)
ALBUMIN/GLOB SERPL: 1.8 G/DL
ALP SERPL-CCNC: 80 U/L (ref 30–99)
ALT SERPL-CCNC: 11 U/L (ref 2–50)
ANION GAP SERPL CALC-SCNC: 10 MMOL/L (ref 0–11.9)
ANISOCYTOSIS BLD QL SMEAR: ABNORMAL
AST SERPL-CCNC: 11 U/L (ref 12–45)
BASOPHILS # BLD AUTO: 1.7 % (ref 0–1.8)
BASOPHILS # BLD: 0.25 K/UL (ref 0–0.12)
BILIRUB SERPL-MCNC: 0.2 MG/DL (ref 0.1–1.5)
BUN SERPL-MCNC: 12 MG/DL (ref 8–22)
CALCIUM SERPL-MCNC: 8.7 MG/DL (ref 8.5–10.5)
CHLORIDE SERPL-SCNC: 104 MMOL/L (ref 96–112)
CO2 SERPL-SCNC: 24 MMOL/L (ref 20–33)
CREAT SERPL-MCNC: 0.7 MG/DL (ref 0.5–1.4)
EOSINOPHIL # BLD AUTO: 0 K/UL (ref 0–0.51)
EOSINOPHIL NFR BLD: 0 % (ref 0–6.9)
ERYTHROCYTE [DISTWIDTH] IN BLOOD BY AUTOMATED COUNT: 64.3 FL (ref 35.9–50)
GLOBULIN SER CALC-MCNC: 2.3 G/DL (ref 1.9–3.5)
GLUCOSE SERPL-MCNC: 149 MG/DL (ref 65–99)
HCT VFR BLD AUTO: 29.4 % (ref 37–47)
HGB BLD-MCNC: 9.4 G/DL (ref 12–16)
LYMPHOCYTES # BLD AUTO: 1.55 K/UL (ref 1–4.8)
LYMPHOCYTES NFR BLD: 10.5 % (ref 22–41)
MANUAL DIFF BLD: NORMAL
MCH RBC QN AUTO: 30 PG (ref 27–33)
MCHC RBC AUTO-ENTMCNC: 32 G/DL (ref 33.6–35)
MCV RBC AUTO: 93.9 FL (ref 81.4–97.8)
METAMYELOCYTES NFR BLD MANUAL: 0.9 %
MICROCYTES BLD QL SMEAR: ABNORMAL
MONOCYTES # BLD AUTO: 0.25 K/UL (ref 0–0.85)
MONOCYTES NFR BLD AUTO: 1.7 % (ref 0–13.4)
MORPHOLOGY BLD-IMP: NORMAL
MYELOCYTES NFR BLD MANUAL: 0.9 %
NEUTROPHILS # BLD AUTO: 12.21 K/UL (ref 2–7.15)
NEUTROPHILS NFR BLD: 81.6 % (ref 44–72)
NEUTS BAND NFR BLD MANUAL: 0.9 % (ref 0–10)
NRBC # BLD AUTO: 0.07 K/UL
NRBC BLD-RTO: 0.5 /100 WBC
OVALOCYTES BLD QL SMEAR: NORMAL
PLATELET # BLD AUTO: 185 K/UL (ref 164–446)
PLATELET BLD QL SMEAR: NORMAL
PMV BLD AUTO: 10 FL (ref 9–12.9)
POIKILOCYTOSIS BLD QL SMEAR: NORMAL
POTASSIUM SERPL-SCNC: 3.7 MMOL/L (ref 3.6–5.5)
PROMYELOCYTES NFR BLD MANUAL: 1.8 %
PROT SERPL-MCNC: 6.5 G/DL (ref 6–8.2)
RBC # BLD AUTO: 3.13 M/UL (ref 4.2–5.4)
RBC BLD AUTO: PRESENT
SODIUM SERPL-SCNC: 138 MMOL/L (ref 135–145)
WBC # BLD AUTO: 14.8 K/UL (ref 4.8–10.8)

## 2018-05-02 PROCEDURE — 99214 OFFICE O/P EST MOD 30 MIN: CPT | Performed by: NURSE PRACTITIONER

## 2018-05-02 PROCEDURE — 85027 COMPLETE CBC AUTOMATED: CPT

## 2018-05-02 PROCEDURE — 80053 COMPREHEN METABOLIC PANEL: CPT

## 2018-05-02 PROCEDURE — 85007 BL SMEAR W/DIFF WBC COUNT: CPT

## 2018-05-02 RX ORDER — 0.9 % SODIUM CHLORIDE 0.9 %
VIAL (ML) INJECTION PRN
Status: CANCELLED | OUTPATIENT
Start: 2018-05-03

## 2018-05-02 RX ORDER — 0.9 % SODIUM CHLORIDE 0.9 %
5 VIAL (ML) INJECTION PRN
Status: CANCELLED | OUTPATIENT
Start: 2018-05-03

## 2018-05-02 RX ORDER — CYCLOBENZAPRINE HCL 5 MG
5-10 TABLET ORAL 2 TIMES DAILY PRN
Qty: 20 TAB | Refills: 0 | Status: SHIPPED | OUTPATIENT
Start: 2018-05-02 | End: 2018-05-09 | Stop reason: SDUPTHER

## 2018-05-02 RX ORDER — PROCHLORPERAZINE MALEATE 10 MG
10 TABLET ORAL EVERY 6 HOURS PRN
Status: CANCELLED | OUTPATIENT
Start: 2018-05-03

## 2018-05-02 RX ORDER — ONDANSETRON 8 MG/1
8 TABLET, ORALLY DISINTEGRATING ORAL
Status: CANCELLED | OUTPATIENT
Start: 2018-05-03

## 2018-05-02 RX ORDER — ONDANSETRON 2 MG/ML
4 INJECTION INTRAMUSCULAR; INTRAVENOUS
Status: CANCELLED | OUTPATIENT
Start: 2018-05-03

## 2018-05-02 RX ORDER — NYSTATIN 100000 U/G
CREAM TOPICAL
Qty: 15 G | Refills: 1 | Status: SHIPPED | OUTPATIENT
Start: 2018-05-02 | End: 2018-07-05

## 2018-05-02 RX ORDER — SODIUM CHLORIDE 9 MG/ML
INJECTION, SOLUTION INTRAVENOUS CONTINUOUS
Status: CANCELLED | OUTPATIENT
Start: 2018-05-03

## 2018-05-02 ASSESSMENT — ENCOUNTER SYMPTOMS
NERVOUS/ANXIOUS: 1
MYALGIAS: 1
HEADACHES: 0
DIZZINESS: 0
SHORTNESS OF BREATH: 1
WEIGHT LOSS: 0
FEVER: 1
INSOMNIA: 1
CONSTIPATION: 0
DIAPHORESIS: 1
DIARRHEA: 0
VOMITING: 0
WHEEZING: 1
TINGLING: 1
PALPITATIONS: 0
COUGH: 1
NAUSEA: 0
CHILLS: 0
MEMORY LOSS: 1

## 2018-05-02 ASSESSMENT — PAIN SCALES - GENERAL
PAINLEVEL: 8=MODERATE-SEVERE PAIN
PAINLEVEL: 8=MODERATE-SEVERE PAIN

## 2018-05-02 NOTE — PROGRESS NOTES
Subjective:      Clarisse Reeves is a 77 y.o. female who presents for Follow-Up (prechemo([facility] patient)) evaluation prior to initiating weekly Taxol for breast cancer      HPI   Ms. Reeves has completed 4 cycles of dose dense AC and presents for evaluation prior to proceeding with week 1 of weekly Taxol for treatment of stage IIIc [cT2, cN3c, M0], ER/NC negative, HER-2 negative, grade 3, invasive ductal carcinoma of the left upper outer breast. She is unaccompanied for today's visit.    She reports tolerating treatment fairly well. She notes that she does sweat with any exertion and experienced intermittent low-grade fevers following completion of dose dense AC. She continues with intermittent cough, SOB w/ exertion, wheezing at night and next follows up with pulmonology in June.    Right sciatic pain is flared for about 3-4 days, impairing ambulation and rest. It is not responding/improving with ibuprofen use. She also reports new onset of itchy rash below pannus - looks very much like shingles but is bilateral across abdomen with no burning noted.    She is otherwise asymptomatic and desires to proceed with treatment.      Allergies   Allergen Reactions   • Codeine Hives and Nausea     RXN=40 years ago   • Lipitor [Atorvastatin Calcium] Myalgia     YCZ=[id number]     • Lovastatin Myalgia     Muscle aches  IXI=[id number]   • Zocor [Simvastatin - High Dose] Myalgia     Severe myalgias  MTO=[id number]   • Lisinopril Cough   • Metformin      Diarrhea     • Tape        Current Outpatient Prescriptions on File Prior to Visit   Medication Sig Dispense Refill   • ALEX CONTOUR NEXT TEST strip      • lidocaine viscous 2% (XYLOCAINE) 2 % Solution      • lorazepam (ATIVAN) 2 MG tablet Take 2 Tabs by mouth at bedtime as needed for Anxiety for up to 30 days. 60 Tab 5   • ALPRAZolam (XANAX) 0.25 MG Tab Take 1 Tab by mouth 1 time daily as needed for Anxiety for up to 30 days. 30 Tab 5   • dexamethasone (DECADRON) 4 MG Tab Take 2 Tabs by  mouth every day. Take 8 mg (2 tabs) on day 2 or 3 after chemotherapy. Do not take day of Neulasta. 4 Tab 0   • MICROLET LANCETS [facility] [place] check once a day 100 Each 3   • Blood Glucose Monitoring Suppl Supplies Misc Contour Next glucometer strips for blood sugar check once a day 100 Each 2   • ondansetron (ZOFRAN) 4 MG Tab tablet Take 1 Tab by mouth every four hours as needed for Nausea/Vomiting (for nausea, vomiting). 30 Tab 6   • levothyroxine (SYNTHROID) 150 MCG Tab Take 1 Tab by mouth Every morning on an empty stomach. 90 Tab 4   • prochlorperazine (COMPAZINE) 10 MG Tab Take 1 Tab by mouth every 6 hours as needed (nausea, vomiting, migraine). 30 Tab 0   • glipiZIDE (GLUCOTROL) 5 MG Tab Take 1 Tab by mouth every day. 90 Tab 4   • fenofibrate (TRIGLIDE) 160 MG tablet Take 1 Tab by mouth every day. 90 Tab 4   • citalopram (CELEXA) 20 MG Tab Take 1 Tab by mouth every day. 90 Tab 4   • aspirin (ASA) 81 MG Chew Tab chewable tablet Take 1 Tab by mouth every day. 100 Tab 11     No current facility-administered medications on file prior to visit.        Review of Systems   Constitutional: Positive for diaphoresis (with any exertion whatsoever), fever (low grade, intermittent, self-limiting) and malaise/fatigue (significant - doing daily chores - very slowly, tires easily). Negative for chills and weight loss (stable).        Alopecia   HENT: Negative for tinnitus.    Eyes: Blurred vision: due for eye exam - stable with pre-chemo vision.   Respiratory: Positive for cough (intermittent - self-limiting; pulm appt. f/up 6/13), shortness of breath (with exertion) and wheezing (intermittent - usually at night).    Cardiovascular: Negative for chest pain, palpitations and leg swelling.   Gastrointestinal: Negative for constipation, diarrhea, nausea (resolved at cycle 2 with altrernating meds as per chemo ed) and vomiting.   Genitourinary: Negative for dysuria.   Musculoskeletal: Positive for joint pain (right sciatica is  "flared) and myalgias.        All over best relieved with sleep   Neurological: Positive for tingling (at finger tips, not toes/feet). Negative for dizziness and headaches.   Psychiatric/Behavioral: Positive for memory loss (chemo brainy). The patient is nervous/anxious (with change to Taxol) and has insomnia (ativan for sleep ).         Objective:     /68   Pulse 86   Temp 36.5 °C (97.7 °F)   Resp 18   Ht 1.676 m (5' 6\")   Wt 91.6 kg (202 lb)   SpO2 97%   BMI 32.60 kg/m²      Physical Exam   Constitutional: She is oriented to person, place, and time. She appears well-developed and well-nourished. No distress.   HENT:   Head: Normocephalic and atraumatic.   Mouth/Throat: Oropharynx is clear and moist. No oropharyngeal exudate.   alopecia   Eyes: Conjunctivae and EOM are normal. Pupils are equal, round, and reactive to light. Right eye exhibits no discharge. Left eye exhibits no discharge. No scleral icterus.   Neck: Normal range of motion. Neck supple. No thyromegaly present.   Cardiovascular: Normal rate, regular rhythm, normal heart sounds and intact distal pulses.  Exam reveals no gallop and no friction rub.    No murmur heard.  Pulmonary/Chest: Effort normal and breath sounds normal. No respiratory distress. She has no wheezes.   Abdominal: Soft. Bowel sounds are normal. She exhibits no distension. There is no tenderness.   Musculoskeletal: She exhibits tenderness. She exhibits no edema.   Right sciatica, impairing gait, pt unable to sit comfortably   Lymphadenopathy:        Head (right side): No submental, no submandibular, no tonsillar, no preauricular, no posterior auricular and no occipital adenopathy present.        Head (left side): No submental, no submandibular, no tonsillar, no preauricular, no posterior auricular and no occipital adenopathy present.     She has no cervical adenopathy.        Right cervical: No superficial cervical and no deep cervical adenopathy present.       Left " cervical: No superficial cervical and no deep cervical adenopathy present.        Right: No supraclavicular adenopathy present.        Left: No supraclavicular adenopathy present.   Neurological: She is alert and oriented to person, place, and time.   Skin: Skin is warm and dry. Rash noted. She is not diaphoretic. There is erythema. No pallor.        Resolving shingles vs. yeast   Psychiatric: She has a normal mood and affect. Her behavior is normal.   Vitals reviewed.      Hospital Outpatient Visit on 05/02/2018   Component Date Value Ref Range Status   • WBC 05/02/2018 14.8* 4.8 - 10.8 K/uL Final   • RBC 05/02/2018 3.13* 4.20 - 5.40 M/uL Final   • Hemoglobin 05/02/2018 9.4* 12.0 - 16.0 g/dL Final   • Hematocrit 05/02/2018 29.4* 37.0 - 47.0 % Final   • MCV 05/02/2018 93.9  81.4 - 97.8 fL Final   • MCH 05/02/2018 30.0  27.0 - 33.0 pg Final   • MCHC 05/02/2018 32.0* 33.6 - 35.0 g/dL Final   • RDW 05/02/2018 64.3* 35.9 - 50.0 fL Final   • Platelet Count 05/02/2018 185  164 - 446 K/uL Final   • MPV 05/02/2018 10.0  9.0 - 12.9 fL Final   • Nucleated RBC 05/02/2018 0.50  /100 WBC Final   • NRBC (Absolute) 05/02/2018 0.07  K/uL Final   • Neutrophils-Polys 05/02/2018 81.60* 44.00 - 72.00 % Final   • Lymphocytes 05/02/2018 10.50* 22.00 - 41.00 % Final   • Monocytes 05/02/2018 1.70  0.00 - 13.40 % Final   • Eosinophils 05/02/2018 0.00  0.00 - 6.90 % Final   • Basophils 05/02/2018 1.70  0.00 - 1.80 % Final   • Neutrophils (Absolute) 05/02/2018 12.21* 2.00 - 7.15 K/uL Final    Includes immature neutrophils, if present.   • Lymphs (Absolute) 05/02/2018 1.55  1.00 - 4.80 K/uL Final   • Monos (Absolute) 05/02/2018 0.25  0.00 - 0.85 K/uL Final   • Eos (Absolute) 05/02/2018 0.00  0.00 - 0.51 K/uL Final   • Baso (Absolute) 05/02/2018 0.25* 0.00 - 0.12 K/uL Final   • Anisocytosis 05/02/2018 1+   Final   • Microcytosis 05/02/2018 1+   Final   • Sodium 05/02/2018 138  135 - 145 mmol/L Final   • Potassium 05/02/2018 3.7  3.6 - 5.5  mmol/L Final   • Chloride 05/02/2018 104  96 - 112 mmol/L Final   • Co2 05/02/2018 24  20 - 33 mmol/L Final   • Anion Gap 05/02/2018 10.0  0.0 - 11.9 Final   • Glucose 05/02/2018 149* 65 - 99 mg/dL Final   • Bun 05/02/2018 12  8 - 22 mg/dL Final   • Creatinine 05/02/2018 0.70  0.50 - 1.40 mg/dL Final   • Calcium 05/02/2018 8.7  8.5 - 10.5 mg/dL Final   • AST(SGOT) 05/02/2018 11* 12 - 45 U/L Final   • ALT(SGPT) 05/02/2018 11  2 - 50 U/L Final   • Alkaline Phosphatase 05/02/2018 80  30 - 99 U/L Final   • Total Bilirubin 05/02/2018 0.2  0.1 - 1.5 mg/dL Final   • Albumin 05/02/2018 4.2  3.2 - 4.9 g/dL Final   • Total Protein 05/02/2018 6.5  6.0 - 8.2 g/dL Final   • Globulin 05/02/2018 2.3  1.9 - 3.5 g/dL Final   • A-G Ratio 05/02/2018 1.8  g/dL Final   • GFR If  05/02/2018 >60  >60 mL/min/1.73 m 2 Final   • GFR If Non  05/02/2018 >60  >60 mL/min/1.73 m 2 Final   • Bands-Stabs 05/02/2018 0.90  0.00 - 10.00 % Final   • Metamyelocytes 05/02/2018 0.90  % Final   • Myelocytes 05/02/2018 0.90  % Final   • Progranulocytes 05/02/2018 1.80  % Final   • Manual Diff Status 05/02/2018 PERFORMED   Final   • Peripheral Smear Review 05/02/2018 see below   Final    Comment: Due to instrument suspect flags, further review of peripheral smear is  indicated on this patient sample. This review may or may not result in  abnormal findings.     • Plt Estimation 05/02/2018 Normal   Final   • RBC Morphology 05/02/2018 Present   Final   • Poikilocytosis 05/02/2018 1+   Final   • Ovalocytes 05/02/2018 1+   Final        Assessment/Plan:     1. Malignant neoplasm of upper-outer quadrant of left breast in female, estrogen receptor negative (CMS-HCC)-  1 positive node; dr. koo; dr sharpe-  chemorx 1st, then surgery, then RT     2. Tinea corporis     3. Sciatica of right side     4. PICC (peripherally inserted central catheter) in place     5. Chemotherapy induced neutropenia (HCC)     6. Encounter for  antineoplastic chemotherapy         1.  Tinea corporis: Rash under pannus lesions look almost like shingles but no hx of chicken pox and goes across the right and left - yeasty too though, usually does not have an issue with yeast. She is issued nystatin cream and will update as to any changes.    2.  Right sciatica: Worse over past 2-3 days - not relieved with ibuprofen, Tylenol, repositioning, stretching. R sciatica - ibuproefen and tylenol, current stretches are not working. She is issued a short course of one time Flexeril, which is helped in the past, she is due to follow up with PCP on 6/21. She will also concentrate on hip flexor stretches. She is encouraged to consider PT referral at PCP visit if symptoms continue. She is also advised to be careful with combining fexeril and ativan.    3.  Neutropenia/leukocytosis: WBC is elevated secondary to posttreatment Neulasta. This should no longer be required as she is initiating weekly Taxol.    4.  Breast cancer: She has completed 4 cycles of dose dense AC and will now proceed with weekly Taxol. CBC and CMP have been evaluated and found to be within acceptable limits.    She will return next week for evaluation prior to week 2 of treatment, sooner as needed.            The patient verbalized agreement and understanding of current plan. All questions and concerns were addressed at time of visit.    Please note that this dictation was created using voice recognition software. I have made every reasonable attempt to correct obvious errors, but I expect that there are errors of grammar and possibly content that I did not discover before finalizing the note.

## 2018-05-02 NOTE — PROGRESS NOTES
Patient arrives ambulatory to medical oncology for PICC line dressing change and labs.  Both lumens positive for blood return. Labs drawn from purple lumen.  Flushed both lumens per protocol and changed IV connectors.  Performed PICC line dressing change per protocol.  Pt tolerated well.     Patient complained of rash under pannus.  Noted red raised bumps with some scabbing.  Informed IRENA Villarreal.  Pt follows up with Delmy at 11am.

## 2018-05-03 ENCOUNTER — OUTPATIENT INFUSION SERVICES (OUTPATIENT)
Dept: ONCOLOGY | Facility: MEDICAL CENTER | Age: 78
End: 2018-05-03
Attending: INTERNAL MEDICINE
Payer: MEDICARE

## 2018-05-03 VITALS
DIASTOLIC BLOOD PRESSURE: 57 MMHG | BODY MASS INDEX: 33.57 KG/M2 | HEART RATE: 82 BPM | TEMPERATURE: 98.2 F | SYSTOLIC BLOOD PRESSURE: 145 MMHG | OXYGEN SATURATION: 98 % | RESPIRATION RATE: 18 BRPM | WEIGHT: 201.5 LBS | HEIGHT: 65 IN

## 2018-05-03 DIAGNOSIS — C50.412 MALIGNANT NEOPLASM OF UPPER-OUTER QUADRANT OF LEFT BREAST IN FEMALE, ESTROGEN RECEPTOR NEGATIVE (HCC): ICD-10-CM

## 2018-05-03 DIAGNOSIS — Z17.1 MALIGNANT NEOPLASM OF UPPER-OUTER QUADRANT OF LEFT BREAST IN FEMALE, ESTROGEN RECEPTOR NEGATIVE (HCC): ICD-10-CM

## 2018-05-03 PROCEDURE — 304540 HCHG NITRO SET VENT 2ND TUB

## 2018-05-03 PROCEDURE — 96415 CHEMO IV INFUSION ADDL HR: CPT

## 2018-05-03 PROCEDURE — 700105 HCHG RX REV CODE 258: Performed by: NURSE PRACTITIONER

## 2018-05-03 PROCEDURE — 96375 TX/PRO/DX INJ NEW DRUG ADDON: CPT

## 2018-05-03 PROCEDURE — 700111 HCHG RX REV CODE 636 W/ 250 OVERRIDE (IP): Performed by: NURSE PRACTITIONER

## 2018-05-03 PROCEDURE — 96413 CHEMO IV INFUSION 1 HR: CPT

## 2018-05-03 RX ORDER — SODIUM CHLORIDE 9 MG/ML
INJECTION, SOLUTION INTRAVENOUS CONTINUOUS
Status: DISCONTINUED | OUTPATIENT
Start: 2018-05-03 | End: 2018-05-03 | Stop reason: HOSPADM

## 2018-05-03 RX ADMIN — PACLITAXEL 164 MG: 6 INJECTION, SOLUTION, CONCENTRATE INTRAVENOUS at 15:43

## 2018-05-03 RX ADMIN — DIPHENHYDRAMINE HYDROCHLORIDE 25 MG: 50 INJECTION INTRAMUSCULAR; INTRAVENOUS at 14:55

## 2018-05-03 RX ADMIN — FAMOTIDINE 20 MG: 10 INJECTION INTRAVENOUS at 14:32

## 2018-05-03 RX ADMIN — DEXAMETHASONE SODIUM PHOSPHATE 12 MG: 4 INJECTION, SOLUTION INTRAMUSCULAR; INTRAVENOUS at 14:40

## 2018-05-03 RX ADMIN — SODIUM CHLORIDE: 9 INJECTION, SOLUTION INTRAVENOUS at 14:31

## 2018-05-03 NOTE — PROGRESS NOTES
Chemotherapy Verification - SECONDARY RN       Height = 64.96in  Weight = 91.42kg  BSA = 2.04m2       Medication: Taxol  Dose: 80mg/m2  Calculated Dose: 163.2mg                             (In mg/m2, AUC, mg/kg)           I confirm that this process was performed independently.

## 2018-05-03 NOTE — PROGRESS NOTES
"Pharmacy Chemotherapy Verification  Patient Name: Clarisse Reeves  Dx: Breast CA    Cycle: 1 weekly Paclitaxel(Cycle 5 total) Previous treatment = DDAC C4 on 4/20/18    Regimen and Dosing Reference  DDAC (Dose Dense Doxorubicin + Cyclophosphamide) followed by Paclitaxel  DOXOrubicin 60 mg/m2 IV on Day 1--dose reduced to 48 mg/m2 due to tolerance  Cyclophosphamide 600 mg/m2 IV over 30 min on Day 1--dose reduced to 480 mg/m2 due to tolerance  14 day cycle for 4 cycles--Completed 4/20/18  NCCN Guidelines for Breast Cancer V.2.2017  Jamila ML et al J Clin Oncol. 2003:21(8):1431-9    Followed By:    Paclitaxel Weekly Course  Paclitaxel (Taxol) 80 mg/m2 IV over 60 min on Day 1   Weekly cycle for 12 weeks  NCCN Guidelines for Breast Cancer v.1.2017  Jamila ML et al J Clin Oncol. 2003:21(8):1431-9    Allergies:Codeine; Lipitor [atorvastatin calcium]; Lovastatin; Zocor [simvastatin - high dose]; Lisinopril; Metformin; and Tape  Blood pressure 137/69, pulse 91, temperature 36.7 °C (98 °F), resp. rate 18, height 1.65 m (5' 4.96\"), weight 91.4 kg (201 lb 8 oz), SpO2 98 %.  Body surface area is 2.05 meters squared.    Labs 5/2/18  ANC ~ 29549   Plt = 185 k  Hgb = 9.4   SCr = 0.7     CrCl ~98 ml/min    AST/ALT/AlkPhos = 11/11/80 Tbili = 0.2    ECHO 1/30/18  LVEF = 65%    Paclitaxel 80 mg/m2 x 2.05 m2 = 164 mg  <5% diff, ok to treat with final written dose = 164 mg       Funmilayo Gorman, PharmD          "

## 2018-05-03 NOTE — PROGRESS NOTES
"Pharmacy Chemotherapy Verification    Patient Name: GREG DEMARCO  Dx: ER/NE negative HER-2 negative Breast Cancer        Protocol: DDAC     *Dosing Reference*  DOXOrubicin 60 mg/m2 IV push on day 1   -- 3/22/18: dose decreased to 48 mg/m2 for side effect  Cyclophosphamide 600 mg/m2 IV over 30 min on day 1   -- 3/22/18: dose decreased to 480 mg/m2 for side effects  14-day cycle x 4 cycles  ~followed by~  PACLitaxel 80 mg/m2 IV over 60 min on day 1  Weekly course x 12 weeks  NCCN Guidelines for Breast Cancer V.1.2017  Jamila DARLING, et al. J Clin Oncol. 2003:21(8):1431-9    Allergies:Codeine; Lipitor [atorvastatin calcium]; Lovastatin; Zocor [simvastatin - high dose]; Lisinopril; Metformin; and Tape  /69   Pulse 91   Temp 36.7 °C (98 °F)   Resp 18   Ht 1.65 m (5' 4.96\")   Wt 91.4 kg (201 lb 8 oz)   SpO2 98%   BMI 33.57 kg/m²  Body surface area is 2.05 meters squared.    Labs 5/2/18  ANC ~15987   Plt = 185k     Hgb = 9.4   SCr = 0.7 mg/dL  CrCl ~97.5 mL/min   AST/ALT/AP = 11/11/80 TBili = 0.2     ECHO 1/30/2018    LVEF 65%     Drug Order   (Drug name, dose, route, IV Fluid & volume, frequency, number of doses) Cycle 1 Weekly PACLitaxel (Cycle 5 total)  Previous treatment: C4 on 4/20/2018 DOXOrubicin + Cyclophosphamide     Medication = PACLitaxel  Base Dose =  80 mg/m2  Calc Dose: Base Dose x 2.05 m2 = 164 mg  Final Dose = 164 mg   Route = IV  Fluid and volume =  mL  Admin Duration = Over 60 minutes          <5% difference, OK to treat with final dose      By my signature below, I confirm this process was performed independently with the BSA and all final chemotherapy dosing calculations congruent. I have reviewed the above chemotherapy order and that my calculation of the final dose and BSA (when applicable) corroborate those calculations of the  pharmacist. Discrepancies of 5% or greater in the written dose have been addressed and documented within the [facility] Progress notes.    Rozina " Claire, PharmD, BCOP

## 2018-05-03 NOTE — PROGRESS NOTES
"Chemotherapy Verification - PRIMARY RN      Height = 64.96\"  Weight = 91.4 kg  BSA = 2.04 m2       Medication: Taxol  Dose: 80 mg/m2  Calculated Dose: 163.4 mg                               I confirm this process was performed independently with the BSA and all final chemotherapy dosing calculations congruent.  Any discrepancies of 5% or greater have been addressed with the chemotherapy pharmacist. The resolution of the discrepancy has been documented in the EPIC progress notes.       "

## 2018-05-04 NOTE — PROGRESS NOTES
Assumed care of patient for remainder of Taxol infusion.  Chemotherapy titrated per rate protocol, and was completed without adverse reaction.  PICC line flushed and protected with gauze and arm wrap.  Pt has her next appointments.  She was discharged from [facility] in [facility] under self care.

## 2018-05-04 NOTE — PROGRESS NOTES
Pt presents ambulatory with friend for Week 1 of weekly Taxol. RN notified ILA OSBORNE to inform her that pt does not have prescription for pm and am steroids prior to treatment, she reported that Dr. Iraheta does not prescribe them for weekly Taxol treatments. Pt updated and verbalized understanding. PICC accessed and pt reports drsg changed in MD office yesterday, blood return noted via both lumens. Labs reviewed and WNL for treatment. Pre-medications given and 30 mins observed prior to starting infusion. Taxol titrated in 50 mL/hr increments and rate currently infusing at 200 mL/hr. Report given chairside to Ember LI to assume care for remainder of infusion. Pt resting in chair with call light within reach and friend at chairside. Weekly printout schedule given to pt.

## 2018-05-08 DIAGNOSIS — C50.412 MALIGNANT NEOPLASM OF UPPER-OUTER QUADRANT OF LEFT BREAST IN FEMALE, ESTROGEN RECEPTOR NEGATIVE (HCC): ICD-10-CM

## 2018-05-08 DIAGNOSIS — Z17.1 MALIGNANT NEOPLASM OF UPPER-OUTER QUADRANT OF LEFT BREAST IN FEMALE, ESTROGEN RECEPTOR NEGATIVE (HCC): ICD-10-CM

## 2018-05-09 ENCOUNTER — HOSPITAL ENCOUNTER (OUTPATIENT)
Facility: MEDICAL CENTER | Age: 78
End: 2018-05-09
Attending: NURSE PRACTITIONER
Payer: MEDICARE

## 2018-05-09 ENCOUNTER — OFFICE VISIT (OUTPATIENT)
Dept: HEMATOLOGY ONCOLOGY | Facility: MEDICAL CENTER | Age: 78
End: 2018-05-09
Payer: MEDICARE

## 2018-05-09 ENCOUNTER — NON-PROVIDER VISIT (OUTPATIENT)
Dept: HEMATOLOGY ONCOLOGY | Facility: MEDICAL CENTER | Age: 78
End: 2018-05-09
Payer: MEDICARE

## 2018-05-09 VITALS
OXYGEN SATURATION: 97 % | HEIGHT: 66 IN | DIASTOLIC BLOOD PRESSURE: 76 MMHG | RESPIRATION RATE: 16 BRPM | SYSTOLIC BLOOD PRESSURE: 126 MMHG | BODY MASS INDEX: 32.35 KG/M2 | WEIGHT: 201.28 LBS | HEART RATE: 99 BPM | TEMPERATURE: 98.2 F

## 2018-05-09 VITALS
DIASTOLIC BLOOD PRESSURE: 76 MMHG | TEMPERATURE: 98.2 F | HEART RATE: 99 BPM | BODY MASS INDEX: 32.35 KG/M2 | SYSTOLIC BLOOD PRESSURE: 126 MMHG | OXYGEN SATURATION: 97 % | RESPIRATION RATE: 16 BRPM | WEIGHT: 201.28 LBS | HEIGHT: 66 IN

## 2018-05-09 DIAGNOSIS — C50.412 MALIGNANT NEOPLASM OF UPPER-OUTER QUADRANT OF LEFT BREAST IN FEMALE, ESTROGEN RECEPTOR NEGATIVE (HCC): ICD-10-CM

## 2018-05-09 DIAGNOSIS — Z45.2 PICC (PERIPHERALLY INSERTED CENTRAL CATHETER) IN PLACE: ICD-10-CM

## 2018-05-09 DIAGNOSIS — M54.31 SCIATICA OF RIGHT SIDE: ICD-10-CM

## 2018-05-09 DIAGNOSIS — B35.4 TINEA CORPORIS: ICD-10-CM

## 2018-05-09 DIAGNOSIS — Z51.11 ENCOUNTER FOR ANTINEOPLASTIC CHEMOTHERAPY: ICD-10-CM

## 2018-05-09 DIAGNOSIS — Z17.1 MALIGNANT NEOPLASM OF UPPER-OUTER QUADRANT OF LEFT BREAST IN FEMALE, ESTROGEN RECEPTOR NEGATIVE (HCC): ICD-10-CM

## 2018-05-09 LAB
ANISOCYTOSIS BLD QL SMEAR: ABNORMAL
BASOPHILS # BLD AUTO: 0.9 % (ref 0–1.8)
BASOPHILS # BLD: 0.08 K/UL (ref 0–0.12)
COMMENT 1642: NORMAL
EOSINOPHIL # BLD AUTO: 0.03 K/UL (ref 0–0.51)
EOSINOPHIL NFR BLD: 0.4 % (ref 0–6.9)
ERYTHROCYTE [DISTWIDTH] IN BLOOD BY AUTOMATED COUNT: 65.3 FL (ref 35.9–50)
HCT VFR BLD AUTO: 29.5 % (ref 37–47)
HGB BLD-MCNC: 9.7 G/DL (ref 12–16)
IMM GRANULOCYTES # BLD AUTO: 0.11 K/UL (ref 0–0.11)
IMM GRANULOCYTES NFR BLD AUTO: 1.3 % (ref 0–0.9)
LYMPHOCYTES # BLD AUTO: 1.63 K/UL (ref 1–4.8)
LYMPHOCYTES NFR BLD: 19.3 % (ref 22–41)
MCH RBC QN AUTO: 30.8 PG (ref 27–33)
MCHC RBC AUTO-ENTMCNC: 32.9 G/DL (ref 33.6–35)
MCV RBC AUTO: 93.7 FL (ref 81.4–97.8)
MONOCYTES # BLD AUTO: 0.6 K/UL (ref 0–0.85)
MONOCYTES NFR BLD AUTO: 7.1 % (ref 0–13.4)
MORPHOLOGY BLD-IMP: NORMAL
NEUTROPHILS # BLD AUTO: 5.98 K/UL (ref 2–7.15)
NEUTROPHILS NFR BLD: 71 % (ref 44–72)
NRBC # BLD AUTO: 0 K/UL
NRBC BLD-RTO: 0 /100 WBC
OVALOCYTES BLD QL SMEAR: NORMAL
PLATELET # BLD AUTO: 330 K/UL (ref 164–446)
PLATELET BLD QL SMEAR: NORMAL
PMV BLD AUTO: 9.9 FL (ref 9–12.9)
POIKILOCYTOSIS BLD QL SMEAR: NORMAL
RBC # BLD AUTO: 3.15 M/UL (ref 4.2–5.4)
RBC BLD AUTO: PRESENT
WBC # BLD AUTO: 8.4 K/UL (ref 4.8–10.8)

## 2018-05-09 PROCEDURE — 99214 OFFICE O/P EST MOD 30 MIN: CPT | Performed by: NURSE PRACTITIONER

## 2018-05-09 PROCEDURE — 85025 COMPLETE CBC W/AUTO DIFF WBC: CPT

## 2018-05-09 RX ORDER — 0.9 % SODIUM CHLORIDE 0.9 %
5 VIAL (ML) INJECTION PRN
Status: CANCELLED | OUTPATIENT
Start: 2018-05-10

## 2018-05-09 RX ORDER — 0.9 % SODIUM CHLORIDE 0.9 %
VIAL (ML) INJECTION PRN
Status: CANCELLED | OUTPATIENT
Start: 2018-05-10

## 2018-05-09 RX ORDER — ONDANSETRON 8 MG/1
8 TABLET, ORALLY DISINTEGRATING ORAL
Status: CANCELLED | OUTPATIENT
Start: 2018-05-10

## 2018-05-09 RX ORDER — PROCHLORPERAZINE MALEATE 10 MG
10 TABLET ORAL EVERY 6 HOURS PRN
Status: CANCELLED | OUTPATIENT
Start: 2018-05-10

## 2018-05-09 RX ORDER — SODIUM CHLORIDE 9 MG/ML
INJECTION, SOLUTION INTRAVENOUS CONTINUOUS
Status: CANCELLED | OUTPATIENT
Start: 2018-05-10

## 2018-05-09 RX ORDER — 0.9 % SODIUM CHLORIDE 0.9 %
VIAL (ML) INJECTION PRN
Status: CANCELLED | OUTPATIENT
Start: 2018-05-09

## 2018-05-09 RX ORDER — CYCLOBENZAPRINE HCL 5 MG
5-10 TABLET ORAL 2 TIMES DAILY PRN
Qty: 30 TAB | Refills: 0 | Status: SHIPPED | OUTPATIENT
Start: 2018-05-09 | End: 2018-07-05

## 2018-05-09 RX ORDER — NYSTATIN 100000 [USP'U]/G
POWDER TOPICAL
Qty: 15 G | Refills: 1 | Status: SHIPPED | OUTPATIENT
Start: 2018-05-09 | End: 2018-07-05

## 2018-05-09 RX ORDER — 0.9 % SODIUM CHLORIDE 0.9 %
5 VIAL (ML) INJECTION PRN
Status: CANCELLED | OUTPATIENT
Start: 2018-05-09

## 2018-05-09 RX ORDER — ONDANSETRON 2 MG/ML
4 INJECTION INTRAMUSCULAR; INTRAVENOUS
Status: CANCELLED | OUTPATIENT
Start: 2018-05-10

## 2018-05-09 ASSESSMENT — ENCOUNTER SYMPTOMS
WHEEZING: 0
DIZZINESS: 0
MYALGIAS: 0
COUGH: 1
SHORTNESS OF BREATH: 1
FEVER: 0
NAUSEA: 0
DIAPHORESIS: 1
TINGLING: 1
CHILLS: 0
CONSTIPATION: 0
WEIGHT LOSS: 0
VOMITING: 0
HEADACHES: 0
PALPITATIONS: 0
INSOMNIA: 0
DIARRHEA: 0

## 2018-05-09 ASSESSMENT — PAIN SCALES - GENERAL
PAINLEVEL: NO PAIN
PAINLEVEL: 8=MODERATE-SEVERE PAIN

## 2018-05-09 NOTE — PROGRESS NOTES
Subjective:      Clarisse Reeves is a 77 y.o. female who presents for Follow-Up (Prechemo) evaluation prior to week 2 of weekly Taxol for breast cancer.    HPI   Ms. Reeves completed 4 cycles of dose dense AC and has transitioned to weekly Taxol. She presents for evaluation prior to week 2 Taxol for treatment of stage IIIc [cT2, cN3c, M0], ER/WV negative, HER-2 negative, grade 3, invasive ductal carcinoma of the left upper outer breast. She is unaccompanied for today's visit.    She reports tolerating transition to Taxol very well and particularly appreciated for slow infusion. She continues with intermittent diaphoresis but otherwise is less fatigued. 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 She has not needed Zofran or Compazine for nausea and bowel movements have normalized.    Sciatic pain is improved and she is able to sit for her visit today. She continues on Flexeril as needed, stretching twice daily, and intermittent ice. She is scheduled to follow up with PCP on 6/21/18.    Rash at lower abdomen is resolving slowly.    She is otherwise asymptomatic and desires to continue with treatment.      Allergies   Allergen Reactions   • Codeine Hives and Nausea     RXN=40 years ago   • Lipitor [Atorvastatin Calcium] Myalgia     FFP=5066     • Lovastatin Myalgia     Muscle aches  TWL=5214   • Zocor [Simvastatin - High Dose] Myalgia     Severe myalgias  XMV=7452   • Lisinopril Cough   • Metformin      Diarrhea     • Tape        Current Outpatient Prescriptions on File Prior to Visit   Medication Sig Dispense Refill   • nystatin (MYCOSTATIN) [id number] UNIT/GM Cream topical cream Clean and pat dry affected area - apply thin layer of cream twice daily (cont. until 1-2 after symptoms subside) 15 g 1   • ALEX CONTOUR NEXT TEST strip      • lidocaine viscous 2% (XYLOCAINE) 2 % Solution      • lorazepam (ATIVAN) 2 MG tablet Take 2 Tabs by mouth at bedtime as needed for Anxiety for up to 30 days. 60 Tab 5   • ALPRAZolam  "(XANAX) 0.25 MG Tab Take 1 Tab by mouth 1 time daily as needed for Anxiety for up to 30 days. 30 Tab 5   • dexamethasone (DECADRON) 4 MG Tab Take 2 Tabs by mouth every day. Take 8 mg (2 tabs) on day 2 or 3 after chemotherapy. Do not take day of Neulasta. 4 Tab 0   • MICROLET LANCETS Misc For BG check once a day 100 Each 3   • Blood Glucose Monitoring Suppl Supplies Misc Contour Next glucometer strips for blood sugar check once a day 100 Each 2   • ondansetron (ZOFRAN) 4 MG Tab tablet Take 1 Tab by mouth every four hours as needed for Nausea/Vomiting (for nausea, vomiting). 30 Tab 6   • levothyroxine (SYNTHROID) 150 MCG Tab Take 1 Tab by mouth Every morning on an empty stomach. 90 Tab 4   • prochlorperazine (COMPAZINE) 10 MG Tab Take 1 Tab by mouth every 6 hours as needed (nausea, vomiting, migraine). 30 Tab 0   • glipiZIDE (GLUCOTROL) 5 MG Tab Take 1 Tab by mouth every day. 90 Tab 4   • fenofibrate (TRIGLIDE) 160 MG tablet Take 1 Tab by mouth every day. 90 Tab 4   • citalopram (CELEXA) 20 MG Tab Take 1 Tab by mouth every day. 90 Tab 4   • aspirin (ASA) 81 MG Chew Tab chewable tablet Take 1 Tab by mouth every day. 100 Tab 11     No current facility-administered medications on file prior to visit.        Review of Systems   Constitutional: Positive for diaphoresis (intermittent - pretty ridiculous). Negative for chills, fever, malaise/fatigue (\"tired but not more so\" \"i actually feel human again\") and weight loss (stable).   Respiratory: Positive for cough (intermittent - more allergy related) and shortness of breath (with exertion). Negative for wheezing.    Cardiovascular: Negative for chest pain, palpitations and leg swelling.   Gastrointestinal: Negative for constipation (Last BM this am - formed; less gas), diarrhea, nausea and vomiting.   Genitourinary: Negative for dysuria.   Musculoskeletal: Positive for joint pain (Right sciatica - slowly improving). Negative for myalgias.        Right sided sciatica is " "slowly improving with flexeril, stretches, ice - follows up with PCP 6/21   Neurological: Positive for tingling (stable at fingers). Negative for dizziness and headaches.   Psychiatric/Behavioral: The patient does not have insomnia (better this week).           Objective:     /76   Pulse 99   Temp 36.8 °C (98.2 °F) (Temporal)   Resp 16   Ht 1.676 m (5' 5.98\")   Wt 91.3 kg (201 lb 4.5 oz)   SpO2 97%   BMI 32.50 kg/m²      Physical Exam   Constitutional: She is oriented to person, place, and time. She appears well-developed and well-nourished. No distress.   HENT:   Head: Normocephalic and atraumatic.   Mouth/Throat: Oropharynx is clear and moist. No oropharyngeal exudate.   Eyes: Conjunctivae and EOM are normal. Pupils are equal, round, and reactive to light. Right eye exhibits no discharge. Left eye exhibits no discharge. No scleral icterus.   Neck: Normal range of motion. Neck supple. No thyromegaly present.   Cardiovascular: Normal rate, regular rhythm, normal heart sounds and intact distal pulses.  Exam reveals no gallop and no friction rub.    No murmur heard.  Pulmonary/Chest: Effort normal and breath sounds normal. No respiratory distress. She has no wheezes.   Abdominal: Soft. Bowel sounds are normal. She exhibits no distension. There is no tenderness.   Musculoskeletal: Normal range of motion. She exhibits tenderness (right hip - sciatic pain improving). She exhibits no edema.   Lymphadenopathy:        Head (right side): No submental, no submandibular, no tonsillar, no preauricular, no posterior auricular and no occipital adenopathy present.        Head (left side): No submental, no submandibular, no tonsillar, no preauricular, no posterior auricular and no occipital adenopathy present.     She has no cervical adenopathy.        Right cervical: No superficial cervical and no deep cervical adenopathy present.       Left cervical: No superficial cervical and no deep cervical adenopathy present.    "     Right: No supraclavicular adenopathy present.        Left: No supraclavicular adenopathy present.   Neurological: She is alert and oriented to person, place, and time.   Skin: Skin is warm and dry. Rash (much improved) noted. She is not diaphoretic. No erythema. No pallor.   Psychiatric: She has a normal mood and affect. Her behavior is normal.   Anxious   Vitals reviewed.      Hospital Outpatient Visit on 05/09/2018   Component Date Value Ref Range Status   • WBC 05/09/2018 8.4  4.8 - 10.8 K/uL Final   • RBC 05/09/2018 3.15* 4.20 - 5.40 M/uL Final   • Hemoglobin 05/09/2018 9.7* 12.0 - 16.0 g/dL Final   • Hematocrit 05/09/2018 29.5* 37.0 - 47.0 % Final   • MCV 05/09/2018 93.7  81.4 - 97.8 fL Final   • MCH 05/09/2018 30.8  27.0 - 33.0 pg Final   • MCHC 05/09/2018 32.9* 33.6 - 35.0 g/dL Final   • RDW 05/09/2018 65.3* 35.9 - 50.0 fL Final   • Platelet Count 05/09/2018 330  164 - 446 K/uL Final   • MPV 05/09/2018 9.9  9.0 - 12.9 fL Final   • Nucleated RBC 05/09/2018 0.00  /100 WBC Final   • NRBC (Absolute) 05/09/2018 0.00  K/uL Final          Assessment/Plan:     1. Malignant neoplasm of upper-outer quadrant of left breast in female, estrogen receptor negative (CMS-HCC)-  1 positive node; dr. koo; dr sharpe-  chemorx 1st, then surgery, then RT     2. PICC (peripherally inserted central catheter) in place     3. Sciatica of right side     4. Tinea corporis  nystatin (MYCOSTATIN) powder   5. Encounter for antineoplastic chemotherapy         1.  Sciatica: Improved. She will continue with Flexeril, stretching, ice as needed. She is scheduled to follow-up with PCP on 6/21/2018 but is issued a refill of Flexeril. She notes that she is taking it less often and will likely not needed beyond her next visit.    2.  Rash: Improving. Patient desires to try nystatin powder in lieu of nystatin cream, order placed.    3.  Breast cancer: She has completed 4 cycles of dose dense AC and transitioned to weekly Taxol. CBC has been  completed and found to be within acceptable limits. She will proceed with week 2 of Taxol and return next week for evaluation prior to week 3, sooner as needed.          The patient verbalized agreement and understanding of current plan. All questions and concerns were addressed at time of visit.    Please note that this dictation was created using voice recognition software. I have made every reasonable attempt to correct obvious errors, but I expect that there are errors of grammar and possibly content that I did not discover before finalizing the note.

## 2018-05-09 NOTE — PROGRESS NOTES
Patient arrived to medical oncology for PICC line dressing change with labs. VSS.  Labs drawn from right upper arm double lumen PICC.  Performed dressing change per protocol and changed IV connectors. Pt tolerated well.

## 2018-05-10 ENCOUNTER — OUTPATIENT INFUSION SERVICES (OUTPATIENT)
Dept: ONCOLOGY | Facility: MEDICAL CENTER | Age: 78
End: 2018-05-10
Attending: INTERNAL MEDICINE
Payer: MEDICARE

## 2018-05-10 VITALS
OXYGEN SATURATION: 96 % | TEMPERATURE: 98.1 F | DIASTOLIC BLOOD PRESSURE: 76 MMHG | WEIGHT: 203.04 LBS | HEART RATE: 100 BPM | RESPIRATION RATE: 18 BRPM | HEIGHT: 65 IN | SYSTOLIC BLOOD PRESSURE: 112 MMHG | BODY MASS INDEX: 33.83 KG/M2

## 2018-05-10 DIAGNOSIS — Z17.1 MALIGNANT NEOPLASM OF UPPER-OUTER QUADRANT OF LEFT BREAST IN FEMALE, ESTROGEN RECEPTOR NEGATIVE (HCC): ICD-10-CM

## 2018-05-10 DIAGNOSIS — C50.412 MALIGNANT NEOPLASM OF UPPER-OUTER QUADRANT OF LEFT BREAST IN FEMALE, ESTROGEN RECEPTOR NEGATIVE (HCC): ICD-10-CM

## 2018-05-10 PROCEDURE — 96375 TX/PRO/DX INJ NEW DRUG ADDON: CPT

## 2018-05-10 PROCEDURE — 700105 HCHG RX REV CODE 258: Performed by: NURSE PRACTITIONER

## 2018-05-10 PROCEDURE — 96415 CHEMO IV INFUSION ADDL HR: CPT

## 2018-05-10 PROCEDURE — 304540 HCHG NITRO SET VENT 2ND TUB

## 2018-05-10 PROCEDURE — 700111 HCHG RX REV CODE 636 W/ 250 OVERRIDE (IP): Performed by: NURSE PRACTITIONER

## 2018-05-10 PROCEDURE — 96413 CHEMO IV INFUSION 1 HR: CPT

## 2018-05-10 RX ADMIN — FAMOTIDINE 20 MG: 10 INJECTION INTRAVENOUS at 14:18

## 2018-05-10 RX ADMIN — DEXAMETHASONE SODIUM PHOSPHATE 12 MG: 4 INJECTION, SOLUTION INTRAMUSCULAR; INTRAVENOUS at 14:18

## 2018-05-10 RX ADMIN — PACLITAXEL 164 MG: 6 INJECTION, SOLUTION, CONCENTRATE INTRAVENOUS at 15:00

## 2018-05-10 RX ADMIN — DIPHENHYDRAMINE HYDROCHLORIDE 25 MG: 50 INJECTION INTRAMUSCULAR; INTRAVENOUS at 14:37

## 2018-05-10 ASSESSMENT — PAIN SCALES - GENERAL: PAINLEVEL_OUTOF10: 8

## 2018-05-10 NOTE — PROGRESS NOTES
"Pharmacy Chemotherapy Verification    Patient Name: GREG DEMARCO  Dx: ER/ND negative HER-2 negative Breast Cancer        Protocol: DDAC     *Dosing Reference*  DOXOrubicin 60 mg/m2 IV push on day 1   -- 3/22/18: dose decreased to 48 mg/m2 for side effect  Cyclophosphamide 600 mg/m2 IV over 30 min on day 1   -- 3/22/18: dose decreased to 480 mg/m2 for side effects  14-day cycle x 4 cycles  ~followed by~  PACLitaxel 80 mg/m2 IV over 60 min on day 1  Weekly course x 12 weeks  NCCN Guidelines for Breast Cancer V.1.2017  Jamila DARLING, et al. J Clin Oncol. 2003:21(8):1431-9    Allergies:Codeine; Lipitor [atorvastatin calcium]; Lovastatin; Zocor [simvastatin - high dose]; Lisinopril; Metformin; and Tape  /76   Pulse 100   Temp 36.7 °C (98.1 °F)   Resp 18   Ht 1.65 m (5' 4.96\")   Wt 92.1 kg (203 lb 0.7 oz)   SpO2 96%   BMI 33.83 kg/m²  Body surface area is 2.05 meters squared.    Labs 5/9/18  ANC ~5980   Plt = 330k     Hgb/Hct = 9.7/29.5     LABS 5/2/2018  SCr = 0.7 mg/dL  CrCl ~97.5 mL/min   AST/ALT/AP = 11/11/80 TBili = 0.2     ECHO 1/30/2018    LVEF 65%     Drug Order   (Drug name, dose, route, IV Fluid & volume, frequency, number of doses) Cycle 2 Weekly PACLitaxel (Cycle 5 total)  Previous treatment: Cycle 1 5/3/2018(C4 on 4/20/2018 DOXOrubicin + Cyclophosphamide)     Medication = PACLitaxel  Base Dose =  80 mg/m2  Calc Dose: Base Dose x 2.05 m2 = 164 mg  Final Dose = 164 mg   Route = IV  Fluid and volume =  mL  Admin Duration = Over 60 minutes          <5% difference, OK to treat with final dose      By my signature below, I confirm this process was performed independently with the BSA and all final chemotherapy dosing calculations congruent. I have reviewed the above chemotherapy order and that my calculation of the final dose and BSA (when applicable) corroborate those calculations of the  pharmacist. Discrepancies of 5% or greater in the written dose have been addressed and " documented within the EPIC Progress notes.    ANDREIA Turcios, PharmD

## 2018-05-10 NOTE — PROGRESS NOTES
[facility] day 1 of weekly tax[facility].  Pt w// no s/s of infection. No complaints at this time.  Pt PICC line assessed, brisk blood return noted in both lumens, flushed per protocol.  Pt's premeds given through PICC w/ no adverse reactions.  Taxol titrated in 50 mL/hr increments [id number].  [id number] Report given to Debbie FORD

## 2018-05-10 NOTE — PROGRESS NOTES
"Pharmacy Chemotherapy Verification  Patient Name: Clarisse Reeves  Dx: Breast CA    Cycle: 2 weekly Paclitaxel(Cycle 6 total) Previous treatment = Cycle 1 on  5/3/18 s/p DDAC C4 on 4/20/18    Regimen and Dosing Reference  DDAC (Dose Dense Doxorubicin + Cyclophosphamide) followed by Paclitaxel  DOXOrubicin 60 mg/m2 IV on Day 1--dose reduced to 48 mg/m2 due to tolerance  Cyclophosphamide 600 mg/m2 IV over 30 min on Day 1--dose reduced to 480 mg/m2 due to tolerance  14 day cycle for 4 cycles--Completed 4/20/18  NCCN Guidelines for Breast Cancer V.2.2017  Jamila DARLING et al J Clin Oncol. 2003:21(8):1431-9    Followed By:    Paclitaxel Weekly Course  Paclitaxel (Taxol) 80 mg/m2 IV over 60 min on Day 1   Weekly cycle for 12 weeks  NCCN Guidelines for Breast Cancer v.1.2017  Jamila ML et al J Clin Oncol. 2003:21(8):1431-9    Allergies:Codeine; Lipitor [atorvastatin calcium]; Lovastatin; Zocor [simvastatin - high dose]; Lisinopril; Metformin; and Tape  Blood pressure 112/76, pulse 100, temperature 36.7 °C (98.1 °F), resp. rate 18, height 1.65 m (5' 4.96\"), weight 92.1 kg (203 lb 0.7 oz), SpO2 96 %.  Body surface area is 2.05 meters squared.    Labs 5/9/18  ANC ~ 5980   Plt = 330 k  Hgb = 9.7     Labs 5/2/18 SCr = 0.7     CrCl ~98 ml/min    AST/ALT/AlkPhos = 11/11/80 Tbili = 0.2    ECHO 1/30/18  LVEF = 65%    Paclitaxel 80 mg/m2 x 2.05 m2 = 164 mg  <5% diff, ok to treat with final written dose = 164 mg       Funmilayo Gorman, PharmD          "

## 2018-05-10 NOTE — PROGRESS NOTES
Chemotherapy Verification - PRIMARY RN      Height = 1.65 m  Weight = 92.1 kg  BSA = 2.05 m2       Medication: paclitaxel  Dose: 80 mg/m2  Calculated Dose: 164 mg                             (In mg/m2, AUC, mg/kg)       I confirm this process was performed independently with the BSA and all final chemotherapy dosing calculations congruent.  Any discrepancies of 5% or greater have been addressed with the chemotherapy pharmacist. The resolution of the discrepancy has been documented in the EPIC progress notes.

## 2018-05-10 NOTE — PROGRESS NOTES
"Chemotherapy Verification - SECONDARY RN       Height = 64.96\"  Weight = 92.1 kg  BSA = 2.05 m2       Medication: Taxol  Dose: 80 mg/m2  Calculated Dose: 164 mg                              I confirm that this process was performed independently.   "

## 2018-05-11 ENCOUNTER — PATIENT OUTREACH (OUTPATIENT)
Dept: OTHER | Facility: MEDICAL CENTER | Age: 78
End: 2018-05-11

## 2018-05-11 NOTE — PROGRESS NOTES
Report received from JEN Hensley.  Remaining taxol infused with no complications or adverse reactions.  Patient to return 5/17/18, confirmed with patient.  PICC line flushed per protocol, brisk blood return noted, gauze, and arm wrap applied.  Patient left ambulatory in no distress.

## 2018-05-11 NOTE — PROGRESS NOTES
Follow up call to pt.  Pt states she is doing well.  No barriers at this time.  Will contact ONN should anything arise.

## 2018-05-15 DIAGNOSIS — C34.11 MALIGNANT NEOPLASM OF RIGHT UPPER LOBE OF LUNG (HCC): ICD-10-CM

## 2018-05-16 ENCOUNTER — APPOINTMENT (OUTPATIENT)
Dept: HEMATOLOGY ONCOLOGY | Facility: MEDICAL CENTER | Age: 78
End: 2018-05-16
Payer: MEDICARE

## 2018-05-16 ENCOUNTER — OFFICE VISIT (OUTPATIENT)
Dept: HEMATOLOGY ONCOLOGY | Facility: MEDICAL CENTER | Age: 78
End: 2018-05-16
Payer: MEDICARE

## 2018-05-16 ENCOUNTER — NON-PROVIDER VISIT (OUTPATIENT)
Dept: HEMATOLOGY ONCOLOGY | Facility: MEDICAL CENTER | Age: 78
End: 2018-05-16
Payer: MEDICARE

## 2018-05-16 ENCOUNTER — HOSPITAL ENCOUNTER (OUTPATIENT)
Dept: RADIOLOGY | Facility: MEDICAL CENTER | Age: 78
End: 2018-05-16
Attending: INTERNAL MEDICINE
Payer: MEDICARE

## 2018-05-16 ENCOUNTER — HOSPITAL ENCOUNTER (OUTPATIENT)
Facility: MEDICAL CENTER | Age: 78
End: 2018-05-16
Attending: INTERNAL MEDICINE
Payer: MEDICARE

## 2018-05-16 VITALS
WEIGHT: 200.4 LBS | TEMPERATURE: 98.6 F | SYSTOLIC BLOOD PRESSURE: 108 MMHG | RESPIRATION RATE: 18 BRPM | HEIGHT: 65 IN | HEART RATE: 120 BPM | DIASTOLIC BLOOD PRESSURE: 60 MMHG | OXYGEN SATURATION: 98 % | BODY MASS INDEX: 33.39 KG/M2

## 2018-05-16 VITALS
RESPIRATION RATE: 18 BRPM | HEART RATE: 120 BPM | OXYGEN SATURATION: 98 % | BODY MASS INDEX: 33.32 KG/M2 | HEIGHT: 65 IN | WEIGHT: 200 LBS | SYSTOLIC BLOOD PRESSURE: 108 MMHG | DIASTOLIC BLOOD PRESSURE: 60 MMHG | TEMPERATURE: 98.6 F

## 2018-05-16 DIAGNOSIS — C50.412 MALIGNANT NEOPLASM OF UPPER-OUTER QUADRANT OF LEFT BREAST IN FEMALE, ESTROGEN RECEPTOR NEGATIVE (HCC): ICD-10-CM

## 2018-05-16 DIAGNOSIS — Z17.1 MALIGNANT NEOPLASM OF UPPER-OUTER QUADRANT OF LEFT BREAST IN FEMALE, ESTROGEN RECEPTOR NEGATIVE (HCC): ICD-10-CM

## 2018-05-16 DIAGNOSIS — C34.11 MALIGNANT NEOPLASM OF RIGHT UPPER LOBE OF LUNG (HCC): ICD-10-CM

## 2018-05-16 LAB
ALBUMIN SERPL BCP-MCNC: 4.1 G/DL (ref 3.2–4.9)
ALBUMIN/GLOB SERPL: 1.6 G/DL
ALP SERPL-CCNC: 50 U/L (ref 30–99)
ALT SERPL-CCNC: 11 U/L (ref 2–50)
ANION GAP SERPL CALC-SCNC: 10 MMOL/L (ref 0–11.9)
ANISOCYTOSIS BLD QL SMEAR: ABNORMAL
AST SERPL-CCNC: 10 U/L (ref 12–45)
BASOPHILS # BLD AUTO: 0.9 % (ref 0–1.8)
BASOPHILS # BLD: 0.09 K/UL (ref 0–0.12)
BILIRUB SERPL-MCNC: 0.5 MG/DL (ref 0.1–1.5)
BUN SERPL-MCNC: 17 MG/DL (ref 8–22)
CALCIUM SERPL-MCNC: 9.9 MG/DL (ref 8.5–10.5)
CHLORIDE SERPL-SCNC: 100 MMOL/L (ref 96–112)
CO2 SERPL-SCNC: 24 MMOL/L (ref 20–33)
CREAT SERPL-MCNC: 0.77 MG/DL (ref 0.5–1.4)
EOSINOPHIL # BLD AUTO: 0.09 K/UL (ref 0–0.51)
EOSINOPHIL NFR BLD: 0.9 % (ref 0–6.9)
ERYTHROCYTE [DISTWIDTH] IN BLOOD BY AUTOMATED COUNT: 65.2 FL (ref 35.9–50)
GLOBULIN SER CALC-MCNC: 2.5 G/DL (ref 1.9–3.5)
GLUCOSE SERPL-MCNC: 129 MG/DL (ref 65–99)
HCT VFR BLD AUTO: 30.5 % (ref 37–47)
HGB BLD-MCNC: 9.9 G/DL (ref 12–16)
LYMPHOCYTES # BLD AUTO: 1.35 K/UL (ref 1–4.8)
LYMPHOCYTES NFR BLD: 13.9 % (ref 22–41)
MACROCYTES BLD QL SMEAR: ABNORMAL
MANUAL DIFF BLD: NORMAL
MCH RBC QN AUTO: 30.7 PG (ref 27–33)
MCHC RBC AUTO-ENTMCNC: 32.5 G/DL (ref 33.6–35)
MCV RBC AUTO: 94.7 FL (ref 81.4–97.8)
METAMYELOCYTES NFR BLD MANUAL: 0.9 %
MONOCYTES # BLD AUTO: 0 K/UL (ref 0–0.85)
MONOCYTES NFR BLD AUTO: 0 % (ref 0–13.4)
MORPHOLOGY BLD-IMP: NORMAL
NEUTROPHILS # BLD AUTO: 8.09 K/UL (ref 2–7.15)
NEUTROPHILS NFR BLD: 79.1 % (ref 44–72)
NEUTS BAND NFR BLD MANUAL: 4.3 % (ref 0–10)
NRBC # BLD AUTO: 0 K/UL
NRBC BLD-RTO: 0 /100 WBC
PLATELET # BLD AUTO: 365 K/UL (ref 164–446)
PLATELET BLD QL SMEAR: NORMAL
PMV BLD AUTO: 9.8 FL (ref 9–12.9)
POTASSIUM SERPL-SCNC: 3.9 MMOL/L (ref 3.6–5.5)
PROT SERPL-MCNC: 6.6 G/DL (ref 6–8.2)
RBC # BLD AUTO: 3.22 M/UL (ref 4.2–5.4)
RBC BLD AUTO: PRESENT
SODIUM SERPL-SCNC: 134 MMOL/L (ref 135–145)
WBC # BLD AUTO: 9.7 K/UL (ref 4.8–10.8)

## 2018-05-16 PROCEDURE — 99213 OFFICE O/P EST LOW 20 MIN: CPT | Performed by: INTERNAL MEDICINE

## 2018-05-16 PROCEDURE — 80053 COMPREHEN METABOLIC PANEL: CPT

## 2018-05-16 PROCEDURE — 71045 X-RAY EXAM CHEST 1 VIEW: CPT

## 2018-05-16 PROCEDURE — 85007 BL SMEAR W/DIFF WBC COUNT: CPT

## 2018-05-16 PROCEDURE — 85027 COMPLETE CBC AUTOMATED: CPT

## 2018-05-16 RX ORDER — 0.9 % SODIUM CHLORIDE 0.9 %
VIAL (ML) INJECTION PRN
Status: CANCELLED | OUTPATIENT
Start: 2018-05-17

## 2018-05-16 RX ORDER — 0.9 % SODIUM CHLORIDE 0.9 %
VIAL (ML) INJECTION PRN
Status: CANCELLED | OUTPATIENT
Start: 2018-05-23

## 2018-05-16 RX ORDER — 0.9 % SODIUM CHLORIDE 0.9 %
5 VIAL (ML) INJECTION PRN
Status: CANCELLED | OUTPATIENT
Start: 2018-05-17

## 2018-05-16 RX ORDER — 0.9 % SODIUM CHLORIDE 0.9 %
VIAL (ML) INJECTION PRN
Status: CANCELLED | OUTPATIENT
Start: 2018-05-24

## 2018-05-16 RX ORDER — ONDANSETRON 2 MG/ML
4 INJECTION INTRAMUSCULAR; INTRAVENOUS
Status: CANCELLED | OUTPATIENT
Start: 2018-05-24

## 2018-05-16 RX ORDER — PROCHLORPERAZINE MALEATE 10 MG
10 TABLET ORAL EVERY 6 HOURS PRN
Status: CANCELLED | OUTPATIENT
Start: 2018-05-17

## 2018-05-16 RX ORDER — 0.9 % SODIUM CHLORIDE 0.9 %
5 VIAL (ML) INJECTION PRN
Status: CANCELLED | OUTPATIENT
Start: 2018-05-23

## 2018-05-16 RX ORDER — ONDANSETRON 8 MG/1
8 TABLET, ORALLY DISINTEGRATING ORAL
Status: CANCELLED | OUTPATIENT
Start: 2018-05-24

## 2018-05-16 RX ORDER — 0.9 % SODIUM CHLORIDE 0.9 %
VIAL (ML) INJECTION PRN
Status: CANCELLED | OUTPATIENT
Start: 2018-05-16

## 2018-05-16 RX ORDER — SODIUM CHLORIDE 9 MG/ML
INJECTION, SOLUTION INTRAVENOUS CONTINUOUS
Status: CANCELLED | OUTPATIENT
Start: 2018-05-17

## 2018-05-16 RX ORDER — PROCHLORPERAZINE MALEATE 10 MG
10 TABLET ORAL EVERY 6 HOURS PRN
Status: CANCELLED | OUTPATIENT
Start: 2018-05-24

## 2018-05-16 RX ORDER — 0.9 % SODIUM CHLORIDE 0.9 %
5 VIAL (ML) INJECTION PRN
Status: CANCELLED | OUTPATIENT
Start: 2018-05-16

## 2018-05-16 RX ORDER — 0.9 % SODIUM CHLORIDE 0.9 %
5 VIAL (ML) INJECTION PRN
Status: CANCELLED | OUTPATIENT
Start: 2018-05-24

## 2018-05-16 RX ORDER — SODIUM CHLORIDE 9 MG/ML
INJECTION, SOLUTION INTRAVENOUS CONTINUOUS
Status: CANCELLED | OUTPATIENT
Start: 2018-05-24

## 2018-05-16 RX ORDER — ONDANSETRON 2 MG/ML
4 INJECTION INTRAMUSCULAR; INTRAVENOUS
Status: CANCELLED | OUTPATIENT
Start: 2018-05-17

## 2018-05-16 RX ORDER — ONDANSETRON 8 MG/1
8 TABLET, ORALLY DISINTEGRATING ORAL
Status: CANCELLED | OUTPATIENT
Start: 2018-05-17

## 2018-05-16 ASSESSMENT — PAIN SCALES - GENERAL
PAINLEVEL: NO PAIN
PAINLEVEL: NO PAIN

## 2018-05-16 NOTE — PROGRESS NOTES
Patient arrived ambulatory for PICC line labs with dressing change. Pt presented with hole on bottom of dressing due to her pulling tape off earlier this morning.  Dressing clean, dry and intact over insertion site/biopatch.  Labs drawn from right upper arm double lumen PICC and flushed per protocol.  Performed PICC line dressing change per protocol with hypoallergenic dressing.  PICC line noted to be pulled out to 7cm. Informed Dr. Barroso that PICC line is pulled out.  Pt scheduled to see MD at 10am.

## 2018-05-16 NOTE — PROGRESS NOTES
Consult Note: Oncology    Date of consultation: 5/16/2018      Referring provider: Esperanza Crandall M.D.    Reason for consultation:   CC: Breast cancer    History of presenting illness:  Breast cancer  -August 2017 patient had a normal screening mammogram  -January 30, 2018. Echocardiogram shows normal systolic function. Ejection fraction of 65%  -February 2018 patient self palpated a lump in the left breast  -February 8, 2018. Diagnostic mammogram positive for suspicious mass in the left breast upper-outer quadrant.  -February 9, 2018. Needle biopsy shows triple negative infiltrating ductal carcinoma. Ki-67 is 30-50%  -February 19, 2018. PET CT scan. . Hypermetabolic 1.7 cm mass in the left breast, in keeping with known malignancy.2. Multiple hypermetabolic remi metastases in the left axilla.3. Nonenlarged lymph nodes in the left supraclavicular and retroclavicular region with mild uptake, suspicious for early metastasis.4. Mild uptake in the inferior endplate of T6 is nonspecific but could relate to degenerative change. Attention on follow-up exam is recommended  -March 3, 2018. MRI of the thoracic spine.1.  No evidence of metastatic disease the thoracic spine. Specifically, there is no suspicious lesion seen at T6.  -March 1, 2018. Cycle 1 day 1 of Adriamycin and cyclophosphamide.  -March 22, 2018. Plan for cycle 2 day 1 of Adriamycin and cyclophosphamide. April 19, 2018. Cycle 4 of Adriamycin and cyclophosphamide  -May 2, 2018. Cycle 1 of weekly paclitaxel  -May 16, 2018. Week 3 of paclitaxel     Adenocarcinoma in situ   Dec 18, 2015 .Right upper lobe lung nodule, 6 cores: Adenocarcinoma-in-situ in fibroelastotic scar tissue, with foci suspicious for invasive adenocarcinoma.  Feb 1, 2016. Right thoracoscopy with anterior segmentectomy of the right upper lobe and removal of a portion of the right middle lobe.    Fibroadenoma of right breast  Nov 12, 2014  Stereotactic biopsy: Benign fibrofatty breast tissue with  "discrete nodules of hyalinized fibroadenoma with microcalcifications. No atypia or malignancy.    CLL  2006 diagnosed with CLL in [place] after being admitted to the hospital for an infection and found to have leucocytosis.  Patient followed by Dr. Foster until 2014 until her insurance changed. Has been followed by her PCP since.       Clarisse Reeves  is a 76 y.o. year old female who presents to [facility] care. She denies any acute complaints at this time except for some chronic fatigue which has been worse for the past past 3-4 months. She states she had some drenching night sweats last night but none in the past 6 months. She has also lost about 27 lbs in the past 3-4 months or so, but she has been trying to watch her diet and lose weight.    She was started on Celexa for depression last year(2016) after she lost her daughter who 51 to massive MI.      Interval History:  Initially seen in our clinic on 7/19/2017   Clarisse Reeves is a 77 y.o. Female who is seen in clinic for triple negative breast cancer. She is currently undergoing neoadjuvant chemotherapy. She describes some numbness in the tips of her fingertips with cyclophosphamide patient is stable.    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  Denies symptoms.   • Cutaneous skin tags 1/29/2015   • DM (diabetes mellitus) (CMS-HCC) (HCC)    • Hiatus hernia syndrome     no surgery " "  • Hypertension     \"In the past\"   • Indigestion    • MEDICAL HOME    • Personal history of colonic polyps 11/26/2012   • Pneumonia 2014   • Pneumonia 06/2017   • Thyroid disease    • Type II or unspecified type diabetes mellitus without mention of complication, not stated as uncontrolled     pre-diabetic       Past surgical history:    Past Surgical History:   Procedure Laterality Date   • THYROIDECTOMY N/A 11/29/2017    Procedure: THYROIDECTOMY COMPLETION, RECURRENT LARYNGEAL NERVE MONITORING;  Surgeon: Cameron Marcelino M.D.;  Location: SURGERY SAME DAY [facility];  Service: General   • THORACOSCOPY Right 2/1/2016    Procedure: THORACOSCOPY Upper Lobectomy ;  Surgeon: John H Ganser, M.D.;  Location: SURGERY [facility];  Service:    • NODE DISSECTION Right 2/1/2016    Procedure: NODE DISSECTION;  Surgeon: John H Ganser, M.D.;  Location: SURGERY [facility];  Service:    • RECOVERY  12/18/2015    Procedure: CT-SCP-RUL LUNG BIOPSY-;  Surgeon: Recoveryonly Surgery;  Location: SURGERY PRE-POST PROC UNIT [facility];  Service:    • OTHER  2001    Lower Left segment of lung removed    • CHOLECYSTECTOMY  1995   • OTHER ORTHOPEDIC SURGERY  1990    bakers cyst removed in right knee, multiple scopes   • OTHER  1990    hemmroid removal   • OTHER  1984    left Thyroid removed, might remove right side in the future   • APPENDECTOMY  1955   • CATARACT EXTRACTION WITH IOL     • TONSILLECTOMY     • US-NEEDLE CORE BX-BREAST PANEL     • VAGINAL HYSTERECTOMY TOTAL      Hysterectomy,Total Vaginal       Allergies:  Codeine; Lipitor; Lovastatin; and Zocor    Medications:    Current Outpatient Prescriptions   Medication Sig Dispense Refill   • cyclobenzaprine (FLEXERIL) 5 MG tablet Take 1-2 Tabs by mouth 2 times a day as needed. 30 Tab 0   • nystatin (MYCOSTATIN) powder Clean and pat dry affected area- apply to affected area twice daily and as needed 15 g 1   • nystatin (MYCOSTATIN) 608838 UNIT/GM Cream topical cream Clean " and pat dry affected area - apply thin layer of cream twice daily (cont. until 1-2 after symptoms subside) 15 g 1   • ALEX CONTOUR NEXT TEST strip      • lidocaine viscous 2% (XYLOCAINE) 2 % Solution      • lorazepam (ATIVAN) 2 MG tablet Take 2 Tabs by mouth at bedtime as needed for Anxiety for up to 30 days. 60 Tab 5   • ALPRAZolam (XANAX) 0.25 MG Tab Take 1 Tab by mouth 1 time daily as needed for Anxiety for up to 30 days. 30 Tab 5   • dexamethasone (DECADRON) 4 MG Tab Take 2 Tabs by mouth every day. Take 8 mg (2 tabs) on day 2 or 3 after chemotherapy. Do not take day of Neulasta. 4 Tab 0   • MICROLET LANCETS Misc For BG check once a day 100 Each 3   • Blood Glucose Monitoring Suppl Supplies Misc Contour Next glucometer strips for blood sugar check once a day 100 Each 2   • ondansetron (ZOFRAN) 4 MG Tab tablet Take 1 Tab by mouth every four hours as needed for Nausea/Vomiting (for nausea, vomiting). 30 Tab 6   • levothyroxine (SYNTHROID) 150 MCG Tab Take 1 Tab by mouth Every morning on an empty stomach. 90 Tab 4   • prochlorperazine (COMPAZINE) 10 MG Tab Take 1 Tab by mouth every 6 hours as needed (nausea, vomiting, migraine). 30 Tab 0   • glipiZIDE (GLUCOTROL) 5 MG Tab Take 1 Tab by mouth every day. 90 Tab 4   • fenofibrate (TRIGLIDE) 160 MG tablet Take 1 Tab by mouth every day. 90 Tab 4   • citalopram (CELEXA) 20 MG Tab Take 1 Tab by mouth every day. 90 Tab 4   • aspirin (ASA) 81 MG Chew Tab chewable tablet Take 1 Tab by mouth every day. 100 Tab 11     No current facility-administered medications for this visit.        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     Social History Main Topics   • Smoking status: Former Smoker     Packs/day: 2.00     Years: 50.00     Types: Cigarettes     Quit date: 5/6/2006   • Smokeless tobacco:  "Never Used      Comment: quit smoking 2002   • Alcohol use 0.0 oz/week      Comment: ocassionaly   • Drug use: No   • Sexual activity: Not Currently     Partners: Male     Other Topics Concern   • Not on file     Social History Narrative   • No narrative on file     She i    Family History:     Family History   Problem Relation Age of Onset   • Cancer Mother    • Lung Disease Father    • Cancer Father        Review of Systems:    All other review of systems are negative except what was mentioned above in the HPI.      Physical Exam:  Vitals:   /60   Pulse (!) 120   Temp 37 °C (98.6 °F)   Resp 18   Ht 1.65 m (5' 4.96\")   Wt 90.7 kg (200 lb)   SpO2 98%   Breastfeeding? No   BMI 33.32 kg/m²     General: Not in acute distress, alert and oriented x 3  HEENT: No pallor, icterus. Oropharynx clear.   Neck: Supple, no palpable masses.  Lymph nodes: No palpable cervical, supraclavicular, axillary or inguinal lymphadenopathy.    CVS: regular rate and rhythm, no rubs or gallops  RESP: Clear to auscultate bilaterally, no wheezing or crackles.   ABD: Soft, RUQ tender to deep palpation, non distended, positive bowel sounds, no palpable organomegaly  EXT: No edema or cyanosis  CNS: Alert and oriented x3, No focal deficits.  Skin- No rash  Breast- right breast does not have any obvious distortion. No masses palpable no lymphadenopathy. Left breast has visible bruising at the 2:00 position at the site of needle biopsy-tender to palpation in the upper outer quadrant. Questionable lymph nodes palpable in the axilla, swelling because of biopsy noted      Labs:   Results for GREG DEMARCO (MRN [id number]) as of 5/16/2018 12:23   5/16/2018 09:17   WBC 9.7   RBC 3.22 (L)   Hemoglobin 9.9 (L)   Hematocrit 30.5 (L)   MCV 94.7   MCH 30.7   MCHC 32.5 (L)   RDW 65.2 (H)   Platelet Count 365   MPV 9.8   Neutrophils-Polys 79.10 (H)   Neutrophils (Absolute) 8.09 (H)   Bands-Stabs 4.30   Lymphocytes 13.90 (L)   Lymphs (Absolute) " 1.35   Monocytes 0.00   Monos (Absolute) 0.00   Eosinophils 0.90   Eos (Absolute) 0.09   Basophils 0.90   Baso (Absolute) 0.09   Metamyelocytes 0.90   Nucleated RBC 0.00   NRBC (Absolute) 0.00   Plt Estimation Normal   RBC Morphology Present   Anisocytosis 1+   Macrocytosis 1+   Peripheral Smear Review see below   Manual Diff Status PERFORMED   Sodium 134 (L)   Potassium 3.9   Chloride 100   Co2 24   Anion Gap 10.0   Glucose 129 (H)   Bun 17   Creatinine 0.77   GFR If  >60   GFR If Non African American >60   Calcium 9.9   AST(SGOT) 10 (L)   ALT(SGPT) 11   Alkaline Phosphatase 50   Total Bilirubin 0.5   Albumin 4.1   Total Protein 6.6   Globulin 2.5   A-G Ratio 1.6         Imaging  March 3, 2018. MRI of the thoracic spine  1.  No evidence of metastatic disease the thoracic spine. Specifically, there is no suspicious lesion seen at T6.  2.  Mild thoracic spondylosis without high-grade impingement on the neural axis  3.  Hiatal hernia    February 19, 2018. PET CT scan  1. Hypermetabolic 1.7 cm mass in the left breast, in keeping with known malignancy.  2. Multiple hypermetabolic remi metastases in the left axilla.  3. Nonenlarged lymph nodes in the left supraclavicular and retroclavicular region with mild uptake, suspicious for early metastasis.  4. Mild uptake in the inferior endplate of T6 is nonspecific but could relate to degenerative change. Attention on follow-up exam is recommended.    Assessment and Plan:  -Breast cancer  -Left side   -Upper outer quadrant  -Invasive ductal  -Stage IIIc [cT2, cN3c, M0]. ER/FL negative HER-2 negative  -Histologic grade 3  -PET/CT scan from February 19, 2018 was reviewed. Left supraclavicular and retroclavicular lymph nodes nonenlarged but mild uptake suspicious for early metastasis.  -May be a candidate for adjuvant Xeloda based on The Capecitabine for Residual Cancer as Adjuvant Therapy (CREATE-X) trial   3/21/2018  -MRI of the spine March 3, no suspicious  lesion seen at T6.  -March 1, 2018. Cycle 1 day 1 of Adriamycin and cyclophosphamide. Patient needed to be admitted to the hospital for neutropenic fever.  -March 22, 2018. Plan for cycle 2 day 1 of Adriamycin and cyclophosphamide. Will plan for 20% dose reduction because of patient's severe fatigue and prolonged anemia.  5/16/2018  -Continue with cycle 3 of paclitaxel    -Anemia  -Stable, no further workup  -Secondary to chemotherapy  -Improved    -CLL  -Established, stable, chronic  -Coon stage 0 with lymphocytosis only   -She is currently under active observation  -PET/CT scan February 2018 did not show evidence of lymph node disease    -Fibroadenoma of breast  -Right breast  -Stereotactic biopsy Nov 12, 2014  Showed benign fibrofatty breast tissue with discrete nodules of hyalinized fibroadenoma with microcalcifications.No atypia or malignancy.    - Adenocarcinoma in situ   - Diagnosed Dec 18, 2015 in the right upper lobe lung. biopsy of  nodule, 6 showed Adenocarcinoma-in-situ in fibroelastotic scar tissue, with foci suspicious for invasive adenocarcinoma.  - Feb 1, 2016. Right thoracoscopy with anterior segmentectomy of the right upper lobe and removal of a portion of the right middle lobe.Pathology report states The adenocarcinoma in situ focally demonstrates areas of central fibrosis which surrounds the adenocarcinoma in situ glands, which makes evaluation for invasion difficult. However, there are a few scattered clusters of malignant cells and small foci suspicious for invasive adenocarcinoma  - PET/CT scan February 2018 did not show any evidence of disease          She agreed and verbalized her agreement and understanding with the current plan.  I answered all questions and concerns she has at this time.   Dear  Esperanza Crandall M.D.., Thank you very much for allowing me to see  Clarisse Reeves today .     Please note that this dictation was created using voice recognition software. I have made every  reasonable attempt to correct obvious errors, but I expect that there are errors of grammar and possibly content that I did not discover before finalizing the note.      SIGNATURES:  Denise Barroso    CC:  Siva Smart M.D.  Siva Smart M.D. Wright, Sharon, M.D.

## 2018-05-17 ENCOUNTER — OUTPATIENT INFUSION SERVICES (OUTPATIENT)
Dept: ONCOLOGY | Facility: MEDICAL CENTER | Age: 78
End: 2018-05-17
Attending: INTERNAL MEDICINE
Payer: MEDICARE

## 2018-05-17 VITALS
RESPIRATION RATE: 18 BRPM | HEIGHT: 65 IN | WEIGHT: 202.16 LBS | SYSTOLIC BLOOD PRESSURE: 115 MMHG | BODY MASS INDEX: 33.68 KG/M2 | DIASTOLIC BLOOD PRESSURE: 67 MMHG | HEART RATE: 110 BPM | TEMPERATURE: 98.1 F | OXYGEN SATURATION: 98 %

## 2018-05-17 DIAGNOSIS — Z17.1 MALIGNANT NEOPLASM OF UPPER-OUTER QUADRANT OF LEFT BREAST IN FEMALE, ESTROGEN RECEPTOR NEGATIVE (HCC): ICD-10-CM

## 2018-05-17 DIAGNOSIS — C50.412 MALIGNANT NEOPLASM OF UPPER-OUTER QUADRANT OF LEFT BREAST IN FEMALE, ESTROGEN RECEPTOR NEGATIVE (HCC): ICD-10-CM

## 2018-05-17 PROCEDURE — 96375 TX/PRO/DX INJ NEW DRUG ADDON: CPT

## 2018-05-17 PROCEDURE — 700111 HCHG RX REV CODE 636 W/ 250 OVERRIDE (IP): Performed by: INTERNAL MEDICINE

## 2018-05-17 PROCEDURE — 96415 CHEMO IV INFUSION ADDL HR: CPT

## 2018-05-17 PROCEDURE — 700105 HCHG RX REV CODE 258: Performed by: INTERNAL MEDICINE

## 2018-05-17 PROCEDURE — 96413 CHEMO IV INFUSION 1 HR: CPT

## 2018-05-17 PROCEDURE — 304540 HCHG NITRO SET VENT 2ND TUB

## 2018-05-17 RX ADMIN — FAMOTIDINE 20 MG: 10 INJECTION, SOLUTION INTRAVENOUS at 13:46

## 2018-05-17 RX ADMIN — DEXAMETHASONE SODIUM PHOSPHATE 12 MG: 4 INJECTION, SOLUTION INTRAMUSCULAR; INTRAVENOUS at 13:47

## 2018-05-17 RX ADMIN — PACLITAXEL 164 MG: 6 INJECTION, SOLUTION, CONCENTRATE INTRAVENOUS at 14:53

## 2018-05-17 RX ADMIN — DIPHENHYDRAMINE HYDROCHLORIDE 25 MG: 50 INJECTION INTRAMUSCULAR; INTRAVENOUS at 14:09

## 2018-05-17 ASSESSMENT — PAIN SCALES - GENERAL: PAINLEVEL_OUTOF10: 0

## 2018-05-17 NOTE — PROGRESS NOTES
Chemotherapy Verification - SECONDARY RN       Height = 1.65 m  Weight = 91.7 kg  BSA = 2.05 m2       Medication: paclitaxel  Dose: 80 mg/m2  Calculated Dose: 164 mg                             (In mg/m2, AUC, mg/kg)     I confirm that this process was performed independently.

## 2018-05-17 NOTE — PROGRESS NOTES
"Pharmacy Chemotherapy Verification  Patient Name: Clarisse Reeves  Dx: Breast CA    Cycle: 3 weekly Paclitaxel(Cycle 7 total) Previous treatment = week 2 on  5/10/18 s/p DDAC C4 on 4/20/18    Regimen and Dosing Reference  DDAC (Dose Dense Doxorubicin + Cyclophosphamide) followed by Paclitaxel  DOXOrubicin 60 mg/m2 IV on Day 1--dose reduced to 48 mg/m2 due to tolerance  Cyclophosphamide 600 mg/m2 IV over 30 min on Day 1--dose reduced to 480 mg/m2 due to tolerance  14 day cycle for 4 cycles--Completed 4/20/18  NCCN Guidelines for Breast Cancer V.2.2017  Jamila DARLING et al J Clin Oncol. 2003:21(8):1431-9    Followed By:    Paclitaxel Weekly Course  Paclitaxel (Taxol) 80 mg/m2 IV over 60 min on Day 1   Weekly cycle for 12 weeks  NCCN Guidelines for Breast Cancer v.1.2017  Jamila ML et al J Clin Oncol. 2003:21(8):1431-9    Allergies:Codeine; Lipitor [atorvastatin calcium]; Lovastatin; Zocor [simvastatin - high dose]; Lisinopril; Metformin; and Tape  Blood pressure 115/67, pulse (!) 110, temperature 36.7 °C (98.1 °F), resp. rate 18, height 1.65 m (5' 4.96\"), weight 91.7 kg (202 lb 2.6 oz), SpO2 98 %.  Body surface area is 2.05 meters squared.    Labs 5/16/18  ANC ~ 8090   Plt = 365 k  Hgb = 9.9   SCr = 0.77    CrCl ~90 ml/min    AST/ALT/AlkPhos = 10/11/50 Tbili = 0.5    ECHO 1/30/18  LVEF = 65%    Paclitaxel 80 mg/m2 x 2.05 m2 = 164 mg  <5% diff, ok to treat with final written dose = 164 mg       Funmilayo Gorman, PharmD          "

## 2018-05-17 NOTE — PROGRESS NOTES
Chemotherapy Verification - PRIMARY RN      Height = 1.65 m  Weight = 91.7 kg  BSA = 2.05 m2       Medication: paclitaxel  Dose: 80 mg/m2  Calculated Dose: 164 mg                             (In mg/m2, AUC, mg/kg)     I confirm this process was performed independently with the BSA and all final chemotherapy dosing calculations congruent.  Any discrepancies of 5% or greater have been addressed with the chemotherapy pharmacist. The resolution of the discrepancy has been documented in the EPIC progress notes.

## 2018-05-17 NOTE — PROGRESS NOTES
[facility] day 1 of weekly taxol.  Pt w// no s/s of infection. No complaints at this time.  Pt PICC line assessed, brisk blood return noted in both lumens, flushed per protocol.  Pt's premeds given through PICC w/ no adverse reactions.  Taxol titrated in 50 mL/hr increments [id number] to max rate of 150 mL/hr per pt request.  8553  Report given to JEN Andrew who will monitor pt for remaining infusion.  Pt has next appt already in place, confirmed w/ pt.

## 2018-05-17 NOTE — PROGRESS NOTES
"Pharmacy Chemotherapy Verification    Patient Name: GREG DEMARCO  Dx: ER/NV negative HER-2 negative Breast Cancer        Protocol: DDAC     *Dosing Reference*  DOXOrubicin 60 mg/m2 IV push on day 1   -- 3/22/18: dose decreased to 48 mg/m2 for side effect  Cyclophosphamide 600 mg/m2 IV over 30 min on day 1   -- 3/22/18: dose decreased to 480 mg/m2 for side effects  14-day cycle x 4 cycles  ~followed by~  PACLitaxel 80 mg/m2 IV over 60 min on day 1  Weekly course x 12 weeks  NCCN Guidelines for Breast Cancer V.1.2017  Jamila DARLING, et al. J Clin Oncol. 2003:21(8):1431-9    Allergies:Codeine; Lipitor [atorvastatin calcium]; Lovastatin; Zocor [simvastatin - high dose]; Lisinopril; Metformin; and Tape  /67   Pulse (!) 110   Temp 36.7 °C (98.1 °F)   Resp 18   Ht 1.65 m (5' 4.96\")   Wt 91.7 kg (202 lb 2.6 oz)   SpO2 98%   BMI 33.68 kg/m²  Body surface area is 2.05 meters squared.    LABS 5/16/2018  ANC ~8090   Plt = 365k     Hgb/Hct = 9.9/30.5  SCr = 0.77 mg/dL  CrCl ~88.8 mL/min   AST/ALT/AP = 10/11/50 TBili = 0.5     ECHO 1/30/2018    LVEF 65%     Drug Order   (Drug name, dose, route, IV Fluid & volume, frequency, number of doses) Cycle 3 Weekly PACLitaxel (Cycle 7 total)  Previous treatment: Cycle 2 5/10/2018(C4 on 4/20/2018 DOXOrubicin + Cyclophosphamide)     Medication = PACLitaxel  Base Dose =  80 mg/m2  Calc Dose: Base Dose x Body surface area is 2.05 meters squared. m2 = 164 mg  Final Dose = 164 mg   Route = IV  Fluid and volume =  mL  Admin Duration = Over 60 minutes          <5% difference, OK to treat with final dose      By my signature below, I confirm this process was performed independently with the BSA and all final chemotherapy dosing calculations congruent. I have reviewed the above chemotherapy order and that my calculation of the final dose and BSA (when applicable) corroborate those calculations of the  pharmacist. Discrepancies of 5% or greater in the written dose have " been addressed and documented within the EPIC Progress notes.    ANDREIA Turcios, PharmD

## 2018-05-18 NOTE — PROGRESS NOTES
Report received from Shellie TAPIA RN and care of patient assumed at 1700. Taxol running at 150 mL/hr per patient request; patient reports experiencing restless legs which she attributed to Taxol. RN explained that restless legs are often a side effect of the benadryl premedication, not the Taxol infusion; patient verbalized understanding and remainder of Taxol was infused at 250 mL/hr without complication. Blood return noted from both lumens following infusion; patient discharged ambulatory in no apparent distress.

## 2018-05-22 DIAGNOSIS — C91.10 CLL (CHRONIC LYMPHOCYTIC LEUKEMIA) (HCC): ICD-10-CM

## 2018-05-23 ENCOUNTER — HOSPITAL ENCOUNTER (OUTPATIENT)
Facility: MEDICAL CENTER | Age: 78
End: 2018-05-23
Attending: NURSE PRACTITIONER
Payer: MEDICARE

## 2018-05-23 ENCOUNTER — OFFICE VISIT (OUTPATIENT)
Dept: HEMATOLOGY ONCOLOGY | Facility: MEDICAL CENTER | Age: 78
End: 2018-05-23
Payer: MEDICARE

## 2018-05-23 ENCOUNTER — NON-PROVIDER VISIT (OUTPATIENT)
Dept: HEMATOLOGY ONCOLOGY | Facility: MEDICAL CENTER | Age: 78
End: 2018-05-23
Payer: MEDICARE

## 2018-05-23 VITALS
WEIGHT: 202.38 LBS | SYSTOLIC BLOOD PRESSURE: 122 MMHG | BODY MASS INDEX: 33.72 KG/M2 | DIASTOLIC BLOOD PRESSURE: 70 MMHG | RESPIRATION RATE: 16 BRPM | TEMPERATURE: 98.2 F | OXYGEN SATURATION: 96 % | HEART RATE: 103 BPM

## 2018-05-23 VITALS
RESPIRATION RATE: 16 BRPM | DIASTOLIC BLOOD PRESSURE: 70 MMHG | OXYGEN SATURATION: 96 % | WEIGHT: 202.38 LBS | HEART RATE: 103 BPM | HEIGHT: 65 IN | TEMPERATURE: 98.2 F | BODY MASS INDEX: 33.72 KG/M2 | SYSTOLIC BLOOD PRESSURE: 122 MMHG

## 2018-05-23 DIAGNOSIS — C91.10 CLL (CHRONIC LYMPHOCYTIC LEUKEMIA) (HCC): ICD-10-CM

## 2018-05-23 DIAGNOSIS — Z45.2 PICC (PERIPHERALLY INSERTED CENTRAL CATHETER) IN PLACE: ICD-10-CM

## 2018-05-23 DIAGNOSIS — C50.412 MALIGNANT NEOPLASM OF UPPER-OUTER QUADRANT OF LEFT BREAST IN FEMALE, ESTROGEN RECEPTOR NEGATIVE (HCC): ICD-10-CM

## 2018-05-23 DIAGNOSIS — Z51.11 ENCOUNTER FOR ANTINEOPLASTIC CHEMOTHERAPY: ICD-10-CM

## 2018-05-23 DIAGNOSIS — Z17.1 MALIGNANT NEOPLASM OF UPPER-OUTER QUADRANT OF LEFT BREAST IN FEMALE, ESTROGEN RECEPTOR NEGATIVE (HCC): ICD-10-CM

## 2018-05-23 LAB
ALBUMIN SERPL BCP-MCNC: 4.1 G/DL (ref 3.2–4.9)
ALBUMIN/GLOB SERPL: 2 G/DL
ALP SERPL-CCNC: 61 U/L (ref 30–99)
ALT SERPL-CCNC: 12 U/L (ref 2–50)
ANION GAP SERPL CALC-SCNC: 9 MMOL/L (ref 0–11.9)
ANISOCYTOSIS BLD QL SMEAR: ABNORMAL
AST SERPL-CCNC: 11 U/L (ref 12–45)
BASOPHILS # BLD AUTO: 0.9 % (ref 0–1.8)
BASOPHILS # BLD: 0.07 K/UL (ref 0–0.12)
BILIRUB SERPL-MCNC: 0.5 MG/DL (ref 0.1–1.5)
BUN SERPL-MCNC: 9 MG/DL (ref 8–22)
CALCIUM SERPL-MCNC: 8.7 MG/DL (ref 8.5–10.5)
CHLORIDE SERPL-SCNC: 103 MMOL/L (ref 96–112)
CO2 SERPL-SCNC: 23 MMOL/L (ref 20–33)
COMMENT 1642: NORMAL
CREAT SERPL-MCNC: 0.7 MG/DL (ref 0.5–1.4)
EOSINOPHIL # BLD AUTO: 0.16 K/UL (ref 0–0.51)
EOSINOPHIL NFR BLD: 2 % (ref 0–6.9)
ERYTHROCYTE [DISTWIDTH] IN BLOOD BY AUTOMATED COUNT: 66.4 FL (ref 35.9–50)
GLOBULIN SER CALC-MCNC: 2.1 G/DL (ref 1.9–3.5)
GLUCOSE SERPL-MCNC: 131 MG/DL (ref 65–99)
HCT VFR BLD AUTO: 27.7 % (ref 37–47)
HGB BLD-MCNC: 8.9 G/DL (ref 12–16)
IMM GRANULOCYTES # BLD AUTO: 0.12 K/UL (ref 0–0.11)
IMM GRANULOCYTES NFR BLD AUTO: 1.5 % (ref 0–0.9)
LYMPHOCYTES # BLD AUTO: 1.46 K/UL (ref 1–4.8)
LYMPHOCYTES NFR BLD: 18.7 % (ref 22–41)
MACROCYTES BLD QL SMEAR: ABNORMAL
MCH RBC QN AUTO: 30.9 PG (ref 27–33)
MCHC RBC AUTO-ENTMCNC: 32.1 G/DL (ref 33.6–35)
MCV RBC AUTO: 96.2 FL (ref 81.4–97.8)
MICROCYTES BLD QL SMEAR: ABNORMAL
MONOCYTES # BLD AUTO: 0.39 K/UL (ref 0–0.85)
MONOCYTES NFR BLD AUTO: 5 % (ref 0–13.4)
MORPHOLOGY BLD-IMP: NORMAL
NEUTROPHILS # BLD AUTO: 5.61 K/UL (ref 2–7.15)
NEUTROPHILS NFR BLD: 71.9 % (ref 44–72)
NRBC # BLD AUTO: 0 K/UL
NRBC BLD-RTO: 0 /100 WBC
OVALOCYTES BLD QL SMEAR: NORMAL
PLATELET # BLD AUTO: 302 K/UL (ref 164–446)
PLATELET BLD QL SMEAR: NORMAL
PMV BLD AUTO: 9.6 FL (ref 9–12.9)
POIKILOCYTOSIS BLD QL SMEAR: NORMAL
POTASSIUM SERPL-SCNC: 3.9 MMOL/L (ref 3.6–5.5)
PROT SERPL-MCNC: 6.2 G/DL (ref 6–8.2)
RBC # BLD AUTO: 2.88 M/UL (ref 4.2–5.4)
RBC BLD AUTO: PRESENT
SODIUM SERPL-SCNC: 135 MMOL/L (ref 135–145)
WBC # BLD AUTO: 7.8 K/UL (ref 4.8–10.8)

## 2018-05-23 PROCEDURE — 80053 COMPREHEN METABOLIC PANEL: CPT

## 2018-05-23 PROCEDURE — 85025 COMPLETE CBC W/AUTO DIFF WBC: CPT

## 2018-05-23 PROCEDURE — 99214 OFFICE O/P EST MOD 30 MIN: CPT | Performed by: NURSE PRACTITIONER

## 2018-05-23 ASSESSMENT — ENCOUNTER SYMPTOMS
COUGH: 0
WHEEZING: 0
MYALGIAS: 0
FEVER: 0
WEIGHT LOSS: 0
HEADACHES: 0
VOMITING: 0
DIZZINESS: 0
PALPITATIONS: 0
NAUSEA: 0
INSOMNIA: 0
TINGLING: 1
CHILLS: 0
SHORTNESS OF BREATH: 1
CONSTIPATION: 0
DIARRHEA: 0

## 2018-05-23 ASSESSMENT — PAIN SCALES - GENERAL
PAINLEVEL: NO PAIN
PAINLEVEL: NO PAIN

## 2018-05-23 NOTE — PROGRESS NOTES
Subjective:      Clarisse Reeves is a 77 y.o. female who presents for Follow-Up (prechemo) evaluation prior to week 4 of weekly Taxol for breast cancer.     HPI   Ms. Reeves completed 4 cycles of dose dense AC and presents for evaluation prior to week 4 of weekly Taxol for treatment of stage IIIc [cT2, cN3c, M0], ER/OR negative, HER-2 negative, grade 3, invasive ductal carcinoma of the left upper outer breast. She is unaccompanied for today's visit.    She continues tolerating Taxol very well, especially with slow infusion. Intermittent diaphoresis continues to improve. Rash below her pannus is resolved. Neuropathy fingertips remains stable. She continues to not need Zofran or Compazine as she denies nausea.    Previously reported sciatic pain is resolved. She is otherwise asymptomatic.      Allergies   Allergen Reactions   • Codeine Hives and Nausea     RXN=40 years ago   • Lipitor [Atorvastatin Calcium] Myalgia     DCQ=5429     • Lovastatin Myalgia     Muscle aches  DVC=3340   • Zocor [Simvastatin - High Dose] Myalgia     Severe myalgias  TTG=7411   • Lisinopril Cough   • Metformin      Diarrhea     • Tape        Current Outpatient Prescriptions on File Prior to Visit   Medication Sig Dispense Refill   • cyclobenzaprine (FLEXERIL) 5 MG tablet Take 1-2 Tabs by mouth 2 times a day as needed. 30 Tab 0   • nystatin (MYCOSTATIN) powder Clean and pat dry affected area- apply to affected area twice daily and as needed 15 g 1   • nystatin (MYCOSTATIN) [id number] UNIT/GM Cream topical cream Clean and pat dry affected area - apply thin layer of cream twice daily (cont. until 1-2 after symptoms subside) 15 g 1   • ALEX CONTOUR NEXT TEST strip      • lidocaine viscous 2% (XYLOCAINE) 2 % Solution      • dexamethasone (DECADRON) 4 MG Tab Take 2 Tabs by mouth every day. Take 8 mg (2 tabs) on day 2 or 3 after chemotherapy. Do not take day of Neulasta. 4 Tab 0   • MICROLET LANCETS Misc For BG check once a day 100 Each 3   • Blood  "Glucose Monitoring Suppl Supplies Misc Contour Next glucometer strips for blood sugar check once a day 100 Each 2   • ondansetron (ZOFRAN) 4 MG Tab tablet Take 1 Tab by mouth every four hours as needed for Nausea/Vomiting (for nausea, vomiting). 30 Tab 6   • levothyroxine (SYNTHROID) 150 MCG Tab Take 1 Tab by mouth Every morning on an empty stomach. 90 Tab 4   • prochlorperazine (COMPAZINE) 10 MG Tab Take 1 Tab by mouth every 6 hours as needed (nausea, vomiting, migraine). 30 Tab 0   • glipiZIDE (GLUCOTROL) 5 MG Tab Take 1 Tab by mouth every day. 90 Tab 4   • fenofibrate (TRIGLIDE) 160 MG tablet Take 1 Tab by mouth every day. 90 Tab 4   • citalopram (CELEXA) 20 MG Tab Take 1 Tab by mouth every day. 90 Tab 4   • aspirin (ASA) 81 MG Chew Tab chewable tablet Take 1 Tab by mouth every day. 100 Tab 11     No current facility-administered medications on file prior to visit.        Review of Systems   Constitutional: Positive for malaise/fatigue. Negative for chills, fever and weight loss (stable). Diaphoresis: better than when on dose dense AC.   Respiratory: Positive for shortness of breath (with exertion). Negative for cough and wheezing.    Cardiovascular: Negative for chest pain, palpitations and leg swelling.   Gastrointestinal: Negative for constipation (last bm this am - formed), diarrhea, nausea and vomiting.   Genitourinary: Negative for dysuria.   Musculoskeletal: Negative for myalgias.   Skin: Positive for rash (nearly resolved; residual darkening of skin).   Neurological: Positive for tingling (stable at fingers and toes). Negative for dizziness and headaches.   Psychiatric/Behavioral: The patient does not have insomnia.         Objective:     /70   Pulse (!) 103   Temp 36.8 °C (98.2 °F)   Resp 16   Ht 1.65 m (5' 4.96\")   Wt 91.8 kg (202 lb 6.1 oz)   SpO2 96%   Breastfeeding? No   BMI 33.72 kg/m²      Physical Exam   Constitutional: She is oriented to person, place, and time. She appears " well-developed and well-nourished. No distress.   HENT:   Head: Normocephalic and atraumatic.   Mouth/Throat: Oropharynx is clear and moist. No oropharyngeal exudate.   Eyes: Conjunctivae and EOM are normal. Pupils are equal, round, and reactive to light. Right eye exhibits no discharge. Left eye exhibits no discharge. No scleral icterus.   Neck: Normal range of motion. Neck supple. No thyromegaly present.   Cardiovascular: Normal rate, regular rhythm, normal heart sounds and intact distal pulses.  Exam reveals no gallop and no friction rub.    No murmur heard.  Pulmonary/Chest: Effort normal and breath sounds normal. No respiratory distress. She has no wheezes.   Abdominal: Soft. Bowel sounds are normal. She exhibits no distension. There is no tenderness.   Musculoskeletal: Normal range of motion. She exhibits no edema or tenderness.   PICC RUE   Lymphadenopathy:        Head (right side): No submental, no submandibular, no tonsillar, no preauricular, no posterior auricular and no occipital adenopathy present.        Head (left side): No submental, no submandibular, no tonsillar, no preauricular, no posterior auricular and no occipital adenopathy present.     She has no cervical adenopathy.        Right cervical: No superficial cervical and no deep cervical adenopathy present.       Left cervical: No superficial cervical and no deep cervical adenopathy present.        Right: No supraclavicular adenopathy present.        Left: No supraclavicular adenopathy present.   Neurological: She is alert and oriented to person, place, and time.   Skin: Skin is warm and dry. No rash noted. She is not diaphoretic. No erythema. No pallor.   Psychiatric: She has a normal mood and affect. Her behavior is normal.   Vitals reviewed.    Hospital Outpatient Visit on 05/23/2018   Component Date Value Ref Range Status   • WBC 05/23/2018 7.8  4.8 - 10.8 K/uL Final   • RBC 05/23/2018 2.88* 4.20 - 5.40 M/uL Final   • Hemoglobin 05/23/2018  8.9* 12.0 - 16.0 g/dL Final   • Hematocrit 05/23/2018 27.7* 37.0 - 47.0 % Final   • MCV 05/23/2018 96.2  81.4 - 97.8 fL Final   • MCH 05/23/2018 30.9  27.0 - 33.0 pg Final   • MCHC 05/23/2018 32.1* 33.6 - 35.0 g/dL Final   • RDW 05/23/2018 66.4* 35.9 - 50.0 fL Final   • Platelet Count 05/23/2018 302  164 - 446 K/uL Final   • MPV 05/23/2018 9.6  9.0 - 12.9 fL Final   • Nucleated RBC 05/23/2018 0.00  /100 WBC Final   • NRBC (Absolute) 05/23/2018 0.00  K/uL Final   • Neutrophils-Polys 05/23/2018 71.90  44.00 - 72.00 % Final   • Lymphocytes 05/23/2018 18.70* 22.00 - 41.00 % Final   • Monocytes 05/23/2018 5.00  0.00 - 13.40 % Final   • Eosinophils 05/23/2018 2.00  0.00 - 6.90 % Final   • Basophils 05/23/2018 0.90  0.00 - 1.80 % Final   • Immature Granulocytes 05/23/2018 1.50* 0.00 - 0.90 % Final   • Lymphs (Absolute) 05/23/2018 1.46  1.00 - 4.80 K/uL Final   • Monos (Absolute) 05/23/2018 0.39  0.00 - 0.85 K/uL Final   • Eos (Absolute) 05/23/2018 0.16  0.00 - 0.51 K/uL Final   • Baso (Absolute) 05/23/2018 0.07  0.00 - 0.12 K/uL Final   • Immature Granulocytes (abs) 05/23/2018 0.12* 0.00 - 0.11 K/uL Final   • Neutrophils (Absolute) 05/23/2018 5.61  2.00 - 7.15 K/uL Final    Includes immature neutrophils, if present.   • Anisocytosis 05/23/2018 1+   Final   • Macrocytosis 05/23/2018 1+   Final   • Microcytosis 05/23/2018 1+   Final   • Sodium 05/23/2018 135  135 - 145 mmol/L Final   • Potassium 05/23/2018 3.9  3.6 - 5.5 mmol/L Final   • Chloride 05/23/2018 103  96 - 112 mmol/L Final   • Co2 05/23/2018 23  20 - 33 mmol/L Final   • Anion Gap 05/23/2018 9.0  0.0 - 11.9 Final   • Glucose 05/23/2018 131* 65 - 99 mg/dL Final   • Bun 05/23/2018 9  8 - 22 mg/dL Final   • Creatinine 05/23/2018 0.70  0.50 - 1.40 mg/dL Final   • Calcium 05/23/2018 8.7  8.5 - 10.5 mg/dL Final   • AST(SGOT) 05/23/2018 11* 12 - 45 U/L Final   • ALT(SGPT) 05/23/2018 12  2 - 50 U/L Final   • Alkaline Phosphatase 05/23/2018 61  30 - 99 U/L Final   • Total  Bilirubin 05/23/2018 0.5  0.1 - 1.5 mg/dL Final   • Albumin 05/23/2018 4.1  3.2 - 4.9 g/dL Final   • Total Protein 05/23/2018 6.2  6.0 - 8.2 g/dL Final   • Globulin 05/23/2018 2.1  1.9 - 3.5 g/dL Final   • A-G Ratio 05/23/2018 2.0  g/dL Final   • GFR If  05/23/2018 >60  >60 mL/min/1.73 m 2 Final   • GFR If Non  05/23/2018 >60  >60 mL/min/1.73 m 2 Final   • Peripheral Smear Review 05/23/2018 see below   Final    Comment: Due to instrument suspect flags, further review of peripheral smear is  indicated on this patient sample. This review may or may not result in  abnormal findings.     • Plt Estimation 05/23/2018 Normal   Final   • RBC Morphology 05/23/2018 Present   Final   • Poikilocytosis 05/23/2018 1+   Final   • Ovalocytes 05/23/2018 1+   Final   • Comments-Diff 05/23/2018 see below   Final    Results have been verified by peripheral blood smear review.          Assessment/Plan:     1. Malignant neoplasm of upper-outer quadrant of left breast in female, estrogen receptor negative (CMS-HCC)-  1 positive node; dr. koo; dr sharpe-  chemorx 1st, then surgery, then RT     2. CLL (chronic lymphocytic leukemia)- wbc= 20k-30k, since 2006; no rx; oncologist to follow     3. PICC (peripherally inserted central catheter) in place     4. Encounter for antineoplastic chemotherapy           1.  CLL: Stable, continue to monitor    2.  Breast cancer: She continues tolerating weekly Taxol very well. CBC and CMP have been evaluated and found to be within acceptable limits. She will proceed with week 4 of weekly Taxol and return next week for evaluation prior to week 5, sooner as needed.            The patient verbalized agreement and understanding of current plan. All questions and concerns were addressed at time of visit.    Please note that this dictation was created using voice recognition software. I have made every reasonable attempt to correct obvious errors, but I expect that there are  errors of grammar and possibly content that I did not discover before finalizing the note.

## 2018-05-23 NOTE — PROGRESS NOTES
Pt here for PICC labs and dressing change. PICC dressing changed per protocol. (+) blood return from both lumens although sluggish. Alteplase ordered if needed prior to Taxol infusion tomorrow. Dressing CDI, dated and initialed. CBC and CMP drawn as ordered. PICC secured at 7 cm, noted in JEN Garcia's last note. Pt ambulatory back to waiting room for pending prechemo appointment.

## 2018-05-24 ENCOUNTER — OUTPATIENT INFUSION SERVICES (OUTPATIENT)
Dept: ONCOLOGY | Facility: MEDICAL CENTER | Age: 78
End: 2018-05-24
Attending: INTERNAL MEDICINE
Payer: MEDICARE

## 2018-05-24 VITALS
DIASTOLIC BLOOD PRESSURE: 71 MMHG | OXYGEN SATURATION: 97 % | TEMPERATURE: 98 F | RESPIRATION RATE: 18 BRPM | WEIGHT: 204.81 LBS | SYSTOLIC BLOOD PRESSURE: 141 MMHG | HEIGHT: 65 IN | BODY MASS INDEX: 34.12 KG/M2 | HEART RATE: 98 BPM

## 2018-05-24 DIAGNOSIS — Z17.1 MALIGNANT NEOPLASM OF UPPER-OUTER QUADRANT OF LEFT BREAST IN FEMALE, ESTROGEN RECEPTOR NEGATIVE (HCC): ICD-10-CM

## 2018-05-24 DIAGNOSIS — C50.412 MALIGNANT NEOPLASM OF UPPER-OUTER QUADRANT OF LEFT BREAST IN FEMALE, ESTROGEN RECEPTOR NEGATIVE (HCC): ICD-10-CM

## 2018-05-24 PROCEDURE — 304540 HCHG NITRO SET VENT 2ND TUB

## 2018-05-24 PROCEDURE — 700105 HCHG RX REV CODE 258: Performed by: INTERNAL MEDICINE

## 2018-05-24 PROCEDURE — 96413 CHEMO IV INFUSION 1 HR: CPT

## 2018-05-24 PROCEDURE — 96375 TX/PRO/DX INJ NEW DRUG ADDON: CPT

## 2018-05-24 PROCEDURE — 700111 HCHG RX REV CODE 636 W/ 250 OVERRIDE (IP): Performed by: INTERNAL MEDICINE

## 2018-05-24 RX ADMIN — DIPHENHYDRAMINE HYDROCHLORIDE 25 MG: 50 INJECTION INTRAMUSCULAR; INTRAVENOUS at 15:11

## 2018-05-24 RX ADMIN — FAMOTIDINE 20 MG: 10 INJECTION, SOLUTION INTRAVENOUS at 15:35

## 2018-05-24 RX ADMIN — DEXAMETHASONE SODIUM PHOSPHATE 12 MG: 4 INJECTION, SOLUTION INTRAMUSCULAR; INTRAVENOUS at 15:40

## 2018-05-24 RX ADMIN — PACLITAXEL 164.8 MG: 6 INJECTION, SOLUTION, CONCENTRATE INTRAVENOUS at 16:13

## 2018-05-24 ASSESSMENT — PAIN SCALES - GENERAL: PAINLEVEL_OUTOF10: 0

## 2018-05-24 NOTE — PROGRESS NOTES
Chemotherapy Verification - PRIMARY RN      Height = 65.96in  Weight = 92.9kg  BSA = 2.05m2       Medication: Taxol  Dose: 80mg/m2  Calculated Dose: 164.7mg                             (In mg/m2, AUC, mg/kg)         I confirm this process was performed independently with the BSA and all final chemotherapy dosing calculations congruent.  Any discrepancies of 5% or greater have been addressed with the chemotherapy pharmacist. The resolution of the discrepancy has been documented in the EPIC progress notes.

## 2018-05-24 NOTE — PROGRESS NOTES
"Pharmacy Chemotherapy Verification    Patient Name: GREG DEMARCO  Dx: ER/KS negative HER-2 negative Breast Cancer        Protocol: DDAC     *Dosing Reference*  DOXOrubicin  IV push on day 1   -- 3/22/18: dose decreased to 48 mg/m2 for side effect  Cyclophosphamide  IV over 30 min on day 1   -- 3/22/18: dose decreased to 480 mg/m2 for side effects  14-day cycle x 4 cycles  (completed)  ~followed by~  PACLitaxel 80 mg/m2 IV over 60 min on day 1  Weekly course x 12 weeks  NCCN Guidelines for Breast Cancer V.1.2017  Jamila ML, et al. J Clin Oncol. 2003:21(8):1431-9    Allergies:Codeine; Lipitor [atorvastatin calcium]; Lovastatin; Zocor [simvastatin - high dose]; Lisinopril; Metformin; and Tape  /71   Pulse 98   Temp 36.7 °C (98 °F)   Resp 18   Ht 1.65 m (5' 4.96\")   Wt 92.9 kg (204 lb 12.9 oz)   SpO2 97%   BMI 34.12 kg/m²  Body surface area is 2.06 meters squared.    ECHO 1/30/2018    LVEF 65%    Labs 5/23/18:  ANC~ 5610   Plt = 302 k     Hgb = 8.9 (w/in parameters)   SCr = 0.7 mg/dL  CrCl ~ 100 mL/min    AST/ALT/AP = 11/12/61 TBili = 0.5         Drug Order   (Drug name, dose, route, IV Fluid & volume, frequency, number of doses) Cycle 4 - Weekly PACLitaxel (Cycle 8 total)  Previous treatment: C3 on 5/17/2018   Medication = PACLitaxel (Taxol)  Base Dose =  80 mg/m2  Calc Dose: Base Dose x 2.06 m2 = 164.8 mg  Final Dose = 164.8 mg   Route = IV  Fluid and volume =  mL  Admin Duration = Over 60 minutes          <5% difference, OK to treat with final dose      By my signature below, I confirm this process was performed independently with the BSA and all final chemotherapy dosing calculations congruent. I have reviewed the above chemotherapy order and that my calculation of the final dose and BSA (when applicable) corroborate those calculations of the  pharmacist. Discrepancies of 5% or greater in the written dose have been addressed and documented within the EPIC Progress " notes.      Alison Brandon, PharmD

## 2018-05-24 NOTE — PROGRESS NOTES
"Pharmacy Chemotherapy Verification  Patient Name: Clarisse Reeves  Dx: Breast CA    Cycle: 4 weekly Paclitaxel (Cycle 8 total) Previous treatment = week 3 on  5/17/18 s/p DDAC C4 on 4/20/18    Regimen and Dosing Reference  DDAC (Dose Dense Doxorubicin + Cyclophosphamide) followed by Paclitaxel  DOXOrubicin 60 mg/m2 IV on Day 1--dose reduced to 48 mg/m2 due to tolerance  Cyclophosphamide 600 mg/m2 IV over 30 min on Day 1--dose reduced to 480 mg/m2 due to tolerance  14 day cycle for 4 cycles--Completed 4/20/18  NCCN Guidelines for Breast Cancer V.2.2017  Jamila DARLING et al J Clin Oncol. 2003:21(8):1431-9    Followed By:    Paclitaxel Weekly Course  Paclitaxel (Taxol) 80 mg/m2 IV over 60 min on Day 1   Weekly cycle for 12 weeks  NCCN Guidelines for Breast Cancer v.1.2017  Jamila ML et al J Clin Oncol. 2003:21(8):1431-9    Allergies:Codeine; Lipitor [atorvastatin calcium]; Lovastatin; Zocor [simvastatin - high dose]; Lisinopril; Metformin; and Tape  Blood pressure 141/71, pulse 98, temperature 36.7 °C (98 °F), resp. rate 18, height 1.65 m (5' 4.96\"), weight 92.9 kg (204 lb 12.9 oz), SpO2 97 %.  Body surface area is 2.06 meters squared.    Labs 5/23/18  ANC ~ 5610   Plt = 302 k  Hgb = 8.9   SCr = 0.7    CrCl ~100 ml/min    AST/ALT/AlkPhos = 11/12/61 Tbili = 0.5    ECHO 1/30/18  LVEF = 65%    Paclitaxel 80 mg/m2 x 2.06 m2 = 164.8 mg  <5% diff, ok to treat with final written dose = 164.8 mg       Funmilayo Gorman, PharmD          "

## 2018-05-24 NOTE — PROGRESS NOTES
Chemotherapy Verification - SECONDARY RN       Height = 1.65 m  Weight = 92.9 kg  BSA = 2.06 m2       Medication: paclitaxel  Dose: 80 mg/m2  Calculated Dose: 164.8 mg                             (In mg/m2, AUC, mg/kg)       I confirm that this process was performed independently.

## 2018-05-25 NOTE — PROGRESS NOTES
Patient arrived ambulatory to the [facility] for C4D1 of weekly Taxol. Reviewed vital signs, labs, and physician order. Patient denies S&S of infection. Pt arrives with DL PICC to E, visualized brisk blood return from each lumen. Pre-treatment medications administered. Taxol administered with tubing and filter over 1 hour, no adverse reaction observed. DL PICC flushed per protocol, 4X4 gauze and mesh sleeve placed for protection. Confirmed upcoming appointment date and time with patient. Patient left the [facility] ambulatory in no sign of distress.

## 2018-05-29 DIAGNOSIS — C34.11 MALIGNANT NEOPLASM OF RIGHT UPPER LOBE OF LUNG (HCC): ICD-10-CM

## 2018-05-30 ENCOUNTER — OFFICE VISIT (OUTPATIENT)
Dept: HEMATOLOGY ONCOLOGY | Facility: MEDICAL CENTER | Age: 78
End: 2018-05-30
Payer: MEDICARE

## 2018-05-30 ENCOUNTER — HOSPITAL ENCOUNTER (OUTPATIENT)
Dept: RADIOLOGY | Facility: MEDICAL CENTER | Age: 78
End: 2018-05-30
Attending: NURSE PRACTITIONER
Payer: MEDICARE

## 2018-05-30 ENCOUNTER — HOSPITAL ENCOUNTER (OUTPATIENT)
Facility: MEDICAL CENTER | Age: 78
End: 2018-05-30
Attending: NURSE PRACTITIONER
Payer: MEDICARE

## 2018-05-30 ENCOUNTER — TELEPHONE (OUTPATIENT)
Dept: HEMATOLOGY ONCOLOGY | Facility: MEDICAL CENTER | Age: 78
End: 2018-05-30

## 2018-05-30 ENCOUNTER — NON-PROVIDER VISIT (OUTPATIENT)
Dept: HEMATOLOGY ONCOLOGY | Facility: MEDICAL CENTER | Age: 78
End: 2018-05-30
Payer: MEDICARE

## 2018-05-30 VITALS
SYSTOLIC BLOOD PRESSURE: 108 MMHG | BODY MASS INDEX: 33.55 KG/M2 | TEMPERATURE: 97.1 F | WEIGHT: 201.39 LBS | RESPIRATION RATE: 18 BRPM | DIASTOLIC BLOOD PRESSURE: 70 MMHG | HEIGHT: 65 IN | HEART RATE: 110 BPM | OXYGEN SATURATION: 97 %

## 2018-05-30 VITALS
WEIGHT: 201.28 LBS | HEIGHT: 65 IN | RESPIRATION RATE: 18 BRPM | DIASTOLIC BLOOD PRESSURE: 70 MMHG | HEART RATE: 110 BPM | BODY MASS INDEX: 33.54 KG/M2 | SYSTOLIC BLOOD PRESSURE: 108 MMHG | OXYGEN SATURATION: 97 % | TEMPERATURE: 97.2 F

## 2018-05-30 DIAGNOSIS — C50.412 MALIGNANT NEOPLASM OF UPPER-OUTER QUADRANT OF LEFT BREAST IN FEMALE, ESTROGEN RECEPTOR NEGATIVE (HCC): ICD-10-CM

## 2018-05-30 DIAGNOSIS — C34.11 MALIGNANT NEOPLASM OF RIGHT UPPER LOBE OF LUNG (HCC): ICD-10-CM

## 2018-05-30 DIAGNOSIS — Z51.11 ENCOUNTER FOR ANTINEOPLASTIC CHEMOTHERAPY: ICD-10-CM

## 2018-05-30 DIAGNOSIS — Z45.2 PICC (PERIPHERALLY INSERTED CENTRAL CATHETER) IN PLACE: ICD-10-CM

## 2018-05-30 DIAGNOSIS — C91.10 CLL (CHRONIC LYMPHOCYTIC LEUKEMIA) (HCC): ICD-10-CM

## 2018-05-30 DIAGNOSIS — Z17.1 MALIGNANT NEOPLASM OF UPPER-OUTER QUADRANT OF LEFT BREAST IN FEMALE, ESTROGEN RECEPTOR NEGATIVE (HCC): ICD-10-CM

## 2018-05-30 DIAGNOSIS — I49.9 IRREGULAR HEART RHYTHM: ICD-10-CM

## 2018-05-30 DIAGNOSIS — R94.31 ABNORMAL EKG: ICD-10-CM

## 2018-05-30 LAB
ANISOCYTOSIS BLD QL SMEAR: ABNORMAL
BASOPHILS # BLD AUTO: 1.8 % (ref 0–1.8)
BASOPHILS # BLD: 0.16 K/UL (ref 0–0.12)
BURR CELLS BLD QL SMEAR: NORMAL
EOSINOPHIL # BLD AUTO: 0.24 K/UL (ref 0–0.51)
EOSINOPHIL NFR BLD: 2.7 % (ref 0–6.9)
ERYTHROCYTE [DISTWIDTH] IN BLOOD BY AUTOMATED COUNT: 69.4 FL (ref 35.9–50)
HCT VFR BLD AUTO: 28.3 % (ref 37–47)
HGB BLD-MCNC: 8.8 G/DL (ref 12–16)
LYMPHOCYTES # BLD AUTO: 1.03 K/UL (ref 1–4.8)
LYMPHOCYTES NFR BLD: 11.6 % (ref 22–41)
MANUAL DIFF BLD: NORMAL
MCH RBC QN AUTO: 31.4 PG (ref 27–33)
MCHC RBC AUTO-ENTMCNC: 31.1 G/DL (ref 33.6–35)
MCV RBC AUTO: 101.1 FL (ref 81.4–97.8)
MICROCYTES BLD QL SMEAR: ABNORMAL
MONOCYTES # BLD AUTO: 0 K/UL (ref 0–0.85)
MONOCYTES NFR BLD AUTO: 0 % (ref 0–13.4)
MORPHOLOGY BLD-IMP: NORMAL
NEUTROPHILS # BLD AUTO: 7.47 K/UL (ref 2–7.15)
NEUTROPHILS NFR BLD: 75 % (ref 44–72)
NEUTS BAND NFR BLD MANUAL: 8.9 % (ref 0–10)
NRBC # BLD AUTO: 0 K/UL
NRBC BLD-RTO: 0 /100 WBC
OVALOCYTES BLD QL SMEAR: NORMAL
PLATELET # BLD AUTO: 309 K/UL (ref 164–446)
PLATELET BLD QL SMEAR: NORMAL
PMV BLD AUTO: 9.5 FL (ref 9–12.9)
POIKILOCYTOSIS BLD QL SMEAR: NORMAL
POLYCHROMASIA BLD QL SMEAR: NORMAL
RBC # BLD AUTO: 2.8 M/UL (ref 4.2–5.4)
RBC BLD AUTO: PRESENT
WBC # BLD AUTO: 8.9 K/UL (ref 4.8–10.8)

## 2018-05-30 PROCEDURE — 85007 BL SMEAR W/DIFF WBC COUNT: CPT

## 2018-05-30 PROCEDURE — 71045 X-RAY EXAM CHEST 1 VIEW: CPT

## 2018-05-30 PROCEDURE — 99214 OFFICE O/P EST MOD 30 MIN: CPT | Performed by: NURSE PRACTITIONER

## 2018-05-30 PROCEDURE — 85027 COMPLETE CBC AUTOMATED: CPT

## 2018-05-30 RX ORDER — 0.9 % SODIUM CHLORIDE 0.9 %
VIAL (ML) INJECTION PRN
Status: CANCELLED | OUTPATIENT
Start: 2018-05-30

## 2018-05-30 RX ORDER — 0.9 % SODIUM CHLORIDE 0.9 %
5 VIAL (ML) INJECTION PRN
Status: CANCELLED | OUTPATIENT
Start: 2018-05-30

## 2018-05-30 RX ORDER — ONDANSETRON 2 MG/ML
4 INJECTION INTRAMUSCULAR; INTRAVENOUS
Status: CANCELLED | OUTPATIENT
Start: 2018-05-31

## 2018-05-30 RX ORDER — 0.9 % SODIUM CHLORIDE 0.9 %
VIAL (ML) INJECTION PRN
Status: CANCELLED | OUTPATIENT
Start: 2018-05-31

## 2018-05-30 RX ORDER — 0.9 % SODIUM CHLORIDE 0.9 %
5 VIAL (ML) INJECTION PRN
Status: CANCELLED | OUTPATIENT
Start: 2018-05-31

## 2018-05-30 RX ORDER — SODIUM CHLORIDE 9 MG/ML
INJECTION, SOLUTION INTRAVENOUS CONTINUOUS
Status: CANCELLED | OUTPATIENT
Start: 2018-05-31

## 2018-05-30 RX ORDER — ONDANSETRON 8 MG/1
8 TABLET, ORALLY DISINTEGRATING ORAL
Status: CANCELLED | OUTPATIENT
Start: 2018-05-31

## 2018-05-30 RX ORDER — PROCHLORPERAZINE MALEATE 10 MG
10 TABLET ORAL EVERY 6 HOURS PRN
Status: CANCELLED | OUTPATIENT
Start: 2018-05-31

## 2018-05-30 ASSESSMENT — ENCOUNTER SYMPTOMS
NAUSEA: 0
COUGH: 0
CHILLS: 0
DIAPHORESIS: 1
MYALGIAS: 0
SHORTNESS OF BREATH: 1
TINGLING: 0
HEADACHES: 0
FEVER: 0
VOMITING: 0
PALPITATIONS: 1
DIZZINESS: 0
WEIGHT LOSS: 0
CONSTIPATION: 0
DIARRHEA: 1
WHEEZING: 0

## 2018-05-30 ASSESSMENT — PAIN SCALES - GENERAL
PAINLEVEL: 2=MINIMAL-SLIGHT
PAINLEVEL: 2=MINIMAL-SLIGHT

## 2018-05-30 NOTE — TELEPHONE ENCOUNTER
Followed up with patient and informed her that her chest xray showed stable right PICC line positioning, tip overlying the SVC.  Informed pt that she may need to receive alteplase if infusion RN is unable to verify brisk blood return.  Also informed her that we will notify infusion services to let them know that she did not get her dressing changed today and request they change it tomorrow.  Patient confirmed she is aware of her EKG results.  In addition she is aware of and in agreement with referral to cardiology. Patient had no further needs or questions regarding plan of care.

## 2018-05-30 NOTE — PROGRESS NOTES
Subjective:      Clarisse Reeves is a 77 y.o. female who presents for Follow-Up (prechemo weekly taxol) evaluation prior to week 5 of weekly Taxol for breast cancer.      HPI   Ms. Reeves completed 4 cycles of dose dense AC and presents for evaluation prior to week 5 of weekly Taxol for treatment of stage IIIc [cT2, cN3c, M0], ER/ID negative, HER-2 negative, grade 3, invasive ductal carcinoma of the left upper outer breast. She is unaccompanied for today's visit.    She continues with recurrent diaphoresis that is self-limiting and is otherwise tolerating treatment well. She is noting occasional heart palpitations and tachycardia. She experienced diarrhea yesterday and today that has improved with Imodium. She is otherwise asymptomatic.        Allergies   Allergen Reactions   • Codeine Hives and Nausea     RXN=40 years ago   • Lipitor [Atorvastatin Calcium] Myalgia     [id number]=[id number]     • Lovastatin Myalgia     Muscle aches  ZFR=[id number]   • Zocor [Simvastatin - High Dose] Myalgia     Severe myalgias  YOP=5538   • Lisinopril Cough   • Metformin      Diarrhea     • Tape        Current Outpatient Prescriptions on File Prior to Visit   Medication Sig Dispense Refill   • nystatin (MYCOSTATIN) powder Clean and pat dry affected area- apply to affected area twice daily and as needed 15 g 1   • ALEX CONTOUR NEXT TEST strip      • [facility] For BG check once a day 100 Each 3   • Blood Glucose Monitoring Suppl Supplies Misc Contour Next glucometer strips for blood sugar check once a day 100 Each 2   • glipiZIDE (GLUCOTROL) 5 MG Tab Take 1 Tab by mouth every day. 90 Tab 4   • fenofibrate (TRIGLIDE) 160 MG tablet Take 1 Tab by mouth every day. 90 Tab 4   • aspirin (ASA) 81 MG Chew Tab chewable tablet Take 1 Tab by mouth every day. 100 Tab 11   • cyclobenzaprine (FLEXERIL) 5 MG tablet Take 1-2 Tabs by mouth 2 times a day as needed. 30 Tab 0   • nystatin (MYCOSTATIN) 705562 UNIT/GM Cream topical cream Clean and pat dry  "affected area - apply thin layer of cream twice daily (cont. until 1-2 after symptoms subside) 15 g 1   • lidocaine viscous 2% (XYLOCAINE) 2 % Solution      • dexamethasone (DECADRON) 4 MG Tab Take 2 Tabs by mouth every day. Take 8 mg (2 tabs) on day 2 or 3 after chemotherapy. Do not take day of Neulasta. 4 Tab 0   • ondansetron (ZOFRAN) 4 MG Tab tablet Take 1 Tab by mouth every four hours as needed for Nausea/Vomiting (for nausea, vomiting). 30 Tab 6   • levothyroxine (SYNTHROID) 150 MCG Tab Take 1 Tab by mouth Every morning on an empty stomach. 90 Tab 4   • prochlorperazine (COMPAZINE) 10 MG Tab Take 1 Tab by mouth every 6 hours as needed (nausea, vomiting, migraine). 30 Tab 0   • citalopram (CELEXA) 20 MG Tab Take 1 Tab by mouth every day. 90 Tab 4     No current facility-administered medications on file prior to visit.        Review of Systems   Constitutional: Positive for diaphoresis (at least daily - fairly significant, recurrent). Negative for chills, fever, malaise/fatigue and weight loss (stable).   Respiratory: Positive for shortness of breath (with some good exertion). Negative for cough and wheezing.    Cardiovascular: Positive for palpitations (recent). Negative for chest pain and leg swelling.        Tachy   Gastrointestinal: Positive for diarrhea (yesterday 5-6 episodes then immodium, today 4 episodes - ). Negative for constipation, nausea and vomiting.   Genitourinary: Negative for dysuria.   Musculoskeletal: Negative for myalgias.        PICC line seems different - \"was at 3 now at 7\"; sciatica resolved   Neurological: Negative for dizziness, tingling and headaches.        Objective:     /70   Pulse (!) 110   Temp 36.2 °C (97.2 °F)   Resp 18   Ht 1.65 m (5' 4.96\")   Wt 91.3 kg (201 lb 4.5 oz)   SpO2 97%   Breastfeeding? No   BMI 33.53 kg/m²      Physical Exam   Constitutional: She is oriented to person, place, and time. She appears well-developed and well-nourished. No distress. " "  HENT:   Head: Normocephalic and atraumatic.   Mouth/Throat: Oropharynx is clear and moist. No oropharyngeal exudate.   Eyes: Conjunctivae and EOM are normal. Pupils are equal, round, and reactive to light. Right eye exhibits no discharge. Left eye exhibits no discharge. No scleral icterus.   Neck: Normal range of motion. Neck supple. No thyromegaly present.   Cardiovascular: Normal heart sounds and intact distal pulses.  Exam reveals no gallop and no friction rub.    No murmur heard.  Irregular, tachy at times, occasional \"palpitations\"   Pulmonary/Chest: Effort normal and breath sounds normal. No respiratory distress. She has no wheezes.   Abdominal: Soft. Bowel sounds are normal. She exhibits no distension. There is no tenderness.   Musculoskeletal: Normal range of motion. She exhibits no edema or tenderness.   Lymphadenopathy:        Head (right side): No submental, no submandibular, no tonsillar, no preauricular, no posterior auricular and no occipital adenopathy present.        Head (left side): No submental, no submandibular, no tonsillar, no preauricular, no posterior auricular and no occipital adenopathy present.     She has no cervical adenopathy.        Right cervical: No superficial cervical and no deep cervical adenopathy present.       Left cervical: No superficial cervical and no deep cervical adenopathy present.        Right: No supraclavicular adenopathy present.        Left: No supraclavicular adenopathy present.   Neurological: She is alert and oriented to person, place, and time.   Skin: Skin is warm and dry. No rash noted. She is not diaphoretic. No erythema. No pallor.   Psychiatric: She has a normal mood and affect. Her behavior is normal.   Vitals reviewed.        Hospital Outpatient Visit on 05/30/2018   Component Date Value Ref Range Status   • Report 05/30/2018    Final                    Value:[facility]    Test Date:  2018-05-30  Pt Name:    GRGE DEMARCO                 " Department: ACOG  MRN:        [id number]                      Room:  Gender:     Female                       Technician: [facility]  :        [id number]                   Requested By:PRANAY IZQUIERDO  Order #:    [id number]                    Reading MD: Endy Montoya MD    Measurements  Intervals                                Axis  Rate:       86                           P:  NV:                                      QRS:        42  QRSD:       88                           T:          64  QT:         388  QTc:        464    Interpretive Statements  ACCELERATED JUNCTIONAL RHYTHM  RSR' IN V1 OR V2, RIGHT VCD OR RVH  Compared to ECG 2018 17:42:05  Accelerated junctional rhythm now present  Right ventricular hypertrophy now present  RSR' in V1 or V2 now present  Sinus rhythm no longer present    Electronically Signed On 2018 15:25:52 PDT by Endy Montoya MD     Hospital Outpatient Visit on 2018   Component Date Value Ref Range Status   • WBC 2018 8.9  4.8 - 10.8 K/uL Final   • RBC 2018 2.80* 4.20 - 5.40 M/uL Final   • Hemoglobin 2018 8.8* 12.0 - 16.0 g/dL Final   • Hematocrit 2018 28.3* 37.0 - 47.0 % Final   • MCV 2018 101.1* 81.4 - 97.8 fL Final   • MCH 2018 31.4  27.0 - 33.0 pg Final   • MCHC 2018 31.1* 33.6 - 35.0 g/dL Final   • RDW 2018 69.4* 35.9 - 50.0 fL Final   • Platelet Count 2018 309  164 - 446 K/uL Final   • MPV 2018 9.5  9.0 - 12.9 fL Final   • Nucleated RBC 2018 0.00  /100 WBC Final   • NRBC (Absolute) 2018 0.00  K/uL Final   • Neutrophils-Polys 2018 75.00* 44.00 - 72.00 % Final   • Lymphocytes 2018 11.60* 22.00 - 41.00 % Final   • Monocytes 2018 0.00  0.00 - 13.40 % Final   • Eosinophils 2018 2.70  0.00 - 6.90 % Final   • Basophils 2018 1.80  0.00 - 1.80 % Final   • Neutrophils (Absolute) 2018 7.47* 2.00 - 7.15 K/uL Final    Includes immature neutrophils, if present.   • Lymphs  (Absolute) 05/30/2018 1.03  1.00 - 4.80 K/uL Final   • Monos (Absolute) 05/30/2018 0.00  0.00 - 0.85 K/uL Final   • Eos (Absolute) 05/30/2018 0.24  0.00 - 0.51 K/uL Final   • Baso (Absolute) 05/30/2018 0.16* 0.00 - 0.12 K/uL Final   • Anisocytosis 05/30/2018 1+   Final   • Microcytosis 05/30/2018 1+   Final   • Bands-Stabs 05/30/2018 8.90  0.00 - 10.00 % Final   • Manual Diff Status 05/30/2018 PERFORMED   Final   • Peripheral Smear Review 05/30/2018 see below   Final    Comment: Due to instrument suspect flags, further review of peripheral smear is  indicated on this patient sample. This review may or may not result in  abnormal findings.     • Plt Estimation 05/30/2018 Normal   Final   • RBC Morphology 05/30/2018 Present   Final   • Polychromia 05/30/2018 1+   Final   • Poikilocytosis 05/30/2018 1+   Final   • Ovalocytes 05/30/2018 1+   Final   • Echinocytes 05/30/2018 1+   Final       Dx-chest-limited (1 View)    Result Date: 5/30/2018 5/30/2018 12:09 PM HISTORY/REASON FOR EXAM: PICC line analysis, lack of return. Left breast carcinoma . TECHNIQUE/EXAM DESCRIPTION AND NUMBER OF VIEWS: Single portable view of the chest. COMPARISON: May 16. FINDINGS: Right PICC line tip again overlies the SVC with no change noted. No kink or other contour abnormality Cardiomediastinal contours are normal. Lungs demonstrate no consolidation. Left base scarring again noted No pneumothorax is seen.     Stable right PICC line positioning, tip overlying the SVC No radiographic evidence of acute cardiopulmonary process.      Dx-chest-limited (1 View)    Result Date: 5/16/2018 5/16/2018 11:15 AM HISTORY/REASON FOR EXAM: PICC line position. TECHNIQUE/EXAM DESCRIPTION AND NUMBER OF VIEWS: Single AP view of the chest. COMPARISON: 3/12/2018 FINDINGS: Right upper extremity PICC line tip overlies the SVC. The heart is normal in size. There is no pleural effusion. Scattered linear opacities. No airspace consolidation.     Right upper extremity  PICC line tip overlies the SVC. Scattered linear atelectasis.      Assessment/Plan:     1. Malignant neoplasm of upper-outer quadrant of left breast in female, estrogen receptor negative (CMS-HCC)-  1 positive node; dr. koo; dr sharpe-  chemorx 1st, then surgery, then RT  REFERRAL TO CARDIOLOGY   2. CLL (chronic lymphocytic leukemia)- wbc= 20k-30k, since 2006; no rx; oncologist to follow     3. PICC (peripherally inserted central catheter) in place  CXR   4. Irregular heart rhythm  EKG    REFERRAL TO CARDIOLOGY   5. Abnormal EKG     6. Encounter for antineoplastic chemotherapy           1.  HR/abn EKG: Patient has irregular heart rate noted upon exam and reports occasional palpitations over the past week. She is status post 4 cycles of dose dense AC. EKG was ordered and shows accelerated junctional rhythm, she will be referred to cardio oncology.    2.  PICC line: Last week PICC line was noted to have been pulled out from original 3 cm marked to 7 cm marked, chest x-ray was completed and showed PICC line overlying SVC. PICC line sluggish blood return and a little bit past 7 cm marked, discussed with PICC nurse who advised TPA and recheck with chest x-ray prior to further intervention. Chest x-ray shows line continues to overlies SVC. PICC dressing was NOT completed today and will be completed tomorrow with chemotherapy along with TPA.    3.  CLL: Stable, continue to monitor.    4.  Breast cancer: Overall she continues to tolerate weekly Taxol very well. CBC and CMP have been evaluated and found to be within acceptable limits. She will proceed with week 5 of weekly Taxol and return per next for evaluation prior to week 6, sooner as needed.            The patient verbalized agreement and understanding of current plan. All questions and concerns were addressed at time of visit.    Please note that this dictation was created using voice recognition software. I have made every reasonable attempt to correct obvious  errors, but I expect that there are errors of grammar and possibly content that I did not discover before finalizing the note.

## 2018-05-30 NOTE — PROGRESS NOTES
Patient arrived ambulatory for PICC line dressing change and labs.  Pt in agreement with plan.  Red lumen with sluggish blood return.  RN able to collect CBC w/diff from red lumen.  Unable to verify blood return from purple lumen. PICC line secured at 7cm veronica.  PICC line placement verified on 5/16/18 by chest xray.   Consulted with Christina Kinney, PICC line RN, and IRENA Villarreal.  Plan of care to verify PICC line placement with chest xray and alteplase if PICC line is above SVC.  Pt in agreement.  Dressing not changed at this time per IRENA Villarreal, until chest xray is performed and plan of care determined.

## 2018-05-31 ENCOUNTER — OUTPATIENT INFUSION SERVICES (OUTPATIENT)
Dept: ONCOLOGY | Facility: MEDICAL CENTER | Age: 78
End: 2018-05-31
Attending: INTERNAL MEDICINE
Payer: MEDICARE

## 2018-05-31 VITALS
OXYGEN SATURATION: 95 % | HEART RATE: 104 BPM | BODY MASS INDEX: 34.05 KG/M2 | HEIGHT: 65 IN | DIASTOLIC BLOOD PRESSURE: 63 MMHG | TEMPERATURE: 99.3 F | RESPIRATION RATE: 18 BRPM | WEIGHT: 204.37 LBS | SYSTOLIC BLOOD PRESSURE: 139 MMHG

## 2018-05-31 DIAGNOSIS — Z17.1 MALIGNANT NEOPLASM OF UPPER-OUTER QUADRANT OF LEFT BREAST IN FEMALE, ESTROGEN RECEPTOR NEGATIVE (HCC): ICD-10-CM

## 2018-05-31 DIAGNOSIS — C50.412 MALIGNANT NEOPLASM OF UPPER-OUTER QUADRANT OF LEFT BREAST IN FEMALE, ESTROGEN RECEPTOR NEGATIVE (HCC): ICD-10-CM

## 2018-05-31 LAB
ANISOCYTOSIS BLD QL SMEAR: ABNORMAL
BASOPHILS # BLD AUTO: 0.9 % (ref 0–1.8)
BASOPHILS # BLD: 0.09 K/UL (ref 0–0.12)
EOSINOPHIL # BLD AUTO: 0.44 K/UL (ref 0–0.51)
EOSINOPHIL NFR BLD: 4.5 % (ref 0–6.9)
ERYTHROCYTE [DISTWIDTH] IN BLOOD BY AUTOMATED COUNT: 66.1 FL (ref 35.9–50)
HCT VFR BLD AUTO: 26.4 % (ref 37–47)
HGB BLD-MCNC: 8.6 G/DL (ref 12–16)
LYMPHOCYTES # BLD AUTO: 2.08 K/UL (ref 1–4.8)
LYMPHOCYTES NFR BLD: 21.4 % (ref 22–41)
MACROCYTES BLD QL SMEAR: ABNORMAL
MANUAL DIFF BLD: NORMAL
MCH RBC QN AUTO: 31.7 PG (ref 27–33)
MCHC RBC AUTO-ENTMCNC: 32.6 G/DL (ref 33.6–35)
MCV RBC AUTO: 97.4 FL (ref 81.4–97.8)
MICROCYTES BLD QL SMEAR: ABNORMAL
MONOCYTES # BLD AUTO: 0.26 K/UL (ref 0–0.85)
MONOCYTES NFR BLD AUTO: 2.7 % (ref 0–13.4)
MORPHOLOGY BLD-IMP: NORMAL
NEUTROPHILS # BLD AUTO: 6.84 K/UL (ref 2–7.15)
NEUTROPHILS NFR BLD: 70.5 % (ref 44–72)
NRBC # BLD AUTO: 0 K/UL
NRBC BLD-RTO: 0 /100 WBC
OVALOCYTES BLD QL SMEAR: NORMAL
PLATELET # BLD AUTO: 358 K/UL (ref 164–446)
PLATELET BLD QL SMEAR: NORMAL
PMV BLD AUTO: 9.5 FL (ref 9–12.9)
POIKILOCYTOSIS BLD QL SMEAR: NORMAL
RBC # BLD AUTO: 2.71 M/UL (ref 4.2–5.4)
RBC BLD AUTO: PRESENT
WBC # BLD AUTO: 9.7 K/UL (ref 4.8–10.8)

## 2018-05-31 PROCEDURE — 96375 TX/PRO/DX INJ NEW DRUG ADDON: CPT

## 2018-05-31 PROCEDURE — 85007 BL SMEAR W/DIFF WBC COUNT: CPT

## 2018-05-31 PROCEDURE — 700105 HCHG RX REV CODE 258: Performed by: NURSE PRACTITIONER

## 2018-05-31 PROCEDURE — 96413 CHEMO IV INFUSION 1 HR: CPT

## 2018-05-31 PROCEDURE — 85027 COMPLETE CBC AUTOMATED: CPT

## 2018-05-31 PROCEDURE — 700111 HCHG RX REV CODE 636 W/ 250 OVERRIDE (IP): Performed by: NURSE PRACTITIONER

## 2018-05-31 PROCEDURE — 304540 HCHG NITRO SET VENT 2ND TUB

## 2018-05-31 PROCEDURE — 700111 HCHG RX REV CODE 636 W/ 250 OVERRIDE (IP): Mod: JG | Performed by: NURSE PRACTITIONER

## 2018-05-31 PROCEDURE — 700105 HCHG RX REV CODE 258

## 2018-05-31 PROCEDURE — 700111 HCHG RX REV CODE 636 W/ 250 OVERRIDE (IP)

## 2018-05-31 PROCEDURE — 36593 DECLOT VASCULAR DEVICE: CPT

## 2018-05-31 RX ADMIN — DEXAMETHASONE SODIUM PHOSPHATE 12 MG: 4 INJECTION, SOLUTION INTRAMUSCULAR; INTRAVENOUS at 15:44

## 2018-05-31 RX ADMIN — FAMOTIDINE 20 MG: 10 INJECTION, SOLUTION INTRAVENOUS at 15:15

## 2018-05-31 RX ADMIN — PACLITAXEL 164.8 MG: 6 INJECTION, SOLUTION, CONCENTRATE INTRAVENOUS at 16:05

## 2018-05-31 RX ADMIN — ALTEPLASE 2 MG: 2.2 INJECTION, POWDER, LYOPHILIZED, FOR SOLUTION INTRAVENOUS at 14:30

## 2018-05-31 RX ADMIN — DIPHENHYDRAMINE HYDROCHLORIDE 25 MG: 50 INJECTION INTRAMUSCULAR; INTRAVENOUS at 15:19

## 2018-05-31 ASSESSMENT — PAIN SCALES - GENERAL: PAINLEVEL: NO PAIN

## 2018-05-31 NOTE — PROGRESS NOTES
Chemotherapy Verification - PRIMARY RN      Height = 165 cm  Weight = 92.7 kg  BSA = 2.06 m^2       Medication: Taxol  Dose: 80 mg/m^2  Calculated Dose: 164.8 mg                             (In mg/m2, AUC, mg/kg)     I confirm this process was performed independently with the BSA and all final chemotherapy dosing calculations congruent.  Any discrepancies of 5% or greater have been addressed with the chemotherapy pharmacist. The resolution of the discrepancy has been documented in the EPIC progress notes.

## 2018-05-31 NOTE — PROGRESS NOTES
"Pharmacy Chemotherapy Verification  Patient Name: Clarisse Reeves  Dx: Breast CA    Cycle: 5 weekly Paclitaxel (Cycle 8 total) Previous treatment = week 4 on  5/24/18 s/p DDAC C4 on 4/20/18    Regimen and Dosing Reference  DDAC (Dose Dense Doxorubicin + Cyclophosphamide) followed by Paclitaxel  DOXOrubicin 60 mg/m2 IV on Day 1--dose reduced to 48 mg/m2 due to tolerance  Cyclophosphamide 600 mg/m2 IV over 30 min on Day 1--dose reduced to 480 mg/m2 due to tolerance  14 day cycle for 4 cycles--Completed 4/20/18  NCCN Guidelines for Breast Cancer V.2.2017  Jamila DARLING et al J Clin Oncol. 2003:21(8):1431-9    Followed By:    Paclitaxel Weekly Course  Paclitaxel (Taxol) 80 mg/m2 IV over 60 min on Day 1   Weekly cycle for 12 weeks  NCCN Guidelines for Breast Cancer v.1.2017  Jamila DARLING et al J Clin Oncol. 2003:21(8):1431-9    Allergies:Codeine; Lipitor [atorvastatin calcium]; Lovastatin; Zocor [simvastatin - high dose]; Lisinopril; Metformin; and Tape  Blood pressure 139/63, pulse (!) 104, temperature 37.4 °C (99.3 °F), resp. rate 18, height 1.65 m (5' 4.96\"), weight 92.7 kg (204 lb 5.9 oz), SpO2 95 %.  Body surface area is 2.06 meters squared.    Labs 5/31/18  ANC ~ 6840   Plt = 358 k  Hgb = 8.6   5/23/18 SCr = 0.7    CrCl ~100 ml/min    AST/ALT/AlkPhos = 11/12/61 Tbili = 0.5    ECHO 1/30/18  LVEF = 65%    Paclitaxel 80 mg/m2 x 2.06 m2 = 164.8 mg  <5% diff, ok to treat with final written dose = 164.8 mg       Funmilayo Gorman, PharmD          "

## 2018-05-31 NOTE — PROGRESS NOTES
Chemotherapy Verification - SECONDARY RN       Height = 64.96 in  Weight = 204 lb  BSA = 2.06 m2       Medication: Taxol  Dose: 80 mg  Calculated Dose: 164.8 mg                             (In mg/m2, AUC, mg/kg)       I confirm that this process was performed independently.

## 2018-05-31 NOTE — PROGRESS NOTES
"Pharmacy Chemotherapy Verification    Patient Name: GREG DEMARCO  Dx: ER/LA negative HER-2 negative Breast Cancer        Protocol: DDAC     *Dosing Reference*  DOXOrubicin  IV push on day 1   -- 3/22/18: dose decreased to 48 mg/m2 for side effect  Cyclophosphamide  IV over 30 min on day 1   -- 3/22/18: dose decreased to 480 mg/m2 for side effects  14-day cycle x 4 cycles  (completed)  ~followed by~  PACLitaxel 80 mg/m2 IV over 60 min on day 1  Weekly course x 12 weeks  NCCN Guidelines for Breast Cancer V.1.2017  Jamila ML, et al. J Clin Oncol. 2003:21(8):1431-9    Allergies:Codeine; Lipitor [atorvastatin calcium]; Lovastatin; Zocor [simvastatin - high dose]; Lisinopril; Metformin; and Tape  /63   Pulse (!) 104   Temp 37.4 °C (99.3 °F)   Resp 18   Ht 1.65 m (5' 4.96\")   Wt 92.7 kg (204 lb 5.9 oz)   SpO2 95%   BMI 34.05 kg/m²  Body surface area is 2.06 meters squared.    ECHO 1/30/2018    LVEF 65%    LABS 5/31/18:  ANC~ 6840   Plt = 358 k     Hgb = 8.6 (w/in parameters)     LABS 5/23/18:  SCr = 0.7 mg/dL  CrCl ~ 98.7 mL/min    AST/ALT/AP = 11/12/61 TBili = 0.5         Drug Order   (Drug name, dose, route, IV Fluid & volume, frequency, number of doses) Cycle 5 - Weekly PACLitaxel (Cycle 9 total)  Previous treatment: C4 on 5/24/2018   Medication = PACLitaxel (Taxol)  Base Dose =  80 mg/m2  Calc Dose: Base Dose x Body surface area is 2.06 meters squared. m2 = 164.8 mg  Final Dose = 164.8 mg   Route = IV  Fluid and volume =  mL  Admin Duration = Over 60 minutes          <5% difference, OK to treat with final dose      By my signature below, I confirm this process was performed independently with the BSA and all final chemotherapy dosing calculations congruent. I have reviewed the above chemotherapy order and that my calculation of the final dose and BSA (when applicable) corroborate those calculations of the  pharmacist. Discrepancies of 5% or greater in the written dose have been " addressed and documented within the EPIC Progress notes.      ANDREIA Turcios, PharmD

## 2018-06-01 NOTE — PROGRESS NOTES
Patient here for weekly Taxol. Blood return noted on one lumen. CBC drawn. Dressing changed using sterile technique. Delmy Frank notified regarding no blood return from one of the lumens of double lumen PICC; orders received for alteplase. Alteplase given per MAR. Brisk blood return noted after 30 minutes of alteplase instillation. Pre-medications given per MAR. Taxol given per MAR. Blood return noted after Taxol administration. Next appointment scheduled. Discharged to self care; no apparent distress noted.

## 2018-06-02 NOTE — PROGRESS NOTES
"Pharmacy Chemotherapy Verification    Patient Name: GREG DEMARCO  Dx: ER/OR negative HER-2 negative Breast Cancer        Protocol: DDAC     *Dosing Reference*  DOXOrubicin  IV push on day 1   -- 3/22/18: dose decreased to 48 mg/m2 for side effect  Cyclophosphamide  IV over 30 min on day 1   -- 3/22/18: dose decreased to 480 mg/m2 for side effects  14-day cycle x 4 cycles  (completed)  ~followed by~  PACLitaxel 80 mg/m2 IV over 60 min on day 1  Weekly course x 12 weeks  NCCN Guidelines for Breast Cancer V.1.2017  Jamila ML, et al. J Clin Oncol. 2003:21(8):1431-9    Allergies:Codeine; Lipitor [atorvastatin calcium]; Lovastatin; Zocor [simvastatin - high dose]; Lisinopril; Metformin; and Tape  /69   Pulse 91   Temp 36.9 °C (98.5 °F)   Resp 18   Ht 1.65 m (5' 4.96\")   Wt 93.8 kg (206 lb 12.7 oz)   SpO2 98%   BMI 34.45 kg/m²  Body surface area is 2.07 meters squared.    ECHO 1/30/2018    LVEF 65%    Labs:  6/6/18:  ANC~ 7100   Plt = 342 k     Hgb = 9.1  SCr = 0.61 mg/dL  CrCl ~ 101 mL/min (min SCr 0.7 used)  AST/ALT/AP = 10/13/61 TBili = 0.3          Drug Order   (Drug name, dose, route, IV Fluid & volume, frequency, number of doses) Cycle 6 - Weekly PACLitaxel (Cycle 10 total)  Previous treatment: C5 on 5/31/18   Medication = PACLitaxel (Taxol)  Base Dose =  80 mg/m2  Calc Dose: Base Dose x 2.07 m2 = 165.6 mg  Final Dose = 165.6 mg   Route = IV  Fluid and volume =  mL  Admin Duration = Over 60 minutes          <5% difference, OK to treat with final dose      By my signature below, I confirm this process was performed independently with the BSA and all final chemotherapy dosing calculations congruent. I have reviewed the above chemotherapy order and that my calculation of the final dose and BSA (when applicable) corroborate those calculations of the  pharmacist. Discrepancies of 5% or greater in the written dose have been addressed and documented within the EPIC Progress " notes.      Alison Brandon, PharmD

## 2018-06-05 DIAGNOSIS — C91.10 CLL (CHRONIC LYMPHOCYTIC LEUKEMIA) (HCC): ICD-10-CM

## 2018-06-06 ENCOUNTER — NON-PROVIDER VISIT (OUTPATIENT)
Dept: HEMATOLOGY ONCOLOGY | Facility: MEDICAL CENTER | Age: 78
End: 2018-06-06
Payer: MEDICARE

## 2018-06-06 ENCOUNTER — OFFICE VISIT (OUTPATIENT)
Dept: HEMATOLOGY ONCOLOGY | Facility: MEDICAL CENTER | Age: 78
End: 2018-06-06
Payer: MEDICARE

## 2018-06-06 ENCOUNTER — HOSPITAL ENCOUNTER (OUTPATIENT)
Facility: MEDICAL CENTER | Age: 78
End: 2018-06-06
Attending: NURSE PRACTITIONER
Payer: MEDICARE

## 2018-06-06 VITALS
SYSTOLIC BLOOD PRESSURE: 127 MMHG | HEART RATE: 99 BPM | TEMPERATURE: 99 F | WEIGHT: 204.7 LBS | HEIGHT: 65 IN | DIASTOLIC BLOOD PRESSURE: 72 MMHG | RESPIRATION RATE: 18 BRPM | OXYGEN SATURATION: 100 % | BODY MASS INDEX: 34.1 KG/M2

## 2018-06-06 VITALS
HEART RATE: 99 BPM | HEIGHT: 65 IN | SYSTOLIC BLOOD PRESSURE: 127 MMHG | WEIGHT: 204.7 LBS | RESPIRATION RATE: 18 BRPM | DIASTOLIC BLOOD PRESSURE: 72 MMHG | TEMPERATURE: 99 F | OXYGEN SATURATION: 100 % | BODY MASS INDEX: 34.1 KG/M2

## 2018-06-06 DIAGNOSIS — R23.2 HOT FLASHES: ICD-10-CM

## 2018-06-06 DIAGNOSIS — C91.10 CLL (CHRONIC LYMPHOCYTIC LEUKEMIA) (HCC): ICD-10-CM

## 2018-06-06 DIAGNOSIS — R10.32 LLQ ABDOMINAL PAIN: ICD-10-CM

## 2018-06-06 DIAGNOSIS — Z17.1 MALIGNANT NEOPLASM OF UPPER-OUTER QUADRANT OF LEFT BREAST IN FEMALE, ESTROGEN RECEPTOR NEGATIVE (HCC): ICD-10-CM

## 2018-06-06 DIAGNOSIS — C50.412 MALIGNANT NEOPLASM OF UPPER-OUTER QUADRANT OF LEFT BREAST IN FEMALE, ESTROGEN RECEPTOR NEGATIVE (HCC): ICD-10-CM

## 2018-06-06 DIAGNOSIS — Z51.11 ENCOUNTER FOR ANTINEOPLASTIC CHEMOTHERAPY: ICD-10-CM

## 2018-06-06 LAB
BASOPHILS # BLD AUTO: 0.4 % (ref 0–1.8)
BASOPHILS # BLD: 0.04 K/UL (ref 0–0.12)
EOSINOPHIL # BLD AUTO: 0.12 K/UL (ref 0–0.51)
EOSINOPHIL NFR BLD: 1.3 % (ref 0–6.9)
ERYTHROCYTE [DISTWIDTH] IN BLOOD BY AUTOMATED COUNT: 62.8 FL (ref 35.9–50)
HCT VFR BLD AUTO: 27.9 % (ref 37–47)
HGB BLD-MCNC: 9.1 G/DL (ref 12–16)
IMM GRANULOCYTES # BLD AUTO: 0.13 K/UL (ref 0–0.11)
IMM GRANULOCYTES NFR BLD AUTO: 1.4 % (ref 0–0.9)
LYMPHOCYTES # BLD AUTO: 1.47 K/UL (ref 1–4.8)
LYMPHOCYTES NFR BLD: 15.9 % (ref 22–41)
MCH RBC QN AUTO: 31.6 PG (ref 27–33)
MCHC RBC AUTO-ENTMCNC: 32.6 G/DL (ref 33.6–35)
MCV RBC AUTO: 96.9 FL (ref 81.4–97.8)
MONOCYTES # BLD AUTO: 0.39 K/UL (ref 0–0.85)
MONOCYTES NFR BLD AUTO: 4.2 % (ref 0–13.4)
NEUTROPHILS # BLD AUTO: 7.1 K/UL (ref 2–7.15)
NEUTROPHILS NFR BLD: 76.8 % (ref 44–72)
NRBC # BLD AUTO: 0 K/UL
NRBC BLD-RTO: 0 /100 WBC
PLATELET # BLD AUTO: 342 K/UL (ref 164–446)
PMV BLD AUTO: 9.7 FL (ref 9–12.9)
RBC # BLD AUTO: 2.88 M/UL (ref 4.2–5.4)
WBC # BLD AUTO: 9.3 K/UL (ref 4.8–10.8)

## 2018-06-06 PROCEDURE — 99214 OFFICE O/P EST MOD 30 MIN: CPT | Performed by: NURSE PRACTITIONER

## 2018-06-06 PROCEDURE — 85025 COMPLETE CBC W/AUTO DIFF WBC: CPT

## 2018-06-06 RX ORDER — 0.9 % SODIUM CHLORIDE 0.9 %
VIAL (ML) INJECTION PRN
Status: CANCELLED | OUTPATIENT
Start: 2018-06-06

## 2018-06-06 RX ORDER — ONDANSETRON 2 MG/ML
4 INJECTION INTRAMUSCULAR; INTRAVENOUS
Status: CANCELLED | OUTPATIENT
Start: 2018-06-07

## 2018-06-06 RX ORDER — 0.9 % SODIUM CHLORIDE 0.9 %
5 VIAL (ML) INJECTION PRN
Status: CANCELLED | OUTPATIENT
Start: 2018-06-06

## 2018-06-06 RX ORDER — ONDANSETRON 8 MG/1
8 TABLET, ORALLY DISINTEGRATING ORAL
Status: CANCELLED | OUTPATIENT
Start: 2018-06-07

## 2018-06-06 RX ORDER — 0.9 % SODIUM CHLORIDE 0.9 %
VIAL (ML) INJECTION PRN
Status: CANCELLED | OUTPATIENT
Start: 2018-06-07

## 2018-06-06 RX ORDER — 0.9 % SODIUM CHLORIDE 0.9 %
5 VIAL (ML) INJECTION PRN
Status: CANCELLED | OUTPATIENT
Start: 2018-06-07

## 2018-06-06 RX ORDER — SODIUM CHLORIDE 9 MG/ML
INJECTION, SOLUTION INTRAVENOUS CONTINUOUS
Status: CANCELLED | OUTPATIENT
Start: 2018-06-07

## 2018-06-06 RX ORDER — PROCHLORPERAZINE MALEATE 10 MG
10 TABLET ORAL EVERY 6 HOURS PRN
Status: CANCELLED | OUTPATIENT
Start: 2018-06-07

## 2018-06-06 ASSESSMENT — ENCOUNTER SYMPTOMS
VOMITING: 0
COUGH: 1
CONSTIPATION: 0
WHEEZING: 0
DEPRESSION: 1
CHILLS: 0
PALPITATIONS: 1
NAUSEA: 0
MYALGIAS: 0
FEVER: 0
DIZZINESS: 0
SHORTNESS OF BREATH: 1
ABDOMINAL PAIN: 1
DIAPHORESIS: 1
DIARRHEA: 0
HEADACHES: 0
WEIGHT LOSS: 0

## 2018-06-06 ASSESSMENT — PAIN SCALES - GENERAL
PAINLEVEL: 4=SLIGHT-MODERATE PAIN
PAINLEVEL: 4=SLIGHT-MODERATE PAIN

## 2018-06-06 NOTE — PROGRESS NOTES
"Subjective:      Clarisse Reeves is a 77 y.o. female who presents for Follow-Up (Prechemo) for breast cancer.         HPI     Patient seen today in follow-up for ER/FL negative, HER-2 negative, stage IIIc breast cancer. She presents unaccompanied for today's visit. Patient is scheduled to receive cycle #6/12 of weekly Taxol.    Patient denies any fevers or chills or weight loss. Her appetite has been fair. She does experience some fatigue and states that she is constantly tired. She is also experiencing hot flashes approximately 3-4 times per day. She denies any mouth sores. She does have a cough but it is not worsening. She also experiencing some shortness of breath which is mild at times with activity. She denies wheezing. She denies any chest pain but does have a \"flutter\" of her heart which she thinks is palpitations. She has been referred to cardio oncology and has not yet established with a provider. She also experiencing some left lower quadrant abdominal pain. She states that it is intermittent and comes and goes approximately 4-5 times per day. She states it feels like she has a stabbing pain in her left lower quadrant that lasts approximately 30 minutes. She is having normal bowel movements every day and is passing a great deal of gas daily. She did have a bowel movement this morning which was soft/formed and normal in color. She denies any nausea or vomiting. She is voiding without difficulty and denies generalized pain, dizziness or headaches.    Patient did state that she feels that she is having some depression associated with her diagnosis and treatment. She is very anxious with regards to what is to come after treatment. She stated she thought she only had 4 more treatments left, but I did confirm that she has a total of 12 cycles of Taxol which made her quite tearful today. She is concerned that she doesn't know what to come after chemotherapy. She doesn't know what kind of surgery she is going " to have or what to expect with radiation therapy. I did discuss with patient that she will get back into see surgeon after the completion of chemotherapy. She is also very scared in concerned that her cancer has spread throughout her body as well. Patient is already on Celexa for depression that she was started on last year after the death of her daughter.    Allergies   Allergen Reactions   • Codeine Hives and Nausea     RXN=40 years ago   • Lipitor [Atorvastatin Calcium] Myalgia     [id number]=[id number]     • Lovastatin Myalgia     Muscle aches  BGU=1886   • Zocor [Simvastatin - High Dose] Myalgia     Severe myalgias  AML=3290   • Lisinopril Cough   • Metformin      Diarrhea     • Tape      Current Outpatient Prescriptions on File Prior to Visit   Medication Sig Dispense Refill   • nystatin (MYCOSTATIN) powder Clean and pat dry affected area- apply to affected area twice daily and as needed 15 g 1   • Blood Glucose Monitoring Suppl Supplies Misc Contour Next glucometer strips for blood sugar check once a day 100 Each 2   • levothyroxine (SYNTHROID) 150 MCG Tab Take 1 Tab by mouth Every morning on an empty stomach. 90 Tab 4   • glipiZIDE (GLUCOTROL) 5 MG Tab Take 1 Tab by mouth every day. 90 Tab 4   • fenofibrate (TRIGLIDE) 160 MG tablet Take 1 Tab by mouth every day. 90 Tab 4   • citalopram (CELEXA) 20 MG Tab Take 1 Tab by mouth every day. 90 Tab 4   • aspirin (ASA) 81 MG Chew Tab chewable tablet Take 1 Tab by mouth every day. 100 Tab 11   • cyclobenzaprine (FLEXERIL) 5 MG tablet Take 1-2 Tabs by mouth 2 times a day as needed. 30 Tab 0   • nystatin (MYCOSTATIN) 942817 UNIT/GM Cream topical cream Clean and pat dry affected area - apply thin layer of cream twice daily (cont. until 1-2 after symptoms subside) 15 g 1   • ALEX CONTOUR NEXT TEST strip      • lidocaine viscous 2% (XYLOCAINE) 2 % Solution      • dexamethasone (DECADRON) 4 MG Tab Take 2 Tabs by mouth every day. Take 8 mg (2 tabs) on day 2 or 3 after chemotherapy. Do  "not take day of Neulasta. 4 Tab 0   • MICROLET LANCETS Misc For BG check once a day 100 Each 3   • ondansetron (ZOFRAN) 4 MG Tab tablet Take 1 Tab by mouth every four hours as needed for Nausea/Vomiting (for nausea, vomiting). 30 Tab 6   • prochlorperazine (COMPAZINE) 10 MG Tab Take 1 Tab by mouth every 6 hours as needed (nausea, vomiting, migraine). 30 Tab 0     No current facility-administered medications on file prior to visit.        Review of Systems   Constitutional: Positive for diaphoresis (hot flashes 3-4 times per day) and malaise/fatigue (constantly tired). Negative for chills, fever and weight loss.   HENT:        Denies mouth sores   Respiratory: Positive for cough (not worsening) and shortness of breath (mild at times with activity). Negative for wheezing.    Cardiovascular: Positive for palpitations. Negative for chest pain.   Gastrointestinal: Positive for abdominal pain (LLQ pain 4-5 times per day). Negative for constipation, diarrhea, nausea and vomiting.   Genitourinary: Negative for dysuria.   Musculoskeletal: Negative for myalgias.   Neurological: Negative for dizziness and headaches.   Psychiatric/Behavioral: Positive for depression.          Objective:     /72   Pulse 99   Temp 37.2 °C (99 °F)   Resp 18   Ht 1.65 m (5' 4.96\")   Wt 92.9 kg (204 lb 11.2 oz)   SpO2 100%   BMI 34.11 kg/m²      Physical Exam   Constitutional: She is oriented to person, place, and time. She appears well-developed and well-nourished. No distress.   HENT:   Head: Normocephalic and atraumatic.   Mouth/Throat: Oropharynx is clear and moist. No oropharyngeal exudate.   Cardiovascular: Normal rate, regular rhythm, normal heart sounds and intact distal pulses.  Exam reveals no gallop and no friction rub.    No murmur heard.  Pulmonary/Chest: Effort normal and breath sounds normal. No respiratory distress. She has no wheezes.   Abdominal: Soft. Bowel sounds are normal. She exhibits no distension. There is " tenderness (tenderness to palpation throughout).   Musculoskeletal: Normal range of motion. She exhibits no edema or tenderness.   Neurological: She is alert and oriented to person, place, and time.   Skin: Skin is warm and dry. She is not diaphoretic.   Psychiatric: She has a normal mood and affect. Her behavior is normal.   Vitals reviewed.      Hospital Outpatient Visit on 06/06/2018   Component Date Value Ref Range Status   • WBC 06/06/2018 9.3  4.8 - 10.8 K/uL Final   • RBC 06/06/2018 2.88* 4.20 - 5.40 M/uL Final   • Hemoglobin 06/06/2018 9.1* 12.0 - 16.0 g/dL Final   • Hematocrit 06/06/2018 27.9* 37.0 - 47.0 % Final   • MCV 06/06/2018 96.9  81.4 - 97.8 fL Final   • MCH 06/06/2018 31.6  27.0 - 33.0 pg Final   • MCHC 06/06/2018 32.6* 33.6 - 35.0 g/dL Final   • RDW 06/06/2018 62.8* 35.9 - 50.0 fL Final   • Platelet Count 06/06/2018 342  164 - 446 K/uL Final   • MPV 06/06/2018 9.7  9.0 - 12.9 fL Final   • Neutrophils-Polys 06/06/2018 76.80* 44.00 - 72.00 % Final   • Lymphocytes 06/06/2018 15.90* 22.00 - 41.00 % Final   • Monocytes 06/06/2018 4.20  0.00 - 13.40 % Final   • Eosinophils 06/06/2018 1.30  0.00 - 6.90 % Final   • Basophils 06/06/2018 0.40  0.00 - 1.80 % Final   • Immature Granulocytes 06/06/2018 1.40* 0.00 - 0.90 % Final   • Nucleated RBC 06/06/2018 0.00  /100 WBC Final   • Neutrophils (Absolute) 06/06/2018 7.10  2.00 - 7.15 K/uL Final    Includes immature neutrophils, if present.   • Lymphs (Absolute) 06/06/2018 1.47  1.00 - 4.80 K/uL Final   • Monos (Absolute) 06/06/2018 0.39  0.00 - 0.85 K/uL Final   • Eos (Absolute) 06/06/2018 0.12  0.00 - 0.51 K/uL Final   • Baso (Absolute) 06/06/2018 0.04  0.00 - 0.12 K/uL Final   • Immature Granulocytes (abs) 06/06/2018 0.13* 0.00 - 0.11 K/uL Final   • NRBC (Absolute) 06/06/2018 0.00  K/uL Final          Assessment/Plan:     1. Malignant neoplasm of upper-outer quadrant of left breast in female, estrogen receptor negative (CMS-HCC)-  1 positive node; dr. koo;  dr sharpe-  chemorx 1st, then surgery, then RT  REFERRAL TO ONCOLOGY PSYCHOSOCIAL SCREENING Referral to? Outpatient Oncology Social Worker; Reason for Consult: Oncology Distress Screening Score; This patient has a screening score of (must be 6 or above): 7; Please indicate the practical problem...    REFERRAL TO ONCOLOGY NURSE NAVIGATOR This patient has a screening score of (must be 6 or above): 7; Please indicate the practical problems source of screening distress;: Treatment Decisions; Please indicate the family problems source of screening ...   2. Hot flashes     3. LLQ abdominal pain     4. Encounter for antineoplastic chemotherapy       Plan  1. Patient with triple negative breast cancer, stage IIIc, due to receive cycle #6/12 of weekly Taxol tomorrow. Clinically patient is doing very well and stable. I did review her CBC which is appropriate to proceed with treatment as planned tomorrow.    2. Patient is experiencing some left lower quadrant abdominal pain intermittently throughout the day. I believe this may be related more to gas pain as patient is having severe gas daily as well. I have recommended that patient try simethicone over-the-counter to see if that helps relieve some of the abdominal pain. She is passing gas and having normal bowel movements every day therefore I am not concerned of a bowel obstruction at this time. We will consider further workup and imaging if this does not resolve. I did discuss patient's current status with Dr. Barroso today and he is in agreement with the plan. He has asked that patient have a CMP completed tomorrow prior to starting chemotherapy.    3. Patient also experiencing some depression as it regards to her treatment. I did discuss with patient the plan at this time for her to complete her chemotherapy which is a total of 12 weeks of Taxol. Patient had thought she was going to be finished with chemotherapy a few weeks prior to that so she was very upset to learn that  she had more cycles then she originally thought. Patient also concerned about what's to come with surgery and what to expect as well as with radiation therapy. I did answer all questions to the best of my ability today. I have placed a referral to our oncology social worker and oncology nurse Navigator for extra assistance with patient throughout the duration of her treatment. Patient did state to me that she does not have any family here in [place] but does have some friends. I did speak with nurse Navigator who will recheck to patient.    4. Patient also experiencing hot flashes daily. I did discuss the possibility of starting patient on Effexor, however she is already on Celexa for depression. I will discuss further with Dr. Barroso to see if there are any options for patient with regards to her hot flashes.    5. Patient is to follow-up again in one week or sooner if needed.

## 2018-06-06 NOTE — PROGRESS NOTES
Patient arrived ambulatory to medical oncology accompanied by herself for PICC line dressing change with labs.  VSS. PICC line placement verified by chest xray on 5/30/2018.  Red lumen with brisk blood return.  CBC w/diff drawn per written treatment plan. PICC flushed per protocol. Purple lumen with brisk blood return.  Performed PICC line dressing change utilizing sterile technique with IV connectors changed.  Hypoallergenic tegaderm applied.  Line noted to be secured at the 7cm veronica and has not moved since verification of placement. Pt tolerated well.  Pt taken to exam room 3 for appointment with IRENA Nix.

## 2018-06-07 ENCOUNTER — PATIENT OUTREACH (OUTPATIENT)
Dept: OTHER | Facility: MEDICAL CENTER | Age: 78
End: 2018-06-07

## 2018-06-07 ENCOUNTER — TELEPHONE (OUTPATIENT)
Dept: HEMATOLOGY ONCOLOGY | Facility: MEDICAL CENTER | Age: 78
End: 2018-06-07

## 2018-06-07 ENCOUNTER — OUTPATIENT INFUSION SERVICES (OUTPATIENT)
Dept: ONCOLOGY | Facility: MEDICAL CENTER | Age: 78
End: 2018-06-07
Attending: INTERNAL MEDICINE
Payer: MEDICARE

## 2018-06-07 VITALS
WEIGHT: 206.79 LBS | TEMPERATURE: 98.5 F | BODY MASS INDEX: 34.45 KG/M2 | HEIGHT: 65 IN | DIASTOLIC BLOOD PRESSURE: 69 MMHG | RESPIRATION RATE: 18 BRPM | SYSTOLIC BLOOD PRESSURE: 123 MMHG | HEART RATE: 91 BPM | OXYGEN SATURATION: 98 %

## 2018-06-07 DIAGNOSIS — C50.412 MALIGNANT NEOPLASM OF UPPER-OUTER QUADRANT OF LEFT BREAST IN FEMALE, ESTROGEN RECEPTOR NEGATIVE (HCC): ICD-10-CM

## 2018-06-07 DIAGNOSIS — Z17.1 MALIGNANT NEOPLASM OF UPPER-OUTER QUADRANT OF LEFT BREAST IN FEMALE, ESTROGEN RECEPTOR NEGATIVE (HCC): ICD-10-CM

## 2018-06-07 LAB
ALBUMIN SERPL BCP-MCNC: 4 G/DL (ref 3.2–4.9)
ALBUMIN/GLOB SERPL: 1.7 G/DL
ALP SERPL-CCNC: 61 U/L (ref 30–99)
ALT SERPL-CCNC: 13 U/L (ref 2–50)
ANION GAP SERPL CALC-SCNC: 9 MMOL/L (ref 0–11.9)
AST SERPL-CCNC: 10 U/L (ref 12–45)
BILIRUB SERPL-MCNC: 0.3 MG/DL (ref 0.1–1.5)
BUN SERPL-MCNC: 10 MG/DL (ref 8–22)
CALCIUM SERPL-MCNC: 8.5 MG/DL (ref 8.5–10.5)
CHLORIDE SERPL-SCNC: 104 MMOL/L (ref 96–112)
CO2 SERPL-SCNC: 23 MMOL/L (ref 20–33)
CREAT SERPL-MCNC: 0.61 MG/DL (ref 0.5–1.4)
GLOBULIN SER CALC-MCNC: 2.3 G/DL (ref 1.9–3.5)
GLUCOSE SERPL-MCNC: 122 MG/DL (ref 65–99)
POTASSIUM SERPL-SCNC: 3.8 MMOL/L (ref 3.6–5.5)
PROT SERPL-MCNC: 6.3 G/DL (ref 6–8.2)
SODIUM SERPL-SCNC: 136 MMOL/L (ref 135–145)

## 2018-06-07 PROCEDURE — 700105 HCHG RX REV CODE 258: Performed by: NURSE PRACTITIONER

## 2018-06-07 PROCEDURE — 96375 TX/PRO/DX INJ NEW DRUG ADDON: CPT

## 2018-06-07 PROCEDURE — 700105 HCHG RX REV CODE 258

## 2018-06-07 PROCEDURE — 80053 COMPREHEN METABOLIC PANEL: CPT

## 2018-06-07 PROCEDURE — 306780 HCHG STAT FOR TRANSFUSION PER CASE

## 2018-06-07 PROCEDURE — 304540 HCHG NITRO SET VENT 2ND TUB

## 2018-06-07 PROCEDURE — 700111 HCHG RX REV CODE 636 W/ 250 OVERRIDE (IP): Performed by: NURSE PRACTITIONER

## 2018-06-07 PROCEDURE — 96413 CHEMO IV INFUSION 1 HR: CPT

## 2018-06-07 PROCEDURE — 700111 HCHG RX REV CODE 636 W/ 250 OVERRIDE (IP)

## 2018-06-07 RX ADMIN — DEXAMETHASONE SODIUM PHOSPHATE 12 MG: 4 INJECTION, SOLUTION INTRAMUSCULAR; INTRAVENOUS at 14:20

## 2018-06-07 RX ADMIN — DIPHENHYDRAMINE HYDROCHLORIDE 25 MG: 50 INJECTION INTRAMUSCULAR; INTRAVENOUS at 14:08

## 2018-06-07 RX ADMIN — PACLITAXEL 165.6 MG: 6 INJECTION, SOLUTION INTRAVENOUS at 14:48

## 2018-06-07 RX ADMIN — FAMOTIDINE 20 MG: 10 INJECTION INTRAVENOUS at 14:08

## 2018-06-07 ASSESSMENT — PAIN SCALES - GENERAL: PAINLEVEL_OUTOF10: 0

## 2018-06-07 NOTE — PROGRESS NOTES
Chemotherapy Verification - PRIMARY RN      Height = 165cm  Weight = 93.8kg  BSA = 2.07m2       Medication: Paclitaxel  Dose: 80mg/m2  Calculated Dose: 165.6mg                             (In mg/m2, AUC, mg/kg)     I confirm this process was performed independently with the BSA and all final chemotherapy dosing calculations congruent.  Any discrepancies of 5% or greater have been addressed with the chemotherapy pharmacist. The resolution of the discrepancy has been documented in the EPIC progress notes.

## 2018-06-07 NOTE — PROGRESS NOTES
"Pharmacy Chemotherapy Verification  Patient Name: Clarisse Reeves  Dx: Breast CA    Cycle: 6 weekly Paclitaxel (Cycle 10 total) Previous treatment = week 5 on  5/31/18 s/p DDAC C4 on 4/20/18    Regimen and Dosing Reference  DDAC (Dose Dense Doxorubicin + Cyclophosphamide) followed by Paclitaxel  DOXOrubicin 60 mg/m2 IV on Day 1--dose reduced to 48 mg/m2 due to tolerance  Cyclophosphamide 600 mg/m2 IV over 30 min on Day 1--dose reduced to 480 mg/m2 due to tolerance  14 day cycle for 4 cycles--Completed 4/20/18  NCCN Guidelines for Breast Cancer V.2.2017  Jamila DARLING et al J Clin Oncol. 2003:21(8):1431-9    Followed By:    Paclitaxel Weekly Course  Paclitaxel (Taxol) 80 mg/m2 IV over 60 min on Day 1   Weekly cycle for 12 weeks  NCCN Guidelines for Breast Cancer v.1.2017  Jamila DARLING et al J Clin Oncol. 2003:21(8):1431-9    Allergies:Codeine; Lipitor [atorvastatin calcium]; Lovastatin; Zocor [simvastatin - high dose]; Lisinopril; Metformin; and Tape  Blood pressure 123/69, pulse 91, temperature 36.9 °C (98.5 °F), resp. rate 18, height 1.65 m (5' 4.96\"), weight 93.8 kg (206 lb 12.7 oz), SpO2 98 %.  Body surface area is 2.07 meters squared.    Labs 6/6/18  ANC ~ 7100   Plt = 342 k  Hgb = 9.1   6/7/18 SCr = 0.7    CrCl ~101 ml/min    AST/ALT/AlkPhos = 10/13/61 Tbili = 0.3    ECHO 1/30/18  LVEF = 65%    Paclitaxel 80 mg/m2 x 2.07 m2 = 165.6 mg  <5% diff, ok to treat with final written dose = 165.6 mg       Funmilayo Gorman, PharmD          "

## 2018-06-07 NOTE — PROGRESS NOTES
Chemotherapy Verification - SECONDARY RN       Height = 165 cm  Weight = 93.8 kg  BSA = 2.07 m2       Medication: Paclitaxel (Taxol)  Dose: 80 mg/m2  Calculated Dose: 165.6 mg                                 I confirm that this process was performed independently.

## 2018-06-07 NOTE — PROGRESS NOTES
"Patient Psychosocial Information   On June 7, 2018, Oncology Nurse Navigator Riki Marmolejo and Oncology Social Worker Carlene Washington met with pt. while pt. was receiving treatment at [facility].      Current Living Situation  Pt. lives alone with her dog, Kelsey.      Financial Information  Pt. stated she did not have any concerns with financials at this time and stated she is currently working with Financial Resource Advocate Kelli Uriarte.      Past Trauma   Pt. shared she worked at [employer] for 9 years and recounted story of person who was suicidal and had called the hotline.  Pt. stated she stayed on the phone with him for about 5 hours and heard when he pulled the trigger.  Pt. also shared the \"only time I've been depressed was when my daughter passed away.\"  Pt.'s daughter passed away two years ago from a massive heart attack at the age of 53.      Spirituality  Pt. stated she did not have a [employer] affiliation but stated she considered herself to be spiritual.      Mental Health History   Pt. states she has never been diagnosed with a mental health condition.      Employment Status  Pt. is retired and shared once she has completed treatment she would like to go back to work.  Pt. stated she has retired three times.  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      Support System  Pt. shared her best friend Brittany Carter is a  and is very supportive of patient.      Patient’s value   Pt. shared what's most important to her to be \"respecting one another and treating each other as human beings\"    Patient Distress Thermometer completed 6/7/2018 score of 2  Reason for distress note:  \"just because\"    Follow up needed:  No additional follow up needed as pt. denied having any barriers at this time.  Pt. states she will reach out to RAJEEV Marmolejo or HOWIE Washington should she have needs in the future.      "

## 2018-06-07 NOTE — PROGRESS NOTES
Met with pt at chairside accompanied by HOWIE Washington.  Pt confirms she still has good support in her friend.  Addressed questions re: the difference between tx for HR+ vs triple negative breast cancer.  Also discussed XRT including expected s/e and logistics.  Pt is eager to complete tx and expresses interest in going back to work or volunteering.  Pt states she does have ONN contact information and agrees to call should any questions or concerns arise.

## 2018-06-08 ENCOUNTER — TELEPHONE (OUTPATIENT)
Dept: HEMATOLOGY ONCOLOGY | Facility: MEDICAL CENTER | Age: 78
End: 2018-06-08

## 2018-06-08 NOTE — TELEPHONE ENCOUNTER
Spoke to the patient today regarding her depression and hot flashes. She is currently taking Celexa 20 mg by mouth daily for depression. I did speak with Dr. Barroso and came up with a plan to have patient decrease his Celexa to 10 mg by mouth daily ×1 week and then start her on Effexor 37.5 mg daily and titrate up as needed. Patient was interested in proceeding with the change, however then she stated that she would like to wait at this point in time. She stated she felt better after meeting with me yesterday with regards to depression. She stated the hot flashes are still bothersome but would like to discuss with Dr. Barroso if there is anything else other than an antidepressant to help with her hot flashes. She is okay and holding off at this time and is scheduled to see Dr. Barroso next week in follow-up.     She also stated that her abdominal pain has subsided a little since yesterday. She has not taken simethicone but stated it was not as bothersome as it has been. Patient seemed to be in a good mood today and did meet with both understand get her and  and was pleased to have spent time with him as well.    Patients to follow-up in the clinic next week with Dr. Barroso.

## 2018-06-08 NOTE — TELEPHONE ENCOUNTER
Attempted to f/u with Clarisse regarding her LLQ abd pain. Per Camila Mejia, APRN, pt to try simethicone for gas and pain relief. LVM on both listed numbers to call JEN Cruz at [phone].

## 2018-06-08 NOTE — PROGRESS NOTES
"Patient arrived for Week 6 Taxol infusion; pt reports fatigue but no significant changes or concerns.  PICC line flushed and patent, kieran additional CMP per order.  Results reviewed and ok to treat per [place] Nix.  Treatment completed without incident.  Nurse Nazario and MARYURI at chairside, as well as Camila Mejia.  No new orders per patient at this time.  Pt reports some depression, but states she actually \"would like to deal with it myself.\"  No SI or s/s depression demonstrated to this RN at this time.  Confirmed pt's next appt; PICC flushed per protocol.  Discharged home in great spirits under no apparent distress.   "

## 2018-06-08 NOTE — TELEPHONE ENCOUNTER
Patient called stated that she is retuning Nacny RN phone call regarding her pain. She stated that she will be home for the rest of the afternoon to call her thank you.

## 2018-06-08 NOTE — TELEPHONE ENCOUNTER
"Clarisse returned this RN's call.    Pt reports \"I feel great!\" and \"I finally feel like my old self!\". She has no complaints and was very joyful on the phone. She never attempted to use simethicone, but her abd pain has subsided.   "

## 2018-06-12 DIAGNOSIS — C91.10 CLL (CHRONIC LYMPHOCYTIC LEUKEMIA) (HCC): ICD-10-CM

## 2018-06-13 ENCOUNTER — NON-PROVIDER VISIT (OUTPATIENT)
Dept: HEMATOLOGY ONCOLOGY | Facility: MEDICAL CENTER | Age: 78
End: 2018-06-13
Payer: MEDICARE

## 2018-06-13 ENCOUNTER — OFFICE VISIT (OUTPATIENT)
Dept: HEMATOLOGY ONCOLOGY | Facility: MEDICAL CENTER | Age: 78
End: 2018-06-13
Payer: MEDICARE

## 2018-06-13 ENCOUNTER — APPOINTMENT (OUTPATIENT)
Dept: PULMONOLOGY | Facility: HOSPICE | Age: 78
End: 2018-06-13
Payer: MEDICARE

## 2018-06-13 ENCOUNTER — HOSPITAL ENCOUNTER (OUTPATIENT)
Dept: RADIOLOGY | Facility: MEDICAL CENTER | Age: 78
End: 2018-06-13
Attending: INTERNAL MEDICINE
Payer: MEDICARE

## 2018-06-13 ENCOUNTER — HOSPITAL ENCOUNTER (OUTPATIENT)
Facility: MEDICAL CENTER | Age: 78
End: 2018-06-13
Attending: INTERNAL MEDICINE
Payer: MEDICARE

## 2018-06-13 VITALS
SYSTOLIC BLOOD PRESSURE: 145 MMHG | TEMPERATURE: 99.3 F | HEIGHT: 64 IN | HEART RATE: 94 BPM | RESPIRATION RATE: 16 BRPM | BODY MASS INDEX: 35.23 KG/M2 | WEIGHT: 206.35 LBS | DIASTOLIC BLOOD PRESSURE: 71 MMHG | OXYGEN SATURATION: 98 %

## 2018-06-13 VITALS
RESPIRATION RATE: 16 BRPM | OXYGEN SATURATION: 98 % | HEART RATE: 94 BPM | SYSTOLIC BLOOD PRESSURE: 145 MMHG | WEIGHT: 206.35 LBS | DIASTOLIC BLOOD PRESSURE: 71 MMHG | BODY MASS INDEX: 35.23 KG/M2 | HEIGHT: 64 IN | TEMPERATURE: 99.4 F

## 2018-06-13 DIAGNOSIS — R23.2 HOT FLASHES: ICD-10-CM

## 2018-06-13 DIAGNOSIS — C34.11 MALIGNANT NEOPLASM OF RIGHT UPPER LOBE OF LUNG (HCC): ICD-10-CM

## 2018-06-13 DIAGNOSIS — C50.412 MALIGNANT NEOPLASM OF UPPER-OUTER QUADRANT OF LEFT BREAST IN FEMALE, ESTROGEN RECEPTOR NEGATIVE (HCC): ICD-10-CM

## 2018-06-13 DIAGNOSIS — Z17.1 MALIGNANT NEOPLASM OF UPPER-OUTER QUADRANT OF LEFT BREAST IN FEMALE, ESTROGEN RECEPTOR NEGATIVE (HCC): ICD-10-CM

## 2018-06-13 DIAGNOSIS — C91.10 CLL (CHRONIC LYMPHOCYTIC LEUKEMIA) (HCC): ICD-10-CM

## 2018-06-13 LAB
ALBUMIN SERPL BCP-MCNC: 3.9 G/DL (ref 3.2–4.9)
ALBUMIN/GLOB SERPL: 1.5 G/DL
ALP SERPL-CCNC: 59 U/L (ref 30–99)
ALT SERPL-CCNC: 9 U/L (ref 2–50)
ANION GAP SERPL CALC-SCNC: 8 MMOL/L (ref 0–11.9)
AST SERPL-CCNC: 10 U/L (ref 12–45)
BASOPHILS # BLD AUTO: 0.4 % (ref 0–1.8)
BASOPHILS # BLD: 0.04 K/UL (ref 0–0.12)
BILIRUB SERPL-MCNC: 0.3 MG/DL (ref 0.1–1.5)
BUN SERPL-MCNC: 9 MG/DL (ref 8–22)
CALCIUM SERPL-MCNC: 8.3 MG/DL (ref 8.5–10.5)
CHLORIDE SERPL-SCNC: 106 MMOL/L (ref 96–112)
CO2 SERPL-SCNC: 21 MMOL/L (ref 20–33)
CREAT SERPL-MCNC: 0.65 MG/DL (ref 0.5–1.4)
EOSINOPHIL # BLD AUTO: 0.08 K/UL (ref 0–0.51)
EOSINOPHIL NFR BLD: 0.9 % (ref 0–6.9)
ERYTHROCYTE [DISTWIDTH] IN BLOOD BY AUTOMATED COUNT: 61.8 FL (ref 35.9–50)
GLOBULIN SER CALC-MCNC: 2.6 G/DL (ref 1.9–3.5)
GLUCOSE SERPL-MCNC: 100 MG/DL (ref 65–99)
HCT VFR BLD AUTO: 24.9 % (ref 37–47)
HGB BLD-MCNC: 8.1 G/DL (ref 12–16)
IMM GRANULOCYTES # BLD AUTO: 0.34 K/UL (ref 0–0.11)
IMM GRANULOCYTES NFR BLD AUTO: 3.8 % (ref 0–0.9)
LYMPHOCYTES # BLD AUTO: 1.68 K/UL (ref 1–4.8)
LYMPHOCYTES NFR BLD: 18.8 % (ref 22–41)
MCH RBC QN AUTO: 31.3 PG (ref 27–33)
MCHC RBC AUTO-ENTMCNC: 32.5 G/DL (ref 33.6–35)
MCV RBC AUTO: 96.1 FL (ref 81.4–97.8)
MONOCYTES # BLD AUTO: 0.54 K/UL (ref 0–0.85)
MONOCYTES NFR BLD AUTO: 6 % (ref 0–13.4)
NEUTROPHILS # BLD AUTO: 6.25 K/UL (ref 2–7.15)
NEUTROPHILS NFR BLD: 70.1 % (ref 44–72)
NRBC # BLD AUTO: 0 K/UL
NRBC BLD-RTO: 0 /100 WBC
PLATELET # BLD AUTO: 317 K/UL (ref 164–446)
PMV BLD AUTO: 9.8 FL (ref 9–12.9)
POTASSIUM SERPL-SCNC: 3.6 MMOL/L (ref 3.6–5.5)
PROT SERPL-MCNC: 6.5 G/DL (ref 6–8.2)
RBC # BLD AUTO: 2.59 M/UL (ref 4.2–5.4)
SODIUM SERPL-SCNC: 135 MMOL/L (ref 135–145)
WBC # BLD AUTO: 8.9 K/UL (ref 4.8–10.8)

## 2018-06-13 PROCEDURE — 99214 OFFICE O/P EST MOD 30 MIN: CPT | Performed by: INTERNAL MEDICINE

## 2018-06-13 PROCEDURE — 80053 COMPREHEN METABOLIC PANEL: CPT

## 2018-06-13 PROCEDURE — 85025 COMPLETE CBC W/AUTO DIFF WBC: CPT

## 2018-06-13 PROCEDURE — 71046 X-RAY EXAM CHEST 2 VIEWS: CPT

## 2018-06-13 RX ORDER — LEVOFLOXACIN 500 MG/1
500 TABLET, FILM COATED ORAL DAILY
Qty: 7 TAB | Refills: 0 | Status: SHIPPED | OUTPATIENT
Start: 2018-06-13 | End: 2018-06-20

## 2018-06-13 RX ORDER — VENLAFAXINE HYDROCHLORIDE 37.5 MG/1
37.5 CAPSULE, EXTENDED RELEASE ORAL DAILY
Qty: 30 CAP | Refills: 0 | Status: SHIPPED | OUTPATIENT
Start: 2018-06-13 | End: 2018-07-09 | Stop reason: SDUPTHER

## 2018-06-13 ASSESSMENT — PAIN SCALES - GENERAL
PAINLEVEL: 7=MODERATE-SEVERE PAIN
PAINLEVEL: 7=MODERATE-SEVERE PAIN

## 2018-06-13 NOTE — PROGRESS NOTES
Consult Note: Oncology    Date of consultation: 6/13/2018      Referring provider: Esperanza Crandall M.D.    Reason for consultation:   CC: Breast cancer    History of presenting illness:  Breast cancer  -August 2017 patient had a normal screening mammogram  -January 30, 2018. Echocardiogram shows normal systolic function. Ejection fraction of 65%  -February 2018 patient self palpated a lump in the left breast  -February 8, 2018. Diagnostic mammogram positive for suspicious mass in the left breast upper-outer quadrant.  -February 9, 2018. Needle biopsy shows triple negative infiltrating ductal carcinoma. Ki-67 is 30-50%  -February 19, 2018. PET CT scan. . Hypermetabolic 1.7 cm mass in the left breast, in keeping with known malignancy.2. Multiple hypermetabolic remi metastases in the left axilla.3. Nonenlarged lymph nodes in the left supraclavicular and retroclavicular region with mild uptake, suspicious for early metastasis.4. Mild uptake in the inferior endplate of T6 is nonspecific but could relate to degenerative change. Attention on follow-up exam is recommended  -March 3, 2018. MRI of the thoracic spine.1.  No evidence of metastatic disease the thoracic spine. Specifically, there is no suspicious lesion seen at T6.  -March 1, 2018. Cycle 1 day 1 of Adriamycin and cyclophosphamide.  -March 22, 2018. Plan for cycle 2 day 1 of Adriamycin and cyclophosphamide. April 19, 2018. Cycle 4 of Adriamycin and cyclophosphamide  -May 2, 2018. Cycle 1 of weekly paclitaxel  -May 16, 2018. Week 3 of paclitaxel   -June 6, 2018. Cycle 6 of paclitaxel  -June 13, 2018. Cycle 7 of weekly paclitaxel planned    Adenocarcinoma in situ   Dec 18, 2015 .Right upper lobe lung nodule, 6 cores: Adenocarcinoma-in-situ in fibroelastotic scar tissue, with foci suspicious for invasive adenocarcinoma.  Feb 1, 2016. Right thoracoscopy with anterior segmentectomy of the right upper lobe and removal of a portion of the right  middle lobe.    Fibroadenoma of right breast  Nov 12, 2014  Stereotactic biopsy: Benign fibrofatty breast tissue with discrete nodules of hyalinized fibroadenoma with microcalcifications. No atypia or malignancy.    CLL  2006 diagnosed with CLL in [place] after being admitted to the hospital for an infection and found to have leucocytosis.  Patient followed by Dr. Foster until 2014 until her insurance changed. Has been followed by her PCP since.       Clarisse Reeves  is a 76 y.o. year old female who presents to \[facility]\"" care. She denies any acute complaints at this time except for some chronic fatigue which has been worse for the past past 3-4 months. She states she had some drenching night sweats last night but none in the past 6 months. She has also lost about 27 lbs in the past 3-4 months or so, but she has been trying to watch her diet and lose weight.    She was started on Celexa for depression last year(2016) after she lost her daughter who 51 to massive MI.      Interval History:  Initially seen in our clinic on 7/19/2017   Clarisse Reeves is a 77 y.o. Female who is seen in clinic for triple negative breast cancer. She is currently undergoing neoadjuvant chemotherapy. Patient states he is not feeling well. States he is fatigued and has been having a cough. She reports a temperature 101°F measured at 4:30 AM. She is here for prechemotherapy visit  She has been complaining of some abdominal pain which seems a bit better    Breast cancer  Location, left breast upper outer quadrant  Severity, stage IIIc  Timing, constant  Modifying factors,no   Quality, ductal  Duration, diagnosed February 9, 2018  Context, discovered on workup for palpable lump  Associated factors, palpable lump in the left breast      Review of Systems:  All other review of systems are negative except what was mentioned above in the HPI.    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  Denies symptoms.   • Cutaneous skin tags 1/29/2015   • DM (diabetes mellitus) (CMS-HCC) (HCC)    • Hiatus hernia syndrome     no surgery   • Hypertension     \"In the past\"   • Indigestion    • MEDICAL HOME    • Personal history of colonic polyps 11/26/2012   • Pneumonia 2014   • Pneumonia 06/2017   • Thyroid disease    • Type II or unspecified type diabetes mellitus without mention of complication, not stated as uncontrolled     pre-diabetic       Past surgical history:    Past Surgical History:   Procedure Laterality Date   • THYROIDECTOMY N/A 11/29/2017    Procedure: THYROIDECTOMY COMPLETION, RECURRENT LARYNGEAL NERVE MONITORING;  Surgeon: Cameron Marcelino M.D.;  Location: SURGERY SAME DAY [facility];  Service: General   • THORACOSCOPY Right 2/1/2016    Procedure: THORACOSCOPY Upper Lobectomy ;  Surgeon: John H Ganser, M.D.;  Location: SURGERY [facility];  Service:    • NODE DISSECTION Right 2/1/2016    Procedure: NODE DISSECTION;  Surgeon: John H Ganser, M.D.;  Location: SURGERY [facility];  Service:    • RECOVERY  12/18/2015    Procedure: CT-SCP-RUL LUNG BIOPSY-;  Surgeon: [facility];  Location: SURGERY PRE-POST PROC UNIT [facility];  Service:    • OTHER  2001    Lower Left segment of lung removed    • CHOLECYSTECTOMY  1995   • OTHER ORTHOPEDIC SURGERY  1990    bakers cyst removed in right knee, multiple scopes   • OTHER  1990    hemmroid removal   • OTHER  1984    left Thyroid removed, might remove right side in the future   • APPENDECTOMY  1955   • CATARACT EXTRACTION WITH IOL     • TONSILLECTOMY     • US-NEEDLE CORE BX-BREAST PANEL     • VAGINAL HYSTERECTOMY TOTAL      Hysterectomy,Total Vaginal       Allergies:  Codeine; Lipitor; Lovastatin; and " Zocor    Medications:    Current Outpatient Prescriptions   Medication Sig Dispense Refill   • venlafaxine XR (EFFEXOR XR) 37.5 MG CAPSULE SR 24 HR Take 1 Cap by mouth every day. 30 Cap 0   • cyclobenzaprine (FLEXERIL) 5 MG tablet Take 1-2 Tabs by mouth 2 times a day as needed. 30 Tab 0   • nystatin (MYCOSTATIN) powder Clean and pat dry affected area- apply to affected area twice daily and as needed 15 g 1   • nystatin (MYCOSTATIN) 124985 UNIT/GM Cream topical cream Clean and pat dry affected area - apply thin layer of cream twice daily (cont. until 1-2 after symptoms subside) 15 g 1   • ALEX CONTOUR NEXT TEST strip      • lidocaine viscous 2% (XYLOCAINE) 2 % Solution      • dexamethasone (DECADRON) 4 MG Tab Take 2 Tabs by mouth every day. Take 8 mg (2 tabs) on day 2 or 3 after chemotherapy. Do not take day of Neulasta. 4 Tab 0   • MICROLET LANCETS Misc For BG check once a day 100 Each 3   • Blood Glucose Monitoring Suppl Supplies Misc Contour Next glucometer strips for blood sugar check once a day 100 Each 2   • ondansetron (ZOFRAN) 4 MG Tab tablet Take 1 Tab by mouth every four hours as needed for Nausea/Vomiting (for nausea, vomiting). 30 Tab 6   • levothyroxine (SYNTHROID) 150 MCG Tab Take 1 Tab by mouth Every morning on an empty stomach. 90 Tab 4   • prochlorperazine (COMPAZINE) 10 MG Tab Take 1 Tab by mouth every 6 hours as needed (nausea, vomiting, migraine). 30 Tab 0   • glipiZIDE (GLUCOTROL) 5 MG Tab Take 1 Tab by mouth every day. 90 Tab 4   • fenofibrate (TRIGLIDE) 160 MG tablet Take 1 Tab by mouth every day. 90 Tab 4   • citalopram (CELEXA) 20 MG Tab Take 1 Tab by mouth every day. 90 Tab 4   • aspirin (ASA) 81 MG Chew Tab chewable tablet Take 1 Tab by mouth every day. 100 Tab 11     No current facility-administered medications for this visit.        Social History:     Social History     Social History   • Marital status: Single     Spouse name: N/A   • Number of children: N/A   • Years of education: N/A  "    Occupational History   • 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     Social History Main Topics   • Smoking status: Former Smoker     Packs/day: 2.00     Years: 50.00     Types: Cigarettes     Quit date: 5/6/2006   • Smokeless tobacco: Never Used      Comment: quit smoking 2002   • Alcohol use 0.0 oz/week      Comment: ocassionaly   • Drug use: No   • Sexual activity: Not Currently     Partners: Male     Other Topics Concern   • Not on file     Social History Narrative   • No narrative on file       Family History:     Family History   Problem Relation Age of Onset   • Cancer Mother    • Lung Disease Father    • Cancer Father            Physical Exam:  Vitals:   /71   Pulse 94   Temp 37.4 °C (99.3 °F)   Resp 16   Ht 1.626 m (5' 4.02\")   Wt 93.6 kg (206 lb 5.6 oz)   SpO2 98%   Breastfeeding? No   BMI 35.40 kg/m²     General: Not in acute distress, alert and oriented x 3  HEENT: No pallor, icterus. Oropharynx clear.   Neck: Supple, no palpable masses.  Lymph nodes: No palpable cervical, supraclavicular, axillary or inguinal lymphadenopathy.    CVS: regular rate and rhythm, no rubs or gallops  RESP: Clear to auscultate bilaterally, no wheezing or crackles.   ABD: Soft, RUQ tender to deep palpation, non distended, positive bowel sounds, no palpable organomegaly  EXT: No edema or cyanosis  CNS: Alert and oriented x3, No focal deficits.  Skin- No rash  Breast- right breast does not have any obvious distortion. No masses palpable no lymphadenopathy. Left breast has visible bruising at the 2:00 position at the site of needle biopsy-tender to palpation in the upper outer quadrant. Questionable lymph nodes palpable in the axilla, swelling because of biopsy noted      Labs:   Results for GREG DEMARCO (MRN [id number]) as of 6/13/2018 12:18   6/13/2018 09:54   WBC 8.9   RBC 2.59 (L)   Hemoglobin 8.1 (L)   Hematocrit 24.9 (L)   MCV 96.1   MCH 31.3   MCHC 32.5 (L)   RDW 61.8 (H)   Platelet Count 317   MPV 9.8 "   Neutrophils-Polys 70.10   Neutrophils (Absolute) 6.25   Lymphocytes 18.80 (L)   Lymphs (Absolute) 1.68   Monocytes 6.00   Monos (Absolute) 0.54   Eosinophils 0.90   Eos (Absolute) 0.08   Basophils 0.40   Baso (Absolute) 0.04   Immature Granulocytes 3.80 (H)   Immature Granulocytes (abs) 0.34 (H)   Nucleated RBC 0.00   NRBC (Absolute) 0.00   Sodium 135   Potassium 3.6   Chloride 106   Co2 21   Anion Gap 8.0   Glucose 100 (H)   Bun 9   Creatinine 0.65   GFR If  >60   GFR If Non African American >60   Calcium 8.3 (L)   AST(SGOT) 10 (L)   ALT(SGPT) 9   Alkaline Phosphatase 59   Total Bilirubin 0.3   Albumin 3.9   Total Protein 6.5   Globulin 2.6   A-G Ratio 1.5           Imaging  March 3, 2018. MRI of the thoracic spine  1.  No evidence of metastatic disease the thoracic spine. Specifically, there is no suspicious lesion seen at T6.  2.  Mild thoracic spondylosis without high-grade impingement on the neural axis  3.  Hiatal hernia    February 19, 2018. PET CT scan  1. Hypermetabolic 1.7 cm mass in the left breast, in keeping with known malignancy.  2. Multiple hypermetabolic remi metastases in the left axilla.  3. Nonenlarged lymph nodes in the left supraclavicular and retroclavicular region with mild uptake, suspicious for early metastasis.  4. Mild uptake in the inferior endplate of T6 is nonspecific but could relate to degenerative change. Attention on follow-up exam is recommended.    Assessment and Plan:  -Breast cancer  -Left side   -Upper outer quadrant  -Invasive ductal  -Stage IIIc [cT2, cN3c, M0]. ER/AK negative HER-2 negative  -Histologic grade 3  -PET/CT scan from February 19, 2018 was reviewed. Left supraclavicular and retroclavicular lymph nodes nonenlarged but mild uptake suspicious for early metastasis.  -May be a candidate for adjuvant Xeloda based on The Capecitabine for Residual Cancer as Adjuvant Therapy (CREATE-X) trial   3/21/2018  -MRI of the spine March 3, no suspicious lesion  seen at T6.  -March 1, 2018. Cycle 1 day 1 of Adriamycin and cyclophosphamide. Patient needed to be admitted to the hospital for neutropenic fever.  -March 22, 2018. Plan for cycle 2 day 1 of Adriamycin and cyclophosphamide. Will plan for 20% dose reduction because of patient's severe fatigue and prolonged anemia.  6/13/2018   -Hold chemotherapy in the morning  -We'll check a chest x-ray, if positive we will call in some antibiotics  -Nurse will call to check in in a couple of days to make sure she is getting better. If not she will need to be admitted to the hospital  -We will attempt chemotherapy again next week. If patient is not tolerating it well. May need to stop and proceed to surgery.    -Anemia  -Stable, no further workup  -Secondary to chemotherapy  -Worse today    -CLL  -Established, stable, chronic  -Coon stage 0 with lymphocytosis only   -She is currently under active observation  -PET/CT scan February 2018 did not show evidence of lymph node disease    -Fibroadenoma of breast  -Right breast  -Stereotactic biopsy Nov 12, 2014  Showed benign fibrofatty breast tissue with discrete nodules of hyalinized fibroadenoma with microcalcifications.No atypia or malignancy.    - Adenocarcinoma in situ   - Diagnosed Dec 18, 2015 in the right upper lobe lung. biopsy of  nodule, 6 showed Adenocarcinoma-in-situ in fibroelastotic scar tissue, with foci suspicious for invasive adenocarcinoma.  - Feb 1, 2016. Right thoracoscopy with anterior segmentectomy of the right upper lobe and removal of a portion of the right middle lobe.Pathology report states The adenocarcinoma in situ focally demonstrates areas of central fibrosis which surrounds the adenocarcinoma in situ glands, which makes evaluation for invasion difficult. However, there are a few scattered clusters of malignant cells and small foci suspicious for invasive adenocarcinoma  - PET/CT scan February 2018 did not show any evidence of disease          She agreed and  verbalized her agreement and understanding with the current plan.  I answered all questions and concerns she has at this time.   Dear  Esperanza Crandall M.D.., Thank you very much for allowing me to see  Clarisse Khan Reeves today .     Please note that this dictation was created using voice recognition software. I have made every reasonable attempt to correct obvious errors, but I expect that there are errors of grammar and possibly content that I did not discover before finalizing the note.      SIGNATURES:  Denise Barroso    CC:  BETSY Guerra Michael D, M.D. Wright, Sharon, M.D.

## 2018-06-13 NOTE — PROGRESS NOTES
"On Saturday the patient states she woke up and she \"hurt all over her body\"  She states her fingers were swollen and she could only get one of her rings off and she had an outbreak of a rash on her hands.  Rash is red and slightly raised.  She states the rash is uncomfortable.  She states that her chest has been \"hurting really bad and almost feels like when she had pneumonia.\" She rates the pain 7/10 \"especially when she coughs.\" Lungs are clear to auscultation. Pt reports nasal drainage that is sometimes clear, sometimes yellow and bloody.  She has productive cough with sputum that is sometimes yellow and sometimes clear.   Pt reports fever at 4:30am \"that was about 101F.\"   Per Dr. Barroso chest xray ordered to rule out pneumonia.      Labs drawn from right upper arm double lumen PICC.  Both lumens (+) blood return.  Flushed both lumens with 20ml NS with IV connectors changed. Performed PICC line dressing change per protocol.     Pt released ambulatory from clinic in NAD.              "

## 2018-06-13 NOTE — PROGRESS NOTES
After conversation last week patient misunderstood the plan. She ended up stopping her Celexa completely times the last week. After further conversation with Dr. Barroso he would like her to start on Effexor. I have sent in Effexor 37.5 mg by mouth daily and to her pharmacy for her to start. Hopefully this will help with her depression as well as give her relief from her hot flashes. We will continue to monitor and follow closely.

## 2018-06-14 ENCOUNTER — APPOINTMENT (OUTPATIENT)
Dept: ONCOLOGY | Facility: MEDICAL CENTER | Age: 78
End: 2018-06-14
Attending: INTERNAL MEDICINE
Payer: MEDICARE

## 2018-06-14 ENCOUNTER — TELEPHONE (OUTPATIENT)
Dept: HEMATOLOGY ONCOLOGY | Facility: MEDICAL CENTER | Age: 78
End: 2018-06-14

## 2018-06-14 NOTE — TELEPHONE ENCOUNTER
"Called patient to follow up with her.  Patient states she did  effexor and Levaquin at her pharmacy yesterday and started taking them.  She states she is fatigued and continues to \"feel like its difficult to take a deep breath due to the pneumonia.\"  She states she is resting now, however she is able to perform activities of daily living.  Patient is aware to present to the ED or call 911 if she experiences increased shortness of breath or difficulty breathing.  RN will follow up with patient tomorrow.    "

## 2018-06-19 DIAGNOSIS — C50.412 MALIGNANT NEOPLASM OF UPPER-OUTER QUADRANT OF LEFT BREAST IN FEMALE, ESTROGEN RECEPTOR NEGATIVE (HCC): ICD-10-CM

## 2018-06-19 DIAGNOSIS — Z17.1 MALIGNANT NEOPLASM OF UPPER-OUTER QUADRANT OF LEFT BREAST IN FEMALE, ESTROGEN RECEPTOR NEGATIVE (HCC): ICD-10-CM

## 2018-06-20 ENCOUNTER — HOSPITAL ENCOUNTER (OUTPATIENT)
Facility: MEDICAL CENTER | Age: 78
End: 2018-06-20
Attending: NURSE PRACTITIONER
Payer: MEDICARE

## 2018-06-20 ENCOUNTER — NON-PROVIDER VISIT (OUTPATIENT)
Dept: HEMATOLOGY ONCOLOGY | Facility: MEDICAL CENTER | Age: 78
End: 2018-06-20
Payer: MEDICARE

## 2018-06-20 ENCOUNTER — OFFICE VISIT (OUTPATIENT)
Dept: HEMATOLOGY ONCOLOGY | Facility: MEDICAL CENTER | Age: 78
End: 2018-06-20
Payer: MEDICARE

## 2018-06-20 VITALS
BODY MASS INDEX: 32.62 KG/M2 | OXYGEN SATURATION: 91 % | TEMPERATURE: 98.7 F | HEART RATE: 105 BPM | HEIGHT: 66 IN | SYSTOLIC BLOOD PRESSURE: 128 MMHG | DIASTOLIC BLOOD PRESSURE: 66 MMHG | WEIGHT: 203 LBS | RESPIRATION RATE: 18 BRPM

## 2018-06-20 VITALS
TEMPERATURE: 98.7 F | HEIGHT: 66 IN | BODY MASS INDEX: 32.63 KG/M2 | DIASTOLIC BLOOD PRESSURE: 66 MMHG | SYSTOLIC BLOOD PRESSURE: 128 MMHG | WEIGHT: 203.04 LBS | RESPIRATION RATE: 18 BRPM | OXYGEN SATURATION: 91 % | HEART RATE: 105 BPM

## 2018-06-20 DIAGNOSIS — C50.412 MALIGNANT NEOPLASM OF UPPER-OUTER QUADRANT OF LEFT BREAST IN FEMALE, ESTROGEN RECEPTOR NEGATIVE (HCC): ICD-10-CM

## 2018-06-20 DIAGNOSIS — Z17.1 MALIGNANT NEOPLASM OF UPPER-OUTER QUADRANT OF LEFT BREAST IN FEMALE, ESTROGEN RECEPTOR NEGATIVE (HCC): ICD-10-CM

## 2018-06-20 DIAGNOSIS — C91.10 CLL (CHRONIC LYMPHOCYTIC LEUKEMIA) (HCC): ICD-10-CM

## 2018-06-20 DIAGNOSIS — R23.2 HOT FLASHES: ICD-10-CM

## 2018-06-20 LAB
ALBUMIN SERPL BCP-MCNC: 3.9 G/DL (ref 3.2–4.9)
ALBUMIN/GLOB SERPL: 1.7 G/DL
ALP SERPL-CCNC: 69 U/L (ref 30–99)
ALT SERPL-CCNC: 9 U/L (ref 2–50)
ANION GAP SERPL CALC-SCNC: 10 MMOL/L (ref 0–11.9)
AST SERPL-CCNC: 9 U/L (ref 12–45)
BASOPHILS # BLD AUTO: 0.7 % (ref 0–1.8)
BASOPHILS # BLD: 0.07 K/UL (ref 0–0.12)
BILIRUB SERPL-MCNC: 0.4 MG/DL (ref 0.1–1.5)
BUN SERPL-MCNC: 14 MG/DL (ref 8–22)
CALCIUM SERPL-MCNC: 8.7 MG/DL (ref 8.5–10.5)
CHLORIDE SERPL-SCNC: 100 MMOL/L (ref 96–112)
CO2 SERPL-SCNC: 25 MMOL/L (ref 20–33)
CREAT SERPL-MCNC: 0.75 MG/DL (ref 0.5–1.4)
EOSINOPHIL # BLD AUTO: 0.18 K/UL (ref 0–0.51)
EOSINOPHIL NFR BLD: 1.8 % (ref 0–6.9)
ERYTHROCYTE [DISTWIDTH] IN BLOOD BY AUTOMATED COUNT: 62.4 FL (ref 35.9–50)
GLOBULIN SER CALC-MCNC: 2.3 G/DL (ref 1.9–3.5)
GLUCOSE SERPL-MCNC: 134 MG/DL (ref 65–99)
HCT VFR BLD AUTO: 28 % (ref 37–47)
HGB BLD-MCNC: 8.7 G/DL (ref 12–16)
IMM GRANULOCYTES # BLD AUTO: 0.21 K/UL (ref 0–0.11)
IMM GRANULOCYTES NFR BLD AUTO: 2.1 % (ref 0–0.9)
LYMPHOCYTES # BLD AUTO: 1.73 K/UL (ref 1–4.8)
LYMPHOCYTES NFR BLD: 17.5 % (ref 22–41)
MCH RBC QN AUTO: 29.7 PG (ref 27–33)
MCHC RBC AUTO-ENTMCNC: 31.1 G/DL (ref 33.6–35)
MCV RBC AUTO: 95.6 FL (ref 81.4–97.8)
MONOCYTES # BLD AUTO: 0.91 K/UL (ref 0–0.85)
MONOCYTES NFR BLD AUTO: 9.2 % (ref 0–13.4)
NEUTROPHILS # BLD AUTO: 6.8 K/UL (ref 2–7.15)
NEUTROPHILS NFR BLD: 68.7 % (ref 44–72)
NRBC # BLD AUTO: 0 K/UL
NRBC BLD-RTO: 0 /100 WBC
PLATELET # BLD AUTO: 370 K/UL (ref 164–446)
PMV BLD AUTO: 9.2 FL (ref 9–12.9)
POTASSIUM SERPL-SCNC: 3.7 MMOL/L (ref 3.6–5.5)
PROT SERPL-MCNC: 6.2 G/DL (ref 6–8.2)
RBC # BLD AUTO: 2.93 M/UL (ref 4.2–5.4)
SODIUM SERPL-SCNC: 135 MMOL/L (ref 135–145)
WBC # BLD AUTO: 9.9 K/UL (ref 4.8–10.8)

## 2018-06-20 PROCEDURE — 99213 OFFICE O/P EST LOW 20 MIN: CPT | Performed by: NURSE PRACTITIONER

## 2018-06-20 PROCEDURE — 85025 COMPLETE CBC W/AUTO DIFF WBC: CPT

## 2018-06-20 PROCEDURE — 80053 COMPREHEN METABOLIC PANEL: CPT

## 2018-06-20 ASSESSMENT — ENCOUNTER SYMPTOMS
SHORTNESS OF BREATH: 1
DIAPHORESIS: 1
NAUSEA: 1
VOMITING: 0
CHILLS: 0
MYALGIAS: 0
WEIGHT LOSS: 1
DIZZINESS: 1
SPUTUM PRODUCTION: 1
WHEEZING: 1
FEVER: 0
INSOMNIA: 0
COUGH: 1
HEADACHES: 1
CONSTIPATION: 0
DIARRHEA: 1
HEMOPTYSIS: 0
PALPITATIONS: 1

## 2018-06-20 ASSESSMENT — PAIN SCALES - GENERAL
PAINLEVEL: NO PAIN
PAINLEVEL: NO PAIN

## 2018-06-20 NOTE — PROGRESS NOTES
"Pt ambulatory to Medical Oncology for PICC labs and dressing change. Dual lumens with positive blood return. Purple lumen more sluggish. PICC secured at 7 cm. PICC dressing changed completed per protocol. Pt is feeling more fatigued than normal and reports sleeping all weekend and feeling as if she has \"no life\". Emotional support provided. Pt states that one of her great friends is a licensed therapist and they talk everyday. Pt also reports having many friends in the apartment complex she lives in that she meets with on a regular basis. IRENA Nix updated and will meet with patient for her prechemo appointment. CBC and CMP still processing at this time.      1215- Verbal orders received from IRENA Nix to remove PICC line. Upon PICC insertion, total length noted at 43 cm. Pt placed supine for PICC removal per protocol. PICC removed without difficulty or complication. Sterile gauze and Tegaderm placed over insertion site. Pt monitored for 30 minutes with no bleeding. Educated to place pressure if bleeding occurs. Further educated to leave dressing in place for 24 hours. Pt verbally agreed and was ambulatory out of Medical Oncology in no acute distress.   "

## 2018-06-20 NOTE — PROGRESS NOTES
Subjective:      Clarisse Reeves is a 77 y.o. female who presents with Follow-Up (prechemo (devon)) for breast cancer.        HPI    Patient seen today in follow-up for breast cancer. She presents unaccompanied for today's visit. Patient is scheduled to receive week #7/12 of Taxol today. Treatment was held last week due to infection and treatment with antibiotics.    Patient has not experienced significant relief from last week. She is experiencing extreme fatigue and no energy. She states she sleeps throughout most the day. She is very tired and only gets up to walk her dog goes right back to sleep. She did confer with me today that she is not experiencing any depression or sadness and did also confirm that she does not want to help herself or others. She was recently changed from Celexa to Effexor last week and has been taking the Effexor at 37.5 mg daily. The Effexor has not given her much relief thus far from her hot flashes which are still present.    Patient stated she feels like she is not herself. She notices she is losing her words when trying to speak. She also feels like her equilibrium is off at times. She is eating still but has lost approximate 3 pounds. She does believe she is eating well. She did complete the antibiotic that was given to her by Dr. Barroso last week. Her last dose was yesterday. She is also checking her blood sugars at home which have been ranging around 96 per patient.    She does still have a cough, a few times per day. She is also experiencing some sputum production, shortness of breath, and wheezing at times. She's been also noticing she can feel some heart palpitations at times but denies any chest pain. She did have some intermittent diarrhea. She does also have some nausea which is resolved with antiemetics approximately once per day. She is voiding without difficulty and denies any pain. She also feels like she has a headache at times right in the frontal area of her  head.    I did discuss agents current clinical status with Dr. Barroso and he did come in and assessed the patient today as well.    Allergies   Allergen Reactions   • Codeine Hives and Nausea     RXN=40 years ago   • Lipitor [Atorvastatin Calcium] Myalgia     [id number]     • Lovastatin Myalgia     Muscle aches  [id number]   • Zocor [Simvastatin - High Dose] Myalgia     Severe myalgias  [id number]   • Lisinopril Cough   • Metformin      Diarrhea     • Tape      Current Outpatient Prescriptions on File Prior to Visit   Medication Sig Dispense Refill   • venlafaxine XR (EFFEXOR XR) 37.5 MG CAPSULE SR 24 HR Take 1 Cap by mouth every day. 30 Cap 0   • levoFLOXacin (LEVAQUIN) 500 MG tablet Take 1 Tab by mouth every day for 7 days. 7 Tab 0   • cyclobenzaprine (FLEXERIL) 5 MG tablet Take 1-2 Tabs by mouth 2 times a day as needed. 30 Tab 0   • nystatin (MYCOSTATIN) powder Clean and pat dry affected area- apply to affected area twice daily and as needed 15 g 1   • nystatin (MYCOSTATIN) 357482 UNIT/GM Cream topical cream Clean and pat dry affected area - apply thin layer of cream twice daily (cont. until 1-2 after symptoms subside) 15 g 1   • ALEX CONTOUR NEXT TEST strip      • lidocaine viscous 2% (XYLOCAINE) 2 % Solution      • dexamethasone (DECADRON) 4 MG Tab Take 2 Tabs by mouth every day. Take 8 mg (2 tabs) on day 2 or 3 after chemotherapy. Do not take day of Neulasta. 4 Tab 0   • MICROLET LANCETS [facility] [place] For BG check once a day 100 Each 3   • Blood Glucose Monitoring Suppl Supplies Misc Contour Next glucometer strips for blood sugar check once a day 100 Each 2   • ondansetron (ZOFRAN) 4 MG Tab tablet Take 1 Tab by mouth every four hours as needed for Nausea/Vomiting (for nausea, vomiting). 30 Tab 6   • levothyroxine (SYNTHROID) 150 MCG Tab Take 1 Tab by mouth Every morning on an empty stomach. 90 Tab 4   • prochlorperazine (COMPAZINE) 10 MG Tab Take 1 Tab by mouth every 6 hours as needed (nausea, vomiting, migraine). 30 Tab 0  "  • glipiZIDE (GLUCOTROL) 5 MG Tab Take 1 Tab by mouth every day. 90 Tab 4   • fenofibrate (TRIGLIDE) 160 MG tablet Take 1 Tab by mouth every day. 90 Tab 4   • aspirin (ASA) 81 MG Chew Tab chewable tablet Take 1 Tab by mouth every day. 100 Tab 11     No current facility-administered medications on file prior to visit.        Review of Systems   Constitutional: Positive for diaphoresis, malaise/fatigue and weight loss. Negative for chills and fever (99.8-99.9 - has not reached 100).   Respiratory: Positive for cough (a few times per day - stable from last week), sputum production, shortness of breath and wheezing (sometimes). Negative for hemoptysis.    Cardiovascular: Positive for palpitations (can feel palpitations at times). Negative for chest pain.   Gastrointestinal: Positive for diarrhea (intermittently at times and sometimes she does not get to the bathroom on time) and nausea (resolves with anti-emetics - once a day or so). Negative for constipation and vomiting.   Genitourinary: Negative for dysuria.   Musculoskeletal: Negative for myalgias.   Neurological: Positive for dizziness and headaches (feels like her head is sore frontal left area).   Psychiatric/Behavioral: The patient does not have insomnia.           Objective:     /66   Pulse (!) 105   Temp 37.1 °C (98.7 °F)   Resp 18   Ht 1.676 m (5' 6\")   Wt 92.1 kg (203 lb)   SpO2 91%   BMI 32.77 kg/m²      Physical Exam   Constitutional: She is oriented to person, place, and time. She appears well-developed and well-nourished. No distress.   HENT:   Head: Normocephalic and atraumatic.   Mouth/Throat: Oropharynx is clear and moist. No oropharyngeal exudate.   Cardiovascular: Regular rhythm, normal heart sounds and intact distal pulses.  Exam reveals no gallop and no friction rub.    No murmur heard.  Slightly tachycardic   Pulmonary/Chest: Effort normal and breath sounds normal. No respiratory distress. She has no wheezes.   Abdominal: Soft. " Bowel sounds are normal. She exhibits no distension. There is no tenderness.   Musculoskeletal: Normal range of motion. She exhibits no edema or tenderness.   Neurological: She is alert and oriented to person, place, and time.   Skin: Skin is warm and dry. No rash noted. She is not diaphoretic. No erythema. No pallor.   Psychiatric: She has a normal mood and affect. Her behavior is normal.   Vitals reviewed.      Hospital Outpatient Visit on 06/20/2018   Component Date Value Ref Range Status   • WBC 06/20/2018 9.9  4.8 - 10.8 K/uL Final   • RBC 06/20/2018 2.93* 4.20 - 5.40 M/uL Final   • Hemoglobin 06/20/2018 8.7* 12.0 - 16.0 g/dL Final   • Hematocrit 06/20/2018 28.0* 37.0 - 47.0 % Final   • MCV 06/20/2018 95.6  81.4 - 97.8 fL Final   • MCH 06/20/2018 29.7  27.0 - 33.0 pg Final   • MCHC 06/20/2018 31.1* 33.6 - 35.0 g/dL Final   • RDW 06/20/2018 62.4* 35.9 - 50.0 fL Final   • Platelet Count 06/20/2018 370  164 - 446 K/uL Final   • MPV 06/20/2018 9.2  9.0 - 12.9 fL Final   • Neutrophils-Polys 06/20/2018 68.70  44.00 - 72.00 % Final   • Lymphocytes 06/20/2018 17.50* 22.00 - 41.00 % Final   • Monocytes 06/20/2018 9.20  0.00 - 13.40 % Final   • Eosinophils 06/20/2018 1.80  0.00 - 6.90 % Final   • Basophils 06/20/2018 0.70  0.00 - 1.80 % Final   • Immature Granulocytes 06/20/2018 2.10* 0.00 - 0.90 % Final   • Nucleated RBC 06/20/2018 0.00  /100 WBC Final   • Neutrophils (Absolute) 06/20/2018 6.80  2.00 - 7.15 K/uL Final    Includes immature neutrophils, if present.   • Lymphs (Absolute) 06/20/2018 1.73  1.00 - 4.80 K/uL Final   • Monos (Absolute) 06/20/2018 0.91* 0.00 - 0.85 K/uL Final   • Eos (Absolute) 06/20/2018 0.18  0.00 - 0.51 K/uL Final   • Baso (Absolute) 06/20/2018 0.07  0.00 - 0.12 K/uL Final   • Immature Granulocytes (abs) 06/20/2018 0.21* 0.00 - 0.11 K/uL Final   • NRBC (Absolute) 06/20/2018 0.00  K/uL Final   • Sodium 06/20/2018 135  135 - 145 mmol/L Final   • Potassium 06/20/2018 3.7  3.6 - 5.5 mmol/L Final    • Chloride 06/20/2018 100  96 - 112 mmol/L Final   • Co2 06/20/2018 25  20 - 33 mmol/L Final   • Anion Gap 06/20/2018 10.0  0.0 - 11.9 Final   • Glucose 06/20/2018 134* 65 - 99 mg/dL Final   • Bun 06/20/2018 14  8 - 22 mg/dL Final   • Creatinine 06/20/2018 0.75  0.50 - 1.40 mg/dL Final   • Calcium 06/20/2018 8.7  8.5 - 10.5 mg/dL Final   • AST(SGOT) 06/20/2018 9* 12 - 45 U/L Final   • ALT(SGPT) 06/20/2018 9  2 - 50 U/L Final   • Alkaline Phosphatase 06/20/2018 69  30 - 99 U/L Final   • Total Bilirubin 06/20/2018 0.4  0.1 - 1.5 mg/dL Final   • Albumin 06/20/2018 3.9  3.2 - 4.9 g/dL Final   • Total Protein 06/20/2018 6.2  6.0 - 8.2 g/dL Final   • Globulin 06/20/2018 2.3  1.9 - 3.5 g/dL Final   • A-G Ratio 06/20/2018 1.7  g/dL Final   • GFR If  06/20/2018 >60  >60 mL/min/1.73 m 2 Final   • GFR If Non  06/20/2018 >60  >60 mL/min/1.73 m 2 Final          Assessment/Plan:     1. Malignant neoplasm of upper-outer quadrant of left breast in female, estrogen receptor negative (CMS-HCC)-  1 positive node; dr. koo; dr sharpe-  chemorx 1st, then surgery, then RT     2. Hot flashes       Plan  1. Patient is due to receive cycle #7/12 of weekly Taxol tomorrow. Due to current clinical status and we will continue to hold chemotherapy. I did review her CBC and CMP and was concern for possible anemia, however hemoglobin is 8.7 today. After discussing with Dr. Barroso he would like to discontinue chemotherapy altogether and proceed directly to surgery. Dr. Barroso did speak with her surgeon, Dr. Sharpe and I did discuss with patient to contact her office to get in to be seen to discuss surgery. Patient verbalized understanding and is in agreement with the plan.    2. Patient is also experiencing hot flashes related to her breast cancer and treatment. She is to continue Effexor at the current dose as she is only been on it for one week. Consider to increase dose next week if no improvement.    3.  Since patient is completing with chemotherapy we will go ahead and pull her PICC line today.    4. I will patient return to the clinic next week for follow-up appointment to see if she has any improvement in her current clinical status. She will see Dr. Barroso at that time.

## 2018-06-21 ENCOUNTER — APPOINTMENT (OUTPATIENT)
Dept: ONCOLOGY | Facility: MEDICAL CENTER | Age: 78
End: 2018-06-21
Attending: INTERNAL MEDICINE
Payer: MEDICARE

## 2018-06-22 ENCOUNTER — PATIENT OUTREACH (OUTPATIENT)
Dept: HEALTH INFORMATION MANAGEMENT | Facility: OTHER | Age: 78
End: 2018-06-22

## 2018-06-22 NOTE — PROGRESS NOTES
Outcome: Left Message    Please transfer to Patient Outreach Team at [phone] when patient returns call.    Attempt # 1

## 2018-06-27 ENCOUNTER — OFFICE VISIT (OUTPATIENT)
Dept: HEMATOLOGY ONCOLOGY | Facility: MEDICAL CENTER | Age: 78
End: 2018-06-27
Payer: MEDICARE

## 2018-06-27 ENCOUNTER — APPOINTMENT (OUTPATIENT)
Dept: HEMATOLOGY ONCOLOGY | Facility: MEDICAL CENTER | Age: 78
End: 2018-06-27
Payer: MEDICARE

## 2018-06-27 VITALS
WEIGHT: 201.61 LBS | DIASTOLIC BLOOD PRESSURE: 74 MMHG | BODY MASS INDEX: 32.4 KG/M2 | SYSTOLIC BLOOD PRESSURE: 128 MMHG | RESPIRATION RATE: 18 BRPM | OXYGEN SATURATION: 94 % | HEIGHT: 66 IN | HEART RATE: 114 BPM | TEMPERATURE: 97.3 F

## 2018-06-27 DIAGNOSIS — Z17.1 MALIGNANT NEOPLASM OF UPPER-OUTER QUADRANT OF LEFT BREAST IN FEMALE, ESTROGEN RECEPTOR NEGATIVE (HCC): ICD-10-CM

## 2018-06-27 DIAGNOSIS — C50.412 MALIGNANT NEOPLASM OF UPPER-OUTER QUADRANT OF LEFT BREAST IN FEMALE, ESTROGEN RECEPTOR NEGATIVE (HCC): ICD-10-CM

## 2018-06-27 PROCEDURE — 99213 OFFICE O/P EST LOW 20 MIN: CPT | Performed by: INTERNAL MEDICINE

## 2018-06-27 ASSESSMENT — PAIN SCALES - GENERAL: PAINLEVEL: NO PAIN

## 2018-06-27 NOTE — PROGRESS NOTES
Consult Note: Oncology    Date of consultation: 6/27/2018      Referring provider: Esperanza Crandall M.D.    Reason for consultation:   CC: Breast cancer    History of presenting illness:  Breast cancer  -August 2017 patient had a normal screening mammogram  -January 30, 2018. Echocardiogram shows normal systolic function. Ejection fraction of 65%  -February 2018 patient self palpated a lump in the left breast  -February 8, 2018. Diagnostic mammogram positive for suspicious mass in the left breast upper-outer quadrant.  -February 9, 2018. Needle biopsy shows triple negative infiltrating ductal carcinoma. Ki-67 is 30-50%  -February 19, 2018. PET CT scan. . Hypermetabolic 1.7 cm mass in the left breast, in keeping with known malignancy.2. Multiple hypermetabolic remi metastases in the left axilla.3. Nonenlarged lymph nodes in the left supraclavicular and retroclavicular region with mild uptake, suspicious for early metastasis.4. Mild uptake in the inferior endplate of T6 is nonspecific but could relate to degenerative change. Attention on follow-up exam is recommended  -March 3, 2018. MRI of the thoracic spine.1.  No evidence of metastatic disease the thoracic spine. Specifically, there is no suspicious lesion seen at T6.  -March 1, 2018. Cycle 1 day 1 of Adriamycin and cyclophosphamide.  -March 22, 2018. Plan for cycle 2 day 1 of Adriamycin and cyclophosphamide. April 19, 2018. Cycle 4 of Adriamycin and cyclophosphamide  -May 2, 2018. Cycle 1 of weekly paclitaxel  -May 16, 2018. Week 3 of paclitaxel   -June 6, 2018. Cycle 6 of paclitaxel  -June 13, 2018. Cycle 7 of weekly paclitaxel deferred secondary to severe fatigue    Adenocarcinoma in situ   Dec 18, 2015 .Right upper lobe lung nodule, 6 cores: Adenocarcinoma-in-situ in fibroelastotic scar tissue, with foci suspicious for invasive adenocarcinoma.  Feb 1, 2016. Right thoracoscopy with anterior segmentectomy of the right upper lobe and removal of a portion of the  right middle lobe.    Fibroadenoma of right breast  Nov 12, 2014  Stereotactic biopsy: Benign fibrofatty breast tissue with discrete nodules of hyalinized fibroadenoma with microcalcifications. No atypia or malignancy.    CLL  2006 diagnosed with CLL in [place] after being admitted to the hospital for an infection and found to have leucocytosis.  Patient followed by Dr. Foster until 2014 until her insurance changed. Has been followed by her PCP since.       Clarisse Reeves  is a 76 y.o. year old female who presents to [facility]. She denies any acute complaints at this time except for some chronic fatigue which has been worse for the past past 3-4 months. She states she had some drenching night sweats last night but none in the past 6 months. She has also lost about 27 lbs in the past 3-4 months or so, but she has been trying to watch her diet and lose weight.    She was started on Celexa for depression last year(2016) after she lost her daughter who 51 to massive MI.      Interval History:  Initially seen in our clinic on 7/19/2017   Clarisse Reeves is a 77 y.o. Female who is seen in clinic for triple negative breast cancer. She is currently undergoing neoadjuvant chemotherapy. Patient states he is not feeling well. States he is fatigued and has been having a cough. She reports a temperature 101°F measured at 4:30 AM. She is here for prechemotherapy visit  She has been complaining of some abdominal pain which seems a bit better    Interval History: 6/27/2018  Clarisse Reeves is a 77 y.o. female seen in clinic today for follow up of severe fatigue malaise secondary to chemotherapy. The patient states she feels great and is going gambling today.she feels her energy and appetite have returned.      Breast cancer  Location, left breast upper outer quadrant  Severity, stage IIIc  Timing, constant  Modifying factors,no   Quality, ductal  Duration, diagnosed February 9, 2018  Context, discovered on workup  "for palpable lump  Associated factors, palpable lump in the left breast      Review of Systems:  All other review of systems are negative except what was mentioned above in the HPI.    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  Denies symptoms.   • Cutaneous skin tags 1/29/2015   • DM (diabetes mellitus) (CMS-HCC) (HCC)    • Hiatus hernia syndrome     no surgery   • Hypertension     \"In the past\"   • Indigestion    • MEDICAL HOME    • Personal history of colonic polyps 11/26/2012   • Pneumonia 2014   • Pneumonia 06/2017   • Thyroid disease    • Type II or unspecified type diabetes mellitus without mention of complication, not stated as uncontrolled     pre-diabetic       Past surgical history:    Past Surgical History:   Procedure Laterality Date   • THYROIDECTOMY N/A 11/29/2017    Procedure: THYROIDECTOMY COMPLETION, RECURRENT LARYNGEAL NERVE MONITORING;  Surgeon: Cameron Marcelino M.D.;  Location: SURGERY SAME DAY [facility];  Service: General   • THORACOSCOPY Right 2/1/2016    Procedure: THORACOSCOPY Upper Lobectomy ;  Surgeon: John H Ganser, M.D.;  Location: SURGERY [facility];  Service:    • NODE DISSECTION Right 2/1/2016    Procedure: NODE DISSECTION;  Surgeon: John H Ganser, M.D.;  Location: SURGERY [facility];  Service:    • RECOVERY  12/18/2015    Procedure: CT-SCP-RUL LUNG BIOPSY-;  Surgeon: Recoveryonly Surgery;  [place] [facility] [place]  Service:    • OTHER  2001    Lower Left segment of lung removed    • CHOLECYSTECTOMY  1995   • OTHER ORTHOPEDIC SURGERY  1990    bakers cyst removed in right knee, multiple scopes   • OTHER  1990    hemmroid removal   • OTHER  1984    left " Thyroid removed, might remove right side in the future   • APPENDECTOMY  1955   • CATARACT EXTRACTION WITH IOL     • TONSILLECTOMY     • US-NEEDLE CORE BX-BREAST PANEL     • VAGINAL HYSTERECTOMY TOTAL      Hysterectomy,Total Vaginal       Allergies:  Codeine; Lipitor; Lovastatin; and Zocor    Medications:    Current Outpatient Prescriptions   Medication Sig Dispense Refill   • MICROLET LANCETS Misc For BG check once a day 100 Each 3   • Blood Glucose Monitoring Suppl Supplies Misc Contour Next glucometer strips for blood sugar check once a day 100 Each 2   • levothyroxine (SYNTHROID) 150 MCG Tab Take 1 Tab by mouth Every morning on an empty stomach. 90 Tab 4   • aspirin (ASA) 81 MG Chew Tab chewable tablet Take 1 Tab by mouth every day. 100 Tab 11   • venlafaxine XR (EFFEXOR XR) 37.5 MG CAPSULE SR 24 HR Take 1 Cap by mouth every day. 30 Cap 0   • cyclobenzaprine (FLEXERIL) 5 MG tablet Take 1-2 Tabs by mouth 2 times a day as needed. 30 Tab 0   • nystatin (MYCOSTATIN) powder Clean and pat dry affected area- apply to affected area twice daily and as needed 15 g 1   • nystatin (MYCOSTATIN) 805401 UNIT/GM Cream topical cream Clean and pat dry affected area - apply thin layer of cream twice daily (cont. until 1-2 after symptoms subside) 15 g 1   • ALEX CONTOUR NEXT TEST strip      • lidocaine viscous 2% (XYLOCAINE) 2 % Solution      • dexamethasone (DECADRON) 4 MG Tab Take 2 Tabs by mouth every day. Take 8 mg (2 tabs) on day 2 or 3 after chemotherapy. Do not take day of Neulasta. 4 Tab 0   • ondansetron (ZOFRAN) 4 MG Tab tablet Take 1 Tab by mouth every four hours as needed for Nausea/Vomiting (for nausea, vomiting). 30 Tab 6   • prochlorperazine (COMPAZINE) 10 MG Tab Take 1 Tab by mouth every 6 hours as needed (nausea, vomiting, migraine). 30 Tab 0   • glipiZIDE (GLUCOTROL) 5 MG Tab Take 1 Tab by mouth every day. 90 Tab 4   • fenofibrate (TRIGLIDE) 160 MG tablet Take 1 Tab by mouth every day. 90 Tab 4     No current  "facility-administered medications for this visit.        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     Social History Main Topics   • Smoking status: Former Smoker     Packs/day: 2.00     Years: 50.00     Types: Cigarettes     Quit date: 5/6/2006   • Smokeless tobacco: Never Used      Comment: quit smoking 2002   • Alcohol use 0.0 oz/week      Comment: ocassionaly   • Drug use: No   • Sexual activity: Not Currently     Partners: Male     Other Topics Concern   • Not on file     Social History Narrative   • No narrative on file       Family History:     Family History   Problem Relation Age of Onset   • Cancer Mother    • Lung Disease Father    • Cancer Father            Physical Exam:  Vitals:   /74   Pulse (!) 114   Temp 36.3 °C (97.3 °F)   Resp 18   Ht 1.676 m (5' 6\")   Wt 91.4 kg (201 lb 9.8 oz)   SpO2 94%   BMI 32.54 kg/m²     General: Not in acute distress, alert and oriented x 3  HEENT: No pallor, icterus. Oropharynx clear.   Neck: Supple, no palpable masses.  Lymph nodes: No palpable cervical, supraclavicular, axillary or inguinal lymphadenopathy.    CVS: regular rate and rhythm, no rubs or gallops  RESP: Clear to auscultate bilaterally, no wheezing or crackles.   ABD: Soft, RUQ tender to deep palpation, non distended, positive bowel sounds, no palpable organomegaly  EXT: No edema or cyanosis  CNS: Alert and oriented x3, No focal deficits.  Skin- No rash  Breast- right breast does not have any obvious distortion. No masses palpable no lymphadenopathy. Left breast has visible bruising at the 2:00 position at the site of needle biopsy-tender to palpation in the upper outer quadrant. Questionable lymph nodes palpable in the axilla, swelling because of biopsy noted      Labs:     Imaging  March 3, 2018. MRI of the " thoracic spine  1.  No evidence of metastatic disease the thoracic spine. Specifically, there is no suspicious lesion seen at T6.  2.  Mild thoracic spondylosis without high-grade impingement on the neural axis  3.  Hiatal hernia    February 19, 2018. PET CT scan  1. Hypermetabolic 1.7 cm mass in the left breast, in keeping with known malignancy.  2. Multiple hypermetabolic remi metastases in the left axilla.  3. Nonenlarged lymph nodes in the left supraclavicular and retroclavicular region with mild uptake, suspicious for early metastasis.  4. Mild uptake in the inferior endplate of T6 is nonspecific but could relate to degenerative change. Attention on follow-up exam is recommended.    Assessment and Plan:  -Breast cancer  -Left side   -Upper outer quadrant  -Invasive ductal  -Stage IIIc [cT2, cN3c, M0]. ER/ME negative HER-2 negative  -Histologic grade 3  -PET/CT scan from February 19, 2018 was reviewed. Left supraclavicular and retroclavicular lymph nodes nonenlarged but mild uptake suspicious for early metastasis.  -May be a candidate for adjuvant Xeloda based on The Capecitabine for Residual Cancer as Adjuvant Therapy (CREATE-X) trial   3/21/2018  -MRI of the spine March 3, no suspicious lesion seen at T6.  -March 1, 2018. Cycle 1 day 1 of Adriamycin and cyclophosphamide. Patient needed to be admitted to the hospital for neutropenic fever.  -March 22, 2018. Plan for cycle 2 day 1 of Adriamycin and cyclophosphamide. Will plan for 20% dose reduction because of patient's severe fatigue and prolonged anemia.  6/27/2018   -Patient symptoms improved dramatically. She does not want to continue chemotherapy.   -Keep appt with Dr. Crandall in  for surgical eval  -referral made to radiation oncology    -Anemia  -Stable, no further workup  -Secondary to chemotherapy    -CLL  -Established, stable, chronic  -Coon stage 0 with lymphocytosis only   -She is currently under active observation  -PET/CT scan February 2018 did not  show evidence of lymph node disease    -Fibroadenoma of breast  -Right breast  -Stereotactic biopsy Nov 12, 2014  Showed benign fibrofatty breast tissue with discrete nodules of hyalinized fibroadenoma with microcalcifications.No atypia or malignancy.    - Adenocarcinoma in situ   - Diagnosed Dec 18, 2015 in the right upper lobe lung. biopsy of  nodule, 6 showed Adenocarcinoma-in-situ in fibroelastotic scar tissue, with foci suspicious for invasive adenocarcinoma.  - Feb 1, 2016. Right thoracoscopy with anterior segmentectomy of the right upper lobe and removal of a portion of the right middle lobe.Pathology report states The adenocarcinoma in situ focally demonstrates areas of central fibrosis which surrounds the adenocarcinoma in situ glands, which makes evaluation for invasion difficult. However, there are a few scattered clusters of malignant cells and small foci suspicious for invasive adenocarcinoma  - PET/CT scan February 2018 did not show any evidence of disease          She agreed and verbalized her agreement and understanding with the current plan.  I answered all questions and concerns she has at this time.   Dear  Esperanza Crandall M.D.., Thank you very much for allowing me to see  Clarisse Reeves today .     Please note that this dictation was created using voice recognition software. I have made every reasonable attempt to correct obvious errors, but I expect that there are errors of grammar and possibly content that I did not discover before finalizing the note.      SIGNATURES:  Denise Barroso    CC:  BETSY Guerra Michael D, M.D. Wright, Sharon, M.D.

## 2018-06-28 ENCOUNTER — APPOINTMENT (OUTPATIENT)
Dept: ONCOLOGY | Facility: MEDICAL CENTER | Age: 78
End: 2018-06-28
Attending: INTERNAL MEDICINE
Payer: MEDICARE

## 2018-07-05 ENCOUNTER — APPOINTMENT (OUTPATIENT)
Dept: ADMISSIONS | Facility: MEDICAL CENTER | Age: 78
End: 2018-07-05
Attending: SURGERY
Payer: MEDICARE

## 2018-07-05 ENCOUNTER — APPOINTMENT (OUTPATIENT)
Dept: ONCOLOGY | Facility: MEDICAL CENTER | Age: 78
End: 2018-07-05
Attending: INTERNAL MEDICINE
Payer: MEDICARE

## 2018-07-06 ENCOUNTER — APPOINTMENT (OUTPATIENT)
Dept: RADIOLOGY | Facility: MEDICAL CENTER | Age: 78
End: 2018-07-06
Attending: SURGERY
Payer: MEDICARE

## 2018-07-06 ENCOUNTER — HOSPITAL ENCOUNTER (OUTPATIENT)
Facility: MEDICAL CENTER | Age: 78
End: 2018-07-07
Attending: SURGERY | Admitting: SURGERY
Payer: MEDICARE

## 2018-07-06 DIAGNOSIS — Z17.1 MALIGNANT NEOPLASM OF UPPER-OUTER QUADRANT OF LEFT BREAST IN FEMALE, ESTROGEN RECEPTOR NEGATIVE (HCC): ICD-10-CM

## 2018-07-06 DIAGNOSIS — C50.419 MALIGNANT NEOPLASM OF UPPER-OUTER QUADRANT OF FEMALE BREAST, UNSPECIFIED ESTROGEN RECEPTOR STATUS, UNSPECIFIED LATERALITY (HCC): ICD-10-CM

## 2018-07-06 DIAGNOSIS — C50.412 MALIGNANT NEOPLASM OF UPPER-OUTER QUADRANT OF LEFT BREAST IN FEMALE, ESTROGEN RECEPTOR NEGATIVE (HCC): ICD-10-CM

## 2018-07-06 LAB — GLUCOSE BLD-MCNC: 100 MG/DL (ref 65–99)

## 2018-07-06 PROCEDURE — A6402 STERILE GAUZE <= 16 SQ IN: HCPCS | Performed by: SURGERY

## 2018-07-06 PROCEDURE — 700102 HCHG RX REV CODE 250 W/ 637 OVERRIDE(OP): Performed by: SURGERY

## 2018-07-06 PROCEDURE — 19281 PERQ DEVICE BREAST 1ST IMAG: CPT | Mod: LT

## 2018-07-06 PROCEDURE — 160041 HCHG SURGERY MINUTES - EA ADDL 1 MIN LEVEL 4: Performed by: SURGERY

## 2018-07-06 PROCEDURE — 88307 TISSUE EXAM BY PATHOLOGIST: CPT

## 2018-07-06 PROCEDURE — 160048 HCHG OR STATISTICAL LEVEL 1-5: Performed by: SURGERY

## 2018-07-06 PROCEDURE — 700111 HCHG RX REV CODE 636 W/ 250 OVERRIDE (IP): Performed by: SURGERY

## 2018-07-06 PROCEDURE — 160035 HCHG PACU - 1ST 60 MINS PHASE I: Performed by: SURGERY

## 2018-07-06 PROCEDURE — 501497 HCHG SURGICLIP: Performed by: SURGERY

## 2018-07-06 PROCEDURE — 700111 HCHG RX REV CODE 636 W/ 250 OVERRIDE (IP)

## 2018-07-06 PROCEDURE — 700102 HCHG RX REV CODE 250 W/ 637 OVERRIDE(OP)

## 2018-07-06 PROCEDURE — 96374 THER/PROPH/DIAG INJ IV PUSH: CPT

## 2018-07-06 PROCEDURE — 160002 HCHG RECOVERY MINUTES (STAT): Performed by: SURGERY

## 2018-07-06 PROCEDURE — 82962 GLUCOSE BLOOD TEST: CPT

## 2018-07-06 PROCEDURE — 19281 PERQ DEVICE BREAST 1ST IMAG: CPT

## 2018-07-06 PROCEDURE — 500371 HCHG DRAIN, BLAKE 10MM: Performed by: SURGERY

## 2018-07-06 PROCEDURE — 700105 HCHG RX REV CODE 258: Performed by: SURGERY

## 2018-07-06 PROCEDURE — A9270 NON-COVERED ITEM OR SERVICE: HCPCS

## 2018-07-06 PROCEDURE — 700101 HCHG RX REV CODE 250

## 2018-07-06 PROCEDURE — A9270 NON-COVERED ITEM OR SERVICE: HCPCS | Performed by: SURGERY

## 2018-07-06 PROCEDURE — 160029 HCHG SURGERY MINUTES - 1ST 30 MINS LEVEL 4: Performed by: SURGERY

## 2018-07-06 PROCEDURE — 160009 HCHG ANES TIME/MIN: Performed by: SURGERY

## 2018-07-06 PROCEDURE — 500122 HCHG BOVIE, BLADE: Performed by: SURGERY

## 2018-07-06 PROCEDURE — 160036 HCHG PACU - EA ADDL 30 MINS PHASE I: Performed by: SURGERY

## 2018-07-06 PROCEDURE — 501838 HCHG SUTURE GENERAL: Performed by: SURGERY

## 2018-07-06 PROCEDURE — 96375 TX/PRO/DX INJ NEW DRUG ADDON: CPT

## 2018-07-06 PROCEDURE — 76098 X-RAY EXAM SURGICAL SPECIMEN: CPT | Mod: LT

## 2018-07-06 PROCEDURE — G0378 HOSPITAL OBSERVATION PER HR: HCPCS

## 2018-07-06 RX ORDER — LEVOTHYROXINE SODIUM 0.15 MG/1
150 TABLET ORAL
Status: DISCONTINUED | OUTPATIENT
Start: 2018-07-07 | End: 2018-07-07 | Stop reason: HOSPADM

## 2018-07-06 RX ORDER — HYDROMORPHONE HYDROCHLORIDE 2 MG/ML
INJECTION, SOLUTION INTRAMUSCULAR; INTRAVENOUS; SUBCUTANEOUS
Status: COMPLETED
Start: 2018-07-06 | End: 2018-07-06

## 2018-07-06 RX ORDER — DEXAMETHASONE SODIUM PHOSPHATE 4 MG/ML
4 INJECTION, SOLUTION INTRA-ARTICULAR; INTRALESIONAL; INTRAMUSCULAR; INTRAVENOUS; SOFT TISSUE
Status: DISCONTINUED | OUTPATIENT
Start: 2018-07-06 | End: 2018-07-07 | Stop reason: HOSPADM

## 2018-07-06 RX ORDER — GABAPENTIN 300 MG/1
CAPSULE ORAL
Status: COMPLETED
Start: 2018-07-06 | End: 2018-07-06

## 2018-07-06 RX ORDER — HALOPERIDOL 5 MG/ML
1 INJECTION INTRAMUSCULAR EVERY 6 HOURS PRN
Status: DISCONTINUED | OUTPATIENT
Start: 2018-07-06 | End: 2018-07-07 | Stop reason: HOSPADM

## 2018-07-06 RX ORDER — SODIUM CHLORIDE, SODIUM LACTATE, POTASSIUM CHLORIDE, CALCIUM CHLORIDE 600; 310; 30; 20 MG/100ML; MG/100ML; MG/100ML; MG/100ML
INJECTION, SOLUTION INTRAVENOUS CONTINUOUS
Status: DISCONTINUED | OUTPATIENT
Start: 2018-07-06 | End: 2018-07-07 | Stop reason: HOSPADM

## 2018-07-06 RX ORDER — MORPHINE SULFATE 4 MG/ML
1-3 INJECTION, SOLUTION INTRAMUSCULAR; INTRAVENOUS
Status: DISCONTINUED | OUTPATIENT
Start: 2018-07-06 | End: 2018-07-07 | Stop reason: HOSPADM

## 2018-07-06 RX ORDER — ACETAMINOPHEN 500 MG
TABLET ORAL
Status: COMPLETED
Start: 2018-07-06 | End: 2018-07-06

## 2018-07-06 RX ORDER — OXYCODONE HCL 5 MG/5 ML
SOLUTION, ORAL ORAL
Status: COMPLETED
Start: 2018-07-06 | End: 2018-07-06

## 2018-07-06 RX ORDER — BUPIVACAINE HYDROCHLORIDE AND EPINEPHRINE 5; 5 MG/ML; UG/ML
INJECTION, SOLUTION PERINEURAL
Status: DISCONTINUED | OUTPATIENT
Start: 2018-07-06 | End: 2018-07-06 | Stop reason: HOSPADM

## 2018-07-06 RX ORDER — VENLAFAXINE HYDROCHLORIDE 37.5 MG/1
37.5 CAPSULE, EXTENDED RELEASE ORAL DAILY
Status: DISCONTINUED | OUTPATIENT
Start: 2018-07-07 | End: 2018-07-07 | Stop reason: HOSPADM

## 2018-07-06 RX ORDER — ONDANSETRON 2 MG/ML
4 INJECTION INTRAMUSCULAR; INTRAVENOUS EVERY 4 HOURS PRN
Status: DISCONTINUED | OUTPATIENT
Start: 2018-07-06 | End: 2018-07-07 | Stop reason: HOSPADM

## 2018-07-06 RX ORDER — GLIPIZIDE 5 MG/1
5 TABLET ORAL DAILY
Status: DISCONTINUED | OUTPATIENT
Start: 2018-07-07 | End: 2018-07-07 | Stop reason: HOSPADM

## 2018-07-06 RX ORDER — KETOROLAC TROMETHAMINE 30 MG/ML
INJECTION, SOLUTION INTRAMUSCULAR; INTRAVENOUS
Status: COMPLETED
Start: 2018-07-06 | End: 2018-07-06

## 2018-07-06 RX ORDER — SODIUM CHLORIDE 9 MG/ML
INJECTION, SOLUTION INTRAVENOUS CONTINUOUS
Status: DISCONTINUED | OUTPATIENT
Start: 2018-07-06 | End: 2018-07-06

## 2018-07-06 RX ORDER — LIDOCAINE AND PRILOCAINE 25; 25 MG/G; MG/G
CREAM TOPICAL
Status: COMPLETED
Start: 2018-07-06 | End: 2018-07-06

## 2018-07-06 RX ORDER — DIPHENHYDRAMINE HYDROCHLORIDE 50 MG/ML
25 INJECTION INTRAMUSCULAR; INTRAVENOUS EVERY 6 HOURS PRN
Status: DISCONTINUED | OUTPATIENT
Start: 2018-07-06 | End: 2018-07-07 | Stop reason: HOSPADM

## 2018-07-06 RX ORDER — ASPIRIN 81 MG/1
81 TABLET, CHEWABLE ORAL DAILY
Status: DISCONTINUED | OUTPATIENT
Start: 2018-07-06 | End: 2018-07-07 | Stop reason: HOSPADM

## 2018-07-06 RX ORDER — CELECOXIB 200 MG/1
CAPSULE ORAL
Status: COMPLETED
Start: 2018-07-06 | End: 2018-07-06

## 2018-07-06 RX ORDER — SCOLOPAMINE TRANSDERMAL SYSTEM 1 MG/1
1 PATCH, EXTENDED RELEASE TRANSDERMAL
Status: DISCONTINUED | OUTPATIENT
Start: 2018-07-06 | End: 2018-07-07 | Stop reason: HOSPADM

## 2018-07-06 RX ORDER — HYDROCODONE BITARTRATE AND ACETAMINOPHEN 5; 325 MG/1; MG/1
1-2 TABLET ORAL EVERY 6 HOURS PRN
Status: DISCONTINUED | OUTPATIENT
Start: 2018-07-06 | End: 2018-07-07 | Stop reason: HOSPADM

## 2018-07-06 RX ORDER — FENOFIBRATE 134 MG/1
134 CAPSULE ORAL
Status: DISCONTINUED | OUTPATIENT
Start: 2018-07-06 | End: 2018-07-07 | Stop reason: HOSPADM

## 2018-07-06 RX ORDER — ALPRAZOLAM 0.25 MG/1
TABLET ORAL
Status: COMPLETED
Start: 2018-07-06 | End: 2018-07-06

## 2018-07-06 RX ADMIN — HYDROMORPHONE HYDROCHLORIDE 0.2 MG: 2 INJECTION INTRAMUSCULAR; INTRAVENOUS; SUBCUTANEOUS at 18:55

## 2018-07-06 RX ADMIN — KETOROLAC TROMETHAMINE 30 MG: 30 INJECTION, SOLUTION INTRAMUSCULAR at 17:56

## 2018-07-06 RX ADMIN — SODIUM CHLORIDE: 9 INJECTION, SOLUTION INTRAVENOUS at 12:45

## 2018-07-06 RX ADMIN — ALPRAZOLAM 0.25 MG: 0.25 TABLET ORAL at 12:45

## 2018-07-06 RX ADMIN — OXYCODONE HYDROCHLORIDE 5 MG: 5 SOLUTION ORAL at 17:20

## 2018-07-06 RX ADMIN — CELECOXIB: 200 CAPSULE ORAL at 12:45

## 2018-07-06 RX ADMIN — HYDROCODONE BITARTRATE AND ACETAMINOPHEN 2 TABLET: 5; 325 TABLET ORAL at 20:06

## 2018-07-06 RX ADMIN — HYDROMORPHONE HYDROCHLORIDE 0.2 MG: 2 INJECTION INTRAMUSCULAR; INTRAVENOUS; SUBCUTANEOUS at 17:26

## 2018-07-06 RX ADMIN — MORPHINE SULFATE 1 MG: 4 INJECTION INTRAVENOUS at 22:51

## 2018-07-06 RX ADMIN — GABAPENTIN 200 MG: 300 CAPSULE ORAL at 12:45

## 2018-07-06 RX ADMIN — ACETAMINOPHEN 1000 MG: 500 TABLET, FILM COATED ORAL at 12:45

## 2018-07-06 RX ADMIN — HYDROMORPHONE HYDROCHLORIDE 0.2 MG: 2 INJECTION INTRAMUSCULAR; INTRAVENOUS; SUBCUTANEOUS at 17:58

## 2018-07-06 RX ADMIN — DIPHENHYDRAMINE HYDROCHLORIDE 25 MG: 50 INJECTION, SOLUTION INTRAMUSCULAR; INTRAVENOUS at 21:16

## 2018-07-06 RX ADMIN — SODIUM CHLORIDE, POTASSIUM CHLORIDE, SODIUM LACTATE AND CALCIUM CHLORIDE: 600; 310; 30; 20 INJECTION, SOLUTION INTRAVENOUS at 21:17

## 2018-07-06 RX ADMIN — ALBUTEROL SULFATE 2.5 MG: 2.5 SOLUTION RESPIRATORY (INHALATION) at 17:45

## 2018-07-06 RX ADMIN — LIDOCAINE AND PRILOCAINE 1 BOTTLE: 25; 25 CREAM TOPICAL at 12:45

## 2018-07-06 RX ADMIN — HYDROMORPHONE HYDROCHLORIDE 0.2 MG: 2 INJECTION INTRAMUSCULAR; INTRAVENOUS; SUBCUTANEOUS at 17:10

## 2018-07-06 ASSESSMENT — PATIENT HEALTH QUESTIONNAIRE - PHQ9
1. LITTLE INTEREST OR PLEASURE IN DOING THINGS: NOT AT ALL
2. FEELING DOWN, DEPRESSED, IRRITABLE, OR HOPELESS: NOT AT ALL
SUM OF ALL RESPONSES TO PHQ9 QUESTIONS 1 AND 2: 0

## 2018-07-06 ASSESSMENT — PAIN SCALES - GENERAL
PAINLEVEL_OUTOF10: 9
PAINLEVEL_OUTOF10: 10
PAINLEVEL_OUTOF10: 9
PAINLEVEL_OUTOF10: 0
PAINLEVEL_OUTOF10: 8
PAINLEVEL_OUTOF10: 7
PAINLEVEL_OUTOF10: 8
PAINLEVEL_OUTOF10: 10
PAINLEVEL_OUTOF10: 7
PAINLEVEL_OUTOF10: 8
PAINLEVEL_OUTOF10: 8

## 2018-07-06 ASSESSMENT — LIFESTYLE VARIABLES: EVER_SMOKED: YES

## 2018-07-06 NOTE — OP REPORT
Operative Report    Date: 7/6/2018    Surgeon: Esperanza Crandall M.D.    Assistant: IRENA Arroyo    Anesthesiologist: Kezia MEYER     Pre-operative Diagnosis:  C50.412 Malignant neoplasm of upper-outer quadrant of left female breast    Post-operative Diagnosis: same     Procedure: left breast needle localized partial mastectomy with left axillary lymphadenectomy    Procedure in detail: The patient was identified in the pre-operative holding area and brought to the operating room. Prior to the procedure, she underwent needle localization with radiology due to the non-palpable nature of the lesion. As well, she underwent radionucleotide injection by nuclear medicine.    Correct side and site were identified. Pre-operative antibiotics of ancef were administered prior to the procedure. Anesthesia was smoothly induced.    I began with the partial mastectomy. The skin was infiltrated with local anesthetic and a curvilinear incision was made in the upper outer quadrant. The breast tissue was grasped with Allis clamps and a core of tissue was removed around the localization wire. The specimen was then completely removed, marked with suture for orientation, and was sent to Radiology for mammogram. This revealed the biopsy clip to be within the excised specimen . Additional tissue from the superior, inferior, deep, medial and lateral margins were excised, marked for new margin, and sent for permanent pathology. Meticulous hemostasis was achieved with electrocautery. The area was irrigated and suctioned.     I was able to reach the left axilla through the same incision.  The subcutaneous tissues of the left axilla were opened with cautery until the lateral border of the pectoralis major was identified. This was elevated, and the clavipectoral fascia was opened with cautery. We identified the axillary vein at the superior border of the incision. The thoracodorsal bundle was identified and protected. The fat lateral to the  thoracodorsal bundle and inferior to the axillary vein was dissected. The axillary fat pad was raised medially and dissected off the skin until the lateral border of the latissimus dorsi was encountered. The dissection was carried lateral to medial, protecting the thoracodorsal bundle. The superficial brances of the axillary vein were divided. The long thoracic nerve was identified and protected. The pectoralis minor muscle was elevated and the fat pad at the axillary apex was carefully dissected. Dissection was carried along the chest wall and the fat pad was detached and excised from the body. The surgical field was irrigated and a drain was placed. The skin was closed in two layers with vicryl and monocryl.     Sterile dressings were applied.     The patient was awakened from anesthetic, and was taken to the recovery room in stable condition.    Sponge and needle counts were correct at the end of the case.     Specimen:   1. left breast partial mastecomy  2. Additional tissue, medial margin, suture marks new margin  3. Additional tissue, lateral margin, suture marks new margin  4. Additional tissue, superior margin, suture marks new margin  5. Additional tissue, inferior margin, suture marks new margin  6. Additional tissue, deep margin, suture marks new margin  7. Left axillary contents    EBL: 100 mL    Dispo: stable, extubated, to PACU    Esperanza Crandall M.D.  [facility]  [phone]

## 2018-07-06 NOTE — PROGRESS NOTES
Discharge Instructions for Lumpectomy and Lymph Node Removal:    On the day of surgery we will be removing the lump of your breast cancer (lumpectomy) and through a separate incision under your arm we will be taking out the 2-3 lymph nodes that drain your breast (sentinel lymph node dissection).    Wound Care  1. You will have 2 incisions: 1) on your breast (lumpectomy) and 2) under your arm (lymph node removal).  2. All of your stitches are buried under the skin and dissolveable. There are no sutures to remove. Your dressing is waterproog.  3. You can shower the day after your surgery and get your wound wet (it will be sealed by the waterproof dressings)  4. You may have some bruising after surgery. This is normal.  5. There may be a small amount of drainage from your wounds, this is usually normal and nothing to worry about. Place a dry gauze or bandage (available at any drug store) on the wound to absorb any drainage.  6. You can remove the dressing 2 days after surgery.     For Pain  1. Wear your bra as much as possible including to bed. The less your breast moves the less it will hurt.  2. You may take Tylenol or Advil every 4-6 hours as directed on the bottle.  3. You will be given a prescription for more severe pain. You can use it as needed.    Work  1. If you would like, you may return to work the day after your biopsy.  2. If you need a work excuse for the day of surgery or for any days thereafter, please let us know.    When to call the doctor  1. If you have severe, uncontrolled pain at the biopsy site.  2. If you run at fever of 100.5?F or higher within a few days of the biopsy.  3. If you have a large amount of drainage that is soaking the bandage more than once a day.  4. If the area around your wound becomes red/inflamed.  5. Make sure you schedule and attend all follow-up visits with your doctor. You should have a follow up appointment in about a week after surgery.    If you have any additional  questions, please do not hesitate to call the office and speak to either myself or the physician on call.    Office address:  [address] [place] [address] [id number]  [place] [id number]    Esperanza Crandall MD  [facility]  [phone]

## 2018-07-07 VITALS
DIASTOLIC BLOOD PRESSURE: 57 MMHG | HEIGHT: 66 IN | OXYGEN SATURATION: 95 % | TEMPERATURE: 97.5 F | WEIGHT: 200.18 LBS | SYSTOLIC BLOOD PRESSURE: 114 MMHG | RESPIRATION RATE: 18 BRPM | BODY MASS INDEX: 32.17 KG/M2 | HEART RATE: 72 BPM

## 2018-07-07 PROCEDURE — 96376 TX/PRO/DX INJ SAME DRUG ADON: CPT

## 2018-07-07 PROCEDURE — 700102 HCHG RX REV CODE 250 W/ 637 OVERRIDE(OP): Performed by: SURGERY

## 2018-07-07 PROCEDURE — A9270 NON-COVERED ITEM OR SERVICE: HCPCS | Performed by: SURGERY

## 2018-07-07 PROCEDURE — G0378 HOSPITAL OBSERVATION PER HR: HCPCS

## 2018-07-07 PROCEDURE — 700111 HCHG RX REV CODE 636 W/ 250 OVERRIDE (IP): Performed by: SURGERY

## 2018-07-07 RX ORDER — HYDROMORPHONE HYDROCHLORIDE 2 MG/1
2-4 TABLET ORAL EVERY 6 HOURS PRN
Qty: 20 TAB | Refills: 0 | Status: SHIPPED | OUTPATIENT
Start: 2018-07-07 | End: 2018-07-09

## 2018-07-07 RX ORDER — HYDROMORPHONE HYDROCHLORIDE 2 MG/1
2 TABLET ORAL EVERY 4 HOURS PRN
Status: DISCONTINUED | OUTPATIENT
Start: 2018-07-07 | End: 2018-07-07 | Stop reason: HOSPADM

## 2018-07-07 RX ADMIN — MORPHINE SULFATE 3 MG: 4 INJECTION INTRAVENOUS at 03:33

## 2018-07-07 RX ADMIN — MORPHINE SULFATE 2 MG: 4 INJECTION INTRAVENOUS at 01:27

## 2018-07-07 RX ADMIN — LEVOTHYROXINE SODIUM 150 MCG: 150 TABLET ORAL at 06:35

## 2018-07-07 RX ADMIN — ASPIRIN 81 MG: 81 TABLET, CHEWABLE ORAL at 08:22

## 2018-07-07 RX ADMIN — VENLAFAXINE HYDROCHLORIDE 37.5 MG: 37.5 CAPSULE, EXTENDED RELEASE ORAL at 08:22

## 2018-07-07 RX ADMIN — MORPHINE SULFATE 3 MG: 4 INJECTION INTRAVENOUS at 08:24

## 2018-07-07 RX ADMIN — HYDROCODONE BITARTRATE AND ACETAMINOPHEN 2 TABLET: 5; 325 TABLET ORAL at 04:19

## 2018-07-07 ASSESSMENT — PAIN SCALES - GENERAL
PAINLEVEL_OUTOF10: 7
PAINLEVEL_OUTOF10: 9
PAINLEVEL_OUTOF10: 7
PAINLEVEL_OUTOF10: 5
PAINLEVEL_OUTOF10: 9
PAINLEVEL_OUTOF10: 5

## 2018-07-07 NOTE — CARE PLAN
Problem: Pain  Goal: Alleviation of Pain or a reduction in pain to the patient's comfort goal  Outcome: PROGRESSING AS EXPECTED      Problem: Risk for Infection, Impaired Wound Healing  Goal: Remain free from signs and symptoms of infection  Outcome: PROGRESSING AS EXPECTED    Goal: Promotion of Wound Healing  Outcome: PROGRESSING AS EXPECTED      Problem: Risk for Post Surgical Fluid imbalance  Goal: Promotion of Fluid Balance  Outcome: PROGRESSING AS EXPECTED      Problem: Nutrition Deficit  Goal: Adequate Food and Fluid Intake  Outcome: PROGRESSING AS EXPECTED      Problem: Risk for Deep Vein Thrombosis/Venous Thromboembolism  Goal: DVT/VTE Prevention Measures in Place  Outcome: PROGRESSING AS EXPECTED    Goal: Patient participates in DVT/VTE Prevention Measures  Outcome: PROGRESSING AS EXPECTED

## 2018-07-07 NOTE — PROGRESS NOTES
MD visited pt,for discharge, IV D/C'd. Discharge instructions provided to pt. Pt states understanding. Pt states all questions have been answered. Copy of discharge provided to pt.educated LUCIO care,supplies given. Signed copy in chart. Prescriptions provided to pt, copy in chart. Pt states that all personal belongings are in possession. Pt escorted off unit without incident.

## 2018-07-07 NOTE — PROGRESS NOTES
Pt has reported difficulty sleeping, pain is not sole factor for insomnia. States morphine has helped bring down to 5/10 at lowest. LUCIO drain emptied.

## 2018-07-07 NOTE — PROGRESS NOTES
Called by nursing, pt to stay overnight as hypoxic/O2 requiring and pain not well controlled. Orders placed.

## 2018-07-07 NOTE — DISCHARGE SUMMARY
Discharge Summary      DATE OF ADMISSION: 7/6/2018    DATE OF DISCHARGE: 7/7//2018    DISCHARGE DIAGNOSIS (ES):  C50.412 Malignant neoplasm of upper-outer quadrant of left female breast    DISCHARGE CONDITION:    Patient is POD 1 and progressing as expected. She is experiencing postoperative surgical pain requiring opiate intervention.     GEN: NAD, OOB ad divya with minimal assist, Incision CDI, axillary drain output 160ml over last 12 hours-all serosanguinous, pain moderately controlled, patient requesting narcotic pain medication   COR: S1/S2 noted, no murmur appreciated  PULM: No adventitious breath sounds appreciated  ABD: Non distended, non tender, positive bowel sounds x 4 quadrants.  EXT: No swelling, no edema present    Plan to discharge home today.    CONSULTATIONS:  ? None    PROCEDURES:  left breast needle localized partial mastectomy with left axillary lymphadenectomy    BRIEF HPI:  ? This patient is a 76 y/o female who presented for surgical evaluation of a left breast cancer.     HOSPITAL COURSE:  ? She underwent procedures as listed above on 7/6/18 for diagnosis as listed above. She is progressing well and will go home today.     MEDS:   Current Outpatient Prescriptions   Medication Sig Dispense Refill   • HYDROmorphone (DILAUDID) 2 MG Tab Take 1-2 Tabs by mouth every 6 hours as needed for Severe Pain for up to 2 days. 20 Tab 0       FOLLOW-UP:  ? Please call my office at [phone] to make an appointment in 1 week(s)    DISCHARGE INSTRUCTIONS:    Discharge Instructions for Lumpectomy and Lymph Node Removal:     On the day of surgery we will be removing the lump of your breast cancer (lumpectomy) and through a separate incision under your arm we will be taking out the 2-3 lymph nodes that drain your breast (sentinel lymph node dissection).     Wound Care  1. You will have 2 incisions: 1) on your breast (lumpectomy) and 2) under your arm (lymph node removal).  2. All of your stitches are buried under  the skin and dissolveable. There are no sutures to remove. Your dressing is waterproog.  3. You can shower the day after your surgery and get your wound wet (it will be sealed by the waterproof dressings)  4. You may have some bruising after surgery. This is normal.  5. There may be a small amount of drainage from your wounds, this is usually normal and nothing to worry about. Place a dry gauze or bandage (available at any drug store) on the wound to absorb any drainage.  6. You can remove the dressing 2 days after surgery.      For Pain  1. Wear your bra as much as possible including to bed. The less your breast moves the less it will hurt.  2. You may take Tylenol or Advil every 4-6 hours as directed on the bottle.  3. You will be given a prescription for more severe pain. You can use it as needed.     Work  1. If you would like, you may return to work the day after your biopsy.  2. If you need a work excuse for the day of surgery or for any days thereafter, please let us know.     When to call the doctor  1. If you have severe, uncontrolled pain at the biopsy site.  2. If you run at fever of 100.5°F or higher within a few days of the biopsy.  3. If you have a large amount of drainage that is soaking the bandage more than once a day.  4. If the area around your wound becomes red/inflamed.  5. Make sure you schedule and attend all follow-up visits with your doctor. You should have a follow up appointment in about a week after surgery.     If you have any additional questions, please do not hesitate to call the office and speak to either myself or the physician on call.     Office address:  [address] [place] [address] [address]  [place] [id number]    Endy OSBORN  [facility]  [phone]

## 2018-07-07 NOTE — DISCHARGE INSTRUCTIONS
Discharge Instructions    Discharged to home by car with friend. Discharged via wheelchair, hospital escort: Yes.  Special equipment needed: Not Applicable    Be sure to schedule a follow-up appointment with your primary care doctor or any specialists as instructed.     Discharge Plan:   Diet Plan: Discussed  Activity Level: Discussed  Confirmed Follow up Appointment: Patient to Call and Schedule Appointment  Confirmed Symptoms Management: Discussed  Medication Reconciliation Updated: Yes  Influenza Vaccine Indication: Not indicated: Previously immunized this influenza season and > 8 years of age    I understand that a diet low in cholesterol, fat, and sodium is recommended for good health. Unless I have been given specific instructions below for another diet, I accept this instruction as my diet prescription.   Other diet:     Special Instructions: None    · Is patient discharged on Warfarin / Coumadin?   No Bulb Drain Home Care  A bulb drain consists of a thin rubber tube and a soft, round bulb that creates a gentle suction. The rubber tube is placed in the area where you had surgery. A bulb is attached to the end of the tube that is outside the body. The bulb drain removes excess fluid that normally builds up in a surgical wound after surgery. The color and amount of fluid will vary. Immediately after surgery, the fluid is bright red and is a little thicker than water. It may gradually change to a yellow or pink color and become more thin and water-like. When the amount decreases to about 1 or 2 tbsp in 24 hours, your health care provider will usually remove it.  DAILY CARE  · Keep the bulb flat (compressed) at all times, except while emptying it. The flatness creates suction. You can flatten the bulb by squeezing it firmly in the middle and then closing the cap.  · Keep sites where the tube enters the skin dry and covered with a bandage (dressing).  · Secure the tube 1-2 in (2.5-5.1 cm) below the insertion sites  to keep it from pulling on your stitches. The tube is stitched in place and will not slip out.  · Secure the bulb as directed by your health care provider.  · For the first 3 days after surgery, there usually is more fluid in the bulb. Empty the bulb whenever it becomes half full because the bulb does not create enough suction if it is too full. The bulb could also overflow. Write down how much fluid you remove each time you empty your drain. Add up the amount removed in 24 hours.  · Empty the bulb at the same time every day once the amount of fluid decreases and you only need to empty it once a day. Write down the amounts and the 24-hour totals to give to your health care provider. This helps your health care provider know when the tubes can be removed.  EMPTYING THE BULB DRAIN  Before emptying the bulb, get a measuring cup, a piece of paper and a pen, and wash your hands.  · Gently run your fingers down the tube (stripping) to empty any drainage from the tubing into the bulb. This may need to be done several times a day to clear the tubing of clots and tissue.  · Open the bulb cap to release suction, which causes it to inflate. Do not touch the inside of the cap.  · Gently run your fingers down the tube (stripping) to empty any drainage from the tubing into the bulb.  · Hold the cap out of the way, and pour fluid into the measuring cup.    · Squeeze the bulb to provide suction.   · Replace the cap.    · Check the tape that holds the tube to your skin. If it is becoming loose, you can remove the loose piece of tape and apply a new one. Then, pin the bulb to your shirt.    · Write down the amount of fluid you emptied out. Write down the date and each time you emptied your bulb drain. (If there are 2 bulbs, note the amount of drainage from each bulb and keep the totals separate. Your health care provider will want to know the total amounts for each drain and which tube is draining more.)    · Flush the fluid down the  toilet and wash your hands.    · Call your health care provider once you have less than 2 tbsp of fluid collecting in the bulb drain every 24 hours.  If there is drainage around the tube site, change dressings and keep the area dry. Cleanse around tube with sterile saline and place dry gauze around site. This gauze should be changed when it is soiled. If it stays clean and unsoiled, it should still be changed daily.   SEEK MEDICAL CARE IF:  · Your drainage has a bad smell or is cloudy.    · You have a fever.    · Your drainage is increasing instead of decreasing.    · Your tube fell out.    · You have redness or swelling around the tube site.    · You have drainage from a surgical wound.    · Your bulb drain will not stay flat after you empty it.    MAKE SURE YOU:   · Understand these instructions.  · Will watch your condition.  · Will get help right away if you are not doing well or get worse.     This information is not intended to replace advice given to you by your health care provider. Make sure you discuss any questions you have with your health care provider.     Document Released: 12/15/2001 Document Revised: 01/08/2016 Document Reviewed: 05/22/2013  [employer] Interactive Patient Education ©2016 [employer]      Depression / Suicide Risk    As you are discharged from this [facility] facility, it is important to learn how to keep safe from harming yourself.    Recognize the warning signs:  · Abrupt changes in personality, positive or negative- including increase in energy   · Giving away possessions  · Change in eating patterns- significant weight changes-  positive or negative  · Change in sleeping patterns- unable to sleep or sleeping all the time   · Unwillingness or inability to communicate  · Depression  · Unusual sadness, discouragement and loneliness  · Talk of wanting to die  · Neglect of personal appearance   · Rebelliousness- reckless behavior  · Withdrawal from people/activities they  love  · Confusion- inability to concentrate     If you or a loved one observes any of these behaviors or has concerns about self-harm, here's what you can do:  · Talk about it- your feelings and reasons for harming yourself  · Remove any means that you might use to hurt yourself (examples: pills, rope, extension cords, firearm)  · Get professional help from the community (Mental Health, Substance Abuse, psychological counseling)  · Do not be alone:Call your Safe Contact- someone whom you trust who will be there for you.  · Call your local CRISIS HOTLINE 107-7734 or 685-884-3110  · Call your local Children's Mobile Crisis Response Team [place] (449) 011-7989 or www.Heavy  · Call the toll free National Suicide Prevention Hotlines   · National Suicide Prevention Lifeline 540-273-RKWY (1051)  · National Hope Line Network 800-SUICIDE (979-0901)

## 2018-07-07 NOTE — PROGRESS NOTES
An extensive informed consent was undertaken with the patient, in regard to the risks and benefits of the use of narcotics for post-operative pain. The patient was counseled regarding the benefits of narcotics for post-operative pain in the short term period. The patient was made aware of the alternatives, including heating pads, ice, and NSAID medication.    The risks of addiction, overdose, respiratory depression, risks during pregnancy (if applicable), were discussed.  We discussed taking the medications as prescribed. We discussed other mediations the patient is taking, and how these can interact. The patient was notified not to share the medications with others. We discussed warning signs of addiction.    I reviewed the NarcRx  program, and the patient deemed not to be at high risk for abuse, and had 1 concurrent prescriptions for Alprazolam.    The patient understands that these medications are intended to be used in the short term for post-operative pain only.    The patient was given a chance to ask questions, and all her questions were answered. Discussion was undertaken with Layman's terms. The patient demonstrated adequate understanding. Consent was given in clear state of mind.  Consent to be signed.     Endy OSBORN  [facility]  [phone]

## 2018-07-07 NOTE — PROGRESS NOTES
Pt in bed c/o pain on left breast incision site,LUCIO drained,pain is 9/10,medicated for pain,pt for d/c,will inform MD regarding pain,d/c plan explained to pt.

## 2018-07-07 NOTE — PROGRESS NOTES
Pt admitted to CDU from PACU. Ambulated with steady gait from stretcher to bed. Slight dizziness reported by patient. Pt alert, oriented. 2L NC, denies SOB. Endorses 9/10 pain at surgical site. Will medicate per MAR. Oriented to call light, unit, and plan of care for overnight. Call light within reach.

## 2018-07-07 NOTE — PROGRESS NOTES
Pt states little improvement from [place]. No other PRNs available, Dr. Crandall paged and new order for morphine. Pt has codeine allergy listed, discussed with pt and pt states she tolerates morphine without issue.

## 2018-07-07 NOTE — PROGRESS NOTES
"Surgical Progress Note:    POD# 1  S/P left breast needle localized partial mastectomy with left axillary lymphadenectomy         PE:  /53   Pulse 86   Temp 35.9 °C (96.7 °F)   Resp 16   Ht 1.676 m (5' 6\")   Wt 90.8 kg (200 lb 2.8 oz)   SpO2 96%   Breastfeeding? No   BMI 32.31 kg/m²     I/O:   Intake/Output Summary (Last 24 hours) at 07/07/18 0834  Last data filed at 07/07/18 0700   Gross per 24 hour   Intake             1480 ml   Output              160 ml   Net             1320 ml     UOP: OOB to commode with minimal assist  PO: Tolerating Regular diet  IV: TKO rate    GEN: NAD, OOB ad divya with minimal assist, Incision CDI, axillary drain output 160ml over last 12 hours-all serosanguinous, pain moderately controlled, patient requesting narcotic pain medication   COR: S1/S2 noted, no murmur appreciated  PULM: No adventitious breath sounds appreciated  ABD: Non distended, non tender, positive bowel sounds x 4 quadrants.  EXT: No swelling, no edema present.    Labs:  No results for input(s): WBC, RBC, HEMOGLOBIN, HEMATOCRIT, MCV, MCH, RDW, PLATELETCT, MPV, NEUTSPOLYS, LYMPHOCYTES, MONOCYTES, EOSINOPHILS, BASOPHILS, RBCMORPHOLO in the last 72 hours.  No results for input(s): SODIUM, POTASSIUM, CHLORIDE, CO2, GLUCOSE, BUN, CPKTOTAL in the last 72 hours.      A/P: Patient progressing as expected. She is requesting intervention for moderate pain. Plan is to discharge home today with narcotic pain medication.    Endy Garnett  [facility]  [phone]    "

## 2018-07-07 NOTE — OR NURSING
1654 Patient arrived from OR on [facility]. Report received from anesthesia and RN. LMA in place. Will continue to monitor.     1705 LMA out    1710 Patient complaining of severe pain, appears calm, dilaudid given as ordered    1715 Per patient pain did not change, oxycodone given    1730 Patient asked if she is getting pain prescription. Called DR. Crandall about this according to doctor Raz patient refused pain meds preop due to codeine sensitivity. Patient to take tylenol and ibuprofen at home    1745 Patient O2 sat still low, denies SOB, albuterol given. Incentive spirometer being used    1837 Patient O2 still low, desats to low 80's, even went down to low 70's on 2L NC. Patient also still complaining about her pain. Dr. Crandall called about this, per doctor Raz patient can be admitted overnight.     1850 Report called to JEN Martin in CDU. Patient to go to T210.  Friend Clarisse called and updated.     1925 Transport at bedside. Patient's belongings with patient.

## 2018-07-07 NOTE — CARE PLAN
Problem: Safety  Goal: Free from accidental injury  Outcome: PROGRESSING AS EXPECTED      Problem: Knowledge Deficit  Goal: Patient/Significant other demonstrates understanding of disease process, treatment plan, medications and discharge instructions  Outcome: PROGRESSING AS EXPECTED      Problem: Skin Integrity  Goal: Skin integrity is maintained or improved  Outcome: PROGRESSING AS EXPECTED      Problem: Risk for Impaired Mobility  Goal: Early Mobilization  Outcome: PROGRESSING AS EXPECTED      Problem: Risk for Impaired Circulation/Tissue Perfusion  Goal: Optimal Post Surgical Circulation/Tissue Perfusion  Outcome: PROGRESSING AS EXPECTED      Problem: Elimination-Post Surgical  Goal: Establishment and Maintenance of regular bowel elimination  Outcome: PROGRESSING AS EXPECTED    Goal: Regular urinary elimination  Outcome: PROGRESSING AS EXPECTED      Problem: Oxygenation/Respiratory Function-Post Surgical  Goal: Patient will Achieve/Maintain Normal Respiratory Rate/Effort  Outcome: PROGRESSING AS EXPECTED

## 2018-07-07 NOTE — PROGRESS NOTES
Norco given for 9/10 pain. Surgical dressing lifting up at edges slightly, reinforced. Scant staining on dressing. Small output from LUCIO drain, secured to hospital gown. SCDs on. LR maintenance fluid started. Tolerated dinner and snacks, no nausea. VSS. Full assessment completed.

## 2018-07-07 NOTE — RESPIRATORY CARE
COPD EDUCATION by COPD CLINICAL EDUCATOR  7/7/2018 at 6:49 AM by Alissa Méndez     Patient reviewed by COPD education team. Patient does not qualify for COPD program.

## 2018-07-17 ENCOUNTER — OFFICE VISIT (OUTPATIENT)
Dept: CARDIOLOGY | Facility: MEDICAL CENTER | Age: 78
End: 2018-07-17
Payer: MEDICARE

## 2018-07-17 VITALS
OXYGEN SATURATION: 97 % | SYSTOLIC BLOOD PRESSURE: 120 MMHG | DIASTOLIC BLOOD PRESSURE: 72 MMHG | WEIGHT: 201.4 LBS | HEART RATE: 90 BPM | BODY MASS INDEX: 32.37 KG/M2 | HEIGHT: 66 IN

## 2018-07-17 DIAGNOSIS — Z78.9 STATIN INTOLERANCE: Chronic | ICD-10-CM

## 2018-07-17 DIAGNOSIS — I49.8 ACCELERATED JUNCTIONAL RHYTHM: Chronic | ICD-10-CM

## 2018-07-17 DIAGNOSIS — Z82.49 FAMILY HISTORY OF HEART ATTACK: ICD-10-CM

## 2018-07-17 DIAGNOSIS — I10 ESSENTIAL HYPERTENSION: ICD-10-CM

## 2018-07-17 PROCEDURE — 99204 OFFICE O/P NEW MOD 45 MIN: CPT | Performed by: INTERNAL MEDICINE

## 2018-07-17 RX ORDER — LORAZEPAM 2 MG/1
TABLET ORAL
COMMUNITY
Start: 2018-06-27 | End: 2018-10-25 | Stop reason: SDUPTHER

## 2018-07-17 RX ORDER — DOCUSATE SODIUM 100 MG/1
TABLET ORAL
COMMUNITY
Start: 2018-06-28 | End: 2018-07-17

## 2018-07-17 ASSESSMENT — ENCOUNTER SYMPTOMS
WEAKNESS: 0
SHORTNESS OF BREATH: 0
HEARTBURN: 1
BLURRED VISION: 0
SORE THROAT: 0
BRUISES/BLEEDS EASILY: 0
MEMORY LOSS: 1
DIZZINESS: 0
COUGH: 0
ABDOMINAL PAIN: 0
NAUSEA: 0
PND: 0
PALPITATIONS: 1
CHILLS: 0
FEVER: 0
FOCAL WEAKNESS: 0
FALLS: 0
CLAUDICATION: 0

## 2018-07-17 NOTE — PATIENT INSTRUCTIONS
Please look into the following diets and incorporate them into your diet    FOR TREATMENT OR PREVENTION OF CORONARY ARTERY DISEASE    [facility]    Dr Morel - Jose Daniel over [place] (book and documentary)    Dr Melvin Monroe's Cardiologist    FOR TREATMENT OF BLOOD PRESSURE  DASH DIET - American Heart Association for treatment of HYPERTENSION    KEEP YOUR SODIUM EQUAL TO CALORIES AND NO MORE THAN DOUBLE THE CALORIES FOR A LOW SALT DIET    FOR TREATMENT OF BAD CHOLESTEROL/FATS  REDUCE PROCESSED SUGAR AS MUCH AS POSSIBLE  INCREASE WHOLE GRAINS/VEGETABLES    Lowering total cholesterol and LDL (bad) cholesterol:  - Eat leaner cuts of meat, or eliminate altogether if possible red meat, and frequently substitute fish or chicken.  - Limit saturated fat to no more than 7-10% of total calories no more than 10 g per day is recommended. Some sources of saturated fat include butter, animal fats, hydrogenated vegetable fats and oils, many desserts, whole milk dairy products.  - Replaced saturated fats with polyunsaturated fats and monounsaturated fats. Foods high in monounsaturated fat include nuts, although well, canola oil, avocados, and olives  Limited transfer at (count and processed foods) and replaced with fresh fruits and vegetables  - Recommend nonfat dairy products  - Increase substantially the amount of soluble fiber intake (, legumes such as beans, fruit whole grains.  - Consider nutritional supplements: plant sterile spreads such as Benecol, fish oil,  flaxseed oil, omega-3 acids capsules 1000 mg twice a day,  or viscous fiber such as Metamucil  - Attain ideal weight and regular exercise (at least 30 minutes per day of walking)    Lowering triglycerides:  - Reduce intake of simple sugar: Desserts, candy, pastries, honey, sodas, sugared cereals, yogurt, Gatorade, sports bars, canned fruit, smoothies, fruit juice, coffee drinks  - Reduced intake of refined starches:) Refined  Posta  - Reduce or abstain from alcohol  - Increase omega-3 fatty acids: [place], Trout, Mackerel, Herring, Albacore tuna and supplements  - Attain ideal weight and regular exercise (at least 30 minutes per day of walking)      Elevating HDL (good) cholesterol:  - Increase physical activity  - Seasoned foods with garlic and onions  - Increase omega-3 fatty acids and supplements as listed above  - Incorporating appropriate amounts of monounsaturated fats such as nuts, olive oil, canola oil, avocados, olives  - Stop smoking  - Attain ideal weight and regular exercise (at least 30 minutes per day of walking)

## 2018-07-17 NOTE — PROGRESS NOTES
Chief Complaint   Patient presents with   • HTN (Controlled)   • Hyperlipidemia       Subjective:   Clarisse Reeves is a 77 y.o. female who presents today in consultation from Ms. Frank for evaluation of her history of Adriamycin chemotherapy for recent breast cancer where she was noted to have tachycardia and on EKG accelerated junctional rhythm with sinus rhythm underlying this.  Long-term she is had obesity and metabolic syndrome with diabetes which has been very well controlled with minimal medications she tried multiple statin medications had intolerance myalgias and has been able to control her lipids with diet.  She has had multiple cancer concerns including CLL, lung cancer and then more recently breast cancer.    Over the years she has had multiple different cardiac testing including echocardiogram on multiple occasions and in 2016 she had a nuclear stress test as her daughter had  suddenly of heart disease which was normal her CT scans do show mild coronary calcifications.    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   • Cataract     right  and  left  IOL   • CLL (chronic lymphoblastic leukemia)    • Cutaneous skin tags 2015   • DM (diabetes mellitus) (HCC)     oral meds   • Hiatus hernia syndrome     no surgery   • Indigestion    • MEDICAL HOME    • Personal history of colonic polyps 2012   • Pneumonia    • Pneumonia 2017   • Thyroid disease    • Type II or unspecified type diabetes mellitus without mention of complication, not stated as uncontrolled     pre-diabetic     Past Surgical History:   Procedure Laterality Date   • MASTECTOMY Left 2018    Procedure: MASTECTOMY - PARTIAL AFTER WIRE LOCAALIZATION;  Surgeon: Esperanza Crandall,  M.D.;  Location: SURGERY SAME DAY [facility];  Service: General   • AXILLARY NODE DISSECTION Left 7/6/2018    Procedure: AXILLARY NODE DISSECTION;  Surgeon: Esperanza Crandall M.D.;  Location: SURGERY SAME DAY [facility];  Service: General   • THYROIDECTOMY N/A 11/29/2017    Procedure: THYROIDECTOMY COMPLETION, RECURRENT LARYNGEAL NERVE MONITORING;  Surgeon: Cameron Marcelino M.D.;  Location: SURGERY SAME DAY [facility];  Service: General   • THORACOSCOPY Right 2/1/2016    Procedure: THORACOSCOPY Upper Lobectomy ;  Surgeon: John H Ganser, M.D.;  Location: SURGERY [facility];  Service:    • NODE DISSECTION Right 2/1/2016    Procedure: NODE DISSECTION;  Surgeon: John H Ganser, M.D.;  Location: SURGERY [facility];  Service:    • RECOVERY  12/18/2015    Procedure: CT-SCP-RUL LUNG BIOPSY-;  Surgeon: Recoveryon Surgery;  Location: SURGERY PRE-POST PROC UNIT [facility];  Service:    • OTHER  2001    Lower Left segment of lung removed    • CHOLECYSTECTOMY  1995   • OTHER ORTHOPEDIC SURGERY  1990    bakers cyst removed in right knee, multiple scopes   • OTHER  1990    hemmroid removal   • OTHER  1984    left Thyroid removed, might remove right side in the future   • APPENDECTOMY  1955   • CATARACT EXTRACTION WITH IOL     • TONSILLECTOMY     • US-NEEDLE CORE BX-BREAST PANEL     • VAGINAL HYSTERECTOMY TOTAL      Hysterectomy,Total Vaginal     Family History   Problem Relation Age of Onset   • Cancer Mother    • Lung Disease Father    • Cancer Father      Social History     Social History   • Marital status: Single     Spouse name: N/A   • Number of children: N/A   • Years of education: N/A     Occupational History   • 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     Social History Main Topics   • Smoking status: Former Smoker     Packs/day: 1.50     Years: 50.00     Types: Cigarettes     Quit date: 5/6/2006   • Smokeless tobacco: Never Used      Comment: quit smoking 2002   • Alcohol use 0.0 oz/week      Comment: 2  drinks a week   • Drug use: No   • Sexual activity: Not Currently     Partners: Male     Other Topics Concern   • Not on file     Social History Narrative   • No narrative on file     Allergies   Allergen Reactions   • Codeine Hives and Nausea     RXN=40 years ago   • Lipitor [Atorvastatin Calcium] Myalgia     [id number]     • Lovastatin Myalgia     Muscle aches  RAW=1386   • Zocor [Simvastatin - High Dose] Myalgia     Severe myalgias  NPO=2471   • Lisinopril Cough   • Metformin      Diarrhea     • Tape      Paper tape ok     Outpatient Encounter Prescriptions as of 7/17/2018   Medication Sig Dispense Refill   • lorazepam (ATIVAN) 2 MG tablet      • venlafaxine XR (EFFEXOR XR) 37.5 MG CAPSULE SR 24 HR TAKE ONE CAPSULE BY MOUTH DAILY 30 Cap 0   • MICROLET LANCETS Misc For BG check once a day 100 Each 3   • Blood Glucose Monitoring Suppl Supplies Misc Contour Next glucometer strips for blood sugar check once a day 100 Each 2   • levothyroxine (SYNTHROID) 150 MCG Tab Take 1 Tab by mouth Every morning on an empty stomach. 90 Tab 4   • glipiZIDE (GLUCOTROL) 5 MG Tab Take 1 Tab by mouth every day. 90 Tab 4   • aspirin (ASA) 81 MG Chew Tab chewable tablet Take 1 Tab by mouth every day. 100 Tab 11   • [DISCONTINUED] DOCQLACE 100 MG Cap      • [DISCONTINUED] fenofibrate (TRIGLIDE) 160 MG tablet Take 1 Tab by mouth every day. 90 Tab 4     No facility-administered encounter medications on file as of 7/17/2018.      Review of Systems   Constitutional: Negative for chills and fever.   HENT: Negative for sore throat.    Eyes: Negative for blurred vision.   Respiratory: Negative for cough and shortness of breath.    Cardiovascular: Positive for palpitations. Negative for chest pain, claudication, leg swelling and PND.   Gastrointestinal: Positive for heartburn. Negative for abdominal pain and nausea.   Musculoskeletal: Negative for falls and joint pain.   Skin: Negative for rash.   Neurological: Negative for dizziness, focal  "weakness and weakness.   Endo/Heme/Allergies: Does not bruise/bleed easily.   Psychiatric/Behavioral: Positive for memory loss (chemo brain).        Objective:   /72   Pulse 90   Ht 1.676 m (5' 6\")   Wt 91.4 kg (201 lb 6.4 oz)   SpO2 97%   BMI 32.51 kg/m²     Physical Exam   Constitutional: No distress.   HENT:   Mouth/Throat: Oropharynx is clear and moist. No oropharyngeal exudate.   Eyes: No scleral icterus.   Neck: No JVD present.   Cardiovascular: Normal rate and normal heart sounds.  Exam reveals no gallop and no friction rub.    No murmur heard.  Pulmonary/Chest: No respiratory distress. She has no wheezes. She has no rales.   Abdominal: Soft. Bowel sounds are normal.   Musculoskeletal: She exhibits no edema.   Neurological: She is alert.   Skin: No rash noted. She is not diaphoretic.   Psychiatric: She has a normal mood and affect.     EKGs reviewed the most recent was accelerated junctional rhythm previous to that was sinus rhythm with short AR    Echocardiogram images reviewed shows normal function normal valve function    Stress test imaging from 2016 reviewed showed normal EKG to prior stress and imaging likely breast attenuation  PET CT images reviewed which show mild coronary calcifications      Assessment:     1. Essential hypertension     2. Family history of heart attack- daughter 52 yo  MI      3. Accelerated junctional rhythm on EKG in 2018 in setting of chemo         Medical Decision Making:  Today's Assessment / Status / Plan:     It was my pleasure to meet with Ms. Reeves.    He now added with the stresses of chemotherapy this would likely resolve over time and certainly if she had any significant symptoms of palpitations or racing heart she could safely take a beta-blocker does not actually require that    For the accelerated junctional rhythm this is likely due to stress from her prior lobectomy for Her health her blood pressure has been well controlledWithout " medications    For her dyslipidemia this has also been well-controlled with diet changes she no longer requires vibrate    For her diabetes control his A1c has been excellent less than 7 on minimal medications is unclear if switching to Jardiance would be a better option as I do not think this was studied in the trial she will continue focus on diet control    Her weight has been stable for many years she lost significant weight about 7-8 years ago and is maintaining that she is working on reducing more weight      I for her Adriamycin exposure we discussed the importance of monitoring for heart failure symptoms and the low incidence of heart failure in the long-term    I will see Ms. Reeves back in 2-3 years     It is my pleasure to participate in the care of Ms. Reeves.  Thank you for referring her to the Cardio-Oncology Program at [facility]. Please do not hesitate to contact me with questions or concerns.    Endy Laguerre MD PhD FACC  Cardiologist [facility]

## 2018-07-17 NOTE — LETTER
[facility]   [address]  [place] [address]  Phone: [phone]  Fax: [phone]              Clarisse Reeves  1940    Encounter Date: 2018    Endy Laguerre M.D.          PROGRESS NOTE:  Chief Complaint   Patient presents with   • HTN (Controlled)   • Hyperlipidemia       Subjective:   Clarisse Reeves is a 77 y.o. female who presents today in consultation from Ms. Frank for evaluation of her history of Adriamycin chemotherapy for recent breast cancer where she was noted to have tachycardia and on EKG accelerated junctional rhythm with sinus rhythm underlying this.  Long-term she is had obesity and metabolic syndrome with diabetes which has been very well controlled with minimal medications she tried multiple statin medications had intolerance myalgias and has been able to control her lipids with diet.  She has had multiple cancer concerns including CLL, lung cancer and then more recently breast cancer.    Over the years she has had multiple different cardiac testing including echocardiogram on multiple occasions and in 2016 she had a nuclear stress test as her daughter had  suddenly of heart disease which was normal her CT scans do show mild coronary calcifications.    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   • Cataract     right  and  left  IOL   • CLL (chronic lymphoblastic leukemia)    • Cutaneous skin tags 2015   • DM (diabetes mellitus) (HCC)     oral meds   • Hiatus hernia syndrome     no surgery   • Indigestion    • MEDICAL HOME    • Personal history of colonic polyps 2012   • Pneumonia    • Pneumonia 2017   • Thyroid disease    • Type II or unspecified  type diabetes mellitus without mention of complication, not stated as uncontrolled     pre-diabetic     Past Surgical History:   Procedure Laterality Date   • MASTECTOMY Left 7/6/2018    Procedure: MASTECTOMY - PARTIAL AFTER WIRE LOCAALIZATION;  Surgeon: Esperanza Crandall M.D.;  Location: SURGERY SAME DAY [facility];  Service: General   • AXILLARY NODE DISSECTION Left 7/6/2018    Procedure: AXILLARY NODE DISSECTION;  Surgeon: Esperanza Crandall M.D.;  Location: SURGERY SAME DAY [facility];  Service: General   • THYROIDECTOMY N/A 11/29/2017    Procedure: THYROIDECTOMY COMPLETION, RECURRENT LARYNGEAL NERVE MONITORING;  Surgeon: Cameron Marcelino M.D.;  Location: SURGERY SAME DAY [facility];  Service: General   • THORACOSCOPY Right 2/1/2016    Procedure: THORACOSCOPY Upper Lobectomy ;  Surgeon: John H Ganser, M.D.;  Location: SURGERY [facility];  Service:    • NODE DISSECTION Right 2/1/2016    Procedure: NODE DISSECTION;  Surgeon: John H Ganser, M.D.;  Location: SURGERY [facility];  Service:    • RECOVERY  12/18/2015    Procedure: CT-SCP-RUL LUNG BIOPSY-;  Surgeon: Recoveryonly Surgery;  Location: SURGERY PRE-POST PROC UNIT [facility];  Service:    • OTHER  2001    Lower Left segment of lung removed    • CHOLECYSTECTOMY  1995   • OTHER ORTHOPEDIC SURGERY  1990    bakers cyst removed in right knee, multiple scopes   • OTHER  1990    hemmroid removal   • OTHER  1984    left Thyroid removed, might remove right side in the future   • APPENDECTOMY  1955   • CATARACT EXTRACTION WITH IOL     • TONSILLECTOMY     • US-NEEDLE CORE BX-BREAST PANEL     • VAGINAL HYSTERECTOMY TOTAL      Hysterectomy,Total Vaginal     Family History   Problem Relation Age of Onset   • Cancer Mother    • Lung Disease Father    • Cancer Father      Social History     Social History   • Marital status: Single     Spouse name: N/A   • Number of children: N/A   • Years of education: N/A     Occupational History   • 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     Social History Main Topics   • Smoking status: Former Smoker     Packs/day: 1.50     Years: 50.00     Types: Cigarettes     Quit date: 5/6/2006   • Smokeless tobacco: Never Used      Comment: quit smoking 2002   • Alcohol use 0.0 oz/week      Comment: 2 drinks a week   • Drug use: No   • Sexual activity: Not Currently     Partners: Male     Other Topics Concern   • Not on file     Social History Narrative   • No narrative on file     Allergies   Allergen Reactions   • Codeine Hives and Nausea     RXN=40 years ago   • Lipitor [Atorvastatin Calcium] Myalgia     HTX=3849     • Lovastatin Myalgia     Muscle aches  KBZ=3090   • Zocor [Simvastatin - High Dose] Myalgia     Severe myalgias  XZI=8382   • Lisinopril Cough   • Metformin      Diarrhea     • Tape      Paper tape ok     Outpatient Encounter Prescriptions as of 7/17/2018   Medication Sig Dispense Refill   • lorazepam (ATIVAN) 2 MG tablet      • venlafaxine XR (EFFEXOR XR) 37.5 MG CAPSULE SR 24 HR TAKE ONE CAPSULE BY MOUTH DAILY 30 Cap 0   • MICROLET LANCETS Misc For BG check once a day 100 Each 3   • Blood Glucose Monitoring Suppl Supplies Misc Contour Next glucometer strips for blood sugar check once a day 100 Each 2   • levothyroxine (SYNTHROID) 150 MCG Tab Take 1 Tab by mouth Every morning on an empty stomach. 90 Tab 4   • glipiZIDE (GLUCOTROL) 5 MG Tab Take 1 Tab by mouth every day. 90 Tab 4   • aspirin (ASA) 81 MG Chew Tab chewable tablet Take 1 Tab by mouth every day. 100 Tab 11   • [DISCONTINUED] DOCQLACE 100 MG Cap      • [DISCONTINUED] fenofibrate (TRIGLIDE) 160 MG tablet Take 1 Tab by mouth every day. 90 Tab 4     No facility-administered encounter medications on file as of 7/17/2018.      Review of Systems   Constitutional: Negative for chills and fever.   HENT: Negative for sore throat.    Eyes: Negative for blurred vision.   Respiratory: Negative for cough and shortness of breath.    Cardiovascular: Positive for palpitations.  "Negative for chest pain, claudication, leg swelling and PND.   Gastrointestinal: Positive for heartburn. Negative for abdominal pain and nausea.   Musculoskeletal: Negative for falls and joint pain.   Skin: Negative for rash.   Neurological: Negative for dizziness, focal weakness and weakness.   Endo/Heme/Allergies: Does not bruise/bleed easily.   Psychiatric/Behavioral: Positive for memory loss (chemo brain).        Objective:   /72   Pulse 90   Ht 1.676 m (5' 6\")   Wt 91.4 kg (201 lb 6.4 oz)   SpO2 97%   BMI 32.51 kg/m²      Physical Exam   Constitutional: No distress.   HENT:   Mouth/Throat: Oropharynx is clear and moist. No oropharyngeal exudate.   Eyes: No scleral icterus.   Neck: No JVD present.   Cardiovascular: Normal rate and normal heart sounds.  Exam reveals no gallop and no friction rub.    No murmur heard.  Pulmonary/Chest: No respiratory distress. She has no wheezes. She has no rales.   Abdominal: Soft. Bowel sounds are normal.   Musculoskeletal: She exhibits no edema.   Neurological: She is alert.   Skin: No rash noted. She is not diaphoretic.   Psychiatric: She has a normal mood and affect.     EKGs reviewed the most recent was accelerated junctional rhythm previous to that was sinus rhythm with short CA    Echocardiogram images reviewed shows normal function normal valve function    Stress test imaging from 2016 reviewed showed normal EKG to prior stress and imaging likely breast attenuation  PET CT images reviewed which show mild coronary calcifications      Assessment:     1. Essential hypertension     2. Family history of heart attack- daughter 54 yo  MI      3. Accelerated junctional rhythm on EKG in 2018 in setting of chemo         Medical Decision Making:  Today's Assessment / Status / Plan:     It was my pleasure to meet with Ms. Reeves.    For Her health her blood pressure has been well controlledWithout medications    For her dyslipidemia this has also been " well-controlled with diet changes she no longer requires vibrate    For her diabetes control his A1c has been excellent less than 7 on minimal medications is unclear if switching to Jardiance would be a better option as I do not think this was studied in the trial she will continue focus on diet control    Her weight has been stable for many years she lost significant weight about 7-8 years ago and is maintaining that she is working on reducing more weight      I for her Adriamycin exposure we discussed the importance of monitoring for heart failure symptoms and the low incidence of heart failure in the long-term    I will see Ms. Reeves back in 2-3 years     It is my pleasure to participate in the care of Ms. Reeves.  Thank you for referring her to the Cardio-Oncology Program at [facility]. Please do not hesitate to contact me with questions or concerns.    Endy Laguerre MD PhD [facility]  Cardiologist [facility]              CHERRY Hayes  [address]  [address]  [facility] [address]  VIA In Basket     Esperanza Crandall M.D.  [address]  [address]  [facility] [address]  VIA Facsimile: [phone]

## 2018-07-20 ENCOUNTER — HOSPITAL ENCOUNTER (OUTPATIENT)
Dept: RADIATION ONCOLOGY | Facility: MEDICAL CENTER | Age: 78
End: 2018-07-31
Attending: RADIOLOGY
Payer: MEDICARE

## 2018-07-20 VITALS
OXYGEN SATURATION: 94 % | WEIGHT: 198 LBS | SYSTOLIC BLOOD PRESSURE: 132 MMHG | BODY MASS INDEX: 31.96 KG/M2 | HEART RATE: 96 BPM | TEMPERATURE: 97.5 F | DIASTOLIC BLOOD PRESSURE: 53 MMHG

## 2018-07-20 DIAGNOSIS — C34.11 MALIGNANT NEOPLASM OF RIGHT UPPER LOBE OF LUNG (HCC): ICD-10-CM

## 2018-07-20 PROCEDURE — 99214 OFFICE O/P EST MOD 30 MIN: CPT | Performed by: RADIOLOGY

## 2018-07-20 PROCEDURE — 99205 OFFICE O/P NEW HI 60 MIN: CPT | Performed by: RADIOLOGY

## 2018-07-20 ASSESSMENT — PAIN SCALES - GENERAL: PAINLEVEL: NO PAIN

## 2018-07-20 NOTE — ADDENDUM NOTE
Encounter addended by: Echo Saldana M.D. on: 7/20/2018  3:30 PM<BR>    Actions taken: Sign clinical note

## 2018-07-20 NOTE — CONSULTS
RADIATION ONCOLOGY CONSULT    DATE OF SERVICE: 7/20/2018    IDENTIFICATION: A 77 y.o. female with a triple negative T2 N1 grade 3 breast cancer status post neoadjuvant AC/Taxol followed by lumpectomy and node dissection with no residual tumor in the breast and 1 microscopic 1 mm remi mass..  She is here at the kind request of Dr. Crandall.    HISTORY OF PRESENT ILLNESS: Patient's history dates back to February of this year when she noted a mass in the upper outer quadrant the left breast. This prompted a mammogram and subsequent ultrasound on 2/8/2018. This revealed a suspicious mass at 2:00 with a suspicious enlarged lymph node. By ultrasound measured 2.1 x 1.8 x 1.3. This was then biopsied on 2/9/2018 and was found to be a grade 3 triple negative infiltrating ductal carcinoma. The Ki-67 was 30-50%. The left axillary node was biopsied and also positive for metastatic disease. She then underwent a PET/CT 2/19/2018 it showed the suspicious mass in the left breast as well as left axilla and there was questionable nodes in the left supraclav. There was also a questionable lesion at T6. An MRI scan of the T-spine was then performed on 3/3/2018 which showed no evidence of metastatic disease. She then proceeded on with AC/Taxol and subsequent lumpectomy and node dissection on 7/6/2018 revealing no residual in the breast but a 1 mm microscopic metastases in an axillary node. Postoperatively she is doing well without any major complaints.    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   • Cataract     right  and  left  IOL   • CLL (chronic lymphoblastic leukemia)    • Cutaneous skin tags 1/29/2015   • DM (diabetes mellitus) (HCC)     oral meds   • Hiatus hernia  syndrome     no surgery   • Indigestion    • MEDICAL HOME    • Personal history of colonic polyps 2012   • Pneumonia    • Pneumonia 2017   • Statin intolerance    • Thyroid disease    • Type II or unspecified type diabetes mellitus without mention of complication, not stated as uncontrolled     pre-diabetic       PAST SURGICAL HISTORY:  Past Surgical History:   Procedure Laterality Date   • MASTECTOMY Left 2018    Procedure: MASTECTOMY - PARTIAL AFTER WIRE LOCAALIZATION;  Surgeon: Esperanza Crandall M.D.;  Location: SURGERY SAME DAY [facility];  Service: General   • AXILLARY NODE DISSECTION Left 2018    Procedure: AXILLARY NODE DISSECTION;  Surgeon: Esperanza Crandall M.D.;  Location: SURGERY SAME DAY [facility];  Service: General   • THYROIDECTOMY N/A 2017    Procedure: THYROIDECTOMY COMPLETION, RECURRENT LARYNGEAL NERVE MONITORING;  Surgeon: Cameron Marcelino M.D.;  Location: SURGERY SAME DAY [facility];  Service: General   • THORACOSCOPY Right 2016    Procedure: THORACOSCOPY Upper Lobectomy ;  Surgeon: John H Ganser, M.D.;  Location: SURGERY [facility];  Service:    • NODE DISSECTION Right 2016    Procedure: NODE DISSECTION;  Surgeon: John H Ganser, M.D.;  Location: SURGERY [facility];  Service:    • RECOVERY  2015    Procedure: CT-SCP-RUL LUNG BIOPSY-;  Surgeon: Recoveryonly Surgery;  Location: SURGERY PRE-POST PROC UNIT [facility];  Service:    • OTHER      Lower Left segment of lung removed    • CHOLECYSTECTOMY     • OTHER ORTHOPEDIC SURGERY      bakers cyst removed in right knee, multiple scopes   • OTHER      hemmroid removal   • OTHER      left Thyroid removed, might remove right side in the future   • APPENDECTOMY     • CATARACT EXTRACTION WITH IOL     • TONSILLECTOMY     • US-NEEDLE CORE BX-BREAST PANEL     • VAGINAL HYSTERECTOMY TOTAL      Hysterectomy,Total Vaginal       GYNECOLOGICAL STATUS:   4 para 1 miscarriage  3    HORMONE USE:  NONE    CURRENT MEDICATIONS:  Current Outpatient Prescriptions   Medication Sig Dispense Refill   • lorazepam (ATIVAN) 2 MG tablet      • venlafaxine XR (EFFEXOR XR) 37.5 MG CAPSULE SR 24 HR TAKE ONE CAPSULE BY MOUTH DAILY 30 Cap 0   • MICROLET LANCETS Misc For BG check once a day 100 Each 3   • Blood Glucose Monitoring Suppl Supplies Misc Contour Next glucometer strips for blood sugar check once a day 100 Each 2   • levothyroxine (SYNTHROID) 150 MCG Tab Take 1 Tab by mouth Every morning on an empty stomach. 90 Tab 4   • glipiZIDE (GLUCOTROL) 5 MG Tab Take 1 Tab by mouth every day. 90 Tab 4   • aspirin (ASA) 81 MG Chew Tab chewable tablet Take 1 Tab by mouth every day. 100 Tab 11     No current facility-administered medications for this encounter.        ALLERGIES:    Codeine; Lipitor [atorvastatin calcium]; Lovastatin; Zocor [simvastatin - high dose]; Lisinopril; Metformin; and Tape    FAMILY HISTORY:    Family History   Problem Relation Age of Onset   • Cancer Mother    • Lung Disease Father    • Cancer Father          SOCIAL HISTORY:     reports that she quit smoking about 12 years ago. Her smoking use included Cigarettes. She has a 75.00 pack-year smoking history. She has never used smokeless tobacco. She reports that she drinks alcohol. She reports that she does not use drugs.  Lives with her  is recently retired. Just lost her daughter 2 years ago at age 53 from a massive heart attack  REVIEW OF SYSTEMS: Pertinent positives consist of the mass in the breast, fatigue, diabetes, thyroid disease, dry mouth some neuropathy from the Taxol  The rest of the review of systems is negative and has been reviewed by me. It is in the nursing note dated 7/20/2018 in [facility]    PHYSICAL EXAM:    1= Restricted in physically strenuous activity, but ambulatory and able to carry out work of a light sedentary nature, e.g., light housework, office work.  Vitals:    07/20/18 1254   BP: 132/53   Pulse: 96    Temp: 36.4 °C (97.5 °F)   SpO2: 94%   Weight: 89.8 kg (198 lb)   Pain Score: No pain      GENERAL: Well-appearing alert and oriented ×3 in no apparent distress alopecia but had alopecia prior to chemotherapy since the birth of her daughter  Breasts: Bilaterally symmetrical the left breast has a drain still in there is a single incision in the upper outer quadrant. She has associated erythema and induration within the surgery site. She has 5 plantar changes in the breasts.  HEENT:  Pupils are equal, round, and reactive to light.  Extraocular muscles   are intact. Sclerae nonicteric.  Conjunctivae pink.  Oral cavity, tongue   protrudes midline.   NECK:  Supple without evidence of thyromegaly.  NODES:  No peripheral adenopathy of the neck, supraclavicular fossa or axillae   bilaterally.  LUNGS:  Clear to ascultation   HEART:  Regular rate and rhythm.  No murmur appreciated  ABDOMEN:  Soft. No evidence of hepatosplenomegaly.  Positive bowel sounds.  EXTREMITIES:  Without Edema.  NEUROLOGIC:  Cranial nerves II through XII were intact. Normal stance and gait motor and sensory grossly within normal limits          IMPRESSION:    A 77 y.o. with  a triple negative T2 N1 grade 3 breast cancer status post neoadjuvant AC/Taxol followed by lumpectomy and node dissection with no residual tumor in the breast and 1 microscopic 1 mm remi mass..      RECOMMENDATIONS:   Had a long discussion with the patient is seen diagnosis prognosis and treatment. She understands the radiation therapy is important in local control particular A because she does have residual disease and axillary node. This is what is recommended in the national cancer guidelines at this point however randomized studies are looking at whether or not the radiation can be excluded.  I've described the details of radiation along with the side effects both acute and chronic, including but not exclusive to fatigue, skin reaction, local soreness, swelling, delayed  healing, scarring fibrosis discoloration. Ample time was allowed for questions, and patient understands.    Patient is tentatively scheduled for simulation a few weeks to get started soon thereafter    Thank you for the opportunity to participate in her care.  If any questions or comments, please do not hesitate in calling.    Please note that this dictation was created using voice recognition software. I have made every reasonable attempt to correct obvious errors, but I expect that there are errors of grammar and possibly content that I did not discover before finalizing the note.

## 2018-07-24 ENCOUNTER — OFFICE VISIT (OUTPATIENT)
Dept: HEMATOLOGY ONCOLOGY | Facility: MEDICAL CENTER | Age: 78
End: 2018-07-24
Payer: MEDICARE

## 2018-07-24 ENCOUNTER — TELEPHONE (OUTPATIENT)
Dept: HEMATOLOGY ONCOLOGY | Facility: MEDICAL CENTER | Age: 78
End: 2018-07-24

## 2018-07-24 ENCOUNTER — HOSPITAL ENCOUNTER (OUTPATIENT)
Dept: LAB | Facility: MEDICAL CENTER | Age: 78
End: 2018-07-24
Attending: INTERNAL MEDICINE
Payer: MEDICARE

## 2018-07-24 VITALS
HEIGHT: 66 IN | DIASTOLIC BLOOD PRESSURE: 81 MMHG | SYSTOLIC BLOOD PRESSURE: 118 MMHG | HEART RATE: 88 BPM | RESPIRATION RATE: 16 BRPM | BODY MASS INDEX: 32.44 KG/M2 | OXYGEN SATURATION: 94 % | TEMPERATURE: 97.4 F | WEIGHT: 201.83 LBS

## 2018-07-24 DIAGNOSIS — C50.412 MALIGNANT NEOPLASM OF UPPER-OUTER QUADRANT OF LEFT BREAST IN FEMALE, ESTROGEN RECEPTOR NEGATIVE (HCC): ICD-10-CM

## 2018-07-24 DIAGNOSIS — C34.11 MALIGNANT NEOPLASM OF RIGHT UPPER LOBE OF LUNG (HCC): ICD-10-CM

## 2018-07-24 DIAGNOSIS — Z17.1 MALIGNANT NEOPLASM OF UPPER-OUTER QUADRANT OF LEFT BREAST IN FEMALE, ESTROGEN RECEPTOR NEGATIVE (HCC): ICD-10-CM

## 2018-07-24 LAB
ALBUMIN SERPL BCP-MCNC: 4.5 G/DL (ref 3.2–4.9)
ALBUMIN/GLOB SERPL: 2 G/DL
ALP SERPL-CCNC: 102 U/L (ref 30–99)
ALT SERPL-CCNC: 15 U/L (ref 2–50)
ANION GAP SERPL CALC-SCNC: 12 MMOL/L (ref 0–11.9)
AST SERPL-CCNC: 14 U/L (ref 12–45)
BASOPHILS # BLD AUTO: 0.4 % (ref 0–1.8)
BASOPHILS # BLD: 0.05 K/UL (ref 0–0.12)
BILIRUB SERPL-MCNC: 0.5 MG/DL (ref 0.1–1.5)
BUN SERPL-MCNC: 10 MG/DL (ref 8–22)
CALCIUM SERPL-MCNC: 9.2 MG/DL (ref 8.5–10.5)
CHLORIDE SERPL-SCNC: 100 MMOL/L (ref 96–112)
CO2 SERPL-SCNC: 25 MMOL/L (ref 20–33)
CREAT SERPL-MCNC: 0.7 MG/DL (ref 0.5–1.4)
EOSINOPHIL # BLD AUTO: 0.33 K/UL (ref 0–0.51)
EOSINOPHIL NFR BLD: 2.7 % (ref 0–6.9)
ERYTHROCYTE [DISTWIDTH] IN BLOOD BY AUTOMATED COUNT: 51.1 FL (ref 35.9–50)
GLOBULIN SER CALC-MCNC: 2.3 G/DL (ref 1.9–3.5)
GLUCOSE SERPL-MCNC: 89 MG/DL (ref 65–99)
HCT VFR BLD AUTO: 35.8 % (ref 37–47)
HGB BLD-MCNC: 11.2 G/DL (ref 12–16)
IMM GRANULOCYTES # BLD AUTO: 0.07 K/UL (ref 0–0.11)
IMM GRANULOCYTES NFR BLD AUTO: 0.6 % (ref 0–0.9)
LYMPHOCYTES # BLD AUTO: 2.68 K/UL (ref 1–4.8)
LYMPHOCYTES NFR BLD: 21.7 % (ref 22–41)
MCH RBC QN AUTO: 28.4 PG (ref 27–33)
MCHC RBC AUTO-ENTMCNC: 31.3 G/DL (ref 33.6–35)
MCV RBC AUTO: 90.6 FL (ref 81.4–97.8)
MONOCYTES # BLD AUTO: 0.73 K/UL (ref 0–0.85)
MONOCYTES NFR BLD AUTO: 5.9 % (ref 0–13.4)
NEUTROPHILS # BLD AUTO: 8.5 K/UL (ref 2–7.15)
NEUTROPHILS NFR BLD: 68.7 % (ref 44–72)
NRBC # BLD AUTO: 0 K/UL
NRBC BLD-RTO: 0 /100 WBC
PLATELET # BLD AUTO: 328 K/UL (ref 164–446)
PMV BLD AUTO: 9.4 FL (ref 9–12.9)
POTASSIUM SERPL-SCNC: 3.7 MMOL/L (ref 3.6–5.5)
PROT SERPL-MCNC: 6.8 G/DL (ref 6–8.2)
RBC # BLD AUTO: 3.95 M/UL (ref 4.2–5.4)
SODIUM SERPL-SCNC: 137 MMOL/L (ref 135–145)
WBC # BLD AUTO: 12.4 K/UL (ref 4.8–10.8)

## 2018-07-24 PROCEDURE — 80053 COMPREHEN METABOLIC PANEL: CPT

## 2018-07-24 PROCEDURE — 99214 OFFICE O/P EST MOD 30 MIN: CPT | Performed by: INTERNAL MEDICINE

## 2018-07-24 PROCEDURE — 85025 COMPLETE CBC W/AUTO DIFF WBC: CPT

## 2018-07-24 PROCEDURE — 36415 COLL VENOUS BLD VENIPUNCTURE: CPT

## 2018-07-24 ASSESSMENT — PAIN SCALES - GENERAL: PAINLEVEL: NO PAIN

## 2018-07-24 NOTE — TELEPHONE ENCOUNTER
Called patient to inform her that her PICC line appointment is scheduled for 7/27/18 1245 check in at [facility] (Right next to ER) . Patients chemo is scheduled for 8/6/18 (Weekly) Taxol. Patient is aware of her prechemo on 8/6/18 0940 and of her picc care/lab appointment at 0830. Patient had no question or concerns at this time.

## 2018-07-24 NOTE — PROGRESS NOTES
Consult Note: Oncology    Date of consultation: 7/24/2018      Referring provider: Esperanza Crandall M.D.    Reason for consultation:   CC: Breast cancer    History of presenting illness:  Breast cancer  -August 2017 patient had a normal screening mammogram  -January 30, 2018. Echocardiogram shows normal systolic function. Ejection fraction of 65%  -February 2018 patient self palpated a lump in the left breast  -February 8, 2018. Diagnostic mammogram positive for suspicious mass in the left breast upper-outer quadrant.  -February 9, 2018. Needle biopsy shows triple negative infiltrating ductal carcinoma. Ki-67 is 30-50%  -February 19, 2018. PET CT scan. . Hypermetabolic 1.7 cm mass in the left breast, in keeping with known malignancy.2. Multiple hypermetabolic remi metastases in the left axilla.3. Nonenlarged lymph nodes in the left supraclavicular and retroclavicular region with mild uptake, suspicious for early metastasis.4. Mild uptake in the inferior endplate of T6 is nonspecific but could relate to degenerative change. Attention on follow-up exam is recommended  -March 3, 2018. MRI of the thoracic spine.1.  No evidence of metastatic disease the thoracic spine. Specifically, there is no suspicious lesion seen at T6.  -March 1, 2018. Cycle 1 day 1 of Adriamycin and cyclophosphamide.  -March 22, 2018. Plan for cycle 2 day 1 of Adriamycin and cyclophosphamide. April 19, 2018. Cycle 4 of Adriamycin and cyclophosphamide  -May 2, 2018. Cycle 1 of weekly paclitaxel  -May 16, 2018. Week 3 of paclitaxel   -June 6, 2018. Cycle 6 of paclitaxel  -June 13, 2018. Cycle 7 of weekly paclitaxel deferred secondary to severe fatigue  -July 6, 2018.  Patient underwent left breast mastectomy with lymph node dissection.  1/7 Lymph nodes positive for disease.  No residual cancer seen breast    Adenocarcinoma in situ   Dec 18, 2015 .Right upper lobe lung nodule, 6 cores: Adenocarcinoma-in-situ in fibroelastotic scar tissue, with  foci suspicious for invasive adenocarcinoma.  Feb 1, 2016. Right thoracoscopy with anterior segmentectomy of the right upper lobe and removal of a portion of the right middle lobe.    Fibroadenoma of right breast  Nov 12, 2014  Stereotactic biopsy: Benign fibrofatty breast tissue with discrete nodules of hyalinized fibroadenoma with microcalcifications. No atypia or malignancy.    CLL  2006 diagnosed with CLL in [place] after being admitted to the hospital for an infection and found to have leucocytosis.  Patient followed by Dr. Foster until 2014 until her insurance changed. Has been followed by her PCP since.       Clarisse Reeves  is a 76 y.o. year old female who presents to [facility]. She denies any acute complaints at this time except for some chronic fatigue which has been worse for the past past 3-4 months. She states she had some drenching night sweats last night but none in the past 6 months. She has also lost about 27 lbs in the past 3-4 months or so, but she has been trying to watch her diet and lose weight.    She was started on Celexa for depression last year(2016) after she lost her daughter who 51 to massive MI.      Interval History:  Initially seen in our clinic on 7/19/2017   Clarisse Reeves is a 77 y.o. Female who is seen in clinic for triple negative breast cancer. She is currently undergoing neoadjuvant chemotherapy. Patient states he is not feeling well. States he is fatigued and has been having a cough. She reports a temperature 101°F measured at 4:30 AM. She is here for prechemotherapy visit  She has been complaining of some abdominal pain which seems a bit better    Interval History: 6/27/2018  Clarisse Reeves is a 77 y.o. female seen in clinic today for follow up of severe fatigue malaise secondary to chemotherapy. The patient states she feels great and is going gambling today.she feels her energy and appetite have returned.    Interval History: 7/24/2018  Clarisse Reeves  is a 77 y.o. female seen in clinic today for follow-up after mastectomy and lymph node dissection.  Patient states she is doing well and denies any acute complaints.She had her drain removed yesterday     Breast cancer  Location, left breast upper outer quadrant  Severity, stage IIIc  Timing, constant  Modifying factors,no   Quality, ductal  Duration, diagnosed February 9, 2018  Context, discovered on workup for palpable lump  Associated factors, palpable lump in the left breast      Review of Systems:  All other review of systems are negative except what was mentioned above in the HPI.    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   • Cataract     right  and  left  IOL   • CLL (chronic lymphoblastic leukemia)    • Cutaneous skin tags 1/29/2015   • DM (diabetes mellitus) (HCC)     oral meds   • Hiatus hernia syndrome     no surgery   • Indigestion    • MEDICAL HOME    • Personal history of colonic polyps 11/26/2012   • Pneumonia 2014   • Pneumonia 06/2017   • Statin intolerance    • Thyroid disease    • Type II or unspecified type diabetes mellitus without mention of complication, not stated as uncontrolled     pre-diabetic       Past surgical history:    Past Surgical History:   Procedure Laterality Date   • MASTECTOMY Left 7/6/2018    Procedure: MASTECTOMY - PARTIAL AFTER WIRE LOCAALIZATION;  Surgeon: Esperanza Crandall M.D.;  Location: SURGERY SAME DAY [facility];  Service: General   • AXILLARY NODE DISSECTION Left 7/6/2018    Procedure: AXILLARY NODE DISSECTION;  Surgeon: Esperanza Crandall M.D.;  Location: SURGERY SAME DAY [facility];  Service: General   • THYROIDECTOMY N/A 11/29/2017    Procedure: THYROIDECTOMY COMPLETION, RECURRENT LARYNGEAL  NERVE MONITORING;  Surgeon: Cameron Marcelino M.D.;  Location: SURGERY SAME DAY [facility];  Service: General   • THORACOSCOPY Right 2/1/2016    Procedure: THORACOSCOPY Upper Lobectomy ;  Surgeon: John H Ganser, M.D.;  Location: SURGERY [facility];  Service:    • NODE DISSECTION Right 2/1/2016    Procedure: NODE DISSECTION;  Surgeon: John H Ganser, M.D.;  Location: SURGERY [facility];  Service:    • RECOVERY  12/18/2015    Procedure: CT-SCP-RUL LUNG BIOPSY-;  Surgeon: Recoveryonly Surgery;  [place] [facility] [facility];  Service:    • OTHER  2001    Lower Left segment of lung removed    • CHOLECYSTECTOMY  1995   • OTHER ORTHOPEDIC SURGERY  1990    bakers cyst removed in right knee, multiple scopes   • OTHER  1990    hemmroid removal   • OTHER  1984    left Thyroid removed, might remove right side in the future   • APPENDECTOMY  1955   • CATARACT EXTRACTION WITH IOL     • TONSILLECTOMY     • US-NEEDLE CORE BX-BREAST PANEL     • VAGINAL HYSTERECTOMY TOTAL      Hysterectomy,Total Vaginal       Allergies:  Codeine; Lipitor; Lovastatin; and Zocor    Medications:    Current Outpatient Prescriptions   Medication Sig Dispense Refill   • venlafaxine XR (EFFEXOR XR) 37.5 MG CAPSULE SR 24 HR TAKE ONE CAPSULE BY MOUTH DAILY 30 Cap 0   • Blood Glucose Monitoring Suppl Supplies Misc Contour Next glucometer strips for blood sugar check once a day 100 Each 2   • levothyroxine (SYNTHROID) 150 MCG Tab Take 1 Tab by mouth Every morning on an empty stomach. 90 Tab 4   • lorazepam (ATIVAN) 2 MG tablet      • MICROLET LANCETS Misc For BG check once a day 100 Each 3   • glipiZIDE (GLUCOTROL) 5 MG Tab Take 1 Tab by mouth every day. 90 Tab 4   • aspirin (ASA) 81 MG Chew Tab chewable tablet Take 1 Tab by mouth every day. 100 Tab 11     No current facility-administered medications for this visit.        Social History:     Social History     Social History   • Marital status: Single     Spouse name: N/A   •  "Number of children: N/A   • Years of education: N/A     Occupational History   • 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     Social History Main Topics   • Smoking status: Former Smoker     Packs/day: 1.50     Years: 50.00     Types: Cigarettes     Quit date: 5/6/2006   • Smokeless tobacco: Never Used      Comment: quit smoking 2002   • Alcohol use 0.0 oz/week      Comment: 2 drinks a week   • Drug use: No   • Sexual activity: Not Currently     Partners: Male     Other Topics Concern   • Not on file     Social History Narrative   • No narrative on file       Family History:     Family History   Problem Relation Age of Onset   • Cancer Mother    • Lung Disease Father    • Cancer Father            Physical Exam:  Vitals:   /81   Pulse 88   Temp 36.3 °C (97.4 °F)   Resp 16   Ht 1.676 m (5' 6\")   Wt 91.6 kg (201 lb 13.3 oz)   SpO2 94%   BMI 32.58 kg/m²     General: Not in acute distress, alert and oriented x 3  HEENT: No pallor, icterus. Oropharynx clear.   Neck: Supple, no palpable masses.  Lymph nodes: No palpable cervical, supraclavicular, axillary or inguinal lymphadenopathy.    CVS: regular rate and rhythm, no rubs or gallops  RESP: Clear to auscultate bilaterally, no wheezing or crackles.   ABD: Soft, RUQ tender to deep palpation, non distended, positive bowel sounds, no palpable organomegaly  EXT: No edema or cyanosis  CNS: Alert and oriented x3, No focal deficits.  Skin- No rash  Breast- right breast does not have any obvious distortion. No masses palpable no lymphadenopathy. Left breast has visible bruising at the 2:00 position at the site of needle biopsy-tender to palpation in the upper outer quadrant. Questionable lymph nodes palpable in the axilla, swelling because of biopsy noted      Labs:     Imaging  March 3, 2018. MRI of the thoracic spine  1.  No evidence of metastatic disease the thoracic spine. Specifically, there is no suspicious lesion seen at T6.  2.  Mild thoracic spondylosis without " high-grade impingement on the neural axis  3.  Hiatal hernia    February 19, 2018. PET CT scan  1. Hypermetabolic 1.7 cm mass in the left breast, in keeping with known malignancy.  2. Multiple hypermetabolic remi metastases in the left axilla.  3. Nonenlarged lymph nodes in the left supraclavicular and retroclavicular region with mild uptake, suspicious for early metastasis.  4. Mild uptake in the inferior endplate of T6 is nonspecific but could relate to degenerative change. Attention on follow-up exam is recommended.    Assessment and Plan:  -Breast cancer  -Left side   -Upper outer quadrant  -Invasive ductal  -Stage IIIc [cT2, cN3c, M0]. ER/IN negative HER-2 negative  -Histologic grade 3  -PET/CT scan from February 19, 2018 was reviewed. Left supraclavicular and retroclavicular lymph nodes nonenlarged but mild uptake suspicious for early metastasis.  -May be a candidate for adjuvant Xeloda based on The Capecitabine for Residual Cancer as Adjuvant Therapy (CREATE-X) trial   3/21/2018  -MRI of the spine March 3, no suspicious lesion seen at T6.  -March 1, 2018. Cycle 1 day 1 of Adriamycin and cyclophosphamide. Patient needed to be admitted to the hospital for neutropenic fever.  -March 22, 2018. Plan for cycle 2 day 1 of Adriamycin and cyclophosphamide. Will plan for 20% dose reduction because of patient's severe fatigue and prolonged anemia.  6/27/2018   -Patient symptoms improved dramatically. She does not want to continue chemotherapy.   -Keep appt with Dr. Crandall in am for surgical eval  -referral made to radiation oncology  7/24/2018  -Had a long discussion regarding residual disease.  Patient would like to complete her Taxol chemotherapy  -Case was discussed with Dr. Saldana from radiation oncology and her radiation will be pushed back until chemotherapy is completed  -check baseline labs today   -Patient underwent left breast mastectomy with lymph node dissection July 6, 2018. .  1/7 Lymph nodes positive for  disease.  No residual cancer seen in breast yp[T0,N1a]    -Anemia  -Secondary to chemotherapy  -Check CBC today     -CLL  -Established, stable, chronic  -Coon stage 0 with lymphocytosis only   -She is currently under active observation  -PET/CT scan February 2018 did not show evidence of lymph node disease    -Fibroadenoma of breast  -Right breast  -Stereotactic biopsy Nov 12, 2014  Showed benign fibrofatty breast tissue with discrete nodules of hyalinized fibroadenoma with microcalcifications.No atypia or malignancy.    - Adenocarcinoma in situ   - Diagnosed Dec 18, 2015 in the right upper lobe lung. biopsy of  nodule, 6 showed Adenocarcinoma-in-situ in fibroelastotic scar tissue, with foci suspicious for invasive adenocarcinoma.  - Feb 1, 2016. Right thoracoscopy with anterior segmentectomy of the right upper lobe and removal of a portion of the right middle lobe.Pathology report states The adenocarcinoma in situ focally demonstrates areas of central fibrosis which surrounds the adenocarcinoma in situ glands, which makes evaluation for invasion difficult. However, there are a few scattered clusters of malignant cells and small foci suspicious for invasive adenocarcinoma  - PET/CT scan February 2018 did not show any evidence of disease          She agreed and verbalized her agreement and understanding with the current plan.  I answered all questions and concerns she has at this time.   Dear  Esperanza Crandall M.D.., Thank you very much for allowing me to see  Clarisse Reeves today .     Please note that this dictation was created using voice recognition software. I have made every reasonable attempt to correct obvious errors, but I expect that there are errors of grammar and possibly content that I did not discover before finalizing the note.      SIGNATURES:  Denise Barroso    CC:  BETSY Guerra Michael D, M.D. Wright, Sharon, M.D.

## 2018-07-25 ENCOUNTER — APPOINTMENT (OUTPATIENT)
Dept: HEMATOLOGY ONCOLOGY | Facility: MEDICAL CENTER | Age: 78
End: 2018-07-25
Payer: MEDICARE

## 2018-07-26 ENCOUNTER — HOSPITAL ENCOUNTER (OUTPATIENT)
Dept: LAB | Facility: MEDICAL CENTER | Age: 78
End: 2018-07-26
Attending: INTERNAL MEDICINE
Payer: MEDICARE

## 2018-07-26 DIAGNOSIS — E78.2 MIXED HYPERLIPIDEMIA: ICD-10-CM

## 2018-07-26 DIAGNOSIS — E55.9 VITAMIN D INSUFFICIENCY: ICD-10-CM

## 2018-07-26 DIAGNOSIS — E03.9 ACQUIRED HYPOTHYROIDISM: ICD-10-CM

## 2018-07-26 DIAGNOSIS — E03.4 HYPOTHYROIDISM DUE TO ACQUIRED ATROPHY OF THYROID: ICD-10-CM

## 2018-07-26 DIAGNOSIS — E11.8 CONTROLLED TYPE 2 DIABETES MELLITUS WITH COMPLICATION, WITHOUT LONG-TERM CURRENT USE OF INSULIN (HCC): ICD-10-CM

## 2018-07-26 LAB
25(OH)D3 SERPL-MCNC: 16 NG/ML (ref 30–100)
ALBUMIN SERPL BCP-MCNC: 4.1 G/DL (ref 3.2–4.9)
ALBUMIN/GLOB SERPL: 1.7 G/DL
ALP SERPL-CCNC: 83 U/L (ref 30–99)
ALT SERPL-CCNC: 13 U/L (ref 2–50)
ANION GAP SERPL CALC-SCNC: 11 MMOL/L (ref 0–11.9)
AST SERPL-CCNC: 13 U/L (ref 12–45)
BASOPHILS # BLD AUTO: 0.5 % (ref 0–1.8)
BASOPHILS # BLD: 0.05 K/UL (ref 0–0.12)
BILIRUB SERPL-MCNC: 0.4 MG/DL (ref 0.1–1.5)
BUN SERPL-MCNC: 8 MG/DL (ref 8–22)
CALCIUM SERPL-MCNC: 9 MG/DL (ref 8.5–10.5)
CHLORIDE SERPL-SCNC: 103 MMOL/L (ref 96–112)
CHOLEST SERPL-MCNC: 161 MG/DL (ref 100–199)
CO2 SERPL-SCNC: 24 MMOL/L (ref 20–33)
CREAT SERPL-MCNC: 0.65 MG/DL (ref 0.5–1.4)
CREAT UR-MCNC: 172.8 MG/DL
CRP SERPL HS-MCNC: 68 MG/L (ref 0–7.5)
EOSINOPHIL # BLD AUTO: 0.5 K/UL (ref 0–0.51)
EOSINOPHIL NFR BLD: 4.6 % (ref 0–6.9)
ERYTHROCYTE [DISTWIDTH] IN BLOOD BY AUTOMATED COUNT: 55.5 FL (ref 35.9–50)
GLOBULIN SER CALC-MCNC: 2.4 G/DL (ref 1.9–3.5)
GLUCOSE SERPL-MCNC: 122 MG/DL (ref 65–99)
HCT VFR BLD AUTO: 36.5 % (ref 37–47)
HDLC SERPL-MCNC: 46 MG/DL
HGB BLD-MCNC: 10.7 G/DL (ref 12–16)
IMM GRANULOCYTES # BLD AUTO: 0.03 K/UL (ref 0–0.11)
IMM GRANULOCYTES NFR BLD AUTO: 0.3 % (ref 0–0.9)
LDLC SERPL CALC-MCNC: 95 MG/DL
LYMPHOCYTES # BLD AUTO: 2.29 K/UL (ref 1–4.8)
LYMPHOCYTES NFR BLD: 21.1 % (ref 22–41)
MCH RBC QN AUTO: 28.2 PG (ref 27–33)
MCHC RBC AUTO-ENTMCNC: 29.3 G/DL (ref 33.6–35)
MCV RBC AUTO: 96.3 FL (ref 81.4–97.8)
MICROALBUMIN UR-MCNC: 1.5 MG/DL
MICROALBUMIN/CREAT UR: 9 MG/G (ref 0–30)
MONOCYTES # BLD AUTO: 0.81 K/UL (ref 0–0.85)
MONOCYTES NFR BLD AUTO: 7.5 % (ref 0–13.4)
MORPHOLOGY BLD-IMP: NORMAL
NEUTROPHILS # BLD AUTO: 7.17 K/UL (ref 2–7.15)
NEUTROPHILS NFR BLD: 66 % (ref 44–72)
NRBC # BLD AUTO: 0 K/UL
NRBC BLD-RTO: 0 /100 WBC
PLATELET # BLD AUTO: 328 K/UL (ref 164–446)
PMV BLD AUTO: 9.8 FL (ref 9–12.9)
POTASSIUM SERPL-SCNC: 4.1 MMOL/L (ref 3.6–5.5)
PROT SERPL-MCNC: 6.5 G/DL (ref 6–8.2)
RBC # BLD AUTO: 3.79 M/UL (ref 4.2–5.4)
SODIUM SERPL-SCNC: 138 MMOL/L (ref 135–145)
TRIGL SERPL-MCNC: 99 MG/DL (ref 0–149)
TSH SERPL DL<=0.005 MIU/L-ACNC: 0.22 UIU/ML (ref 0.38–5.33)
WBC # BLD AUTO: 10.9 K/UL (ref 4.8–10.8)

## 2018-07-26 PROCEDURE — 82043 UR ALBUMIN QUANTITATIVE: CPT

## 2018-07-26 PROCEDURE — 82570 ASSAY OF URINE CREATININE: CPT

## 2018-07-26 PROCEDURE — 36415 COLL VENOUS BLD VENIPUNCTURE: CPT

## 2018-07-26 PROCEDURE — 86141 C-REACTIVE PROTEIN HS: CPT

## 2018-07-26 PROCEDURE — 83036 HEMOGLOBIN GLYCOSYLATED A1C: CPT

## 2018-07-26 PROCEDURE — 82306 VITAMIN D 25 HYDROXY: CPT

## 2018-07-26 PROCEDURE — 84443 ASSAY THYROID STIM HORMONE: CPT

## 2018-07-26 PROCEDURE — 85025 COMPLETE CBC W/AUTO DIFF WBC: CPT

## 2018-07-26 PROCEDURE — 80061 LIPID PANEL: CPT

## 2018-07-26 PROCEDURE — 80053 COMPREHEN METABOLIC PANEL: CPT

## 2018-07-27 ENCOUNTER — HOSPITAL ENCOUNTER (OUTPATIENT)
Dept: RADIOLOGY | Facility: MEDICAL CENTER | Age: 78
End: 2018-07-27
Attending: INTERNAL MEDICINE
Payer: MEDICARE

## 2018-07-27 DIAGNOSIS — Z17.1 MALIGNANT NEOPLASM OF UPPER-OUTER QUADRANT OF LEFT BREAST IN FEMALE, ESTROGEN RECEPTOR NEGATIVE (HCC): ICD-10-CM

## 2018-07-27 DIAGNOSIS — C34.11 MALIGNANT NEOPLASM OF RIGHT UPPER LOBE OF LUNG (HCC): ICD-10-CM

## 2018-07-27 DIAGNOSIS — C50.412 MALIGNANT NEOPLASM OF UPPER-OUTER QUADRANT OF LEFT BREAST IN FEMALE, ESTROGEN RECEPTOR NEGATIVE (HCC): ICD-10-CM

## 2018-07-27 PROCEDURE — 36569 INSJ PICC 5 YR+ W/O IMAGING: CPT

## 2018-07-27 RX ORDER — LIDOCAINE HYDROCHLORIDE 10 MG/ML
2 INJECTION, SOLUTION INFILTRATION; PERINEURAL
Status: DISCONTINUED | OUTPATIENT
Start: 2018-07-27 | End: 2018-07-28 | Stop reason: HOSPADM

## 2018-07-27 NOTE — PROCEDURES
PICC Insertion Final 3CG    Consents confirmed, vessel patency confirmed with ultrasound. Risks and benefits of procedure explained to patient/family and education regarding central line associated bloodstream infections provided. Questions answered.     PICC placed in RUE per MD order with ultrasound guidance. 4 Fr, SINGLE lumen PICC placed in BASILIC vein after ONE attempt(s). 1 cc's of 1% lidocaine injected intradermally, 21 gauge microintroducer needle and modified Seldinger technique used. 40 cm catheter inserted with good blood return. Secured at 1cm marker. Each lumen flushed without resistance with 10 mL 0.9% normal saline. PICC line secured with Biopatch and Tegaderm.    PICC placement is confirmed by nurse using 3CG technology. PICC line is appropriate for use at this time. Pt tolerated procedure WELL, NO C/O PAIN.  Patient condition relayed to unit RN or ordering physician via this post procedure note in the EMR.     BARD Power PICC ref # [id number], Lot # [id number]    All guidewires removed.  RUE arm circumference 9 cm proximal to ac:  41 cm

## 2018-07-30 ENCOUNTER — OFFICE VISIT (OUTPATIENT)
Dept: MEDICAL GROUP | Age: 78
End: 2018-07-30
Payer: MEDICARE

## 2018-07-30 VITALS
HEART RATE: 90 BPM | SYSTOLIC BLOOD PRESSURE: 128 MMHG | DIASTOLIC BLOOD PRESSURE: 72 MMHG | OXYGEN SATURATION: 95 % | HEIGHT: 66 IN | TEMPERATURE: 98.7 F | WEIGHT: 204 LBS | BODY MASS INDEX: 32.78 KG/M2

## 2018-07-30 DIAGNOSIS — E11.8 CONTROLLED TYPE 2 DIABETES MELLITUS WITH COMPLICATION, WITHOUT LONG-TERM CURRENT USE OF INSULIN (HCC): ICD-10-CM

## 2018-07-30 DIAGNOSIS — I10 ESSENTIAL HYPERTENSION: ICD-10-CM

## 2018-07-30 DIAGNOSIS — E66.9 OBESITY (BMI 30-39.9): ICD-10-CM

## 2018-07-30 DIAGNOSIS — E78.2 MIXED HYPERLIPIDEMIA: ICD-10-CM

## 2018-07-30 DIAGNOSIS — E03.4 HYPOTHYROIDISM DUE TO ACQUIRED ATROPHY OF THYROID: ICD-10-CM

## 2018-07-30 DIAGNOSIS — D64.9 NORMOCYTIC ANEMIA: ICD-10-CM

## 2018-07-30 DIAGNOSIS — K21.00 GASTROESOPHAGEAL REFLUX DISEASE WITH ESOPHAGITIS: ICD-10-CM

## 2018-07-30 DIAGNOSIS — C91.10 CLL (CHRONIC LYMPHOCYTIC LEUKEMIA) (HCC): ICD-10-CM

## 2018-07-30 DIAGNOSIS — E55.9 VITAMIN D INSUFFICIENCY: ICD-10-CM

## 2018-07-30 DIAGNOSIS — R79.82 ELEVATED C-REACTIVE PROTEIN (CRP): ICD-10-CM

## 2018-07-30 PROCEDURE — 99214 OFFICE O/P EST MOD 30 MIN: CPT | Performed by: INTERNAL MEDICINE

## 2018-07-30 RX ORDER — LANSOPRAZOLE 30 MG/1
30 CAPSULE, DELAYED RELEASE ORAL DAILY
Qty: 90 CAP | Refills: 4 | Status: SHIPPED | OUTPATIENT
Start: 2018-07-30 | End: 2019-01-23 | Stop reason: SDUPTHER

## 2018-07-30 ASSESSMENT — ENCOUNTER SYMPTOMS
MUSCULOSKELETAL NEGATIVE: 1
EYES NEGATIVE: 1
CONSTITUTIONAL NEGATIVE: 1
RESPIRATORY NEGATIVE: 1
PSYCHIATRIC NEGATIVE: 1
CARDIOVASCULAR NEGATIVE: 1
GASTROINTESTINAL NEGATIVE: 1
NEUROLOGICAL NEGATIVE: 1

## 2018-07-30 NOTE — PROGRESS NOTES
Subjective:      Clarisse Reeves is a 78 y.o. female who presents with Follow-Up (lab review)    Hospital Outpatient Visit on 07/26/2018   Component Date Value   • TSH 07/26/2018 0.220*   • C Reactive Protein High * 07/26/2018 68.0*   • 25-Hydroxy   Vitamin D 25 07/26/2018 16*   • Sodium 07/26/2018 138    • Potassium 07/26/2018 4.1    • Chloride 07/26/2018 103    • Co2 07/26/2018 24    • Anion Gap 07/26/2018 11.0    • Glucose 07/26/2018 122*   • Bun 07/26/2018 8    • Creatinine 07/26/2018 0.65    • Calcium 07/26/2018 9.0    • AST(SGOT) 07/26/2018 13    • ALT(SGPT) 07/26/2018 13    • Alkaline Phosphatase 07/26/2018 83    • Total Bilirubin 07/26/2018 0.4    • Albumin 07/26/2018 4.1    • Total Protein 07/26/2018 6.5    • Globulin 07/26/2018 2.4    • A-G Ratio 07/26/2018 1.7    • Cholesterol,Tot 07/26/2018 161    • Triglycerides 07/26/2018 99    • HDL 07/26/2018 46    • LDL 07/26/2018 95    • WBC 07/26/2018 10.9*   • RBC 07/26/2018 3.79*   • Hemoglobin 07/26/2018 10.7*   • Hematocrit 07/26/2018 36.5*   • MCV 07/26/2018 96.3    • MCH 07/26/2018 28.2    • MCHC 07/26/2018 29.3*   • RDW 07/26/2018 55.5*   • Platelet Count 07/26/2018 328    • MPV 07/26/2018 9.8    • Nucleated RBC 07/26/2018 0.00    • NRBC (Absolute) 07/26/2018 0.00    • Neutrophils-Polys 07/26/2018 66.00    • Lymphocytes 07/26/2018 21.10*   • Monocytes 07/26/2018 7.50    • Eosinophils 07/26/2018 4.60    • Basophils 07/26/2018 0.50    • Immature Granulocytes 07/26/2018 0.30    • Lymphs (Absolute) 07/26/2018 2.29    • Monos (Absolute) 07/26/2018 0.81    • Eos (Absolute) 07/26/2018 0.50    • Baso (Absolute) 07/26/2018 0.05    • Immature Granulocytes (a* 07/26/2018 0.03    • Neutrophils (Absolute) 07/26/2018 7.17*   • Creatinine, Urine 07/26/2018 172.80    • Microalbumin, Urine Rand* 07/26/2018 1.5    • Micro Alb Creat Ratio 07/26/2018 9    • Peripheral Smear Review 07/26/2018 see below    • GFR If  07/26/2018 >60    • GFR If Non   Ameri* 07/26/2018 >60    Hospital Outpatient Visit on 07/24/2018   Component Date Value   • WBC 07/24/2018 12.4*   • RBC 07/24/2018 3.95*   • Hemoglobin 07/24/2018 11.2*   • Hematocrit 07/24/2018 35.8*   • MCV 07/24/2018 90.6    • MCH 07/24/2018 28.4    • MCHC 07/24/2018 31.3*   • RDW 07/24/2018 51.1*   • Platelet Count 07/24/2018 328    • MPV 07/24/2018 9.4    • Neutrophils-Polys 07/24/2018 68.70    • Lymphocytes 07/24/2018 21.70*   • Monocytes 07/24/2018 5.90    • Eosinophils 07/24/2018 2.70    • Basophils 07/24/2018 0.40    • Immature Granulocytes 07/24/2018 0.60    • Nucleated RBC 07/24/2018 0.00    • Neutrophils (Absolute) 07/24/2018 8.50*   • Lymphs (Absolute) 07/24/2018 2.68    • Monos (Absolute) 07/24/2018 0.73    • Eos (Absolute) 07/24/2018 0.33    • Baso (Absolute) 07/24/2018 0.05    • Immature Granulocytes (a* 07/24/2018 0.07    • NRBC (Absolute) 07/24/2018 0.00    • Sodium 07/24/2018 137    • Potassium 07/24/2018 3.7    • Chloride 07/24/2018 100    • Co2 07/24/2018 25    • Anion Gap 07/24/2018 12.0*   • Glucose 07/24/2018 89    • Bun 07/24/2018 10    • Creatinine 07/24/2018 0.70    • Calcium 07/24/2018 9.2    • AST(SGOT) 07/24/2018 14    • ALT(SGPT) 07/24/2018 15    • Alkaline Phosphatase 07/24/2018 102*   • Total Bilirubin 07/24/2018 0.5    • Albumin 07/24/2018 4.5    • Total Protein 07/24/2018 6.8    • Globulin 07/24/2018 2.3    • A-G Ratio 07/24/2018 2.0    • GFR If  07/24/2018 >60    • GFR If Non  Ameri* 07/24/2018 >60    Admission on 07/06/2018, Discharged on 07/07/2018   Component Date Value   • Glucose - Accu-Ck 07/06/2018 100*      Lab Results   Component Value Date/Time    HBA1C 5.9 (H) 03/07/2018 08:05 PM    HBA1C 5.7 (H) 01/30/2018 10:50 AM     Lab Results   Component Value Date/Time    SODIUM 138 07/26/2018 07:58 AM    POTASSIUM 4.1 07/26/2018 07:58 AM    CHLORIDE 103 07/26/2018 07:58 AM    CO2 24 07/26/2018 07:58 AM    GLUCOSE 122 (H) 07/26/2018 07:58 AM    BUN 8  "07/26/2018 07:58 AM    CREATININE 0.65 07/26/2018 07:58 AM    CREATININE 0.76 04/12/2013 11:17 AM    BUNCREATRAT 19 11/17/2014 10:29 AM    BUNCREATRAT 25 04/12/2013 11:17 AM    ALKPHOSPHAT 83 07/26/2018 07:58 AM    ASTSGOT 13 07/26/2018 07:58 AM    ALTSGPT 13 07/26/2018 07:58 AM    TBILIRUBIN 0.4 07/26/2018 07:58 AM     Lab Results   Component Value Date/Time    INR 0.99 01/30/2018 10:50 AM    INR 0.95 06/01/2017 06:23 AM    INR 0.93 12/16/2015 03:17 PM     Lab Results   Component Value Date/Time    CHOLSTRLTOT 161 07/26/2018 07:58 AM    LDL 95 07/26/2018 07:58 AM    HDL 46 07/26/2018 07:58 AM    TRIGLYCERIDE 99 07/26/2018 07:58 AM       No results found for: TESTOSTERONE  Lab Results   Component Value Date/Time    TSH 0.617 04/30/2014 10:35 AM    TSH 0.383 (L) 02/06/2014 01:26 PM     Lab Results   Component Value Date/Time    FREET4 1.34 08/08/2017 11:35 AM    FREET4 1.28 03/22/2017 12:21 PM     No results found for: URICACID  No components found for: VITB12  Lab Results   Component Value Date/Time    25HYDROXY 16 (L) 07/26/2018 07:58 AM    25HYDROXY 20 (L) 01/13/2018 10:16 AM               HPI    Review of Systems   Constitutional: Negative.    HENT: Negative.    Eyes: Negative.    Respiratory: Negative.    Cardiovascular: Negative.    Gastrointestinal: Negative.    Genitourinary: Negative.    Musculoskeletal: Negative.    Skin: Negative.    Neurological: Negative.    Endo/Heme/Allergies: Negative.    Psychiatric/Behavioral: Negative.           Objective:     /72   Pulse 90   Temp 37.1 °C (98.7 °F)   Ht 1.676 m (5' 5.98\")   Wt 92.5 kg (204 lb)   SpO2 95%   BMI 32.94 kg/m²      Physical Exam   Constitutional: She is oriented to person, place, and time. She appears well-developed and well-nourished. No distress.   HENT:   Head: Normocephalic and atraumatic.   Right Ear: External ear normal.   Left Ear: External ear normal.   Nose: Nose normal.   Mouth/Throat: Oropharynx is clear and moist. No " oropharyngeal exudate.   Eyes: Pupils are equal, round, and reactive to light. Conjunctivae and EOM are normal. Right eye exhibits no discharge. Left eye exhibits no discharge. No scleral icterus.   Neck: Normal range of motion. Neck supple. No JVD present. No tracheal deviation present. No thyromegaly present.   Cardiovascular: Normal rate, regular rhythm, normal heart sounds and intact distal pulses.  Exam reveals no gallop and no friction rub.    No murmur heard.  Pulmonary/Chest: Effort normal and breath sounds normal. No stridor. No respiratory distress. She has no wheezes. She has no rales. She exhibits no tenderness.   Abdominal: Soft. Bowel sounds are normal. She exhibits no distension and no mass. There is no tenderness. There is no rebound and no guarding.   Musculoskeletal: Normal range of motion. She exhibits no edema or tenderness.   Lymphadenopathy:     She has no cervical adenopathy.   Neurological: She is alert and oriented to person, place, and time. She has normal reflexes. She displays normal reflexes. No cranial nerve deficit. She exhibits normal muscle tone. Coordination normal.   Skin: Skin is warm and dry. No rash noted. She is not diaphoretic. No erythema. No pallor.   Psychiatric: She has a normal mood and affect. Her behavior is normal. Judgment and thought content normal.   Vitals reviewed.    Hospital Outpatient Visit on 07/26/2018   Component Date Value   • TSH 07/26/2018 0.220*   • C Reactive Protein High * 07/26/2018 68.0*   • 25-Hydroxy   Vitamin D 25 07/26/2018 16*   • Sodium 07/26/2018 138    • Potassium 07/26/2018 4.1    • Chloride 07/26/2018 103    • Co2 07/26/2018 24    • Anion Gap 07/26/2018 11.0    • Glucose 07/26/2018 122*   • Bun 07/26/2018 8    • Creatinine 07/26/2018 0.65    • Calcium 07/26/2018 9.0    • AST(SGOT) 07/26/2018 13    • ALT(SGPT) 07/26/2018 13    • Alkaline Phosphatase 07/26/2018 83    • Total Bilirubin 07/26/2018 0.4    • Albumin 07/26/2018 4.1    • Total  Protein 07/26/2018 6.5    • Globulin 07/26/2018 2.4    • A-G Ratio 07/26/2018 1.7    • Cholesterol,Tot 07/26/2018 161    • Triglycerides 07/26/2018 99    • HDL 07/26/2018 46    • LDL 07/26/2018 95    • WBC 07/26/2018 10.9*   • RBC 07/26/2018 3.79*   • Hemoglobin 07/26/2018 10.7*   • Hematocrit 07/26/2018 36.5*   • MCV 07/26/2018 96.3    • MCH 07/26/2018 28.2    • MCHC 07/26/2018 29.3*   • RDW 07/26/2018 55.5*   • Platelet Count 07/26/2018 328    • MPV 07/26/2018 9.8    • Nucleated RBC 07/26/2018 0.00    • NRBC (Absolute) 07/26/2018 0.00    • Neutrophils-Polys 07/26/2018 66.00    • Lymphocytes 07/26/2018 21.10*   • Monocytes 07/26/2018 7.50    • Eosinophils 07/26/2018 4.60    • Basophils 07/26/2018 0.50    • Immature Granulocytes 07/26/2018 0.30    • Lymphs (Absolute) 07/26/2018 2.29    • Monos (Absolute) 07/26/2018 0.81    • Eos (Absolute) 07/26/2018 0.50    • Baso (Absolute) 07/26/2018 0.05    • Immature Granulocytes (a* 07/26/2018 0.03    • Neutrophils (Absolute) 07/26/2018 7.17*   • Creatinine, Urine 07/26/2018 172.80    • Microalbumin, Urine Rand* 07/26/2018 1.5    • Micro Alb Creat Ratio 07/26/2018 9    • Peripheral Smear Review 07/26/2018 see below    • GFR If  07/26/2018 >60    • GFR If Non  Ameri* 07/26/2018 >60    Hospital Outpatient Visit on 07/24/2018   Component Date Value   • WBC 07/24/2018 12.4*   • RBC 07/24/2018 3.95*   • Hemoglobin 07/24/2018 11.2*   • Hematocrit 07/24/2018 35.8*   • MCV 07/24/2018 90.6    • MCH 07/24/2018 28.4    • MCHC 07/24/2018 31.3*   • RDW 07/24/2018 51.1*   • Platelet Count 07/24/2018 328    • MPV 07/24/2018 9.4    • Neutrophils-Polys 07/24/2018 68.70    • Lymphocytes 07/24/2018 21.70*   • Monocytes 07/24/2018 5.90    • Eosinophils 07/24/2018 2.70    • Basophils 07/24/2018 0.40    • Immature Granulocytes 07/24/2018 0.60    • Nucleated RBC 07/24/2018 0.00    • Neutrophils (Absolute) 07/24/2018 8.50*   • Lymphs (Absolute) 07/24/2018 2.68    • Monos  (Absolute) 07/24/2018 0.73    • Eos (Absolute) 07/24/2018 0.33    • Baso (Absolute) 07/24/2018 0.05    • Immature Granulocytes (a* 07/24/2018 0.07    • NRBC (Absolute) 07/24/2018 0.00    • Sodium 07/24/2018 137    • Potassium 07/24/2018 3.7    • Chloride 07/24/2018 100    • Co2 07/24/2018 25    • Anion Gap 07/24/2018 12.0*   • Glucose 07/24/2018 89    • Bun 07/24/2018 10    • Creatinine 07/24/2018 0.70    • Calcium 07/24/2018 9.2    • AST(SGOT) 07/24/2018 14    • ALT(SGPT) 07/24/2018 15    • Alkaline Phosphatase 07/24/2018 102*   • Total Bilirubin 07/24/2018 0.5    • Albumin 07/24/2018 4.5    • Total Protein 07/24/2018 6.8    • Globulin 07/24/2018 2.3    • A-G Ratio 07/24/2018 2.0    • GFR If  07/24/2018 >60    • GFR If Non  Ameri* 07/24/2018 >60    Admission on 07/06/2018, Discharged on 07/07/2018   Component Date Value   • Glucose - Accu-Ck 07/06/2018 100*      .,alll  Lab Results   Component Value Date/Time    HBA1C 5.9 (H) 03/07/2018 08:05 PM    HBA1C 5.7 (H) 01/30/2018 10:50 AM     Lab Results   Component Value Date/Time    SODIUM 138 07/26/2018 07:58 AM    POTASSIUM 4.1 07/26/2018 07:58 AM    CHLORIDE 103 07/26/2018 07:58 AM    CO2 24 07/26/2018 07:58 AM    GLUCOSE 122 (H) 07/26/2018 07:58 AM    BUN 8 07/26/2018 07:58 AM    CREATININE 0.65 07/26/2018 07:58 AM    CREATININE 0.76 04/12/2013 11:17 AM    BUNCREATRAT 19 11/17/2014 10:29 AM    BUNCREATRAT 25 04/12/2013 11:17 AM    ALKPHOSPHAT 83 07/26/2018 07:58 AM    ASTSGOT 13 07/26/2018 07:58 AM    ALTSGPT 13 07/26/2018 07:58 AM    TBILIRUBIN 0.4 07/26/2018 07:58 AM     Lab Results   Component Value Date/Time    INR 0.99 01/30/2018 10:50 AM    INR 0.95 06/01/2017 06:23 AM    INR 0.93 12/16/2015 03:17 PM     Lab Results   Component Value Date/Time    CHOLSTRLTOT 161 07/26/2018 07:58 AM    LDL 95 07/26/2018 07:58 AM    HDL 46 07/26/2018 07:58 AM    TRIGLYCERIDE 99 07/26/2018 07:58 AM       No results found for: TESTOSTERONE  Lab Results    Component Value Date/Time    TSH 0.617 04/30/2014 10:35 AM    TSH 0.383 (L) 02/06/2014 01:26 PM     Lab Results   Component Value Date/Time    FREET4 1.34 08/08/2017 11:35 AM    FREET4 1.28 03/22/2017 12:21 PM     No results found for: URICACID  No components found for: VITB12  Lab Results   Component Value Date/Time    25HYDROXY 16 (L) 07/26/2018 07:58 AM    25HYDROXY 20 (L) 01/13/2018 10:16 AM                 Assessment/Plan:     1. Essential hypertension         Under good control. Continue same regimen.]'    2. Controlled type 2 diabetes mellitus with complication, without long-term current use of insulin (HCC)        Under good control. Continue same regimen.]'  - HEMOGLOBIN A1C; Future  - MICROALBUMIN CREAT RATIO URINE; Future    3. Normocytic anemia        Under good control. Continue same regimen.]'      4. Hypothyroidism due to acquired atrophy of thyroid        Under good control. Continue same regimen.]'    - TSH; Future    5. Obesity (BMI 30-39.9)    diet/exercise/lose 15 lbs.; patient counseled  - Patient identified as having weight management issue.  Appropriate orders and counseling given.    6. Gastroesophageal reflux disease with esophagitis         Under good control. Continue same regimen.]'    - lansoprazole (PREVACID) 30 MG CAPSULE DELAYED RELEASE; Take 1 Cap by mouth every day.  Dispense: 90 Cap; Refill: 4    7. Elevated C-reactive protein (CRP)  This is equal to 65 and probably secondary to her chemotherapy for her breast cancer and subsequent complications of her illness.    8. CLL (chronic lymphocytic leukemia)- wbc= 20k-30k, since 2006; no rx; oncologist to follow        Under good control. Continue same regimen.]'      9. Mixed hyperlipidemia     - COMP METABOLIC PANEL; Future  - LIPID PROFILE; Future  - VITAMIN D,25 HYDROXY; Future    10. Vitamin D insufficiency       Under good control. Continue same regimen.]'      30 minute face-to-face encounter took place today.  More than half of  this time was spent in the coordination of care of the above problems, as well as counseling.

## 2018-07-31 LAB
EST. AVERAGE GLUCOSE BLD GHB EST-MCNC: 111 MG/DL
HBA1C MFR BLD: 5.5 % (ref 0–5.6)

## 2018-08-01 ENCOUNTER — HOSPITAL ENCOUNTER (OUTPATIENT)
Dept: RADIATION ONCOLOGY | Facility: MEDICAL CENTER | Age: 78
End: 2018-08-31
Attending: RADIOLOGY
Payer: MEDICARE

## 2018-08-02 NOTE — PROGRESS NOTES
Pharmacy Chemotherapy Verification    Patient Name: Clarisse Reeves  Dx: Breast CA    Cycle: 7 weekly Paclitaxel (Cycle 11 total) - delayed for extreme fatigue  Previous treatment = week 6 on 6/7/18 s/p DDAC C4 on 4/20/18    Regimen: DDAC (Dose Dense Doxorubicin + Cyclophosphamide) followed by Paclitaxel  *Dosing Reference*  DOXOrubicin  IV on Day 1   --dose reduced to 48 mg/m2 due to tolerance  Cyclophosphamide IV over 30 min on Day 1   --dose reduced to 480 mg/m2 due to tolerance  14 day cycle for 4 cycles (completed 4/20/18)  NCCN Guidelines for Breast Cancer V.2.2017  Citron ML et al J Clin Oncol. 2003:21(8):1431-9    Followed By:    Paclitaxel Weekly Course  Paclitaxel (Taxol) 80 mg/m2 IV over 60 min on Day 1   Weekly cycle for 12 weeks  NCCN Guidelines for Breast Cancer v.1.2017  Sophyon ML et al J Clin Oncol. 2003:21(8):1431-9    Allergies:Codeine; Lipitor [atorvastatin calcium]; Lovastatin; Zocor [simvastatin - high dose]; Lisinopril; Metformin; and Tape  Blood pressure 132/74, pulse 98, temperature 36.4 °C (97.6 °F), resp. rate 18, weight 91.9 kg (202 lb 9.6 oz), SpO2 96 %.  Body surface area is 2.05 meters squared.    ECHO 1/30/18  LVEF = 65%    Labs 8/6/18:  ANC~ 5550   Plt = 263 k     Hgb = 10.8  SCr = 0.63 mg/dL  CrCl ~ 98 mL/min (min SCr 0.7 used)  AST/ALT/AP = 11/14/89 TBili = 0.3       Paclitaxel (Taxol) 80 mg/m2 x 2.05 m2 = 164 mg   <5% difference, okay to treat with final dose = 164 mg IV       Alison Brandon, PharmD

## 2018-08-03 DIAGNOSIS — Z17.1 MALIGNANT NEOPLASM OF UPPER-OUTER QUADRANT OF LEFT BREAST IN FEMALE, ESTROGEN RECEPTOR NEGATIVE (HCC): ICD-10-CM

## 2018-08-03 DIAGNOSIS — C50.412 MALIGNANT NEOPLASM OF UPPER-OUTER QUADRANT OF LEFT BREAST IN FEMALE, ESTROGEN RECEPTOR NEGATIVE (HCC): ICD-10-CM

## 2018-08-05 DIAGNOSIS — Z17.1 MALIGNANT NEOPLASM OF UPPER-OUTER QUADRANT OF LEFT BREAST IN FEMALE, ESTROGEN RECEPTOR NEGATIVE (HCC): ICD-10-CM

## 2018-08-05 DIAGNOSIS — C50.412 MALIGNANT NEOPLASM OF UPPER-OUTER QUADRANT OF LEFT BREAST IN FEMALE, ESTROGEN RECEPTOR NEGATIVE (HCC): ICD-10-CM

## 2018-08-05 DIAGNOSIS — R23.2 HOT FLASHES: ICD-10-CM

## 2018-08-06 ENCOUNTER — NON-PROVIDER VISIT (OUTPATIENT)
Dept: HEMATOLOGY ONCOLOGY | Facility: MEDICAL CENTER | Age: 78
End: 2018-08-06
Payer: MEDICARE

## 2018-08-06 ENCOUNTER — HOSPITAL ENCOUNTER (OUTPATIENT)
Facility: MEDICAL CENTER | Age: 78
End: 2018-08-06
Attending: INTERNAL MEDICINE
Payer: MEDICARE

## 2018-08-06 ENCOUNTER — OUTPATIENT INFUSION SERVICES (OUTPATIENT)
Dept: ONCOLOGY | Facility: MEDICAL CENTER | Age: 78
End: 2018-08-06
Attending: INTERNAL MEDICINE
Payer: MEDICARE

## 2018-08-06 ENCOUNTER — OFFICE VISIT (OUTPATIENT)
Dept: HEMATOLOGY ONCOLOGY | Facility: MEDICAL CENTER | Age: 78
End: 2018-08-06
Payer: MEDICARE

## 2018-08-06 VITALS
DIASTOLIC BLOOD PRESSURE: 62 MMHG | OXYGEN SATURATION: 95 % | HEIGHT: 65 IN | SYSTOLIC BLOOD PRESSURE: 102 MMHG | WEIGHT: 203 LBS | TEMPERATURE: 98.5 F | BODY MASS INDEX: 33.82 KG/M2 | HEART RATE: 75 BPM | RESPIRATION RATE: 16 BRPM

## 2018-08-06 VITALS
DIASTOLIC BLOOD PRESSURE: 62 MMHG | WEIGHT: 203.37 LBS | HEIGHT: 66 IN | OXYGEN SATURATION: 95 % | RESPIRATION RATE: 16 BRPM | SYSTOLIC BLOOD PRESSURE: 102 MMHG | TEMPERATURE: 98.5 F | HEART RATE: 75 BPM | BODY MASS INDEX: 32.68 KG/M2

## 2018-08-06 VITALS
SYSTOLIC BLOOD PRESSURE: 132 MMHG | TEMPERATURE: 97.6 F | RESPIRATION RATE: 18 BRPM | HEIGHT: 65 IN | HEART RATE: 98 BPM | DIASTOLIC BLOOD PRESSURE: 74 MMHG | BODY MASS INDEX: 33.76 KG/M2 | WEIGHT: 202.6 LBS | OXYGEN SATURATION: 96 %

## 2018-08-06 DIAGNOSIS — Z17.1 MALIGNANT NEOPLASM OF UPPER-OUTER QUADRANT OF LEFT BREAST IN FEMALE, ESTROGEN RECEPTOR NEGATIVE (HCC): ICD-10-CM

## 2018-08-06 DIAGNOSIS — C50.412 MALIGNANT NEOPLASM OF UPPER-OUTER QUADRANT OF LEFT BREAST IN FEMALE, ESTROGEN RECEPTOR NEGATIVE (HCC): ICD-10-CM

## 2018-08-06 DIAGNOSIS — C91.10 CLL (CHRONIC LYMPHOCYTIC LEUKEMIA) (HCC): ICD-10-CM

## 2018-08-06 LAB
ALBUMIN SERPL BCP-MCNC: 4.3 G/DL (ref 3.2–4.9)
ALBUMIN/GLOB SERPL: 1.9 G/DL
ALP SERPL-CCNC: 89 U/L (ref 30–99)
ALT SERPL-CCNC: 14 U/L (ref 2–50)
ANION GAP SERPL CALC-SCNC: 9 MMOL/L (ref 0–11.9)
AST SERPL-CCNC: 11 U/L (ref 12–45)
BASOPHILS # BLD AUTO: 0.5 % (ref 0–1.8)
BASOPHILS # BLD: 0.05 K/UL (ref 0–0.12)
BILIRUB SERPL-MCNC: 0.3 MG/DL (ref 0.1–1.5)
BUN SERPL-MCNC: 10 MG/DL (ref 8–22)
CALCIUM SERPL-MCNC: 8.9 MG/DL (ref 8.5–10.5)
CHLORIDE SERPL-SCNC: 103 MMOL/L (ref 96–112)
CO2 SERPL-SCNC: 25 MMOL/L (ref 20–33)
COMMENT 1642: NORMAL
CREAT SERPL-MCNC: 0.63 MG/DL (ref 0.5–1.4)
EOSINOPHIL # BLD AUTO: 0.46 K/UL (ref 0–0.51)
EOSINOPHIL NFR BLD: 5 % (ref 0–6.9)
ERYTHROCYTE [DISTWIDTH] IN BLOOD BY AUTOMATED COUNT: 47.6 FL (ref 35.9–50)
GLOBULIN SER CALC-MCNC: 2.3 G/DL (ref 1.9–3.5)
GLUCOSE SERPL-MCNC: 113 MG/DL (ref 65–99)
HCT VFR BLD AUTO: 34 % (ref 37–47)
HGB BLD-MCNC: 10.8 G/DL (ref 12–16)
IMM GRANULOCYTES # BLD AUTO: 0.04 K/UL (ref 0–0.11)
IMM GRANULOCYTES NFR BLD AUTO: 0.4 % (ref 0–0.9)
LYMPHOCYTES # BLD AUTO: 2.38 K/UL (ref 1–4.8)
LYMPHOCYTES NFR BLD: 25.8 % (ref 22–41)
MCH RBC QN AUTO: 28 PG (ref 27–33)
MCHC RBC AUTO-ENTMCNC: 31.8 G/DL (ref 33.6–35)
MCV RBC AUTO: 88.1 FL (ref 81.4–97.8)
MONOCYTES # BLD AUTO: 0.74 K/UL (ref 0–0.85)
MONOCYTES NFR BLD AUTO: 8 % (ref 0–13.4)
MORPHOLOGY BLD-IMP: NORMAL
NEUTROPHILS # BLD AUTO: 5.55 K/UL (ref 2–7.15)
NEUTROPHILS NFR BLD: 60.3 % (ref 44–72)
NRBC # BLD AUTO: 0 K/UL
NRBC BLD-RTO: 0 /100 WBC
PLATELET # BLD AUTO: 263 K/UL (ref 164–446)
PMV BLD AUTO: 10 FL (ref 9–12.9)
POTASSIUM SERPL-SCNC: 4 MMOL/L (ref 3.6–5.5)
PROT SERPL-MCNC: 6.6 G/DL (ref 6–8.2)
RBC # BLD AUTO: 3.86 M/UL (ref 4.2–5.4)
SODIUM SERPL-SCNC: 137 MMOL/L (ref 135–145)
WBC # BLD AUTO: 9.2 K/UL (ref 4.8–10.8)

## 2018-08-06 PROCEDURE — 85025 COMPLETE CBC W/AUTO DIFF WBC: CPT

## 2018-08-06 PROCEDURE — 700105 HCHG RX REV CODE 258

## 2018-08-06 PROCEDURE — 700105 HCHG RX REV CODE 258: Performed by: INTERNAL MEDICINE

## 2018-08-06 PROCEDURE — 700111 HCHG RX REV CODE 636 W/ 250 OVERRIDE (IP): Performed by: INTERNAL MEDICINE

## 2018-08-06 PROCEDURE — 96415 CHEMO IV INFUSION ADDL HR: CPT

## 2018-08-06 PROCEDURE — 304540 HCHG NITRO SET VENT 2ND TUB

## 2018-08-06 PROCEDURE — 700111 HCHG RX REV CODE 636 W/ 250 OVERRIDE (IP)

## 2018-08-06 PROCEDURE — 96413 CHEMO IV INFUSION 1 HR: CPT

## 2018-08-06 PROCEDURE — 80053 COMPREHEN METABOLIC PANEL: CPT

## 2018-08-06 PROCEDURE — 96375 TX/PRO/DX INJ NEW DRUG ADDON: CPT

## 2018-08-06 PROCEDURE — 99214 OFFICE O/P EST MOD 30 MIN: CPT | Performed by: INTERNAL MEDICINE

## 2018-08-06 RX ORDER — ONDANSETRON 2 MG/ML
4 INJECTION INTRAMUSCULAR; INTRAVENOUS
Status: CANCELLED | OUTPATIENT
Start: 2018-08-07

## 2018-08-06 RX ORDER — VENLAFAXINE HYDROCHLORIDE 37.5 MG/1
CAPSULE, EXTENDED RELEASE ORAL
Qty: 30 CAP | Refills: 3 | Status: SHIPPED | OUTPATIENT
Start: 2018-08-06 | End: 2019-01-09 | Stop reason: SDUPTHER

## 2018-08-06 RX ORDER — 0.9 % SODIUM CHLORIDE 0.9 %
5 VIAL (ML) INJECTION PRN
Status: CANCELLED | OUTPATIENT
Start: 2018-08-07

## 2018-08-06 RX ORDER — 0.9 % SODIUM CHLORIDE 0.9 %
VIAL (ML) INJECTION PRN
Status: CANCELLED | OUTPATIENT
Start: 2018-08-07

## 2018-08-06 RX ORDER — 0.9 % SODIUM CHLORIDE 0.9 %
VIAL (ML) INJECTION PRN
Status: CANCELLED | OUTPATIENT
Start: 2018-08-06

## 2018-08-06 RX ORDER — ONDANSETRON 8 MG/1
8 TABLET, ORALLY DISINTEGRATING ORAL
Status: CANCELLED | OUTPATIENT
Start: 2018-08-07

## 2018-08-06 RX ORDER — 0.9 % SODIUM CHLORIDE 0.9 %
5 VIAL (ML) INJECTION PRN
Status: CANCELLED | OUTPATIENT
Start: 2018-08-06

## 2018-08-06 RX ORDER — PROCHLORPERAZINE MALEATE 10 MG
10 TABLET ORAL EVERY 6 HOURS PRN
Status: CANCELLED | OUTPATIENT
Start: 2018-08-07

## 2018-08-06 RX ORDER — SODIUM CHLORIDE 9 MG/ML
INJECTION, SOLUTION INTRAVENOUS CONTINUOUS
Status: CANCELLED | OUTPATIENT
Start: 2018-08-07

## 2018-08-06 RX ADMIN — FAMOTIDINE 20 MG: 10 INJECTION INTRAVENOUS at 11:12

## 2018-08-06 RX ADMIN — DEXAMETHASONE SODIUM PHOSPHATE 12 MG: 4 INJECTION, SOLUTION INTRAMUSCULAR; INTRAVENOUS at 11:25

## 2018-08-06 RX ADMIN — PACLITAXEL 164 MG: 6 INJECTION, SOLUTION INTRAVENOUS at 11:52

## 2018-08-06 RX ADMIN — DIPHENHYDRAMINE HYDROCHLORIDE 25 MG: 50 INJECTION INTRAMUSCULAR; INTRAVENOUS at 11:12

## 2018-08-06 ASSESSMENT — PAIN SCALES - GENERAL
PAINLEVEL_OUTOF10: 0
PAINLEVEL: NO PAIN
PAINLEVEL: NO PAIN

## 2018-08-06 NOTE — PROGRESS NOTES
Consult Note: Oncology    Date of consultation: 8/6/2018      Referring provider: Esperanza Crandall M.D.    Reason for consultation:   CC: Breast cancer    History of presenting illness:  Breast cancer  -August 2017 patient had a normal screening mammogram  -January 30, 2018. Echocardiogram shows normal systolic function. Ejection fraction of 65%  -February 2018 patient self palpated a lump in the left breast  -February 8, 2018. Diagnostic mammogram positive for suspicious mass in the left breast upper-outer quadrant.  -February 9, 2018. Needle biopsy shows triple negative infiltrating ductal carcinoma. Ki-67 is 30-50%  -February 19, 2018. PET CT scan. . Hypermetabolic 1.7 cm mass in the left breast, in keeping with known malignancy.2. Multiple hypermetabolic remi metastases in the left axilla.3. Nonenlarged lymph nodes in the left supraclavicular and retroclavicular region with mild uptake, suspicious for early metastasis.4. Mild uptake in the inferior endplate of T6 is nonspecific but could relate to degenerative change. Attention on follow-up exam is recommended  -March 3, 2018. MRI of the thoracic spine.1.  No evidence of metastatic disease the thoracic spine. Specifically, there is no suspicious lesion seen at T6.  -March 1, 2018. Cycle 1 day 1 of Adriamycin and cyclophosphamide.  -March 22, 2018. Plan for cycle 2 day 1 of Adriamycin and cyclophosphamide. April 19, 2018. Cycle 4 of Adriamycin and cyclophosphamide  -May 2, 2018. Cycle 1 of weekly paclitaxel  -May 16, 2018. Week 3 of paclitaxel   -June 6, 2018. Cycle 6 of paclitaxel  -June 13, 2018. Cycle 7 of weekly paclitaxel deferred secondary to severe fatigue  -July 6, 2018.  Patient underwent left breast mastectomy with lymph node dissection.  1/7 Lymph nodes positive for disease.  No residual cancer seen breast    Adenocarcinoma in situ   Dec 18, 2015 .Right upper lobe lung nodule, 6 cores: Adenocarcinoma-in-situ in fibroelastotic scar tissue, with  foci suspicious for invasive adenocarcinoma.  Feb 1, 2016. Right thoracoscopy with anterior segmentectomy of the right upper lobe and removal of a portion of the right middle lobe.    Fibroadenoma of right breast  Nov 12, 2014  Stereotactic biopsy: Benign fibrofatty breast tissue with discrete nodules of hyalinized fibroadenoma with microcalcifications. No atypia or malignancy.    CLL  2006 diagnosed with CLL in [place] after being admitted to the hospital for an infection and found to have leucocytosis.  Patient followed by Dr. Foster until 2014 until her insurance changed. Has been followed by her PCP since.       Clarisse Reeves  is a 76 y.o. year old female who presents to [facility]. She denies any acute complaints at this time except for some chronic fatigue which has been worse for the past past 3-4 months. She states she had some drenching night sweats last night but none in the past 6 months. She has also lost about 27 lbs in the past 3-4 months or so, but she has been trying to watch her diet and lose weight.    She was started on Celexa for depression last year(2016) after she lost her daughter who 51 to massive MI.      Interval History:  Initially seen in our clinic on 7/19/2017   Clarisse Reeves is a 77 y.o. Female who is seen in clinic for triple negative breast cancer. She is currently undergoing neoadjuvant chemotherapy. Patient states he is not feeling well. States he is fatigued and has been having a cough. She reports a temperature 101°F measured at 4:30 AM. She is here for prechemotherapy visit  She has been complaining of some abdominal pain which seems a bit better    Interval History: 6/27/2018  Clarisse Reeves is a 77 y.o. female seen in clinic today for follow up of severe fatigue malaise secondary to chemotherapy. The patient states she feels great and is going gambling today.she feels her energy and appetite have returned.    Interval History: 7/24/2018  Clarisse Reeves  is a 77 y.o. female seen in clinic today for follow-up after mastectomy and lymph node dissection.  Patient states she is doing well and denies any acute complaints.She had her drain removed yesterday     Interval History: 8/6/2018  Clarisse Reeves is a 78 y.o. female seen in clinic today for starting adjuvant chemotherapy, denies any new complaints.she got has a got a new PICC line in her right arm    Breast cancer  Location, left breast upper outer quadrant  Severity, stage IIIc  Timing, constant  Modifying factors,no   Quality, ductal  Duration, diagnosed February 9, 2018  Context, discovered on workup for palpable lump  Associated factors, palpable lump in the left breast      Review of Systems:  All other review of systems are negative except what was mentioned above in the HPI.    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   • Cataract     right  and  left  IOL   • CLL (chronic lymphoblastic leukemia)    • Cutaneous skin tags 1/29/2015   • DM (diabetes mellitus) (HCC)     oral meds   • Hiatus hernia syndrome     no surgery   • Indigestion    • MEDICAL HOME    • Personal history of colonic polyps 11/26/2012   • Pneumonia 2014   • Pneumonia 06/2017   • Statin intolerance    • Thyroid disease    • Type II or unspecified type diabetes mellitus without mention of complication, not stated as uncontrolled     pre-diabetic       Past surgical history:    Past Surgical History:   Procedure Laterality Date   • MASTECTOMY Left 7/6/2018    Procedure: MASTECTOMY - PARTIAL AFTER WIRE LOCAALIZATION;  Surgeon: Esperanza Crandall M.D.;  Location: SURGERY SAME DAY [facility];  Service: General   • AXILLARY NODE DISSECTION Left 7/6/2018    Procedure: AXILLARY  NODE DISSECTION;  Surgeon: Esperanza Crandall M.D.;  Location: SURGERY SAME DAY [facility];  Service: General   • THYROIDECTOMY N/A 11/29/2017    Procedure: THYROIDECTOMY COMPLETION, RECURRENT LARYNGEAL NERVE MONITORING;  Surgeon: Cameron Marcelino M.D.;  Location: SURGERY SAME DAY [facility];  Service: General   • THORACOSCOPY Right 2/1/2016    Procedure: THORACOSCOPY Upper Lobectomy ;  Surgeon: John H Ganser, M.D.;  Location: SURGERY [facility];  Service:    • NODE DISSECTION Right 2/1/2016    Procedure: NODE DISSECTION;  Surgeon: John H Ganser, M.D.;  Location: SURGERY [facility];  Service:    • RECOVERY  12/18/2015    Procedure: CT-SCP-RUL LUNG BIOPSY-;  Surgeon: [facility];  [place] [facility] [place]  [place]:    • OTHER  2001    Lower Left segment of lung removed    • CHOLECYSTECTOMY  1995   • OTHER ORTHOPEDIC SURGERY  1990    bakers cyst removed in right knee, multiple scopes   • OTHER  1990    hemmroid removal   • OTHER  1984    left Thyroid removed, might remove right side in the future   • APPENDECTOMY  1955   • CATARACT EXTRACTION WITH IOL     • TONSILLECTOMY     • US-NEEDLE CORE BX-BREAST PANEL     • VAGINAL HYSTERECTOMY TOTAL      Hysterectomy,Total Vaginal       Allergies:  Codeine; Lipitor; Lovastatin; and Zocor    Medications:    Current Outpatient Prescriptions   Medication Sig Dispense Refill   • lansoprazole (PREVACID) 30 MG CAPSULE DELAYED RELEASE Take 1 Cap by mouth every day. 90 Cap 4   • lorazepam (ATIVAN) 2 MG tablet      • venlafaxine XR (EFFEXOR XR) 37.5 MG CAPSULE SR 24 HR TAKE ONE CAPSULE BY MOUTH DAILY 30 Cap 0   • MICROLET LANCETS Misc For BG check once a day 100 Each 3   • Blood Glucose Monitoring Suppl Supplies Misc Contour Next glucometer strips for blood sugar check once a day 100 Each 2   • levothyroxine (SYNTHROID) 150 MCG Tab Take 1 Tab by mouth Every morning on an empty stomach. 90 Tab 4   • glipiZIDE (GLUCOTROL) 5 MG Tab Take 1 Tab  "by mouth every day. 90 Tab 4   • aspirin (ASA) 81 MG Chew Tab chewable tablet Take 1 Tab by mouth every day. 100 Tab 11     No current facility-administered medications for this visit.        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     Social History Main Topics   • Smoking status: Former Smoker     Packs/day: 1.50     Years: 50.00     Types: Cigarettes     Quit date: 5/6/2006   • Smokeless tobacco: Never Used      Comment: quit smoking 2002   • Alcohol use 0.0 oz/week      Comment: 2 drinks a week   • Drug use: No   • Sexual activity: Not Currently     Partners: Male     Other Topics Concern   • Not on file     Social History Narrative   • No narrative on file       Family History:     Family History   Problem Relation Age of Onset   • Cancer Mother    • Lung Disease Father    • Cancer Father            Physical Exam:  Vitals:   /62   Pulse 75   Temp 36.9 °C (98.5 °F)   Resp 16   Ht 1.651 m (5' 5\")   Wt 92.1 kg (203 lb)   SpO2 95%   BMI 33.78 kg/m²     General: Not in acute distress, alert and oriented x 3  HEENT: No pallor, icterus. Oropharynx clear.   Neck: Supple, no palpable masses.  Lymph nodes: No palpable cervical, supraclavicular, axillary or inguinal lymphadenopathy.    CVS: regular rate and rhythm, no rubs or gallops  RESP: Clear to auscultate bilaterally, no wheezing or crackles.   ABD: Soft, RUQ tender to deep palpation, non distended, positive bowel sounds, no palpable organomegaly  EXT: No edema or cyanosis  CNS: Alert and oriented x3, No focal deficits.  Skin- No rash        Labs:   Hospital Outpatient Visit on 08/06/2018   Component Date Value Ref Range Status   • Sodium 08/06/2018 137  135 - 145 mmol/L Final   • Potassium 08/06/2018 4.0  3.6 - 5.5 mmol/L Final   • Chloride 08/06/2018 103  96 - 112 mmol/L " Final   • Co2 08/06/2018 25  20 - 33 mmol/L Final   • Anion Gap 08/06/2018 9.0  0.0 - 11.9 Final   • Glucose 08/06/2018 113* 65 - 99 mg/dL Final   • Bun 08/06/2018 10  8 - 22 mg/dL Final   • Creatinine 08/06/2018 0.63  0.50 - 1.40 mg/dL Final   • Calcium 08/06/2018 8.9  8.5 - 10.5 mg/dL Final   • AST(SGOT) 08/06/2018 11* 12 - 45 U/L Final   • ALT(SGPT) 08/06/2018 14  2 - 50 U/L Final   • Alkaline Phosphatase 08/06/2018 89  30 - 99 U/L Final   • Total Bilirubin 08/06/2018 0.3  0.1 - 1.5 mg/dL Final   • Albumin 08/06/2018 4.3  3.2 - 4.9 g/dL Final   • Total Protein 08/06/2018 6.6  6.0 - 8.2 g/dL Final   • Globulin 08/06/2018 2.3  1.9 - 3.5 g/dL Final   • A-G Ratio 08/06/2018 1.9  g/dL Final   • GFR If  08/06/2018 >60  >60 mL/min/1.73 m 2 Final   • GFR If Non  08/06/2018 >60  >60 mL/min/1.73 m 2 Final   Hospital Outpatient Visit on 07/26/2018   Component Date Value Ref Range Status   • TSH 07/26/2018 0.220* 0.380 - 5.330 uIU/mL Final    Comment: Please note new reference ranges effective 12/14/2017 10:00 AM  Pregnant Females, 1st Trimester  0.050-3.700  Pregnant Females, 2nd Trimester  0.310-4.350  Pregnant Females, 3rd Trimester  0.410-5.180     • C Reactive Protein High Sensitive 07/26/2018 68.0* 0.0 - 7.5 mg/L Final    Comment: CDC/AHA Recommendations for Relative Risk Categories for hsCRP  Relative Risk                 Average hs-CRP level  Low                             <1.0 mg/L  Average Risk                    1.0-3.0 mg/L  High Risk                       >3.0 mg/L  Increased hs-CRP values are non-specific and should not be  interpreted without a complete clinical history. hs-CRP  levels should be measured twice (averaging the results),  optimally 2 weeks apart, fasting or non-fasting.  hs-CRP  should be used in conjunction with conventional risk assess-  ment for cardiovascular disease to guide therapy decisions.     • 25-Hydroxy   Vitamin D 25 07/26/2018 16* 30 - 100 ng/mL  Final    Comment: Adult Ranges:   <20 ng/mL - Deficiency  20-29 ng/mL - Insufficiency   ng/mL - Sufficiency  The [employer] Centaur Vitamin D Assay is standardized to the  [facility] reference measurement procedures, a  reference method for the Vitamin D Standardization Program  (VDSP).  The VDSP aligns patient results among 25 (OH)  Vitamin D methods.     • Sodium 07/26/2018 138  135 - 145 mmol/L Final   • Potassium 07/26/2018 4.1  3.6 - 5.5 mmol/L Final   • Chloride 07/26/2018 103  96 - 112 mmol/L Final   • Co2 07/26/2018 24  20 - 33 mmol/L Final   • Anion Gap 07/26/2018 11.0  0.0 - 11.9 Final   • Glucose 07/26/2018 122* 65 - 99 mg/dL Final   • Bun 07/26/2018 8  8 - 22 mg/dL Final   • Creatinine 07/26/2018 0.65  0.50 - 1.40 mg/dL Final   • Calcium 07/26/2018 9.0  8.5 - 10.5 mg/dL Final   • AST(SGOT) 07/26/2018 13  12 - 45 U/L Final   • ALT(SGPT) 07/26/2018 13  2 - 50 U/L Final   • Alkaline Phosphatase 07/26/2018 83  30 - 99 U/L Final   • Total Bilirubin 07/26/2018 0.4  0.1 - 1.5 mg/dL Final   • Albumin 07/26/2018 4.1  3.2 - 4.9 g/dL Final   • Total Protein 07/26/2018 6.5  6.0 - 8.2 g/dL Final   • Globulin 07/26/2018 2.4  1.9 - 3.5 g/dL Final   • A-G Ratio 07/26/2018 1.7  g/dL Final   • Cholesterol,Tot 07/26/2018 161  100 - 199 mg/dL Final   • Triglycerides 07/26/2018 99  0 - 149 mg/dL Final   • HDL 07/26/2018 46  >=40 mg/dL Final   • LDL 07/26/2018 95  <100 mg/dL Final   • WBC 07/26/2018 10.9* 4.8 - 10.8 K/uL Final   • RBC 07/26/2018 3.79* 4.20 - 5.40 M/uL Final   • Hemoglobin 07/26/2018 10.7* 12.0 - 16.0 g/dL Final   • Hematocrit 07/26/2018 36.5* 37.0 - 47.0 % Final   • MCV 07/26/2018 96.3  81.4 - 97.8 fL Final   • MCH 07/26/2018 28.2  27.0 - 33.0 pg Final   • MCHC 07/26/2018 29.3* 33.6 - 35.0 g/dL Final   • RDW 07/26/2018 55.5* 35.9 - 50.0 fL Final   • Platelet Count 07/26/2018 328  164 - 446 K/uL Final   • MPV 07/26/2018 9.8  9.0 - 12.9 fL Final   • Nucleated RBC 07/26/2018 0.00  /100 WBC Final   • NRBC  (Absolute) 07/26/2018 0.00  K/uL Final   • Neutrophils-Polys 07/26/2018 66.00  44.00 - 72.00 % Final   • Lymphocytes 07/26/2018 21.10* 22.00 - 41.00 % Final   • Monocytes 07/26/2018 7.50  0.00 - 13.40 % Final   • Eosinophils 07/26/2018 4.60  0.00 - 6.90 % Final   • Basophils 07/26/2018 0.50  0.00 - 1.80 % Final   • Immature Granulocytes 07/26/2018 0.30  0.00 - 0.90 % Final   • Lymphs (Absolute) 07/26/2018 2.29  1.00 - 4.80 K/uL Final   • Monos (Absolute) 07/26/2018 0.81  0.00 - 0.85 K/uL Final   • Eos (Absolute) 07/26/2018 0.50  0.00 - 0.51 K/uL Final   • Baso (Absolute) 07/26/2018 0.05  0.00 - 0.12 K/uL Final   • Immature Granulocytes (abs) 07/26/2018 0.03  0.00 - 0.11 K/uL Final   • Neutrophils (Absolute) 07/26/2018 7.17* 2.00 - 7.15 K/uL Final    Includes immature neutrophils, if present.   • Glycohemoglobin 07/26/2018 5.5  0.0 - 5.6 % Final    Comment: Increased risk for diabetes:  5.7 -6.4%  Diabetes:  >6.4%  Glycemic control for adults with diabetes:  <7.0%  The above interpretations are per ADA guidelines.  Diagnosis  of diabetes mellitus on the basis of elevated Hemoglobin A1c  should be confirmed by repeating the Hb A1c test.     • Est Avg Glucose 07/26/2018 111  mg/dL Final    Comment: The eAG calculation is based on the A1c-Derived Daily Glucose  (ADAG) study.  See the ADA's website for additional information.     • Creatinine, Urine 07/26/2018 172.80  mg/dL Final   • Microalbumin, Urine Random 07/26/2018 1.5  mg/dL Final   • Micro Alb Creat Ratio 07/26/2018 9  0 - 30 mg/g Final   • Peripheral Smear Review 07/26/2018 see below   Final    Comment: Due to instrument suspect flags, further review of peripheral smear is  indicated on this patient sample. This review may or may not result in  abnormal findings.     • GFR If  07/26/2018 >60  >60 mL/min/1.73 m 2 Final   • GFR If Non  07/26/2018 >60  >60 mL/min/1.73 m 2 Final   Hospital Outpatient Visit on 07/24/2018   Component  Date Value Ref Range Status   • WBC 07/24/2018 12.4* 4.8 - 10.8 K/uL Final   • RBC 07/24/2018 3.95* 4.20 - 5.40 M/uL Final   • Hemoglobin 07/24/2018 11.2* 12.0 - 16.0 g/dL Final   • Hematocrit 07/24/2018 35.8* 37.0 - 47.0 % Final   • MCV 07/24/2018 90.6  81.4 - 97.8 fL Final   • MCH 07/24/2018 28.4  27.0 - 33.0 pg Final   • MCHC 07/24/2018 31.3* 33.6 - 35.0 g/dL Final   • RDW 07/24/2018 51.1* 35.9 - 50.0 fL Final   • Platelet Count 07/24/2018 328  164 - 446 K/uL Final   • MPV 07/24/2018 9.4  9.0 - 12.9 fL Final   • Neutrophils-Polys 07/24/2018 68.70  44.00 - 72.00 % Final   • Lymphocytes 07/24/2018 21.70* 22.00 - 41.00 % Final   • Monocytes 07/24/2018 5.90  0.00 - 13.40 % Final   • Eosinophils 07/24/2018 2.70  0.00 - 6.90 % Final   • Basophils 07/24/2018 0.40  0.00 - 1.80 % Final   • Immature Granulocytes 07/24/2018 0.60  0.00 - 0.90 % Final   • Nucleated RBC 07/24/2018 0.00  /100 WBC Final   • Neutrophils (Absolute) 07/24/2018 8.50* 2.00 - 7.15 K/uL Final    Includes immature neutrophils, if present.   • Lymphs (Absolute) 07/24/2018 2.68  1.00 - 4.80 K/uL Final   • Monos (Absolute) 07/24/2018 0.73  0.00 - 0.85 K/uL Final   • Eos (Absolute) 07/24/2018 0.33  0.00 - 0.51 K/uL Final   • Baso (Absolute) 07/24/2018 0.05  0.00 - 0.12 K/uL Final   • Immature Granulocytes (abs) 07/24/2018 0.07  0.00 - 0.11 K/uL Final   • NRBC (Absolute) 07/24/2018 0.00  K/uL Final   • Sodium 07/24/2018 137  135 - 145 mmol/L Final   • Potassium 07/24/2018 3.7  3.6 - 5.5 mmol/L Final   • Chloride 07/24/2018 100  96 - 112 mmol/L Final   • Co2 07/24/2018 25  20 - 33 mmol/L Final   • Anion Gap 07/24/2018 12.0* 0.0 - 11.9 Final   • Glucose 07/24/2018 89  65 - 99 mg/dL Final   • Bun 07/24/2018 10  8 - 22 mg/dL Final   • Creatinine 07/24/2018 0.70  0.50 - 1.40 mg/dL Final   • Calcium 07/24/2018 9.2  8.5 - 10.5 mg/dL Final   • AST(SGOT) 07/24/2018 14  12 - 45 U/L Final   • ALT(SGPT) 07/24/2018 15  2 - 50 U/L Final   • Alkaline Phosphatase 07/24/2018  102* 30 - 99 U/L Final   • Total Bilirubin 07/24/2018 0.5  0.1 - 1.5 mg/dL Final   • Albumin 07/24/2018 4.5  3.2 - 4.9 g/dL Final   • Total Protein 07/24/2018 6.8  6.0 - 8.2 g/dL Final   • Globulin 07/24/2018 2.3  1.9 - 3.5 g/dL Final   • A-G Ratio 07/24/2018 2.0  g/dL Final   • GFR If  07/24/2018 >60  >60 mL/min/1.73 m 2 Final   • GFR If Non  07/24/2018 >60  >60 mL/min/1.73 m 2 Final         Imaging  March 3, 2018. MRI of the thoracic spine  1.  No evidence of metastatic disease the thoracic spine. Specifically, there is no suspicious lesion seen at T6.  2.  Mild thoracic spondylosis without high-grade impingement on the neural axis  3.  Hiatal hernia    February 19, 2018. PET CT scan  1. Hypermetabolic 1.7 cm mass in the left breast, in keeping with known malignancy.  2. Multiple hypermetabolic remi metastases in the left axilla.  3. Nonenlarged lymph nodes in the left supraclavicular and retroclavicular region with mild uptake, suspicious for early metastasis.  4. Mild uptake in the inferior endplate of T6 is nonspecific but could relate to degenerative change. Attention on follow-up exam is recommended.    Assessment and Plan:  -Breast cancer  -Left side   -Upper outer quadrant  -Invasive ductal  -Stage IIIc [cT2, cN3c, M0]. ER/IA negative HER-2 negative  -Histologic grade 3  -PET/CT scan from February 19, 2018 was reviewed. Left supraclavicular and retroclavicular lymph nodes nonenlarged but mild uptake suspicious for early metastasis.  -May be a candidate for adjuvant Xeloda based on The Capecitabine for Residual Cancer as Adjuvant Therapy (CREATE-X) trial   3/21/2018  -MRI of the spine March 3, no suspicious lesion seen at T6.  -March 1, 2018. Cycle 1 day 1 of Adriamycin and cyclophosphamide. Patient needed to be admitted to the hospital for neutropenic fever.  -March 22, 2018. Plan for cycle 2 day 1 of Adriamycin and cyclophosphamide. Will plan for 20% dose reduction because  of patient's severe fatigue and prolonged anemia.  6/27/2018   -Patient symptoms improved dramatically. She does not want to continue chemotherapy.   -Keep appt with Dr. Crandall in am for surgical eval  -referral made to radiation oncology  7/24/2018  -Had a long discussion regarding residual disease.  Patient would like to complete her Taxol chemotherapy  -Case was discussed with Dr. Saldana from radiation oncology and her radiation will be pushed back until chemotherapy is completed  -check baseline labs today   -Patient underwent left breast mastectomy with lymph node dissection July 6, 2018. .  1/7 Lymph nodes positive for disease.  No residual cancer seen in breast yp[T0,N1a]  8/6/2018   -proceed with Week 7 of Paclitaxel    -Anemia  -Secondary to chemotherapy  8/6/2018   -resolved, labs above reviewed      -CLL  -Established, stable, chronic  -Coon stage 0 with lymphocytosis only   -She is currently under active observation  -PET/CT scan February 2018 did not show evidence of lymph node disease  8/6/2018  Labs above reviewed    -Fibroadenoma of breast  -Right breast  -Stereotactic biopsy Nov 12, 2014  Showed benign fibrofatty breast tissue with discrete nodules of hyalinized fibroadenoma with microcalcifications.No atypia or malignancy.    - Adenocarcinoma in situ   - Diagnosed Dec 18, 2015 in the right upper lobe lung. biopsy of  nodule, 6 showed Adenocarcinoma-in-situ in fibroelastotic scar tissue, with foci suspicious for invasive adenocarcinoma.  - Feb 1, 2016. Right thoracoscopy with anterior segmentectomy of the right upper lobe and removal of a portion of the right middle lobe.Pathology report states The adenocarcinoma in situ focally demonstrates areas of central fibrosis which surrounds the adenocarcinoma in situ glands, which makes evaluation for invasion difficult. However, there are a few scattered clusters of malignant cells and small foci suspicious for invasive adenocarcinoma  - PET/CT scan  February 2018 did not show any evidence of disease          She agreed and verbalized her agreement and understanding with the current plan.  I answered all questions and concerns she has at this time.   Dear  Esperanza Crandall M.D.., Thank you very much for allowing me to see  Clarisse Reeves today .     Please note that this dictation was created using voice recognition software. I have made every reasonable attempt to correct obvious errors, but I expect that there are errors of grammar and possibly content that I did not discover before finalizing the note.      SIGNATURES:  Denise Barroso    CC:  BETSY Guerra Michael D, M.D. Wright, Sharon, M.D.

## 2018-08-06 NOTE — PROGRESS NOTES
Chemotherapy Verification - PRIMARY RN      Height = 165cm  Weight = 91.9kg  BSA = 2.05m2       Medication: Taxol  Dose: 80mg/m2  Calculated Dose: 164mg                             (In mg/m2, AUC, mg/kg)       I confirm this process was performed independently with the BSA and all final chemotherapy dosing calculations congruent.  Any discrepancies of 5% or greater have been addressed with the chemotherapy pharmacist. The resolution of the discrepancy has been documented in the EPIC progress notes.

## 2018-08-06 NOTE — PROGRESS NOTES
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 J Clin Oncol. 2003:21(8):1431-9    Allergies:Codeine; Lipitor [atorvastatin calcium]; Lovastatin; Zocor [simvastatin - high dose]; Lisinopril; Metformin; and Tape  /74   Pulse 98   Temp 36.4 °C (97.6 °F)   Resp 18   Wt 91.9 kg (202 lb 9.6 oz)   SpO2 96%   BMI 33.71 kg/m²  Body surface area is 2.05 meters squared.     ECHO 1/30/2018    LVEF 65%    Labs:  8/6/18:  ANC~ 5550  Plt = 263 k    Hgb = 10.8  SCr = 0.63 mg/dL CrCl ~ 98 mL/min (min SCr 0.7 used)  AST/ALT/AP = WNL TBili = 0.3        Drug Order   (Drug name, dose, route, IV Fluid & volume, frequency, number of doses) Cycle 7 Taxol, delayed due to fatigue   Previous treatment: C6 on 6/7/18   Medication = PACLitaxel (Taxol)  Base Dose =  80 mg/m2  Calc Dose: Base Dose x 2.05 m2 = 164 mg  Final Dose = 164 mg   Route = IV  Fluid and volume =  mL  Admin Duration = Over 60 minutes          <5% difference, OK to treat with final dose      By my signature below, I confirm this process was performed independently with the BSA and all final chemotherapy dosing calculations congruent. I have reviewed the above chemotherapy order and that my calculation of the final dose and BSA (when applicable) corroborate those calculations of the  pharmacist. Discrepancies of 5% or greater in the written dose have been addressed and documented within the [facility] Progress notes.    Soan EstradaD

## 2018-08-06 NOTE — PROGRESS NOTES
Chemotherapy Verification - SECONDARY RN       Height = 64.96in      Weight = 91.9kg  BSA = 2.05m2       Medication: Taxol  Dose: 80mg/m2  Calculated Dose: 164mg                             (In mg/m2, AUC, mg/kg)                          I confirm that this process was performed independently.

## 2018-08-06 NOTE — PROGRESS NOTES
Patient presents to medical oncology for PICC line labs and dressing change.  RUE single lumen PICC (+) blood return. Labs collected per orders and lumen flushed per protocol.  Performed PICC line dressing change and IV connector change per protocol.  Pt tolerated well.  PICC covered with netting.  Pt tolerated well.  Pt released to waiting room ambulatory in [facility].

## 2018-08-10 ENCOUNTER — PATIENT OUTREACH (OUTPATIENT)
Dept: OTHER | Facility: MEDICAL CENTER | Age: 78
End: 2018-08-10

## 2018-08-10 DIAGNOSIS — C50.412 MALIGNANT NEOPLASM OF UPPER-OUTER QUADRANT OF LEFT BREAST IN FEMALE, ESTROGEN RECEPTOR NEGATIVE (HCC): ICD-10-CM

## 2018-08-10 DIAGNOSIS — Z17.1 MALIGNANT NEOPLASM OF UPPER-OUTER QUADRANT OF LEFT BREAST IN FEMALE, ESTROGEN RECEPTOR NEGATIVE (HCC): ICD-10-CM

## 2018-08-12 NOTE — PROGRESS NOTES
"Pharmacy Chemotherapy Verification    Patient Name: Clarisse Reeves  Dx: Breast CA    Cycle: 8 weekly Paclitaxel    Previous treatment = week 7 on 8/6/18     Regimen: DDAC (Dose Dense Doxorubicin + Cyclophosphamide) followed by Paclitaxel  *Dosing Reference*  DOXOrubicin  IV on Day 1   --dose reduced to 48 mg/m2 due to tolerance  Cyclophosphamide IV over 30 min on Day 1   --dose reduced to 480 mg/m2 due to tolerance  14 day cycle for 4 cycles (completed 4/20/18)  NCCN Guidelines for Breast Cancer V.2.2017  Jamila ML et al J Clin Oncol. 2003:21(8):1431-9    Followed By:  Paclitaxel Weekly Course  Paclitaxel (Taxol) 80 mg/m2 IV over 60 min on Day 1   Weekly cycle for 12 weeks  NCCN Guidelines for Breast Cancer v.1.2017  Jmaila ML et al J Clin Oncol. 2003:21(8):1431-9    Allergies:Codeine; Lipitor [atorvastatin calcium]; Lovastatin; Zocor [simvastatin - high dose]; Lisinopril; Metformin; and Tape  Blood pressure 133/64, pulse 100, temperature 36.6 °C (97.9 °F), resp. rate 18, height 1.65 m (5' 4.96\"), weight 92 kg (202 lb 13.2 oz), SpO2 100 %.  Body surface area is 2.05 meters squared.     ECHO 1/30/18  LVEF = 65%    Labs 8/13/18:  ANC~ 4520   Plt = 316 k     Hgb = 11.0    8/6/18:  SCr = 0.63 mg/dL  CrCl ~ 98 mL/min (min SCr 0.7)  AST/ALT/AP = WNL TBili = 0.3    Paclitaxel (Taxol) 80 mg/m2 x 2.05 m2 = 164 mg   <5% difference, okay to treat with final dose = 164 mg IV     Gary Murguia, PharmD  "

## 2018-08-13 ENCOUNTER — APPOINTMENT (OUTPATIENT)
Dept: HEMATOLOGY ONCOLOGY | Facility: MEDICAL CENTER | Age: 78
End: 2018-08-13
Payer: MEDICARE

## 2018-08-13 ENCOUNTER — NON-PROVIDER VISIT (OUTPATIENT)
Dept: HEMATOLOGY ONCOLOGY | Facility: MEDICAL CENTER | Age: 78
End: 2018-08-13
Payer: MEDICARE

## 2018-08-13 ENCOUNTER — OFFICE VISIT (OUTPATIENT)
Dept: HEMATOLOGY ONCOLOGY | Facility: MEDICAL CENTER | Age: 78
End: 2018-08-13
Payer: MEDICARE

## 2018-08-13 ENCOUNTER — OUTPATIENT INFUSION SERVICES (OUTPATIENT)
Dept: ONCOLOGY | Facility: MEDICAL CENTER | Age: 78
End: 2018-08-13
Attending: INTERNAL MEDICINE
Payer: MEDICARE

## 2018-08-13 ENCOUNTER — HOSPITAL ENCOUNTER (OUTPATIENT)
Facility: MEDICAL CENTER | Age: 78
End: 2018-08-13
Attending: INTERNAL MEDICINE
Payer: MEDICARE

## 2018-08-13 VITALS
HEIGHT: 65 IN | BODY MASS INDEX: 33.79 KG/M2 | HEART RATE: 100 BPM | RESPIRATION RATE: 18 BRPM | WEIGHT: 202.82 LBS | SYSTOLIC BLOOD PRESSURE: 133 MMHG | DIASTOLIC BLOOD PRESSURE: 64 MMHG | TEMPERATURE: 97.9 F | OXYGEN SATURATION: 100 %

## 2018-08-13 VITALS
DIASTOLIC BLOOD PRESSURE: 70 MMHG | RESPIRATION RATE: 16 BRPM | BODY MASS INDEX: 33.7 KG/M2 | OXYGEN SATURATION: 99 % | SYSTOLIC BLOOD PRESSURE: 128 MMHG | HEIGHT: 65 IN | HEART RATE: 89 BPM | TEMPERATURE: 96.8 F | WEIGHT: 202.27 LBS

## 2018-08-13 DIAGNOSIS — Z17.1 MALIGNANT NEOPLASM OF UPPER-OUTER QUADRANT OF LEFT BREAST IN FEMALE, ESTROGEN RECEPTOR NEGATIVE (HCC): ICD-10-CM

## 2018-08-13 DIAGNOSIS — C34.11 MALIGNANT NEOPLASM OF RIGHT UPPER LOBE OF LUNG (HCC): ICD-10-CM

## 2018-08-13 DIAGNOSIS — C50.412 MALIGNANT NEOPLASM OF UPPER-OUTER QUADRANT OF LEFT BREAST IN FEMALE, ESTROGEN RECEPTOR NEGATIVE (HCC): ICD-10-CM

## 2018-08-13 LAB
BASOPHILS # BLD AUTO: 0.7 % (ref 0–1.8)
BASOPHILS # BLD: 0.05 K/UL (ref 0–0.12)
EOSINOPHIL # BLD AUTO: 0.23 K/UL (ref 0–0.51)
EOSINOPHIL NFR BLD: 3.2 % (ref 0–6.9)
ERYTHROCYTE [DISTWIDTH] IN BLOOD BY AUTOMATED COUNT: 48.3 FL (ref 35.9–50)
HCT VFR BLD AUTO: 34.6 % (ref 37–47)
HGB BLD-MCNC: 11 G/DL (ref 12–16)
IMM GRANULOCYTES # BLD AUTO: 0.06 K/UL (ref 0–0.11)
IMM GRANULOCYTES NFR BLD AUTO: 0.8 % (ref 0–0.9)
LYMPHOCYTES # BLD AUTO: 1.93 K/UL (ref 1–4.8)
LYMPHOCYTES NFR BLD: 27 % (ref 22–41)
MCH RBC QN AUTO: 27.9 PG (ref 27–33)
MCHC RBC AUTO-ENTMCNC: 31.8 G/DL (ref 33.6–35)
MCV RBC AUTO: 87.8 FL (ref 81.4–97.8)
MONOCYTES # BLD AUTO: 0.37 K/UL (ref 0–0.85)
MONOCYTES NFR BLD AUTO: 5.2 % (ref 0–13.4)
NEUTROPHILS # BLD AUTO: 4.52 K/UL (ref 2–7.15)
NEUTROPHILS NFR BLD: 63.1 % (ref 44–72)
NRBC # BLD AUTO: 0 K/UL
NRBC BLD-RTO: 0 /100 WBC
PLATELET # BLD AUTO: 316 K/UL (ref 164–446)
PMV BLD AUTO: 9.6 FL (ref 9–12.9)
RBC # BLD AUTO: 3.94 M/UL (ref 4.2–5.4)
WBC # BLD AUTO: 7.2 K/UL (ref 4.8–10.8)

## 2018-08-13 PROCEDURE — 99214 OFFICE O/P EST MOD 30 MIN: CPT | Performed by: INTERNAL MEDICINE

## 2018-08-13 PROCEDURE — 700105 HCHG RX REV CODE 258: Performed by: INTERNAL MEDICINE

## 2018-08-13 PROCEDURE — 85025 COMPLETE CBC W/AUTO DIFF WBC: CPT

## 2018-08-13 PROCEDURE — 700111 HCHG RX REV CODE 636 W/ 250 OVERRIDE (IP)

## 2018-08-13 PROCEDURE — 96415 CHEMO IV INFUSION ADDL HR: CPT

## 2018-08-13 PROCEDURE — 96375 TX/PRO/DX INJ NEW DRUG ADDON: CPT

## 2018-08-13 PROCEDURE — 700105 HCHG RX REV CODE 258

## 2018-08-13 PROCEDURE — 96413 CHEMO IV INFUSION 1 HR: CPT

## 2018-08-13 PROCEDURE — 700111 HCHG RX REV CODE 636 W/ 250 OVERRIDE (IP): Performed by: INTERNAL MEDICINE

## 2018-08-13 RX ORDER — 0.9 % SODIUM CHLORIDE 0.9 %
5 VIAL (ML) INJECTION PRN
Status: CANCELLED | OUTPATIENT
Start: 2018-08-13

## 2018-08-13 RX ORDER — 0.9 % SODIUM CHLORIDE 0.9 %
VIAL (ML) INJECTION PRN
Status: CANCELLED | OUTPATIENT
Start: 2018-08-14

## 2018-08-13 RX ORDER — 0.9 % SODIUM CHLORIDE 0.9 %
VIAL (ML) INJECTION PRN
Status: CANCELLED | OUTPATIENT
Start: 2018-08-13

## 2018-08-13 RX ORDER — ONDANSETRON 8 MG/1
8 TABLET, ORALLY DISINTEGRATING ORAL
Status: CANCELLED | OUTPATIENT
Start: 2018-08-14

## 2018-08-13 RX ORDER — PROCHLORPERAZINE MALEATE 10 MG
10 TABLET ORAL EVERY 6 HOURS PRN
Status: CANCELLED | OUTPATIENT
Start: 2018-08-14

## 2018-08-13 RX ORDER — ONDANSETRON 2 MG/ML
4 INJECTION INTRAMUSCULAR; INTRAVENOUS
Status: CANCELLED | OUTPATIENT
Start: 2018-08-14

## 2018-08-13 RX ORDER — 0.9 % SODIUM CHLORIDE 0.9 %
5 VIAL (ML) INJECTION PRN
Status: CANCELLED | OUTPATIENT
Start: 2018-08-14

## 2018-08-13 RX ORDER — SODIUM CHLORIDE 9 MG/ML
INJECTION, SOLUTION INTRAVENOUS CONTINUOUS
Status: CANCELLED | OUTPATIENT
Start: 2018-08-14

## 2018-08-13 RX ADMIN — DEXAMETHASONE SODIUM PHOSPHATE 12 MG: 4 INJECTION, SOLUTION INTRAMUSCULAR; INTRAVENOUS at 14:15

## 2018-08-13 RX ADMIN — FAMOTIDINE 20 MG: 10 INJECTION INTRAVENOUS at 13:59

## 2018-08-13 RX ADMIN — PACLITAXEL 164 MG: 6 INJECTION, SOLUTION INTRAVENOUS at 14:42

## 2018-08-13 RX ADMIN — DIPHENHYDRAMINE HYDROCHLORIDE 25 MG: 50 INJECTION INTRAMUSCULAR; INTRAVENOUS at 14:00

## 2018-08-13 ASSESSMENT — PAIN SCALES - GENERAL
PAINLEVEL: NO PAIN
PAINLEVEL_OUTOF10: 0

## 2018-08-13 NOTE — PROGRESS NOTES
"Pharmacy Chemotherapy Verification    Patient Name: GREG DEMARCO  Dx: ER/TX negative HER-2 negative Breast Cancer        Protocol: DDAC     *Dosing Reference*  DOXOrubicin  IV push on day 1   -- 3/22/18: dose decreased to 48 mg/m2 for side effect  Cyclophosphamide  IV over 30 min on day 1   -- 3/22/18: dose decreased to 480 mg/m2 for side effects  14-day cycle x 4 cycles  (completed)  ~followed by~  PACLitaxel 80 mg/m2 IV over 60 min on day 1  Weekly course x 12 weeks  NCCN Guidelines for Breast Cancer V.1.2017  Jamila ML, et al. J Clin Oncol. 2003:21(8):1431-9    Allergies:Codeine; Lipitor [atorvastatin calcium]; Lovastatin; Zocor [simvastatin - high dose]; Lisinopril; Metformin; and Tape  /64   Pulse 100   Temp 36.6 °C (97.9 °F)   Resp 18   Ht 1.65 m (5' 4.96\")   Wt 92 kg (202 lb 13.2 oz)   SpO2 100%   BMI 33.79 kg/m²  Body surface area is 2.05 meters squared.     ECHO 1/30/2018    LVEF 65%    ANC~ 4520 Plt = 316k   Hgb = 11            Drug Order   (Drug name, dose, route, IV Fluid & volume, frequency, number of doses) Cycle 8   Previous treatment: C7 = 8/6/18   Medication = PACLitaxel (Taxol)  Base Dose =  80 mg/m2  Calc Dose: Base Dose x 2.05m2 = 164mg  Final Dose = 164mg   Route = IV  Fluid and volume =  mL  Admin Duration = Over 60 minutes          <5% difference, OK to treat with final dose      By my signature below, I confirm this process was performed independently with the BSA and all final chemotherapy dosing calculations congruent. I have reviewed the above chemotherapy order and that my calculation of the final dose and BSA (when applicable) corroborate those calculations of the  pharmacist. Discrepancies of 5% or greater in the written dose have been addressed and documented within the [facility] Progress notes.    Justin Olmos, PharmD  "

## 2018-08-13 NOTE — PROGRESS NOTES
Pt ambulatory to Medical Oncology for PICC labs. Labs obtained from single lumen PICC without difficulty. Pt to complete PICC care today in \[facility]\"" during chemo infusion. She denies any acute complaints and is ambulatory to waiting room in [facility].

## 2018-08-13 NOTE — PROGRESS NOTES
Chemotherapy Verification - PRIMARY RN      Height = 165cm  Weight = 92kg  BSA = 2.05m2       Medication: Taxol  Dose: 80mg/m2  Calculated Dose: 164mg                             (In mg/m2, AUC, mg/kg)     I confirm this process was performed independently with the BSA and all final chemotherapy dosing calculations congruent.  Any discrepancies of 5% or greater have been addressed with the chemotherapy pharmacist. The resolution of the discrepancy has been documented in the EPIC progress notes.

## 2018-08-13 NOTE — PROGRESS NOTES
Chemotherapy Verification - SECONDARY RN       Height = 1.65 m  Weight = 92 kg  BSA = 2.05 m2       Medication: paclitaxel  Dose: 80 mg/m2  Calculated Dose: 164 mg                             (In mg/m2, AUC, mg/kg)     I confirm that this process was performed independently.

## 2018-08-13 NOTE — PROGRESS NOTES
Consult Note: Oncology    Date of consultation: 8/13/2018      Referring provider: Esperanza Crandall M.D.    Reason for consultation:   CC: Breast cancer    History of presenting illness:  Breast cancer  -August 2017 patient had a normal screening mammogram  -January 30, 2018. Echocardiogram shows normal systolic function. Ejection fraction of 65%  -February 2018 patient self palpated a lump in the left breast  -February 8, 2018. Diagnostic mammogram positive for suspicious mass in the left breast upper-outer quadrant.  -February 9, 2018. Needle biopsy shows triple negative infiltrating ductal carcinoma. Ki-67 is 30-50%  -February 19, 2018. PET CT scan. . Hypermetabolic 1.7 cm mass in the left breast, in keeping with known malignancy.2. Multiple hypermetabolic remi metastases in the left axilla.3. Nonenlarged lymph nodes in the left supraclavicular and retroclavicular region with mild uptake, suspicious for early metastasis.4. Mild uptake in the inferior endplate of T6 is nonspecific but could relate to degenerative change. Attention on follow-up exam is recommended  -March 3, 2018. MRI of the thoracic spine.1.  No evidence of metastatic disease the thoracic spine. Specifically, there is no suspicious lesion seen at T6.  -March 1, 2018. Cycle 1 day 1 of Adriamycin and cyclophosphamide.  -March 22, 2018. Plan for cycle 2 day 1 of Adriamycin and cyclophosphamide. April 19, 2018. Cycle 4 of Adriamycin and cyclophosphamide  -May 2, 2018. Cycle 1 of weekly paclitaxel  -May 16, 2018. Week 3 of paclitaxel   -June 6, 2018. Cycle 6 of paclitaxel  -June 13, 2018. Cycle 7 of weekly paclitaxel deferred secondary to severe fatigue  -July 6, 2018. Patient underwent left breast mastectomy with lymph node dissection.  1/7 Lymph nodes positive for disease.  No residual cancer seen breast  -Aug 6, 2018. Resumed chemotherapy. Cycle 7  -Aug 13,2018. Cycle 8    Adenocarcinoma in situ   Dec 18, 2015 .Right upper lobe lung nodule, 6 cores:  Adenocarcinoma-in-situ in fibroelastotic scar tissue, with foci suspicious for invasive adenocarcinoma.  Feb 1, 2016. Right thoracoscopy with anterior segmentectomy of the right upper lobe and removal of a portion of the right middle lobe.    Fibroadenoma of right breast  Nov 12, 2014  Stereotactic biopsy: Benign fibrofatty breast tissue with discrete nodules of hyalinized fibroadenoma with microcalcifications. No atypia or malignancy.    CLL  2006 diagnosed with CLL in [place] after being admitted to the hospital for an infection and found to have leucocytosis.  Patient followed by Dr. Foster until 2014 until her insurance changed. Has been followed by her PCP since.       Clarisse Reeves  is a 76 y.o. year old female who presents to [facility] care. She denies any acute complaints at this time except for some chronic fatigue which has been worse for the past past 3-4 months. She states she had some drenching night sweats last night but none in the past 6 months. She has also lost about 27 lbs in the past 3-4 months or so, but she has been trying to watch her diet and lose weight.    She was started on Celexa for depression last year(2016) after she lost her daughter who 51 to massive MI.      Interval History:  Initially seen in our clinic on 7/19/2017   Clarisse Reeves is a 77 y.o. Female who is seen in clinic for triple negative breast cancer. She is currently undergoing neoadjuvant chemotherapy. Patient states he is not feeling well. States he is fatigued and has been having a cough. She reports a temperature 101°F measured at 4:30 AM. She is here for prechemotherapy visit  She has been complaining of some abdominal pain which seems a bit better    Interval History: 6/27/2018  Clarisse Reeves is a 77 y.o. female seen in clinic today for follow up of severe fatigue malaise secondary to chemotherapy. The patient states she feels great and is going gambling today.she feels her energy and appetite have  returned.    Interval History: 7/24/2018  Clarisse Reeves is a 77 y.o. female seen in clinic today for follow-up after mastectomy and lymph node dissection.  Patient states she is doing well and denies any acute complaints.She had her drain removed yesterday     Interval History: 8/6/2018  Clarisse Reeves is a 78 y.o. female seen in clinic today for starting adjuvant chemotherapy, denies any new complaints.she got has a got a new PICC line in her right arm    Interval History: 8/13/2018  Clarisse Reeves is a 78 y.o. female seen in clinic today for scheduled chemotherapy. Denies any fatigue    Breast cancer  Location, left breast upper outer quadrant  Severity, stage IIIc  Timing, constant  Modifying factors,no   Quality, ductal  Duration, diagnosed February 9, 2018  Context, discovered on workup for palpable lump  Associated factors, palpable lump in the left breast      Review of Systems:  All other review of systems are negative except what was mentioned above in the HPI.    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   • Cataract     right  and  left  IOL   • CLL (chronic lymphoblastic leukemia)    • Cutaneous skin tags 1/29/2015   • DM (diabetes mellitus) (HCC)     oral meds   • Hiatus hernia syndrome     no surgery   • Indigestion    • MEDICAL HOME    • Personal history of colonic polyps 11/26/2012   • Pneumonia 2014   • Pneumonia 06/2017   • Statin intolerance    • Thyroid disease    • Type II or unspecified type diabetes mellitus without mention of complication, not stated as uncontrolled     pre-diabetic       Past surgical history:    Past Surgical History:   Procedure Laterality Date   • MASTECTOMY Left 7/6/2018    Procedure:  MASTECTOMY - PARTIAL AFTER WIRE LOCAALIZATION;  Surgeon: Esperanza Crandall M.D.;  Location: SURGERY [facility];  Service: General   • AXILLARY NODE DISSECTION Left 7/6/2018    Procedure: AXILLARY NODE DISSECTION;  Surgeon: Esperanza Crandall M.D.;  Location: SURGERY SAME DAY [facility];  Service: General   • THYROIDECTOMY N/A 11/29/2017    Procedure: THYROIDECTOMY COMPLETION, RECURRENT LARYNGEAL NERVE MONITORING;  Surgeon: Cameron Marcelino M.D.;  Location: SURGERY SAME DAY [facility];  Service: General   • THORACOSCOPY Right 2/1/2016    Procedure: THORACOSCOPY Upper Lobectomy ;  Surgeon: John H Ganser, M.D.;  Location: SURGERY [facility];  Service:    • NODE DISSECTION Right 2/1/2016    Procedure: NODE DISSECTION;  Surgeon: John H Ganser, M.D.;  Location: SURGERY [facility];  Service:    • RECOVERY  12/18/2015    Procedure: CT-SCP-RUL LUNG BIOPSY-;  Surgeon: [facility];  [place] [facility] [place];  Service:    • OTHER  2001    Lower Left segment of lung removed    • CHOLECYSTECTOMY  1995   • OTHER ORTHOPEDIC SURGERY  1990    bakers cyst removed in right knee, multiple scopes   • OTHER  1990    hemmroid removal   • OTHER  1984    left Thyroid removed, might remove right side in the future   • APPENDECTOMY  1955   • CATARACT EXTRACTION WITH IOL     • TONSILLECTOMY     • US-NEEDLE CORE BX-BREAST PANEL     • VAGINAL HYSTERECTOMY TOTAL      Hysterectomy,Total Vaginal       Allergies:  Codeine; Lipitor; Lovastatin; and Zocor    Medications:    Current Outpatient Prescriptions   Medication Sig Dispense Refill   • venlafaxine XR (EFFEXOR XR) 37.5 MG CAPSULE SR 24 HR TAKE ONE CAPSULE BY MOUTH DAILY 30 Cap 3   • lorazepam (ATIVAN) 2 MG tablet      • MICROLET LANCETS Misc For BG check once a day 100 Each 3   • Blood Glucose Monitoring Suppl Supplies Misc Contour Next glucometer strips for blood sugar check once a day 100 Each 2   • lansoprazole (PREVACID) 30 MG CAPSULE  "DELAYED RELEASE Take 1 Cap by mouth every day. 90 Cap 4   • levothyroxine (SYNTHROID) 150 MCG Tab Take 1 Tab by mouth Every morning on an empty stomach. 90 Tab 4   • glipiZIDE (GLUCOTROL) 5 MG Tab Take 1 Tab by mouth every day. 90 Tab 4   • aspirin (ASA) 81 MG Chew Tab chewable tablet Take 1 Tab by mouth every day. 100 Tab 11     No current facility-administered medications for this visit.        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     Social History Main Topics   • Smoking status: Former Smoker     Packs/day: 1.50     Years: 50.00     Types: Cigarettes     Quit date: 5/6/2006   • Smokeless tobacco: Never Used      Comment: quit smoking 2002   • Alcohol use 0.0 oz/week      Comment: 2 drinks a week   • Drug use: No   • Sexual activity: Not Currently     Partners: Male     Other Topics Concern   • Not on file     Social History Narrative   • No narrative on file       Family History:     Family History   Problem Relation Age of Onset   • Cancer Mother    • Lung Disease Father    • Cancer Father            Physical Exam:  Vitals:   /70   Pulse 89   Temp 36 °C (96.8 °F)   Resp 16   Ht 1.65 m (5' 4.96\")   Wt 91.7 kg (202 lb 4.4 oz)   SpO2 99%   BMI 33.70 kg/m²     General: Not in acute distress, alert and oriented x 3  HEENT: No pallor, icterus. Oropharynx clear.   Neck: Supple, no palpable masses.  Lymph nodes: No palpable cervical, supraclavicular, axillary or inguinal lymphadenopathy.    CVS: regular rate and rhythm, no rubs or gallops  RESP: Clear to auscultate bilaterally, no wheezing or crackles.   ABD: Soft, RUQ tender to deep palpation, non distended, positive bowel sounds, no palpable organomegaly  EXT: No edema or cyanosis  CNS: Alert and oriented x3, No focal deficits.  Skin- No rash        Labs:   Hospital " Outpatient Visit on 08/13/2018   Component Date Value Ref Range Status   • WBC 08/13/2018 7.2  4.8 - 10.8 K/uL Final   • RBC 08/13/2018 3.94* 4.20 - 5.40 M/uL Final   • Hemoglobin 08/13/2018 11.0* 12.0 - 16.0 g/dL Final   • Hematocrit 08/13/2018 34.6* 37.0 - 47.0 % Final   • MCV 08/13/2018 87.8  81.4 - 97.8 fL Final   • MCH 08/13/2018 27.9  27.0 - 33.0 pg Final   • MCHC 08/13/2018 31.8* 33.6 - 35.0 g/dL Final   • RDW 08/13/2018 48.3  35.9 - 50.0 fL Final   • Platelet Count 08/13/2018 316  164 - 446 K/uL Final   • MPV 08/13/2018 9.6  9.0 - 12.9 fL Final   • Neutrophils-Polys 08/13/2018 63.10  44.00 - 72.00 % Final   • Lymphocytes 08/13/2018 27.00  22.00 - 41.00 % Final   • Monocytes 08/13/2018 5.20  0.00 - 13.40 % Final   • Eosinophils 08/13/2018 3.20  0.00 - 6.90 % Final   • Basophils 08/13/2018 0.70  0.00 - 1.80 % Final   • Immature Granulocytes 08/13/2018 0.80  0.00 - 0.90 % Final   • Nucleated RBC 08/13/2018 0.00  /100 WBC Final   • Neutrophils (Absolute) 08/13/2018 4.52  2.00 - 7.15 K/uL Final    Includes immature neutrophils, if present.   • Lymphs (Absolute) 08/13/2018 1.93  1.00 - 4.80 K/uL Final   • Monos (Absolute) 08/13/2018 0.37  0.00 - 0.85 K/uL Final   • Eos (Absolute) 08/13/2018 0.23  0.00 - 0.51 K/uL Final   • Baso (Absolute) 08/13/2018 0.05  0.00 - 0.12 K/uL Final   • Immature Granulocytes (abs) 08/13/2018 0.06  0.00 - 0.11 K/uL Final   • NRBC (Absolute) 08/13/2018 0.00  K/uL Final   Hospital Outpatient Visit on 08/06/2018   Component Date Value Ref Range Status   • WBC 08/06/2018 9.2  4.8 - 10.8 K/uL Final   • RBC 08/06/2018 3.86* 4.20 - 5.40 M/uL Final   • Hemoglobin 08/06/2018 10.8* 12.0 - 16.0 g/dL Final   • Hematocrit 08/06/2018 34.0* 37.0 - 47.0 % Final   • MCV 08/06/2018 88.1  81.4 - 97.8 fL Final   • MCH 08/06/2018 28.0  27.0 - 33.0 pg Final   • MCHC 08/06/2018 31.8* 33.6 - 35.0 g/dL Final   • RDW 08/06/2018 47.6  35.9 - 50.0 fL Final   • Platelet Count 08/06/2018 263  164 - 446 K/uL Final     Comment: Platelet clumping confirmed on smear review.  If a more accurate platelet  count is required, please request recollection.     • MPV 08/06/2018 10.0  9.0 - 12.9 fL Final   • Neutrophils-Polys 08/06/2018 60.30  44.00 - 72.00 % Final   • Lymphocytes 08/06/2018 25.80  22.00 - 41.00 % Final   • Monocytes 08/06/2018 8.00  0.00 - 13.40 % Final   • Eosinophils 08/06/2018 5.00  0.00 - 6.90 % Final   • Basophils 08/06/2018 0.50  0.00 - 1.80 % Final   • Immature Granulocytes 08/06/2018 0.40  0.00 - 0.90 % Final   • Nucleated RBC 08/06/2018 0.00  /100 WBC Final   • Neutrophils (Absolute) 08/06/2018 5.55  2.00 - 7.15 K/uL Final    Includes immature neutrophils, if present.   • Lymphs (Absolute) 08/06/2018 2.38  1.00 - 4.80 K/uL Final   • Monos (Absolute) 08/06/2018 0.74  0.00 - 0.85 K/uL Final   • Eos (Absolute) 08/06/2018 0.46  0.00 - 0.51 K/uL Final   • Baso (Absolute) 08/06/2018 0.05  0.00 - 0.12 K/uL Final   • Immature Granulocytes (abs) 08/06/2018 0.04  0.00 - 0.11 K/uL Final   • NRBC (Absolute) 08/06/2018 0.00  K/uL Final   • Sodium 08/06/2018 137  135 - 145 mmol/L Final   • Potassium 08/06/2018 4.0  3.6 - 5.5 mmol/L Final   • Chloride 08/06/2018 103  96 - 112 mmol/L Final   • Co2 08/06/2018 25  20 - 33 mmol/L Final   • Anion Gap 08/06/2018 9.0  0.0 - 11.9 Final   • Glucose 08/06/2018 113* 65 - 99 mg/dL Final   • Bun 08/06/2018 10  8 - 22 mg/dL Final   • Creatinine 08/06/2018 0.63  0.50 - 1.40 mg/dL Final   • Calcium 08/06/2018 8.9  8.5 - 10.5 mg/dL Final   • AST(SGOT) 08/06/2018 11* 12 - 45 U/L Final   • ALT(SGPT) 08/06/2018 14  2 - 50 U/L Final   • Alkaline Phosphatase 08/06/2018 89  30 - 99 U/L Final   • Total Bilirubin 08/06/2018 0.3  0.1 - 1.5 mg/dL Final   • Albumin 08/06/2018 4.3  3.2 - 4.9 g/dL Final   • Total Protein 08/06/2018 6.6  6.0 - 8.2 g/dL Final   • Globulin 08/06/2018 2.3  1.9 - 3.5 g/dL Final   • A-G Ratio 08/06/2018 1.9  g/dL Final   • GFR If  08/06/2018 >60  >60 mL/min/1.73 m  2 Final   • GFR If Non  08/06/2018 >60  >60 mL/min/1.73 m 2 Final   • Peripheral Smear Review 08/06/2018 see below   Final    Comment: Due to instrument suspect flags, further review of peripheral smear is  indicated on this patient sample. This review may or may not result in  abnormal findings.     • Comments-Diff 08/06/2018 see below   Final    Results have been verified by peripheral blood smear review.       Imaging  March 3, 2018. MRI of the thoracic spine  1.  No evidence of metastatic disease the thoracic spine. Specifically, there is no suspicious lesion seen at T6.  2.  Mild thoracic spondylosis without high-grade impingement on the neural axis  3.  Hiatal hernia    February 19, 2018. PET CT scan  1. Hypermetabolic 1.7 cm mass in the left breast, in keeping with known malignancy.  2. Multiple hypermetabolic remi metastases in the left axilla.  3. Nonenlarged lymph nodes in the left supraclavicular and retroclavicular region with mild uptake, suspicious for early metastasis.  4. Mild uptake in the inferior endplate of T6 is nonspecific but could relate to degenerative change. Attention on follow-up exam is recommended.    Assessment and Plan:  -Breast cancer  -Left side   -Upper outer quadrant  -Invasive ductal  -Stage IIIc [cT2, cN3c, M0]. ER/SC negative HER-2 negative  -Histologic grade 3  -PET/CT scan from February 19, 2018 was reviewed. Left supraclavicular and retroclavicular lymph nodes nonenlarged but mild uptake suspicious for early metastasis.  -May be a candidate for adjuvant Xeloda based on The Capecitabine for Residual Cancer as Adjuvant Therapy (CREATE-X) trial   3/21/2018  -MRI of the spine March 3, no suspicious lesion seen at T6.  -March 1, 2018. Cycle 1 day 1 of Adriamycin and cyclophosphamide. Patient needed to be admitted to the hospital for neutropenic fever.  -March 22, 2018. Plan for cycle 2 day 1 of Adriamycin and cyclophosphamide. Will plan for 20% dose reduction  because of patient's severe fatigue and prolonged anemia.  6/27/2018   -Patient symptoms improved dramatically. She does not want to continue chemotherapy.   -Keep appt with Dr. Crandall in am for surgical eval  -referral made to radiation oncology  7/24/2018  -Had a long discussion regarding residual disease.  Patient would like to complete her Taxol chemotherapy  -Case was discussed with Dr. Saldana from radiation oncology and her radiation will be pushed back until chemotherapy is completed  -check baseline labs today   -Patient underwent left breast mastectomy with lymph node dissection July 6, 2018. .  1/7 Lymph nodes positive for disease.  No residual cancer seen in breast yp[T0,N1a]  8/6/2018   -proceed with Week 7 of Paclitaxel  8/13/2018  -labs reviewed  -proceed with week 8    -Anemia  -Secondary to chemotherapy  8/6/2018   -resolved, labs above reviewed  8/13/2018  -stable Hg 11    -CLL  -Established, stable, chronic  -Coon stage 0 with lymphocytosis only   -She is currently under active observation  -PET/CT scan February 2018 did not show evidence of lymph node disease  8/6/2018  Labs above reviewed    -Fibroadenoma of breast  -Right breast  -Stereotactic biopsy Nov 12, 2014  Showed benign fibrofatty breast tissue with discrete nodules of hyalinized fibroadenoma with microcalcifications.No atypia or malignancy.    - Adenocarcinoma in situ   - Diagnosed Dec 18, 2015 in the right upper lobe lung. biopsy of  nodule, 6 showed Adenocarcinoma-in-situ in fibroelastotic scar tissue, with foci suspicious for invasive adenocarcinoma.  - Feb 1, 2016. Right thoracoscopy with anterior segmentectomy of the right upper lobe and removal of a portion of the right middle lobe.Pathology report states The adenocarcinoma in situ focally demonstrates areas of central fibrosis which surrounds the adenocarcinoma in situ glands, which makes evaluation for invasion difficult. However, there are a few scattered clusters of malignant  cells and small foci suspicious for invasive adenocarcinoma  - PET/CT scan February 2018 did not show any evidence of disease          She agreed and verbalized her agreement and understanding with the current plan.  I answered all questions and concerns she has at this time.   Dear  Esperanza Crandall M.D.., Thank you very much for allowing me to see  Clarisse Reeves today .     Please note that this dictation was created using voice recognition software. I have made every reasonable attempt to correct obvious errors, but I expect that there are errors of grammar and possibly content that I did not discover before finalizing the note.      SIGNATURES:  Denise Barroso    CC:  Siva Smart M.D.  Siva Smart M.D. Wright, Sharon, M.D.

## 2018-08-14 NOTE — PROGRESS NOTES
Patient arrived to clinic for week 8 Taxol.  Denies any acute changes or s/s of infection.  PICC line in place, flushes well with brisk blood return noted.  Labs drawn in MD office and within parameters to treat.  Pre medications given and Taxol infused (2nd dose since lapse in treatment so medication titrated 50ml/hr to max rate), pt tolerated well.  PICC line flushed, dressing changed using sterile technique, clave changed, and guaze/mesh cover placed.  Confirmed next appointment and pt ambulated out of clinic in no apparent distress.

## 2018-08-17 DIAGNOSIS — J45.20 MILD INTERMITTENT ASTHMA WITHOUT COMPLICATION: ICD-10-CM

## 2018-08-17 DIAGNOSIS — C50.412 MALIGNANT NEOPLASM OF UPPER-OUTER QUADRANT OF LEFT BREAST IN FEMALE, ESTROGEN RECEPTOR NEGATIVE (HCC): ICD-10-CM

## 2018-08-17 DIAGNOSIS — Z17.1 MALIGNANT NEOPLASM OF UPPER-OUTER QUADRANT OF LEFT BREAST IN FEMALE, ESTROGEN RECEPTOR NEGATIVE (HCC): ICD-10-CM

## 2018-08-19 NOTE — PROGRESS NOTES
"Pharmacy Chemotherapy Verification    Patient Name: Clarisse Reeves  Dx: Breast CA    Cycle: 9 weekly Paclitaxel    Previous treatment = week 8 on 8/13/18     Regimen: DDAC (Dose Dense Doxorubicin + Cyclophosphamide) followed by Paclitaxel  *Dosing Reference*  DOXOrubicin  IV on Day 1   --dose reduced to 48 mg/m2 due to tolerance  Cyclophosphamide IV over 30 min on Day 1   --dose reduced to 480 mg/m2 due to tolerance  14 day cycle for 4 cycles (completed 4/20/18)  NCCN Guidelines for Breast Cancer V.2.2017  Jamila ML et al J Clin Oncol. 2003:21(8):1431-9    Followed By:  Paclitaxel Weekly Course  Paclitaxel (Taxol) 80 mg/m2 IV over 60 min on Day 1   Weekly cycle for 12 weeks  NCCN Guidelines for Breast Cancer v.1.2017  Jamila ML et al J Clin Oncol. 2003:21(8):1431-9    Allergies:Codeine; Lipitor [atorvastatin calcium]; Lovastatin; Zocor [simvastatin - high dose]; Lisinopril; Metformin; and Tape  Blood pressure 124/61, pulse 79, temperature 36.3 °C (97.3 °F), resp. rate 18, height 1.65 m (5' 4.96\"), weight 94.3 kg (207 lb 14.3 oz), SpO2 100 %.  Body surface area is 2.08 meters squared.     ECHO 1/30/18  LVEF = 65%    ANC~ 3660 Plt = 248k   Hgb = 10         Paclitaxel (Taxol) 80 mg/m2 x 2.08m2 = 166.4mg   <5% difference, okay to treat with final dose = 166.4mg IV     Justin Olmos, PharmD  "

## 2018-08-19 NOTE — PROGRESS NOTES
"Pharmacy Chemotherapy Verification    Patient Name: GREG DEMARCO  Dx: ER/MT negative HER-2 negative Breast Cancer        Protocol: DDAC     *Dosing Reference*  DOXOrubicin  IV push on day 1   -- 3/22/18: dose decreased to 48 mg/m2 for side effect  Cyclophosphamide  IV over 30 min on day 1   -- 3/22/18: dose decreased to 480 mg/m2 for side effects  14-day cycle x 4 cycles  (completed)  ~followed by~  PACLitaxel 80 mg/m2 IV over 60 min on day 1  Weekly course x 12 weeks  NCCN Guidelines for Breast Cancer V.1.2017  Jamila ML, et al. J Clin Oncol. 2003:21(8):1431-9    Allergies:Codeine; Lipitor [atorvastatin calcium]; Lovastatin; Zocor [simvastatin - high dose]; Lisinopril; Metformin; and Tape  /61   Pulse 79   Temp 36.3 °C (97.3 °F)   Resp 18   Ht 1.65 m (5' 4.96\")   Wt 94.3 kg (207 lb 14.3 oz)   SpO2 100%   BMI 34.64 kg/m²  Body surface area is 2.08 meters squared.     ANC~ 3660 Plt = 248k   Hgb = 10.0    8/6/18: Cr = 0.63 LFTs/TBili = WNL/0.3      ECHO 1/30/2018    LVEF 65%     Drug Order   (Drug name, dose, route, IV Fluid & volume, frequency, number of doses) Cycle 9   Previous treatment: 8/13/18   Medication = PACLitaxel (Taxol)  Base Dose =  80 mg/m2  Calc Dose: Base Dose x 2.08 m2 = 166 mg  Final Dose = 166.4 mg   Route = IV  Fluid and volume =  mL  Admin Duration = Over 60 minutes          <5% difference, OK to treat with final dose      By my signature below, I confirm this process was performed independently with the BSA and all final chemotherapy dosing calculations congruent. I have reviewed the above chemotherapy order and that my calculation of the final dose and BSA (when applicable) corroborate those calculations of the  pharmacist. Discrepancies of 5% or greater in the written dose have been addressed and documented within the [facility] Progress notes.    Gary Murguia, PharmD  "

## 2018-08-20 ENCOUNTER — OUTPATIENT INFUSION SERVICES (OUTPATIENT)
Dept: ONCOLOGY | Facility: MEDICAL CENTER | Age: 78
End: 2018-08-20
Attending: INTERNAL MEDICINE
Payer: MEDICARE

## 2018-08-20 ENCOUNTER — NON-PROVIDER VISIT (OUTPATIENT)
Dept: HEMATOLOGY ONCOLOGY | Facility: MEDICAL CENTER | Age: 78
End: 2018-08-20
Payer: MEDICARE

## 2018-08-20 ENCOUNTER — OFFICE VISIT (OUTPATIENT)
Dept: HEMATOLOGY ONCOLOGY | Facility: MEDICAL CENTER | Age: 78
End: 2018-08-20
Payer: MEDICARE

## 2018-08-20 ENCOUNTER — HOSPITAL ENCOUNTER (OUTPATIENT)
Facility: MEDICAL CENTER | Age: 78
End: 2018-08-20
Attending: INTERNAL MEDICINE
Payer: MEDICARE

## 2018-08-20 VITALS
RESPIRATION RATE: 16 BRPM | WEIGHT: 207 LBS | HEART RATE: 84 BPM | BODY MASS INDEX: 34.45 KG/M2 | OXYGEN SATURATION: 96 % | SYSTOLIC BLOOD PRESSURE: 128 MMHG | DIASTOLIC BLOOD PRESSURE: 68 MMHG | TEMPERATURE: 98.4 F

## 2018-08-20 VITALS
HEART RATE: 79 BPM | SYSTOLIC BLOOD PRESSURE: 124 MMHG | HEIGHT: 65 IN | DIASTOLIC BLOOD PRESSURE: 61 MMHG | RESPIRATION RATE: 18 BRPM | TEMPERATURE: 97.3 F | BODY MASS INDEX: 34.64 KG/M2 | WEIGHT: 207.89 LBS | OXYGEN SATURATION: 100 %

## 2018-08-20 VITALS
BODY MASS INDEX: 34.62 KG/M2 | HEART RATE: 84 BPM | SYSTOLIC BLOOD PRESSURE: 128 MMHG | DIASTOLIC BLOOD PRESSURE: 68 MMHG | OXYGEN SATURATION: 96 % | WEIGHT: 207.78 LBS | HEIGHT: 65 IN | RESPIRATION RATE: 16 BRPM | TEMPERATURE: 98.4 F

## 2018-08-20 DIAGNOSIS — Z45.2 PICC (PERIPHERALLY INSERTED CENTRAL CATHETER) IN PLACE: ICD-10-CM

## 2018-08-20 DIAGNOSIS — Z17.1 MALIGNANT NEOPLASM OF UPPER-OUTER QUADRANT OF LEFT BREAST IN FEMALE, ESTROGEN RECEPTOR NEGATIVE (HCC): ICD-10-CM

## 2018-08-20 DIAGNOSIS — I82.622 ACUTE DEEP VEIN THROMBOSIS (DVT) OF LEFT UPPER EXTREMITY, UNSPECIFIED VEIN (HCC): ICD-10-CM

## 2018-08-20 DIAGNOSIS — C50.412 MALIGNANT NEOPLASM OF UPPER-OUTER QUADRANT OF LEFT BREAST IN FEMALE, ESTROGEN RECEPTOR NEGATIVE (HCC): ICD-10-CM

## 2018-08-20 DIAGNOSIS — M79.89 SWELLING OF ARM: ICD-10-CM

## 2018-08-20 LAB
BASOPHILS # BLD AUTO: 0.5 % (ref 0–1.8)
BASOPHILS # BLD: 0.03 K/UL (ref 0–0.12)
COMMENT 1642: NORMAL
EOSINOPHIL # BLD AUTO: 0.09 K/UL (ref 0–0.51)
EOSINOPHIL NFR BLD: 1.6 % (ref 0–6.9)
ERYTHROCYTE [DISTWIDTH] IN BLOOD BY AUTOMATED COUNT: 49.7 FL (ref 35.9–50)
HCT VFR BLD AUTO: 32.4 % (ref 37–47)
HGB BLD-MCNC: 10 G/DL (ref 12–16)
IMM GRANULOCYTES # BLD AUTO: 0.04 K/UL (ref 0–0.11)
IMM GRANULOCYTES NFR BLD AUTO: 0.7 % (ref 0–0.9)
LYMPHOCYTES # BLD AUTO: 1.56 K/UL (ref 1–4.8)
LYMPHOCYTES NFR BLD: 28.1 % (ref 22–41)
MCH RBC QN AUTO: 27.3 PG (ref 27–33)
MCHC RBC AUTO-ENTMCNC: 30.9 G/DL (ref 33.6–35)
MCV RBC AUTO: 88.5 FL (ref 81.4–97.8)
MONOCYTES # BLD AUTO: 0.18 K/UL (ref 0–0.85)
MONOCYTES NFR BLD AUTO: 3.2 % (ref 0–13.4)
MORPHOLOGY BLD-IMP: NORMAL
NEUTROPHILS # BLD AUTO: 3.66 K/UL (ref 2–7.15)
NEUTROPHILS NFR BLD: 65.9 % (ref 44–72)
NRBC # BLD AUTO: 0 K/UL
NRBC BLD-RTO: 0 /100 WBC
PLATELET # BLD AUTO: 248 K/UL (ref 164–446)
PMV BLD AUTO: 9.6 FL (ref 9–12.9)
RBC # BLD AUTO: 3.66 M/UL (ref 4.2–5.4)
WBC # BLD AUTO: 5.6 K/UL (ref 4.8–10.8)

## 2018-08-20 PROCEDURE — 96413 CHEMO IV INFUSION 1 HR: CPT

## 2018-08-20 PROCEDURE — 304540 HCHG NITRO SET VENT 2ND TUB

## 2018-08-20 PROCEDURE — 99213 OFFICE O/P EST LOW 20 MIN: CPT | Performed by: RADIOLOGY

## 2018-08-20 PROCEDURE — 96375 TX/PRO/DX INJ NEW DRUG ADDON: CPT

## 2018-08-20 PROCEDURE — 99212 OFFICE O/P EST SF 10 MIN: CPT | Performed by: RADIOLOGY

## 2018-08-20 PROCEDURE — 700111 HCHG RX REV CODE 636 W/ 250 OVERRIDE (IP): Performed by: INTERNAL MEDICINE

## 2018-08-20 PROCEDURE — 85025 COMPLETE CBC W/AUTO DIFF WBC: CPT

## 2018-08-20 PROCEDURE — 700105 HCHG RX REV CODE 258: Performed by: INTERNAL MEDICINE

## 2018-08-20 PROCEDURE — 99214 OFFICE O/P EST MOD 30 MIN: CPT | Performed by: INTERNAL MEDICINE

## 2018-08-20 RX ORDER — SODIUM CHLORIDE 9 MG/ML
INJECTION, SOLUTION INTRAVENOUS CONTINUOUS
Status: CANCELLED | OUTPATIENT
Start: 2018-08-20

## 2018-08-20 RX ORDER — 0.9 % SODIUM CHLORIDE 0.9 %
VIAL (ML) INJECTION PRN
Status: CANCELLED | OUTPATIENT
Start: 2018-08-27

## 2018-08-20 RX ORDER — ONDANSETRON 2 MG/ML
4 INJECTION INTRAMUSCULAR; INTRAVENOUS
Status: CANCELLED | OUTPATIENT
Start: 2018-08-27

## 2018-08-20 RX ORDER — ONDANSETRON 8 MG/1
8 TABLET, ORALLY DISINTEGRATING ORAL
Status: CANCELLED | OUTPATIENT
Start: 2018-08-20

## 2018-08-20 RX ORDER — 0.9 % SODIUM CHLORIDE 0.9 %
VIAL (ML) INJECTION PRN
Status: CANCELLED | OUTPATIENT
Start: 2018-08-20

## 2018-08-20 RX ORDER — 0.9 % SODIUM CHLORIDE 0.9 %
5 VIAL (ML) INJECTION PRN
Status: CANCELLED | OUTPATIENT
Start: 2018-08-20

## 2018-08-20 RX ORDER — SODIUM CHLORIDE 9 MG/ML
INJECTION, SOLUTION INTRAVENOUS CONTINUOUS
Status: CANCELLED | OUTPATIENT
Start: 2018-08-27

## 2018-08-20 RX ORDER — 0.9 % SODIUM CHLORIDE 0.9 %
VIAL (ML) INJECTION PRN
Status: CANCELLED | OUTPATIENT
Start: 2018-08-26

## 2018-08-20 RX ORDER — PROCHLORPERAZINE MALEATE 10 MG
10 TABLET ORAL EVERY 6 HOURS PRN
Status: CANCELLED | OUTPATIENT
Start: 2018-08-27

## 2018-08-20 RX ORDER — ONDANSETRON 8 MG/1
8 TABLET, ORALLY DISINTEGRATING ORAL
Status: CANCELLED | OUTPATIENT
Start: 2018-08-27

## 2018-08-20 RX ORDER — PROCHLORPERAZINE MALEATE 10 MG
10 TABLET ORAL EVERY 6 HOURS PRN
Status: CANCELLED | OUTPATIENT
Start: 2018-08-20

## 2018-08-20 RX ORDER — ONDANSETRON 2 MG/ML
4 INJECTION INTRAMUSCULAR; INTRAVENOUS
Status: CANCELLED | OUTPATIENT
Start: 2018-08-20

## 2018-08-20 RX ORDER — 0.9 % SODIUM CHLORIDE 0.9 %
5 VIAL (ML) INJECTION PRN
Status: CANCELLED | OUTPATIENT
Start: 2018-08-27

## 2018-08-20 RX ORDER — SODIUM CHLORIDE 9 MG/ML
INJECTION, SOLUTION INTRAVENOUS CONTINUOUS
Status: DISCONTINUED | OUTPATIENT
Start: 2018-08-20 | End: 2018-08-20 | Stop reason: HOSPADM

## 2018-08-20 RX ORDER — 0.9 % SODIUM CHLORIDE 0.9 %
5 VIAL (ML) INJECTION PRN
Status: CANCELLED | OUTPATIENT
Start: 2018-08-26

## 2018-08-20 RX ADMIN — DEXAMETHASONE SODIUM PHOSPHATE 12 MG: 4 INJECTION, SOLUTION INTRAMUSCULAR; INTRAVENOUS at 10:45

## 2018-08-20 RX ADMIN — PACLITAXEL 166.4 MG: 6 INJECTION, SOLUTION INTRAVENOUS at 11:10

## 2018-08-20 RX ADMIN — FAMOTIDINE 20 MG: 10 INJECTION INTRAVENOUS at 10:23

## 2018-08-20 RX ADMIN — SODIUM CHLORIDE: 9 INJECTION, SOLUTION INTRAVENOUS at 10:23

## 2018-08-20 RX ADMIN — DIPHENHYDRAMINE HYDROCHLORIDE 25 MG: 50 INJECTION INTRAMUSCULAR; INTRAVENOUS at 10:23

## 2018-08-20 ASSESSMENT — PAIN SCALES - GENERAL: PAINLEVEL: NO PAIN

## 2018-08-20 NOTE — PROGRESS NOTES
Consult Note: Oncology    Date of consultation: 8/20/2018      Referring provider: Esperanza Crandall M.D.    Reason for consultation:   CC: Breast cancer    History of presenting illness:  Breast cancer  -August 2017 patient had a normal screening mammogram  -January 30, 2018. Echocardiogram shows normal systolic function. Ejection fraction of 65%  -February 2018 patient self palpated a lump in the left breast  -February 8, 2018. Diagnostic mammogram positive for suspicious mass in the left breast upper-outer quadrant.  -February 9, 2018. Needle biopsy shows triple negative infiltrating ductal carcinoma. Ki-67 is 30-50%  -February 19, 2018. PET CT scan. . Hypermetabolic 1.7 cm mass in the left breast, in keeping with known malignancy.2. Multiple hypermetabolic remi metastases in the left axilla.3. Nonenlarged lymph nodes in the left supraclavicular and retroclavicular region with mild uptake, suspicious for early metastasis.4. Mild uptake in the inferior endplate of T6 is nonspecific but could relate to degenerative change. Attention on follow-up exam is recommended  -March 3, 2018. MRI of the thoracic spine.1.  No evidence of metastatic disease the thoracic spine. Specifically, there is no suspicious lesion seen at T6.  -March 1, 2018. Cycle 1 day 1 of Adriamycin and cyclophosphamide.  -March 22, 2018. Plan for cycle 2 day 1 of Adriamycin and cyclophosphamide. April 19, 2018. Cycle 4 of Adriamycin and cyclophosphamide  -May 2, 2018. Cycle 1 of weekly paclitaxel  -May 16, 2018. Week 3 of paclitaxel   -June 6, 2018. Cycle 6 of paclitaxel  -June 13, 2018. Cycle 7 of weekly paclitaxel deferred secondary to severe fatigue  -July 6, 2018. Patient underwent left breast mastectomy with lymph node dissection.  1/7 Lymph nodes positive for disease.  No residual cancer seen breast  -Aug 6, 2018. Resumed chemotherapy. Cycle 7  -Aug 13,2018. Cycle 8    Adenocarcinoma in situ   Dec 18, 2015 .Right upper lobe lung nodule, 6 cores:  Adenocarcinoma-in-situ in fibroelastotic scar tissue, with foci suspicious for invasive adenocarcinoma.  Feb 1, 2016. Right thoracoscopy with anterior segmentectomy of the right upper lobe and removal of a portion of the right middle lobe.    Fibroadenoma of right breast  Nov 12, 2014  Stereotactic biopsy: Benign fibrofatty breast tissue with discrete nodules of hyalinized fibroadenoma with microcalcifications. No atypia or malignancy.    CLL  2006 diagnosed with CLL in [place] after being admitted to the hospital for an infection and found to have leucocytosis.  Patient followed by Dr. Foster until 2014 until her insurance changed. Has been followed by her PCP since.       Clarisse Reeves  is a 76 y.o. year old female who presents to [facility] care. She denies any acute complaints at this time except for some chronic fatigue which has been worse for the past past 3-4 months. She states she had some drenching night sweats last night but none in the past 6 months. She has also lost about 27 lbs in the past 3-4 months or so, but she has been trying to watch her diet and lose weight.    She was started on Celexa for depression last year(2016) after she lost her daughter who 51 to massive MI.      Interval History:  Initially seen in our clinic on 7/19/2017   Clarisse Reeves is a 77 y.o. Female who is seen in clinic for triple negative breast cancer. She is currently undergoing neoadjuvant chemotherapy. Patient states he is not feeling well. States he is fatigued and has been having a cough. She reports a temperature 101°F measured at 4:30 AM. She is here for prechemotherapy visit  She has been complaining of some abdominal pain which seems a bit better    Interval History: 6/27/2018  Clarisse Reeves is a 77 y.o. female seen in clinic today for follow up of severe fatigue malaise secondary to chemotherapy. The patient states she feels great and is going gambling today.she feels her energy and appetite have  returned.    Interval History: 7/24/2018  Clarisse Reeves is a 77 y.o. female seen in clinic today for follow-up after mastectomy and lymph node dissection.  Patient states she is doing well and denies any acute complaints.She had her drain removed yesterday     Interval History: 8/6/2018  Clarisse Reeves is a 78 y.o. female seen in clinic today for starting adjuvant chemotherapy, denies any new complaints.she got has a got a new PICC line in her right arm    Interval History: 8/13/2018  Clarisse Reeves is a 78 y.o. female seen in clinic today for scheduled chemotherapy. Denies any fatigue    Breast cancer  Location, left breast upper outer quadrant  Severity, stage IIIc  Timing, constant  Modifying factors,no   Quality, ductal  Duration, diagnosed February 9, 2018  Context, discovered on workup for palpable lump  Associated factors, palpable lump in the left breast      Review of Systems:  All other review of systems are negative except what was mentioned above in the HPI.    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   • Cataract     right  and  left  IOL   • CLL (chronic lymphoblastic leukemia)    • Cutaneous skin tags 1/29/2015   • DM (diabetes mellitus) (HCC)     oral meds   • Hiatus hernia syndrome     no surgery   • Indigestion    • MEDICAL HOME    • Personal history of colonic polyps 11/26/2012   • Pneumonia 2014   • Pneumonia 06/2017   • Statin intolerance    • Thyroid disease    • Type II or unspecified type diabetes mellitus without mention of complication, not stated as uncontrolled     pre-diabetic       Past surgical history:    Past Surgical History:   Procedure Laterality Date   • MASTECTOMY Left 7/6/2018    Procedure:  MASTECTOMY - PARTIAL AFTER WIRE LOCAALIZATION;  Surgeon: Esperanza Crandall M.D.;  Location: SURGERY SAME DAY [facility];  Service: General   • AXILLARY NODE DISSECTION Left 7/6/2018    Procedure: AXILLARY NODE DISSECTION;  Surgeon: Esperanza Crandall M.D.;  Location: SURGERY SAME DAY [facility];  Service: General   • THYROIDECTOMY N/A 11/29/2017    Procedure: THYROIDECTOMY COMPLETION, RECURRENT LARYNGEAL NERVE MONITORING;  Surgeon: Cameron Marcelino M.D.;  Location: SURGERY SAME DAY [facility];  Service: General   • THORACOSCOPY Right 2/1/2016    Procedure: THORACOSCOPY Upper Lobectomy ;  Surgeon: John H Ganser, M.D.;  Location: SURGERY [facility];  Service:    • NODE DISSECTION Right 2/1/2016    Procedure: NODE DISSECTION;  Surgeon: John H Ganser, M.D.;  Location: SURGERY [facility];  Service:    • RECOVERY  12/18/2015    Procedure: CT-SCP-RUL LUNG BIOPSY-;  Surgeon: [facility];  Location: SURGERY PRE-POST PROC UNIT [facility];  Service:    • OTHER  2001    Lower Left segment of lung removed    • CHOLECYSTECTOMY  1995   • OTHER ORTHOPEDIC SURGERY  1990    bakers cyst removed in right knee, multiple scopes   • OTHER  1990    hemmroid removal   • OTHER  1984    left Thyroid removed, might remove right side in the future   • APPENDECTOMY  1955   • CATARACT EXTRACTION WITH IOL     • TONSILLECTOMY     • US-NEEDLE CORE BX-BREAST PANEL     • VAGINAL HYSTERECTOMY TOTAL      Hysterectomy,Total Vaginal       Allergies:  Codeine; Lipitor; Lovastatin; and Zocor    Medications:    Current Outpatient Prescriptions   Medication Sig Dispense Refill   • venlafaxine XR (EFFEXOR XR) 37.5 MG CAPSULE SR 24 HR TAKE ONE CAPSULE BY MOUTH DAILY 30 Cap 3   • lansoprazole (PREVACID) 30 MG CAPSULE DELAYED RELEASE Take 1 Cap by mouth every day. 90 Cap 4   • lorazepam (ATIVAN) 2 MG tablet      • MICROLET LANCETS Misc For BG check once a day 100 Each 3   • Blood Glucose Monitoring Suppl Supplies Misc Contour Next  glucometer strips for blood sugar check once a day 100 Each 2   • levothyroxine (SYNTHROID) 150 MCG Tab Take 1 Tab by mouth Every morning on an empty stomach. 90 Tab 4   • glipiZIDE (GLUCOTROL) 5 MG Tab Take 1 Tab by mouth every day. 90 Tab 4   • aspirin (ASA) 81 MG Chew Tab chewable tablet Take 1 Tab by mouth every day. 100 Tab 11     No current facility-administered medications for this visit.        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     Social History Main Topics   • Smoking status: Former Smoker     Packs/day: 1.50     Years: 50.00     Types: Cigarettes     Quit date: 5/6/2006   • Smokeless tobacco: Never Used      Comment: quit smoking 2002   • Alcohol use 0.0 oz/week      Comment: 2 drinks a week   • Drug use: No   • Sexual activity: Not Currently     Partners: Male     Other Topics Concern   • Not on file     Social History Narrative   • No narrative on file       Family History:     Family History   Problem Relation Age of Onset   • Cancer Mother    • Lung Disease Father    • Cancer Father            Physical Exam:  Vitals:   /68   Pulse 84   Temp 36.9 °C (98.4 °F)   Resp 16   Wt 93.9 kg (207 lb)   SpO2 96%   BMI 34.45 kg/m²     General: Not in acute distress, alert and oriented x 3  HEENT: No pallor, icterus. Oropharynx clear.   Neck: Supple, no palpable masses.  Lymph nodes: No palpable cervical, supraclavicular, axillary or inguinal lymphadenopathy.    CVS: regular rate and rhythm, no rubs or gallops  RESP: Clear to auscultate bilaterally, no wheezing or crackles.   ABD: Soft, RUQ tender to deep palpation, non distended, positive bowel sounds, no palpable organomegaly  EXT: No edema or cyanosis  CNS: Alert and oriented x3, No focal deficits.  Skin- No rash        Labs:   Hospital Outpatient Visit on  08/13/2018   Component Date Value Ref Range Status   • WBC 08/13/2018 7.2  4.8 - 10.8 K/uL Final   • RBC 08/13/2018 3.94* 4.20 - 5.40 M/uL Final   • Hemoglobin 08/13/2018 11.0* 12.0 - 16.0 g/dL Final   • Hematocrit 08/13/2018 34.6* 37.0 - 47.0 % Final   • MCV 08/13/2018 87.8  81.4 - 97.8 fL Final   • MCH 08/13/2018 27.9  27.0 - 33.0 pg Final   • MCHC 08/13/2018 31.8* 33.6 - 35.0 g/dL Final   • RDW 08/13/2018 48.3  35.9 - 50.0 fL Final   • Platelet Count 08/13/2018 316  164 - 446 K/uL Final   • MPV 08/13/2018 9.6  9.0 - 12.9 fL Final   • Neutrophils-Polys 08/13/2018 63.10  44.00 - 72.00 % Final   • Lymphocytes 08/13/2018 27.00  22.00 - 41.00 % Final   • Monocytes 08/13/2018 5.20  0.00 - 13.40 % Final   • Eosinophils 08/13/2018 3.20  0.00 - 6.90 % Final   • Basophils 08/13/2018 0.70  0.00 - 1.80 % Final   • Immature Granulocytes 08/13/2018 0.80  0.00 - 0.90 % Final   • Nucleated RBC 08/13/2018 0.00  /100 WBC Final   • Neutrophils (Absolute) 08/13/2018 4.52  2.00 - 7.15 K/uL Final    Includes immature neutrophils, if present.   • Lymphs (Absolute) 08/13/2018 1.93  1.00 - 4.80 K/uL Final   • Monos (Absolute) 08/13/2018 0.37  0.00 - 0.85 K/uL Final   • Eos (Absolute) 08/13/2018 0.23  0.00 - 0.51 K/uL Final   • Baso (Absolute) 08/13/2018 0.05  0.00 - 0.12 K/uL Final   • Immature Granulocytes (abs) 08/13/2018 0.06  0.00 - 0.11 K/uL Final   • NRBC (Absolute) 08/13/2018 0.00  K/uL Final   Hospital Outpatient Visit on 08/06/2018   Component Date Value Ref Range Status   • WBC 08/06/2018 9.2  4.8 - 10.8 K/uL Final   • RBC 08/06/2018 3.86* 4.20 - 5.40 M/uL Final   • Hemoglobin 08/06/2018 10.8* 12.0 - 16.0 g/dL Final   • Hematocrit 08/06/2018 34.0* 37.0 - 47.0 % Final   • MCV 08/06/2018 88.1  81.4 - 97.8 fL Final   • MCH 08/06/2018 28.0  27.0 - 33.0 pg Final   • MCHC 08/06/2018 31.8* 33.6 - 35.0 g/dL Final   • RDW 08/06/2018 47.6  35.9 - 50.0 fL Final   • Platelet Count 08/06/2018 263  164 - 446 K/uL Final    Comment: Platelet  clumping confirmed on smear review.  If a more accurate platelet  count is required, please request recollection.     • MPV 08/06/2018 10.0  9.0 - 12.9 fL Final   • Neutrophils-Polys 08/06/2018 60.30  44.00 - 72.00 % Final   • Lymphocytes 08/06/2018 25.80  22.00 - 41.00 % Final   • Monocytes 08/06/2018 8.00  0.00 - 13.40 % Final   • Eosinophils 08/06/2018 5.00  0.00 - 6.90 % Final   • Basophils 08/06/2018 0.50  0.00 - 1.80 % Final   • Immature Granulocytes 08/06/2018 0.40  0.00 - 0.90 % Final   • Nucleated RBC 08/06/2018 0.00  /100 WBC Final   • Neutrophils (Absolute) 08/06/2018 5.55  2.00 - 7.15 K/uL Final    Includes immature neutrophils, if present.   • Lymphs (Absolute) 08/06/2018 2.38  1.00 - 4.80 K/uL Final   • Monos (Absolute) 08/06/2018 0.74  0.00 - 0.85 K/uL Final   • Eos (Absolute) 08/06/2018 0.46  0.00 - 0.51 K/uL Final   • Baso (Absolute) 08/06/2018 0.05  0.00 - 0.12 K/uL Final   • Immature Granulocytes (abs) 08/06/2018 0.04  0.00 - 0.11 K/uL Final   • NRBC (Absolute) 08/06/2018 0.00  K/uL Final   • Sodium 08/06/2018 137  135 - 145 mmol/L Final   • Potassium 08/06/2018 4.0  3.6 - 5.5 mmol/L Final   • Chloride 08/06/2018 103  96 - 112 mmol/L Final   • Co2 08/06/2018 25  20 - 33 mmol/L Final   • Anion Gap 08/06/2018 9.0  0.0 - 11.9 Final   • Glucose 08/06/2018 113* 65 - 99 mg/dL Final   • Bun 08/06/2018 10  8 - 22 mg/dL Final   • Creatinine 08/06/2018 0.63  0.50 - 1.40 mg/dL Final   • Calcium 08/06/2018 8.9  8.5 - 10.5 mg/dL Final   • AST(SGOT) 08/06/2018 11* 12 - 45 U/L Final   • ALT(SGPT) 08/06/2018 14  2 - 50 U/L Final   • Alkaline Phosphatase 08/06/2018 89  30 - 99 U/L Final   • Total Bilirubin 08/06/2018 0.3  0.1 - 1.5 mg/dL Final   • Albumin 08/06/2018 4.3  3.2 - 4.9 g/dL Final   • Total Protein 08/06/2018 6.6  6.0 - 8.2 g/dL Final   • Globulin 08/06/2018 2.3  1.9 - 3.5 g/dL Final   • A-G Ratio 08/06/2018 1.9  g/dL Final   • GFR If  08/06/2018 >60  >60 mL/min/1.73 m 2 Final   • GFR If  Non  08/06/2018 >60  >60 mL/min/1.73 m 2 Final   • Peripheral Smear Review 08/06/2018 see below   Final    Comment: Due to instrument suspect flags, further review of peripheral smear is  indicated on this patient sample. This review may or may not result in  abnormal findings.     • Comments-Diff 08/06/2018 see below   Final    Results have been verified by peripheral blood smear review.       Imaging  March 3, 2018. MRI of the thoracic spine  1.  No evidence of metastatic disease the thoracic spine. Specifically, there is no suspicious lesion seen at T6.  2.  Mild thoracic spondylosis without high-grade impingement on the neural axis  3.  Hiatal hernia    February 19, 2018. PET CT scan  1. Hypermetabolic 1.7 cm mass in the left breast, in keeping with known malignancy.  2. Multiple hypermetabolic remi metastases in the left axilla.  3. Nonenlarged lymph nodes in the left supraclavicular and retroclavicular region with mild uptake, suspicious for early metastasis.  4. Mild uptake in the inferior endplate of T6 is nonspecific but could relate to degenerative change. Attention on follow-up exam is recommended.    Assessment and Plan:  -Breast cancer  -Left side   -Upper outer quadrant  -Invasive ductal  -Stage IIIc [cT2, cN3c, M0]. ER/WV negative HER-2 negative  -Histologic grade 3  -PET/CT scan from February 19, 2018 was reviewed. Left supraclavicular and retroclavicular lymph nodes nonenlarged but mild uptake suspicious for early metastasis.  -May be a candidate for adjuvant Xeloda based on The Capecitabine for Residual Cancer as Adjuvant Therapy (CREATE-X) trial   3/21/2018  -MRI of the spine March 3, no suspicious lesion seen at T6.  -March 1, 2018. Cycle 1 day 1 of Adriamycin and cyclophosphamide. Patient needed to be admitted to the hospital for neutropenic fever.  -March 22, 2018. Plan for cycle 2 day 1 of Adriamycin and cyclophosphamide. Will plan for 20% dose reduction because of patient's  severe fatigue and prolonged anemia.  6/27/2018   -Patient symptoms improved dramatically. She does not want to continue chemotherapy.   -Keep appt with Dr. Crandall in am for surgical eval  -referral made to radiation oncology  7/24/2018  -Had a long discussion regarding residual disease.  Patient would like to complete her Taxol chemotherapy  -Case was discussed with Dr. Saldana from radiation oncology and her radiation will be pushed back until chemotherapy is completed  -check baseline labs today   -Patient underwent left breast mastectomy with lymph node dissection July 6, 2018. .  1/7 Lymph nodes positive for disease.  No residual cancer seen in breast yp[T0,N1a]  8/6/2018   -proceed with Week 7 of Paclitaxel  8/20/2018   -labs reviewed  -proceed with week 9 of 12. Chemotherapy requiring intensive monitoring for toxicity.      -Anemia  8/20/2018   -Secondary to chemotherapy  -Transfuse for hg < 7.5    -CLL  -Established, stable, chronic  -Coon stage 0 with lymphocytosis only   -She is currently under active observation  -PET/CT scan February 2018 did not show evidence of lymph node disease  8/6/2018  Labs above reviewed    -Fibroadenoma of breast  -Right breast  -Stereotactic biopsy Nov 12, 2014  Showed benign fibrofatty breast tissue with discrete nodules of hyalinized fibroadenoma with microcalcifications.No atypia or malignancy.    - Adenocarcinoma in situ   - Diagnosed Dec 18, 2015 in the right upper lobe lung. biopsy of  nodule, 6 showed Adenocarcinoma-in-situ in fibroelastotic scar tissue, with foci suspicious for invasive adenocarcinoma.  - Feb 1, 2016. Right thoracoscopy with anterior segmentectomy of the right upper lobe and removal of a portion of the right middle lobe.Pathology report states The adenocarcinoma in situ focally demonstrates areas of central fibrosis which surrounds the adenocarcinoma in situ glands, which makes evaluation for invasion difficult. However, there are a few scattered clusters  of malignant cells and small foci suspicious for invasive adenocarcinoma  - PET/CT scan February 2018 did not show any evidence of disease          She agreed and verbalized her agreement and understanding with the current plan.  I answered all questions and concerns she has at this time.   Dear  Esperanza Crandall M.D.., Thank you very much for allowing me to see  Clarisse Reeves today .     Please note that this dictation was created using voice recognition software. I have made every reasonable attempt to correct obvious errors, but I expect that there are errors of grammar and possibly content that I did not discover before finalizing the note.      SIGNATURES:  Denise Barroso    CC:  Siva Smart M.D.  Siva Smart M.D. Wright, Sharon, M.D.

## 2018-08-20 NOTE — PROGRESS NOTES
Pt arrived ambulatory, here for week 9 weekly Taxol for breast cancer. Pt with R single lumen PICC in place, dressing change done today in MD office along with labs. PICC flushed, blood return observed. Lab results reviewed and pt appropriate for chemo today. Premeds given. Taxol infused over one hour. Pt neyda well. Line flushed clear. PICC line flushed, blood return observed, and site wrapped. Pt knows to return next week. Discharged home under self care in no apparent distress.

## 2018-08-20 NOTE — PROGRESS NOTES
Chemotherapy Verification - SECONDARY RN       Height = 165 cm  Weight = 94.3 kg  BSA = 2.078 m2       Medication: Taxol  Dose: 80 mg/m2  Calculated Dose: 166.3 mg                             (In mg/m2, AUC, mg/kg)       I confirm that this process was performed independently.

## 2018-08-20 NOTE — PROGRESS NOTES
Pt ambulatory to Medical Oncology for PICC line dressing change and labs. Single lumen PICC with (+) blood return, secured at 1 cm. Labs obtained. Dressing changed per protocol without incidence. New dressing CDI. Pt back to waiting room for prechemo appointment in [facility].

## 2018-08-20 NOTE — PROGRESS NOTES
Chemotherapy Verification - PRIMARY RN      Height = 165 cm  Weight = 94.3 kg  BSA = 2.08 m2       Medication: Paclitaxel (Taxol)  Dose: 80 mg/m2  Calculated Dose: 166.4 mg                                I confirm this process was performed independently with the BSA and all final chemotherapy dosing calculations congruent.  Any discrepancies of 5% or greater have been addressed with the chemotherapy pharmacist. The resolution of the discrepancy has been documented in the EPIC progress notes.

## 2018-08-21 ENCOUNTER — OFFICE VISIT (OUTPATIENT)
Dept: PULMONOLOGY | Facility: HOSPICE | Age: 78
End: 2018-08-21
Payer: MEDICARE

## 2018-08-21 ENCOUNTER — HOSPITAL ENCOUNTER (OUTPATIENT)
Dept: RADIOLOGY | Facility: MEDICAL CENTER | Age: 78
End: 2018-08-21
Attending: INTERNAL MEDICINE
Payer: MEDICARE

## 2018-08-21 ENCOUNTER — NON-PROVIDER VISIT (OUTPATIENT)
Dept: PULMONOLOGY | Facility: HOSPICE | Age: 78
End: 2018-08-21
Payer: MEDICARE

## 2018-08-21 VITALS
BODY MASS INDEX: 34.32 KG/M2 | DIASTOLIC BLOOD PRESSURE: 80 MMHG | HEART RATE: 78 BPM | HEIGHT: 65 IN | RESPIRATION RATE: 16 BRPM | SYSTOLIC BLOOD PRESSURE: 128 MMHG | TEMPERATURE: 98.3 F | WEIGHT: 206 LBS | OXYGEN SATURATION: 99 %

## 2018-08-21 VITALS — BODY MASS INDEX: 34.32 KG/M2 | WEIGHT: 206 LBS

## 2018-08-21 DIAGNOSIS — R06.09 DYSPNEA ON EXERTION: ICD-10-CM

## 2018-08-21 DIAGNOSIS — C34.11 MALIGNANT NEOPLASM OF RIGHT UPPER LOBE OF LUNG (HCC): ICD-10-CM

## 2018-08-21 DIAGNOSIS — C50.412 MALIGNANT NEOPLASM OF UPPER-OUTER QUADRANT OF LEFT BREAST IN FEMALE, ESTROGEN RECEPTOR NEGATIVE (HCC): ICD-10-CM

## 2018-08-21 DIAGNOSIS — Z17.1 MALIGNANT NEOPLASM OF UPPER-OUTER QUADRANT OF LEFT BREAST IN FEMALE, ESTROGEN RECEPTOR NEGATIVE (HCC): ICD-10-CM

## 2018-08-21 DIAGNOSIS — J45.20 MILD INTERMITTENT ASTHMA WITHOUT COMPLICATION: ICD-10-CM

## 2018-08-21 DIAGNOSIS — M79.89 SWELLING OF ARM: ICD-10-CM

## 2018-08-21 DIAGNOSIS — Z85.6 PERSONAL HISTORY OF CLL (CHRONIC LYMPHOCYTIC LEUKEMIA): ICD-10-CM

## 2018-08-21 DIAGNOSIS — I82.622 ACUTE DEEP VEIN THROMBOSIS (DVT) OF LEFT UPPER EXTREMITY, UNSPECIFIED VEIN (HCC): ICD-10-CM

## 2018-08-21 PROCEDURE — 94726 PLETHYSMOGRAPHY LUNG VOLUMES: CPT | Performed by: INTERNAL MEDICINE

## 2018-08-21 PROCEDURE — 94060 EVALUATION OF WHEEZING: CPT | Performed by: INTERNAL MEDICINE

## 2018-08-21 PROCEDURE — 93971 EXTREMITY STUDY: CPT | Mod: LT

## 2018-08-21 PROCEDURE — 94729 DIFFUSING CAPACITY: CPT | Performed by: INTERNAL MEDICINE

## 2018-08-21 PROCEDURE — 99214 OFFICE O/P EST MOD 30 MIN: CPT | Mod: 25 | Performed by: INTERNAL MEDICINE

## 2018-08-21 ASSESSMENT — PULMONARY FUNCTION TESTS
FVC_PERCENT_PREDICTED: 79
FVC_LLN: 2.60
FEV1_PERCENT_PREDICTED: 76
FVC: 2.47
FEV1/FVC_PERCENT_PREDICTED: 102
FEV1/FVC: 76
FVC_PREDICTED: 3.11
FEV1/FVC_PERCENT_CHANGE: 0
FEV1/FVC_PERCENT_PREDICTED: 101
FEV1_PERCENT_CHANGE: 4
FVC: 2.35
FEV1_PERCENT_CHANGE: 5
FEV1/FVC: 76
FEV1/FVC_PREDICTED: 74
FEV1_PERCENT_PREDICTED: 80
FEV1/FVC: 76
FEV1_LLN: 1.95
FVC_PERCENT_PREDICTED: 75
FEV1/FVC: 75.71
FEV1/FVC_PERCENT_LLN: 62
FEV1: 1.78
FEV1/FVC_PERCENT_CHANGE: 80
FEV1/FVC_PERCENT_PREDICTED: 75
FEV1: 1.87
FEV1/FVC_PERCENT_PREDICTED: 102
FEV1/FVC_PERCENT_PREDICTED: 101
FEV1_PREDICTED: 2.33

## 2018-08-21 NOTE — PROGRESS NOTES
RADIATION ONCOLOGY FOLLOW-UP    DATE OF SERVICE: 8/21/2018    IDENTIFICATION:   A 78 y.o. female with a triple negative T2 N1 grade 3  breast cancer status post neoadjuvant AC/Taxol followed by lumpectomy and  node dissection with no residual tumor in the breast and 1 microscopic 1  mm remi mass, currently completing the Taxol she was unable to complete prior to surgery. She is here to discuss the role of comprehensive radiation therapy to decrease her chance of local recurrence.      HISTORY OF PRESENT ILLNESS:   Patient has been doing well since surgery. She is also tolerating the chemotherapy without much in the way of difficulty. And she's here specifically to discuss the role of radiation therapy.    CURRENT MEDICATIONS:  Current Outpatient Prescriptions   Medication Sig Dispense Refill   • venlafaxine XR (EFFEXOR XR) 37.5 MG CAPSULE SR 24 HR TAKE ONE CAPSULE BY MOUTH DAILY 30 Cap 3   • lansoprazole (PREVACID) 30 MG CAPSULE DELAYED RELEASE Take 1 Cap by mouth every day. 90 Cap 4   • lorazepam (ATIVAN) 2 MG tablet      • MICROLET LANCETS Misc For BG check once a day 100 Each 3   • Blood Glucose Monitoring Suppl Supplies Misc Contour Next glucometer strips for blood sugar check once a day 100 Each 2   • levothyroxine (SYNTHROID) 150 MCG Tab Take 1 Tab by mouth Every morning on an empty stomach. 90 Tab 4   • glipiZIDE (GLUCOTROL) 5 MG Tab Take 1 Tab by mouth every day. 90 Tab 4   • aspirin (ASA) 81 MG Chew Tab chewable tablet Take 1 Tab by mouth every day. 100 Tab 11     No current facility-administered medications for this encounter.        ALLERGIES:  Codeine; Lipitor [atorvastatin calcium]; Lovastatin; Zocor [simvastatin - high dose]; Lisinopril; Metformin; and Tape    FAMILY HISTORY:    Family History   Problem Relation Age of Onset   • Cancer Mother    • Lung Disease Father    • Cancer Father    [unfilled]        SOCIAL HISTORY:     reports that she quit smoking about 12 years ago. Her smoking use included  Cigarettes. She has a 75.00 pack-year smoking history. She has never used smokeless tobacco. She reports that she drinks alcohol. She reports that she does not use drugs.    REVIEW OF SYSTEMS: Is significant for that mentioned in the HPI  The rest of the review of systems has been reviewed by me and is documented in the nursing note in [facility] dated 7/20/2018    PHYSICAL EXAM:    There were no vitals filed for this visit.           ECOG PERFORMANCE STATUS:  0= Fully active, able to carry on all pre-disease performance without restriction.  Patient is alert and oriented ×3 in no apparent distress, neurological nonfocal      IMPRESSION:    A 78 y.o. with  a triple negative T2 N1 grade 3  breast cancer status post neoadjuvant AC/Taxol followed by lumpectomy and  node dissection with no residual tumor in the breast and 1 microscopic 1  mm remi mass. She is currently completing her Taxol post surgery as she was having difficulty completing prior to surgery..    RECOMMENDATIONS:   I had a 45 minute discussion with the patient discussing diagnosis prognosis and treatment. She understands that this is triple negative breast cancer after a long description. She does understand that the only thing we have to treat this systemically with is chemotherapy and because of the microscopic metastases following chemotherapy she is at high risk for local regional recurrence and radiation therapy is recommended based on national cancer guidelines. We also spent a long time discussing the treatments as she is concerned about volume of lung that is treated. I'd throughout how the radiation therapy is delivered and what percentage of lung would be treated. We do not know however to reaction to do the simulation which will be for a couple weeks. She understands that very few people have any type of respiratory dysfunction after comprehensive chest wall radiation therapy even those who have had prior lung resections. We discussed treatment  timing and the areas that will be treated along with the side effects both acute and chronic which have been reviewed previously in my consultation.    At this point she is tending towards proceeding forward with the radiation treatments. She she knows she can talk to me at any time if she has any further questions. At this point she is tentatively scheduled for a simulation after she completes her last dose of Taxol.      Thank you for the opportunity to participate in her care.  If any questions or comments, please do not hesitate in calling.      Please note that this dictation was created using voice recognition software. I have made every reasonable attempt to correct obvious errors, but I expect that there are errors of grammar and possibly content that I did not discover before finalizing the note.

## 2018-08-22 NOTE — PROCEDURES
Technician: ADELINA Moreland    Technician Comment:  Good patient effort & cooperation.  The results of this test meet the ATS/ERS standards for acceptability & reproducibility.  Test was performed on the [employer] Body Plethysmograph-Elite DX system.  Predicted values were [facility]-3 for spirometry, [facility] for DLCO, ITS for Lung Volumes.  The DLCO was uncorrected for Hgb.  A bronchodilator of Ventolin HFA -2puffs via spacer administered.  DLCO performed during dilation period.    Interpretation:    FEV1 is 1.78 L which is 76% of predicted.  FEV1 FVC ratio was normal at 76%.  MVV is normal at 91% predicted.  There is no significant response to an inhaled bronchodilator.  Total lung capacity is 92% of predicted.  DLCO is 98% of predicted.  Airway resistance is increased.    No definite evidence of significant obstructive or restrictive disease is noted.  The increased airway resistance and the shape of the flow volume loop suggest the possibility of mild obstructive lung disease.

## 2018-08-22 NOTE — PROGRESS NOTES
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  She has had follow-up CTs in August 2016, March 2017, and March 2018 demonstrating a stable linear density in the right upper lobe consistent with postsurgical changes.  Prior pulmonary function testing preoperatively demonstrated an FEV1 of 1.75 L which was 72% of predicted with an FEV1 FEC ratio of 74%, total lung capacity of 107% of predicted and a DLCO of 128% of predicted.  Pulmonary function testing today shows an FEV1 of 1.78 L which is 76% of predicted.  Her FEV1 FVC ratio was normal at 76%.  Total lung capacity is 92% of predicted.  DLCO is 98% of predicted.  The patient was diagnosed with breast cancer on the left side in February 2018.  She has triple negative breast cancer.  She received rachele-adjuvant chemotherapy followed by lumpectomy and node dissection.  She had one microscopic 1 mm lymph node.  She is scheduled for further chemotherapy to be followed by radiation therapy.  The patient has a prior history of CLL which was diagnosed in 2006.  Please see surgical, radiation therapy, and oncology notes.    From a pulmonary perspective she is on no inhaled medications.  She is on no supplemental oxygen.  She is not complaining of shortness of breath, cough, sputum production, or hemoptysis.  She is a former smoker and quit smoking in 2002.      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   • Cataract     right  and  left  IOL   • CLL (chronic lymphoblastic leukemia)    • Cutaneous skin tags 1/29/2015   • DM (diabetes mellitus) (HCC)     oral meds    • Hiatus hernia syndrome     no surgery   • Indigestion    • MEDICAL HOME    • Personal history of colonic polyps 11/26/2012   • Pneumonia 2014   • Pneumonia 06/2017   • Statin intolerance    • Thyroid disease    • Type II or unspecified type diabetes mellitus without mention of complication, not stated as uncontrolled     pre-diabetic       ROS:   Constitutional: Denies fevers, chills, night sweats, fatigue or weight loss  Eyes: Denies vision loss, pain, drainage, double vision  Ears, Nose, Throat: Denies earache, tinnitus, hoarseness  Cardiovascular: Denies chest pain, tightness, palpitations  Respiratory: Denies shortness of breath, sputum production, cough, hemoptysis  Sleep: Denies, snoring, apnea  GI: Denies abdominal pain, nausea, vomiting, diarrhea  : Denies frequent urination, hematuria, painful urination  Musculoskeletal: Denies back pain, painful joints, sore muscles  Neurological: Denies headaches, seizures  Skin: Denies rashes, color changes  Psychiatric: Denies depression or thoughts of suicide  Hematologic: Denies bleeding tendency or clotting tendency  Allergic/Immunologic: Denies rhinitis, skin sensitivity    Social History     Social History   • Marital status: Single     Spouse name: N/A   • Number of children: N/A   • Years of education: N/A     Occupational History   • 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     Social History Main Topics   • Smoking status: Former Smoker     Packs/day: 1.50     Years: 50.00     Types: Cigarettes     Quit date: 5/6/2006   • Smokeless tobacco: Never Used      Comment: quit smoking 2002   • Alcohol use 0.0 oz/week      Comment: 2 drinks a week   • Drug use: No   • Sexual activity: Not Currently     Partners: Male     Other Topics Concern   • Not on file     Social History Narrative   • No narrative on file     Codeine; Lipitor [atorvastatin calcium]; Lovastatin; Zocor [simvastatin - high dose]; Lisinopril; Metformin; and Tape  Current Outpatient Prescriptions on  "File Prior to Visit   Medication Sig Dispense Refill   • venlafaxine XR (EFFEXOR XR) 37.5 MG CAPSULE SR 24 HR TAKE ONE CAPSULE BY MOUTH DAILY 30 Cap 3   • lansoprazole (PREVACID) 30 MG CAPSULE DELAYED RELEASE Take 1 Cap by mouth every day. 90 Cap 4   • lorazepam (ATIVAN) 2 MG tablet      • Blood Glucose Monitoring Suppl Supplies Misc Contour Next glucometer strips for blood sugar check once a day 100 Each 2   • levothyroxine (SYNTHROID) 150 MCG Tab Take 1 Tab by mouth Every morning on an empty stomach. 90 Tab 4   • aspirin (ASA) 81 MG Chew Tab chewable tablet Take 1 Tab by mouth every day. 100 Tab 11   • MICROLET LANCETS Misc For BG check once a day 100 Each 3   • glipiZIDE (GLUCOTROL) 5 MG Tab Take 1 Tab by mouth every day. 90 Tab 4     No current facility-administered medications on file prior to visit.      Blood pressure 128/80, pulse 78, temperature 36.8 °C (98.3 °F), resp. rate 16, height 1.65 m (5' 4.96\"), weight 93.4 kg (206 lb), SpO2 99 %.  Family History   Problem Relation Age of Onset   • Cancer Mother    • Lung Disease Father    • Cancer Father        Physical Exam:    HEENT: PERRLA, EOMI, no scleral icterus, no nasal or oral lesions  Neck: No thyromegaly, no adenopathy, no bruits  Mallampatti: Grade II  Lungs: Equal breath sounds, no wheezes or crackles  Heart: Regular rate and rhythm, no gallops or murmurs  Abdomen: Soft, benign, no organomegaly  Extremities: No clubbing, cyanosis, or edema  Neurologic: Cranial nerve, motor, and sensory exam are normal    1. 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, 2/1/2016-  partial lobectomy RUL/RML- Dr Ganser- folowed [facility]    2. Dyspnea on exertion    3. Personal history of CLL (chronic lymphocytic leukemia)    4. Malignant neoplasm of upper-outer quadrant of left breast in female, estrogen receptor negative (CMS-HCC)-  1 positive node; dr. koo; dr sharpe-  chemorx 1st, then surgery, then RT        At this point there is no " evidence of recurrence of her lung cancer.  She will continue treatment for her recently diagnosed breast cancer.  Almost certainly she will have repeat CT scanning of her chest over the next year.  We will not order a repeat CT scan at this time.  Pulmonary function testing is adequate for any planned radiation therapy.  We will see her back in 1 year.  If needed at that time we can make decisions about further CT scanning and pulmonary function testing.

## 2018-08-24 DIAGNOSIS — Z17.1 MALIGNANT NEOPLASM OF UPPER-OUTER QUADRANT OF LEFT BREAST IN FEMALE, ESTROGEN RECEPTOR NEGATIVE (HCC): ICD-10-CM

## 2018-08-24 DIAGNOSIS — C50.412 MALIGNANT NEOPLASM OF UPPER-OUTER QUADRANT OF LEFT BREAST IN FEMALE, ESTROGEN RECEPTOR NEGATIVE (HCC): ICD-10-CM

## 2018-08-24 RX ORDER — 0.9 % SODIUM CHLORIDE 0.9 %
20 VIAL (ML) INJECTION ONCE
Status: CANCELLED | OUTPATIENT
Start: 2018-08-27 | End: 2018-08-27

## 2018-08-26 NOTE — PROGRESS NOTES
"Pharmacy Chemotherapy Verification    Patient Name: GREG DEMARCO  Dx: ER/MD negative HER-2 negative Breast Cancer        Protocol: DDAC     *Dosing Reference*  DOXOrubicin  IV push on day 1   -- 3/22/18: dose decreased to 48 mg/m2 for side effect  Cyclophosphamide  IV over 30 min on day 1   -- 3/22/18: dose decreased to 480 mg/m2 for side effects  14-day cycle x 4 cycles  (completed)  ~followed by~  PACLitaxel 80 mg/m2 IV over 60 min on day 1  Weekly course x 12 weeks  NCCN Guidelines for Breast Cancer V.1.2017  Jamila ML, et al. J Clin Oncol. 2003:21(8):1431-9    Allergies:Codeine; Lipitor [atorvastatin calcium]; Lovastatin; Zocor [simvastatin - high dose]; Lisinopril; Metformin; and Tape  /70   Pulse 99   Temp 36.3 °C (97.3 °F)   Resp 18   Ht 1.65 m (5' 4.96\")   Wt 93.3 kg (205 lb 11 oz)   BMI 34.27 kg/m²  Body surface area is 2.07 meters squared.     ANC~ 2850 Plt = 328k   Hgb = 10.5    8/27/18: Cr = 0.70 LFTs/TBili = WNL/0.4    ECHO 1/30/2018    LVEF 65%     Drug Order   (Drug name, dose, route, IV Fluid & volume, frequency, number of doses) Cycle 10   Previous treatment: 8/20/18   Medication = PACLitaxel (Taxol)  Base Dose =  80 mg/m2  Calc Dose: Base Dose x 2.07 m2 = 166 mg  Final Dose = 165.6 mg   Route = IV  Fluid and volume =  mL  Admin Duration = Over 60 minutes          <5% difference, OK to treat with final dose      By my signature below, I confirm this process was performed independently with the BSA and all final chemotherapy dosing calculations congruent. I have reviewed the above chemotherapy order and that my calculation of the final dose and BSA (when applicable) corroborate those calculations of the  pharmacist. Discrepancies of 5% or greater in the written dose have been addressed and documented within the [facility] Progress notes.    Gary Murguia, PharmD  "

## 2018-08-27 ENCOUNTER — OFFICE VISIT (OUTPATIENT)
Dept: HEMATOLOGY ONCOLOGY | Facility: MEDICAL CENTER | Age: 78
End: 2018-08-27
Payer: MEDICARE

## 2018-08-27 ENCOUNTER — APPOINTMENT (OUTPATIENT)
Dept: HEMATOLOGY ONCOLOGY | Facility: MEDICAL CENTER | Age: 78
End: 2018-08-27
Payer: MEDICARE

## 2018-08-27 ENCOUNTER — OUTPATIENT INFUSION SERVICES (OUTPATIENT)
Dept: ONCOLOGY | Facility: MEDICAL CENTER | Age: 78
End: 2018-08-27
Attending: INTERNAL MEDICINE
Payer: MEDICARE

## 2018-08-27 VITALS
TEMPERATURE: 96.8 F | SYSTOLIC BLOOD PRESSURE: 142 MMHG | RESPIRATION RATE: 18 BRPM | OXYGEN SATURATION: 97 % | HEIGHT: 65 IN | WEIGHT: 205.58 LBS | DIASTOLIC BLOOD PRESSURE: 88 MMHG | HEART RATE: 56 BPM | BODY MASS INDEX: 34.25 KG/M2

## 2018-08-27 VITALS
HEIGHT: 65 IN | TEMPERATURE: 97.3 F | SYSTOLIC BLOOD PRESSURE: 119 MMHG | BODY MASS INDEX: 34.27 KG/M2 | DIASTOLIC BLOOD PRESSURE: 70 MMHG | WEIGHT: 205.69 LBS | RESPIRATION RATE: 18 BRPM | HEART RATE: 99 BPM

## 2018-08-27 VITALS
TEMPERATURE: 97.3 F | DIASTOLIC BLOOD PRESSURE: 70 MMHG | HEART RATE: 99 BPM | OXYGEN SATURATION: 98 % | SYSTOLIC BLOOD PRESSURE: 119 MMHG | WEIGHT: 205.69 LBS | RESPIRATION RATE: 18 BRPM | HEIGHT: 65 IN | BODY MASS INDEX: 34.27 KG/M2

## 2018-08-27 DIAGNOSIS — C34.11 MALIGNANT NEOPLASM OF RIGHT UPPER LOBE OF LUNG (HCC): ICD-10-CM

## 2018-08-27 DIAGNOSIS — C50.412 MALIGNANT NEOPLASM OF UPPER-OUTER QUADRANT OF LEFT BREAST IN FEMALE, ESTROGEN RECEPTOR NEGATIVE (HCC): ICD-10-CM

## 2018-08-27 DIAGNOSIS — Z17.1 MALIGNANT NEOPLASM OF UPPER-OUTER QUADRANT OF LEFT BREAST IN FEMALE, ESTROGEN RECEPTOR NEGATIVE (HCC): ICD-10-CM

## 2018-08-27 DIAGNOSIS — Z45.2 PICC (PERIPHERALLY INSERTED CENTRAL CATHETER) IN PLACE: ICD-10-CM

## 2018-08-27 LAB
ALBUMIN SERPL BCP-MCNC: 3.9 G/DL (ref 3.2–4.9)
ALBUMIN/GLOB SERPL: 1.6 G/DL
ALP SERPL-CCNC: 84 U/L (ref 30–99)
ALT SERPL-CCNC: 13 U/L (ref 2–50)
ANION GAP SERPL CALC-SCNC: 8 MMOL/L (ref 0–11.9)
AST SERPL-CCNC: 9 U/L (ref 12–45)
BASOPHILS # BLD AUTO: 0.7 % (ref 0–1.8)
BASOPHILS # BLD: 0.04 K/UL (ref 0–0.12)
BILIRUB SERPL-MCNC: 0.4 MG/DL (ref 0.1–1.5)
BUN SERPL-MCNC: 11 MG/DL (ref 8–22)
CALCIUM SERPL-MCNC: 8.3 MG/DL (ref 8.5–10.5)
CHLORIDE SERPL-SCNC: 103 MMOL/L (ref 96–112)
CO2 SERPL-SCNC: 24 MMOL/L (ref 20–33)
CREAT SERPL-MCNC: 0.7 MG/DL (ref 0.5–1.4)
EOSINOPHIL # BLD AUTO: 0.09 K/UL (ref 0–0.51)
EOSINOPHIL NFR BLD: 1.6 % (ref 0–6.9)
ERYTHROCYTE [DISTWIDTH] IN BLOOD BY AUTOMATED COUNT: 51 FL (ref 35.9–50)
GLOBULIN SER CALC-MCNC: 2.4 G/DL (ref 1.9–3.5)
GLUCOSE SERPL-MCNC: 129 MG/DL (ref 65–99)
HCT VFR BLD AUTO: 33.3 % (ref 37–47)
HGB BLD-MCNC: 10.5 G/DL (ref 12–16)
IMM GRANULOCYTES # BLD AUTO: 0.18 K/UL (ref 0–0.11)
IMM GRANULOCYTES NFR BLD AUTO: 3.1 % (ref 0–0.9)
LYMPHOCYTES # BLD AUTO: 2.21 K/UL (ref 1–4.8)
LYMPHOCYTES NFR BLD: 38.3 % (ref 22–41)
MCH RBC QN AUTO: 27.9 PG (ref 27–33)
MCHC RBC AUTO-ENTMCNC: 31.5 G/DL (ref 33.6–35)
MCV RBC AUTO: 88.6 FL (ref 81.4–97.8)
MONOCYTES # BLD AUTO: 0.4 K/UL (ref 0–0.85)
MONOCYTES NFR BLD AUTO: 6.9 % (ref 0–13.4)
NEUTROPHILS # BLD AUTO: 2.85 K/UL (ref 2–7.15)
NEUTROPHILS NFR BLD: 49.4 % (ref 44–72)
NRBC # BLD AUTO: 0 K/UL
NRBC BLD-RTO: 0 /100 WBC
PLATELET # BLD AUTO: 325 K/UL (ref 164–446)
PMV BLD AUTO: 9.5 FL (ref 9–12.9)
POTASSIUM SERPL-SCNC: 4.2 MMOL/L (ref 3.6–5.5)
PROT SERPL-MCNC: 6.3 G/DL (ref 6–8.2)
RBC # BLD AUTO: 3.76 M/UL (ref 4.2–5.4)
SODIUM SERPL-SCNC: 135 MMOL/L (ref 135–145)
WBC # BLD AUTO: 5.8 K/UL (ref 4.8–10.8)

## 2018-08-27 PROCEDURE — 304540 HCHG NITRO SET VENT 2ND TUB

## 2018-08-27 PROCEDURE — 80053 COMPREHEN METABOLIC PANEL: CPT

## 2018-08-27 PROCEDURE — 85025 COMPLETE CBC W/AUTO DIFF WBC: CPT

## 2018-08-27 PROCEDURE — 306780 HCHG STAT FOR TRANSFUSION PER CASE

## 2018-08-27 PROCEDURE — 700105 HCHG RX REV CODE 258

## 2018-08-27 PROCEDURE — 99211 OFF/OP EST MAY X REQ PHY/QHP: CPT

## 2018-08-27 PROCEDURE — 700111 HCHG RX REV CODE 636 W/ 250 OVERRIDE (IP): Performed by: INTERNAL MEDICINE

## 2018-08-27 PROCEDURE — 99214 OFFICE O/P EST MOD 30 MIN: CPT | Performed by: INTERNAL MEDICINE

## 2018-08-27 PROCEDURE — 96413 CHEMO IV INFUSION 1 HR: CPT

## 2018-08-27 PROCEDURE — 700111 HCHG RX REV CODE 636 W/ 250 OVERRIDE (IP)

## 2018-08-27 PROCEDURE — 36592 COLLECT BLOOD FROM PICC: CPT

## 2018-08-27 PROCEDURE — 700105 HCHG RX REV CODE 258: Performed by: INTERNAL MEDICINE

## 2018-08-27 PROCEDURE — 96375 TX/PRO/DX INJ NEW DRUG ADDON: CPT

## 2018-08-27 RX ADMIN — DEXAMETHASONE SODIUM PHOSPHATE 12 MG: 4 INJECTION, SOLUTION INTRAMUSCULAR; INTRAVENOUS at 11:17

## 2018-08-27 RX ADMIN — DIPHENHYDRAMINE HYDROCHLORIDE 25 MG: 50 INJECTION INTRAMUSCULAR; INTRAVENOUS at 11:02

## 2018-08-27 RX ADMIN — PACLITAXEL 165.6 MG: 6 INJECTION, SOLUTION INTRAVENOUS at 11:53

## 2018-08-27 RX ADMIN — FAMOTIDINE 20 MG: 10 INJECTION INTRAVENOUS at 11:15

## 2018-08-27 ASSESSMENT — PAIN SCALES - GENERAL
PAINLEVEL_OUTOF10: 0
PAINLEVEL: NO PAIN

## 2018-08-27 NOTE — PROGRESS NOTES
Consult Note: Oncology    Date of consultation: 8/27/2018      Referring provider: Esperanza Crandall M.D.    Reason for consultation:   CC: Breast cancer    History of presenting illness:  Breast cancer  -August 2017 patient had a normal screening mammogram  -January 30, 2018. Echocardiogram shows normal systolic function. Ejection fraction of 65%  -February 2018 patient self palpated a lump in the left breast  -February 8, 2018. Diagnostic mammogram positive for suspicious mass in the left breast upper-outer quadrant.  -February 9, 2018. Needle biopsy shows triple negative infiltrating ductal carcinoma. Ki-67 is 30-50%  -February 19, 2018. PET CT scan. . Hypermetabolic 1.7 cm mass in the left breast, in keeping with known malignancy.2. Multiple hypermetabolic remi metastases in the left axilla.3. Nonenlarged lymph nodes in the left supraclavicular and retroclavicular region with mild uptake, suspicious for early metastasis.4. Mild uptake in the inferior endplate of T6 is nonspecific but could relate to degenerative change. Attention on follow-up exam is recommended  -March 3, 2018. MRI of the thoracic spine.1.  No evidence of metastatic disease the thoracic spine. Specifically, there is no suspicious lesion seen at T6.  -March 1, 2018. Cycle 1 day 1 of Adriamycin and cyclophosphamide.  -March 22, 2018. Plan for cycle 2 day 1 of Adriamycin and cyclophosphamide. April 19, 2018. Cycle 4 of Adriamycin and cyclophosphamide  -May 2, 2018. Cycle 1 of weekly paclitaxel  -May 16, 2018. Week 3 of paclitaxel   -June 6, 2018. Cycle 6 of paclitaxel  -June 13, 2018. Cycle 7 of weekly paclitaxel deferred secondary to severe fatigue  -July 6, 2018. Patient underwent left breast mastectomy with lymph node dissection.  1/7 Lymph nodes positive for disease.  No residual cancer seen breast  -Aug 6, 2018. Resumed chemotherapy. Cycle 7  -Aug 13,2018. Cycle 8    Adenocarcinoma in situ   Dec 18, 2015 .Right upper lobe lung nodule, 6 cores:  Adenocarcinoma-in-situ in fibroelastotic scar tissue, with foci suspicious for invasive adenocarcinoma.  Feb 1, 2016. Right thoracoscopy with anterior segmentectomy of the right upper lobe and removal of a portion of the right middle lobe.    Fibroadenoma of right breast  Nov 12, 2014  Stereotactic biopsy: Benign fibrofatty breast tissue with discrete nodules of hyalinized fibroadenoma with microcalcifications. No atypia or malignancy.    CLL  2006 diagnosed with CLL in [place] after being admitted to the hospital for an infection and found to have leucocytosis.  Patient followed by Dr. Foster until 2014 until her insurance changed. Has been followed by her PCP since.       Clarisse Reeves  is a 76 y.o. year old female who presents to [facility] care. She denies any acute complaints at this time except for some chronic fatigue which has been worse for the past past 3-4 months. She states she had some drenching night sweats last night but none in the past 6 months. She has also lost about 27 lbs in the past 3-4 months or so, but she has been trying to watch her diet and lose weight.    She was started on Celexa for depression last year(2016) after she lost her daughter who 51 to massive MI.      Interval History:  Initially seen in our clinic on 7/19/2017   Clarisse Reeves is a 77 y.o. Female who is seen in clinic for triple negative breast cancer. She is currently undergoing neoadjuvant chemotherapy. Patient states he is not feeling well. States he is fatigued and has been having a cough. She reports a temperature 101°F measured at 4:30 AM. She is here for prechemotherapy visit  She has been complaining of some abdominal pain which seems a bit better    Interval History: 6/27/2018  Clarisse Reeves is a 77 y.o. female seen in clinic today for follow up of severe fatigue malaise secondary to chemotherapy. The patient states she feels great and is going gambling today.she feels her energy and appetite have  returned.    Interval History: 7/24/2018  Clarisse Reeves is a 77 y.o. female seen in clinic today for follow-up after mastectomy and lymph node dissection.  Patient states she is doing well and denies any acute complaints.She had her drain removed yesterday     Interval History: 8/6/2018  Clarisse Reeves is a 78 y.o. female seen in clinic today for starting adjuvant chemotherapy, denies any new complaints.she got has a got a new PICC line in her right arm    Interval History: 8/27/2018  Clarisse Reeves is a 78 y.o. female seen in clinic today for follow up. Starting to have some fatigue, had PFTs done last week.no other complaints    Breast cancer  Location, left breast upper outer quadrant  Severity, stage IIIc  Timing, constant  Modifying factors,no   Quality, ductal  Duration, diagnosed February 9, 2018  Context, discovered on workup for palpable lump  Associated factors, palpable lump in the left breast      Review of Systems:  All other review of systems are negative except what was mentioned above in the HPI.    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   • Cataract     right  and  left  IOL   • CLL (chronic lymphoblastic leukemia)    • Cutaneous skin tags 1/29/2015   • DM (diabetes mellitus) (HCC)     oral meds   • Hiatus hernia syndrome     no surgery   • Indigestion    • MEDICAL HOME    • Personal history of colonic polyps 11/26/2012   • Pneumonia 2014   • Pneumonia 06/2017   • Statin intolerance    • Thyroid disease    • Type II or unspecified type diabetes mellitus without mention of complication, not stated as uncontrolled     pre-diabetic       Past surgical history:    Past Surgical History:   Procedure Laterality Date   •  MASTECTOMY Left 7/6/2018    Procedure: MASTECTOMY - PARTIAL AFTER WIRE LOCAALIZATION;  Surgeon: Esperanza Crandall M.D.;  Location: SURGERY SAME DAY [facility];  Service: General   • AXILLARY NODE DISSECTION Left 7/6/2018    Procedure: AXILLARY NODE DISSECTION;  Surgeon: Esperanza Crandall M.D.;  Location: SURGERY SAME DAY [facility];  Service: General   • THYROIDECTOMY N/A 11/29/2017    Procedure: THYROIDECTOMY COMPLETION, RECURRENT LARYNGEAL NERVE MONITORING;  Surgeon: Cameron Marcelino M.D.;  Location: SURGERY SAME DAY [facility];  Service: General   • THORACOSCOPY Right 2/1/2016    Procedure: THORACOSCOPY Upper Lobectomy ;  Surgeon: John H Ganser, M.D.;  Location: SURGERY [facility];  Service:    • NODE DISSECTION Right 2/1/2016    Procedure: NODE DISSECTION;  Surgeon: John H Ganser, M.D.;  Location: SURGERY [facility];  Service:    • RECOVERY  12/18/2015    Procedure: CT-SCP-RUL LUNG BIOPSY-;  Surgeon: [facility];  Location: SURGERY PRE-POST PROC UNIT [facility];  Service:    • OTHER  2001    Lower Left segment of lung removed    • CHOLECYSTECTOMY  1995   • OTHER ORTHOPEDIC SURGERY  1990    bakers cyst removed in right knee, multiple scopes   • OTHER  1990    hemmroid removal   • OTHER  1984    left Thyroid removed, might remove right side in the future   • APPENDECTOMY  1955   • CATARACT EXTRACTION WITH IOL     • TONSILLECTOMY     • US-NEEDLE CORE BX-BREAST PANEL     • VAGINAL HYSTERECTOMY TOTAL      Hysterectomy,Total Vaginal       Allergies:  Codeine; Lipitor; Lovastatin; and Zocor    Medications:    Current Outpatient Prescriptions   Medication Sig Dispense Refill   • Blood Glucose Monitoring Suppl Supplies Misc Contour Next glucometer strips for blood sugar check once a day 100 Each 2   • venlafaxine XR (EFFEXOR XR) 37.5 MG CAPSULE SR 24 HR TAKE ONE CAPSULE BY MOUTH DAILY 30 Cap 3   • lansoprazole (PREVACID) 30 MG CAPSULE DELAYED RELEASE Take 1 Cap by mouth every day. 90 Cap 4   •  "lorazepam (ATIVAN) 2 MG tablet      • MICROLET LANCETS Misc For BG check once a day 100 Each 3   • levothyroxine (SYNTHROID) 150 MCG Tab Take 1 Tab by mouth Every morning on an empty stomach. 90 Tab 4   • glipiZIDE (GLUCOTROL) 5 MG Tab Take 1 Tab by mouth every day. 90 Tab 4   • aspirin (ASA) 81 MG Chew Tab chewable tablet Take 1 Tab by mouth every day. 100 Tab 11     No current facility-administered medications for this visit.        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     Social History Main Topics   • Smoking status: Former Smoker     Packs/day: 1.50     Years: 50.00     Types: Cigarettes     Quit date: 5/6/2006   • Smokeless tobacco: Never Used      Comment: quit smoking 2002   • Alcohol use 0.0 oz/week      Comment: 2 drinks a week   • Drug use: No   • Sexual activity: Not Currently     Partners: Male     Other Topics Concern   • Not on file     Social History Narrative   • No narrative on file       Family History:     Family History   Problem Relation Age of Onset   • Cancer Mother    • Lung Disease Father    • Cancer Father            Physical Exam:  Vitals:   /88   Pulse (!) 56   Temp 36 °C (96.8 °F)   Resp 18   Ht 1.65 m (5' 4.96\")   Wt 93.2 kg (205 lb 9.3 oz)   SpO2 97%   BMI 34.25 kg/m²     General: Not in acute distress, alert and oriented x 3  HEENT: No pallor, icterus. Oropharynx clear.   Neck: Supple, no palpable masses.  Lymph nodes: No palpable cervical, supraclavicular, axillary or inguinal lymphadenopathy.    CVS: regular rate and rhythm, no rubs or gallops  RESP: Clear to auscultate bilaterally, no wheezing or crackles.   ABD: Soft, RUQ tender to deep palpation, non distended, positive bowel sounds, no palpable organomegaly  EXT: No edema or cyanosis  CNS: Alert and oriented x3, No focal " deficits.  Skin- No rash        Labs:   Outpatient Infusion Services on 08/27/2018   Component Date Value Ref Range Status   • WBC 08/27/2018 5.8  4.8 - 10.8 K/uL Final   • RBC 08/27/2018 3.76* 4.20 - 5.40 M/uL Final   • Hemoglobin 08/27/2018 10.5* 12.0 - 16.0 g/dL Final   • Hematocrit 08/27/2018 33.3* 37.0 - 47.0 % Final   • MCV 08/27/2018 88.6  81.4 - 97.8 fL Final   • MCH 08/27/2018 27.9  27.0 - 33.0 pg Final   • MCHC 08/27/2018 31.5* 33.6 - 35.0 g/dL Final   • RDW 08/27/2018 51.0* 35.9 - 50.0 fL Final   • Platelet Count 08/27/2018 325  164 - 446 K/uL Final   • MPV 08/27/2018 9.5  9.0 - 12.9 fL Final   • Neutrophils-Polys 08/27/2018 49.40  44.00 - 72.00 % Final   • Lymphocytes 08/27/2018 38.30  22.00 - 41.00 % Final   • Monocytes 08/27/2018 6.90  0.00 - 13.40 % Final   • Eosinophils 08/27/2018 1.60  0.00 - 6.90 % Final   • Basophils 08/27/2018 0.70  0.00 - 1.80 % Final   • Immature Granulocytes 08/27/2018 3.10* 0.00 - 0.90 % Final   • Nucleated RBC 08/27/2018 0.00  /100 WBC Final   • Neutrophils (Absolute) 08/27/2018 2.85  2.00 - 7.15 K/uL Final    Includes immature neutrophils, if present.   • Lymphs (Absolute) 08/27/2018 2.21  1.00 - 4.80 K/uL Final   • Monos (Absolute) 08/27/2018 0.40  0.00 - 0.85 K/uL Final   • Eos (Absolute) 08/27/2018 0.09  0.00 - 0.51 K/uL Final   • Baso (Absolute) 08/27/2018 0.04  0.00 - 0.12 K/uL Final   • Immature Granulocytes (abs) 08/27/2018 0.18* 0.00 - 0.11 K/uL Final   • NRBC (Absolute) 08/27/2018 0.00  K/uL Final   Hospital Outpatient Visit on 08/20/2018   Component Date Value Ref Range Status   • WBC 08/20/2018 5.6  4.8 - 10.8 K/uL Final   • RBC 08/20/2018 3.66* 4.20 - 5.40 M/uL Final   • Hemoglobin 08/20/2018 10.0* 12.0 - 16.0 g/dL Final   • Hematocrit 08/20/2018 32.4* 37.0 - 47.0 % Final   • MCV 08/20/2018 88.5  81.4 - 97.8 fL Final   • MCH 08/20/2018 27.3  27.0 - 33.0 pg Final   • MCHC 08/20/2018 30.9* 33.6 - 35.0 g/dL Final   • RDW 08/20/2018 49.7  35.9 - 50.0 fL Final   •  Platelet Count 08/20/2018 248  164 - 446 K/uL Final   • MPV 08/20/2018 9.6  9.0 - 12.9 fL Final   • Neutrophils-Polys 08/20/2018 65.90  44.00 - 72.00 % Final   • Lymphocytes 08/20/2018 28.10  22.00 - 41.00 % Final   • Monocytes 08/20/2018 3.20  0.00 - 13.40 % Final   • Eosinophils 08/20/2018 1.60  0.00 - 6.90 % Final   • Basophils 08/20/2018 0.50  0.00 - 1.80 % Final   • Immature Granulocytes 08/20/2018 0.70  0.00 - 0.90 % Final   • Nucleated RBC 08/20/2018 0.00  /100 WBC Final   • Neutrophils (Absolute) 08/20/2018 3.66  2.00 - 7.15 K/uL Final    Includes immature neutrophils, if present.   • Lymphs (Absolute) 08/20/2018 1.56  1.00 - 4.80 K/uL Final   • Monos (Absolute) 08/20/2018 0.18  0.00 - 0.85 K/uL Final   • Eos (Absolute) 08/20/2018 0.09  0.00 - 0.51 K/uL Final   • Baso (Absolute) 08/20/2018 0.03  0.00 - 0.12 K/uL Final   • Immature Granulocytes (abs) 08/20/2018 0.04  0.00 - 0.11 K/uL Final   • NRBC (Absolute) 08/20/2018 0.00  K/uL Final   • Peripheral Smear Review 08/20/2018 see below   Final    Comment: Due to instrument suspect flags, further review of peripheral smear is  indicated on this patient sample. This review may or may not result in  abnormal findings.     • Comments-Diff 08/20/2018 see below   Final    Results have been verified by peripheral blood smear review.       Imaging  March 3, 2018. MRI of the thoracic spine  1.  No evidence of metastatic disease the thoracic spine. Specifically, there is no suspicious lesion seen at T6.  2.  Mild thoracic spondylosis without high-grade impingement on the neural axis  3.  Hiatal hernia    February 19, 2018. PET CT scan  1. Hypermetabolic 1.7 cm mass in the left breast, in keeping with known malignancy.  2. Multiple hypermetabolic remi metastases in the left axilla.  3. Nonenlarged lymph nodes in the left supraclavicular and retroclavicular region with mild uptake, suspicious for early metastasis.  4. Mild uptake in the inferior endplate of T6 is  nonspecific but could relate to degenerative change. Attention on follow-up exam is recommended.    Assessment and Plan:  -Breast cancer  -Left side   -Upper outer quadrant  -Invasive ductal  -Stage IIIc [cT2, cN3c, M0]. ER/NM negative HER-2 negative  -Histologic grade 3  -PET/CT scan from February 19, 2018 was reviewed. Left supraclavicular and retroclavicular lymph nodes nonenlarged but mild uptake suspicious for early metastasis.  -May be a candidate for adjuvant Xeloda based on The Capecitabine for Residual Cancer as Adjuvant Therapy (CREATE-X) trial   3/21/2018  -MRI of the spine March 3, no suspicious lesion seen at T6.  -March 1, 2018. Cycle 1 day 1 of Adriamycin and cyclophosphamide. Patient needed to be admitted to the hospital for neutropenic fever.  -March 22, 2018. Plan for cycle 2 day 1 of Adriamycin and cyclophosphamide. Will plan for 20% dose reduction because of patient's severe fatigue and prolonged anemia.  6/27/2018   -Patient symptoms improved dramatically. She does not want to continue chemotherapy.   -Keep appt with Dr. Crandall in  for surgical eval  -referral made to radiation oncology  7/24/2018  -Had a long discussion regarding residual disease.  Patient would like to complete her Taxol chemotherapy  -Case was discussed with Dr. Saldana from radiation oncology and her radiation will be pushed back until chemotherapy is completed  -check baseline labs today   -Patient underwent left breast mastectomy with lymph node dissection July 6, 2018. .  1/7 Lymph nodes positive for disease.  No residual cancer seen in breast yp[T0,N1a]  8/20/2018   -labs reviewed  -proceed with week 9 of 12. Chemotherapy requiring intensive monitoring for toxicity.  8/27/2018   -labs pending  -proceed with chemotherapy  -PFT show adequate pulm reserve. Pt seen by pulm last week. She will proceed with radiation after completing chemotherapy  -RTC in 1 week      -Anemia  8/27/2018  -labs pending   -Secondary to  chemotherapy  -Transfuse for hg < 7.5    -CLL  -Established, stable, chronic  -Coon stage 0 with lymphocytosis only   -She is currently under active observation  -PET/CT scan February 2018 did not show evidence of lymph node disease  8/6/2018  Labs above reviewed    -Fibroadenoma of breast  -Right breast  -Stereotactic biopsy Nov 12, 2014  Showed benign fibrofatty breast tissue with discrete nodules of hyalinized fibroadenoma with microcalcifications.No atypia or malignancy.    - Adenocarcinoma in situ   - Diagnosed Dec 18, 2015 in the right upper lobe lung. biopsy of  nodule, 6 showed Adenocarcinoma-in-situ in fibroelastotic scar tissue, with foci suspicious for invasive adenocarcinoma.  - Feb 1, 2016. Right thoracoscopy with anterior segmentectomy of the right upper lobe and removal of a portion of the right middle lobe.Pathology report states The adenocarcinoma in situ focally demonstrates areas of central fibrosis which surrounds the adenocarcinoma in situ glands, which makes evaluation for invasion difficult. However, there are a few scattered clusters of malignant cells and small foci suspicious for invasive adenocarcinoma  - PET/CT scan February 2018 did not show any evidence of disease          She agreed and verbalized her agreement and understanding with the current plan.  I answered all questions and concerns she has at this time.   Dear  Esperanza Crandall M.D.., Thank you very much for allowing me to see  Clarisse Reeves today .     Please note that this dictation was created using voice recognition software. I have made every reasonable attempt to correct obvious errors, but I expect that there are errors of grammar and possibly content that I did not discover before finalizing the note.      SIGNATURES:  Denise Barroso    CC:  BETSY Guerra Michael D, M.D. Wright, Sharon, M.D.

## 2018-08-27 NOTE — PROGRESS NOTES
Pt arrived to [facility] ambulatory, here for lab draw prior to chemo. R PICC in place, line patent with brisk blood return observed. Dressing to be changed later at next appt. Labs drawn as ordered. PICC flushed. Pt to see MD this AM and return for chemo. Pt discharged under self care in no apparent distress.

## 2018-08-27 NOTE — PROGRESS NOTES
Chemotherapy Verification - PRIMARY RN      Height = 165cm  Weight = 93.3kg  BSA = 2.07m2       Medication: Taxol  Dose: 80mg/m2  Calculated Dose: 165.6mg                             (In mg/m2, AUC, mg/kg)     I confirm this process was performed independently with the BSA and all final chemotherapy dosing calculations congruent.  Any discrepancies of 5% or greater have been addressed with the chemotherapy pharmacist. The resolution of the discrepancy has been documented in the EPIC progress notes.

## 2018-08-27 NOTE — PROGRESS NOTES
Patient arrived for Cycle 10 Taxol, reports no changes or concerns.  Labs previously drawn, reviewed and within parameters.  PICC line flushed and patent, dressing changed in sterile field. Treatment completed without incident. PICC flushed and gauze dressing applied.  Next appt confirmed; pt discharged home in good spirits under no apparent distress.

## 2018-08-27 NOTE — PROGRESS NOTES
Chemotherapy Verification - SECONDARY RN       Height = 165 cm  Weight = 93.3 kg  BSA = 2.07 m2       Medication: Taxol  Dose: 80 mg/m2  Calculated Dose: 165.6 mg                             (In mg/m2, AUC, mg/kg)     I confirm that this process was performed independently.

## 2018-08-27 NOTE — PROGRESS NOTES
"Pharmacy Chemotherapy Verification    Patient Name: Clarisse Reeves  Dx: Breast CA    Cycle: 10 weekly Paclitaxel    Previous treatment = week 9 on 8/20/18     Regimen: DDAC (Dose Dense Doxorubicin + Cyclophosphamide) followed by Paclitaxel  *Dosing Reference*  DOXOrubicin  IV on Day 1   --dose reduced to 48 mg/m2 due to tolerance  Cyclophosphamide IV over 30 min on Day 1   --dose reduced to 480 mg/m2 due to tolerance  14 day cycle for 4 cycles (completed 4/20/18)  NCCN Guidelines for Breast Cancer V.2.2017  Sophyon ML et al J Clin Oncol. 2003:21(8):1431-9    Followed By:  Paclitaxel Weekly Course  Paclitaxel (Taxol) 80 mg/m2 IV over 60 min on Day 1   Weekly cycle for 12 weeks  NCCN Guidelines for Breast Cancer v.1.2017  Sophyon ML et al J Clin Oncol. 2003:21(8):1431-9    Allergies:Codeine; Lipitor [atorvastatin calcium]; Lovastatin; Zocor [simvastatin - high dose]; Lisinopril; Metformin; and Tape    /70   Pulse 99   Temp 36.3 °C (97.3 °F)   Resp 18   Ht 1.65 m (5' 4.96\")   Wt 93.3 kg (205 lb 11 oz)   BMI 34.27 kg/m²   Body surface area is 2.07 meters squared.      ECHO 1/30/18  LVEF = 65%    Labs 8/27/18  ANC~ 2850 Plt = 325k   Hgb = 10.5     SCr = 0.7mg/dL CrCl ~ 98.9 mL/min   LFT's = WNLs  TBili = 0.4  K = 4.2      Paclitaxel (Taxol) 80 mg/m2 x 2.07m2 = 165.6 mg   <5% difference, okay to treat with final dose = 165.6mg IV     Matt Bronson, PharmD    "

## 2018-08-30 DIAGNOSIS — C91.10 CLL (CHRONIC LYMPHOCYTIC LEUKEMIA) (HCC): ICD-10-CM

## 2018-08-31 ENCOUNTER — NON-PROVIDER VISIT (OUTPATIENT)
Dept: HEMATOLOGY ONCOLOGY | Facility: MEDICAL CENTER | Age: 78
End: 2018-08-31
Payer: MEDICARE

## 2018-08-31 ENCOUNTER — HOSPITAL ENCOUNTER (OUTPATIENT)
Facility: MEDICAL CENTER | Age: 78
End: 2018-08-31
Attending: INTERNAL MEDICINE
Payer: MEDICARE

## 2018-08-31 ENCOUNTER — OFFICE VISIT (OUTPATIENT)
Dept: HEMATOLOGY ONCOLOGY | Facility: MEDICAL CENTER | Age: 78
End: 2018-08-31
Payer: MEDICARE

## 2018-08-31 VITALS
BODY MASS INDEX: 32.6 KG/M2 | HEIGHT: 66 IN | HEART RATE: 94 BPM | WEIGHT: 202.82 LBS | SYSTOLIC BLOOD PRESSURE: 126 MMHG | TEMPERATURE: 97 F | OXYGEN SATURATION: 96 % | RESPIRATION RATE: 18 BRPM | DIASTOLIC BLOOD PRESSURE: 52 MMHG

## 2018-08-31 VITALS
DIASTOLIC BLOOD PRESSURE: 52 MMHG | HEART RATE: 94 BPM | SYSTOLIC BLOOD PRESSURE: 126 MMHG | BODY MASS INDEX: 32.6 KG/M2 | WEIGHT: 202.82 LBS | HEIGHT: 66 IN | OXYGEN SATURATION: 96 % | RESPIRATION RATE: 18 BRPM | TEMPERATURE: 97 F

## 2018-08-31 DIAGNOSIS — C50.412 MALIGNANT NEOPLASM OF UPPER-OUTER QUADRANT OF LEFT BREAST IN FEMALE, ESTROGEN RECEPTOR NEGATIVE (HCC): ICD-10-CM

## 2018-08-31 DIAGNOSIS — Z17.1 MALIGNANT NEOPLASM OF UPPER-OUTER QUADRANT OF LEFT BREAST IN FEMALE, ESTROGEN RECEPTOR NEGATIVE (HCC): ICD-10-CM

## 2018-08-31 DIAGNOSIS — Z45.2 PICC (PERIPHERALLY INSERTED CENTRAL CATHETER) IN PLACE: ICD-10-CM

## 2018-08-31 DIAGNOSIS — C91.10 CLL (CHRONIC LYMPHOCYTIC LEUKEMIA) (HCC): ICD-10-CM

## 2018-08-31 LAB
BASOPHILS # BLD AUTO: 0.7 % (ref 0–1.8)
BASOPHILS # BLD: 0.04 K/UL (ref 0–0.12)
EOSINOPHIL # BLD AUTO: 0.05 K/UL (ref 0–0.51)
EOSINOPHIL NFR BLD: 0.9 % (ref 0–6.9)
ERYTHROCYTE [DISTWIDTH] IN BLOOD BY AUTOMATED COUNT: 51.1 FL (ref 35.9–50)
HCT VFR BLD AUTO: 35.7 % (ref 37–47)
HGB BLD-MCNC: 11.1 G/DL (ref 12–16)
IMM GRANULOCYTES # BLD AUTO: 0.03 K/UL (ref 0–0.11)
IMM GRANULOCYTES NFR BLD AUTO: 0.6 % (ref 0–0.9)
LYMPHOCYTES # BLD AUTO: 2.29 K/UL (ref 1–4.8)
LYMPHOCYTES NFR BLD: 42.4 % (ref 22–41)
MCH RBC QN AUTO: 27.2 PG (ref 27–33)
MCHC RBC AUTO-ENTMCNC: 31.1 G/DL (ref 33.6–35)
MCV RBC AUTO: 87.5 FL (ref 81.4–97.8)
MONOCYTES # BLD AUTO: 0.2 K/UL (ref 0–0.85)
MONOCYTES NFR BLD AUTO: 3.7 % (ref 0–13.4)
NEUTROPHILS # BLD AUTO: 2.79 K/UL (ref 2–7.15)
NEUTROPHILS NFR BLD: 51.7 % (ref 44–72)
NRBC # BLD AUTO: 0 K/UL
NRBC BLD-RTO: 0 /100 WBC
PLATELET # BLD AUTO: 316 K/UL (ref 164–446)
PMV BLD AUTO: 9.8 FL (ref 9–12.9)
RBC # BLD AUTO: 4.08 M/UL (ref 4.2–5.4)
WBC # BLD AUTO: 5.4 K/UL (ref 4.8–10.8)

## 2018-08-31 PROCEDURE — 85025 COMPLETE CBC W/AUTO DIFF WBC: CPT

## 2018-08-31 PROCEDURE — 99214 OFFICE O/P EST MOD 30 MIN: CPT | Performed by: INTERNAL MEDICINE

## 2018-08-31 RX ORDER — 0.9 % SODIUM CHLORIDE 0.9 %
5 VIAL (ML) INJECTION PRN
Status: CANCELLED | OUTPATIENT
Start: 2018-09-09

## 2018-08-31 RX ORDER — 0.9 % SODIUM CHLORIDE 0.9 %
VIAL (ML) INJECTION PRN
Status: CANCELLED | OUTPATIENT
Start: 2018-09-10

## 2018-08-31 RX ORDER — 0.9 % SODIUM CHLORIDE 0.9 %
VIAL (ML) INJECTION PRN
Status: CANCELLED | OUTPATIENT
Start: 2018-09-09

## 2018-08-31 RX ORDER — ONDANSETRON 2 MG/ML
4 INJECTION INTRAMUSCULAR; INTRAVENOUS
Status: CANCELLED | OUTPATIENT
Start: 2018-09-10

## 2018-08-31 RX ORDER — ONDANSETRON 8 MG/1
8 TABLET, ORALLY DISINTEGRATING ORAL
Status: CANCELLED | OUTPATIENT
Start: 2018-09-03

## 2018-08-31 RX ORDER — 0.9 % SODIUM CHLORIDE 0.9 %
5 VIAL (ML) INJECTION PRN
Status: CANCELLED | OUTPATIENT
Start: 2018-09-03

## 2018-08-31 RX ORDER — 0.9 % SODIUM CHLORIDE 0.9 %
5 VIAL (ML) INJECTION PRN
Status: CANCELLED | OUTPATIENT
Start: 2018-09-10

## 2018-08-31 RX ORDER — 0.9 % SODIUM CHLORIDE 0.9 %
VIAL (ML) INJECTION PRN
Status: CANCELLED | OUTPATIENT
Start: 2018-09-03

## 2018-08-31 RX ORDER — 0.9 % SODIUM CHLORIDE 0.9 %
5 VIAL (ML) INJECTION PRN
Status: CANCELLED | OUTPATIENT
Start: 2018-09-02

## 2018-08-31 RX ORDER — 0.9 % SODIUM CHLORIDE 0.9 %
VIAL (ML) INJECTION PRN
Status: CANCELLED | OUTPATIENT
Start: 2018-09-02

## 2018-08-31 RX ORDER — ONDANSETRON 2 MG/ML
4 INJECTION INTRAMUSCULAR; INTRAVENOUS
Status: CANCELLED | OUTPATIENT
Start: 2018-09-03

## 2018-08-31 RX ORDER — PROCHLORPERAZINE MALEATE 10 MG
10 TABLET ORAL EVERY 6 HOURS PRN
Status: CANCELLED | OUTPATIENT
Start: 2018-09-10

## 2018-08-31 RX ORDER — SODIUM CHLORIDE 9 MG/ML
INJECTION, SOLUTION INTRAVENOUS CONTINUOUS
Status: CANCELLED | OUTPATIENT
Start: 2018-09-03

## 2018-08-31 RX ORDER — PROCHLORPERAZINE MALEATE 10 MG
10 TABLET ORAL EVERY 6 HOURS PRN
Status: CANCELLED | OUTPATIENT
Start: 2018-09-03

## 2018-08-31 RX ORDER — SODIUM CHLORIDE 9 MG/ML
INJECTION, SOLUTION INTRAVENOUS CONTINUOUS
Status: CANCELLED | OUTPATIENT
Start: 2018-09-10

## 2018-08-31 RX ORDER — ONDANSETRON 8 MG/1
8 TABLET, ORALLY DISINTEGRATING ORAL
Status: CANCELLED | OUTPATIENT
Start: 2018-09-10

## 2018-08-31 ASSESSMENT — PAIN SCALES - GENERAL
PAINLEVEL: 1=MINIMAL PAIN
PAINLEVEL: 2=MINIMAL-SLIGHT

## 2018-08-31 NOTE — PROGRESS NOTES
Pt ambulatory to Medical Oncology for PICC labs. CBC drawn without difficulty. Line flushes easily. Last dressing change 8/27/2018. Next due on 9/3. Pt has [facility] appointment. Pt denies any acute complaints and is ambulatory to waiting room prior to appointment with Dr. Rebolledo.     **Addendum**  After visit with Dr. Rebolledo. Verbal orders received to remove PICC line as patient will not proceed with Taxol any longer. Pt placed supine on exam table. PICC removed without difficulty in multiple slow motions. PICC measures 40 cm once removed. Verified placement length was 40 cm. Sterile gauze and tegaderm placed over previous insertion site. Pt instructed to leave dressing in place for 24 hours. Pt observed for 15 minutes. No bleeding at site noted. Pt to hold pressure and inform our office if the dressing saturates. She denies any acute complaints and is ambulatory out of clinic in [facility].

## 2018-08-31 NOTE — PROGRESS NOTES
Date of visit: 8/31/2018  8:41 AM      Chief Complaint- -Stage IIIc [cT2, cN3c, M0]. ER/ME negative HER-2 negative, triple negative breast carcinoma.  YpT0,ypN1a    Identification/Prior relevant history:   CLL  2006 diagnosed with CLL in [place] after being admitted to the hospital for an infection and found to have leucocytosis.  Patient followed by Dr. Foster until 2014 until her insurance changed.    -February 2018 patient self palpated a lump in the left breast  -February 8, 2018. Diagnostic mammogram positive for suspicious mass in the left breast upper-outer quadrant.  -February 9, 2018. Needle biopsy shows triple negative infiltrating ductal carcinoma. Ki-67 is 30-50%  -February 19, 2018. PET CT scan. . Hypermetabolic 1.7 cm mass in the left breast, in keeping with known malignancy.2. Multiple hypermetabolic remi metastases in the left axilla.3. Nonenlarged lymph nodes in the left supraclavicular and retroclavicular region with mild uptake, suspicious for early metastasis.4. Mild uptake in the inferior endplate of T6 is nonspecific but could relate to degenerative change. Attention on follow-up exam is recommended  -March 3, 2018. MRI of the thoracic spine.1.  No evidence of metastatic disease the thoracic spine. Specifically, there is no suspicious lesion seen at T6    Interim history -  Neoadjuvant dd AC-> weekly Taxol. Chemotherapy held after week #6 of Taxol.  left breast mastectomy with lymph node dissection.  1/7 Lymph nodes positive for disease.  No residual cancer seen breast.  Lymphocytosis gradually resolved during the chemotherapy  Aug 6, 2018. Resumed chemotherapy. Cycle 7  S/p 10 doses.  C/o severe fatigue and intolerance.. She wants to hold further treatment.      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   • Cataract     right  and  left  IOL   • CLL (chronic lymphoblastic leukemia)    • Cutaneous skin tags 1/29/2015   • DM (diabetes mellitus) (HCC)     oral meds   • Hiatus hernia syndrome     no surgery   • Indigestion    • MEDICAL HOME    • Personal history of colonic polyps 11/26/2012   • Pneumonia 2014   • Pneumonia 06/2017   • Statin intolerance    • Thyroid disease    • Type II or unspecified type diabetes mellitus without mention of complication, not stated as uncontrolled     pre-diabetic       Past surgical history:       Past Surgical History:   Procedure Laterality Date   • MASTECTOMY Left 7/6/2018    Procedure: MASTECTOMY - PARTIAL AFTER WIRE LOCAALIZATION;  Surgeon: Esperanza Crandall M.D.;  Location: SURGERY SAME DAY [facility];  Service: General   • AXILLARY NODE DISSECTION Left 7/6/2018    Procedure: AXILLARY NODE DISSECTION;  Surgeon: Esperanza Crandall M.D.;  Location: SURGERY SAME DAY [facility];  Service: General   • THYROIDECTOMY N/A 11/29/2017    Procedure: THYROIDECTOMY COMPLETION, RECURRENT LARYNGEAL NERVE MONITORING;  Surgeon: Cameron Marcelino M.D.;  Location: SURGERY SAME DAY [facility];  Service: General   • THORACOSCOPY Right 2/1/2016    Procedure: THORACOSCOPY Upper Lobectomy ;  Surgeon: John H Ganser, M.D.;  Location: SURGERY [facility];  Service:    • NODE DISSECTION Right 2/1/2016    Procedure: NODE DISSECTION;  Surgeon: John H Ganser, M.D.;  Location: SURGERY [facility];  Service:    • RECOVERY  12/18/2015    Procedure: CT-SCP-RUL LUNG BIOPSY-;  Surgeon: Recoveryonly Surgery;  Location: SURGERY PRE-POST PROC UNIT [facility];  Service:    • OTHER  2001    Lower Left segment of lung removed    • CHOLECYSTECTOMY  1995   • OTHER ORTHOPEDIC SURGERY  1990    bakers cyst removed in right knee, multiple scopes   • OTHER  1990    hemmroid removal   • OTHER  1984    left Thyroid removed, might  remove right side in the future   • APPENDECTOMY  1955   • CATARACT EXTRACTION WITH IOL     • TONSILLECTOMY     • US-NEEDLE CORE BX-BREAST PANEL     • VAGINAL HYSTERECTOMY TOTAL      Hysterectomy,Total Vaginal       Allergies:       Codeine; Lipitor [atorvastatin calcium]; Lovastatin; Zocor [simvastatin - high dose]; Lisinopril; Metformin; and Tape    Medications:         Current Outpatient Prescriptions   Medication Sig Dispense Refill   • venlafaxine XR (EFFEXOR XR) 37.5 MG CAPSULE SR 24 HR TAKE ONE CAPSULE BY MOUTH DAILY 30 Cap 3   • lansoprazole (PREVACID) 30 MG CAPSULE DELAYED RELEASE Take 1 Cap by mouth every day. 90 Cap 4   • lorazepam (ATIVAN) 2 MG tablet      • MICROLET LANCETS Misc For BG check once a day 100 Each 3   • Blood Glucose Monitoring Suppl Supplies Misc Contour Next glucometer strips for blood sugar check once a day 100 Each 2   • levothyroxine (SYNTHROID) 150 MCG Tab Take 1 Tab by mouth Every morning on an empty stomach. 90 Tab 4   • glipiZIDE (GLUCOTROL) 5 MG Tab Take 1 Tab by mouth every day. 90 Tab 4   • aspirin (ASA) 81 MG Chew Tab chewable tablet Take 1 Tab by mouth every day. 100 Tab 11     No current facility-administered medications for this visit.          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     Social History Main Topics   • Smoking status: Former Smoker     Packs/day: 1.50     Years: 50.00     Types: Cigarettes     Quit date: 5/6/2006   • Smokeless tobacco: Never Used      Comment: quit smoking 2002   • Alcohol use 0.0 oz/week      Comment: 2 drinks a week   • Drug use: No   • Sexual activity: Not Currently     Partners: Male     Other Topics Concern   • Not on file     Social History Narrative   • No narrative on file       Family History:      Family History   Problem Relation Age of Onset   •  "Cancer Mother    • Lung Disease Father    • Cancer Father        Review of Systems:  All other review of systems are negative except what was mentioned above in the HPI.    Constitutional: Negative for fever, chills, positive weight loss and malaise/fatigue.    HEENT: No new auditory or visual complaints. No sore throat and neck pain.     Respiratory: Negative for cough, sputum production, shortness of breath and wheezing.    Cardiovascular: Negative for chest pain, palpitations, orthopnea and leg swelling.    Gastrointestinal: Negative for heartburn, nausea, vomiting and abdominal pain.    Genitourinary: Negative for dysuria, hematuria    Musculoskeletal: No new arthralgias or myalgias   Skin: Negative for rash and itching.    Neurological: Negative for focal weakness and headaches.    Endo/Heme/Allergies: No abnormal bleed/bruise.    Psychiatric/Behavioral: No new depression/anxiety.    Physical Exam:  Vitals: /52   Pulse 94   Temp 36.1 °C (97 °F)   Resp 18   Ht 1.676 m (5' 6\")   Wt 92 kg (202 lb 13.2 oz)   SpO2 96%   BMI 32.74 kg/m²     General: Not in acute distress, alert and oriented x 3  HEENT: No pallor, icterus. Oropharynx clear.   Neck: Supple, no palpable masses.  Lymph nodes: No palpable cervical, supraclavicular, axillary or inguinal lymphadenopathy.    CVS: regular rate and rhythm, no rubs or gallops  RESP: Clear to auscultate bilaterally, no wheezing or crackles.   ABD: Soft, non tender, non distended, positive bowel sounds, no palpable organomegaly  EXT: No edema or cyanosis  CNS: Alert and oriented x3, No focal deficits.  Skin- No rash      Labs:   [facility] on 08/27/2018   Component Date Value Ref Range Status   • WBC 08/27/2018 5.8  4.8 - 10.8 K/uL Final   • RBC 08/27/2018 3.76* 4.20 - 5.40 M/uL Final   • Hemoglobin 08/27/2018 10.5* 12.0 - 16.0 g/dL Final   • Hematocrit 08/27/2018 33.3* 37.0 - 47.0 % Final   • MCV 08/27/2018 88.6  81.4 - 97.8 fL Final   • MCH " 08/27/2018 27.9  27.0 - 33.0 pg Final   • MCHC 08/27/2018 31.5* 33.6 - 35.0 g/dL Final   • RDW 08/27/2018 51.0* 35.9 - 50.0 fL Final   • Platelet Count 08/27/2018 325  164 - 446 K/uL Final   • MPV 08/27/2018 9.5  9.0 - 12.9 fL Final   • Neutrophils-Polys 08/27/2018 49.40  44.00 - 72.00 % Final   • Lymphocytes 08/27/2018 38.30  22.00 - 41.00 % Final   • Monocytes 08/27/2018 6.90  0.00 - 13.40 % Final   • Eosinophils 08/27/2018 1.60  0.00 - 6.90 % Final   • Basophils 08/27/2018 0.70  0.00 - 1.80 % Final   • Immature Granulocytes 08/27/2018 3.10* 0.00 - 0.90 % Final   • Nucleated RBC 08/27/2018 0.00  /100 WBC Final   • Neutrophils (Absolute) 08/27/2018 2.85  2.00 - 7.15 K/uL Final    Includes immature neutrophils, if present.   • Lymphs (Absolute) 08/27/2018 2.21  1.00 - 4.80 K/uL Final   • Monos (Absolute) 08/27/2018 0.40  0.00 - 0.85 K/uL Final   • Eos (Absolute) 08/27/2018 0.09  0.00 - 0.51 K/uL Final   • Baso (Absolute) 08/27/2018 0.04  0.00 - 0.12 K/uL Final   • Immature Granulocytes (abs) 08/27/2018 0.18* 0.00 - 0.11 K/uL Final   • NRBC (Absolute) 08/27/2018 0.00  K/uL Final   • Sodium 08/27/2018 135  135 - 145 mmol/L Final   • Potassium 08/27/2018 4.2  3.6 - 5.5 mmol/L Final   • Chloride 08/27/2018 103  96 - 112 mmol/L Final   • Co2 08/27/2018 24  20 - 33 mmol/L Final   • Anion Gap 08/27/2018 8.0  0.0 - 11.9 Final   • Glucose 08/27/2018 129* 65 - 99 mg/dL Final   • Bun 08/27/2018 11  8 - 22 mg/dL Final   • Creatinine 08/27/2018 0.70  0.50 - 1.40 mg/dL Final   • Calcium 08/27/2018 8.3* 8.5 - 10.5 mg/dL Final   • AST(SGOT) 08/27/2018 9* 12 - 45 U/L Final   • ALT(SGPT) 08/27/2018 13  2 - 50 U/L Final   • Alkaline Phosphatase 08/27/2018 84  30 - 99 U/L Final   • Total Bilirubin 08/27/2018 0.4  0.1 - 1.5 mg/dL Final   • Albumin 08/27/2018 3.9  3.2 - 4.9 g/dL Final   • Total Protein 08/27/2018 6.3  6.0 - 8.2 g/dL Final   • Globulin 08/27/2018 2.4  1.9 - 3.5 g/dL Final   • A-G Ratio 08/27/2018 1.6  g/dL Final   • GFR  If  08/27/2018 >60  >60 mL/min/1.73 m 2 Final   • GFR If Non  08/27/2018 >60  >60 mL/min/1.73 m 2 Final       Assessment and Plan:  Stage IIIc [cT2, cN3c, M0]. YpT0,ypN1a  ER/[place] negative HER-2 negative, triple negative breast carcinoma-she had very good response to suboptimal neoadjuvant chemotherapy with achievement of pCR of the breast mass, however, there was residual one lymph node which was positive.  Resume Weekly paclitaxel after surgery. Overall, she got 10 doses. She feels very fatigued and does not want to continue further chemotherapy. Long discussion today about the uncertainty of not getting the full dose recommended chemotherapy. She is also not a good candidate for Xeloda per CREATE - X results.  Instructed her to make an appointment with radiation oncology to proceed with adjuvant radiation.. We will see her back in 4 months with repeat CT of her chest    CLL  -Established, stable, chronic  -Coon stage 0 with lymphocytosis only   -She is currently under active observation  -PET/CT scan February 2018 did not show evidence of lymph node disease   lymphocytosis has resolved with the chemotherapy.  -Fibroadenoma of breast  -Right breast  -Stereotactic biopsy Nov 12, 2014  Showed benign fibrofatty breast tissue with discrete nodules of hyalinized fibroadenoma with microcalcifications.No atypia or malignancy.     - Adenocarcinoma in situ   - Diagnosed Dec 18, 2015 in the right upper lobe lung. biopsy of  nodule, 6 showed Adenocarcinoma-in-situ in fibroelastotic scar tissue, with foci suspicious for invasive adenocarcinoma.  - Feb 1, 2016. Right thoracoscopy with anterior segmentectomy of the right upper lobe and removal of a portion of the right middle lobe.Pathology report states The adenocarcinoma in situ focally demonstrates areas of central fibrosis which surrounds the adenocarcinoma in situ glands, which makes evaluation for invasion difficult. However, there are a few  scattered clusters of malignant cells and small foci suspicious for invasive adenocarcinoma  - PET/CT scan February 2018 did not show any evidence of disease    Left upper extremity lymphedema.-Will refer her to the lymphedema clinic.    Complex patient requiring complex decision making. I have extensively reviewed her prior medical records as part of establishing care with me and formulated the plan., More than 80% of the 55 minute visit today spent discussing all the above face-to-face with the patient  Please note that this dictation was created using voice recognition software. I have made every reasonable attempt to correct obvious errors, but I expect that there are errors of grammar and possibly content that I did not discover before finalizing the note.      SIGNATURES:  Rajan Rebolledo    CC:  Siva Smart M.D.  No ref. Provider found

## 2018-09-03 ENCOUNTER — APPOINTMENT (OUTPATIENT)
Dept: ONCOLOGY | Facility: MEDICAL CENTER | Age: 78
End: 2018-09-03
Attending: INTERNAL MEDICINE
Payer: MEDICARE

## 2018-09-04 ENCOUNTER — HOSPITAL ENCOUNTER (OUTPATIENT)
Dept: RADIATION ONCOLOGY | Facility: MEDICAL CENTER | Age: 78
End: 2018-09-30
Attending: RADIOLOGY
Payer: MEDICARE

## 2018-09-10 ENCOUNTER — APPOINTMENT (OUTPATIENT)
Dept: ONCOLOGY | Facility: MEDICAL CENTER | Age: 78
End: 2018-09-10
Attending: INTERNAL MEDICINE
Payer: MEDICARE

## 2018-09-10 ENCOUNTER — PATIENT MESSAGE (OUTPATIENT)
Dept: MEDICAL GROUP | Age: 78
End: 2018-09-10

## 2018-09-11 NOTE — TELEPHONE ENCOUNTER
She is at increased risk for getting hepatitis B view of her multiple medical problems history of cancer and chronic lymphocytic leukemia.

## 2018-09-12 ENCOUNTER — PHYSICAL THERAPY (OUTPATIENT)
Dept: PHYSICAL THERAPY | Facility: REHABILITATION | Age: 78
End: 2018-09-12
Attending: SURGERY
Payer: MEDICARE

## 2018-09-12 DIAGNOSIS — M79.602 ARM PAIN, ANTERIOR, LEFT: ICD-10-CM

## 2018-09-12 DIAGNOSIS — L90.5 SCAR OF FEMALE BREAST: ICD-10-CM

## 2018-09-12 PROCEDURE — 97162 PT EVAL MOD COMPLEX 30 MIN: CPT

## 2018-09-12 PROCEDURE — G8987 SELF CARE CURRENT STATUS: HCPCS | Mod: CI

## 2018-09-12 PROCEDURE — G8988 SELF CARE GOAL STATUS: HCPCS | Mod: CH

## 2018-09-12 PROCEDURE — 97110 THERAPEUTIC EXERCISES: CPT

## 2018-09-13 PROCEDURE — G8988 SELF CARE GOAL STATUS: HCPCS | Mod: CI

## 2018-09-13 PROCEDURE — G8987 SELF CARE CURRENT STATUS: HCPCS | Mod: CI

## 2018-09-13 ASSESSMENT — ENCOUNTER SYMPTOMS
PAIN SCALE AT HIGHEST: 8
PAIN SCALE AT LOWEST: 7
PAIN SCALE: 7

## 2018-09-13 NOTE — OP THERAPY EVALUATION
Outpatient Physical Therapy  LYMPHEDEMA THERAPY INITIAL EVALUATION    [facility] Physical Therapy [address]  [address]  [place] [address]  Phone:  [phone]  Fax:  [phone]    Date of Evaluation: 2018    Patient: Clarisse Reeves  YOB: 1940  MRN: [id number]     Referring Provider: Esperanza Crandall M.D.  [address] [facility] [id number]  [place] [address]   Referring Diagnosis Malignant neoplasm of upper-outer quadrant of unspecified female breast [C50.419]     Time Calculation             Physical Therapy Occurrence Codes    Date of onset of impairment:  18   Date physical therapy care plan established or reviewed:  18   Date physical therapy treatment started:  18          Chief Complaint: Arm Problem (L arm and breast swelling)    Visit Diagnoses     ICD-10-CM   1. Scar of female breast L90.5   2. Arm pain, anterior, left M79.602       Subjective:   History of Present Illness:     Mechanism of injury:  Completed chemotherapy two weeks ago, noticing an increase in L breast and arm swelling  Sleep disturbance:  Non-restful sleep  Pain:     Current pain ratin    At best pain ratin    At worst pain ratin    Location:  L anterior forearm and upper arm  Patient Goals:     Patient goals for therapy:  Decreased edema      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   • Cataract     right  and  left  IOL   • CLL (chronic lymphoblastic leukemia)    • Cutaneous skin tags 2015   • DM (diabetes mellitus) (HCC)     oral meds   • Hiatus hernia syndrome     no surgery   • Indigestion    • MEDICAL HOME    • Personal history of colonic polyps 2012   • Pneumonia    • Pneumonia 2017   • Statin intolerance     • Thyroid disease    • Type II or unspecified type diabetes mellitus without mention of complication, not stated as uncontrolled     pre-diabetic     Past Surgical History:   Procedure Laterality Date   • MASTECTOMY Left 7/6/2018    Procedure: MASTECTOMY - PARTIAL AFTER WIRE LOCAALIZATION;  Surgeon: Esperanza Crandall M.D.;  Location: SURGERY SAME DAY [facility];  Service: General   • AXILLARY NODE DISSECTION Left 7/6/2018    Procedure: AXILLARY NODE DISSECTION;  Surgeon: Esperanza Crandall M.D.;  Location: SURGERY SAME DAY [facility];  Service: General   • THYROIDECTOMY N/A 11/29/2017    Procedure: THYROIDECTOMY COMPLETION, RECURRENT LARYNGEAL NERVE MONITORING;  Surgeon: Cameron Marcelino M.D.;  Location: SURGERY SAME DAY [facility];  Service: General   • THORACOSCOPY Right 2/1/2016    Procedure: THORACOSCOPY Upper Lobectomy ;  Surgeon: John H Ganser, M.D.;  Location: SURGERY [facility];  Service:    • NODE DISSECTION Right 2/1/2016    Procedure: NODE DISSECTION;  Surgeon: John H Ganser, M.D.;  Location: SURGERY [facility];  Service:    • RECOVERY  12/18/2015    Procedure: CT-SCP-RUL LUNG BIOPSY-;  Surgeon: Recoveryonly Surgery;  [place] [facility];  Service:    • OTHER  2001    Lower Left segment of lung removed    • CHOLECYSTECTOMY  1995   • OTHER ORTHOPEDIC SURGERY  1990    bakers cyst removed in right knee, multiple scopes   • OTHER  1990    hemmroid removal   • OTHER  1984    left Thyroid removed, might remove right side in the future   • APPENDECTOMY  1955   • CATARACT EXTRACTION WITH IOL     • TONSILLECTOMY     • US-NEEDLE CORE BX-BREAST PANEL     • VAGINAL HYSTERECTOMY TOTAL      Hysterectomy,Total Vaginal     Social History   Substance Use Topics   • Smoking status: Former Smoker     Packs/day: 1.50     Years: 50.00     Types: Cigarettes     Quit date: 5/6/2006   • Smokeless tobacco: Never Used      Comment: quit smoking 2002   • Alcohol use 0.0 oz/week       Comment: 2 drinks a week     Family and Occupational History     Social History   • Marital status: Single     Spouse name: N/A   • Number of children: N/A   • Years of education: N/A     Occupational History   • 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       Lymphedema Objective    Postural Observation  Seated posture is  Standing posture is  Correction of posture  Thoracic hyperkyphosis with protracted shlds, bilateral    Skin Appearance    Non-pitting edema, fibrotic scar  L breast swelling with mild redness, visible swelling in L upper arm and prox forearm  AROM L shld   flex 165  abd 130  L axillary cording    Sensation      Sensation Comments  Inconsistent bilateral hand numbness      Left Upper Extremity Circumferential Measurements  Axilla cm  Upper Proximal Humerus 38 cm  Distal Humerus 28.3 cm  Elbow 26 cm  Mid-Forearm 24 cm  Wrist 16 cm  Distal Velasquez Crease cm  Total 132.3 cm    Right Upper Extremity Circumferential Measurements  Axilla cm  Upper Proximal Humerus 38 cm  Distal Humerus 27 cm  Elbow 26 cm  Mid-Forearm 23.4 cm  Wrist 15 cm  Distal Velasquez Crease cm  Total cm 129.4            Therapeutic Exercises (CPT 39431):     1. Shoulder retraction, 10 x hold 5 sec    2. Pectoral corner stretches, 3 x hold 30 sec      Time-based treatments/modalities:          Assessment and Plan:   Functional Impairments: abnormal/decreased or restricted ROM and swelling    Assessment details:  L truncal and UE swelling with evidence of axillary webbing and limited AROM of L shoulder; breast is edematous and mildly reddened    Goals:   Short Term Goals:  Will improve AROM of Left UE as follows: shoulder abduction from 130 to 145  Pt will demonstrate proper skin care management  Pt will demonstrate correct performance of HEP  Patient will demonstrate adherence to lymphedema precautions  Pain reduced from 7/10 to 5/10  Short term goal time span:  1-2 weeks    Long Term Goals:  Reduce circumferential measurements to within 5  cm of right UE  Improve AROM of Left UE as follows: active shoulder flexion to 170  Patient will demonstrate self massage techniques  Patient will demonstrate understanding of risk reduction techniques  Patient will demonstrate compression garment donning/doffing/care/wear schedule with or without assistance, as needed    Long term goal time span:  4-6 weeks    Plan:  Therapy options:  Physical therapy treatment to continue  Planned therapy interventions:  Myofascial release techniques, patient education, postural exercises, soft tissue manual techniques (CPT 20995), self-care/training (CPT 92539), decongestive exercises and compression sleeve  Planned education:  Pathophysiology of lymphedema, lymphedema precautions, proper skin care/nutrition, self massage and long term self-management of lymphedema  Frequency:  2x week  Duration in weeks:  2  Plan details:  Decrease to 1 x week, as indicated      Functional Limitation G-Codes and Severity Modifiers      Current:     Goal: Self Care: Goal (): CI     Referring provider co-signature:  I have reviewed this plan of care and my co-signature certifies the need for services.  Certification Dates:   From 9/12/18     To 10/11/2018    Physician Signature: ________________________________ Date: ______________

## 2018-09-14 ENCOUNTER — PHYSICAL THERAPY (OUTPATIENT)
Dept: PHYSICAL THERAPY | Facility: REHABILITATION | Age: 78
End: 2018-09-14
Attending: SURGERY
Payer: MEDICARE

## 2018-09-14 DIAGNOSIS — I89.0 LYMPHEDEMA OF LEFT ARM: ICD-10-CM

## 2018-09-14 DIAGNOSIS — M79.602 ARM PAIN, ANTERIOR, LEFT: ICD-10-CM

## 2018-09-14 DIAGNOSIS — L90.5 SCAR OF FEMALE BREAST: ICD-10-CM

## 2018-09-14 PROCEDURE — 97140 MANUAL THERAPY 1/> REGIONS: CPT

## 2018-09-14 PROCEDURE — 97535 SELF CARE MNGMENT TRAINING: CPT

## 2018-09-14 NOTE — OP THERAPY DAILY TREATMENT
Outpatient Physical Therapy  LYMPHEDEMA THERAPY DAILY TREATMENT     [facility] Physical Therapy [address].  [address]  [place] [address]  Phone:  [phone]  Fax:  [phone]    Date: 09/14/2018    Patient: Clarisse Reeves  YOB: 1940  MRN: [id number]     Time Calculation  Start time: 1110  Stop time: 1155 Time Calculation (min): 45 minutes     Chief Complaint: No chief complaint on file.    Visit #: 2    Subjective:   History of Present Illness:     Mechanism of injury:  Pt reports an increase in L breast discomfort and swelling; L arm and hand swelling unchanged.  She is concerned about this.      Lymphedema Objective      Skin Appearance      L breast mild redness and moderate edema; noticeable red pressure area under breast from bra leading to discussion of bra fit and restrictive effect on fluid mobility.  Mild swelling noted on dorsum L hand            Therapeutic Exercises (CPT 00035):     1. LUE AROM  following MLD, 10 x eafingers, wrist, elbow, shld    Therapeutic Treatments and Modalities:     1. Manual Therapy (CPT 43338), MLD LUE with emphasis on axillary  to axillary and axillary to inguinal nodes with extra time on L breast to encourage lymph activation/flow; STM L axilla, Pt educated on principles of MLD and importance of proper sequence for fluid mobilization; diaphragmatic breathing encouraged daily    Time-based treatments/modalities:  Manual therapy minutes (CPT 23045): 30 minutes  Functional training, self care minutes (CPT 42453): 15 minutes       Assessment and Plan:   Assessment details:  Breast tissue is supple and pt attentive to education.  Strongly encouraged her to find sport bra or one without an underwire to decrease restriction around trunk ,as this may be contributing to the breast edema. We discussed options for breast compression and an arm sleeve will be needed in the near future.

## 2018-09-19 ENCOUNTER — PHYSICAL THERAPY (OUTPATIENT)
Dept: PHYSICAL THERAPY | Facility: REHABILITATION | Age: 78
End: 2018-09-19
Attending: SURGERY
Payer: MEDICARE

## 2018-09-19 DIAGNOSIS — M79.602 ARM PAIN, ANTERIOR, LEFT: ICD-10-CM

## 2018-09-19 DIAGNOSIS — L90.5 SCAR OF FEMALE BREAST: ICD-10-CM

## 2018-09-19 DIAGNOSIS — I89.0 LYMPHEDEMA OF LEFT ARM: ICD-10-CM

## 2018-09-19 PROCEDURE — 97140 MANUAL THERAPY 1/> REGIONS: CPT

## 2018-09-19 PROCEDURE — 97110 THERAPEUTIC EXERCISES: CPT

## 2018-09-19 NOTE — OP THERAPY DAILY TREATMENT
Outpatient Physical Therapy  LYMPHEDEMA THERAPY DAILY TREATMENT     [facility] Physical Therapy [address].  [address]  [place] [address]  Phone:  [phone]  Fax:  [phone]    Date: 09/19/2018    Patient: Clarisse Reeves  YOB: 1940  MRN: [id number]     Time Calculation  Start time: 1105  Stop time: 1150 Time Calculation (min): 45 minutes     Chief Complaint: No chief complaint on file.    Visit #: 3    Subjective:   History of Present Illness:     Mechanism of injury:  Pt reports increased pain in L breast, forearm and medial upper arm; not wearing restrictive bra but modifying with pads for distribution of pressure.  Pt reported that she is scheduled to begin radiation next week but is still undecided.      Lymphedema Objective      Skin Appearance    Orange peel skin      Left Upper Extremity Circumferential Measurements  Axilla cm  Upper Proximal Humerus 36.5 cm  Distal Humerus 29.8 cm  Elbow 26.6 cm  Mid-Forearm 21 cm  Wrist 16.2 cm  Distal Velasquez Crease cm  Total 130.1 cm              Therapeutic Exercises (CPT 03520):     1. L should  adbuction stretch contract relax, 8 x with relaxation breathing    2. Median n glides , 5 x    Therapeutic Treatments and Modalities:     1. Manual Therapy (CPT 21060), MLD LUE with emphasis on axillary  to axillary and axillary to inguinal nodes with deeper tissue techniques L breast to encourage lymph activation/flow;     Time-based treatments/modalities:  Manual therapy minutes (CPT 06814): 23 minutes  Therapeutic exercise minutes (CPT 49162): 12 minutes       Assessment and Plan:   Assessment details:  Mild swelling in LUE with moderate swelling in L breast and a clear need for better truncal compression (she will try sports bra). Radiation therapy may do further damage to the lymphatics in R upper quadrant. Median n may be affected by scarring.   At this time focus will continue with truncal compression, obtaining a circular knit  sleeve and educating regarding self MLD and exercise.  Other barriers to therapy:  Out of pocket cost of garments    Plan:  Therapy options:  Physical therapy treatment to continue  Planned therapy interventions:  Decongestive exercises, manual lymph drainage and self-care/training (CPT 32051)

## 2018-09-20 ENCOUNTER — TELEPHONE (OUTPATIENT)
Dept: OTHER | Facility: MEDICAL CENTER | Age: 78
End: 2018-09-20

## 2018-09-20 ENCOUNTER — TELEPHONE (OUTPATIENT)
Dept: HEMATOLOGY ONCOLOGY | Facility: MEDICAL CENTER | Age: 78
End: 2018-09-20

## 2018-09-20 ENCOUNTER — PATIENT OUTREACH (OUTPATIENT)
Dept: OTHER | Facility: MEDICAL CENTER | Age: 78
End: 2018-09-20

## 2018-09-20 NOTE — PROGRESS NOTES
Phoned pt to discuss her concerns about XRT and lymphedema.  She states her understanding is that XRT will negatively impact her lymphedema treatment and is concerned about the side effects.  Advised pt to allow for Dr. Saldana to examen her and provide her the opportunity to address her questions and concerns, pt agreeable.  Pt is scheduled to see Dr. Saldana Monday 9/24/18.

## 2018-09-20 NOTE — TELEPHONE ENCOUNTER
Patient called and stated that she has lymphedema and she is seeing a physical therapist, the patient stated that she will be starting radiation. The physical therapist stated that it is almost impossible for the patient to have radiation done due to the skin being irritated. The patient stated that from her understanding that if she didn't have radiation that the breast cancer could come back and the cancer would not be curable? The patient would like Delmy CAREY To call her back.  The patient see's  on Monday 9-24-18 at 11:00.

## 2018-09-20 NOTE — TELEPHONE ENCOUNTER
Telephone call from pt requesting information regarding lymphedema and radiation treatment.  Pt states she has a call into Dr. Saldana and would like to speak with the breast nurse navigator.  Message sent to breast ONN.

## 2018-09-21 ENCOUNTER — PHYSICAL THERAPY (OUTPATIENT)
Dept: PHYSICAL THERAPY | Facility: REHABILITATION | Age: 78
End: 2018-09-21
Attending: SURGERY
Payer: MEDICARE

## 2018-09-21 DIAGNOSIS — I89.0 LYMPHEDEMA OF LEFT ARM: ICD-10-CM

## 2018-09-21 DIAGNOSIS — M79.602 ARM PAIN, ANTERIOR, LEFT: ICD-10-CM

## 2018-09-21 DIAGNOSIS — L90.5 SCAR OF FEMALE BREAST: ICD-10-CM

## 2018-09-21 PROCEDURE — 97110 THERAPEUTIC EXERCISES: CPT

## 2018-09-21 PROCEDURE — 97140 MANUAL THERAPY 1/> REGIONS: CPT

## 2018-09-21 NOTE — OP THERAPY DAILY TREATMENT
Outpatient Physical Therapy  LYMPHEDEMA THERAPY DAILY TREATMENT     [facility] Physical Therapy [address].  [address]  [place] [address]  Phone:  [phone]  Fax:  [phone]    Date: 09/21/2018    Patient: Clarisse Reeves  YOB: 1940  MRN: [id number]     Time Calculation  Start time: 1100  Stop time: 1145 Time Calculation (min): 45 minutes     Chief Complaint: No chief complaint on file.    Visit #: 4    Subjective:   History of Present Illness:     Mechanism of injury:  Feeling as though R breast is swelling even more and now into R hand.  She states that getting a new bra will be a priority this weekend.  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  Also aware that it may damage her skin, causing further swelling.  Trying to do stretches daily.      Lymphedema Objective        Therapeutic Exercises (CPT 70304):     1. Pectoral stretching, supine and against wall, 3 x 30 sec    2. Shld flexion and abd with 1# weight to shoulder height, 10 x    3. Supine triceps with 1#, 10 x    Therapeutic Treatments and Modalities:     1. Manual Therapy (CPT 28232), MLD LUE with emphasis on axillary  to axillary and axillary to inguinal nodes with deeper tissue techniques L breast to encourage lymph activation/flow; , KT L breast for edema reduction towards sternal nodes and thoracis nodes    Time-based treatments/modalities:  Manual therapy minutes (CPT 35493): 35 minutes  Therapeutic exercise minutes (CPT 95265): 10 minutes       Assessment and Plan:   Assessment details:  Pt presents with pectoral restriction and axillary cording with visible swelling in L breast.  Her bra is inadequately compressive over the breast tissue and too restrictive around the trunk.  Trial with KT tape for assistance with reduction.  She desperately needs a new bra and arm  sleeve following reduction with bandaging.  I have contacted [employer] for a trial of the Flexitouch pump.  Radiation may prevent much treatment but we will at least get her an arm sleeve ASAP.  Increase frequency of stretching and activity program at home

## 2018-09-24 ENCOUNTER — HOSPITAL ENCOUNTER (OUTPATIENT)
Dept: RADIATION ONCOLOGY | Facility: MEDICAL CENTER | Age: 78
End: 2018-09-24

## 2018-09-24 ENCOUNTER — PHYSICAL THERAPY (OUTPATIENT)
Dept: PHYSICAL THERAPY | Facility: REHABILITATION | Age: 78
End: 2018-09-24
Attending: SURGERY
Payer: MEDICARE

## 2018-09-24 DIAGNOSIS — M79.602 ARM PAIN, ANTERIOR, LEFT: ICD-10-CM

## 2018-09-24 DIAGNOSIS — L90.5 SCAR OF FEMALE BREAST: ICD-10-CM

## 2018-09-24 DIAGNOSIS — I89.0 LYMPHEDEMA OF LEFT ARM: ICD-10-CM

## 2018-09-24 PROCEDURE — 77290 THER RAD SIMULAJ FIELD CPLX: CPT | Mod: 26 | Performed by: RADIOLOGY

## 2018-09-24 PROCEDURE — 97140 MANUAL THERAPY 1/> REGIONS: CPT

## 2018-09-24 PROCEDURE — 77470 SPECIAL RADIATION TREATMENT: CPT | Mod: 26 | Performed by: RADIOLOGY

## 2018-09-24 PROCEDURE — 77290 THER RAD SIMULAJ FIELD CPLX: CPT | Performed by: RADIOLOGY

## 2018-09-24 PROCEDURE — 97110 THERAPEUTIC EXERCISES: CPT

## 2018-09-24 PROCEDURE — 77334 RADIATION TREATMENT AID(S): CPT | Mod: 26 | Performed by: RADIOLOGY

## 2018-09-24 PROCEDURE — 77334 RADIATION TREATMENT AID(S): CPT | Performed by: RADIOLOGY

## 2018-09-24 PROCEDURE — 77470 SPECIAL RADIATION TREATMENT: CPT | Performed by: RADIOLOGY

## 2018-09-24 PROCEDURE — 77263 THER RADIOLOGY TX PLNG CPLX: CPT | Performed by: RADIOLOGY

## 2018-09-24 NOTE — OP THERAPY DAILY TREATMENT
Outpatient Physical Therapy  LYMPHEDEMA THERAPY DAILY TREATMENT     [facility] Physical Therapy [address].  [address]  [place] [address]  Phone:  [phone]  Fax:  [phone]    Date: 09/24/2018    Patient: Clarisse Reeves  YOB: 1940  MRN: [id number]     Time Calculation  Start time: 0845  Stop time: 0925 Time Calculation (min): 40 minutes     Chief Complaint: Arm Swelling    Visit #: 5    Subjective:   History of Present Illness:     Mechanism of injury:  Anxious with radiation marking this week and may start next week.  Has a new front closure bra but still tight under breasts; maybe decreased breast pain      Lymphedema Objective      Left Upper Extremity Circumferential Measurements  Axilla cm  Upper Proximal Humerus 36.4 cm  Distal Humerus 34 cm  Elbow 27.5 cm  Mid-Forearm 22.8 cm  Wrist 16.4 cm  Distal Velasquez Crease 18.5 cm  Total 155.6 cm              Therapeutic Exercises (CPT 75644):     1. Pectoral stretching, supine and against wall, 3 x 30 sec    2. Shld flexion and abd with 1# weight to shoulder height, 10 x    Therapeutic Treatments and Modalities:     1. Manual Therapy (CPT 80835), MLD LUE with emphasis on axillary  to axillary and axillary to inguinal nodes with deeper tissue techniques L breast to encourage lymph activation/flow;     Time-based treatments/modalities:  Manual therapy minutes (CPT 35102): 28 minutes  Therapeutic exercise minutes (CPT 94221): 12 minutes       Assessment and Plan:   Assessment details:  R breast w/ softer tissue feel except for area just under breast; encouraging pt to obtain new bra with more truncal compression but less restrictive.  PLAN to wrap arm this week and obtain circular arm sleeve early next week prior to radiation

## 2018-09-26 ENCOUNTER — TELEPHONE (OUTPATIENT)
Dept: MEDICAL GROUP | Age: 78
End: 2018-09-26

## 2018-09-26 ENCOUNTER — PHYSICAL THERAPY (OUTPATIENT)
Dept: PHYSICAL THERAPY | Facility: REHABILITATION | Age: 78
End: 2018-09-26
Attending: SURGERY
Payer: MEDICARE

## 2018-09-26 DIAGNOSIS — I89.0 LYMPHEDEMA OF LEFT ARM: ICD-10-CM

## 2018-09-26 DIAGNOSIS — L90.5 SCAR OF FEMALE BREAST: ICD-10-CM

## 2018-09-26 DIAGNOSIS — Z23 NEED FOR VACCINATION: ICD-10-CM

## 2018-09-26 PROCEDURE — 97140 MANUAL THERAPY 1/> REGIONS: CPT

## 2018-09-26 NOTE — TELEPHONE ENCOUNTER
Patient is on the MA Schedule tomorrow for Flu HD/Hep B vaccine/injection.    SPECIFIC Action To Be Taken: Orders pending, please sign.

## 2018-09-26 NOTE — OP THERAPY DAILY TREATMENT
Outpatient Physical Therapy  LYMPHEDEMA THERAPY DAILY TREATMENT     [facility] Physical Therapy [address].  [address]  [place] [address]  Phone:  [phone]  Fax:  [phone]    Date: 09/26/2018    Patient: Clarisse Reeves  YOB: 1940  MRN: [id number]     Time Calculation  Start time: 1105  Stop time: 1158 Time Calculation (min): 53 minutes     Chief Complaint: Arm Problem    Visit #: 6    Subjective:   History of Present Illness:     Mechanism of injury:  Ongoing pain in left breast with swelling; she states that she hopes to purchase a new bra today.  Had visit from [employer] [facility] yesterday.  Eager to begin but unsure when it might be.  Starting radiation next Monday      Lymphedema Objective      Left Upper Extremity Circumferential Measurements  Axilla cm  Upper Proximal Humerus 36.5 cm  Distal Humerus 29.5 cm  Elbow 25 cm  Mid-Forearm 21.1 cm  Wrist 16.2 cm  Distal Velasquez Crease 19 cm  Total 147.3 cm      Other Notes  Measurements are 10 cm apart; prior to arm bandaging          Therapeutic Exercises (CPT 49535):     1. Pectoral stretching, supine and against wall, 3 x 30 sec    2. Shld flexion and abd with 1# weight to shoulder height, 10 x    Therapeutic Treatments and Modalities:     1. Manual Therapy (CPT 12727), MLD LUE with emphasis on axillary  to axillary and axillary to inguinal nodes with deeper tissue techniques L breast to encourage lymph activation/flow; , Full arm bandaging from fingers to axilla using 4,6,8,10 cm wraps over  Tricofix and Cellona    Therapeutic Treatment and Modalities Summary: Pt comfortable in wraps    Time-based treatments/modalities:  Manual therapy minutes (CPT 60920): 53 minutes       Assessment and Plan:   Assessment details:  Plan to wrap LUE in order to reduce girth and obtain arm sleeve prior to radiation.  Pump will assist with truncal swelling, as will the new bra.  L breast is quite swollen    Plan:  Therapy options:  Physical  therapy treatment to continue  Planned therapy interventions: cont every other day until sleeve is obtained.

## 2018-09-27 ENCOUNTER — NON-PROVIDER VISIT (OUTPATIENT)
Dept: MEDICAL GROUP | Age: 78
End: 2018-09-27
Payer: MEDICARE

## 2018-09-27 DIAGNOSIS — Z23 NEED FOR VACCINATION: ICD-10-CM

## 2018-09-27 PROCEDURE — 90662 IIV NO PRSV INCREASED AG IM: CPT | Performed by: INTERNAL MEDICINE

## 2018-09-27 PROCEDURE — G0008 ADMIN INFLUENZA VIRUS VAC: HCPCS | Performed by: INTERNAL MEDICINE

## 2018-09-27 PROCEDURE — 90746 HEPB VACCINE 3 DOSE ADULT IM: CPT | Performed by: INTERNAL MEDICINE

## 2018-09-27 PROCEDURE — 77334 RADIATION TREATMENT AID(S): CPT | Mod: 26 | Performed by: RADIOLOGY

## 2018-09-27 PROCEDURE — 77295 3-D RADIOTHERAPY PLAN: CPT | Performed by: RADIOLOGY

## 2018-09-27 PROCEDURE — G0010 ADMIN HEPATITIS B VACCINE: HCPCS | Performed by: INTERNAL MEDICINE

## 2018-09-27 PROCEDURE — 77300 RADIATION THERAPY DOSE PLAN: CPT | Mod: 26 | Performed by: RADIOLOGY

## 2018-09-27 PROCEDURE — 77334 RADIATION TREATMENT AID(S): CPT | Performed by: RADIOLOGY

## 2018-09-27 PROCEDURE — 77300 RADIATION THERAPY DOSE PLAN: CPT | Performed by: RADIOLOGY

## 2018-09-27 PROCEDURE — 77295 3-D RADIOTHERAPY PLAN: CPT | Mod: 26 | Performed by: RADIOLOGY

## 2018-09-28 ENCOUNTER — PHYSICAL THERAPY (OUTPATIENT)
Dept: PHYSICAL THERAPY | Facility: REHABILITATION | Age: 78
End: 2018-09-28
Attending: SURGERY
Payer: MEDICARE

## 2018-09-28 DIAGNOSIS — M79.602 ARM PAIN, ANTERIOR, LEFT: ICD-10-CM

## 2018-09-28 DIAGNOSIS — L90.5 SCAR OF FEMALE BREAST: ICD-10-CM

## 2018-09-28 DIAGNOSIS — I89.0 LYMPHEDEMA OF LEFT ARM: ICD-10-CM

## 2018-09-28 PROCEDURE — 97140 MANUAL THERAPY 1/> REGIONS: CPT

## 2018-09-28 PROCEDURE — 97110 THERAPEUTIC EXERCISES: CPT

## 2018-09-28 NOTE — OP THERAPY DAILY TREATMENT
Outpatient Physical Therapy  LYMPHEDEMA THERAPY DAILY TREATMENT     [facility] Physical Therapy [address].  [address]  [place] [address]  Phone:  [phone]  Fax:  [phone]    Date: 09/28/2018    Patient: Clarisse Reeves  YOB: 1940  MRN: [id number]     Time Calculation  Start time: 1100  Stop time: 1148 Time Calculation (min): 48 minutes     Chief Complaint: Arm Problem    Visit #: 7    Subjective:   History of Present Illness:     Mechanism of injury:  Pt reports that she had to remove bandages yesterday evening due to pain/discomfort at the elbow with activity.  Trying a new bra with some success; noticing decreased pain in L breast with bra and/or KT applied last visit.  Expressing anxiety about radiation and L lung (hx of partial lobectomy).  Would like to get arm sleeve today.      Lymphedema Objective      Skin Appearance      Decreased redness L breast with improved tissue softness.    Left Upper Extremity Circumferential Measurements  Axilla cm  Upper Proximal Humerus 36.7 cm  Distal Humerus 30.5 cm  Elbow 26 cm  Mid-Forearm 21.3 cm  Wrist 16.2 cm  Distal Velasquez Crease 18.7 cm  Total 149.4 cm              Therapeutic Exercises (CPT 76705):     1. Pectoral stretching, supine and against wall, 3 x 30 sec    2. Shld flexion and abd with 1# weight to shoulder height, 10 x, HEP    Therapeutic Treatments and Modalities:     1. Manual Therapy (CPT 20505), MLD LUE with emphasis on axillary  to axillary and axillary to inguinal nodes with deeper tissue techniques L breast to encourage lymph activation/flow; , KT posterior lateral chest wall to thoracic nodes and L breast towards sternal nodes    Time-based treatments/modalities:  Manual therapy minutes (CPT 16586): 38 minutes  Therapeutic exercise minutes (CPT 98692): 10 minutes       Assessment and Plan:   Assessment details:  Visible decrease in distal swelling (hand) but comparative measurements indicate slight reduction  in forearm and upper arm girth.  Will proceed with circular knit sleeve for mild compression over the weekend and perhaps try the wraps again next week for girth reduction.   Plan: reinforce HEP, pt to obtain new supportive bra with less trunk restriction for improved fluid mobilization; consider arm bandaging next week, cont with MLD and self care...awaiting more information on Felxitouch pump    Plan:  Therapy options:  Physical therapy treatment to continue  Planned therapy interventions: cont as above.

## 2018-10-01 ENCOUNTER — HOSPITAL ENCOUNTER (OUTPATIENT)
Dept: RADIATION ONCOLOGY | Facility: MEDICAL CENTER | Age: 78
End: 2018-10-01

## 2018-10-01 ENCOUNTER — HOSPITAL ENCOUNTER (OUTPATIENT)
Dept: RADIATION ONCOLOGY | Facility: MEDICAL CENTER | Age: 78
End: 2018-10-31
Attending: RADIOLOGY
Payer: MEDICARE

## 2018-10-01 ENCOUNTER — PHYSICAL THERAPY (OUTPATIENT)
Dept: PHYSICAL THERAPY | Facility: REHABILITATION | Age: 78
End: 2018-10-01
Attending: SURGERY
Payer: MEDICARE

## 2018-10-01 DIAGNOSIS — I89.0 LYMPHEDEMA OF LEFT ARM: ICD-10-CM

## 2018-10-01 DIAGNOSIS — L90.5 SCAR OF FEMALE BREAST: ICD-10-CM

## 2018-10-01 PROCEDURE — 77412 RADIATION TX DELIVERY LVL 3: CPT | Performed by: RADIOLOGY

## 2018-10-01 PROCEDURE — 77280 THER RAD SIMULAJ FIELD SMPL: CPT | Performed by: RADIOLOGY

## 2018-10-01 PROCEDURE — 97110 THERAPEUTIC EXERCISES: CPT

## 2018-10-01 PROCEDURE — 97140 MANUAL THERAPY 1/> REGIONS: CPT

## 2018-10-01 PROCEDURE — 77280 THER RAD SIMULAJ FIELD SMPL: CPT | Mod: 26 | Performed by: RADIOLOGY

## 2018-10-01 NOTE — OP THERAPY DAILY TREATMENT
Outpatient Physical Therapy  LYMPHEDEMA THERAPY DAILY TREATMENT     [facility] Physical Therapy [address].  [address]  [place] [address]  Phone:  [phone]  Fax:  [phone]    Date: 10/01/2018    Patient: Clarisse Reeves  YOB: 1940  MRN: [id number]     Time Calculation  Start time: 1015  Stop time: 1103 Time Calculation (min): 48 minutes     Chief Complaint: No chief complaint on file.    Visit #: 8    Subjective:   History of Present Illness:     Mechanism of injury:  Got the arm sleeve and gauntlet on Friday.  It feels good but gauntlet causes hand swelling.  Has been locating the perfect bra.  No pain in L breast today, although still feels swollen.  Radiation begins today.      Lymphedema Objective      Left Upper Extremity Circumferential Measurements  Axilla cm  Upper Proximal Humerus 36.5 cm  Distal Humerus 30.4 cm  Elbow 25.3 cm  Mid-Forearm 21.4 cm  Wrist 17 cm  Distal Velasquez Crease 20.6 cm  Total 151.2 cm      Other Notes  L shld [id number]  L shld abd 155    Measurements 9/28/2018 (prior to sleeve)  Upper Proximal Humerus 36.7 cm  Distal Humerus 30.5 cm  Elbow 26 cm  Mid-Forearm 21.3 cm  Wrist 16.2 cm  Distal Velasquez Crease 18.7 c        Therapeutic Exercises (CPT 24054):     1. Active abd with skin stretching L shld, 10 x    2. Shld flexion and abd with 1# weight to shoulder height, 10 x, HEP    Therapeutic Treatments and Modalities:     1. Manual Therapy (CPT 33235), MLD LUE with emphasis on axillary  to axillary and axillary to inguinal nodes with deeper tissue techniques L breast to encourage lymph activation/flow; , deeper tissue techniques in distal upper arm and proximal forearm    Time-based treatments/modalities:  Manual therapy minutes (CPT 01611): 36 minutes  Therapeutic exercise minutes (CPT 88737): 12 minutes       Assessment and Plan:   Assessment details:  Pt demonstrates understanding of her responsibilities in managing swelling, and is comfortable  with self MLD, now has arm compression garments and a HEP.  Comparative measurements indicate minimal reduction in girth of arm (maintaining) and the increase in circumference of L wrist and hand was due to the discovery that she was wearing the gauntlet incorrectly.  We will reduce visits to weekly as she begins radiation and feels more confident with her understanding of the daily needs and risks.  She could receive her lymphedema pump in the next week or two.    Plan:  Therapy options:  Physical therapy treatment to continue  Planned therapy interventions:  Home exercise program  Planned education:  Skin care guidelines, long term self-management of lymphedema and home pump use

## 2018-10-02 ENCOUNTER — HOSPITAL ENCOUNTER (OUTPATIENT)
Dept: RADIATION ONCOLOGY | Facility: MEDICAL CENTER | Age: 78
End: 2018-10-02

## 2018-10-02 PROCEDURE — 77417 THER RADIOLOGY PORT IMAGE(S): CPT | Performed by: RADIOLOGY

## 2018-10-02 PROCEDURE — 77412 RADIATION TX DELIVERY LVL 3: CPT | Performed by: RADIOLOGY

## 2018-10-03 ENCOUNTER — APPOINTMENT (OUTPATIENT)
Dept: PHYSICAL THERAPY | Facility: REHABILITATION | Age: 78
End: 2018-10-03
Attending: SURGERY
Payer: MEDICARE

## 2018-10-03 ENCOUNTER — HOSPITAL ENCOUNTER (OUTPATIENT)
Dept: RADIATION ONCOLOGY | Facility: MEDICAL CENTER | Age: 78
End: 2018-10-03

## 2018-10-03 PROCEDURE — 77412 RADIATION TX DELIVERY LVL 3: CPT | Performed by: RADIOLOGY

## 2018-10-03 PROCEDURE — 77417 THER RADIOLOGY PORT IMAGE(S): CPT | Performed by: RADIOLOGY

## 2018-10-03 PROCEDURE — 77336 RADIATION PHYSICS CONSULT: CPT | Performed by: RADIOLOGY

## 2018-10-04 ENCOUNTER — HOSPITAL ENCOUNTER (OUTPATIENT)
Dept: RADIATION ONCOLOGY | Facility: MEDICAL CENTER | Age: 78
End: 2018-10-04

## 2018-10-04 PROCEDURE — 77412 RADIATION TX DELIVERY LVL 3: CPT | Performed by: RADIOLOGY

## 2018-10-04 PROCEDURE — 77417 THER RADIOLOGY PORT IMAGE(S): CPT | Performed by: RADIOLOGY

## 2018-10-05 ENCOUNTER — HOSPITAL ENCOUNTER (OUTPATIENT)
Dept: RADIATION ONCOLOGY | Facility: MEDICAL CENTER | Age: 78
End: 2018-10-05

## 2018-10-05 ENCOUNTER — APPOINTMENT (OUTPATIENT)
Dept: PHYSICAL THERAPY | Facility: REHABILITATION | Age: 78
End: 2018-10-05
Attending: SURGERY
Payer: MEDICARE

## 2018-10-05 PROCEDURE — 77417 THER RADIOLOGY PORT IMAGE(S): CPT | Performed by: RADIOLOGY

## 2018-10-05 PROCEDURE — 77427 RADIATION TX MANAGEMENT X5: CPT | Performed by: RADIOLOGY

## 2018-10-05 PROCEDURE — 77412 RADIATION TX DELIVERY LVL 3: CPT | Performed by: RADIOLOGY

## 2018-10-08 ENCOUNTER — HOSPITAL ENCOUNTER (OUTPATIENT)
Dept: RADIATION ONCOLOGY | Facility: MEDICAL CENTER | Age: 78
End: 2018-10-08

## 2018-10-08 PROCEDURE — 77417 THER RADIOLOGY PORT IMAGE(S): CPT | Performed by: RADIOLOGY

## 2018-10-08 PROCEDURE — 77412 RADIATION TX DELIVERY LVL 3: CPT | Performed by: RADIOLOGY

## 2018-10-09 ENCOUNTER — HOSPITAL ENCOUNTER (OUTPATIENT)
Dept: RADIATION ONCOLOGY | Facility: MEDICAL CENTER | Age: 78
End: 2018-10-09

## 2018-10-09 PROCEDURE — 77412 RADIATION TX DELIVERY LVL 3: CPT | Performed by: RADIOLOGY

## 2018-10-10 ENCOUNTER — PHYSICAL THERAPY (OUTPATIENT)
Dept: PHYSICAL THERAPY | Facility: REHABILITATION | Age: 78
End: 2018-10-10
Attending: SURGERY
Payer: MEDICARE

## 2018-10-10 DIAGNOSIS — L90.5 SCAR OF FEMALE BREAST: ICD-10-CM

## 2018-10-10 DIAGNOSIS — I89.0 LYMPHEDEMA OF LEFT ARM: ICD-10-CM

## 2018-10-10 PROCEDURE — 77417 THER RADIOLOGY PORT IMAGE(S): CPT | Performed by: RADIOLOGY

## 2018-10-10 PROCEDURE — 77336 RADIATION PHYSICS CONSULT: CPT | Performed by: RADIOLOGY

## 2018-10-10 PROCEDURE — 97140 MANUAL THERAPY 1/> REGIONS: CPT

## 2018-10-10 PROCEDURE — 77412 RADIATION TX DELIVERY LVL 3: CPT | Performed by: RADIOLOGY

## 2018-10-10 PROCEDURE — 97110 THERAPEUTIC EXERCISES: CPT

## 2018-10-10 NOTE — OP THERAPY DAILY TREATMENT
Outpatient Physical Therapy  LYMPHEDEMA THERAPY DAILY TREATMENT     [facility] Physical Therapy [address].  [address]  [place] [address]  Phone:  [phone]  Fax:  [phone]    Date: 10/10/2018    Patient: Clarisse Reeves  YOB: 1940  MRN: [id number]     Time Calculation  Start time: 1244  Stop time: 1330 Time Calculation (min): 46 minutes     Chief Complaint: No chief complaint on file.    Visit #: 9    Subjective:   History of Present Illness:     Mechanism of injury:  Started the radiation therapy with no ill effects so far.  Has arm sleeve and gauntlet, but feels as though the gauntlet makes her hand swell with some pain in the first finger.  Wearing the sleeve all day      Lymphedema Objective      Left Upper Extremity Circumferential Measurements  Axilla cm  Upper Proximal Humerus 35.4 cm  Distal Humerus 31.5 cm  Elbow 26.6 cm  Mid-Forearm 21.5 cm  Wrist 16.6 cm  Distal Velasquez Crease 19.3 cm  Total 150.9 cm      Other Notes  L shld flex 170  L shld abd 135  Axillary adhesions and deeper stretch discomfort with full abduction          Therapeutic Exercises (CPT 65373):     1. Active abd with skin stretching L shld, 10 x, HEP    2. Corner stretch for pectorals , 2 x 10, HEP    Therapeutic Treatments and Modalities:     1. Manual Therapy (CPT 25044), MLD LUE with emphasis on axillary  to axillary and axillary to inguinal nodes with deeper tissue techniques L breast to encourage lymph activation/flow; , deeper tissue techniques in distal upper arm and proximal forearm, STM L axilla with active flex and abd, pt reports stretch into forearm with abduction    Time-based treatments/modalities:  Manual therapy minutes (CPT 82530): 34 minutes  Therapeutic exercise minutes (CPT 35601): 12 minutes       Assessment and Plan:   Assessment details:  Pt will continue with radiation and HEP, self MLD and use of compression sleeve.  She demonstrates compliance with home program.  Will  make appt in 1-2 weeks to monitor ROM and axillary adhesions.  Min change in girth measurements of LUE..  Waiting for confirmation of lymphedema pump.

## 2018-10-11 ENCOUNTER — HOSPITAL ENCOUNTER (OUTPATIENT)
Dept: RADIATION ONCOLOGY | Facility: MEDICAL CENTER | Age: 78
End: 2018-10-11

## 2018-10-11 PROCEDURE — 77412 RADIATION TX DELIVERY LVL 3: CPT | Performed by: RADIOLOGY

## 2018-10-11 PROCEDURE — 77417 THER RADIOLOGY PORT IMAGE(S): CPT | Performed by: RADIOLOGY

## 2018-10-12 ENCOUNTER — TELEPHONE (OUTPATIENT)
Dept: MEDICAL GROUP | Age: 78
End: 2018-10-12

## 2018-10-12 ENCOUNTER — APPOINTMENT (OUTPATIENT)
Dept: PHYSICAL THERAPY | Facility: REHABILITATION | Age: 78
End: 2018-10-12
Attending: SURGERY
Payer: MEDICARE

## 2018-10-12 NOTE — TELEPHONE ENCOUNTER
1. Caller Name: Clarisse Reeves                                           Call Back Number: [phone] (home)         Patient approves a detailed voicemail message: yes    2. What are the patient's symptoms (location & severity)? Headache and confusion    3. Is this a new symptom Yes    4. When did it start? This am    5. Action taken per Active Symptom Guide: Recommended ER to patient and patient refused. Recomended that patient come for same day appt and she stated that she would only see Dr. Smart. Scheduled patient with Dr. Smart for 10/15 at 10:20.  Patient stated that she was feeling better just tired. Recommended 5that if confusion and headache returned that she should go to ER to be evealuated. Patient stated that if it should occure again she would go to ER.    6. Patient does not agree to recommended action per Active Symptom Escalation Protocol. Patient was advised again of recommendation and verbalized understanding.    Patient stated that she would not drive to hospital as she did not feel comfortable driving. I offered to call ambulance for her and she declined stating that she would never call an ambulance and beside the point she could not leave her dog by itself. She stated that she had a neighbor that if she needed anything she would call

## 2018-10-12 NOTE — TELEPHONE ENCOUNTER
Patient called with complaints earlier in the day of confusion and memory impairment while trying to set up an appointment for her lymphedema with the lymphedema clinic and also to get some durable medical equipment for this process.  She says she is feeling well now.  However she did have a headache at that time.  Advised patient go to the emergency room.  However she feels fine does not want to go at this time.  She says she will go if she has recurrence of symptoms.  She is not having any paresthesias paresis or confusion at this time and no headache at this time.  She is able to answer my questions without difficulty and with appropriate responses.

## 2018-10-16 ENCOUNTER — OFFICE VISIT (OUTPATIENT)
Dept: MEDICAL GROUP | Age: 78
End: 2018-10-16
Payer: MEDICARE

## 2018-10-16 ENCOUNTER — HOSPITAL ENCOUNTER (OUTPATIENT)
Dept: RADIATION ONCOLOGY | Facility: MEDICAL CENTER | Age: 78
End: 2018-10-16
Payer: MEDICARE

## 2018-10-16 VITALS
DIASTOLIC BLOOD PRESSURE: 72 MMHG | OXYGEN SATURATION: 96 % | WEIGHT: 202 LBS | BODY MASS INDEX: 32.47 KG/M2 | TEMPERATURE: 97.9 F | HEART RATE: 100 BPM | HEIGHT: 66 IN | SYSTOLIC BLOOD PRESSURE: 130 MMHG

## 2018-10-16 DIAGNOSIS — R47.01 EXPRESSIVE APHASIA: ICD-10-CM

## 2018-10-16 DIAGNOSIS — E11.8 CONTROLLED TYPE 2 DIABETES MELLITUS WITH COMPLICATION, WITHOUT LONG-TERM CURRENT USE OF INSULIN (HCC): ICD-10-CM

## 2018-10-16 DIAGNOSIS — C91.10 CLL (CHRONIC LYMPHOCYTIC LEUKEMIA) (HCC): ICD-10-CM

## 2018-10-16 DIAGNOSIS — G45.9 TIA (TRANSIENT ISCHEMIC ATTACK): ICD-10-CM

## 2018-10-16 DIAGNOSIS — R51.9 NONINTRACTABLE EPISODIC HEADACHE, UNSPECIFIED HEADACHE TYPE: ICD-10-CM

## 2018-10-16 DIAGNOSIS — E78.2 MIXED HYPERLIPIDEMIA: ICD-10-CM

## 2018-10-16 DIAGNOSIS — E03.4 HYPOTHYROIDISM DUE TO ACQUIRED ATROPHY OF THYROID: ICD-10-CM

## 2018-10-16 DIAGNOSIS — I67.9 CEREBRAL VASCULAR DISEASE: ICD-10-CM

## 2018-10-16 DIAGNOSIS — E66.9 OBESITY (BMI 30-39.9): ICD-10-CM

## 2018-10-16 DIAGNOSIS — I10 ESSENTIAL HYPERTENSION: ICD-10-CM

## 2018-10-16 PROCEDURE — 99215 OFFICE O/P EST HI 40 MIN: CPT | Performed by: INTERNAL MEDICINE

## 2018-10-16 PROCEDURE — 77417 THER RADIOLOGY PORT IMAGE(S): CPT | Performed by: RADIOLOGY

## 2018-10-16 PROCEDURE — 77412 RADIATION TX DELIVERY LVL 3: CPT | Performed by: RADIOLOGY

## 2018-10-16 PROCEDURE — 77427 RADIATION TX MANAGEMENT X5: CPT | Performed by: RADIOLOGY

## 2018-10-16 ASSESSMENT — ENCOUNTER SYMPTOMS
DIARRHEA: 1
PSYCHIATRIC NEGATIVE: 1
CARDIOVASCULAR NEGATIVE: 1
CONSTITUTIONAL NEGATIVE: 1
HEADACHES: 1
NUMBER OF EPISODES OF EMESIS TODAY: 1
RESPIRATORY NEGATIVE: 1
EYES NEGATIVE: 1
MUSCULOSKELETAL NEGATIVE: 1

## 2018-10-16 NOTE — PROGRESS NOTES
Subjective:      Clarisse Reeves is a 78 y.o. female who presents with Headache (x4 days main issue); Diarrhea (x4 days); and Emesis (x4 days whenever eating)  The patient is here for followup of chronic medical problems listed below. The patient is compliant with medications and having no side effects from them. Denies chest pain, abdominal pain, dyspnea, myalgias, or cough      However the patient is primarily here for recent episodes of expressive aphasia and severe headaches with wavy lines intervention visual field.  She has no prior history of migraine headaches or visual disturbance.    She is under treatment for breast cancer with one positive node for which she has received lumpectomy this year followed by chemotherapy for 2 months and is now finishing up radiation therapy which will conclude in the end of next month.    She also underwent treatment for lung cancer of the right upper lobe 2 years ago with lobectomy and radiation to the lung.  There is been no apparent recurrence of that.    She had a negative MRI of her brain for headaches 9 months ago.  This was with and without contrast.  However no MRA was done.    .    .   Patient Active Problem List    Diagnosis Date Noted   • Essential hypertension 09/01/2015     Priority: Medium   • Controlled type 2 diabetes mellitus with complication, without long-term current use of insulin (HCC) 09/01/2015     Priority: Medium   • Normocytic anemia 06/02/2017     Priority: Low   • Hypothyroidism due to acquired atrophy of thyroid 09/01/2015     Priority: Low   • Obesity (BMI 30-39.9) 09/01/2015     Priority: Low   • Gastroesophageal reflux disease with esophagitis 11/07/2013     Priority: Low   • Vitamin D insufficiency 07/09/2013     Priority: Low   • Mixed hyperlipidemia 11/26/2012     Priority: Low   • CLL (chronic lymphocytic leukemia)- wbc= 20k-30k, since 2006 until march 2018 when it returned normal at 5-10k following chemorx for breast ca- dr grant; no  rx; oncologist to follow 2012     Priority: Low   • TIA (transient ischemic attack) exp aphasia with headache  10/16/2018   • Nonintractable episodic headache 10/16/2018   • Elevated C-reactive protein (CRP) 2018   • Accelerated junctional rhythm on EKG in 2018 in setting of chemo    • Statin intolerance    • Thrombocytosis (HCC) 2018   • Anxiety 2018   • PICC (peripherally inserted central catheter) in place 2018   • Malignant neoplasm of upper-outer quadrant of left breast in female, estrogen receptor negative (CMS-HCC)-  1 positive node; dr. 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, 2016-  partial lobectomy RUL/RML- Dr Ganser- folowed by [facility] 2016   • Multiple thyroid nodules- dr jaeger, neg FNA ; us no change 2017 2015   • Fatty infiltration of liver 2012     Allergies   Allergen Reactions   • Codeine Hives and Nausea     RXN=40 years ago   • Lipitor [Atorvastatin Calcium] Myalgia     XHS=1623     • Lovastatin Myalgia     Muscle aches  DAS=5310   • Zocor [Simvastatin - High Dose] Myalgia     Severe myalgias  VFP=2294   • Lisinopril Cough   • Metformin      Diarrhea     • Tape      Paper tape ok     Outpatient Medications Prior to Visit   Medication Sig Dispense Refill   • venlafaxine XR (EFFEXOR XR) 37.5 MG CAPSULE SR 24 HR TAKE ONE CAPSULE BY MOUTH DAILY 30 Cap 3   • lansoprazole (PREVACID) 30 MG CAPSULE DELAYED RELEASE Take 1 Cap by mouth every day. 90 Cap 4   • lorazepam (ATIVAN) 2 MG tablet      • MICROLET LANCETS Misc For BG  "check once a day 100 Each 3   • Blood Glucose Monitoring Suppl Supplies Misc Contour Next glucometer strips for blood sugar check once a day 100 Each 2   • levothyroxine (SYNTHROID) 150 MCG Tab Take 1 Tab by mouth Every morning on an empty stomach. 90 Tab 4   • glipiZIDE (GLUCOTROL) 5 MG Tab Take 1 Tab by mouth every day. (Patient not taking: Reported on 10/16/2018) 90 Tab 4   • aspirin (ASA) 81 MG Chew Tab chewable tablet Take 1 Tab by mouth every day. (Patient not taking: Reported on 10/16/2018) 100 Tab 11     No facility-administered medications prior to visit.                Headache      Diarrhea    Associated symptoms include headaches.   Emesis   Associated symptoms include headaches.       Review of Systems   Constitutional: Negative.    Eyes: Negative.    Respiratory: Negative.    Cardiovascular: Negative.    Gastrointestinal: Positive for diarrhea.   Genitourinary: Negative.    Musculoskeletal: Negative.    Skin: Negative.    Neurological: Positive for headaches.   Endo/Heme/Allergies: Negative.    Psychiatric/Behavioral: Negative.           Objective:     /72 (BP Location: Right arm, Patient Position: Sitting)   Pulse 100   Temp 36.6 °C (97.9 °F) (Temporal)   Ht 1.676 m (5' 5.98\")   Wt 91.6 kg (202 lb)   SpO2 96%   BMI 32.62 kg/m²      Physical Exam   Constitutional: She is oriented to person, place, and time. She appears well-developed and well-nourished. No distress.   HENT:   Head: Normocephalic and atraumatic.   Right Ear: External ear normal.   Left Ear: External ear normal.   Nose: Nose normal.   Mouth/Throat: Oropharynx is clear and moist. No oropharyngeal exudate.   Eyes: Pupils are equal, round, and reactive to light. Conjunctivae and EOM are normal. Right eye exhibits no discharge. Left eye exhibits no discharge. No scleral icterus.   Neck: Normal range of motion. Neck supple. No JVD present. No tracheal deviation present. No thyromegaly present.   Cardiovascular: Normal rate, " regular rhythm, normal heart sounds and intact distal pulses.  Exam reveals no gallop and no friction rub.    No murmur heard.  Pulmonary/Chest: Effort normal and breath sounds normal. No stridor. No respiratory distress. She has no wheezes. She has no rales. She exhibits no tenderness.   Abdominal: Soft. Bowel sounds are normal. She exhibits no distension and no mass. There is no tenderness. There is no rebound and no guarding.   Musculoskeletal: Normal range of motion. She exhibits no edema or tenderness.   Lymphadenopathy:     She has no cervical adenopathy.   Neurological: She is alert and oriented to person, place, and time. She has normal reflexes. She displays normal reflexes. No cranial nerve deficit. She exhibits normal muscle tone. Coordination normal.   Skin: Skin is warm and dry. No rash noted. She is not diaphoretic. No erythema. No pallor.   Psychiatric: She has a normal mood and affect. Her behavior is normal. Judgment and thought content normal.   Vitals reviewed.              No visits with results within 1 Month(s) from this visit.   Latest known visit with results is:   Hospital Outpatient Visit on 08/31/2018   Component Date Value   • WBC 08/31/2018 5.4    • RBC 08/31/2018 4.08*   • Hemoglobin 08/31/2018 11.1*   • Hematocrit 08/31/2018 35.7*   • MCV 08/31/2018 87.5    • MCH 08/31/2018 27.2    • MCHC 08/31/2018 31.1*   • RDW 08/31/2018 51.1*   • Platelet Count 08/31/2018 316    • MPV 08/31/2018 9.8    • Neutrophils-Polys 08/31/2018 51.70    • Lymphocytes 08/31/2018 42.40*   • Monocytes 08/31/2018 3.70    • Eosinophils 08/31/2018 0.90    • Basophils 08/31/2018 0.70    • Immature Granulocytes 08/31/2018 0.60    • Nucleated RBC 08/31/2018 0.00    • Neutrophils (Absolute) 08/31/2018 2.79    • Lymphs (Absolute) 08/31/2018 2.29    • Monos (Absolute) 08/31/2018 0.20    • Eos (Absolute) 08/31/2018 0.05    • Baso (Absolute) 08/31/2018 0.04    • Immature Granulocytes (a* 08/31/2018 0.03    • NRBC (Absolute)  08/31/2018 0.00       Lab Results   Component Value Date/Time    HBA1C 5.5 07/26/2018 07:58 AM    HBA1C 5.9 (H) 03/07/2018 08:05 PM     Lab Results   Component Value Date/Time    SODIUM 135 08/27/2018 08:33 AM    POTASSIUM 4.2 08/27/2018 08:33 AM    CHLORIDE 103 08/27/2018 08:33 AM    CO2 24 08/27/2018 08:33 AM    GLUCOSE 129 (H) 08/27/2018 08:33 AM    BUN 11 08/27/2018 08:33 AM    CREATININE 0.70 08/27/2018 08:33 AM    CREATININE 0.76 04/12/2013 11:17 AM    BUNCREATRAT 19 11/17/2014 10:29 AM    BUNCREATRAT 25 04/12/2013 11:17 AM    ALKPHOSPHAT 84 08/27/2018 08:33 AM    ASTSGOT 9 (L) 08/27/2018 08:33 AM    ALTSGPT 13 08/27/2018 08:33 AM    TBILIRUBIN 0.4 08/27/2018 08:33 AM     Lab Results   Component Value Date/Time    INR 0.99 01/30/2018 10:50 AM    INR 0.95 06/01/2017 06:23 AM    INR 0.93 12/16/2015 03:17 PM     Lab Results   Component Value Date/Time    CHOLSTRLTOT 161 07/26/2018 07:58 AM    LDL 95 07/26/2018 07:58 AM    HDL 46 07/26/2018 07:58 AM    TRIGLYCERIDE 99 07/26/2018 07:58 AM       No results found for: TESTOSTERONE  Lab Results   Component Value Date/Time    TSH 0.617 04/30/2014 10:35 AM    TSH 0.383 (L) 02/06/2014 01:26 PM     Lab Results   Component Value Date/Time    FREET4 1.34 08/08/2017 11:35 AM    FREET4 1.28 03/22/2017 12:21 PM     No results found for: URICACID  No components found for: VITB12  Lab Results   Component Value Date/Time    25HYDROXY 16 (L) 07/26/2018 07:58 AM    25HYDROXY 20 (L) 01/13/2018 10:16 AM        Assessment/Plan:     1. TIA (transient ischemic attack)- exp aphasia with headache   See below.  - MR-MRA HEAD-WITH & W/O AND SEQUENCES; Future  - WESTERGREN SED RATE; Future    2. Nonintractable episodic headache, unspecified headache type-new problem.  Rule out metastatic disease, aneurysm, or occult strokes versus occipital migraines.     - MR-MRA HEAD-WITH & W/O AND SEQUENCES; Future    3. Controlled type 2 diabetes mellitus with complication, without long-term current use  of insulin (HCC) Under good control. Continue same regimen       4. CLL (chronic lymphocytic leukemia)- wbc= 20k-30k, since 2006 until march 2018 when it returned normal at 5-10k following chemorx for breast ca- dr grant; no rx; oncologist to follow Under good control. Continue same regimen     - CBC WITH DIFFERENTIAL; Future    5. Essential hypertension Under good control. Continue same regimen       6. Mixed hyperlipidemia Under good control. Continue same regimen      - COMP METABOLIC PANEL; Future  - LIPID PROFILE; Future  - CBC WITH DIFFERENTIAL; Future    7. Hypothyroidism due to acquired atrophy of thyroid   Under good control. Continue same regimen.\    - TSH; Future    8. Obesity (BMI 30-39.9)        diet/exercise/lose 15 lbs.; patient counseled        40 minute face-to-face encounter took place today.  More than half of this time was spent in the coordination of care of the above problems, as well as counseling.

## 2018-10-17 ENCOUNTER — HOSPITAL ENCOUNTER (OUTPATIENT)
Dept: RADIATION ONCOLOGY | Facility: MEDICAL CENTER | Age: 78
End: 2018-10-17

## 2018-10-17 PROCEDURE — 77417 THER RADIOLOGY PORT IMAGE(S): CPT | Performed by: RADIOLOGY

## 2018-10-17 PROCEDURE — 77412 RADIATION TX DELIVERY LVL 3: CPT | Performed by: RADIOLOGY

## 2018-10-18 ENCOUNTER — HOSPITAL ENCOUNTER (OUTPATIENT)
Dept: LAB | Facility: MEDICAL CENTER | Age: 78
End: 2018-10-18
Attending: INTERNAL MEDICINE
Payer: MEDICARE

## 2018-10-18 ENCOUNTER — HOSPITAL ENCOUNTER (OUTPATIENT)
Dept: RADIATION ONCOLOGY | Facility: MEDICAL CENTER | Age: 78
End: 2018-10-18

## 2018-10-18 DIAGNOSIS — E03.4 HYPOTHYROIDISM DUE TO ACQUIRED ATROPHY OF THYROID: ICD-10-CM

## 2018-10-18 DIAGNOSIS — C91.10 CLL (CHRONIC LYMPHOCYTIC LEUKEMIA) (HCC): ICD-10-CM

## 2018-10-18 DIAGNOSIS — E78.2 MIXED HYPERLIPIDEMIA: ICD-10-CM

## 2018-10-18 DIAGNOSIS — G45.9 TIA (TRANSIENT ISCHEMIC ATTACK): ICD-10-CM

## 2018-10-18 LAB
ALBUMIN SERPL BCP-MCNC: 4.2 G/DL (ref 3.2–4.9)
ALBUMIN/GLOB SERPL: 1.6 G/DL
ALP SERPL-CCNC: 95 U/L (ref 30–99)
ALT SERPL-CCNC: 15 U/L (ref 2–50)
ANION GAP SERPL CALC-SCNC: 10 MMOL/L (ref 0–11.9)
AST SERPL-CCNC: 16 U/L (ref 12–45)
BASOPHILS # BLD AUTO: 0.8 % (ref 0–1.8)
BASOPHILS # BLD: 0.07 K/UL (ref 0–0.12)
BILIRUB SERPL-MCNC: 0.3 MG/DL (ref 0.1–1.5)
BUN SERPL-MCNC: 13 MG/DL (ref 8–22)
CALCIUM SERPL-MCNC: 9.2 MG/DL (ref 8.5–10.5)
CHLORIDE SERPL-SCNC: 103 MMOL/L (ref 96–112)
CHOLEST SERPL-MCNC: 178 MG/DL (ref 100–199)
CO2 SERPL-SCNC: 26 MMOL/L (ref 20–33)
CREAT SERPL-MCNC: 0.63 MG/DL (ref 0.5–1.4)
EOSINOPHIL # BLD AUTO: 0.22 K/UL (ref 0–0.51)
EOSINOPHIL NFR BLD: 2.5 % (ref 0–6.9)
ERYTHROCYTE [DISTWIDTH] IN BLOOD BY AUTOMATED COUNT: 55 FL (ref 35.9–50)
FASTING STATUS PATIENT QL REPORTED: NORMAL
GLOBULIN SER CALC-MCNC: 2.7 G/DL (ref 1.9–3.5)
GLUCOSE SERPL-MCNC: 82 MG/DL (ref 65–99)
HCT VFR BLD AUTO: 39 % (ref 37–47)
HDLC SERPL-MCNC: 50 MG/DL
HGB BLD-MCNC: 12 G/DL (ref 12–16)
IMM GRANULOCYTES # BLD AUTO: 0.04 K/UL (ref 0–0.11)
IMM GRANULOCYTES NFR BLD AUTO: 0.4 % (ref 0–0.9)
LDLC SERPL CALC-MCNC: 103 MG/DL
LYMPHOCYTES # BLD AUTO: 2.24 K/UL (ref 1–4.8)
LYMPHOCYTES NFR BLD: 25.1 % (ref 22–41)
MCH RBC QN AUTO: 27.3 PG (ref 27–33)
MCHC RBC AUTO-ENTMCNC: 30.8 G/DL (ref 33.6–35)
MCV RBC AUTO: 88.8 FL (ref 81.4–97.8)
MONOCYTES # BLD AUTO: 0.69 K/UL (ref 0–0.85)
MONOCYTES NFR BLD AUTO: 7.7 % (ref 0–13.4)
NEUTROPHILS # BLD AUTO: 5.67 K/UL (ref 2–7.15)
NEUTROPHILS NFR BLD: 63.5 % (ref 44–72)
NRBC # BLD AUTO: 0 K/UL
NRBC BLD-RTO: 0 /100 WBC
PLATELET # BLD AUTO: 320 K/UL (ref 164–446)
PMV BLD AUTO: 10 FL (ref 9–12.9)
POTASSIUM SERPL-SCNC: 4 MMOL/L (ref 3.6–5.5)
PROT SERPL-MCNC: 6.9 G/DL (ref 6–8.2)
RBC # BLD AUTO: 4.39 M/UL (ref 4.2–5.4)
SODIUM SERPL-SCNC: 139 MMOL/L (ref 135–145)
TRIGL SERPL-MCNC: 123 MG/DL (ref 0–149)
TSH SERPL DL<=0.005 MIU/L-ACNC: 0.69 UIU/ML (ref 0.38–5.33)
WBC # BLD AUTO: 8.9 K/UL (ref 4.8–10.8)

## 2018-10-18 PROCEDURE — 85025 COMPLETE CBC W/AUTO DIFF WBC: CPT

## 2018-10-18 PROCEDURE — 80061 LIPID PANEL: CPT

## 2018-10-18 PROCEDURE — 80053 COMPREHEN METABOLIC PANEL: CPT

## 2018-10-18 PROCEDURE — 36415 COLL VENOUS BLD VENIPUNCTURE: CPT

## 2018-10-18 PROCEDURE — 77417 THER RADIOLOGY PORT IMAGE(S): CPT | Performed by: RADIOLOGY

## 2018-10-18 PROCEDURE — 84443 ASSAY THYROID STIM HORMONE: CPT

## 2018-10-18 PROCEDURE — 85652 RBC SED RATE AUTOMATED: CPT

## 2018-10-18 PROCEDURE — 77412 RADIATION TX DELIVERY LVL 3: CPT | Performed by: RADIOLOGY

## 2018-10-19 ENCOUNTER — PHYSICAL THERAPY (OUTPATIENT)
Dept: PHYSICAL THERAPY | Facility: REHABILITATION | Age: 78
End: 2018-10-19
Attending: SURGERY
Payer: MEDICARE

## 2018-10-19 ENCOUNTER — HOSPITAL ENCOUNTER (OUTPATIENT)
Dept: RADIATION ONCOLOGY | Facility: MEDICAL CENTER | Age: 78
End: 2018-10-19

## 2018-10-19 DIAGNOSIS — I89.0 LYMPHEDEMA OF LEFT ARM: ICD-10-CM

## 2018-10-19 DIAGNOSIS — L90.5 SCAR OF FEMALE BREAST: ICD-10-CM

## 2018-10-19 LAB — ERYTHROCYTE [SEDIMENTATION RATE] IN BLOOD BY WESTERGREN METHOD: 14 MM/HOUR (ref 0–30)

## 2018-10-19 PROCEDURE — 77336 RADIATION PHYSICS CONSULT: CPT | Performed by: RADIOLOGY

## 2018-10-19 PROCEDURE — 77412 RADIATION TX DELIVERY LVL 3: CPT | Performed by: RADIOLOGY

## 2018-10-19 PROCEDURE — 97140 MANUAL THERAPY 1/> REGIONS: CPT

## 2018-10-19 PROCEDURE — 77417 THER RADIOLOGY PORT IMAGE(S): CPT | Performed by: RADIOLOGY

## 2018-10-19 NOTE — OP THERAPY DAILY TREATMENT
Outpatient Physical Therapy  LYMPHEDEMA THERAPY DAILY TREATMENT     [facility] [address].  [address]  [place] [address]  Phone:  [phone]  Fax:  [phone]    Date: 10/19/2018    Patient: Clarisse Reeves  YOB: 1940  MRN: [id number]     Time Calculation  Start time: 1420  Stop time: 1456 Time Calculation (min): 36 minutes     Chief Complaint: Arm Problem    Visit #: 10    Subjective:   History of Present Illness:     Mechanism of injury:  Verified for pump...but not sure when she will receive it.   Arm feels very good; axillary area much softer; performing self MLD twice a day. A bit more swollen today for some reason.  Radiation going well.. So far no skin issues.  No longer has pain in R breast.  Overall feeling good about her status.      Lymphedema Objective      Left Upper Extremity Circumferential Measurements  Axilla cm  Upper Proximal Humerus 34 cm  Distal Humerus 30 cm  Elbow 26.6 cm  Mid-Forearm 21 cm  Wrist 16.5 cm  Distal Velasquez Crease 18.4 cm  Total 146.5 cm      Other Notes  L shld flex 175  L shld abd 155  Axillary adhesions and pectoral muscle tightness but improved ROM          Therapeutic Exercises (CPT 61442):     1. Active abd with skin stretching and deeper tissue STM L axilla, 10 x, HEP    2. Trunk rotation stretch L shld abd w/ shld retraction, 2 x 10, HEP    Therapeutic Treatments and Modalities:     1. Manual Therapy (CPT 41009), MLD LUE with emphasis on axillary  to axillary and axillary to inguinal nodes with deeper tissue techniques L breast to encourage lymph activation/flow; , deeper tissue techniques in distal upper arm and proximal forearm, STM L axilla with active flex and abd, pt no longer reports stretch into forearm with abduction    Time-based treatments/modalities:  Manual therapy minutes (CPT 22912): 30 minutes  Therapeutic exercise minutes (CPT 50316): 6 minutes       Assessment and Plan:   Assessment details:  Pt  demonstrates improved L shoulder ROM and slightly reduced girth measurements in RUE d/t compliance with HEP and self MLD.  She does not always wear the compression sleeve and is still able to manage swelling.  She does have some tissue adhesions in the L axilla but her ROM is now WNL.  She reports daily adherence to her home program.  The compression pump should arrive in 1-2 weeks and then return for wrap up visit to confirm successful management of lymphedema.    Plan:  Therapy options:  Physical therapy treatment to continue  Planned therapy interventions: see above.

## 2018-10-22 ENCOUNTER — TELEPHONE (OUTPATIENT)
Dept: MEDICAL GROUP | Age: 78
End: 2018-10-22

## 2018-10-22 ENCOUNTER — HOSPITAL ENCOUNTER (OUTPATIENT)
Dept: RADIATION ONCOLOGY | Facility: MEDICAL CENTER | Age: 78
End: 2018-10-22

## 2018-10-22 PROCEDURE — 77417 THER RADIOLOGY PORT IMAGE(S): CPT | Performed by: RADIOLOGY

## 2018-10-22 PROCEDURE — 77412 RADIATION TX DELIVERY LVL 3: CPT | Performed by: RADIOLOGY

## 2018-10-23 ENCOUNTER — HOSPITAL ENCOUNTER (OUTPATIENT)
Dept: RADIATION ONCOLOGY | Facility: MEDICAL CENTER | Age: 78
End: 2018-10-23

## 2018-10-23 DIAGNOSIS — F41.8 SITUATIONAL ANXIETY: ICD-10-CM

## 2018-10-23 PROCEDURE — 77412 RADIATION TX DELIVERY LVL 3: CPT | Performed by: RADIOLOGY

## 2018-10-23 PROCEDURE — 77427 RADIATION TX MANAGEMENT X5: CPT | Performed by: RADIOLOGY

## 2018-10-23 PROCEDURE — 77417 THER RADIOLOGY PORT IMAGE(S): CPT | Performed by: RADIOLOGY

## 2018-10-24 PROCEDURE — 77417 THER RADIOLOGY PORT IMAGE(S): CPT | Performed by: RADIOLOGY

## 2018-10-24 PROCEDURE — 77412 RADIATION TX DELIVERY LVL 3: CPT | Performed by: RADIOLOGY

## 2018-10-25 ENCOUNTER — HOSPITAL ENCOUNTER (OUTPATIENT)
Dept: RADIATION ONCOLOGY | Facility: MEDICAL CENTER | Age: 78
End: 2018-10-25

## 2018-10-25 ENCOUNTER — HOSPITAL ENCOUNTER (OUTPATIENT)
Dept: RADIOLOGY | Facility: MEDICAL CENTER | Age: 78
End: 2018-10-25
Attending: INTERNAL MEDICINE
Payer: MEDICARE

## 2018-10-25 VITALS — HEIGHT: 66 IN | WEIGHT: 198 LBS | BODY MASS INDEX: 31.82 KG/M2

## 2018-10-25 DIAGNOSIS — G45.9 TIA (TRANSIENT ISCHEMIC ATTACK): ICD-10-CM

## 2018-10-25 DIAGNOSIS — I67.9 CEREBRAL VASCULAR DISEASE: ICD-10-CM

## 2018-10-25 DIAGNOSIS — R51.9 NONINTRACTABLE EPISODIC HEADACHE, UNSPECIFIED HEADACHE TYPE: ICD-10-CM

## 2018-10-25 DIAGNOSIS — F41.9 ANXIETY: ICD-10-CM

## 2018-10-25 DIAGNOSIS — F51.01 PRIMARY INSOMNIA: ICD-10-CM

## 2018-10-25 PROCEDURE — A9585 GADOBUTROL INJECTION: HCPCS | Performed by: INTERNAL MEDICINE

## 2018-10-25 PROCEDURE — 700117 HCHG RX CONTRAST REV CODE 255: Performed by: INTERNAL MEDICINE

## 2018-10-25 PROCEDURE — 77412 RADIATION TX DELIVERY LVL 3: CPT | Performed by: RADIOLOGY

## 2018-10-25 PROCEDURE — A9270 NON-COVERED ITEM OR SERVICE: HCPCS | Performed by: RADIOLOGY

## 2018-10-25 PROCEDURE — 70544 MR ANGIOGRAPHY HEAD W/O DYE: CPT

## 2018-10-25 PROCEDURE — 700102 HCHG RX REV CODE 250 W/ 637 OVERRIDE(OP): Performed by: RADIOLOGY

## 2018-10-25 PROCEDURE — 70553 MRI BRAIN STEM W/O & W/DYE: CPT

## 2018-10-25 PROCEDURE — 77417 THER RADIOLOGY PORT IMAGE(S): CPT | Performed by: RADIOLOGY

## 2018-10-25 RX ORDER — GADOBUTROL 604.72 MG/ML
7.5 INJECTION INTRAVENOUS ONCE
Status: DISCONTINUED | OUTPATIENT
Start: 2018-10-25 | End: 2018-10-26 | Stop reason: HOSPADM

## 2018-10-25 RX ORDER — DIAZEPAM 5 MG/1
10 TABLET ORAL
Status: COMPLETED | OUTPATIENT
Start: 2018-10-25 | End: 2018-10-25

## 2018-10-25 RX ORDER — GADOBUTROL 604.72 MG/ML
8 INJECTION INTRAVENOUS ONCE
Status: COMPLETED | OUTPATIENT
Start: 2018-10-25 | End: 2018-10-25

## 2018-10-25 RX ADMIN — DIAZEPAM 10 MG: 5 TABLET ORAL at 15:58

## 2018-10-25 RX ADMIN — GADOBUTROL 8 ML: 604.72 INJECTION INTRAVENOUS at 17:34

## 2018-10-25 ASSESSMENT — PAIN SCALES - GENERAL
PAINLEVEL_OUTOF10: 0
PAINLEVEL_OUTOF10: 0

## 2018-10-26 NOTE — DISCHARGE INSTRUCTIONS
MRI ADULT DISCHARGE INSTRUCTIONS    You have been medicated today for your scan. Please follow the instructions below to ensure your safe recovery. If you have any questions or problems, feel free to call us at [phone] or [phone].     1.   Have someone stay with you to assist you as needed.    2.   Do not drive or operate any mechanical devices.    3.   Do not perform any activity that requires concentration. Make no major decisions over the next 24 hours.     4.   Be careful changing positions from laying down to sitting or standing, as you may become dizzy.     5.   Do not drink alcohol for 48 hours.    6.   There are no restrictions for eating your normal meals. Drink fluids.    7.   You may continue your usual medications for pain, tranquilizers, muscle relaxants or sedatives when awake.     8.   Tomorrow, you may continue your normal daily activities.     9.   Pressure dressing on 10 - 15 minutes. If swelling or bleeding occurs when removed, continue placing direct pressure on injection site for another 5 minutes, or until bleeding stops.   Diazepam (VALIUM) Oral solution  What is this medicine?  You were prescribed DIAZEPAM (dye AZ e abby) for the procedure you had today. This medication is a benzodiazepine. It is used to treat anxiety and nervousness. It also can help treat alcohol withdrawal, relax muscles, and treat certain types of seizures.  This medicine may be used for other purposes; ask your health care provider or pharmacist if you have questions.  What side effects may I notice from receiving this medicine?  Side effects that you should report to your doctor or health care professional as soon as possible:  • allergic reactions like skin rash, itching or hives, swelling of the face, lips, or tongue  • angry, confused, depressed, other mood changes  • breathing problems  • feeling faint or lightheaded, falls  • muscle cramps  • problems with balance, talking,  walking  • restlessness  • tremors  • trouble passing urine or change in the amount of urine  • unusually weak or tired  Side effects that usually do not require medical attention (report to your doctor or health care professional if they continue or are bothersome):  • difficulty sleeping, nightmares  • dizziness, drowsiness, clumsiness, or unsteadiness, a hangover effect  • headache  • nausea, vomiting  This list may not describe all possible side effects. Call your doctor for medical advice about side effects. You may report side effects to FDA at 0-838-VSZ-9333.    I have been informed of and understand the above discharge instructions.

## 2018-10-28 NOTE — PROGRESS NOTES
Please inform patient that her recent MRA brain was normal.     Veronica Pacheco MD., covering for Dr. Smart

## 2018-10-29 ENCOUNTER — TELEPHONE (OUTPATIENT)
Dept: MEDICAL GROUP | Age: 78
End: 2018-10-29

## 2018-10-29 ENCOUNTER — HOSPITAL ENCOUNTER (OUTPATIENT)
Dept: RADIATION ONCOLOGY | Facility: MEDICAL CENTER | Age: 78
End: 2018-10-29

## 2018-10-29 PROCEDURE — 77412 RADIATION TX DELIVERY LVL 3: CPT | Performed by: RADIOLOGY

## 2018-10-29 PROCEDURE — 77417 THER RADIOLOGY PORT IMAGE(S): CPT | Performed by: RADIOLOGY

## 2018-10-29 PROCEDURE — 77336 RADIATION PHYSICS CONSULT: CPT | Performed by: RADIOLOGY

## 2018-10-29 RX ORDER — LORAZEPAM 2 MG/1
TABLET ORAL
Qty: 60 TAB | Refills: 0 | Status: SHIPPED
Start: 2018-10-29 | End: 2018-11-27 | Stop reason: SDUPTHER

## 2018-10-29 NOTE — TELEPHONE ENCOUNTER
Phone Number Called: [phone] (home)       Message: left a detailed voicemail to pt    Left Message for patient to call back: no

## 2018-10-29 NOTE — TELEPHONE ENCOUNTER
----- Message from Veronica Pacheco M.D. sent at 10/27/2018  5:54 PM PDT -----  Please inform patient that her recent MRI brain does not show tumor or acute abnormality. She has minor abnormality found on MRI brain such as age related atrophy of brain and chronic microvascular ischemic changes. Please advise to keep her upcoming appointment with Dr. Smart on 11/6/18.    Veronica Pacheco M.D., covering for Dr. Smart.

## 2018-10-30 ENCOUNTER — HOSPITAL ENCOUNTER (OUTPATIENT)
Dept: RADIATION ONCOLOGY | Facility: MEDICAL CENTER | Age: 78
End: 2018-10-30

## 2018-10-30 ENCOUNTER — HOSPITAL ENCOUNTER (OUTPATIENT)
Dept: RADIOLOGY | Facility: MEDICAL CENTER | Age: 78
End: 2018-10-30
Attending: INTERNAL MEDICINE
Payer: MEDICARE

## 2018-10-30 DIAGNOSIS — R47.01 EXPRESSIVE APHASIA: ICD-10-CM

## 2018-10-30 DIAGNOSIS — G45.9 TIA (TRANSIENT ISCHEMIC ATTACK): ICD-10-CM

## 2018-10-30 DIAGNOSIS — R51.9 NONINTRACTABLE EPISODIC HEADACHE, UNSPECIFIED HEADACHE TYPE: ICD-10-CM

## 2018-10-30 DIAGNOSIS — I67.9 CEREBRAL VASCULAR DISEASE: ICD-10-CM

## 2018-10-30 PROCEDURE — 77412 RADIATION TX DELIVERY LVL 3: CPT | Performed by: RADIOLOGY

## 2018-10-30 PROCEDURE — 70496 CT ANGIOGRAPHY HEAD: CPT

## 2018-10-30 PROCEDURE — 77417 THER RADIOLOGY PORT IMAGE(S): CPT | Performed by: RADIOLOGY

## 2018-10-30 PROCEDURE — 93880 EXTRACRANIAL BILAT STUDY: CPT

## 2018-10-30 PROCEDURE — 700117 HCHG RX CONTRAST REV CODE 255: Performed by: INTERNAL MEDICINE

## 2018-10-30 RX ADMIN — IOHEXOL 100 ML: 350 INJECTION, SOLUTION INTRAVENOUS at 16:42

## 2018-10-30 NOTE — TELEPHONE ENCOUNTER
----- Message from My [employer] sent at 10/29/2018  2:50 PM PDT -----  Regarding: Needing to clarify order for CT tomorrow   Patient is scheduled for a CT head w/wo for tomorrow and our CT tech wanted to find out if you are looking for something vascular or looking for mass? If it is vascular it would be a CTA w/wo otherwise they can do the head w/wo for mass. Thank you.

## 2018-10-30 NOTE — TELEPHONE ENCOUNTER
----- Message from Veronica Pacheco M.D. sent at 10/29/2018  6:59 PM PDT -----  Regarding: FW: Needing to clarify order for CT tomorrow       ----- Message -----  From: My [employer]  Sent: 10/29/2018   3:51 PM  To: Veronica Pacheco M.D.  Subject: FW: Needing to clarify order for CT tomorrow     I noticed in a note that you might be covering for Dr. Smart and wanted to help get CT for tomorrow clarified, please see note below. Thank you.   ----- Message -----  From: My [employer]  Sent: 10/29/2018   2:50 PM  To: Siva Smart M.D.  Subject: Needing to clarify order for CT tomorrow         Patient is scheduled for a CT head w/wo for tomorrow and our CT tech wanted to find out if you are looking for something vascular or looking for mass? If it is vascular it would be a CTA w/wo otherwise they can do the head w/wo for mass. Thank you.

## 2018-10-31 ENCOUNTER — TELEPHONE (OUTPATIENT)
Dept: MEDICAL GROUP | Age: 78
End: 2018-10-31

## 2018-10-31 PROCEDURE — 77412 RADIATION TX DELIVERY LVL 3: CPT | Performed by: RADIOLOGY

## 2018-10-31 PROCEDURE — 77417 THER RADIOLOGY PORT IMAGE(S): CPT | Performed by: RADIOLOGY

## 2018-10-31 PROCEDURE — 77427 RADIATION TX MANAGEMENT X5: CPT | Performed by: RADIOLOGY

## 2018-10-31 NOTE — LETTER
October 31, 2018        Clarisse Reeves  [address] [facility]  [address]  [place] [address]          Dear Clarisse,    We have received the results of your recent:    Carotid Ultrasound and CT Scan       Your test came back unchanged or within normal limits.  Please follow up as previously discussed with your physician.      Feel free to call us with any questions.        Sincerely,          Siva Smart M.D.  Electronically Signed

## 2018-10-31 NOTE — TELEPHONE ENCOUNTER
Phone Number Called: [phone] (home)       Message: Called LVM for patient to inform of normal results. Patient was also sent mychart and letter to inform of results.     Left Message for patient to call back: N\A only if patient has additional questions or concerns.

## 2018-10-31 NOTE — TELEPHONE ENCOUNTER
----- Message from Siva Smart M.D. sent at 10/30/2018  7:59 PM PDT -----  Call pt with normal results.

## 2018-11-01 ENCOUNTER — HOSPITAL ENCOUNTER (OUTPATIENT)
Dept: RADIATION ONCOLOGY | Facility: MEDICAL CENTER | Age: 78
End: 2018-11-01

## 2018-11-01 ENCOUNTER — HOSPITAL ENCOUNTER (OUTPATIENT)
Dept: RADIATION ONCOLOGY | Facility: MEDICAL CENTER | Age: 78
End: 2018-11-30
Attending: RADIOLOGY
Payer: MEDICARE

## 2018-11-01 PROCEDURE — 77417 THER RADIOLOGY PORT IMAGE(S): CPT | Performed by: RADIOLOGY

## 2018-11-01 PROCEDURE — 77412 RADIATION TX DELIVERY LVL 3: CPT | Performed by: RADIOLOGY

## 2018-11-02 ENCOUNTER — HOSPITAL ENCOUNTER (OUTPATIENT)
Dept: RADIATION ONCOLOGY | Facility: MEDICAL CENTER | Age: 78
End: 2018-11-02

## 2018-11-02 PROCEDURE — 77412 RADIATION TX DELIVERY LVL 3: CPT | Performed by: RADIOLOGY

## 2018-11-02 PROCEDURE — 77417 THER RADIOLOGY PORT IMAGE(S): CPT | Performed by: RADIOLOGY

## 2018-11-05 ENCOUNTER — HOSPITAL ENCOUNTER (OUTPATIENT)
Dept: RADIATION ONCOLOGY | Facility: MEDICAL CENTER | Age: 78
End: 2018-11-05

## 2018-11-05 PROCEDURE — 77336 RADIATION PHYSICS CONSULT: CPT | Performed by: RADIOLOGY

## 2018-11-05 PROCEDURE — 77412 RADIATION TX DELIVERY LVL 3: CPT | Performed by: RADIOLOGY

## 2018-11-05 PROCEDURE — 77417 THER RADIOLOGY PORT IMAGE(S): CPT | Performed by: RADIOLOGY

## 2018-11-06 ENCOUNTER — TELEPHONE (OUTPATIENT)
Dept: HEMATOLOGY ONCOLOGY | Facility: MEDICAL CENTER | Age: 78
End: 2018-11-06

## 2018-11-06 ENCOUNTER — HOSPITAL ENCOUNTER (OUTPATIENT)
Dept: RADIATION ONCOLOGY | Facility: MEDICAL CENTER | Age: 78
End: 2018-11-06

## 2018-11-06 ENCOUNTER — OFFICE VISIT (OUTPATIENT)
Dept: MEDICAL GROUP | Age: 78
End: 2018-11-06
Payer: MEDICARE

## 2018-11-06 VITALS
SYSTOLIC BLOOD PRESSURE: 120 MMHG | BODY MASS INDEX: 32.78 KG/M2 | DIASTOLIC BLOOD PRESSURE: 70 MMHG | TEMPERATURE: 98.4 F | HEIGHT: 66 IN | OXYGEN SATURATION: 93 % | WEIGHT: 204 LBS | HEART RATE: 84 BPM

## 2018-11-06 DIAGNOSIS — Z23 NEED FOR HEPATITIS B VACCINATION: ICD-10-CM

## 2018-11-06 DIAGNOSIS — G43.109 OCULAR MIGRAINE: ICD-10-CM

## 2018-11-06 DIAGNOSIS — E66.9 OBESITY (BMI 30-39.9): ICD-10-CM

## 2018-11-06 DIAGNOSIS — E78.2 MIXED HYPERLIPIDEMIA: ICD-10-CM

## 2018-11-06 DIAGNOSIS — E11.8 CONTROLLED TYPE 2 DIABETES MELLITUS WITH COMPLICATION, WITHOUT LONG-TERM CURRENT USE OF INSULIN (HCC): ICD-10-CM

## 2018-11-06 DIAGNOSIS — C50.412 MALIGNANT NEOPLASM OF UPPER-OUTER QUADRANT OF LEFT BREAST IN FEMALE, ESTROGEN RECEPTOR NEGATIVE (HCC): ICD-10-CM

## 2018-11-06 DIAGNOSIS — I10 ESSENTIAL HYPERTENSION: ICD-10-CM

## 2018-11-06 DIAGNOSIS — C91.10 CLL (CHRONIC LYMPHOCYTIC LEUKEMIA) (HCC): ICD-10-CM

## 2018-11-06 DIAGNOSIS — Z17.1 MALIGNANT NEOPLASM OF UPPER-OUTER QUADRANT OF LEFT BREAST IN FEMALE, ESTROGEN RECEPTOR NEGATIVE (HCC): ICD-10-CM

## 2018-11-06 DIAGNOSIS — K21.00 GASTROESOPHAGEAL REFLUX DISEASE WITH ESOPHAGITIS: ICD-10-CM

## 2018-11-06 DIAGNOSIS — E55.9 VITAMIN D DEFICIENCY: ICD-10-CM

## 2018-11-06 DIAGNOSIS — R51.9 NONINTRACTABLE EPISODIC HEADACHE, UNSPECIFIED HEADACHE TYPE: ICD-10-CM

## 2018-11-06 DIAGNOSIS — E03.4 HYPOTHYROIDISM DUE TO ACQUIRED ATROPHY OF THYROID: ICD-10-CM

## 2018-11-06 LAB
HBA1C MFR BLD: 5.7 % (ref ?–5.8)
INT CON NEG: NEGATIVE
INT CON POS: POSITIVE

## 2018-11-06 PROCEDURE — 99214 OFFICE O/P EST MOD 30 MIN: CPT | Performed by: INTERNAL MEDICINE

## 2018-11-06 PROCEDURE — 83036 HEMOGLOBIN GLYCOSYLATED A1C: CPT | Performed by: INTERNAL MEDICINE

## 2018-11-06 PROCEDURE — 77412 RADIATION TX DELIVERY LVL 3: CPT | Performed by: RADIOLOGY

## 2018-11-06 RX ORDER — MULTIVIT-MIN/IRON/FOLIC ACID/K 18-600-40
2000 CAPSULE ORAL DAILY
Qty: 100 CAP | Refills: 3 | Status: SHIPPED | OUTPATIENT
Start: 2018-11-06 | End: 2019-01-23 | Stop reason: SDUPTHER

## 2018-11-06 ASSESSMENT — ENCOUNTER SYMPTOMS
GASTROINTESTINAL NEGATIVE: 1
RESPIRATORY NEGATIVE: 1
NEUROLOGICAL NEGATIVE: 1
PSYCHIATRIC NEGATIVE: 1
MUSCULOSKELETAL NEGATIVE: 1
CONSTITUTIONAL NEGATIVE: 1
CARDIOVASCULAR NEGATIVE: 1
EYES NEGATIVE: 1

## 2018-11-06 NOTE — TELEPHONE ENCOUNTER
"Patient called and rescheduled her 4 month follow up appointment with Dr. Rebolledo on 12/27/18 to 12/19/18 at 2:40 p.m. Patient stated she didn't want to come in \"right after Jes\". Also, confirmed CT appointment for 12/17/18.  "

## 2018-11-07 PROCEDURE — 77334 RADIATION TREATMENT AID(S): CPT | Performed by: RADIOLOGY

## 2018-11-07 PROCEDURE — 77427 RADIATION TX MANAGEMENT X5: CPT | Performed by: RADIOLOGY

## 2018-11-07 PROCEDURE — 77412 RADIATION TX DELIVERY LVL 3: CPT | Performed by: RADIOLOGY

## 2018-11-07 PROCEDURE — 77334 RADIATION TREATMENT AID(S): CPT | Mod: 26 | Performed by: RADIOLOGY

## 2018-11-07 PROCEDURE — 77307 TELETHX ISODOSE PLAN CPLX: CPT | Mod: 26 | Performed by: RADIOLOGY

## 2018-11-07 PROCEDURE — 77307 TELETHX ISODOSE PLAN CPLX: CPT | Performed by: RADIOLOGY

## 2018-11-07 NOTE — PROGRESS NOTES
Subjective:      Clarisse Reeves is a 78 y.o. female who presents with Follow-Up (vomiting improved)  and  The patient is here for followup of chronic medical problems listed below. The patient is compliant with medications and having no side effects from them. Denies chest pain, abdominal pain, dyspnea, myalgias, or cough.   Patient Active Problem List    Diagnosis Date Noted   • Essential hypertension 09/01/2015     Priority: Medium   • Controlled type 2 diabetes mellitus with complication, without long-term current use of insulin (HCC) 09/01/2015     Priority: Medium   • Normocytic anemia 06/02/2017     Priority: Low   • Hypothyroidism due to acquired atrophy of thyroid 09/01/2015     Priority: Low   • Obesity (BMI 30-39.9) 09/01/2015     Priority: Low   • Gastroesophageal reflux disease with esophagitis 11/07/2013     Priority: Low   • Vitamin D insufficiency 07/09/2013     Priority: Low   • Mixed hyperlipidemia 11/26/2012     Priority: Low   • CLL (chronic lymphocytic leukemia)- wbc= 20k-30k, since 2006 until march 2018 when it returned normal at 5-10k following chemorx for breast ca- dr grant; no rx; oncologist to follow 04/30/2012     Priority: Low   • Ocular migraine 11/06/2018   • TIA (transient ischemic attack) exp aphasia with headache  10/16/2018   • Nonintractable episodic headache- migraines 10/16/2018   • Cerebral vascular disease 10/16/2018   • Expressive aphasia 10/16/2018   • Elevated C-reactive protein (CRP) 07/30/2018   • Accelerated junctional rhythm on EKG in 5/2018 in setting of chemo    • Statin intolerance    • Thrombocytosis (HCC) 03/21/2018   • Anxiety 03/21/2018   • PICC (peripherally inserted central catheter) in place 03/21/2018   • Malignant neoplasm of upper-outer quadrant of left breast in female, estrogen receptor negative (CMS-HCC)-  1 positive node; dr. grant; dr sharpe-  chemorx 1st, then surgery, then RT 2 mo (dr beltran) 02/13/2018   • Nontoxic uninodular goiter 11/29/2017    • Mild intermittent asthma without complication- pma;  albuterol inhaler prn 2017   • Moderate episode of recurrent major depressive disorder (HCC) 2017   • Primary insomnia 2017   • Family history of heart attack- daughter 52 yo  MI 2016 10/07/2016   • Malignant neoplasm of right upper lobe of lung - adenocarcinoma in situ, 2016-  partial lobectomy RUL/RML- Dr Ganser- folowed by PMA 2016   • Multiple thyroid nodules- dr jaeger, neg FNA ; us no change 2015   • Fatty infiltration of liver 2012     Allergies   Allergen Reactions   • Codeine Hives and Nausea     RXN=40 years ago   • Lipitor [Atorvastatin Calcium] Myalgia     LQC=0133     • Lovastatin Myalgia     Muscle aches  PZK=8004   • Zocor [Simvastatin - High Dose] Myalgia     Severe myalgias  BLU=7100   • Lisinopril Cough   • Metformin      Diarrhea     • Tape      Paper tape ok     Outpatient Medications Prior to Visit   Medication Sig Dispense Refill   • lorazepam (ATIVAN) 2 MG tablet TAKE TWO TABLETS BY MOUTH EVERY NIGHT AT BEDTIME AS NEEDED FOR ANXIETY FOR UP TO 30 DAYS 60 Tab 0   • venlafaxine XR (EFFEXOR XR) 37.5 MG CAPSULE SR 24 HR TAKE ONE CAPSULE BY MOUTH DAILY 30 Cap 3   • lansoprazole (PREVACID) 30 MG CAPSULE DELAYED RELEASE Take 1 Cap by mouth every day. 90 Cap 4   • MICROLET LANCETS Misc For BG check once a day 100 Each 3   • Blood Glucose Monitoring Suppl Supplies Misc Contour Next glucometer strips for blood sugar check once a day 100 Each 2   • levothyroxine (SYNTHROID) 150 MCG Tab Take 1 Tab by mouth Every morning on an empty stomach. 90 Tab 4   • glipiZIDE (GLUCOTROL) 5 MG Tab Take 1 Tab by mouth every day. (Patient not taking: Reported on 10/16/2018) 90 Tab 4   • aspirin (ASA) 81 MG Chew Tab chewable tablet Take 1 Tab by mouth every day. (Patient not taking: Reported on 10/16/2018) 100 Tab 11     No facility-administered medications prior to visit.                HPI    Review of Systems  "  Constitutional: Negative.    HENT: Negative.    Eyes: Negative.    Respiratory: Negative.    Cardiovascular: Negative.    Gastrointestinal: Negative.    Genitourinary: Negative.    Musculoskeletal: Negative.    Skin: Negative.    Neurological: Negative.    Endo/Heme/Allergies: Negative.    Psychiatric/Behavioral: Negative.           Objective:     /70 (BP Location: Right arm, Patient Position: Sitting)   Pulse 84   Temp 36.9 °C (98.4 °F) (Temporal)   Ht 1.676 m (5' 5.98\")   Wt 92.5 kg (204 lb)   SpO2 93%   BMI 32.94 kg/m²      Physical Exam   Constitutional: She is oriented to person, place, and time. She appears well-developed and well-nourished. No distress.   HENT:   Head: Normocephalic and atraumatic.   Right Ear: External ear normal.   Left Ear: External ear normal.   Nose: Nose normal.   Mouth/Throat: Oropharynx is clear and moist. No oropharyngeal exudate.   Eyes: Pupils are equal, round, and reactive to light. Conjunctivae and EOM are normal. Right eye exhibits no discharge. Left eye exhibits no discharge. No scleral icterus.   Neck: Normal range of motion. Neck supple. No JVD present. No tracheal deviation present. No thyromegaly present.   Cardiovascular: Normal rate, regular rhythm, normal heart sounds and intact distal pulses.  Exam reveals no gallop and no friction rub.    No murmur heard.  Pulmonary/Chest: Effort normal and breath sounds normal. No stridor. No respiratory distress. She has no wheezes. She has no rales. She exhibits no tenderness.   Abdominal: Soft. Bowel sounds are normal. She exhibits no distension and no mass. There is no tenderness. There is no rebound and no guarding.   Musculoskeletal: Normal range of motion. She exhibits no edema or tenderness.   Lymphadenopathy:     She has no cervical adenopathy.   Neurological: She is alert and oriented to person, place, and time. She has normal reflexes. She displays normal reflexes. No cranial nerve deficit. She exhibits normal " muscle tone. Coordination normal.   Skin: Skin is warm and dry. No rash noted. She is not diaphoretic. No erythema. No pallor.   Psychiatric: She has a normal mood and affect. Her behavior is normal. Judgment and thought content normal.   Vitals reviewed.    Hospital Outpatient Visit on 10/18/2018   Component Date Value   • TSH 10/18/2018 0.690    • Sodium 10/18/2018 139    • Potassium 10/18/2018 4.0    • Chloride 10/18/2018 103    • Co2 10/18/2018 26    • Anion Gap 10/18/2018 10.0    • Glucose 10/18/2018 82    • Bun 10/18/2018 13    • Creatinine 10/18/2018 0.63    • Calcium 10/18/2018 9.2    • AST(SGOT) 10/18/2018 16    • ALT(SGPT) 10/18/2018 15    • Alkaline Phosphatase 10/18/2018 95    • Total Bilirubin 10/18/2018 0.3    • Albumin 10/18/2018 4.2    • Total Protein 10/18/2018 6.9    • Globulin 10/18/2018 2.7    • A-G Ratio 10/18/2018 1.6    • Cholesterol,Tot 10/18/2018 178    • Triglycerides 10/18/2018 123    • HDL 10/18/2018 50    • LDL 10/18/2018 103*   • WBC 10/18/2018 8.9    • RBC 10/18/2018 4.39    • Hemoglobin 10/18/2018 12.0    • Hematocrit 10/18/2018 39.0    • MCV 10/18/2018 88.8    • MCH 10/18/2018 27.3    • MCHC 10/18/2018 30.8*   • RDW 10/18/2018 55.0*   • Platelet Count 10/18/2018 320    • MPV 10/18/2018 10.0    • Neutrophils-Polys 10/18/2018 63.50    • Lymphocytes 10/18/2018 25.10    • Monocytes 10/18/2018 7.70    • Eosinophils 10/18/2018 2.50    • Basophils 10/18/2018 0.80    • Immature Granulocytes 10/18/2018 0.40    • Nucleated RBC 10/18/2018 0.00    • Neutrophils (Absolute) 10/18/2018 5.67    • Lymphs (Absolute) 10/18/2018 2.24    • Monos (Absolute) 10/18/2018 0.69    • Eos (Absolute) 10/18/2018 0.22    • Baso (Absolute) 10/18/2018 0.07    • Immature Granulocytes (a* 10/18/2018 0.04    • NRBC (Absolute) 10/18/2018 0.00    • Sed Rate Justinren 10/18/2018 14    • Fasting Status 10/18/2018 Fasting    • GFR If  10/18/2018 >60    • GFR If Non  Ameri* 10/18/2018 >60       Lab  Results   Component Value Date/Time    HBA1C 5.7 11/06/2018 12:24 PM    HBA1C 5.5 07/26/2018 07:58 AM     Lab Results   Component Value Date/Time    SODIUM 139 10/18/2018 10:32 AM    POTASSIUM 4.0 10/18/2018 10:32 AM    CHLORIDE 103 10/18/2018 10:32 AM    CO2 26 10/18/2018 10:32 AM    GLUCOSE 82 10/18/2018 10:32 AM    BUN 13 10/18/2018 10:32 AM    CREATININE 0.63 10/18/2018 10:32 AM    CREATININE 0.76 04/12/2013 11:17 AM    BUNCREATRAT 19 11/17/2014 10:29 AM    BUNCREATRAT 25 04/12/2013 11:17 AM    ALKPHOSPHAT 95 10/18/2018 10:32 AM    ASTSGOT 16 10/18/2018 10:32 AM    ALTSGPT 15 10/18/2018 10:32 AM    TBILIRUBIN 0.3 10/18/2018 10:32 AM     Lab Results   Component Value Date/Time    INR 0.99 01/30/2018 10:50 AM    INR 0.95 06/01/2017 06:23 AM    INR 0.93 12/16/2015 03:17 PM     Lab Results   Component Value Date/Time    CHOLSTRLTOT 178 10/18/2018 10:32 AM     (H) 10/18/2018 10:32 AM    HDL 50 10/18/2018 10:32 AM    TRIGLYCERIDE 123 10/18/2018 10:32 AM       No results found for: TESTOSTERONE  Lab Results   Component Value Date/Time    TSH 0.617 04/30/2014 10:35 AM    TSH 0.383 (L) 02/06/2014 01:26 PM     Lab Results   Component Value Date/Time    FREET4 1.34 08/08/2017 11:35 AM    FREET4 1.28 03/22/2017 12:21 PM     No results found for: URICACID  No components found for: VITB12  Lab Results   Component Value Date/Time    25HYDROXY 16 (L) 07/26/2018 07:58 AM    25HYDROXY 20 (L) 01/13/2018 10:16 AM                 Assessment/Plan:     1. Ocular migraine    Under good control. Continue same regimen  2. Nonintractable episodic headache, unspecified headache type- probable migraines   Under good control. Continue same regimen    3. Controlled type 2 diabetes mellitus with complication, without long-term current use of insulin (HCC)   Under good control. Continue same regimen  - POCT Hemoglobin A1C  - Diabetic Monofilament Lower Extremity Exam    4. Essential hypertension   Under good control. Continue same  regimen    5. CLL (chronic lymphocytic leukemia)- wbc= 20k-30k, since 2006 until march 2018 when it returned normal at 5-10k following chemorx for breast ca- dr grant; no rx; oncologist to follow   Under good control. Continue same regimen  6. Mixed hyperlipidemiaUnder good control. Continue same regimen     - TSH; Future  - COMP METABOLIC PANEL; Future  - LIPID PROFILE; Future  - CBC WITH DIFFERENTIAL; Future    7. Hypothyroidism due to acquired atrophy of thyroid   Under good control. Continue same regimen- TSH; Future    8. Obesity (BMI 30-39.9)    diet/exercise/lose 15 lbs.; patient counseled    9. Malignant neoplasm of upper-outer quadrant of left breast in female, estrogen receptor negative (CMS-HCC)-  1 positive node; dr. grant; dr sharpe  chemorx 1st, then surgery, then RT 2 mo (dr beltran)     Under good control. Continue same regimen  10. Gastroesophageal reflux disease with esophagitis   Under good control. Continue same regimen    11. Vitamin D deficiencyUnder good control. Continue same regimen   - Cholecalciferol (VITAMIN D) 2000 units Cap; Take 1 Cap by mouth every day.  Dispense: 100 Cap; Refill: 3    12. Need for hepatitis B vaccination            30 minute face-to-face encounter took place today.  More than half of this time was spent in the coordination of care of the above problems, as well as counseling.

## 2018-11-08 ENCOUNTER — HOSPITAL ENCOUNTER (OUTPATIENT)
Dept: RADIATION ONCOLOGY | Facility: MEDICAL CENTER | Age: 78
End: 2018-11-08

## 2018-11-08 PROCEDURE — 77412 RADIATION TX DELIVERY LVL 3: CPT | Performed by: RADIOLOGY

## 2018-11-09 ENCOUNTER — HOSPITAL ENCOUNTER (OUTPATIENT)
Dept: RADIATION ONCOLOGY | Facility: MEDICAL CENTER | Age: 78
End: 2018-11-09

## 2018-11-09 ENCOUNTER — APPOINTMENT (OUTPATIENT)
Dept: PHYSICAL THERAPY | Facility: REHABILITATION | Age: 78
End: 2018-11-09
Attending: SURGERY
Payer: MEDICARE

## 2018-11-09 PROCEDURE — 77412 RADIATION TX DELIVERY LVL 3: CPT | Performed by: RADIOLOGY

## 2018-11-12 ENCOUNTER — HOSPITAL ENCOUNTER (OUTPATIENT)
Dept: RADIATION ONCOLOGY | Facility: MEDICAL CENTER | Age: 78
End: 2018-11-12

## 2018-11-12 PROCEDURE — 77336 RADIATION PHYSICS CONSULT: CPT | Performed by: RADIOLOGY

## 2018-11-12 PROCEDURE — 77412 RADIATION TX DELIVERY LVL 3: CPT | Performed by: RADIOLOGY

## 2018-11-12 PROCEDURE — 77417 THER RADIOLOGY PORT IMAGE(S): CPT | Performed by: RADIOLOGY

## 2018-11-13 ENCOUNTER — HOSPITAL ENCOUNTER (OUTPATIENT)
Dept: RADIATION ONCOLOGY | Facility: MEDICAL CENTER | Age: 78
End: 2018-11-13

## 2018-11-13 DIAGNOSIS — Z17.1 MALIGNANT NEOPLASM OF UPPER-OUTER QUADRANT OF LEFT BREAST IN FEMALE, ESTROGEN RECEPTOR NEGATIVE (HCC): ICD-10-CM

## 2018-11-13 DIAGNOSIS — C50.412 MALIGNANT NEOPLASM OF UPPER-OUTER QUADRANT OF LEFT BREAST IN FEMALE, ESTROGEN RECEPTOR NEGATIVE (HCC): ICD-10-CM

## 2018-11-13 PROCEDURE — 77280 THER RAD SIMULAJ FIELD SMPL: CPT | Mod: 26 | Performed by: RADIOLOGY

## 2018-11-13 PROCEDURE — 77280 THER RAD SIMULAJ FIELD SMPL: CPT | Performed by: RADIOLOGY

## 2018-11-13 PROCEDURE — 77412 RADIATION TX DELIVERY LVL 3: CPT | Performed by: RADIOLOGY

## 2018-11-13 RX ORDER — MORPHINE SULFATE 15 MG/1
15 TABLET ORAL EVERY 4 HOURS PRN
Qty: 20 TAB | Refills: 0 | Status: SHIPPED | OUTPATIENT
Start: 2018-11-13 | End: 2018-12-13

## 2018-11-14 PROCEDURE — A6243 HYDROGEL DRG >16<=48 W/O BDR: HCPCS | Performed by: RADIOLOGY

## 2018-11-14 PROCEDURE — 77427 RADIATION TX MANAGEMENT X5: CPT | Performed by: RADIOLOGY

## 2018-11-14 PROCEDURE — 77387 GUIDANCE FOR RADJ TX DLVR: CPT | Performed by: RADIOLOGY

## 2018-11-14 PROCEDURE — 77412 RADIATION TX DELIVERY LVL 3: CPT | Performed by: RADIOLOGY

## 2018-11-14 PROCEDURE — 77014 PR CT GUIDANCE PLACEMENT RAD THERAPY FIELDS: CPT | Mod: 26 | Performed by: RADIOLOGY

## 2018-11-15 ENCOUNTER — HOSPITAL ENCOUNTER (OUTPATIENT)
Dept: RADIATION ONCOLOGY | Facility: MEDICAL CENTER | Age: 78
End: 2018-11-15

## 2018-11-15 PROCEDURE — 77014 PR CT GUIDANCE PLACEMENT RAD THERAPY FIELDS: CPT | Mod: 26 | Performed by: RADIOLOGY

## 2018-11-15 PROCEDURE — A6243 HYDROGEL DRG >16<=48 W/O BDR: HCPCS | Performed by: RADIOLOGY

## 2018-11-15 PROCEDURE — 77412 RADIATION TX DELIVERY LVL 3: CPT | Performed by: RADIOLOGY

## 2018-11-15 PROCEDURE — 77387 GUIDANCE FOR RADJ TX DLVR: CPT | Performed by: RADIOLOGY

## 2018-11-16 ENCOUNTER — HOSPITAL ENCOUNTER (OUTPATIENT)
Dept: RADIATION ONCOLOGY | Facility: MEDICAL CENTER | Age: 78
End: 2018-11-16

## 2018-11-16 PROCEDURE — 77387 GUIDANCE FOR RADJ TX DLVR: CPT | Performed by: RADIOLOGY

## 2018-11-16 PROCEDURE — 77412 RADIATION TX DELIVERY LVL 3: CPT | Performed by: RADIOLOGY

## 2018-11-16 PROCEDURE — 77014 PR CT GUIDANCE PLACEMENT RAD THERAPY FIELDS: CPT | Mod: 26 | Performed by: RADIOLOGY

## 2018-11-18 ENCOUNTER — HOSPITAL ENCOUNTER (OUTPATIENT)
Dept: RADIATION ONCOLOGY | Facility: MEDICAL CENTER | Age: 78
End: 2018-11-18

## 2018-11-18 PROCEDURE — 77412 RADIATION TX DELIVERY LVL 3: CPT | Performed by: RADIOLOGY

## 2018-11-18 PROCEDURE — 77387 GUIDANCE FOR RADJ TX DLVR: CPT | Performed by: RADIOLOGY

## 2018-11-18 PROCEDURE — 77336 RADIATION PHYSICS CONSULT: CPT | Performed by: RADIOLOGY

## 2018-11-18 PROCEDURE — 77014 PR CT GUIDANCE PLACEMENT RAD THERAPY FIELDS: CPT | Mod: 26 | Performed by: RADIOLOGY

## 2018-11-26 ENCOUNTER — TELEPHONE (OUTPATIENT)
Dept: HEMATOLOGY ONCOLOGY | Facility: MEDICAL CENTER | Age: 78
End: 2018-11-26

## 2018-11-26 NOTE — TELEPHONE ENCOUNTER
Patient is requesting the CT-CTA HEAD WITH & W/O-POST PROCESS patient would like to know if there is a way in which she can get a PET/CT instead. I let patient know that I will be sending out a message to Dr. Rebolledo's Medical Assistant.

## 2018-11-27 DIAGNOSIS — F41.9 ANXIETY: ICD-10-CM

## 2018-11-27 DIAGNOSIS — F51.01 PRIMARY INSOMNIA: ICD-10-CM

## 2018-11-28 ENCOUNTER — TELEPHONE (OUTPATIENT)
Dept: MEDICAL GROUP | Age: 78
End: 2018-11-28

## 2018-11-28 DIAGNOSIS — Z23 NEED FOR VACCINATION: ICD-10-CM

## 2018-11-28 RX ORDER — LORAZEPAM 2 MG/1
TABLET ORAL
Qty: 60 TAB | Refills: 0 | Status: SHIPPED | OUTPATIENT
Start: 2018-11-28 | End: 2018-12-27 | Stop reason: SDUPTHER

## 2018-11-29 ENCOUNTER — TELEPHONE (OUTPATIENT)
Dept: HEMATOLOGY ONCOLOGY | Facility: MEDICAL CENTER | Age: 78
End: 2018-11-29

## 2018-11-29 NOTE — TELEPHONE ENCOUNTER
Patient is very anxious with regards to her upcoming scans.  She is scheduled to have a CT scan of the chest with contrast ordered by Dr. Rebolledo this coming December 17 and follow-up with Dr. Rebolledo on the 19th.  Patient is very scared that she may have metastatic disease and is requesting to have a PET scan instead.  I did discuss with patient that PET/CT would not be warranted at this time and we would need to proceed with a CT scan for further evaluation.  I will have her follow-up with Dr. Rebolledo to review those results and discuss any further plan of care.  Patient did verbalize understanding in will be seen in the next 3 weeks in the office for further evaluation.

## 2018-12-17 ENCOUNTER — HOSPITAL ENCOUNTER (OUTPATIENT)
Dept: RADIOLOGY | Facility: MEDICAL CENTER | Age: 78
End: 2018-12-17
Attending: INTERNAL MEDICINE
Payer: MEDICARE

## 2018-12-17 DIAGNOSIS — Z17.1 MALIGNANT NEOPLASM OF UPPER-OUTER QUADRANT OF LEFT BREAST IN FEMALE, ESTROGEN RECEPTOR NEGATIVE (HCC): ICD-10-CM

## 2018-12-17 DIAGNOSIS — C50.412 MALIGNANT NEOPLASM OF UPPER-OUTER QUADRANT OF LEFT BREAST IN FEMALE, ESTROGEN RECEPTOR NEGATIVE (HCC): ICD-10-CM

## 2018-12-17 PROCEDURE — 700117 HCHG RX CONTRAST REV CODE 255: Performed by: INTERNAL MEDICINE

## 2018-12-17 PROCEDURE — 71260 CT THORAX DX C+: CPT

## 2018-12-17 RX ADMIN — IOHEXOL 75 ML: 350 INJECTION, SOLUTION INTRAVENOUS at 10:58

## 2018-12-19 ENCOUNTER — OFFICE VISIT (OUTPATIENT)
Dept: HEMATOLOGY ONCOLOGY | Facility: MEDICAL CENTER | Age: 78
End: 2018-12-19
Payer: MEDICARE

## 2018-12-19 VITALS
SYSTOLIC BLOOD PRESSURE: 112 MMHG | RESPIRATION RATE: 16 BRPM | HEART RATE: 92 BPM | OXYGEN SATURATION: 97 % | DIASTOLIC BLOOD PRESSURE: 66 MMHG | HEIGHT: 66 IN | WEIGHT: 200.07 LBS | TEMPERATURE: 97.7 F | BODY MASS INDEX: 32.15 KG/M2

## 2018-12-19 DIAGNOSIS — C34.11 MALIGNANT NEOPLASM OF RIGHT UPPER LOBE OF LUNG (HCC): ICD-10-CM

## 2018-12-19 DIAGNOSIS — C91.10 CLL (CHRONIC LYMPHOCYTIC LEUKEMIA) (HCC): ICD-10-CM

## 2018-12-19 DIAGNOSIS — Z17.1 MALIGNANT NEOPLASM OF UPPER-OUTER QUADRANT OF LEFT BREAST IN FEMALE, ESTROGEN RECEPTOR NEGATIVE (HCC): ICD-10-CM

## 2018-12-19 DIAGNOSIS — C50.412 MALIGNANT NEOPLASM OF UPPER-OUTER QUADRANT OF LEFT BREAST IN FEMALE, ESTROGEN RECEPTOR NEGATIVE (HCC): ICD-10-CM

## 2018-12-19 PROCEDURE — 99214 OFFICE O/P EST MOD 30 MIN: CPT | Performed by: INTERNAL MEDICINE

## 2018-12-19 ASSESSMENT — PAIN SCALES - GENERAL: PAINLEVEL: 2=MINIMAL-SLIGHT

## 2018-12-20 ENCOUNTER — TELEPHONE (OUTPATIENT)
Dept: PULMONOLOGY | Facility: HOSPICE | Age: 78
End: 2018-12-20

## 2018-12-20 NOTE — TELEPHONE ENCOUNTER
Clarisse called because she finished Chemotherapy and Radiation and had a CT-Scan on Monday during the visit when they were reviewing the Ct with her she said she saw something on her right lung and she wants Dr. Crawford to look at it, she said nothing on her previous scan had shown. And with the history of cancer she would like to make sure everything is ok.

## 2018-12-20 NOTE — TELEPHONE ENCOUNTER
Please let the patient know Dr Crawford is out of the office.    I reviewed CT report, it does not appear there is anything of concern in the lungs.    He is advised however to follow-up with her oncologist, Dr. Rebolledo will be following this.    Thank you

## 2018-12-21 ENCOUNTER — TELEPHONE (OUTPATIENT)
Dept: HEMATOLOGY ONCOLOGY | Facility: MEDICAL CENTER | Age: 78
End: 2018-12-21

## 2018-12-21 NOTE — TELEPHONE ENCOUNTER
Spoke to patient to let her know that her mammo gram is scheduled for 12/28/2018 at 2:45 at [address] [address]. Patient verbally understood.

## 2018-12-22 NOTE — PROGRESS NOTES
Date of visit: 12/19/2018  7:04 PM      Chief Complaint- Stage IIIc [cT2, cN3c, M0]. ER/[place] negative HER-2 negative, triple negative breast carcinoma.  YpT0,ypN1a     Identification/Prior relevant history:   CLL  2006 diagnosed with CLL in [place] after being admitted to the hospital for an infection and found to have leucocytosis.  Patient followed by Dr. Fsoter until 2014 until her insurance changed.     -February 2018 patient self palpated a lump in the left breast  -February 8, 2018. Diagnostic mammogram positive for suspicious mass in the left breast upper-outer quadrant.  -February 9, 2018. Needle biopsy shows triple negative infiltrating ductal carcinoma. Ki-67 is 30-50%  -February 19, 2018. PET CT scan. . Hypermetabolic 1.7 cm mass in the left breast, in keeping with known malignancy.2. Multiple hypermetabolic remi metastases in the left axilla.3. Nonenlarged lymph nodes in the left supraclavicular and retroclavicular region with mild uptake, suspicious for early metastasis.4. Mild uptake in the inferior endplate of T6 is nonspecific but could relate to degenerative change. Attention on follow-up exam is recommended  -March 3, 2018. MRI of the thoracic spine.1.  No evidence of metastatic disease the thoracic spine. Specifically, there is no suspicious lesion seen at T6   -  Neoadjuvant dd AC-> weekly Taxol. Chemotherapy held after week #6 of Taxol.  left breast mastectomy with lymph node dissection.  1/7 Lymph nodes positive for disease.  No residual cancer seen breast.  Lymphocytosis gradually resolved during the chemotherapy  Aug 6, 2018. Resumed chemotherapy. Cycle 7  S/p 10 doses.  C/o severe fatigue and intolerance stopped further treatment   interim history  -She finished radiation and is currently on surveillance.  She feels some lumpiness involving the left breast.  CT chest-12/17/18-Anterior right perihilar/right upper lobe soft tissue thickening decreased since previous  examination.  Postoperative changes right upper lobe unchanged.  Atelectasis within both lungs. No new infiltrates. No pleural effusions.  Postoperative changes left breast and axilla.  Large hiatal hernia.  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   • Cataract     right  and  left  IOL   • CLL (chronic lymphoblastic leukemia)    • Cutaneous skin tags 1/29/2015   • DM (diabetes mellitus) (HCC)     oral meds   • Hiatus hernia syndrome     no surgery   • Indigestion    • MEDICAL HOME    • Personal history of colonic polyps 11/26/2012   • Pneumonia 2014   • Pneumonia 06/2017   • Statin intolerance    • Thyroid disease    • Type II or unspecified type diabetes mellitus without mention of complication, not stated as uncontrolled     pre-diabetic       Past surgical history:       Past Surgical History:   Procedure Laterality Date   • MASTECTOMY Left 7/6/2018    Procedure: MASTECTOMY - PARTIAL AFTER WIRE LOCAALIZATION;  Surgeon: Esperanza Crandall M.D.;  Location: SURGERY SAME DAY [facility];  Service: General   • AXILLARY NODE DISSECTION Left 7/6/2018    Procedure: AXILLARY NODE DISSECTION;  Surgeon: Esperanza Crandall M.D.;  Location: SURGERY SAME DAY [facility];  Service: General   • THYROIDECTOMY N/A 11/29/2017    Procedure: THYROIDECTOMY COMPLETION, RECURRENT LARYNGEAL NERVE MONITORING;  Surgeon: Cameron Marcelino M.D.;  Location: SURGERY SAME DAY [facility];  Service: General   • THORACOSCOPY Right 2/1/2016    Procedure: THORACOSCOPY Upper Lobectomy ;  Surgeon: John H Ganser, M.D.;  Location: [facility];  Service:    • NODE DISSECTION Right 2/1/2016    Procedure: NODE DISSECTION;  Surgeon: John H Ganser, M.D.;   Location: SURGERY [facility];  Service:    • RECOVERY  12/18/2015    Procedure: CT-SCP-RUL LUNG BIOPSY-;  Surgeon: Recoveryonly Surgery;  Location: SURGERY PRE-POST PROC UNIT [facility];  Service:    • OTHER  2001    Lower Left segment of lung removed    • CHOLECYSTECTOMY  1995   • OTHER ORTHOPEDIC SURGERY  1990    bakers cyst removed in right knee, multiple scopes   • OTHER  1990    hemmroid removal   • OTHER  1984    left Thyroid removed, might remove right side in the future   • APPENDECTOMY  1955   • CATARACT EXTRACTION WITH IOL     • TONSILLECTOMY     • US-NEEDLE CORE BX-BREAST PANEL     • VAGINAL HYSTERECTOMY TOTAL      Hysterectomy,Total Vaginal       Allergies:       Codeine; Lipitor [atorvastatin calcium]; Lovastatin; Zocor [simvastatin - high dose]; Lisinopril; Metformin; and Tape    Medications:         Current Outpatient Prescriptions   Medication Sig Dispense Refill   • lorazepam (ATIVAN) 2 MG tablet TAKE TWO TABLETS BY MOUTH EVERY NIGHT AT BEDTIME AS NEEDED FOR ANXIETY FOR UP TO 30 DAYS 60 Tab 0   • Cholecalciferol (VITAMIN D) 2000 units Cap Take 1 Cap by mouth every day. 100 Cap 3   • levothyroxine (SYNTHROID) 150 MCG Tab Take 1 Tab by mouth Every morning on an empty stomach. 90 Tab 4   • venlafaxine XR (EFFEXOR XR) 37.5 MG CAPSULE SR 24 HR TAKE ONE CAPSULE BY MOUTH DAILY (Patient not taking: Reported on 12/19/2018) 30 Cap 3   • lansoprazole (PREVACID) 30 MG CAPSULE DELAYED RELEASE Take 1 Cap by mouth every day. 90 Cap 4   • MICROLET LANCETS Misc For BG check once a day (Patient not taking: Reported on 12/19/2018) 100 Each 3   • Blood Glucose Monitoring Suppl Supplies Misc Contour Next glucometer strips for blood sugar check once a day (Patient not taking: Reported on 12/19/2018) 100 Each 2     No current facility-administered medications for this visit.          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     Social History Main Topics   • Smoking status: Former Smoker     Packs/day: 1.50     Years: 50.00     Types: Cigarettes     Quit date: 5/6/2006   • Smokeless tobacco: Never Used      Comment: quit smoking 2002   • Alcohol use 0.0 oz/week      Comment: 2 drinks a week   • Drug use: No   • Sexual activity: Not Currently     Partners: Male     Other Topics Concern   • Not on file     Social History Narrative   • No narrative on file       Family History:      Family History   Problem Relation Age of Onset   • Cancer Mother    • Lung Disease Father    • Cancer Father        Review of Systems:  All other review of systems are negative except what was mentioned above in the HPI.    Constitutional: Negative for fever, chills, weight loss and malaise/fatigue.    HEENT: No new auditory or visual complaints. No sore throat and neck pain.     Respiratory: Negative for cough, sputum production, shortness of breath and wheezing.    Cardiovascular: Negative for chest pain, palpitations, orthopnea and leg swelling.    Gastrointestinal: Negative for heartburn, nausea, vomiting and abdominal pain.    Genitourinary: Negative for dysuria, hematuria    Musculoskeletal: No new arthralgias or myalgias   Skin: Negative for rash and itching.    Neurological: Negative for focal weakness and headaches.    Endo/Heme/Allergies: No abnormal bleed/bruise.    Psychiatric/Behavioral: No new depression/anxiety.    Physical Exam:  Vitals: /66 (BP Location: Right arm, Patient Position: Sitting, BP Cuff Size: Large adult)   Pulse 92   Temp 36.5 °C (97.7 °F) (Temporal)   Resp 16   Ht 1.676 m (5' 5.98\")   Wt 90.8 kg (200 lb 1.1 oz)   SpO2 97%   BMI 32.31 kg/m²     General: Not in acute distress, alert and oriented x 3  HEENT: No pallor, icterus. Oropharynx clear.   Neck: Supple, no palpable masses.  Lymph nodes: No palpable cervical, " supraclavicular, axillary or inguinal lymphadenopathy.    CVS: regular rate and rhythm, no rubs or gallops  RESP: Clear to auscultate bilaterally, no wheezing or crackles.   ABD: Soft, non tender, non distended, positive bowel sounds, no palpable organomegaly  EXT: No edema or cyanosis  CNS: Alert and oriented x3, No focal deficits.  Skin- No rash  Present-she is status post unilateral mastectomy.  She does have a vague mass involving the right breast.    Labs:   No visits with results within 1 Week(s) from this visit.   Latest known visit with results is:   Office Visit on 11/06/2018   Component Date Value Ref Range Status   • Glycohemoglobin 11/06/2018 5.7  % Final   • Internal Control Negative 11/06/2018 Negative   Final   • Internal Control Positive 11/06/2018 Positive   Final             Assessment and Plan:    Stage IIIc [cT2, cN3c, M0]. YpT0,ypN1a  ER/ID negative HER-2 negative, triple negative breast carcinoma-she had very good response to suboptimal neoadjuvant chemotherapy with achievement of pCR of the breast mass, however, there was residual one lymph node which was positive.  Resumed Weekly paclitaxel after surgery. Overall, she got 10 doses. She feels very fatigued and does not want to continue further chemotherapy.  She refused Xeloda per CREATE - X results.  She finished her radiation and is on surveillance.  Complains of some lumpiness involving the breast.  I will obtain a mammogram.     CLL  -Established, stable, chronic  -Coon stage 0 with lymphocytosis only   -She is currently under active observation  -PET/CT scan February 2018 did not show evidence of lymph node disease   lymphocytosis has resolved with the chemotherapy.  -Fibroadenoma of breast  -Right breast  -Stereotactic biopsy Nov 12, 2014  Showed benign fibrofatty breast tissue with discrete nodules of hyalinized fibroadenoma with microcalcifications.No atypia or malignancy.     - Adenocarcinoma in situ of the lung  - Diagnosed Dec 18,  2015 in the right upper lobe lung. biopsy of  nodule, 6 showed Adenocarcinoma-in-situ in fibroelastotic scar tissue, with foci suspicious for invasive adenocarcinoma.  - Feb 1, 2016. Right thoracoscopy with anterior segmentectomy of the right upper lobe and removal of a portion of the right middle lobe.Pathology report states The adenocarcinoma in situ focally demonstrates areas of central fibrosis which surrounds the adenocarcinoma in situ glands, which makes evaluation for invasion difficult. However, there are a few scattered clusters of malignant cells and small foci suspicious for invasive adenocarcinoma  - PET/CT scan February 2018 did not show any evidence of disease  -Recent CT chest does not reveal any evidence of progression.  There is mention of some thickening involving the right upper lobe and will ask radiology to compare with the prior imaging.  Consider annual CT chest after this     Left upper extremity lymphedema.-She was referred to lymphedema clinic.  She will continue self myositis.  Overall improved.  Follow-up with her PCP for her other medical issues      Complex patient requiring complex decision making. Reviewed images/ lab with patient.  Antineoplastic therapy requiring close monitoring.    She agreed and verbalized  agreement and understanding with the current plan.  I answered all questions and concerns at this time         Please note that this dictation was created using voice recognition software. I have made every reasonable attempt to correct obvious errors, but I expect that there are errors of grammar and possibly content that I did not discover before finalizing the note.      SIGNATURES:  Rajan Rebolledo    CC:  Siva Smart M.D.  No ref. provider found

## 2018-12-27 DIAGNOSIS — F51.01 PRIMARY INSOMNIA: ICD-10-CM

## 2018-12-27 DIAGNOSIS — F41.9 ANXIETY: ICD-10-CM

## 2018-12-28 ENCOUNTER — TELEPHONE (OUTPATIENT)
Dept: HEMATOLOGY ONCOLOGY | Facility: MEDICAL CENTER | Age: 78
End: 2018-12-28

## 2018-12-28 ENCOUNTER — HOSPITAL ENCOUNTER (OUTPATIENT)
Dept: RADIOLOGY | Facility: MEDICAL CENTER | Age: 78
End: 2018-12-28
Attending: INTERNAL MEDICINE
Payer: MEDICARE

## 2018-12-28 DIAGNOSIS — C50.412 MALIGNANT NEOPLASM OF UPPER-OUTER QUADRANT OF LEFT BREAST IN FEMALE, ESTROGEN RECEPTOR NEGATIVE (HCC): ICD-10-CM

## 2018-12-28 DIAGNOSIS — Z17.1 MALIGNANT NEOPLASM OF UPPER-OUTER QUADRANT OF LEFT BREAST IN FEMALE, ESTROGEN RECEPTOR NEGATIVE (HCC): ICD-10-CM

## 2018-12-28 DIAGNOSIS — C91.10 CLL (CHRONIC LYMPHOCYTIC LEUKEMIA) (HCC): ICD-10-CM

## 2018-12-28 PROCEDURE — 76642 ULTRASOUND BREAST LIMITED: CPT | Mod: LT

## 2018-12-28 PROCEDURE — G0279 TOMOSYNTHESIS, MAMMO: HCPCS

## 2018-12-28 RX ORDER — LORAZEPAM 2 MG/1
TABLET ORAL
Qty: 60 TAB | Refills: 0 | Status: SHIPPED
Start: 2018-12-28 | End: 2019-01-23 | Stop reason: SDUPTHER

## 2018-12-29 NOTE — TELEPHONE ENCOUNTER
RN called patient to discuss her results from the mammo performed earlier today. VM left with request to call back. Per Dr. Rebolledo Please let the patient know that the mammogram is not suspicious for any cancer.  She can see her breast surgeon if she has concern. If patient calls back okay to relay information

## 2019-01-02 ENCOUNTER — APPOINTMENT (OUTPATIENT)
Dept: MEDICAL GROUP | Age: 79
End: 2019-01-02
Payer: MEDICARE

## 2019-01-02 ENCOUNTER — TELEPHONE (OUTPATIENT)
Dept: HEMATOLOGY ONCOLOGY | Facility: MEDICAL CENTER | Age: 79
End: 2019-01-02

## 2019-01-02 NOTE — TELEPHONE ENCOUNTER
Patient states Marian Cox RN contacted her on December 28th and informed her the mammogram and ultrasound was ok and there are no abnormalities. On December 31st she received an email from Dr. Brittany Pittman (Radiologist) stating the mammogram was abnormal and she needed to follow up with her oncologist immediately.     Patient is very concerned with the findings and would like to follow up on the suspicious lump immediately. Patient feels like she is getting conflicting information. Patient is requesting a phone call back this morning.

## 2019-01-02 NOTE — TELEPHONE ENCOUNTER
RN called patient back with concerns. Patient stated she was upset because Dr. Pittman had told her on her day of Mammogram that everything was fine and then she got a letter 3 days later stating this is an urgent matter for follow up. RN stated she was sorry she was so upset then RN then asked patient if it would be okay to review the Mammo together with her looking at her mychart, patient agreed.   RN reviewed findings and reinforced what Dr. Rebolledo's recommendation was because d/t to low suspicion of cancer she needs to follow up with her breast surgeon, that this could be d/t post operative changes. Patient asked why she is getting pushed off on other physician. RN stated she was sorry she felt that way and that was not what Dr. Rebolledo was doing. RN explained that if there is low evidence of cancer then Dr. Rebolledo, the oncologist, would not be able to follow or treat that and the recommendation is to have her follow up with her breast surgeon for exam r/t the new findings that at this time according to mammo has a low suspicion of cancer.   After thoroughly reviewing scan and answering questions patient was less upset and apologized for being short with RN. RN stated no need and that this can be very frightening thing and make a person anxious and was glad she called to discuss her questions. Patient states she will call Dr. Crandall to schedule an appointment for f/u.

## 2019-01-07 ENCOUNTER — TELEPHONE (OUTPATIENT)
Dept: MEDICAL GROUP | Age: 79
End: 2019-01-07

## 2019-01-07 ENCOUNTER — HOSPITAL ENCOUNTER (OUTPATIENT)
Dept: RADIOLOGY | Facility: MEDICAL CENTER | Age: 79
End: 2019-01-07
Attending: SURGERY
Payer: MEDICARE

## 2019-01-07 DIAGNOSIS — R92.8 ABNORMAL FINDINGS ON DIAGNOSTIC IMAGING OF BREAST: ICD-10-CM

## 2019-01-07 DIAGNOSIS — N63.0 BREAST MASS: ICD-10-CM

## 2019-01-07 PROCEDURE — 88305 TISSUE EXAM BY PATHOLOGIST: CPT

## 2019-01-07 PROCEDURE — 19083 BX BREAST 1ST LESION US IMAG: CPT

## 2019-01-07 NOTE — TELEPHONE ENCOUNTER
ESTABLISHED PATIENT PRE-VISIT PLANNING     Patient was NOT contacted to complete PVP.     Note: Patient will not be contacted if there is no indication to call.     1.  Reviewed notes from the last few office visits within the medical group: Yes    2.  If any orders were placed at last visit or intended to be done for this visit (i.e. 6 mos follow-up), do we have Results/Consult Notes?        •  Labs - scheduled   Note: If patient appointment is for lab review and patient did not complete labs, check with provider if OK to reschedule patient until labs completed.       •  Imaging - Imaging ordered, completed and results are in chart.       •  Referrals - No referrals were ordered at last office visit.    3. Is this appointment scheduled as a Hospital Follow-Up? No    4.  Immunizations were updated in Epic using WebIZ?: Epic matches WebIZ       •  Web Iz Recommendations: HEPATITIS B and SHINGRIX (Shingles)    5.  Patient is due for the following Health Maintenance Topics:   Health Maintenance Due   Topic Date Due   • IMM ZOSTER VACCINES (1 of 2) 07/30/1991   • Annual Wellness Visit  07/12/2018   • IMM HEP B VACCINE (2 of 3 - Risk 3-dose series) 10/25/2018   • RETINAL SCREENING  12/27/2018           6. Orders for overdue Health Maintenance topics pended in Pre-Charting? YES    7.  AHA (MDX) form printed for Provider? YES    8.  Patient was NOT informed to arrive 15 min prior to their scheduled appointment and bring in their medication bottles.

## 2019-01-08 ENCOUNTER — TELEPHONE (OUTPATIENT)
Dept: RADIOLOGY | Facility: MEDICAL CENTER | Age: 79
End: 2019-01-08

## 2019-01-08 ENCOUNTER — APPOINTMENT (OUTPATIENT)
Dept: LAB | Facility: MEDICAL CENTER | Age: 79
End: 2019-01-08
Attending: INTERNAL MEDICINE
Payer: MEDICARE

## 2019-01-08 LAB — PATHOLOGY CONSULT NOTE: NORMAL

## 2019-01-09 ENCOUNTER — APPOINTMENT (OUTPATIENT)
Dept: MEDICAL GROUP | Age: 79
End: 2019-01-09
Payer: MEDICARE

## 2019-01-09 DIAGNOSIS — R23.2 HOT FLASHES: ICD-10-CM

## 2019-01-09 DIAGNOSIS — Z17.1 MALIGNANT NEOPLASM OF UPPER-OUTER QUADRANT OF LEFT BREAST IN FEMALE, ESTROGEN RECEPTOR NEGATIVE (HCC): ICD-10-CM

## 2019-01-09 DIAGNOSIS — C50.412 MALIGNANT NEOPLASM OF UPPER-OUTER QUADRANT OF LEFT BREAST IN FEMALE, ESTROGEN RECEPTOR NEGATIVE (HCC): ICD-10-CM

## 2019-01-09 RX ORDER — VENLAFAXINE HYDROCHLORIDE 37.5 MG/1
37.5 CAPSULE, EXTENDED RELEASE ORAL DAILY
Qty: 90 CAP | Refills: 1 | Status: SHIPPED | OUTPATIENT
Start: 2019-01-09 | End: 2019-01-23 | Stop reason: SDUPTHER

## 2019-01-10 ENCOUNTER — HOSPITAL ENCOUNTER (OUTPATIENT)
Dept: LAB | Facility: MEDICAL CENTER | Age: 79
End: 2019-01-10
Attending: INTERNAL MEDICINE
Payer: MEDICARE

## 2019-01-10 DIAGNOSIS — E03.4 HYPOTHYROIDISM DUE TO ACQUIRED ATROPHY OF THYROID: ICD-10-CM

## 2019-01-10 DIAGNOSIS — E78.2 MIXED HYPERLIPIDEMIA: ICD-10-CM

## 2019-01-10 LAB
ALBUMIN SERPL BCP-MCNC: 4.1 G/DL (ref 3.2–4.9)
ALBUMIN/GLOB SERPL: 1.5 G/DL
ALP SERPL-CCNC: 103 U/L (ref 30–99)
ALT SERPL-CCNC: 11 U/L (ref 2–50)
ANION GAP SERPL CALC-SCNC: 9 MMOL/L (ref 0–11.9)
AST SERPL-CCNC: 12 U/L (ref 12–45)
BASOPHILS # BLD AUTO: 0.6 % (ref 0–1.8)
BASOPHILS # BLD: 0.05 K/UL (ref 0–0.12)
BILIRUB SERPL-MCNC: 0.4 MG/DL (ref 0.1–1.5)
BUN SERPL-MCNC: 9 MG/DL (ref 8–22)
CALCIUM SERPL-MCNC: 8.6 MG/DL (ref 8.5–10.5)
CHLORIDE SERPL-SCNC: 104 MMOL/L (ref 96–112)
CHOLEST SERPL-MCNC: 179 MG/DL (ref 100–199)
CO2 SERPL-SCNC: 27 MMOL/L (ref 20–33)
COMMENT 1642: NORMAL
CREAT SERPL-MCNC: 0.77 MG/DL (ref 0.5–1.4)
EOSINOPHIL # BLD AUTO: 0.21 K/UL (ref 0–0.51)
EOSINOPHIL NFR BLD: 2.7 % (ref 0–6.9)
ERYTHROCYTE [DISTWIDTH] IN BLOOD BY AUTOMATED COUNT: 47.1 FL (ref 35.9–50)
FASTING STATUS PATIENT QL REPORTED: NORMAL
GLOBULIN SER CALC-MCNC: 2.7 G/DL (ref 1.9–3.5)
GLUCOSE SERPL-MCNC: 102 MG/DL (ref 65–99)
HCT VFR BLD AUTO: 39.2 % (ref 37–47)
HDLC SERPL-MCNC: 45 MG/DL
HGB BLD-MCNC: 13 G/DL (ref 12–16)
IMM GRANULOCYTES # BLD AUTO: 0.04 K/UL (ref 0–0.11)
IMM GRANULOCYTES NFR BLD AUTO: 0.5 % (ref 0–0.9)
LDLC SERPL CALC-MCNC: 105 MG/DL
LYMPHOCYTES # BLD AUTO: 2.24 K/UL (ref 1–4.8)
LYMPHOCYTES NFR BLD: 28.6 % (ref 22–41)
MCH RBC QN AUTO: 29 PG (ref 27–33)
MCHC RBC AUTO-ENTMCNC: 33.2 G/DL (ref 33.6–35)
MCV RBC AUTO: 87.5 FL (ref 81.4–97.8)
MONOCYTES # BLD AUTO: 0.52 K/UL (ref 0–0.85)
MONOCYTES NFR BLD AUTO: 6.6 % (ref 0–13.4)
MORPHOLOGY BLD-IMP: NORMAL
NEUTROPHILS # BLD AUTO: 4.76 K/UL (ref 2–7.15)
NEUTROPHILS NFR BLD: 61 % (ref 44–72)
NRBC # BLD AUTO: 0 K/UL
NRBC BLD-RTO: 0 /100 WBC
PLATELET # BLD AUTO: 325 K/UL (ref 164–446)
PMV BLD AUTO: 9.9 FL (ref 9–12.9)
POTASSIUM SERPL-SCNC: 3.9 MMOL/L (ref 3.6–5.5)
PROT SERPL-MCNC: 6.8 G/DL (ref 6–8.2)
RBC # BLD AUTO: 4.48 M/UL (ref 4.2–5.4)
SODIUM SERPL-SCNC: 140 MMOL/L (ref 135–145)
TRIGL SERPL-MCNC: 144 MG/DL (ref 0–149)
TSH SERPL DL<=0.005 MIU/L-ACNC: 2.36 UIU/ML (ref 0.38–5.33)
WBC # BLD AUTO: 7.8 K/UL (ref 4.8–10.8)

## 2019-01-10 PROCEDURE — 80053 COMPREHEN METABOLIC PANEL: CPT

## 2019-01-10 PROCEDURE — 84443 ASSAY THYROID STIM HORMONE: CPT

## 2019-01-10 PROCEDURE — 80061 LIPID PANEL: CPT

## 2019-01-10 PROCEDURE — 36415 COLL VENOUS BLD VENIPUNCTURE: CPT

## 2019-01-10 PROCEDURE — 85025 COMPLETE CBC W/AUTO DIFF WBC: CPT

## 2019-01-15 ENCOUNTER — HOSPITAL ENCOUNTER (OUTPATIENT)
Dept: RADIATION ONCOLOGY | Facility: MEDICAL CENTER | Age: 79
End: 2019-01-31
Attending: RADIOLOGY
Payer: MEDICARE

## 2019-01-15 ENCOUNTER — TELEPHONE (OUTPATIENT)
Dept: MEDICAL GROUP | Age: 79
End: 2019-01-15

## 2019-01-15 VITALS
WEIGHT: 200.6 LBS | OXYGEN SATURATION: 98 % | SYSTOLIC BLOOD PRESSURE: 127 MMHG | BODY MASS INDEX: 32.4 KG/M2 | HEART RATE: 76 BPM | DIASTOLIC BLOOD PRESSURE: 67 MMHG | TEMPERATURE: 97.8 F

## 2019-01-15 PROCEDURE — 99212 OFFICE O/P EST SF 10 MIN: CPT | Performed by: RADIOLOGY

## 2019-01-15 ASSESSMENT — PAIN SCALES - GENERAL: PAINLEVEL: 7=MODERATE-SEVERE PAIN

## 2019-01-15 NOTE — NON-PROVIDER
Patient was seen today in clinic with Dr. Saldana for Follow up.  Vitals signs and weight were obtained and pain assessment was completed.  Allergies and medications were reviewed with the patient.  Toxicities of treatment assessed.     Vitals/Pain:  Vitals:    01/15/19 1051   BP: 127/67   BP Location: Right arm   Patient Position: Sitting   BP Cuff Size: Adult   Pulse: 76   Temp: 36.6 °C (97.8 °F)   TempSrc: Temporal   SpO2: 98%   Weight: 91 kg (200 lb 9.6 oz)   Pain Score: 7=Moderate-Severe Pain (aches and soreness Left breast)        Allergies:   Codeine; Lipitor [atorvastatin calcium]; Lovastatin; Zocor [simvastatin - high dose]; Lisinopril; Metformin; and Tape    Current Medications:  Current Outpatient Prescriptions   Medication Sig Dispense Refill   • venlafaxine XR (EFFEXOR XR) 37.5 MG CAPSULE SR 24 HR Take 1 Cap by mouth every day. 90 Cap 1   • lorazepam (ATIVAN) 2 MG tablet TAKE TWO TABLETS BY MOUTH EVERY NIGHT AT BEDTIME AS NEEDED FOR ANXIETY FOR UP TO 30 DAYS 60 Tab 0   • Cholecalciferol (VITAMIN D) 2000 units Cap Take 1 Cap by mouth every day. 100 Cap 3   • lansoprazole (PREVACID) 30 MG CAPSULE DELAYED RELEASE Take 1 Cap by mouth every day. 90 Cap 4   • MICROLET LANCETS Misc For BG check once a day 100 Each 3   • Blood Glucose Monitoring Suppl Supplies Misc Contour Next glucometer strips for blood sugar check once a day 100 Each 2   • levothyroxine (SYNTHROID) 150 MCG Tab Take 1 Tab by mouth Every morning on an empty stomach. 90 Tab 4     No current facility-administered medications for this encounter.          PCP:  Bing Du, Med Ass't

## 2019-01-15 NOTE — TELEPHONE ENCOUNTER
ESTABLISHED PATIENT PRE-VISIT PLANNING     Patient was NOT contacted to complete PVP.     Note: Patient will not be contacted if there is no indication to call.     1.  Reviewed notes from the last few office visits within the medical group: Yes    2.  If any orders were placed at last visit or intended to be done for this visit (i.e. 6 mos follow-up), do we have Results/Consult Notes?        •  Labs - Labs ordered, completed on 1/10/19 and results are in chart.   Note: If patient appointment is for lab review and patient did not complete labs, check with provider if OK to reschedule patient until labs completed.       •  Imaging - Imaging was not ordered at last office visit.       •  Referrals - No referrals were ordered at last office visit.    3. Is this appointment scheduled as a Hospital Follow-Up? No    4.  Immunizations were updated in Epic using WebIZ?: Epic matches WebIZ       •  Web Iz Recommendations: HEPATITIS B and SHINGRIX (Shingles)    5.  Patient is due for the following Health Maintenance Topics:   Health Maintenance Due   Topic Date Due   • IMM ZOSTER VACCINES (1 of 2) 07/30/1991   • Annual Wellness Visit  07/12/2018   • IMM HEP B VACCINE (2 of 3 - Risk 3-dose series) 10/25/2018   • RETINAL SCREENING  12/27/2018           6. Orders for overdue Health Maintenance topics pended in Pre-Charting? N\A    7.  AHA (MDX) form printed for Provider? YES    8.  Patient was NOT informed to arrive 15 min prior to their scheduled appointment and bring in their medication bottles.

## 2019-01-15 NOTE — PROGRESS NOTES
RADIATION ONCOLOGY FOLLOW-UP    DATE OF SERVICE: 1/15/2019    IDENTIFICATION:   A 78 y.o. female with a triple negative T2 N1 grade 3 breast cancer status post neoadjuvant AC/Taxol followed by lumpectomy and node dissection with no residual tumor in the breast and 1 microscopic 1 mm remi mass.  Status post radiation therapy complete 11/18/2018    HISTORY OF PRESENT ILLNESS:   Since last seen patient has been doing fairly well she noted some lumps in her breast this was worked up by Dr. Esperanza coombs and she underwent an ultrasound 12/28/2008 and a mammogram in no malignant features were found.  She also had a biopsy of the abnormality 1/7/2019 and no evidence of malignancy was seen.  She still has some swelling in the breast and some soreness in the lower axilla in the irradiated area but otherwise seems to be doing well and is in good spirits.    CURRENT MEDICATIONS:  Current Outpatient Prescriptions   Medication Sig Dispense Refill   • venlafaxine XR (EFFEXOR XR) 37.5 MG CAPSULE SR 24 HR Take 1 Cap by mouth every day. 90 Cap 1   • lorazepam (ATIVAN) 2 MG tablet TAKE TWO TABLETS BY MOUTH EVERY NIGHT AT BEDTIME AS NEEDED FOR ANXIETY FOR UP TO 30 DAYS 60 Tab 0   • Cholecalciferol (VITAMIN D) 2000 units Cap Take 1 Cap by mouth every day. 100 Cap 3   • lansoprazole (PREVACID) 30 MG CAPSULE DELAYED RELEASE Take 1 Cap by mouth every day. 90 Cap 4   • MICROLET LANCETS Misc For BG check once a day 100 Each 3   • Blood Glucose Monitoring Suppl Supplies Misc Contour Next glucometer strips for blood sugar check once a day 100 Each 2   • levothyroxine (SYNTHROID) 150 MCG Tab Take 1 Tab by mouth Every morning on an empty stomach. 90 Tab 4     No current facility-administered medications for this encounter.        ALLERGIES:  Codeine; Lipitor [atorvastatin calcium]; Lovastatin; Zocor [simvastatin - high dose]; Lisinopril; Metformin; and Tape    FAMILY HISTORY:    Family History   Problem Relation Age of Onset   • Cancer Mother     • Lung Disease Father    • Cancer Father    [unfilled]        SOCIAL HISTORY:     reports that she quit smoking about 12 years ago. Her smoking use included Cigarettes. She has a 75.00 pack-year smoking history. She has never used smokeless tobacco. She reports that she drinks alcohol. She reports that she does not use drugs.        REVIEW OF SYSTEMS: Is significant for that mentioned in the HPI  The rest of the review of systems has been reviewed by me and is documented in the nursing note in [facility] dated 7/20/2018    PHYSICAL EXAM:     ECOG PERFORMANCE STATUS:  0= Fully active, able to carry on all pre-disease performance without restriction.       Vitals:    01/15/19 1051   BP: 127/67   BP Location: Right arm   Patient Position: Sitting   BP Cuff Size: Adult   Pulse: 76   Temp: 36.6 °C (97.8 °F)   TempSrc: Temporal   SpO2: 98%   Weight: 91 kg (200 lb 9.6 oz)   Pain Score: 7=Moderate-Severe Pain (aches and soreness Left breast)        GENERAL: Well-appearing alert and oriented x3 in no apparent distress  Breasts: The left breast has evidence of the recent radiation change with hyper pigmentation retraction and induration with associated lymphedema.  The right breast is larger more pendulous but no masses are appreciated in either breast or axilla  HEENT:  Pupils are equal, round, and reactive to light.  Extraocular muscles   are intact. Sclerae nonicteric.  Conjunctivae pink.  Oral cavity, tongue   protrudes midline.   NECK:  Supple without evidence of thyromegaly.  NODES:  No peripheral adenopathy of the neck, supraclavicular fossa or axillae   bilaterally.  LUNGS:  Clear to ascultation   HEART:  Regular rate and rhythm.  No murmur appreciated  ABDOMEN:  Soft. No evidence of hepatosplenomegaly.  Positive bowel sounds.  EXTREMITIES:  Without Edema.  NEUROLOGIC:  Cranial nerves II through XII were intact. Normal stance and gait motor and sensory grossly within normal limits          IMPRESSION:    A 78 y.o. female  with a triple negative T2 N1 grade 3 breast cancer status post neoadjuvant AC/Taxol followed by lumpectomy and node dissection with no residual tumor in the breast and 1 microscopic 1 mm remi mass.  Radiation therapy complete 11/18/2018    RECOMMENDATIONS:   At this point I think she is doing well without any evidence of disease.  She is going to continue her follow-up with Dr. Rebolledo based on national cancer guidelines for breast cancer follow-up.  She can see me on an as-needed basis and will also be following with Dr. Crandall.        Thank you for the opportunity to participate in her care.  If any questions or comments, please do not hesitate in calling.      Please note that this dictation was created using voice recognition software. I have made every reasonable attempt to correct obvious errors, but I expect that there are errors of grammar and possibly content that I did not discover before finalizing the note.

## 2019-01-16 ENCOUNTER — APPOINTMENT (OUTPATIENT)
Dept: MEDICAL GROUP | Age: 79
End: 2019-01-16
Payer: MEDICARE

## 2019-01-23 ENCOUNTER — OFFICE VISIT (OUTPATIENT)
Dept: MEDICAL GROUP | Age: 79
End: 2019-01-23
Payer: MEDICARE

## 2019-01-23 VITALS
HEART RATE: 90 BPM | SYSTOLIC BLOOD PRESSURE: 118 MMHG | OXYGEN SATURATION: 92 % | TEMPERATURE: 98.1 F | DIASTOLIC BLOOD PRESSURE: 78 MMHG | WEIGHT: 198 LBS | BODY MASS INDEX: 31.82 KG/M2 | HEIGHT: 66 IN

## 2019-01-23 DIAGNOSIS — F41.9 ANXIETY: ICD-10-CM

## 2019-01-23 DIAGNOSIS — Z45.2 PICC (PERIPHERALLY INSERTED CENTRAL CATHETER) IN PLACE: ICD-10-CM

## 2019-01-23 DIAGNOSIS — C34.11 MALIGNANT NEOPLASM OF RIGHT UPPER LOBE OF LUNG (HCC): ICD-10-CM

## 2019-01-23 DIAGNOSIS — T45.1X5A CHEMOTHERAPY-INDUCED NEUTROPENIA (HCC): ICD-10-CM

## 2019-01-23 DIAGNOSIS — E04.1 NONTOXIC UNINODULAR GOITER: ICD-10-CM

## 2019-01-23 DIAGNOSIS — E66.9 OBESITY (BMI 30-39.9): ICD-10-CM

## 2019-01-23 DIAGNOSIS — J96.01 ACUTE RESPIRATORY FAILURE WITH HYPOXIA (HCC): ICD-10-CM

## 2019-01-23 DIAGNOSIS — K21.00 GASTROESOPHAGEAL REFLUX DISEASE WITH ESOPHAGITIS: ICD-10-CM

## 2019-01-23 DIAGNOSIS — C91.10 CLL (CHRONIC LYMPHOCYTIC LEUKEMIA) (HCC): ICD-10-CM

## 2019-01-23 DIAGNOSIS — E55.9 VITAMIN D INSUFFICIENCY: ICD-10-CM

## 2019-01-23 DIAGNOSIS — C50.412 MALIGNANT NEOPLASM OF UPPER-OUTER QUADRANT OF LEFT BREAST IN FEMALE, ESTROGEN RECEPTOR NEGATIVE (HCC): ICD-10-CM

## 2019-01-23 DIAGNOSIS — K76.0 FATTY INFILTRATION OF LIVER: ICD-10-CM

## 2019-01-23 DIAGNOSIS — E03.4 HYPOTHYROIDISM DUE TO ACQUIRED ATROPHY OF THYROID: ICD-10-CM

## 2019-01-23 DIAGNOSIS — R73.01 IFG (IMPAIRED FASTING GLUCOSE): ICD-10-CM

## 2019-01-23 DIAGNOSIS — F33.1 MODERATE EPISODE OF RECURRENT MAJOR DEPRESSIVE DISORDER (HCC): ICD-10-CM

## 2019-01-23 DIAGNOSIS — E11.8 CONTROLLED TYPE 2 DIABETES MELLITUS WITH COMPLICATION, WITHOUT LONG-TERM CURRENT USE OF INSULIN (HCC): ICD-10-CM

## 2019-01-23 DIAGNOSIS — G45.9 TIA (TRANSIENT ISCHEMIC ATTACK): ICD-10-CM

## 2019-01-23 DIAGNOSIS — Z17.1 MALIGNANT NEOPLASM OF UPPER-OUTER QUADRANT OF LEFT BREAST IN FEMALE, ESTROGEN RECEPTOR NEGATIVE (HCC): ICD-10-CM

## 2019-01-23 DIAGNOSIS — Z78.9 STATIN INTOLERANCE: Chronic | ICD-10-CM

## 2019-01-23 DIAGNOSIS — D75.839 THROMBOCYTOSIS: ICD-10-CM

## 2019-01-23 DIAGNOSIS — K13.0 ANGULAR CHEILITIS: ICD-10-CM

## 2019-01-23 DIAGNOSIS — R47.01 EXPRESSIVE APHASIA: ICD-10-CM

## 2019-01-23 DIAGNOSIS — I10 ESSENTIAL HYPERTENSION: ICD-10-CM

## 2019-01-23 DIAGNOSIS — J45.20 MILD INTERMITTENT ASTHMA WITHOUT COMPLICATION: ICD-10-CM

## 2019-01-23 DIAGNOSIS — D64.9 NORMOCYTIC ANEMIA: ICD-10-CM

## 2019-01-23 DIAGNOSIS — E04.2 MULTIPLE THYROID NODULES: ICD-10-CM

## 2019-01-23 DIAGNOSIS — E55.9 VITAMIN D DEFICIENCY: ICD-10-CM

## 2019-01-23 DIAGNOSIS — I82.722 CHRONIC DEEP VEIN THROMBOSIS (DVT) OF BRACHIAL VEIN OF LEFT UPPER EXTREMITY (HCC): ICD-10-CM

## 2019-01-23 DIAGNOSIS — R23.2 HOT FLASHES: ICD-10-CM

## 2019-01-23 DIAGNOSIS — D70.1 CHEMOTHERAPY-INDUCED NEUTROPENIA (HCC): ICD-10-CM

## 2019-01-23 DIAGNOSIS — F51.01 PRIMARY INSOMNIA: ICD-10-CM

## 2019-01-23 DIAGNOSIS — I67.9 CEREBRAL VASCULAR DISEASE: ICD-10-CM

## 2019-01-23 DIAGNOSIS — R51.9 NONINTRACTABLE EPISODIC HEADACHE, UNSPECIFIED HEADACHE TYPE: ICD-10-CM

## 2019-01-23 DIAGNOSIS — Z82.49 FAMILY HISTORY OF HEART ATTACK: ICD-10-CM

## 2019-01-23 DIAGNOSIS — G43.109 OCULAR MIGRAINE: ICD-10-CM

## 2019-01-23 DIAGNOSIS — E78.2 MIXED HYPERLIPIDEMIA: ICD-10-CM

## 2019-01-23 DIAGNOSIS — R79.82 ELEVATED C-REACTIVE PROTEIN (CRP): ICD-10-CM

## 2019-01-23 PROCEDURE — 8041 PR SCP AHA: Performed by: INTERNAL MEDICINE

## 2019-01-23 PROCEDURE — 99214 OFFICE O/P EST MOD 30 MIN: CPT | Performed by: INTERNAL MEDICINE

## 2019-01-23 RX ORDER — CLOTRIMAZOLE AND BETAMETHASONE DIPROPIONATE 10; .64 MG/G; MG/G
1 CREAM TOPICAL 2 TIMES DAILY
Qty: 15 G | Refills: 3 | Status: SHIPPED | OUTPATIENT
Start: 2019-01-23 | End: 2019-06-03 | Stop reason: SDUPTHER

## 2019-01-23 RX ORDER — NYSTATIN 100000 [USP'U]/G
POWDER TOPICAL
COMMUNITY
Start: 2019-01-04 | End: 2019-06-03

## 2019-01-23 RX ORDER — MULTIVIT-MIN/IRON/FOLIC ACID/K 18-600-40
2000 CAPSULE ORAL DAILY
Qty: 100 CAP | Refills: 3 | Status: ON HOLD | OUTPATIENT
Start: 2019-01-23 | End: 2019-07-19

## 2019-01-23 RX ORDER — LANSOPRAZOLE 30 MG/1
30 CAPSULE, DELAYED RELEASE ORAL DAILY
Qty: 90 CAP | Refills: 4 | Status: SHIPPED | OUTPATIENT
Start: 2019-01-23 | End: 2019-06-20

## 2019-01-23 RX ORDER — LORAZEPAM 2 MG/1
4 TABLET ORAL NIGHTLY PRN
Qty: 120 TAB | Refills: 2 | Status: SHIPPED | OUTPATIENT
Start: 2019-01-23 | End: 2019-03-24

## 2019-01-23 RX ORDER — VENLAFAXINE HYDROCHLORIDE 37.5 MG/1
37.5 CAPSULE, EXTENDED RELEASE ORAL DAILY
Qty: 90 CAP | Refills: 4 | Status: SHIPPED | OUTPATIENT
Start: 2019-01-23 | End: 2019-06-20

## 2019-01-23 RX ORDER — LEVOTHYROXINE SODIUM 0.15 MG/1
150 TABLET ORAL
Qty: 90 TAB | Refills: 4 | Status: SHIPPED | OUTPATIENT
Start: 2019-01-23 | End: 2019-06-20

## 2019-01-23 ASSESSMENT — ENCOUNTER SYMPTOMS
CONSTITUTIONAL NEGATIVE: 1
CARDIOVASCULAR NEGATIVE: 1
RESPIRATORY NEGATIVE: 1

## 2019-01-23 NOTE — PROGRESS NOTES
Subjective:      Clarisse Reeves is a 78 y.o. female who presents with Follow-Up (patient doing well)        HPI  The patient is here for review of labs drawn on 1/10/19.   Patient has chronic medical problems as listed below. The patient is compliant with medications and having no side effects from them. Denies chest pain, abdominal pain, dyspnea, myalgias, or cough.      Patient has a history of CLL.   Labs drawn on 1/10/19 notable for RBC was 4.48, MCHC was 33.2, blood glucose of 102, Total cholesterol of 179, triglycerides of 144, HDL of 45, LDL of 105. Patient is currently taking Ativan 2 mg with good control of anxiety, Prevacid for history of GERD, Effexor 37.5 mg QD for history of depression.   Patient had previous diagnosis of Diabetes, but has been in prediabetic range for the last year. She attributes improved blood sugar to 40 lbs of weight loss over the last two years. Patient has been off of her diabetic medications for the last year. Patient has a history of asthma, but has not required use of inhalers in years she states. PICC Line resolved: Patient states PICC line was removed 8/31/18.   Angular cheilitis:   Patient complains of dry skin over bilateral corners of mouth in the last few days. Nothing makes this problem better or worse.       Annual Health Assessment Questions:  1.  Are you currently engaging in any exercise or physical activity? Yes  2.  How would you describe your mood or emotional well-being today? fair  3.  Have you had any falls in the last year? No  4.  Have you noticed any problems with balance or had difficulty walking? No  5.  In the last six months have you experienced any leakage of urine? No  6. DPA/Advanced Directive: Patient has Advanced Directive on file.     Patient Active Problem List   Diagnosis   • CLL (chronic lymphocytic leukemia)- wbc= 20k-30k, since 2006 until march 2018 when it returned normal at 5-10k following chemorx for breast ca- dr grant; no rx;  oncologist to follow   • Mixed hyperlipidemia   • Fatty infiltration of liver   • Vitamin D insufficiency   • Gastroesophageal reflux disease with esophagitis   • Multiple thyroid nodules- dr jaeger, neg FNA ; us no change    • Essential hypertension   • Controlled type 2 diabetes mellitus with complication, without long-term current use of insulin (HCC)   • Hypothyroidism due to acquired atrophy of thyroid   • Obesity (BMI 30-39.9)   • 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 grant; dr sharpe-  chemorx 1st, then surgery, then RT 2 mo (dr beltran)   • Anxiety   • PICC (peripherally inserted central catheter) in place   • Accelerated junctional rhythm on EKG in 2018 in setting of chemo   • Statin intolerance   • Elevated C-reactive protein (CRP)   • TIA (transient ischemic attack) exp aphasia with headache    • Nonintractable episodic headache- migraines   • Cerebral vascular disease   • Expressive aphasia   • Ocular migraine   • Chronic deep vein thrombosis (DVT) of brachial vein of left upper extremity (HCC)       Outpatient Medications Prior to Visit   Medication Sig Dispense Refill   • venlafaxine XR (EFFEXOR XR) 37.5 MG CAPSULE SR 24 HR Take 1 Cap by mouth every day. 90 Cap 1   • lorazepam (ATIVAN) 2 MG tablet TAKE TWO TABLETS BY MOUTH EVERY NIGHT AT BEDTIME AS NEEDED FOR ANXIETY FOR UP TO 30 DAYS 60 Tab 0   • Cholecalciferol (VITAMIN D) 2000 units Cap Take 1 Cap by mouth every day. 100 Cap 3   •  "lansoprazole (PREVACID) 30 MG CAPSULE DELAYED RELEASE Take 1 Cap by mouth every day. 90 Cap 4   • MICROLET LANCETS Misc For BG check once a day 100 Each 3   • Blood Glucose Monitoring Suppl Supplies Misc Contour Next glucometer strips for blood sugar check once a day 100 Each 2   • levothyroxine (SYNTHROID) 150 MCG Tab Take 1 Tab by mouth Every morning on an empty stomach. 90 Tab 4     No facility-administered medications prior to visit.         Allergies   Allergen Reactions   • Codeine Hives and Nausea     RXN=40 years ago   • Lipitor [Atorvastatin Calcium] Myalgia     RNL=3431     • Lovastatin Myalgia     Muscle aches  BIY=4492   • Zocor [Simvastatin - High Dose] Myalgia     Severe myalgias  OOV=6426   • Lisinopril Cough   • Metformin      Diarrhea     • Tape      Paper tape ok       Review of Systems   Constitutional: Negative.    Respiratory: Negative.    Cardiovascular: Negative.    All other systems reviewed and are negative.       Objective:     /78 (BP Location: Right arm, Patient Position: Sitting)   Pulse 90   Temp 36.7 °C (98.1 °F) (Temporal)   Ht 1.676 m (5' 5.98\")   Wt 89.8 kg (198 lb)   SpO2 92%   BMI 31.97 kg/m²     Physical Exam   Constitutional: Oriented to person, place, and time. Appears well-developed and well-nourished. No distress.   Head: Normocephalic and atraumatic.   Right Ear: External ear normal.   Left Ear: External ear normal.   Nose: Nose normal.   Mouth/Throat: Oropharynx is clear and moist. No oropharyngeal exudate.   Eyes: Pupils are equal, round, and reactive to light. Conjunctivae and EOM are normal. Right eye exhibits no discharge. Left eye exhibits no discharge. No scleral icterus.   Neck: Normal range of motion. Neck supple. No JVD present. No tracheal deviation present. No thyromegaly present.   Cardiovascular: Normal rate, regular rhythm, normal heart sounds and intact distal pulses.  Exam reveals no gallop and no friction rub.    No murmur " heard.  Pulmonary/Chest: Effort normal. No stridor. No respiratory distress. No wheezing or rales. No tenderness.   Abdominal: Soft. Bowel sounds are normal. No distension and no mass. There is no tenderness. There is no rebound and no guarding. No hernia.   Musculoskeletal: Normal range of motion No edema or tenderness.   Lymphadenopathy: No cervical adenopathy.   Neurological: Alert and oriented to person, place, and time. Normal reflexes. Normal reflexes. No cranial nerve deficit. Normal muscle tone. Coordination normal.   Skin: Fissures over bilateral corners of mouth eczematous changes of 1 cm.  Psychiatric: Normal mood and affect. Behavior is normal. Judgment and thought content normal.   Nursing note and vitals reviewed.      Lab Results   Component Value Date/Time    HBA1C 5.7 11/06/2018 12:24 PM    HBA1C 5.5 07/26/2018 07:58 AM     Lab Results   Component Value Date/Time    SODIUM 140 01/10/2019 10:42 AM    POTASSIUM 3.9 01/10/2019 10:42 AM    CHLORIDE 104 01/10/2019 10:42 AM    CO2 27 01/10/2019 10:42 AM    GLUCOSE 102 (H) 01/10/2019 10:42 AM    BUN 9 01/10/2019 10:42 AM    CREATININE 0.77 01/10/2019 10:42 AM    CREATININE 0.76 04/12/2013 11:17 AM    BUNCREATRAT 19 11/17/2014 10:29 AM    BUNCREATRAT 25 04/12/2013 11:17 AM    ALKPHOSPHAT 103 (H) 01/10/2019 10:42 AM    ASTSGOT 12 01/10/2019 10:42 AM    ALTSGPT 11 01/10/2019 10:42 AM    TBILIRUBIN 0.4 01/10/2019 10:42 AM     Lab Results   Component Value Date/Time    INR 0.99 01/30/2018 10:50 AM    INR 0.95 06/01/2017 06:23 AM    INR 0.93 12/16/2015 03:17 PM     Lab Results   Component Value Date/Time    CHOLSTRLTOT 179 01/10/2019 10:42 AM     (H) 01/10/2019 10:42 AM    HDL 45 01/10/2019 10:42 AM    TRIGLYCERIDE 144 01/10/2019 10:42 AM       No results found for: TESTOSTERONE  Lab Results   Component Value Date/Time    TSH 0.617 04/30/2014 10:35 AM    TSH 0.383 (L) 02/06/2014 01:26 PM     Lab Results   Component Value Date/Time    FREET4 1.34  08/08/2017 11:35 AM    FREET4 1.28 03/22/2017 12:21 PM     No results found for: URICACID  No components found for: VITB12  Lab Results   Component Value Date/Time    25HYDROXY 16 (L) 07/26/2018 07:58 AM    25HYDROXY 20 (L) 01/13/2018 10:16 AM          Assessment/Plan:     1. Controlled type 2 diabetes mellitus with complication, without long-term current use of insulin (HCC)-  Resolved with 50 lb wt loss- now just has IFG  Problem resolved. Patient has been off of diabetic medications for the last year with good control of blood sugar. Continue with same regimen.     2. Essential hypertension  Chronic, Under good control. Continue same regimen. Blood pressure was 118/78 today.     3. CLL (chronic lymphocytic leukemia)- wbc= 20k-30k, since 2006 until march 2018 when it returned normal at 5-10k following chemorx for breast ca- dr grant; no rx; oncologist to follow  Chronic, stable. Patient followed by Dr. Grant, Oncology.     4. Mixed hyperlipidemia  Chronic, Under good control. Continue same regimen.   - TSH; Future  - COMP METABOLIC PANEL; Future  - Lipid Profile; Future  - CBC WITH DIFFERENTIAL; Future    5. Fatty infiltration of liver  Chronic, Under good control. Continue same regimen.     6. Vitamin D insufficiency  Under good control. Continue same regimen.   - Cholecalciferol (VITAMIN D) 2000 units Cap; Take 1 Cap by mouth every day.  Dispense: 100 Cap; Refill: 3  - VITAMIN D,25 HYDROXY; Future    7. Gastroesophageal reflux disease with esophagitis  Under good control. Continue same regimen.   - lansoprazole (PREVACID) 30 MG CAPSULE DELAYED RELEASE; Take 1 Cap by mouth every day.  Dispense: 90 Cap; Refill: 4    8. Multiple thyroid nodules- dr jaeger, neg FNA 2015; us no change 2017  Chronic, stable. Followed by Dr. Jaeger.     9. Hypothyroidism due to acquired atrophy of thyroid  Chronic, Under good control. Continue same regimen.   - levothyroxine (SYNTHROID) 150 MCG Tab; Take 1 Tab by mouth Every morning on  an empty stomach.  Dispense: 90 Tab; Refill: 4  - TSH; Future    10. Obesity (BMI 30-39.9)   diet/exercise/lose 15 lbs.; patient counseled     11. 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 Continue same regimen.     12. Family history of heart attack- daughter 54 yo  MI   Under good control. Continue same regimen.     13. Moderate episode of recurrent major depressive disorder (HCC)  Under good control. Continue same regimen.   - venlafaxine XR (EFFEXOR XR) 37.5 MG CAPSULE SR 24 HR; Take 1 Cap by mouth every day.  Dispense: 90 Cap; Refill: 4    14. Primary insomnia  Chronic, Under good control. Continue same regimen.   - LORazepam (ATIVAN) 2 MG tablet; Take 2 Tabs by mouth at bedtime as needed for Anxiety for up to 60 days.  Dispense: 120 Tab; Refill: 2    15. Normocytic anemia  Problem resolved.     16. Mild intermittent asthma without complication- pma;  albuterol inhaler prn  Problem resolved. Patient has not required use of inhaler in years.     17. Nontoxic uninodular goiter  Chronic, stable.     18. Malignant neoplasm of upper-outer quadrant of left breast in female, estrogen receptor negative (CMS-HCC)-  1 positive node; dr. grant; dr sharpe-  chemorx 1st, then surgery, then RT 2 mo (dr beltran)  Under good control. Continue same regimen.   - venlafaxine XR (EFFEXOR XR) 37.5 MG CAPSULE SR 24 HR; Take 1 Cap by mouth every day.  Dispense: 90 Cap; Refill: 4    19. Thrombocytosis (HCC)-resolved.  Removed from problem list  Problem resolved.     20. Anxiety  Chronic, Under good control. Continue same regimen.   - LORazepam (ATIVAN) 2 MG tablet; Take 2 Tabs by mouth at bedtime as needed for Anxiety for up to 60 days.  Dispense: 120 Tab; Refill: 2    21. PICC (peripherally inserted central catheter) in place- RESOLVED; TO BE REMOVED FORM PROB LIST  Problem resolved 18.     22. Statin  intolerance  Under good control. Continue same regimen.     23. Elevated C-reactive protein (CRP)  Under good control. Continue same regimen.     24. TIA (transient ischemic attack) exp aphasia with headache   Under good control. Continue same regimen.     25. Nonintractable episodic headache, unspecified headache type  Under good control. Continue same regimen.     26. Cerebral vascular disease  Under good control. Continue same regimen.     27. Expressive aphasia  Under good control. Continue same regimen.     28. Ocular migraine  Under good control. Continue same regimen.     29. Chemotherapy-induced neutropenia (HCC)-resolved.  Removed from problem list  Under good control. Continue same regimen.     30. Acute respiratory failure with hypoxia (HCC)-resolved.  Removed from problem list.  No longer on oxygen.  Under good control. Continue same regimen.     31. Acute respiratory failure with hypoxia (HCC)-resolved.  Removed from problem list.  No longer on oxygen.  Under good control. Continue same regimen.     32. IFG (impaired fasting glucose)  Under good control. Continue same regimen.     33. Malignant neoplasm of upper-outer quadrant of left breast in female, estrogen receptor negative (CMS-HCC)-  1 positive node; dr. koo; dr sharpe-  chemorx 1st, then surgery, then RT  - venlafaxine XR (EFFEXOR XR) 37.5 MG CAPSULE SR 24 HR; Take 1 Cap by mouth every day.  Dispense: 90 Cap; Refill: 4    33. Hot flashes  Under good control. Continue same regimen.   - venlafaxine XR (EFFEXOR XR) 37.5 MG CAPSULE SR 24 HR; Take 1 Cap by mouth every day.  Dispense: 90 Cap; Refill: 4    34. Vitamin D deficiency  Under good control. Continue same regimen.   - Cholecalciferol (VITAMIN D) 2000 units Cap; Take 1 Cap by mouth every day.  Dispense: 100 Cap; Refill: 3    35. Angular cheilitis  This is a new problem.   - clotrimazole-betamethasone (LOTRISONE) 1-0.05 % Cream; Apply 1 Application to affected area(s) 2 times a day.   Dispense: 15 g; Refill: 3    36. Chronic deep vein thrombosis (DVT) of brachial vein of left upper extremity (HCC)- resolved; removed form prob list  Problem resolved.         40 minute face-to-face encounter took place today.  More than half of this time was spent in the coordination of care of the above problems, as well as counseling.     IHira (Scribe), am scribing for, and in the presence of, Siva Smart M.D..    Electronically signed by: Hira Mcgovern (Codyibe), 1/23/2019    I, Siva Smart M.D., personally performed the services described in this documentation, as scribed by Hira Mcgovern in my presence, and it is both accurate and complete.

## 2019-03-14 ENCOUNTER — TELEPHONE (OUTPATIENT)
Dept: HEMATOLOGY ONCOLOGY | Facility: MEDICAL CENTER | Age: 79
End: 2019-03-14

## 2019-03-14 NOTE — TELEPHONE ENCOUNTER
Patient called stated that she had called earlier  today spoke with someone in our office . Regarding her upcoming appointment with Dr Rebolledo she was told to complete her labs/ Mammo prior to her next visit with Dr Rebolledo.but she is unable to scheduled her Mammo due too her new lump she is going to call her Surgeon  Dr Crandall at [facility] and make an appointment with her.

## 2019-03-19 ENCOUNTER — TELEPHONE (OUTPATIENT)
Dept: MEDICAL GROUP | Age: 79
End: 2019-03-19

## 2019-04-08 ENCOUNTER — PATIENT OUTREACH (OUTPATIENT)
Dept: HEALTH INFORMATION MANAGEMENT | Facility: OTHER | Age: 79
End: 2019-04-08

## 2019-04-08 ENCOUNTER — HOSPITAL ENCOUNTER (OUTPATIENT)
Dept: RADIOLOGY | Facility: MEDICAL CENTER | Age: 79
End: 2019-04-08
Attending: SURGERY
Payer: MEDICARE

## 2019-04-08 DIAGNOSIS — Z85.3 PERSONAL HISTORY OF BREAST CANCER: ICD-10-CM

## 2019-04-08 PROCEDURE — 76642 ULTRASOUND BREAST LIMITED: CPT | Mod: RT

## 2019-04-08 NOTE — PROGRESS NOTES
Outcome: Left Message    Please transfer to Patient Outreach Team at [phone] when patient returns call.    WebIZ Checked & Epic Updated:  yes    HealthConnect Verified: yes    Attempt # 1

## 2019-04-08 NOTE — PROGRESS NOTES
Outcome: pt will call back to schedule awv    Please transfer to Patient Outreach Team at [phone] when patient returns call.      Attempt # 2

## 2019-04-09 ENCOUNTER — APPOINTMENT (OUTPATIENT)
Dept: PULMONOLOGY | Facility: HOSPICE | Age: 79
End: 2019-04-09
Payer: MEDICARE

## 2019-04-11 ENCOUNTER — PATIENT OUTREACH (OUTPATIENT)
Dept: HEALTH INFORMATION MANAGEMENT | Facility: OTHER | Age: 79
End: 2019-04-11

## 2019-04-11 NOTE — PROGRESS NOTES
1. Attempt #:1    2. HealthConnect Verified: yes    3. Verify PCP: yes    4. Review Care Team: yes    5. WebIZ Checked & Epic Updated: Yes  · WebIZ Recommendations: SHINGRIX (Shingles)  · Is patient due for Tdap? NO  · Is patient due for Shingles? YES. Patient was not notified of copay/out of pocket cost.    6. Reviewed/Updated the following with patient:       •   Communication Preference Obtained? YES       •   Preferred Pharmacy? YES       •   Preferred Lab? YES       •   Family History (document living status of immediate family members and if + hx of cancer, diabetes, hypertension, hyperlipidemia, heart attack, stroke) YES    7. Annual Wellness Visit Scheduling  · Scheduling Status:Scheduled     8. Care Gap Scheduling (Attempt to Schedule EACH Overdue Care Gap!)     Health Maintenance Due   Topic Date Due   • IMM ZOSTER VACCINES (1 of 2) 07/30/1991   • Annual Wellness Visit  07/12/2018        Scheduled patient for Annual Wellness Visit     9. CIBDO Activation: already active    10. CIBDO Josey: no    11. Virtual Visits: no    12. Opt In to Text Messages: no    13. Patient was advised: “This is a free wellness visit. The provider will screen for medical conditions to help you stay healthy. If you have other concerns to address you may be asked to discuss these at a separate visit or there may be an additional fee.”     14. Patient was informed to arrive 15 min prior to their scheduled appointment and bring in their medication bottles.

## 2019-04-11 NOTE — PROGRESS NOTES
1. HealthConnect Verified: yes    2. Verify PCP: yes    3. Review and add  to Care Team: yes    4. WebIZ Checked & Epic Updated: No WebIZ record  WebIZ Recommendations: SHINGRIX (Shingles)  Is patient due for Tdap? NO  Is patient due for Shingles? YES. Patient was not notified of copay/out of pocket cost.    5. Reviewed/Updated the following with patient:       •   Communication Preference Obtained? YES  • MyChart Activation: already active       •   E-Mail Address Obtained? YES       •   Appointment Day and Time Preferences? NO       •   Preferred Pharmacy? YES       •   Preferred Lab? YES

## 2019-04-16 ENCOUNTER — APPOINTMENT (OUTPATIENT)
Dept: MEDICAL GROUP | Age: 79
End: 2019-04-16
Payer: MEDICARE

## 2019-04-18 ENCOUNTER — TELEPHONE (OUTPATIENT)
Dept: MEDICAL GROUP | Age: 79
End: 2019-04-18

## 2019-04-18 NOTE — TELEPHONE ENCOUNTER
ANNUAL WELLNESS VISIT PRE-VISIT PLANNING WITH OUTREACH    1.  If any orders were placed at last visit, do we have Results/Consult Notes?        •  Labs - Labs ordered, but not to be completed until 7/19.  Note: If patient appointment is for lab review and patient did not complete labs, check with provider if OK to reschedule patient until labs completed.       •  Imaging - Imaging was not ordered at last office visit.       •  Referrals - No referrals were ordered at last office visit.    2.  Immunizations were updated in [facility] using WebIZ?:Yes       •  WebIZ Recommendations: SHINGRIX (Shingles)       •  Is patient due for Tdap? NO       •  Is patient due for Shingrx? YES. Patient was not notified of copay/out of pocket cost.    3.  Patient has the following Care Path diagnoses on Problem List:  NONE    4.  Orders for overdue Health Maintenance topics pended in Pre-Charting? N\A

## 2019-04-25 NOTE — H&P
PRIMARY CARE PROVIDER:  Siva Smart MD    REASON FOR EVALUATION:  Expressive aphasia.    HISTORY OF PRESENT ILLNESS:  This is a 77-year-old female who comes to the   emergency room stating that for 1 hour she is having difficulty talking.  She   noticed that she was at a computer trying to explain something to a colleague   and noticed having difficulty speaking.  She did not pay much attention for   the first couple of minutes and then noted that it was not going away.  It   lasted for about an hour.  She came to the emergency room and in the emergency   room, her symptoms resolved.  REMSA reported right-sided weakness and unequal   smile.  None of that was elicited on my exam.  The patient was complaining of   left-sided headache, intensity 7/10, a dull pain, constant with no radiation.    CTA of the head did not show an acute CVA.  Dr. Bacon of neurology has been   consulted.    PAST MEDICAL HISTORY:  Includes:  1.  Lung cancer.  2.  History of chronic lymphoblastic leukemia.  3.  History of cataracts.  4.  History of diabetes mellitus type 2.  5.  History of hiatal hernia.  6.  History of essential hypertension.  7.  History of gastroesophageal reflux disease.    PAST SURGICAL HISTORY:  Vaginal hysterectomy, appendectomy, cholecystectomy,   thoracoscopy with node dissection, cataract extraction, tonsillectomy,   thyroidectomy.    SOCIAL HISTORY:  Ex-smoker, she smoked 2 packs a day for 50 years, quit in   2006.  Drinks alcohol occasionally.  No illicit drugs.    FAMILY HISTORY:  Mother had cancer as well as the father.    ALLERGIES:  TO CODEINE, LIPITOR, ZOCOR, LISINOPRIL, METFORMIN.    OUTPATIENT MEDICATIONS:  Glipizide 5 mg daily, Triglide 160 mg daily, Cozaar   200 mg daily, Synthroid 150 mcg daily, Ativan 2 mg at bedtime, Celexa 20 mg   daily, aspirin 81 mg daily, vitamin B12 1000 mcg every 30 days.    REVIEW OF SYSTEMS:  Positive for the headache, difficulty speaking.  No blurry   vision.  No swollen  Pt discharged to care of self. Pt assessed by MD. New medication and after care discussed, all questions answered. Pt confirmed understanding. nodes.  No chest pain or shortness of breath.  No nausea   or vomiting.  No fever or chills.  No abdominal pain, constipation, diarrhea.    No leg swelling.  No skin rash.  Other review of systems otherwise negative.    PHYSICAL EXAMINATION:  VITAL SIGNS:  On exam, blood pressure is 140/61, respirations 17, pulse 72,   temperature is 36.6.  GENERAL:  Elderly female, not in acute distress.  HEENT:  Sclerae anicteric.  Oral mucosa is moist.  No facial droop noted.  NECK:  Supple.  HEART:  S1, S2, regular rate, no murmur appreciated.  LUNGS:  Clear.  No rales or wheezing.  ABDOMEN:  Soft, nontender, positive bowel sounds appreciated.  EXTREMITIES:  No edema noted.  No cyanosis or clubbing.  NEUROLOGIC:  She is alert, awake, oriented x3.  No focal deficits.    LABORATORY DATA:  White cell count is 26, hemoglobin of 12, hematocrit 38,   platelet count 360.  Sodium 137, potassium 3.7, BUN 13, creatinine 0.63.    Troponin less than 0.01.  CTA of the head showing no acute abnormality.  CTA   of the neck within normal limits.  X-ray of chest, some mild interstitial   prominence.    IMPRESSION:  1.  Expressive aphasia.  2.  Transient ischemic attack.  3.  History of hypertension.  4.  History of dyslipidemia.  5.  Diabetes mellitus type 2.  6.  History of anxiety.  7.  History of hypothyroidism.    PLAN:  The patient will be admitted to the neuro floor, monitored on   telemetry.  At this time, we do not anticipate 2 midnights stay for this   patient.  The patient will go for MRI of the brain.  Dr. Bacon of neurology   has been consulted.  Stroke protocol initiated.  Physical therapy and   occupational therapy has been ordered.  Aspirin daily.  No statin given   because she has an allergy.  Permissive hypertension for 24 hours.  We will   continue to monitor.       ____________________________________     MD YOSEF RENNER / WANDA    DD:  01/30/2018 13:17:47  DT:  01/30/2018 14:25:20    D#:  [id number]  Job#:  [id number]

## 2019-04-26 ENCOUNTER — APPOINTMENT (OUTPATIENT)
Dept: MEDICAL GROUP | Age: 79
End: 2019-04-26
Payer: MEDICARE

## 2019-04-26 ENCOUNTER — OFFICE VISIT (OUTPATIENT)
Dept: MEDICAL GROUP | Age: 79
End: 2019-04-26
Payer: MEDICARE

## 2019-04-26 VITALS
HEIGHT: 66 IN | SYSTOLIC BLOOD PRESSURE: 130 MMHG | OXYGEN SATURATION: 98 % | WEIGHT: 195.6 LBS | HEART RATE: 71 BPM | BODY MASS INDEX: 31.43 KG/M2 | DIASTOLIC BLOOD PRESSURE: 70 MMHG | TEMPERATURE: 98.7 F

## 2019-04-26 DIAGNOSIS — J45.20 MILD INTERMITTENT ASTHMA WITHOUT COMPLICATION: ICD-10-CM

## 2019-04-26 DIAGNOSIS — E03.4 HYPOTHYROIDISM DUE TO ACQUIRED ATROPHY OF THYROID: ICD-10-CM

## 2019-04-26 DIAGNOSIS — G44.219 EPISODIC TENSION-TYPE HEADACHE, NOT INTRACTABLE: ICD-10-CM

## 2019-04-26 DIAGNOSIS — K76.0 FATTY INFILTRATION OF LIVER: ICD-10-CM

## 2019-04-26 DIAGNOSIS — Z17.1 MALIGNANT NEOPLASM OF UPPER-OUTER QUADRANT OF LEFT BREAST IN FEMALE, ESTROGEN RECEPTOR NEGATIVE (HCC): ICD-10-CM

## 2019-04-26 DIAGNOSIS — C50.412 MALIGNANT NEOPLASM OF UPPER-OUTER QUADRANT OF LEFT BREAST IN FEMALE, ESTROGEN RECEPTOR NEGATIVE (HCC): ICD-10-CM

## 2019-04-26 DIAGNOSIS — N60.12 CHRONIC MASTITIS, LEFT: ICD-10-CM

## 2019-04-26 DIAGNOSIS — I10 ESSENTIAL HYPERTENSION: ICD-10-CM

## 2019-04-26 DIAGNOSIS — Z00.00 MEDICARE ANNUAL WELLNESS VISIT, SUBSEQUENT: ICD-10-CM

## 2019-04-26 DIAGNOSIS — R73.01 IFG (IMPAIRED FASTING GLUCOSE): ICD-10-CM

## 2019-04-26 DIAGNOSIS — E55.9 VITAMIN D INSUFFICIENCY: ICD-10-CM

## 2019-04-26 DIAGNOSIS — C34.11 MALIGNANT NEOPLASM OF RIGHT UPPER LOBE OF LUNG (HCC): ICD-10-CM

## 2019-04-26 DIAGNOSIS — K13.0 ANGULAR CHEILITIS: ICD-10-CM

## 2019-04-26 DIAGNOSIS — F41.9 ANXIETY: ICD-10-CM

## 2019-04-26 DIAGNOSIS — F33.1 MODERATE EPISODE OF RECURRENT MAJOR DEPRESSIVE DISORDER (HCC): ICD-10-CM

## 2019-04-26 DIAGNOSIS — E78.2 MIXED HYPERLIPIDEMIA: ICD-10-CM

## 2019-04-26 DIAGNOSIS — G45.9 TIA (TRANSIENT ISCHEMIC ATTACK): ICD-10-CM

## 2019-04-26 DIAGNOSIS — E66.9 OBESITY (BMI 30-39.9): ICD-10-CM

## 2019-04-26 DIAGNOSIS — G43.109 OCULAR MIGRAINE: ICD-10-CM

## 2019-04-26 DIAGNOSIS — Z01.84 IMMUNITY STATUS TESTING: ICD-10-CM

## 2019-04-26 DIAGNOSIS — I67.9 CEREBRAL VASCULAR DISEASE: ICD-10-CM

## 2019-04-26 DIAGNOSIS — Z91.81 RISK FOR FALLS: ICD-10-CM

## 2019-04-26 DIAGNOSIS — K21.00 GASTROESOPHAGEAL REFLUX DISEASE WITH ESOPHAGITIS: ICD-10-CM

## 2019-04-26 DIAGNOSIS — F51.01 PRIMARY INSOMNIA: ICD-10-CM

## 2019-04-26 DIAGNOSIS — E04.2 MULTIPLE THYROID NODULES: ICD-10-CM

## 2019-04-26 PROBLEM — E04.1 NONTOXIC UNINODULAR GOITER: Status: RESOLVED | Noted: 2017-11-29 | Resolved: 2019-04-26

## 2019-04-26 PROBLEM — I82.722 CHRONIC DEEP VEIN THROMBOSIS (DVT) OF BRACHIAL VEIN OF LEFT UPPER EXTREMITY (HCC): Status: RESOLVED | Noted: 2019-01-23 | Resolved: 2019-04-26

## 2019-04-26 PROBLEM — R51.9 NONINTRACTABLE EPISODIC HEADACHE: Status: RESOLVED | Noted: 2018-10-16 | Resolved: 2019-04-26

## 2019-04-26 PROCEDURE — G0439 PPPS, SUBSEQ VISIT: HCPCS | Performed by: INTERNAL MEDICINE

## 2019-04-26 ASSESSMENT — ENCOUNTER SYMPTOMS: GENERAL WELL-BEING: GOOD

## 2019-04-26 ASSESSMENT — ACTIVITIES OF DAILY LIVING (ADL): BATHING_REQUIRES_ASSISTANCE: 0

## 2019-04-26 ASSESSMENT — PATIENT HEALTH QUESTIONNAIRE - PHQ9: CLINICAL INTERPRETATION OF PHQ2 SCORE: 0

## 2019-04-26 NOTE — PROGRESS NOTES
Chief Complaint   Patient presents with   • Annual Wellness Visit     left breast with constant pain, hx of cancer. has questions about getting a second opinion regarding pain   • Medication Refill     vitamin d         HPI:  Clarisse Reeves is a 78 y.o. here for Medicare Annual Wellness Visit     Patient reports chronic left breast pain that has worsened over past month. She states that she has difficulty lifting her arm. She had a benign biopsy earlier this month. History of breast cancer with lumpectomy, chemotherapy, and radiation. She would like a second opinion about the pain because she has not been able to find a cause.    Patient Active Problem List    Diagnosis Date Noted   • Essential hypertension 09/01/2015     Priority: Medium   • Hypothyroidism due to acquired atrophy of thyroid 09/01/2015     Priority: Low   • Obesity (BMI 30-39.9) 09/01/2015     Priority: Low   • Gastroesophageal reflux disease with esophagitis 11/07/2013     Priority: Low   • Vitamin D insufficiency 07/09/2013     Priority: Low   • Mixed hyperlipidemia 11/26/2012     Priority: Low   • Episodic tension-type headache, not intractable 04/26/2019   • IFG (impaired fasting glucose) 01/23/2019   • Angular cheilitis 01/23/2019   • Ocular migraine 11/06/2018   • TIA (transient ischemic attack) exp aphasia with headache  10/16/2018   • Cerebral vascular disease- old tia with exp aphasia- resolved; neg mri/mra brain 10/16/2018   • Anxiety 03/21/2018   • PICC (peripherally inserted central catheter) in place- RESOLVED- NO LONGER IN PACE SINCE AUG 31, 2018 03/21/2018   • Malignant neoplasm of upper-outer quadrant of left breast in female, estrogen receptor negative (CMS-HCC)- 2/2018; 1 positive node; chemorx (dr. 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 90 Cap 4   • lansoprazole (PREVACID) 30 MG CAPSULE DELAYED RELEASE Take 1 Cap by mouth every day. 90 Cap 4   • Cholecalciferol (VITAMIN D) 2000 units Cap Take 1 Cap by mouth every day. 100 Cap 3   • levothyroxine (SYNTHROID) 150 MCG Tab Take 1 Tab by mouth Every morning on an empty stomach. 90 Tab 4   • MICROLET LANCETS Misc For BG check once a day 100 Each 3   • Blood Glucose Monitoring Suppl Supplies Misc Contour Next glucometer strips for blood sugar check once a day 100 Each 2   • NYAMYC powder      • clotrimazole-betamethasone (LOTRISONE) 1-0.05 % Cream Apply 1 Application to affected area(s) 2 times a day. 15 g 3     No current facility-administered medications for this visit.             Current supplements as per medication list.       Allergies: Codeine; Lipitor [atorvastatin calcium]; Lovastatin; Zocor [simvastatin - high dose]; Latex; Lisinopril; Metformin; and Tape    Current social contact/activities: spends time with friends and have dinner, shop and movies. Likes to volunteer at the hospital.     She  reports that she quit smoking about 12 years ago. Her smoking use included Cigarettes. She has a 75.00 pack-year smoking history. She has never used smokeless tobacco. She reports that she drinks about 1.2 oz of alcohol per week . She reports that she does not use drugs.  Counseling given: Not Answered      DPA/Advanced Directive:  Patient does not have an Advanced Directive.  A packet and workshop information was given on  Advanced Directives.    ROS:    Gait: Uses no assistive device  Ostomy: No  Other tubes: No  Amputations: No  Chronic oxygen use: No  Last eye exam: 1/2019  Wears hearing aids: No   : Reports urinary leakage during the last 6 months that has somewhat interfered with their daily activities or sleep.    Screening:     Depression Screening    Little interest or pleasure in doing things?  0 - not at all  Feeling down, depressed , or hopeless? 0 - not at all  Trouble falling or staying asleep, or sleeping too much?     Feeling tired or having little energy?     Poor appetite or overeating?     Feeling bad about yourself - or that you are a failure or have let yourself or your family down?    Trouble concentrating on things, such as reading the newspaper or watching television?    Moving or speaking so slowly that other people could have noticed.  Or the opposite - being so fidgety or restless that you have been moving around a lot more than usual?     Thoughts that you would be better off dead, or of hurting yourself?     Patient Health Questionnaire Score: 14    If depressive symptoms identified deferred to follow up visit unless specifically addressed in assessment and plan.    Interpretation of PHQ-9 Total Score   Score Severity   1-4 No Depression   5-9 Mild Depression   10-14 Moderate Depression   15-19 Moderately Severe Depression   20-27 Severe Depression      Screening for Cognitive Impairment    Three Minute Recall (village, kitchen, baby) 3/3    Pradeep clock face with all 12 numbers and set the hands to show 10 past 10.  Yes    Cognitive concerns identified deferred for follow up unless specifically addressed in assessment and plan.    Fall Risk Assessment    Has the patient had two or more falls in the last year or any fall with injury in the last year?  Yes    Safety Assessment    Throw rugs on floor.  No  Handrails on all stairs.  No  Good lighting in all hallways.  Yes  Difficulty hearing.  No  Patient  counseled about all safety risks that were identified.    Functional Assessment ADLs    Are there any barriers preventing you from cooking for yourself or meeting nutritional needs?  No.    Are there any barriers preventing you from driving safely or obtaining transportation?  No.    Are there any barriers preventing you from using a telephone or calling for help?  No.    Are there any barriers preventing you from shopping?  No.    Are there any barriers preventing you from taking care of your own finances?  No.    Are there any barriers preventing you from managing your medications?  No.    Are there any barriers preventing you from showering, bathing or dressing yourself? No.    Are you currently engaging in any exercise or physical activity?  Yes.  Walks dog 3 times a day and occasionally uses treadmill  What is your perception of your health?  Good.      Health Maintenance Summary                IMM ZOSTER VACCINES Overdue 7/30/1991     Annual Wellness Visit Overdue 7/12/2018      Done 7/11/2017 Visit Dx: Medicare annual wellness visit, subsequent    COLONOSCOPY Next Due 6/7/2019      Done 6/7/2016 AMB REFERRAL TO GI FOR COLONOSCOPY     Patient has more history with this topic...    BONE DENSITY Next Due 11/21/2019      Done 11/21/2014 DS-BONE DENSITY STUDY (DEXA)    MAMMOGRAM Next Due 12/28/2019      Done 12/28/2018 MA-MAMMO DIAGNOSTIC BILAT W/TOMOSYNTHESIS W/CAD     Patient has more history with this topic...    IMM DTaP/Tdap/Td Vaccine Next Due 6/13/2026      Done 6/13/2016 Imm Admin: Tdap Vaccine     Patient has more history with this topic...          Patient Care Team:  Siva Smart M.D. as PCP - General (Internal Medicine)  [facility] as Consulting Physician (Ophthalmology)  [facility] as Consulting Physician (Gastroenterology)  [facility] as Respiratory (Pulmonary Medicine)  Demarco Bowers M.D. as Consulting Physician (Endocrinology)  Loren MAYORGA  TELMA Alves M.D. (Cardiology)  Dhara Parker, COSNTANCE (Inactive) (Physical Therapy)  Riki Marmolejo R.N. as Nurse Navigator (Oncology)  Echo Saldana M.D. as Consulting Physician (Radiation Oncology)  Neva Lipscomb as        Social History   Substance Use Topics   • Smoking status: Former Smoker     Packs/day: 1.50     Years: 50.00     Types: Cigarettes     Quit date: 5/6/2006   • Smokeless tobacco: Never Used      Comment: quit smoking 2002   • Alcohol use 1.2 oz/week     2 Glasses of wine per week      Comment: 2 drinks a week     Family History   Problem Relation Age of Onset   • Cancer Mother    • Lung Disease Father    • Cancer Father      She  has a past medical history of Accelerated junctional rhythm on EKG in 5/2018 in setting of chemo; Cancer (HCC) (2006); Cancer (HCC) (2016); Cancer (HCC) (2018); Carcinoma in situ of respiratory system (2016); Cataract; CLL (chronic lymphoblastic leukemia); Cutaneous skin tags (1/29/2015); DM (diabetes mellitus) ([facility]); Hiatus hernia syndrome; Indigestion; MEDICAL HOME; Personal history of colonic polyps (11/26/2012); Pneumonia (2014); Pneumonia (06/2017); Statin intolerance; Thyroid disease; and Type II or unspecified type diabetes mellitus without mention of complication, not stated as uncontrolled.   Past Surgical History:   Procedure Laterality Date   • MASTECTOMY Left 7/6/2018    Procedure: MASTECTOMY - PARTIAL AFTER WIRE LOCAALIZATION;  Surgeon: Esperanza Crandall M.D.;  Location: SURGERY SAME DAY [facility];  Service: General   • AXILLARY NODE DISSECTION Left 7/6/2018    Procedure: AXILLARY NODE DISSECTION;  Surgeon: Esperanza Crandall M.D.;  Location: SURGERY SAME DAY [facility];  Service: General   • THYROIDECTOMY N/A 11/29/2017    Procedure: THYROIDECTOMY COMPLETION, RECURRENT LARYNGEAL NERVE MONITORING;  Surgeon: Cameron Marcelino M.D.;  Location: SURGERY SAME DAY [facility];  Service: General   •  "THORACOSCOPY Right 2/1/2016    Procedure: THORACOSCOPY Upper Lobectomy ;  Surgeon: John H Ganser, M.D.;  Location: SURGERY [facility];  Service:    • NODE DISSECTION Right 2/1/2016    Procedure: NODE DISSECTION;  Surgeon: John H Ganser, M.D.;  Location: SURGERY [facility];  Service:    • RECOVERY  12/18/2015    Procedure: CT-SCP-RUL LUNG BIOPSY-;  Surgeon: Recoveryon Surgery;  Location: SURGERY PRE-POST PROC UNIT [facility];  Service:    • OTHER  2001    Lower Left segment of lung removed    • CHOLECYSTECTOMY  1995   • OTHER ORTHOPEDIC SURGERY  1990    bakers cyst removed in right knee, multiple scopes   • OTHER  1990    hemmroid removal   • OTHER  1984    left Thyroid removed, might remove right side in the future   • APPENDECTOMY  1955   • CATARACT EXTRACTION WITH IOL     • TONSILLECTOMY     • US-NEEDLE CORE BX-BREAST PANEL     • VAGINAL HYSTERECTOMY TOTAL      Hysterectomy,Total Vaginal       Exam:   /70 (BP Location: Right arm, Patient Position: Sitting, BP Cuff Size: Adult)   Pulse 71   Temp 37.1 °C (98.7 °F)   Ht 1.676 m (5' 5.98\")   Wt 88.7 kg (195 lb 9.6 oz)   SpO2 98%  Body mass index is 31.59 kg/m².    Hearing excellent.    Dentition good  Alert, oriented in no acute distress.  Eye contact is good, speech goal directed, affect calm    Left breast: At 10 o'clock starting from edge of areola radiating outwards, 8x9cm area of induration and dimpling, dark erythema that is tender and hard.    Assessment and Plan. The following treatment and monitoring plan is recommended:      1. Medicare annual wellness visit, subsequent       2. Mixed hyperlipidemia  Lipid panel completed on 1/10/19. Cholesterol 179; Triglycerides 144; HDL 45; . Under good control. Continue same regimen.    3. Vitamin D insufficiency  Under good control. Continue same regimen of Lansoprazole 10mg.    4. Gastroesophageal reflux disease with esophagitis  Under good control. Continue same regimen.    5. " Hypothyroidism due to acquired atrophy of thyroid  Under good control. Continue same regimen of Levothyroxine 150mcg.    6. Obesity (BMI 30-39.9)  Patient counseled to diet, exercise, and lose 15 pounds.    7. Fatty infiltration of liver  Under good control. Continue same regimen.    8. 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 Continue same regimen.    9. Multiple thyroid nodules- [facility], neg FNA 2015; us no change 2017  Under good control. Continue same regimen.    10. Moderate episode of recurrent major depressive disorder (HCC)  Under good control. Continue same regimen of Effexor XR 37.5mg.    11. Primary insomnia  Under good control. Continue same regimen.    12. Mild intermittent asthma without complication- [facility];  albuterol inhaler prn  Under good control. Continue same regimen of albuterol inhaler as needed.    13. Malignant neoplasm of upper-outer quadrant of left breast in female, estrogen receptor negative (CMS-HCC)-  1 positive node; dr. grant; dr sharpe-  chemorx 1st, then surgery, then RT 2 mo (dr beltran)  Finished radiation 6 months ago. Left breast is very tender and hard. At 10 o'clock starting from edge of areola radiating outwards, 8x9cm area of induration and dimpling, dark erythema that is tender and hard. Appointment with Dr. Bentley scheduled for next month.    - REFERRAL TO GENERAL SURGERY  - MA-DIAGNOSTIC MAMMO W/CAD-BILAT; Future    14. Anxiety  Under good control. Continue same regimen.    15. TIA (transient ischemic attack) exp aphasia with headache   Under good control. Continue same regimen.    16. Episodic tension-type headache, not intractable  Under good control. Continue same regimen.    17. IFG (impaired fasting glucose)  Fasting glucose on 1/10/19 is 102. Under good control. Continue same regimen.    18. Essential hypertension  Under good control. Continue same  regimen. BP today is 130/70.    19. Ocular migraine  Under good control. Continue same regimen.    20. Cerebral vascular disease- old tia with exp aphasia- resolved; neg mri/mra brain  Under good control. Continue same regimen.    21. Angular cheilitis  Under good control. Continue same regimen.    22. Malignant neoplasm of upper-outer quadrant of left breast in female, estrogen receptor negative (CMS-HCC)- 2/2018; 1 positive node; chemorx (dr. grant) ), lumpectomy ( dr sharpe), RT 2 mo (dr beltran)  Finished radiation 6 months ago. Left breast is very tender and hard. At 10 o'clock starting from edge of areola radiating outwards, 8x9cm area of induration and dimpling, dark erythema that is tender and hard. Appointment with Dr. Bentley scheduled for next month.    - REFERRAL TO GENERAL SURGERY  - MA-DIAGNOSTIC MAMMO W/CAD-BILAT; Future    23. Chronic mastitis, left  At 10 o'clock starting from edge of areola radiating outwards, 8x9cm area of induration and dimpling, dark erythema that is tender and hard.    - MA-DIAGNOSTIC MAMMO W/CAD-BILAT; Future    24. Immunity status testing  Patient would like to test for titers against MMR since finishing chemotherapy. Plan to evaluate with:    - MISCELLANEOUS TEST; Future      Services suggested: No services needed at this time  Health Care Screening: Age-appropriate preventive services recommended by USPTF and ACIP covered by Medicare were discussed today. Services ordered if indicated and agreed upon by the patient.  Referrals offered: Community-based lifestyle interventions to reduce health risks and promote self-management and wellness, fall prevention, nutrition, physical activity, tobacco-use cessation, weight loss, and mental health services as per orders if indicated.    Discussion today about general wellness and lifestyle habits:    · Prevent falls and reduce trip hazards; Cautioned about securing or removing rugs.  · Have a working fire alarm and carbon monoxide  detector;   · Engage in regular physical activity and social activities     Follow-up: 3 mos      40 minute face-to-face encounter took place today.  More than half of this time was spent in the coordination of care of the above problems, as well as counseling.

## 2019-04-26 NOTE — PROGRESS NOTES
Chief Complaint   Patient presents with   • Annual Wellness Visit     left breast with constant pain, hx of cancer. has questions about getting a second opinion regarding pain   • Medication Refill     vitamin d           HPI:  Clarisse Reeves is a 78 y.o. here for Medicare Annual Wellness Visit     Patient reports chronic left breast pain that has worsened over past month. She states that she has difficulty lifting her arm. She had a benign biopsy earlier this month. History of breast cancer with lumpectomy, chemotherapy, and radiation.      Patient Active Problem List    Diagnosis Date Noted   • Essential hypertension 09/01/2015     Priority: Medium   • Hypothyroidism due to acquired atrophy of thyroid 09/01/2015     Priority: Low   • Obesity (BMI 30-39.9) 09/01/2015     Priority: Low   • Gastroesophageal reflux disease with esophagitis 11/07/2013     Priority: Low   • Vitamin D insufficiency 07/09/2013     Priority: Low   • Mixed hyperlipidemia 11/26/2012     Priority: Low   • Episodic tension-type headache, not intractable 04/26/2019   • IFG (impaired fasting glucose) 01/23/2019   • Angular cheilitis 01/23/2019   • Ocular migraine 11/06/2018   • TIA (transient ischemic attack) exp aphasia with headache  10/16/2018   • Cerebral vascular disease- old tia with exp aphasia- resolved; neg mri/mra brain 10/16/2018   • Anxiety 03/21/2018   • PICC (peripherally inserted central catheter) in place- RESOLVED- NO LONGER IN PACE SINCE AUG 31, 2018 03/21/2018   • Malignant neoplasm of upper-outer quadrant of left breast in female, estrogen receptor negative (CMS-HCC)-  1 positive node; dr. 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 90 Cap 4   • lansoprazole (PREVACID) 30 MG CAPSULE DELAYED RELEASE Take 1 Cap by mouth every day. 90 Cap 4   • Cholecalciferol (VITAMIN D) 2000 units Cap Take 1 Cap by mouth every day. 100 Cap 3   • levothyroxine (SYNTHROID) 150 MCG Tab Take 1 Tab by mouth Every morning on an empty stomach. 90 Tab 4   • MICROLET LANCETS Misc For BG check once a day 100 Each 3   • Blood Glucose Monitoring Suppl Supplies Misc Contour Next glucometer strips for blood sugar check once a day 100 Each 2   • NYAMYC powder      • clotrimazole-betamethasone (LOTRISONE) 1-0.05 % Cream Apply 1 Application to affected area(s) 2 times a day. 15 g 3     No current facility-administered medications for this visit.             Current supplements as per medication list.       Allergies: Codeine; Lipitor [atorvastatin calcium]; Lovastatin; Zocor [simvastatin - high dose]; Latex; Lisinopril; Metformin; and Tape    Current social contact/activities: spends time with friends and have dinner, shop and movies. Likes to volunteer at the hospital.     She  reports that she quit smoking about 12 years ago. Her smoking use included Cigarettes. She has a 75.00 pack-year smoking history. She has never used smokeless tobacco. She reports that she drinks about 1.2 oz of alcohol per week . She reports that she does not use drugs.  Counseling given: Not Answered      DPA/Advanced Directive:  Patient does not have an Advanced Directive.  A packet and workshop information was given on Advanced Directives.    ROS:    Gait: Uses no assistive device  Ostomy: No  Other tubes:  No  Amputations: No  Chronic oxygen use: No  Last eye exam: 1/2019  Wears hearing aids: No   : Reports urinary leakage during the last 6 months that has somewhat interfered with their daily activities or sleep.    Screening:     Depression Screening    Little interest or pleasure in doing things?  0 - not at all  Feeling down, depressed , or hopeless? 0 - not at all  Trouble falling or staying asleep, or sleeping too much?     Feeling tired or having little energy?     Poor appetite or overeating?     Feeling bad about yourself - or that you are a failure or have let yourself or your family down?    Trouble concentrating on things, such as reading the newspaper or watching television?    Moving or speaking so slowly that other people could have noticed.  Or the opposite - being so fidgety or restless that you have been moving around a lot more than usual?     Thoughts that you would be better off dead, or of hurting yourself?     Patient Health Questionnaire Score: 14    If depressive symptoms identified deferred to follow up visit unless specifically addressed in assessment and plan.    Interpretation of PHQ-9 Total Score   Score Severity   1-4 No Depression   5-9 Mild Depression   10-14 Moderate Depression   15-19 Moderately Severe Depression   20-27 Severe Depression      Screening for Cognitive Impairment    Three Minute Recall (village, kitchen, baby) 3/3    Pradeep clock face with all 12 numbers and set the hands to show 10 past 10.  Yes    Cognitive concerns identified deferred for follow up unless specifically addressed in assessment and plan.    Fall Risk Assessment    Has the patient had two or more falls in the last year or any fall with injury in the last year?  Yes    Safety Assessment    Throw rugs on floor.  No  Handrails on all stairs.  No  Good lighting in all hallways.  Yes  Difficulty hearing.  No  Patient counseled about all safety risks that were identified.    Functional Assessment ADLs    Are  there any barriers preventing you from cooking for yourself or meeting nutritional needs?  No.    Are there any barriers preventing you from driving safely or obtaining transportation?  No.    Are there any barriers preventing you from using a telephone or calling for help?  No.    Are there any barriers preventing you from shopping?  No.    Are there any barriers preventing you from taking care of your own finances?  No.    Are there any barriers preventing you from managing your medications?  No.    Are there any barriers preventing you from showering, bathing or dressing yourself? No.    Are you currently engaging in any exercise or physical activity?  Yes.  Walks dog 3 times a day and occasionally uses treadmill  What is your perception of your health?  Good.      Health Maintenance Summary                IMM ZOSTER VACCINES Overdue 7/30/1991     Annual Wellness Visit Overdue 7/12/2018      Done 7/11/2017 Visit Dx: Medicare annual wellness visit, subsequent    COLONOSCOPY Next Due 6/7/2019      Done 6/7/2016 AMB REFERRAL TO GI FOR COLONOSCOPY     Patient has more history with this topic...    BONE DENSITY Next Due 11/21/2019      Done 11/21/2014 DS-BONE DENSITY STUDY (DEXA)    MAMMOGRAM Next Due 12/28/2019      Done 12/28/2018 MA-MAMMO DIAGNOSTIC BILAT W/TOMOSYNTHESIS W/CAD     Patient has more history with this topic...    IMM DTaP/Tdap/Td Vaccine Next Due 6/13/2026      Done 6/13/2016 Imm Admin: Tdap Vaccine     Patient has more history with this topic...          Patient Care Team:  Siva Smart M.D. as PCP - General (Internal Medicine)  [facility] as Consulting Physician (Ophthalmology)  [facility] as Consulting Physician (Gastroenterology)  [facility] as Respiratory (Pulmonary Medicine)  Demarco Bowers M.D. as Consulting Physician (Endocrinology)  TELMA Troncoso M.D. (Cardiology)  Dhara Parker, CONSTANCE (Inactive) (Physical  Therapy)  Riki Marmolejo R.N. as Nurse Navigator (Oncology)  Echo Saldana M.D. as Consulting Physician (Radiation Oncology)  Neva Lipscomb as        Social History   Substance Use Topics   • Smoking status: Former Smoker     Packs/day: 1.50     Years: 50.00     Types: Cigarettes     Quit date: 5/6/2006   • Smokeless tobacco: Never Used      Comment: quit smoking 2002   • Alcohol use 1.2 oz/week     2 Glasses of wine per week      Comment: 2 drinks a week     Family History   Problem Relation Age of Onset   • Cancer Mother    • Lung Disease Father    • Cancer Father      She  has a past medical history of Accelerated junctional rhythm on EKG in 5/2018 in setting of chemo; Cancer (HCC) (2006); Cancer (HCC) (2016); Cancer (HCC) (2018); Carcinoma in situ of respiratory system (2016); Cataract; CLL (chronic lymphoblastic leukemia); Cutaneous skin tags (1/29/2015); DM (diabetes mellitus) (HCC); Hiatus hernia syndrome; Indigestion; MEDICAL HOME; Personal history of colonic polyps (11/26/2012); Pneumonia (2014); Pneumonia (06/2017); Statin intolerance; Thyroid disease; and Type II or unspecified type diabetes mellitus without mention of complication, not stated as uncontrolled.   Past Surgical History:   Procedure Laterality Date   • MASTECTOMY Left 7/6/2018    Procedure: MASTECTOMY - PARTIAL AFTER WIRE LOCAALIZATION;  Surgeon: Esperanza Crandall M.D.;  Location: SURGERY SAME DAY [facility];  Service: General   • AXILLARY NODE DISSECTION Left 7/6/2018    Procedure: AXILLARY NODE DISSECTION;  Surgeon: Esperanza Crandall M.D.;  Location: SURGERY SAME DAY [facility];  Service: General   • THYROIDECTOMY N/A 11/29/2017    Procedure: THYROIDECTOMY COMPLETION, RECURRENT LARYNGEAL NERVE MONITORING;  Surgeon: Cameron Marcelino M.D.;  Location: SURGERY SAME DAY [facility];  Service: General   • THORACOSCOPY Right 2/1/2016    Procedure: THORACOSCOPY Upper Lobectomy ;  Surgeon: John H Ganser,  "M.D.;  Location: SURGERY [facility] [place];  Service:    • NODE DISSECTION Right 2/1/2016    Procedure: NODE DISSECTION;  Surgeon: John H Ganser, M.D.;  Location: SURGERY [facility] [place];  Service:    • RECOVERY  12/18/2015    Procedure: CT-SCP-RUL LUNG BIOPSY-;  Surgeon: Recoveryon Surgery;  Location: SURGERY PRE-POST PROC UNIT [facility] [place];  Service:    • OTHER  2001    Lower Left segment of lung removed    • CHOLECYSTECTOMY  1995   • OTHER ORTHOPEDIC SURGERY  1990    bakers cyst removed in right knee, multiple scopes   • OTHER  1990    hemmroid removal   • OTHER  1984    left Thyroid removed, might remove right side in the future   • APPENDECTOMY  1955   • CATARACT EXTRACTION WITH IOL     • TONSILLECTOMY     • US-NEEDLE CORE BX-BREAST PANEL     • VAGINAL HYSTERECTOMY TOTAL      Hysterectomy,Total Vaginal       Exam:   /70 (BP Location: Right arm, Patient Position: Sitting, BP Cuff Size: Adult)   Pulse 71   Temp 37.1 °C (98.7 °F)   Ht 1.676 m (5' 5.98\")   Wt 88.7 kg (195 lb 9.6 oz)   SpO2 98%  Body mass index is 31.59 kg/m².    Hearing excellent.    Dentition good  Alert, oriented in no acute distress.  Eye contact is good, speech goal directed, affect calm    Assessment and Plan. The following treatment and monitoring plan is recommended:     1. Medicare annual wellness visit, subsequent     2. Mixed hyperlipidemia     3. Vitamin D insufficiency     4. Gastroesophageal reflux disease with esophagitis     5. Hypothyroidism due to acquired atrophy of thyroid     6. Obesity (BMI 30-39.9)     7. Fatty infiltration of liver     8. Malignant neoplasm of right upper lobe of lung - adenocarcinoma in situ, 2/1/2016-  partial lobectomy RUL/RML- Dr Ganser- folowed by [facility]     9. Multiple thyroid nodules- [facility], neg FNA 2015; us no change 2017     10. Moderate episode of recurrent major depressive disorder (HCC)     11. Primary insomnia     12. Mild intermittent asthma without complication- pma;  albuterol " inhaler prn     13. Malignant neoplasm of upper-outer quadrant of left breast in female, estrogen receptor negative (CMS-HCC)-  1 positive node; dr. grant; dr sharpe-  chemorx 1st, then surgery, then RT 2 mo (dr beltran)     14. Anxiety     15. TIA (transient ischemic attack) exp aphasia with headache      16. Episodic tension-type headache, not intractable     17. IFG (impaired fasting glucose)     18. Essential hypertension     19. Ocular migraine     20. Cerebral vascular disease- old tia with exp aphasia- resolved; neg mri/mra brain     21. Angular cheilitis           Services suggested: No services needed at this time    Health Care Screening: Age-appropriate preventive services recommended by USPTF and ACIP covered by Medicare were discussed today. Services ordered if indicated and agreed upon by the patient.  Referrals offered: Community-based lifestyle interventions to reduce health risks and promote self-management and wellness, fall prevention, nutrition, physical activity, tobacco-use cessation, weight loss, and mental health services as per orders if indicated.    Discussion today about general wellness and lifestyle habits:    · Prevent falls and reduce trip hazards; Cautioned about securing or removing rugs.  · Have a working fire alarm and carbon monoxide detector;   · Engage in regular physical activity and social activities     Follow-up: No Follow-up on file.

## 2019-05-07 ENCOUNTER — HOSPITAL ENCOUNTER (OUTPATIENT)
Dept: LAB | Facility: MEDICAL CENTER | Age: 79
End: 2019-05-07
Attending: INTERNAL MEDICINE
Payer: MEDICARE

## 2019-05-07 DIAGNOSIS — E55.9 VITAMIN D INSUFFICIENCY: ICD-10-CM

## 2019-05-07 DIAGNOSIS — Z01.84 IMMUNITY STATUS TESTING: ICD-10-CM

## 2019-05-07 DIAGNOSIS — E03.4 HYPOTHYROIDISM DUE TO ACQUIRED ATROPHY OF THYROID: ICD-10-CM

## 2019-05-07 DIAGNOSIS — E78.2 MIXED HYPERLIPIDEMIA: ICD-10-CM

## 2019-05-07 LAB
25(OH)D3 SERPL-MCNC: 23 NG/ML (ref 30–100)
ALBUMIN SERPL BCP-MCNC: 4.1 G/DL (ref 3.2–4.9)
ALBUMIN/GLOB SERPL: 1.6 G/DL
ALP SERPL-CCNC: 136 U/L (ref 30–99)
ALT SERPL-CCNC: 13 U/L (ref 2–50)
ANION GAP SERPL CALC-SCNC: 9 MMOL/L (ref 0–11.9)
AST SERPL-CCNC: 15 U/L (ref 12–45)
BASOPHILS # BLD AUTO: 0.5 % (ref 0–1.8)
BASOPHILS # BLD: 0.03 K/UL (ref 0–0.12)
BILIRUB SERPL-MCNC: 0.4 MG/DL (ref 0.1–1.5)
BUN SERPL-MCNC: 11 MG/DL (ref 8–22)
CALCIUM SERPL-MCNC: 8.8 MG/DL (ref 8.5–10.5)
CHLORIDE SERPL-SCNC: 103 MMOL/L (ref 96–112)
CHOLEST SERPL-MCNC: 161 MG/DL (ref 100–199)
CO2 SERPL-SCNC: 27 MMOL/L (ref 20–33)
CREAT SERPL-MCNC: 0.75 MG/DL (ref 0.5–1.4)
EOSINOPHIL # BLD AUTO: 0.13 K/UL (ref 0–0.51)
EOSINOPHIL NFR BLD: 2.2 % (ref 0–6.9)
ERYTHROCYTE [DISTWIDTH] IN BLOOD BY AUTOMATED COUNT: 50.9 FL (ref 35.9–50)
FASTING STATUS PATIENT QL REPORTED: NORMAL
GLOBULIN SER CALC-MCNC: 2.6 G/DL (ref 1.9–3.5)
GLUCOSE SERPL-MCNC: 110 MG/DL (ref 65–99)
HCT VFR BLD AUTO: 39.9 % (ref 37–47)
HDLC SERPL-MCNC: 38 MG/DL
HGB BLD-MCNC: 12.4 G/DL (ref 12–16)
IMM GRANULOCYTES # BLD AUTO: 0.02 K/UL (ref 0–0.11)
IMM GRANULOCYTES NFR BLD AUTO: 0.3 % (ref 0–0.9)
LDLC SERPL CALC-MCNC: 91 MG/DL
LYMPHOCYTES # BLD AUTO: 2.07 K/UL (ref 1–4.8)
LYMPHOCYTES NFR BLD: 34.4 % (ref 22–41)
MCH RBC QN AUTO: 28.4 PG (ref 27–33)
MCHC RBC AUTO-ENTMCNC: 31.1 G/DL (ref 33.6–35)
MCV RBC AUTO: 91.5 FL (ref 81.4–97.8)
MONOCYTES # BLD AUTO: 0.47 K/UL (ref 0–0.85)
MONOCYTES NFR BLD AUTO: 7.8 % (ref 0–13.4)
NEUTROPHILS # BLD AUTO: 3.3 K/UL (ref 2–7.15)
NEUTROPHILS NFR BLD: 54.8 % (ref 44–72)
NRBC # BLD AUTO: 0 K/UL
NRBC BLD-RTO: 0 /100 WBC
PLATELET # BLD AUTO: 261 K/UL (ref 164–446)
PMV BLD AUTO: 9.8 FL (ref 9–12.9)
POTASSIUM SERPL-SCNC: 4 MMOL/L (ref 3.6–5.5)
PROT SERPL-MCNC: 6.7 G/DL (ref 6–8.2)
RBC # BLD AUTO: 4.36 M/UL (ref 4.2–5.4)
SODIUM SERPL-SCNC: 139 MMOL/L (ref 135–145)
TRIGL SERPL-MCNC: 158 MG/DL (ref 0–149)
WBC # BLD AUTO: 6 K/UL (ref 4.8–10.8)

## 2019-05-07 PROCEDURE — 86735 MUMPS ANTIBODY: CPT

## 2019-05-07 PROCEDURE — 82306 VITAMIN D 25 HYDROXY: CPT

## 2019-05-07 PROCEDURE — 80061 LIPID PANEL: CPT

## 2019-05-07 PROCEDURE — 80053 COMPREHEN METABOLIC PANEL: CPT

## 2019-05-07 PROCEDURE — 85025 COMPLETE CBC W/AUTO DIFF WBC: CPT

## 2019-05-07 PROCEDURE — 84443 ASSAY THYROID STIM HORMONE: CPT

## 2019-05-07 PROCEDURE — 86762 RUBELLA ANTIBODY: CPT

## 2019-05-07 PROCEDURE — 36415 COLL VENOUS BLD VENIPUNCTURE: CPT

## 2019-05-07 PROCEDURE — 86765 RUBEOLA ANTIBODY: CPT

## 2019-05-08 ENCOUNTER — HOSPITAL ENCOUNTER (OUTPATIENT)
Dept: RADIOLOGY | Facility: MEDICAL CENTER | Age: 79
End: 2019-05-08
Attending: INTERNAL MEDICINE
Payer: MEDICARE

## 2019-05-08 DIAGNOSIS — N60.12 CHRONIC MASTITIS, LEFT: ICD-10-CM

## 2019-05-08 DIAGNOSIS — Z17.1 MALIGNANT NEOPLASM OF UPPER-OUTER QUADRANT OF LEFT BREAST IN FEMALE, ESTROGEN RECEPTOR NEGATIVE (HCC): ICD-10-CM

## 2019-05-08 DIAGNOSIS — C50.412 MALIGNANT NEOPLASM OF UPPER-OUTER QUADRANT OF LEFT BREAST IN FEMALE, ESTROGEN RECEPTOR NEGATIVE (HCC): ICD-10-CM

## 2019-05-08 LAB
MEV IGG SER IA-ACNC: 3.1
MUV IGG SER IA-ACNC: 2.85
RUBV AB SER QL: >500 IU/ML
TSH SERPL DL<=0.005 MIU/L-ACNC: 0.29 UIU/ML (ref 0.38–5.33)

## 2019-05-08 PROCEDURE — 76642 ULTRASOUND BREAST LIMITED: CPT | Mod: LT

## 2019-05-08 PROCEDURE — G0279 TOMOSYNTHESIS, MAMMO: HCPCS | Mod: LT

## 2019-05-09 ENCOUNTER — TELEPHONE (OUTPATIENT)
Dept: MEDICAL GROUP | Age: 79
End: 2019-05-09

## 2019-05-09 NOTE — TELEPHONE ENCOUNTER
Phone Number Called: [phone] (home)       Message: I left a voicemail for patient to call back to inform them of message below.      Left Message for patient to call back: yes

## 2019-05-09 NOTE — TELEPHONE ENCOUNTER
----- Message from Siva Smart M.D. sent at 5/9/2019 10:57 AM PDT -----  Patient has good immunity to MMR.  No need for vaccines.  Including no need for measles vaccine.

## 2019-05-15 ENCOUNTER — TELEPHONE (OUTPATIENT)
Dept: MEDICAL GROUP | Age: 79
End: 2019-05-15

## 2019-05-15 ENCOUNTER — APPOINTMENT (OUTPATIENT)
Dept: HEMATOLOGY ONCOLOGY | Facility: MEDICAL CENTER | Age: 79
End: 2019-05-15
Payer: MEDICARE

## 2019-05-15 DIAGNOSIS — F41.9 ANXIETY: ICD-10-CM

## 2019-05-15 DIAGNOSIS — F51.01 PRIMARY INSOMNIA: ICD-10-CM

## 2019-05-15 RX ORDER — LORAZEPAM 2 MG/1
4 TABLET ORAL NIGHTLY PRN
Qty: 10 TAB | Refills: 0 | Status: SHIPPED | OUTPATIENT
Start: 2019-05-15 | End: 2019-05-20

## 2019-05-15 NOTE — TELEPHONE ENCOUNTER
1. Caller Name: Clarisse                                           Call Back Number: [phone] (home)     Two things:      1) patient's cat bumped into Patient while she was holding her Lorazepam pill bottles and they all went into the garbage disposal. Patient is requesting 9-10 pills that she thinks were left in her bottle.     2) patient states that her TSH lab was low and if that is something she should be concerned about.     Please advise.

## 2019-05-16 NOTE — TELEPHONE ENCOUNTER
"As I said in my last note, \"TSH is okay-not to worry\".  Perhaps you did not see it because TSH was not capitalized .  "

## 2019-05-28 ENCOUNTER — OFFICE VISIT (OUTPATIENT)
Dept: MEDICAL GROUP | Age: 79
End: 2019-05-28
Payer: MEDICARE

## 2019-05-28 VITALS
DIASTOLIC BLOOD PRESSURE: 80 MMHG | HEART RATE: 98 BPM | SYSTOLIC BLOOD PRESSURE: 140 MMHG | HEIGHT: 66 IN | TEMPERATURE: 98.6 F | OXYGEN SATURATION: 93 % | BODY MASS INDEX: 32.14 KG/M2 | WEIGHT: 200 LBS

## 2019-05-28 DIAGNOSIS — N60.12 MASTITIS CHRONIC, LEFT: ICD-10-CM

## 2019-05-28 DIAGNOSIS — K13.0 CHEILITIS: ICD-10-CM

## 2019-05-28 DIAGNOSIS — Z17.1 MALIGNANT NEOPLASM OF UPPER-OUTER QUADRANT OF LEFT BREAST IN FEMALE, ESTROGEN RECEPTOR NEGATIVE (HCC): ICD-10-CM

## 2019-05-28 DIAGNOSIS — L01.00 IMPETIGO: ICD-10-CM

## 2019-05-28 DIAGNOSIS — C50.412 MALIGNANT NEOPLASM OF UPPER-OUTER QUADRANT OF LEFT BREAST IN FEMALE, ESTROGEN RECEPTOR NEGATIVE (HCC): ICD-10-CM

## 2019-05-28 DIAGNOSIS — Z12.11 SCREEN FOR COLON CANCER: ICD-10-CM

## 2019-05-28 PROCEDURE — 99214 OFFICE O/P EST MOD 30 MIN: CPT | Performed by: INTERNAL MEDICINE

## 2019-05-28 RX ORDER — SULFAMETHOXAZOLE AND TRIMETHOPRIM 800; 160 MG/1; MG/1
1 TABLET ORAL 2 TIMES DAILY
Qty: 20 TAB | Refills: 0 | Status: SHIPPED | OUTPATIENT
Start: 2019-05-28 | End: 2019-06-07

## 2019-05-28 ASSESSMENT — ENCOUNTER SYMPTOMS
EYES NEGATIVE: 1
NEUROLOGICAL NEGATIVE: 1
RESPIRATORY NEGATIVE: 1
GASTROINTESTINAL NEGATIVE: 1
CARDIOVASCULAR NEGATIVE: 1
PSYCHIATRIC NEGATIVE: 1
CONSTITUTIONAL NEGATIVE: 1
MUSCULOSKELETAL NEGATIVE: 1

## 2019-05-28 NOTE — PROGRESS NOTES
Subjective:      Clarisse Reeves is a 78 y.o. female who presents with Blister and Generalized Body Aches        HPI    The patient is here for followup of chronic medical problems listed below. The patient is compliant with medications and having no side effects from them. Denies chest pain, abdominal pain, dyspnea, myalgias, or cough.    Impetigo  Cheilitis  Patient reports having blisters and spots to her face, nose and lips. She also complains of chapped lips as well. Further examination reveals the symptoms are consistent with an impetigo infection.    Malignant neoplasm of upper-outer quadrant of left breast  Mastitis chronic, left- r/o inflammatory carcinoma vs d/t RT- dr barnes to [facility]  Patient had a mammogram performed on 5/08/19 the impression of which showed Edematous skin and underlying tissues at 9:00 position left breast in the current area of clinical concern for pain and palpable abnormality. She has an appointment with Dr. Barnes for further evaluation of this.    Screen for colon cancer  Patient is due for a colonoscopy, however states that she will not have it performed, but is willing to use cologuard.    Patient Active Problem List   Diagnosis   • Mixed hyperlipidemia   • Fatty infiltration of liver   • Vitamin D insufficiency   • Gastroesophageal reflux disease with esophagitis   • Multiple thyroid nodules- [facility], neg FNA 2015; us no change 2017   • Essential hypertension   • Hypothyroidism due to acquired atrophy of thyroid   • Obesity (BMI 30-39.9)   • Malignant neoplasm of right upper lobe of lung - adenocarcinoma in situ, 2/1/2016-  partial lobectomy RUL/RML- Dr Ganser- folowed by [facility]   • Moderate episode of recurrent major depressive disorder (HCC)   • Primary insomnia   • Mild intermittent asthma without complication- pma;  albuterol inhaler prn   • Malignant neoplasm of upper-outer quadrant of left breast in female, estrogen receptor negative (CMS-HCC)- 2/2018; 1 positive node;  chemorx (dr. grant) ), lumpectomy ( dr sharpe), RT 2 mo (dr beltran)   • Anxiety   • PICC (peripherally inserted central catheter) in place- RESOLVED- NO LONGER IN PACE SINCE AUG 31, 2018   • TIA (transient ischemic attack) exp aphasia with headache    • Cerebral vascular disease- old tia with exp aphasia- resolved; neg mri/mra brain   • Ocular migraine   • IFG (impaired fasting glucose)   • Angular cheilitis   • Episodic tension-type headache, not intractable   • Mastitis chronic, left- r/o inflammatory carcinoma vs d/t RT- dr barnes to [facility]   • Screen for colon cancer       Outpatient Medications Prior to Visit   Medication Sig Dispense Refill   • NYAMYC powder      • venlafaxine XR (EFFEXOR XR) 37.5 MG CAPSULE SR 24 HR Take 1 Cap by mouth every day. 90 Cap 4   • lansoprazole (PREVACID) 30 MG CAPSULE DELAYED RELEASE Take 1 Cap by mouth every day. 90 Cap 4   • Cholecalciferol (VITAMIN D) 2000 units Cap Take 1 Cap by mouth every day. 100 Cap 3   • levothyroxine (SYNTHROID) 150 MCG Tab Take 1 Tab by mouth Every morning on an empty stomach. 90 Tab 4   • clotrimazole-betamethasone (LOTRISONE) 1-0.05 % Cream Apply 1 Application to affected area(s) 2 times a day. 15 g 3   • MICROLET LANCETS Misc For BG check once a day 100 Each 3   • Blood Glucose Monitoring Suppl Supplies Misc Contour Next glucometer strips for blood sugar check once a day 100 Each 2     No facility-administered medications prior to visit.         Allergies   Allergen Reactions   • Codeine Hives and Nausea     RXN=40 years ago   • Lipitor [Atorvastatin Calcium] Myalgia     LJT=1436     • Lovastatin Myalgia     Muscle aches  PAY=3691   • Zocor [Simvastatin - High Dose] Myalgia     Severe myalgias  RTM=7398   • Latex Hives and Rash     Gets severe rash and blisters   • Lisinopril Cough   • Metformin      Diarrhea     • Tape      Paper tape ok       Review of Systems   Constitutional: Negative.    HENT: Negative.    Eyes: Negative.    Respiratory: Negative.  "   Cardiovascular: Negative.    Gastrointestinal: Negative.    Genitourinary: Negative.    Musculoskeletal: Negative.    Skin: Positive for rash (Impetigo). Negative for itching.        Positive: Abnormality to left breast   Neurological: Negative.    Endo/Heme/Allergies: Negative.    Psychiatric/Behavioral: Negative.         Objective:     /80 (BP Location: Left arm, Patient Position: Sitting, BP Cuff Size: Adult)   Pulse 98   Temp 37 °C (98.6 °F) (Temporal)   Ht 1.676 m (5' 5.98\")   Wt 90.7 kg (200 lb)   SpO2 93%   BMI 32.30 kg/m²     Physical Exam   Constitutional: Oriented to person, place, and time. Appears well-developed and well-nourished. No distress.   Head: Normocephalic and atraumatic.   Right Ear: External ear normal.   Left Ear: External ear normal.   Nose: Nose normal.   Mouth/Throat: Oropharynx is clear and moist. No oropharyngeal exudate.   Eyes: Pupils are equal, round, and reactive to light. Conjunctivae and EOM are normal. Right eye exhibits no discharge. Left eye exhibits no discharge. No scleral icterus.   Neck: Normal range of motion. Neck supple. No JVD present. No tracheal deviation present. No thyromegaly present.   Cardiovascular: Normal rate, regular rhythm, normal heart sounds and intact distal pulses.  Exam reveals no gallop and no friction rub.    No murmur heard.  Pulmonary/Chest: Effort normal. No stridor. No respiratory distress. No wheezing or rales. No tenderness.   Abdominal: Soft. Bowel sounds are normal. No distension and no mass. There is no tenderness. There is no rebound and no guarding. No hernia.   Musculoskeletal: Normal range of motion No edema or tenderness.   Lymphadenopathy: No cervical adenopathy.   Neurological: Alert and oriented to person, place, and time. Normal reflexes. Normal reflexes. No cranial nerve deficit. Normal muscle tone. Coordination normal.   Skin: Skin is warm and dry. No rash noted. Not diaphoretic. No erythema. No pallor. "   Psychiatric: Normal mood and affect. Behavior is normal. Judgment and thought content normal.   Nursing note and vitals reviewed.      Lab Results   Component Value Date/Time    HBA1C 5.7 11/06/2018 12:24 PM    HBA1C 5.5 07/26/2018 07:58 AM     Lab Results   Component Value Date/Time    SODIUM 139 05/07/2019 08:51 AM    POTASSIUM 4.0 05/07/2019 08:51 AM    CHLORIDE 103 05/07/2019 08:51 AM    CO2 27 05/07/2019 08:51 AM    GLUCOSE 110 (H) 05/07/2019 08:51 AM    BUN 11 05/07/2019 08:51 AM    CREATININE 0.75 05/07/2019 08:51 AM    CREATININE 0.76 04/12/2013 11:17 AM    BUNCREATRAT 19 11/17/2014 10:29 AM    BUNCREATRAT 25 04/12/2013 11:17 AM    ALKPHOSPHAT 136 (H) 05/07/2019 08:51 AM    ASTSGOT 15 05/07/2019 08:51 AM    ALTSGPT 13 05/07/2019 08:51 AM    TBILIRUBIN 0.4 05/07/2019 08:51 AM     Lab Results   Component Value Date/Time    INR 0.99 01/30/2018 10:50 AM    INR 0.95 06/01/2017 06:23 AM    INR 0.93 12/16/2015 03:17 PM     Lab Results   Component Value Date/Time    CHOLSTRLTOT 161 05/07/2019 08:51 AM    LDL 91 05/07/2019 08:51 AM    HDL 38 (A) 05/07/2019 08:51 AM    TRIGLYCERIDE 158 (H) 05/07/2019 08:51 AM       No results found for: TESTOSTERONE  Lab Results   Component Value Date/Time    TSH 0.617 04/30/2014 10:35 AM    TSH 0.383 (L) 02/06/2014 01:26 PM     Lab Results   Component Value Date/Time    FREET4 1.34 08/08/2017 11:35 AM    FREET4 1.28 03/22/2017 12:21 PM     No results found for: URICACID  No components found for: VITB12  Lab Results   Component Value Date/Time    25HYDROXY 23 (L) 05/07/2019 08:51 AM    25HYDROXY 16 (L) 07/26/2018 07:58 AM          Assessment/Plan:     1. Impetigo  - Patient will start Bactrim 800-160 mg to treat her impetigo. She is instructed to avoid contract with the elderly and infants as they are most at risk for germaine the infection.  - sulfamethoxazole-trimethoprim (BACTRIM DS) 800-160 MG tablet; Take 1 Tab by mouth 2 times a day.  Dispense: 20 Tab; Refill: 0    2.  Cheilitis  - See #2  - sulfamethoxazole-trimethoprim (BACTRIM DS) 800-160 MG tablet; Take 1 Tab by mouth 2 times a day.  Dispense: 20 Tab; Refill: 0    3. Malignant neoplasm of upper-outer quadrant of left breast in female, estrogen receptor negative (CMS-HCC)- 2/2018; 1 positive node; chemorx (dr. grant) ), lumpectomy ( dr sharpe), RT 2 mo (dr beltran)  - Patient is instructed to attend her appointment with Dr. Barnes for cancer screening and evaluation.    4. Mastitis chronic, left- r/o inflammatory carcinoma vs d/t RT- dr barnes to eval  - See #3    5. Screen for colon cancer  - Patient refuses colonoscopy but is happy to comply with undergoing cologuard screening.  - COLOGUARD (FIT DNA)    Other orders  - mupirocin (BACTROBAN) 2 % Ointment; Apply 1 Application to affected area(s) 3 times a day.  Dispense: 1 Tube; Refill: 1     30 minute face-to-face encounter took place today.  More than half of this time was spent in the coordination of care of the above problems, as well as counseling.     Hira MEADOWS (Scribe), am scribing for, and in the presence of, Siva Smart M.D..    Electronically signed by: Hira Calhoun (Scribe), 5/28/2019    Siva MEADOWS M.D., personally performed the services described in this documentation, as scribed by Hira Calhoun in my presence, and it is both accurate and complete.

## 2019-05-31 ENCOUNTER — PATIENT MESSAGE (OUTPATIENT)
Dept: MEDICAL GROUP | Age: 79
End: 2019-05-31

## 2019-05-31 NOTE — TELEPHONE ENCOUNTER
From: Clarisse Reeves  To: Siva Smart M.D.  Sent: 5/31/2019 11:50 AM PDT  Subject: Test Result Question    Maybe I'm being a little impatient for this medication to start acting. So far it is not helping if anything it seems to be a little more.     This must be my complaint day, sorry. I am running a medium temp 100 to 102 more at night and a lot of sweating if I do much of anything.    Sorry to bother you on the weekend.    Florencio [birth year]

## 2019-05-31 NOTE — PATIENT COMMUNICATION
Impetigo and the skin rash you have should not cause a fever.  If you are having a fever then should go to the emergency room for further evaluation.

## 2019-06-03 ENCOUNTER — OFFICE VISIT (OUTPATIENT)
Dept: MEDICAL GROUP | Age: 79
End: 2019-06-03
Payer: MEDICARE

## 2019-06-03 VITALS
DIASTOLIC BLOOD PRESSURE: 64 MMHG | HEART RATE: 82 BPM | TEMPERATURE: 98.6 F | WEIGHT: 195 LBS | BODY MASS INDEX: 31.34 KG/M2 | SYSTOLIC BLOOD PRESSURE: 126 MMHG | HEIGHT: 66 IN | OXYGEN SATURATION: 95 %

## 2019-06-03 DIAGNOSIS — K13.0 ANGULAR CHEILITIS: ICD-10-CM

## 2019-06-03 DIAGNOSIS — R21 RASH: ICD-10-CM

## 2019-06-03 DIAGNOSIS — L03.211 FACIAL CELLULITIS: ICD-10-CM

## 2019-06-03 DIAGNOSIS — B00.9 HSV-1 (HERPES SIMPLEX VIRUS 1) INFECTION: ICD-10-CM

## 2019-06-03 PROBLEM — Z45.2 PICC (PERIPHERALLY INSERTED CENTRAL CATHETER) IN PLACE: Status: RESOLVED | Noted: 2018-03-21 | Resolved: 2019-06-03

## 2019-06-03 PROCEDURE — 96372 THER/PROPH/DIAG INJ SC/IM: CPT | Performed by: INTERNAL MEDICINE

## 2019-06-03 PROCEDURE — 99214 OFFICE O/P EST MOD 30 MIN: CPT | Mod: 25 | Performed by: INTERNAL MEDICINE

## 2019-06-03 RX ORDER — CEFTRIAXONE 1 G/1
1 INJECTION, POWDER, FOR SOLUTION INTRAMUSCULAR; INTRAVENOUS DAILY
Qty: 1000 MG | Refills: 0 | OUTPATIENT
Start: 2019-06-03 | End: 2019-06-03

## 2019-06-03 RX ORDER — CLINDAMYCIN HYDROCHLORIDE 300 MG/1
300 CAPSULE ORAL 3 TIMES DAILY
Qty: 30 CAP | Refills: 1 | Status: SHIPPED | OUTPATIENT
Start: 2019-06-03 | End: 2019-06-07

## 2019-06-03 RX ORDER — CLOTRIMAZOLE AND BETAMETHASONE DIPROPIONATE 10; .64 MG/G; MG/G
1 CREAM TOPICAL 2 TIMES DAILY
Qty: 15 G | Refills: 3 | Status: SHIPPED | OUTPATIENT
Start: 2019-06-03 | End: 2019-06-07

## 2019-06-03 RX ORDER — TRIAMCINOLONE ACETONIDE 40 MG/ML
40 INJECTION, SUSPENSION INTRA-ARTICULAR; INTRAMUSCULAR ONCE
Status: COMPLETED | OUTPATIENT
Start: 2019-06-03 | End: 2019-06-03

## 2019-06-03 RX ORDER — VALACYCLOVIR HYDROCHLORIDE 1 G/1
1000 TABLET, FILM COATED ORAL 2 TIMES DAILY
Qty: 20 TAB | Refills: 0 | Status: SHIPPED | OUTPATIENT
Start: 2019-06-03 | End: 2019-06-07

## 2019-06-03 RX ORDER — METHYLPREDNISOLONE 4 MG/1
TABLET ORAL
Qty: 1 KIT | Refills: 0 | Status: SHIPPED | OUTPATIENT
Start: 2019-06-03 | End: 2019-06-07

## 2019-06-03 RX ADMIN — TRIAMCINOLONE ACETONIDE 40 MG: 40 INJECTION, SUSPENSION INTRA-ARTICULAR; INTRAMUSCULAR at 15:34

## 2019-06-03 ASSESSMENT — ENCOUNTER SYMPTOMS
CONSTITUTIONAL NEGATIVE: 1
RESPIRATORY NEGATIVE: 1
GASTROINTESTINAL NEGATIVE: 1
NEUROLOGICAL NEGATIVE: 1
PSYCHIATRIC NEGATIVE: 1
MUSCULOSKELETAL NEGATIVE: 1
CARDIOVASCULAR NEGATIVE: 1
EYES NEGATIVE: 1

## 2019-06-03 NOTE — PROGRESS NOTES
Try applying lotrisone cream 3 times a day ( I had prescribed it previously in January) and I will refer you to a dermatologist. Lotrisone reordered.

## 2019-06-03 NOTE — PROGRESS NOTES
Subjective:      Clarisse Reeves is a 78 y.o. female who presents with Rash (around mouth, x2 months)        HPI    The patient is here for followup of chronic medical problems listed below. The patient is compliant with medications and having no side effects from them. Denies chest pain, abdominal pain, dyspnea, myalgias, or cough.    Facial cellulitis  HSV-1  Angular cheilitis - worse  Patient comes in today with a worsening rash to her face, nose and lips with associated burning and itching which has been present for the last two months. She was last seen on 5/28/19 for the same symptoms were she was prescribed Mupirocin 2% ointment and Bactrim DS which have not improved her symptoms. She denies any fevers, chills or vision changes.      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 grant) ), lumpectomy ( dr sharpe), RT 2 mo (dr beltran)   • Anxiety   • PICC (peripherally inserted central catheter) in place- RESOLVED- NO LONGER IN PACE SINCE AUG 31, 2018   • TIA (transient ischemic attack) exp aphasia with headache    • Cerebral vascular disease- old tia with exp aphasia- resolved; neg mri/mra brain   • Ocular migraine   • IFG (impaired fasting glucose)    • Angular cheilitis   • Episodic tension-type headache, not intractable   • Mastitis chronic, left- r/o inflammatory carcinoma vs d/t RT- dr barnes to [facility]   • Screen for colon cancer       Outpatient Medications Prior to Visit   Medication Sig Dispense Refill   • clotrimazole-betamethasone (LOTRISONE) 1-0.05 % Cream Apply 1 Application to affected area(s) 2 times a day. 15 g 3   • mupirocin (BACTROBAN) 2 % Ointment Apply 1 Application to affected area(s) 3 times a day. 1 Tube 1   • sulfamethoxazole-trimethoprim (BACTRIM DS) 800-160 MG tablet Take 1 Tab by mouth 2 times a day. 20 Tab 0   • venlafaxine XR (EFFEXOR XR) 37.5 MG CAPSULE SR 24 HR Take 1 Cap by mouth every day. 90 Cap 4   • lansoprazole (PREVACID) 30 MG CAPSULE DELAYED RELEASE Take 1 Cap by mouth every day. 90 Cap 4   • Cholecalciferol (VITAMIN D) 2000 units Cap Take 1 Cap by mouth every day. 100 Cap 3   • levothyroxine (SYNTHROID) 150 MCG Tab Take 1 Tab by mouth Every morning on an empty stomach. 90 Tab 4   • MICROLET LANCETS Misc For BG check once a day 100 Each 3   • Blood Glucose Monitoring Suppl Supplies Misc Contour Next glucometer strips for blood sugar check once a day 100 Each 2   • NYAMYC powder        No facility-administered medications prior to visit.         Allergies   Allergen Reactions   • Codeine Hives and Nausea     RXN=40 years ago   • Lipitor [Atorvastatin Calcium] Myalgia     NWI=4100     • Lovastatin Myalgia     Muscle aches  ARU=5437   • Zocor [Simvastatin - High Dose] Myalgia     Severe myalgias  LXF=4637   • Latex Hives and Rash     Gets severe rash and blisters   • Lisinopril Cough   • Metformin      Diarrhea     • Tape      Paper tape ok       Review of Systems   Constitutional: Negative.    HENT: Negative.    Eyes: Negative.    Respiratory: Negative.    Cardiovascular: Negative.    Gastrointestinal: Negative.    Genitourinary: Negative.    Musculoskeletal: Negative.    Skin: Positive for itching and rash.        Positive:  "Burning, Pain   Neurological: Negative.    Endo/Heme/Allergies: Negative.    Psychiatric/Behavioral: Negative.    All other systems reviewed and are negative.         Objective:     /64 (BP Location: Right arm, Patient Position: Sitting, BP Cuff Size: Adult)   Pulse 82   Temp 37 °C (98.6 °F) (Temporal)   Ht 1.676 m (5' 6\")   Wt 88.5 kg (195 lb)   SpO2 95%   BMI 31.47 kg/m²     Physical Exam   Constitutional: Oriented to person, place, and time. Appears well-developed and well-nourished. No distress.   Head: Normocephalic and atraumatic.   Right Ear: External ear normal.   Left Ear: External ear normal.   Nose: Nose normal  Mouth/Throat: Oropharynx is clear and moist. No oropharyngeal exudate.   Eyes: Pupils are equal, round, and reactive to light. Conjunctivae and EOM are normal. Right eye exhibits no discharge. Left eye exhibits no discharge. No scleral icterus.   Neck: Normal range of motion. Neck supple. No JVD present. No tracheal deviation present. No thyromegaly present.   Cardiovascular: Normal rate, regular rhythm, normal heart sounds and intact distal pulses.  Exam reveals no gallop and no friction rub.    No murmur heard.  Pulmonary/Chest: Effort normal. No stridor. No respiratory distress. No wheezing or rales. No tenderness.   Abdominal: Soft. Bowel sounds are normal. No distension and no mass. There is no tenderness. There is no rebound and no guarding. No hernia.   Musculoskeletal: Normal range of motion No edema or tenderness.   Lymphadenopathy: No cervical adenopathy.   Neurological: Alert and oriented to person, place, and time. Normal reflexes. Normal reflexes. No cranial nerve deficit. Normal muscle tone. Coordination normal.   Skin: Skin is warm and dry. Not diaphoretic. No erythema. No pallor. Pustular vesicular 1-2 mm eruptions at the corners of mouth and nose, 3x2 cm eruptions at each corner of the mouth, 3 x 1.5 cm pustular vesicular eruption under lower lip, 1 cm eruption on the " upper lip, skin to the nose has similar pustular vesicular changes which are 1.5 cm on bilateral nasolabial folds  Psychiatric: Normal mood and affect. Behavior is normal. Judgment and thought content normal.   Nursing note and vitals reviewed.      Lab Results   Component Value Date/Time    HBA1C 5.7 11/06/2018 12:24 PM    HBA1C 5.5 07/26/2018 07:58 AM     Lab Results   Component Value Date/Time    SODIUM 139 05/07/2019 08:51 AM    POTASSIUM 4.0 05/07/2019 08:51 AM    CHLORIDE 103 05/07/2019 08:51 AM    CO2 27 05/07/2019 08:51 AM    GLUCOSE 110 (H) 05/07/2019 08:51 AM    BUN 11 05/07/2019 08:51 AM    CREATININE 0.75 05/07/2019 08:51 AM    CREATININE 0.76 04/12/2013 11:17 AM    BUNCREATRAT 19 11/17/2014 10:29 AM    BUNCREATRAT 25 04/12/2013 11:17 AM    ALKPHOSPHAT 136 (H) 05/07/2019 08:51 AM    ASTSGOT 15 05/07/2019 08:51 AM    ALTSGPT 13 05/07/2019 08:51 AM    TBILIRUBIN 0.4 05/07/2019 08:51 AM     Lab Results   Component Value Date/Time    INR 0.99 01/30/2018 10:50 AM    INR 0.95 06/01/2017 06:23 AM    INR 0.93 12/16/2015 03:17 PM     Lab Results   Component Value Date/Time    CHOLSTRLTOT 161 05/07/2019 08:51 AM    LDL 91 05/07/2019 08:51 AM    HDL 38 (A) 05/07/2019 08:51 AM    TRIGLYCERIDE 158 (H) 05/07/2019 08:51 AM       No results found for: TESTOSTERONE  Lab Results   Component Value Date/Time    TSH 0.617 04/30/2014 10:35 AM    TSH 0.383 (L) 02/06/2014 01:26 PM     Lab Results   Component Value Date/Time    FREET4 1.34 08/08/2017 11:35 AM    FREET4 1.28 03/22/2017 12:21 PM     No results found for: URICACID  No components found for: VITB12  Lab Results   Component Value Date/Time    25HYDROXY 23 (L) 05/07/2019 08:51 AM    25HYDROXY 16 (L) 07/26/2018 07:58 AM          Assessment/Plan:       1. Facial cellulitis  - Patient will be started on steroids and valacyclovir to treat the suspected HSV-1 infection, as well as clindamycin treat any bacterial infections present. Patient will also be treated with a  rocephin injection here in office for further antibiotic coverage. Referral to dermatology placed. Patient will return in one week for reevaluation.  - valacyclovir (VALTREX) 1 GM Tab; Take 1 Tab by mouth 2 times a day.  Dispense: 20 Tab; Refill: 0  - MethylPREDNISolone (MEDROL DOSEPAK) 4 MG Tablet Therapy Pack; Medrol dosepack as directed  Dispense: 1 Kit; Refill: 0  - REFERRAL TO DERMATOLOGY  - clindamycin (CLEOCIN) 300 MG Cap; Take 1 Cap by mouth 3 times a day.  Dispense: 30 Cap; Refill: 1  - cefTRIAXone (ROCEPHIN) 500 mg, lidocaine (XYLOCAINE) 1 % 1.8 mL for IM use; 500 mg by Intramuscular route Once.  - triamcinolone acetonide (KENALOG-40) injection 40 mg; 1 mL by Intramuscular route Once.    2. HSV-1 (herpes simplex virus 1) infection  - See #1  - valacyclovir (VALTREX) 1 GM Tab; Take 1 Tab by mouth 2 times a day.  Dispense: 20 Tab; Refill: 0  - MethylPREDNISolone (MEDROL DOSEPAK) 4 MG Tablet Therapy Pack; Medrol dosepack as directed  Dispense: 1 Kit; Refill: 0  - REFERRAL TO DERMATOLOGY  - triamcinolone acetonide (KENALOG-40) injection 40 mg; 1 mL by Intramuscular route Once.    3. Angular cheilitis- worse - prob d/t hsv 1- trial valtredx and steroids  - See #1    30 minute face-to-face encounter took place today.  More than half of this time was spent in the coordination of care of the above problems, as well as counseling.     Hira MEADOWS (Marcial), am scribing for, and in the presence of, Siva Smart M.D..    Electronically signed by: Hira Calhoun (Marcial), 6/3/2019    Siva MEADOWS M.D., personally performed the services described in this documentation, as scribed by Hira Calhoun in my presence, and it is both accurate and complete.

## 2019-06-04 ENCOUNTER — APPOINTMENT (OUTPATIENT)
Dept: PULMONOLOGY | Facility: HOSPICE | Age: 79
End: 2019-06-04
Payer: MEDICARE

## 2019-06-07 ENCOUNTER — OFFICE VISIT (OUTPATIENT)
Dept: DERMATOLOGY | Facility: IMAGING CENTER | Age: 79
End: 2019-06-07
Payer: MEDICARE

## 2019-06-07 VITALS — TEMPERATURE: 99.1 F | BODY MASS INDEX: 31.65 KG/M2 | WEIGHT: 190 LBS | HEIGHT: 65 IN

## 2019-06-07 DIAGNOSIS — L71.0 PERIORAL DERMATITIS: ICD-10-CM

## 2019-06-07 PROCEDURE — 99203 OFFICE O/P NEW LOW 30 MIN: CPT | Performed by: DERMATOLOGY

## 2019-06-07 RX ORDER — PIMECROLIMUS 10 MG/G
1 CREAM TOPICAL 3 TIMES DAILY
Qty: 30 G | Refills: 1 | Status: ON HOLD | OUTPATIENT
Start: 2019-06-07 | End: 2019-07-19

## 2019-06-07 NOTE — PROGRESS NOTES
CC: skin lesions    Subjective: new patient here for skin rash on face.    Establish care for facial cellulitis, HSV-1 dx by Dr. Smart    HPI:red bumpy rash around mouth and nostrils  Onset: 5/7/19  Symptoms: painful, itchy and burning  Aggravating factors: no  Alleviating factors: none  New creams/topicals: none  New medications (up to last 6 months): None  New travel: None  Other exposures: None  Treatments: Mupirocin ointment, Medrol Dose feng, clindamycin 300mg TID, Valacyclovir 1gm BID.    History of skin cancer: No  History of precancers/actinic keratoses: No  History of biopsies:No  History of blistering/severe sunburns:No  Family history of skin cancer:No  Family history of atypical moles:No    ROS: no fevers/chills. No itch.  No joint pain  DermPMH: no skin cancer/melanoma  No problem-specific Assessment & Plan notes found for this encounter.    Relevant PMH: mult med comorbidities: CLL, hx breast cancer, hx lung cancer  Social:former smoker    PE: Gen:WDWN female in NAD. Skin: focal exam: Eyes/lips - without rashes/lesions.  Neck - without rashes/lesions.  Upper chest - without rashes/lesions.  Face - erythematous plaques on bilateral oral commissure and along NL fold, studded with few papules.  No vesicles.  No crusting.  No punched out erosions.    A/P: suspect perioral dermatitis:  -reviewed [employer] [place] information; may worsen before improves, jacob as methylprednisolone dose pack completes next 1-2 days  -stop all topical products  -warm water wash only.  Dove, only if needed  -elidel cream BID X 3-6 weeks  -f/u 3 weeks  -keep diary for reaction to products  -consider future allergy patch tests    I have reviewed medications relevant to my specialty.

## 2019-06-11 ENCOUNTER — APPOINTMENT (OUTPATIENT)
Dept: MEDICAL GROUP | Age: 79
End: 2019-06-11
Payer: MEDICARE

## 2019-06-20 ENCOUNTER — APPOINTMENT (OUTPATIENT)
Dept: RADIOLOGY | Facility: MEDICAL CENTER | Age: 79
End: 2019-06-20
Attending: EMERGENCY MEDICINE
Payer: MEDICARE

## 2019-06-20 ENCOUNTER — HOSPITAL ENCOUNTER (OUTPATIENT)
Facility: MEDICAL CENTER | Age: 79
End: 2019-06-22
Attending: EMERGENCY MEDICINE | Admitting: HOSPITALIST
Payer: MEDICARE

## 2019-06-20 DIAGNOSIS — F40.240 CLAUSTROPHOBIA: ICD-10-CM

## 2019-06-20 DIAGNOSIS — R07.81 PLEURITIC CHEST PAIN: ICD-10-CM

## 2019-06-20 DIAGNOSIS — H57.02 ANISOCORIA: ICD-10-CM

## 2019-06-20 PROBLEM — R53.83 MALAISE AND FATIGUE: Status: ACTIVE | Noted: 2019-06-20

## 2019-06-20 PROBLEM — R53.81 MALAISE AND FATIGUE: Status: ACTIVE | Noted: 2019-06-20

## 2019-06-20 PROBLEM — R07.9 PAIN IN THE CHEST: Status: ACTIVE | Noted: 2019-06-20

## 2019-06-20 LAB
ALBUMIN SERPL BCP-MCNC: 4.1 G/DL (ref 3.2–4.9)
ALBUMIN/GLOB SERPL: 1.2 G/DL
ALP SERPL-CCNC: 278 U/L (ref 30–99)
ALT SERPL-CCNC: 27 U/L (ref 2–50)
ANION GAP SERPL CALC-SCNC: 15 MMOL/L (ref 0–11.9)
AST SERPL-CCNC: 35 U/L (ref 12–45)
BASOPHILS # BLD AUTO: 0.3 % (ref 0–1.8)
BASOPHILS # BLD: 0.03 K/UL (ref 0–0.12)
BILIRUB SERPL-MCNC: 0.5 MG/DL (ref 0.1–1.5)
BUN SERPL-MCNC: 17 MG/DL (ref 8–22)
CALCIUM SERPL-MCNC: 8.7 MG/DL (ref 8.4–10.2)
CHLORIDE SERPL-SCNC: 101 MMOL/L (ref 96–112)
CO2 SERPL-SCNC: 22 MMOL/L (ref 20–33)
CREAT SERPL-MCNC: 0.95 MG/DL (ref 0.5–1.4)
EKG IMPRESSION: NORMAL
EOSINOPHIL # BLD AUTO: 0.07 K/UL (ref 0–0.51)
EOSINOPHIL NFR BLD: 0.8 % (ref 0–6.9)
ERYTHROCYTE [DISTWIDTH] IN BLOOD BY AUTOMATED COUNT: 47.3 FL (ref 35.9–50)
GLOBULIN SER CALC-MCNC: 3.4 G/DL (ref 1.9–3.5)
GLUCOSE SERPL-MCNC: 88 MG/DL (ref 65–99)
HCT VFR BLD AUTO: 39.4 % (ref 37–47)
HGB BLD-MCNC: 12.6 G/DL (ref 12–16)
IMM GRANULOCYTES # BLD AUTO: 0.15 K/UL (ref 0–0.11)
IMM GRANULOCYTES NFR BLD AUTO: 1.7 % (ref 0–0.9)
LYMPHOCYTES # BLD AUTO: 2.41 K/UL (ref 1–4.8)
LYMPHOCYTES NFR BLD: 27.2 % (ref 22–41)
MCH RBC QN AUTO: 27.6 PG (ref 27–33)
MCHC RBC AUTO-ENTMCNC: 32 G/DL (ref 33.6–35)
MCV RBC AUTO: 86.2 FL (ref 81.4–97.8)
MONOCYTES # BLD AUTO: 0.51 K/UL (ref 0–0.85)
MONOCYTES NFR BLD AUTO: 5.8 % (ref 0–13.4)
NEUTROPHILS # BLD AUTO: 5.68 K/UL (ref 2–7.15)
NEUTROPHILS NFR BLD: 64.2 % (ref 44–72)
NRBC # BLD AUTO: 0 K/UL
NRBC BLD-RTO: 0 /100 WBC
PLATELET # BLD AUTO: 237 K/UL (ref 164–446)
PMV BLD AUTO: 9.2 FL (ref 9–12.9)
POTASSIUM SERPL-SCNC: 3.9 MMOL/L (ref 3.6–5.5)
PROT SERPL-MCNC: 7.5 G/DL (ref 6–8.2)
RBC # BLD AUTO: 4.57 M/UL (ref 4.2–5.4)
SODIUM SERPL-SCNC: 138 MMOL/L (ref 135–145)
TROPONIN I SERPL-MCNC: 0.02 NG/ML (ref 0–0.04)
TSH SERPL DL<=0.005 MIU/L-ACNC: 0.23 UIU/ML (ref 0.38–5.33)
WBC # BLD AUTO: 8.9 K/UL (ref 4.8–10.8)

## 2019-06-20 PROCEDURE — 700111 HCHG RX REV CODE 636 W/ 250 OVERRIDE (IP): Performed by: EMERGENCY MEDICINE

## 2019-06-20 PROCEDURE — 71275 CT ANGIOGRAPHY CHEST: CPT

## 2019-06-20 PROCEDURE — 99219 PR INITIAL OBSERVATION CARE,LEVL II: CPT | Performed by: HOSPITALIST

## 2019-06-20 PROCEDURE — 80053 COMPREHEN METABOLIC PANEL: CPT

## 2019-06-20 PROCEDURE — 36415 COLL VENOUS BLD VENIPUNCTURE: CPT

## 2019-06-20 PROCEDURE — A9270 NON-COVERED ITEM OR SERVICE: HCPCS | Performed by: HOSPITALIST

## 2019-06-20 PROCEDURE — 700105 HCHG RX REV CODE 258: Performed by: EMERGENCY MEDICINE

## 2019-06-20 PROCEDURE — 700102 HCHG RX REV CODE 250 W/ 637 OVERRIDE(OP): Performed by: HOSPITALIST

## 2019-06-20 PROCEDURE — G0378 HOSPITAL OBSERVATION PER HR: HCPCS

## 2019-06-20 PROCEDURE — 85025 COMPLETE CBC W/AUTO DIFF WBC: CPT

## 2019-06-20 PROCEDURE — 93005 ELECTROCARDIOGRAM TRACING: CPT | Performed by: HOSPITALIST

## 2019-06-20 PROCEDURE — 99285 EMERGENCY DEPT VISIT HI MDM: CPT

## 2019-06-20 PROCEDURE — 84443 ASSAY THYROID STIM HORMONE: CPT

## 2019-06-20 PROCEDURE — 70450 CT HEAD/BRAIN W/O DYE: CPT

## 2019-06-20 PROCEDURE — 93005 ELECTROCARDIOGRAM TRACING: CPT | Performed by: EMERGENCY MEDICINE

## 2019-06-20 PROCEDURE — 93005 ELECTROCARDIOGRAM TRACING: CPT

## 2019-06-20 PROCEDURE — 96374 THER/PROPH/DIAG INJ IV PUSH: CPT

## 2019-06-20 PROCEDURE — 700117 HCHG RX CONTRAST REV CODE 255: Performed by: EMERGENCY MEDICINE

## 2019-06-20 PROCEDURE — 84484 ASSAY OF TROPONIN QUANT: CPT

## 2019-06-20 PROCEDURE — 94760 N-INVAS EAR/PLS OXIMETRY 1: CPT

## 2019-06-20 PROCEDURE — 71045 X-RAY EXAM CHEST 1 VIEW: CPT

## 2019-06-20 RX ORDER — ACETAMINOPHEN 325 MG/1
650 TABLET ORAL EVERY 6 HOURS PRN
Status: DISCONTINUED | OUTPATIENT
Start: 2019-06-20 | End: 2019-06-22 | Stop reason: HOSPADM

## 2019-06-20 RX ORDER — VENLAFAXINE HYDROCHLORIDE 37.5 MG/1
37.5 CAPSULE, EXTENDED RELEASE ORAL EVERY MORNING
COMMUNITY
End: 2019-07-22

## 2019-06-20 RX ORDER — AMOXICILLIN 250 MG
2 CAPSULE ORAL 2 TIMES DAILY
Status: DISCONTINUED | OUTPATIENT
Start: 2019-06-20 | End: 2019-06-21

## 2019-06-20 RX ORDER — REGADENOSON 0.08 MG/ML
0.4 INJECTION, SOLUTION INTRAVENOUS
Status: DISCONTINUED | OUTPATIENT
Start: 2019-06-20 | End: 2019-06-21

## 2019-06-20 RX ORDER — CALCIUM CARBONATE 500 MG/1
500 TABLET, CHEWABLE ORAL ONCE
Status: COMPLETED | OUTPATIENT
Start: 2019-06-20 | End: 2019-06-20

## 2019-06-20 RX ORDER — BISACODYL 10 MG
10 SUPPOSITORY, RECTAL RECTAL
Status: DISCONTINUED | OUTPATIENT
Start: 2019-06-20 | End: 2019-06-21

## 2019-06-20 RX ORDER — LANSOPRAZOLE 30 MG/1
30 CAPSULE, DELAYED RELEASE ORAL
COMMUNITY
End: 2019-07-22

## 2019-06-20 RX ORDER — AMINOPHYLLINE 25 MG/ML
100 INJECTION, SOLUTION INTRAVENOUS
Status: DISCONTINUED | OUTPATIENT
Start: 2019-06-20 | End: 2019-06-21

## 2019-06-20 RX ORDER — ASPIRIN 600 MG/1
300 SUPPOSITORY RECTAL DAILY
Status: DISCONTINUED | OUTPATIENT
Start: 2019-06-21 | End: 2019-06-20

## 2019-06-20 RX ORDER — ASPIRIN 81 MG/1
324 TABLET, CHEWABLE ORAL DAILY
Status: DISCONTINUED | OUTPATIENT
Start: 2019-06-20 | End: 2019-06-21

## 2019-06-20 RX ORDER — ASPIRIN 325 MG
325 TABLET ORAL DAILY
Status: DISCONTINUED | OUTPATIENT
Start: 2019-06-21 | End: 2019-06-20

## 2019-06-20 RX ORDER — ASPIRIN 600 MG/1
300 SUPPOSITORY RECTAL DAILY
Status: DISCONTINUED | OUTPATIENT
Start: 2019-06-20 | End: 2019-06-21

## 2019-06-20 RX ORDER — ASPIRIN 81 MG/1
324 TABLET, CHEWABLE ORAL DAILY
Status: DISCONTINUED | OUTPATIENT
Start: 2019-06-21 | End: 2019-06-20

## 2019-06-20 RX ORDER — HEPARIN SODIUM 5000 [USP'U]/ML
5000 INJECTION, SOLUTION INTRAVENOUS; SUBCUTANEOUS EVERY 8 HOURS
Status: DISCONTINUED | OUTPATIENT
Start: 2019-06-20 | End: 2019-06-22 | Stop reason: HOSPADM

## 2019-06-20 RX ORDER — SODIUM CHLORIDE 9 MG/ML
1000 INJECTION, SOLUTION INTRAVENOUS ONCE
Status: COMPLETED | OUTPATIENT
Start: 2019-06-20 | End: 2019-06-20

## 2019-06-20 RX ORDER — LEVOTHYROXINE SODIUM 0.12 MG/1
125 TABLET ORAL EVERY MORNING
COMMUNITY
End: 2019-06-28

## 2019-06-20 RX ORDER — LORAZEPAM 1 MG/1
4 TABLET ORAL ONCE
Status: COMPLETED | OUTPATIENT
Start: 2019-06-20 | End: 2019-06-20

## 2019-06-20 RX ORDER — ONDANSETRON 2 MG/ML
4 INJECTION INTRAMUSCULAR; INTRAVENOUS ONCE
Status: COMPLETED | OUTPATIENT
Start: 2019-06-20 | End: 2019-06-20

## 2019-06-20 RX ORDER — POLYETHYLENE GLYCOL 3350 17 G/17G
1 POWDER, FOR SOLUTION ORAL
Status: DISCONTINUED | OUTPATIENT
Start: 2019-06-20 | End: 2019-06-21

## 2019-06-20 RX ORDER — ASPIRIN 325 MG
325 TABLET ORAL DAILY
Status: DISCONTINUED | OUTPATIENT
Start: 2019-06-20 | End: 2019-06-22 | Stop reason: HOSPADM

## 2019-06-20 RX ORDER — LORAZEPAM 2 MG/1
4 TABLET ORAL
COMMUNITY
Start: 2019-05-23 | End: 2019-07-08 | Stop reason: SDUPTHER

## 2019-06-20 RX ADMIN — LORAZEPAM 4 MG: 1 TABLET ORAL at 22:29

## 2019-06-20 RX ADMIN — SODIUM CHLORIDE 1000 ML: 9 INJECTION, SOLUTION INTRAVENOUS at 15:16

## 2019-06-20 RX ADMIN — ONDANSETRON 4 MG: 2 INJECTION INTRAMUSCULAR; INTRAVENOUS at 17:44

## 2019-06-20 RX ADMIN — IOHEXOL 100 ML: 350 INJECTION, SOLUTION INTRAVENOUS at 17:20

## 2019-06-20 RX ADMIN — ASPIRIN 325 MG ORAL TABLET 325 MG: 325 PILL ORAL at 20:39

## 2019-06-20 RX ADMIN — CALCIUM CARBONATE (ANTACID) CHEW TAB 500 MG 500 MG: 500 CHEW TAB at 23:37

## 2019-06-20 RX ADMIN — ACETAMINOPHEN 650 MG: 325 TABLET, FILM COATED ORAL at 20:39

## 2019-06-20 ASSESSMENT — ENCOUNTER SYMPTOMS
BLURRED VISION: 0
SPUTUM PRODUCTION: 0
TINGLING: 0
PALPITATIONS: 0
NECK PAIN: 0
MEMORY LOSS: 0
BACK PAIN: 0
BLOOD IN STOOL: 0
CONSTIPATION: 0
DEPRESSION: 0
NERVOUS/ANXIOUS: 1
PHOTOPHOBIA: 0
FEVER: 0
ORTHOPNEA: 0
MYALGIAS: 1
SORE THROAT: 0
SHORTNESS OF BREATH: 0
VOMITING: 0
HEADACHES: 0
SPEECH CHANGE: 0
HEARTBURN: 0
STRIDOR: 0
DIZZINESS: 0
PND: 0
TREMORS: 0
DOUBLE VISION: 0
WEAKNESS: 1
HEMOPTYSIS: 0
NAUSEA: 0
COUGH: 0
EYE PAIN: 0
CLAUDICATION: 0
SENSORY CHANGE: 0
CHILLS: 0

## 2019-06-20 ASSESSMENT — COGNITIVE AND FUNCTIONAL STATUS - GENERAL
CLIMB 3 TO 5 STEPS WITH RAILING: A LITTLE
SUGGESTED CMS G CODE MODIFIER DAILY ACTIVITY: CH
SUGGESTED CMS G CODE MODIFIER MOBILITY: CI
DAILY ACTIVITIY SCORE: 24
MOBILITY SCORE: 23

## 2019-06-20 ASSESSMENT — COPD QUESTIONNAIRES
COPD SCREENING SCORE: 4
DO YOU EVER COUGH UP ANY MUCUS OR PHLEGM?: NO/ONLY WITH OCCASIONAL COLDS OR INFECTIONS
HAVE YOU SMOKED AT LEAST 100 CIGARETTES IN YOUR ENTIRE LIFE: NO/DON'T KNOW
IN THE PAST 12 MONTHS DO YOU DO LESS THAN YOU USED TO BECAUSE OF YOUR BREATHING PROBLEMS: DISAGREE/UNSURE
DURING THE PAST 4 WEEKS HOW MUCH DID YOU FEEL SHORT OF BREATH: MOST  OR ALL OF THE TIME

## 2019-06-20 ASSESSMENT — LIFESTYLE VARIABLES
EVER_SMOKED: NEVER
ALCOHOL_USE: NO
DO YOU DRINK ALCOHOL: NO

## 2019-06-20 NOTE — ED NOTES
Rounded on patient. Up to restroom via Celina Mancini.  Patient tolerated well, did continue to feel dizziness with standing.

## 2019-06-20 NOTE — ED NOTES
Rounded on patient.  Witness patient have a feeling of legs giving out.  No fall occurrence.  Patient and friend oriented to room and call button. Line and labs obtained.  Mouth swabs provided.  Will continue to monitor.

## 2019-06-20 NOTE — ED NOTES
"Presents complaining of acute onset of left chest pain with associated SOB persisting for approximately a week.   Chief Complaint   Patient presents with   • Chest Pressure   • Shortness of Breath     /85   Pulse (!) 101   Temp 37.2 °C (98.9 °F) (Oral)   Resp 20   Ht 1.651 m (5' 5\")   Wt 85 kg (187 lb 6.3 oz)   SpO2 96%   BMI 31.18 kg/m²     "

## 2019-06-20 NOTE — ED NOTES
Medication Reconciliation updated and complete per pt at bedside  Allergies have been verified and updated  No oral ABX within the last 14 days  Pt Home Pharmacy:[facility]

## 2019-06-20 NOTE — ED PROVIDER NOTES
ED Provider Note    CHIEF COMPLAINT  Chief Complaint   Patient presents with   • Chest Pressure   • Shortness of Breath       HPI  Clarisse Reeves is a 78 y.o. female who presents to the Emergency Department with chest pain which has been present for a week.  It is left-sided and associated with shortness of breath.  She is having this guru when she takes a deep breath.   .  It is located left side  and feels like heaviness and has an intensity of 7 with  radiation to the left arm and shoulder.   There is  shortness of breath, extreme weakness,  nausea, no abdominal pain, no back pain, no jaw pain, marked diaphoresis, poor appetite and extreme thrist...C     CAD Risk factor review:  non CAD, no dislipidemia, positive family history, no diabetes, priro smoking, no obesity, no hypertension, no cocaine or methamphetamines.    Pulmonary Embolist risk factor review:  no recent surgery, no history of DVT or PE, no hemoptysis, no unilateral leg swelling, prior stage 4 malignancy-stage 4 partial mastectomy July 6 last year, chemo in Februrary until August.    REVIEW OF SYSTEMS   As above all other systems are negative.    PAST MEDICAL HISTORY   has a past medical history of Accelerated junctional rhythm on EKG in 5/2018 in setting of chemo; Cancer (HCC) (2006); Cancer (HCC) (2016); Cancer (HCC) (2018); Carcinoma in situ of respiratory system (2016); Cataract; CLL (chronic lymphoblastic leukemia); Cutaneous skin tags (1/29/2015); DM (diabetes mellitus) (HCC); Hiatus hernia syndrome; Indigestion; MEDICAL HOME; Personal history of colonic polyps (11/26/2012); Pneumonia (2014); Pneumonia (06/2017); Statin intolerance; Thyroid disease; and Type II or unspecified type diabetes mellitus without mention of complication, not stated as uncontrolled.    FAMILY HISTORY  Family History   Problem Relation Age of Onset   • Cancer Mother    • Lung Disease Father    • Cancer Father         SOCIAL HISTORY  Social History     Social  History Main Topics   • Smoking status: Former Smoker     Packs/day: 1.50     Years: 50.00     Types: Cigarettes     Quit date: 5/6/2006   • Smokeless tobacco: Never Used      Comment: quit smoking 2002   • Alcohol use 1.2 oz/week     2 Glasses of wine per week      Comment: 2 drinks a week   • Drug use: No   • Sexual activity: Not Currently     Partners: Male       SURGICAL HISTORY   has a past surgical history that includes us-needle core bx-breast panel; vaginal hysterectomy total; recovery (12/18/2015); other orthopedic surgery (1990); appendectomy (1955); cholecystectomy (1995); other (2001); other (1984); other (1990); thoracoscopy (Right, 2/1/2016); node dissection (Right, 2/1/2016); cataract extraction with iol; tonsillectomy; thyroidectomy (N/A, 11/29/2017); mastectomy (Left, 7/6/2018); and axillary node dissection (Left, 7/6/2018).    CURRENT MEDICATIONS  Reviewed.  See Encounter Summary.  Include   Current Facility-Administered Medications:   •  senna-docusate (PERICOLACE or SENOKOT S) 8.6-50 MG per tablet 2 Tab, 2 Tab, Oral, BID **AND** polyethylene glycol/lytes (MIRALAX) PACKET 1 Packet, 1 Packet, Oral, QDAY PRN **AND** magnesium hydroxide (MILK OF MAGNESIA) suspension 30 mL, 30 mL, Oral, QDAY PRN **AND** bisacodyl (DULCOLAX) suppository 10 mg, 10 mg, Rectal, QDAY PRN, Riya Gama M.D.  •  regadenoson (LEXISCAN) injection SOLN 0.4 mg, 0.4 mg, Intravenous, Once PRN, Riya Gama M.D.  •  aminophylline injection 100 mg, 100 mg, Intravenous, Q5 MIN PRN, Riya Gama M.D.  •  acetaminophen (TYLENOL) tablet 650 mg, 650 mg, Oral, Q6HRS PRN, Riya Gama M.D.  •  heparin injection 5,000 Units, 5,000 Units, Subcutaneous, Q8HRS, Riya Gama M.D.  •  aspirin (ASA) tablet 325 mg, 325 mg, Oral, DAILY **OR** aspirin (ASA) chewable tab 324 mg, 324 mg, Oral, DAILY **OR** aspirin (ASA) suppository 300 mg, 300 mg, Rectal, DAILY, Riya Gama M.D.    ALLERGIES  Allergies   Allergen  "Reactions   • Atorvastatin Myalgia     Leg cramps     • Codeine Hives and Nausea     RXN=40 years ago   • Latex Hives and Rash     Gets severe rash and blisters   • Lovastatin Myalgia     Muscle cramps     • Simvastatin Myalgia     Leg cramps     • Tape Unspecified     Blisters  Paper tape ok   • Lisinopril Cough   • Metformin Diarrhea     Diarrhea         PHYSICAL EXAM  VITAL SIGNS: /101   Pulse 79   Temp 36.5 °C (97.7 °F) (Oral)   Resp 18   Ht 1.651 m (5' 5\")   Wt 85 kg (187 lb 6.3 oz)   SpO2 93%   BMI 31.18 kg/m²   Constitutional:  Alert , able to answer questions  HENT: Nose is normal in appearance, external ears are normal,  moist mucous membranes  Eyes: Anicteric, left pupil 5mm, right 3 mm, there is no conjunctival drainage or pallor   Neck: The trachea is midline, there is no obvious mass or meningeal signs  Cardiovascular: Good perfusion,  regular rate and rhythm without murmurs gallops or rubs  Thorax & Lungs: Respiratory rate and effort are normal. There is normal chest excursion with respiration.  No wheezes rhonchi or rales noted.  Left breast 2 masses and skin thickening medically  Abdomen: Abdomen is normal in appearance, no gross peritoneal signs  normal bowel sounds, no pain with cough  :   No CVA tenderness to palpation  Musculoskeletal: No deformities noted in all 4 extremities.   Skin: Visualized skin is warm without rash.  Neurologic:  Cranial nerves II through XII are intact there is no focal abnormality noted.  Psychiatric: Normal mood and mentation    RADIOLOGY/PROCEDURES  Imaging Studies:    Results for orders placed or performed during the hospital encounter of 06/20/19   CBC with Differential   Result Value Ref Range    WBC 8.9 4.8 - 10.8 K/uL    RBC 4.57 4.20 - 5.40 M/uL    Hemoglobin 12.6 12.0 - 16.0 g/dL    Hematocrit 39.4 37.0 - 47.0 %    MCV 86.2 81.4 - 97.8 fL    MCH 27.6 27.0 - 33.0 pg    MCHC 32.0 (L) 33.6 - 35.0 g/dL    RDW 47.3 35.9 - 50.0 fL    Platelet Count 237 " 164 - 446 K/uL    MPV 9.2 9.0 - 12.9 fL    Neutrophils-Polys 64.20 44.00 - 72.00 %    Lymphocytes 27.20 22.00 - 41.00 %    Monocytes 5.80 0.00 - 13.40 %    Eosinophils 0.80 0.00 - 6.90 %    Basophils 0.30 0.00 - 1.80 %    Immature Granulocytes 1.70 (H) 0.00 - 0.90 %    Nucleated RBC 0.00 /100 WBC    Neutrophils (Absolute) 5.68 2.00 - 7.15 K/uL    Lymphs (Absolute) 2.41 1.00 - 4.80 K/uL    Monos (Absolute) 0.51 0.00 - 0.85 K/uL    Eos (Absolute) 0.07 0.00 - 0.51 K/uL    Baso (Absolute) 0.03 0.00 - 0.12 K/uL    Immature Granulocytes (abs) 0.15 (H) 0.00 - 0.11 K/uL    NRBC (Absolute) 0.00 K/uL   Complete Metabolic Panel (CMP)   Result Value Ref Range    Sodium 138 135 - 145 mmol/L    Potassium 3.9 3.6 - 5.5 mmol/L    Chloride 101 96 - 112 mmol/L    Co2 22 20 - 33 mmol/L    Anion Gap 15.0 (H) 0.0 - 11.9    Glucose 88 65 - 99 mg/dL    Bun 17 8 - 22 mg/dL    Creatinine 0.95 0.50 - 1.40 mg/dL    Calcium 8.7 8.4 - 10.2 mg/dL    AST(SGOT) 35 12 - 45 U/L    ALT(SGPT) 27 2 - 50 U/L    Alkaline Phosphatase 278 (H) 30 - 99 U/L    Total Bilirubin 0.5 0.1 - 1.5 mg/dL    Albumin 4.1 3.2 - 4.9 g/dL    Total Protein 7.5 6.0 - 8.2 g/dL    Globulin 3.4 1.9 - 3.5 g/dL    A-G Ratio 1.2 g/dL   Troponin   Result Value Ref Range    Troponin I 0.02 0.00 - 0.04 ng/mL   ESTIMATED GFR   Result Value Ref Range    GFR If African American >60 >60 mL/min/1.73 m 2    GFR If Non  57 (A) >60 mL/min/1.73 m 2   Troponin - STAT Once   Result Value Ref Range    Troponin I 0.02 0.00 - 0.04 ng/mL   TSH   Result Value Ref Range    TSH 0.230 (L) 0.380 - 5.330 uIU/mL   EKG   Result Value Ref Range    Report       [facility] Emergency Dept.    Test Date:  2019  Pt Name:    GREG DEMARCO                 Department: EDS  MRN:        [id number]                      [facility]  Gender:     Female                       Technician: YAA  :        [id number]                   Requested By:ER TRIAGE  PROTOCOL  Order #:    [id number]                    Reading MD:    Measurements  Intervals                                Axis  Rate:       102                          P:          87  ND:         120                          QRS:        39  QRSD:       80                           T:          46  QT:         344  QTc:        449    Interpretive Statements  SINUS TACHYCARDIA  Compared to ECG 05/30/2018 12:43:50  Accelerated junctional rhythm no longer present  Right ventricular hypertrophy no longer present           CT-CTA CHEST PULMONARY ARTERY W/ RECONS   Final Result         1. No CT evidence of pulmonary embolism.      2. Grossly unchanged upper lobe soft tissue thickening in the right lower lobe.      3. Mild nodular thickening in the left axilla, similar to prior, could relate to prior postsurgical change.      4. Small hiatal hernia.      CT-HEAD W/O   Final Result         1. No acute intracranial abnormality. No evidence of acute intracranial hemorrhage or mass lesion.               DX-CHEST-PORTABLE (1 VIEW)   Final Result      No acute cardiopulmonary abnormality identified.      NM-BONE SCAN WHOLE BODY    (Results Pending)   MR-BRAIN-WITH & W/O    (Results Pending)   MR-CHEST-WITH & W/O AND SEQ    (Results Pending)   NM-CARDIAC STRESS TEST    (Results Pending)         I interpreted this EKG myself.  This is a 12-lead study.  The rhythm is Sinus with a rate of102.  There are no ST segment nor T wave abnormalities.  Interpretation: No ST segment elevation myocardial infarction.  Sinus tachycardia.      COURSE & MEDICAL DECISION MAKING  Nursing notes and vital signs were reviewed. (See chart for details)  The patients nursing records were reviewed, history was obtained from the patient.    This patient presents with dizziness shortness of breath weakness with a history of breast cancer and abnormal breast exam.  Clinically she presents with what she states is new anisocoria.  It is not associated with a  headache and vomiting.  At this time I cannot exclude a cerebral metastasis but I will do a screening CT first before MRI to make sure there is no large mass,  edema or other abnormality that is emergent.    With regard to the patient's chest pain chest x-ray was unremarkable, troponin was negative EKG showed no abnormality.  She has pleuritic pain in the differential at this point is metastatic disease versus pulmonary emboli.  The patient will undergo CT imaging to evaluate further    CT shows axillary changes, with breast mass, suspicion for metastatic diease  Also anisocoria, new and will be admitted for MRI brain        FINAL IMPRESSION  1. Chest pain  2. Anisocoria       DISPOSITION  Admit  Electronically signed by: Gloria Denson, 6/20/2019 2:59 PM

## 2019-06-20 NOTE — ED NOTES
Rounded on patient.  Aware of pending tests.  Pillow provided for arm and covered with a blanket.  No additional needs at this time.

## 2019-06-21 ENCOUNTER — APPOINTMENT (OUTPATIENT)
Dept: RADIOLOGY | Facility: MEDICAL CENTER | Age: 79
End: 2019-06-21
Attending: HOSPITALIST
Payer: MEDICARE

## 2019-06-21 ENCOUNTER — TELEPHONE (OUTPATIENT)
Dept: MEDICAL GROUP | Age: 79
End: 2019-06-21

## 2019-06-21 PROBLEM — R07.89 CHEST WALL PAIN: Status: ACTIVE | Noted: 2019-06-20

## 2019-06-21 PROBLEM — R79.89 LOW TSH LEVEL: Status: ACTIVE | Noted: 2019-06-21

## 2019-06-21 LAB
ALBUMIN SERPL BCP-MCNC: 3.1 G/DL (ref 3.2–4.9)
ALBUMIN/GLOB SERPL: 1.2 G/DL
ALP SERPL-CCNC: 230 U/L (ref 30–99)
ALT SERPL-CCNC: 21 U/L (ref 2–50)
ANION GAP SERPL CALC-SCNC: 10 MMOL/L (ref 0–11.9)
APPEARANCE UR: CLEAR
AST SERPL-CCNC: 31 U/L (ref 12–45)
BASOPHILS # BLD AUTO: 0.3 % (ref 0–1.8)
BASOPHILS # BLD: 0.02 K/UL (ref 0–0.12)
BILIRUB SERPL-MCNC: 0.4 MG/DL (ref 0.1–1.5)
BILIRUB UR QL STRIP.AUTO: NEGATIVE
BUN SERPL-MCNC: 13 MG/DL (ref 8–22)
CALCIUM SERPL-MCNC: 8.3 MG/DL (ref 8.4–10.2)
CHLORIDE SERPL-SCNC: 105 MMOL/L (ref 96–112)
CHOLEST SERPL-MCNC: 148 MG/DL (ref 100–199)
CO2 SERPL-SCNC: 23 MMOL/L (ref 20–33)
COLOR UR: YELLOW
CREAT SERPL-MCNC: 0.7 MG/DL (ref 0.5–1.4)
EKG IMPRESSION: NORMAL
EOSINOPHIL # BLD AUTO: 0.09 K/UL (ref 0–0.51)
EOSINOPHIL NFR BLD: 1.5 % (ref 0–6.9)
ERYTHROCYTE [DISTWIDTH] IN BLOOD BY AUTOMATED COUNT: 48.6 FL (ref 35.9–50)
GLOBULIN SER CALC-MCNC: 2.6 G/DL (ref 1.9–3.5)
GLUCOSE SERPL-MCNC: 123 MG/DL (ref 65–99)
GLUCOSE UR STRIP.AUTO-MCNC: NEGATIVE MG/DL
HCT VFR BLD AUTO: 33.1 % (ref 37–47)
HDLC SERPL-MCNC: 25 MG/DL
HGB BLD-MCNC: 10.5 G/DL (ref 12–16)
IMM GRANULOCYTES # BLD AUTO: 0.16 K/UL (ref 0–0.11)
IMM GRANULOCYTES NFR BLD AUTO: 2.6 % (ref 0–0.9)
KETONES UR STRIP.AUTO-MCNC: NEGATIVE MG/DL
LDLC SERPL CALC-MCNC: 96 MG/DL
LEUKOCYTE ESTERASE UR QL STRIP.AUTO: NEGATIVE
LYMPHOCYTES # BLD AUTO: 2.35 K/UL (ref 1–4.8)
LYMPHOCYTES NFR BLD: 38.1 % (ref 22–41)
MCH RBC QN AUTO: 27.9 PG (ref 27–33)
MCHC RBC AUTO-ENTMCNC: 31.7 G/DL (ref 33.6–35)
MCV RBC AUTO: 88 FL (ref 81.4–97.8)
MICRO URNS: NORMAL
MONOCYTES # BLD AUTO: 0.48 K/UL (ref 0–0.85)
MONOCYTES NFR BLD AUTO: 7.8 % (ref 0–13.4)
NEUTROPHILS # BLD AUTO: 3.06 K/UL (ref 2–7.15)
NEUTROPHILS NFR BLD: 49.7 % (ref 44–72)
NITRITE UR QL STRIP.AUTO: NEGATIVE
NRBC # BLD AUTO: 0 K/UL
NRBC BLD-RTO: 0 /100 WBC
PH UR STRIP.AUTO: 5.5 [PH]
PLATELET # BLD AUTO: 186 K/UL (ref 164–446)
PMV BLD AUTO: 9.3 FL (ref 9–12.9)
POTASSIUM SERPL-SCNC: 4 MMOL/L (ref 3.6–5.5)
PROT SERPL-MCNC: 5.7 G/DL (ref 6–8.2)
PROT UR QL STRIP: NEGATIVE MG/DL
RBC # BLD AUTO: 3.76 M/UL (ref 4.2–5.4)
RBC UR QL AUTO: NEGATIVE
SODIUM SERPL-SCNC: 138 MMOL/L (ref 135–145)
SP GR UR STRIP.AUTO: <=1.005
TRIGL SERPL-MCNC: 133 MG/DL (ref 0–149)
WBC # BLD AUTO: 6.2 K/UL (ref 4.8–10.8)

## 2019-06-21 PROCEDURE — 700111 HCHG RX REV CODE 636 W/ 250 OVERRIDE (IP): Performed by: HOSPITALIST

## 2019-06-21 PROCEDURE — 93018 CV STRESS TEST I&R ONLY: CPT | Performed by: INTERNAL MEDICINE

## 2019-06-21 PROCEDURE — 80061 LIPID PANEL: CPT

## 2019-06-21 PROCEDURE — 96372 THER/PROPH/DIAG INJ SC/IM: CPT

## 2019-06-21 PROCEDURE — 700102 HCHG RX REV CODE 250 W/ 637 OVERRIDE(OP): Performed by: INTERNAL MEDICINE

## 2019-06-21 PROCEDURE — 78452 HT MUSCLE IMAGE SPECT MULT: CPT | Mod: 26 | Performed by: INTERNAL MEDICINE

## 2019-06-21 PROCEDURE — 84442 ASSAY OF THYROID ACTIVITY: CPT

## 2019-06-21 PROCEDURE — 99225 PR SUBSEQUENT OBSERVATION CARE,LEVEL II: CPT | Performed by: INTERNAL MEDICINE

## 2019-06-21 PROCEDURE — 96375 TX/PRO/DX INJ NEW DRUG ADDON: CPT

## 2019-06-21 PROCEDURE — A9270 NON-COVERED ITEM OR SERVICE: HCPCS | Performed by: INTERNAL MEDICINE

## 2019-06-21 PROCEDURE — 700111 HCHG RX REV CODE 636 W/ 250 OVERRIDE (IP)

## 2019-06-21 PROCEDURE — 86800 THYROGLOBULIN ANTIBODY: CPT

## 2019-06-21 PROCEDURE — 81003 URINALYSIS AUTO W/O SCOPE: CPT

## 2019-06-21 PROCEDURE — G0378 HOSPITAL OBSERVATION PER HR: HCPCS

## 2019-06-21 PROCEDURE — 84479 ASSAY OF THYROID (T3 OR T4): CPT

## 2019-06-21 PROCEDURE — 93010 ELECTROCARDIOGRAM REPORT: CPT | Performed by: INTERNAL MEDICINE

## 2019-06-21 PROCEDURE — A9502 TC99M TETROFOSMIN: HCPCS

## 2019-06-21 PROCEDURE — 85025 COMPLETE CBC W/AUTO DIFF WBC: CPT

## 2019-06-21 PROCEDURE — 80053 COMPREHEN METABOLIC PANEL: CPT

## 2019-06-21 RX ORDER — LORAZEPAM 1 MG/1
2 TABLET ORAL ONCE
Status: COMPLETED | OUTPATIENT
Start: 2019-06-22 | End: 2019-06-22

## 2019-06-21 RX ORDER — MORPHINE SULFATE 4 MG/ML
2 INJECTION, SOLUTION INTRAMUSCULAR; INTRAVENOUS ONCE
Status: COMPLETED | OUTPATIENT
Start: 2019-06-21 | End: 2019-06-21

## 2019-06-21 RX ORDER — REGADENOSON 0.08 MG/ML
INJECTION, SOLUTION INTRAVENOUS
Status: COMPLETED
Start: 2019-06-21 | End: 2019-06-21

## 2019-06-21 RX ORDER — LORAZEPAM 1 MG/1
2 TABLET ORAL ONCE
Status: COMPLETED | OUTPATIENT
Start: 2019-06-21 | End: 2019-06-21

## 2019-06-21 RX ORDER — LORAZEPAM 2 MG/ML
1 INJECTION INTRAMUSCULAR
Status: DISCONTINUED | OUTPATIENT
Start: 2019-06-21 | End: 2019-06-22 | Stop reason: HOSPADM

## 2019-06-21 RX ORDER — OXYCODONE AND ACETAMINOPHEN 7.5; 325 MG/1; MG/1
1 TABLET ORAL EVERY 4 HOURS PRN
Status: DISCONTINUED | OUTPATIENT
Start: 2019-06-21 | End: 2019-06-22

## 2019-06-21 RX ADMIN — HEPARIN SODIUM 5000 UNITS: 5000 INJECTION, SOLUTION INTRAVENOUS; SUBCUTANEOUS at 14:42

## 2019-06-21 RX ADMIN — LORAZEPAM 2 MG: 1 TABLET ORAL at 22:03

## 2019-06-21 RX ADMIN — MORPHINE SULFATE 2 MG: 4 INJECTION INTRAVENOUS at 02:19

## 2019-06-21 RX ADMIN — REGADENOSON 0.4 MG: 0.08 INJECTION, SOLUTION INTRAVENOUS at 11:39

## 2019-06-21 RX ADMIN — HEPARIN SODIUM 5000 UNITS: 5000 INJECTION, SOLUTION INTRAVENOUS; SUBCUTANEOUS at 21:10

## 2019-06-21 RX ADMIN — OXYCODONE HYDROCHLORIDE AND ACETAMINOPHEN 1 TABLET: 7.5; 325 TABLET ORAL at 16:58

## 2019-06-21 RX ADMIN — OXYCODONE HYDROCHLORIDE AND ACETAMINOPHEN 1 TABLET: 7.5; 325 TABLET ORAL at 21:09

## 2019-06-21 ASSESSMENT — ENCOUNTER SYMPTOMS
DIARRHEA: 0
DIZZINESS: 0
COUGH: 0
CHILLS: 0
NERVOUS/ANXIOUS: 1
DEPRESSION: 0
EYE PAIN: 0
SORE THROAT: 0
NAUSEA: 0
VOMITING: 0
HEADACHES: 0
SENSORY CHANGE: 0
EYE REDNESS: 0
ABDOMINAL PAIN: 0
FEVER: 0
SHORTNESS OF BREATH: 0
SPEECH CHANGE: 0
CONSTIPATION: 0
DIAPHORESIS: 0
WEAKNESS: 0

## 2019-06-21 NOTE — PROGRESS NOTES
Report received. Care assumed. Resting in bed. Plan of care discussed. No c/o. Call light in reach.

## 2019-06-21 NOTE — TELEPHONE ENCOUNTER
ESTABLISHED PATIENT PRE-VISIT PLANNING     Patient was NOT contacted to complete PVP.     Note: Patient will not be contacted if there is no indication to call.     1.  Reviewed notes from the last few office visits within the medical group: Yes    2.  If any orders were placed at last visit or intended to be done for this visit (i.e. 6 mos follow-up), do we have Results/Consult Notes?        •  Labs - Labs ordered, completed on 6/19 and results are in chart.   Note: If patient appointment is for lab review and patient did not complete labs, check with provider if OK to reschedule patient until labs completed.       •  Imaging - Imaging ordered, completed and results are in chart.       •  Referrals - Referral ordered, patient has NOT been seen.    3. Is this appointment scheduled as a Hospital Follow-Up? Yes, visit was at [facility].     4.  Immunizations were updated in Epic using WebIZ?: Epic matches WebIZ       •  Web Iz Recommendations: SHINGRIX (Shingles)    5.  Patient is due for the following Health Maintenance Topics:   Health Maintenance Due   Topic Date Due   • IMM ZOSTER VACCINES (1 of 2) 07/30/1991   • COLONOSCOPY  06/07/2019           6. Orders for overdue Health Maintenance topics pended in Pre-Charting? N\A    7.  AHA (MDX) form printed for Provider? No, already completed    8.  Patient was NOT informed to arrive 15 min prior to their scheduled appointment and bring in their medication bottles.

## 2019-06-21 NOTE — PROGRESS NOTES
Nursing care plan includes knowledge deficit, potential for discomfort, potential for compromised cardiac output.  POC includes teaching, comfort measures and reassurance, and access to code cart, cardiology stand by and availability of rapid response team.  Pt verbalizes good understanding of benefits and risks of pharmacological cardiac stress test.  Informed consent obtained.  Lexiscan given, pt developed the following signs/symptoms:leg pressure, sob, leg heaviness, fatigue, stomach pressure.    VS stable, symptoms resolved.  To waiting room, fluids and/or snack given, awaiting second scan.  Nursing goals met.

## 2019-06-21 NOTE — PROGRESS NOTES
Assumed care of pt w/ beside report from JEN Rajan. A&Ox4. Pt reports adequate pain control from morphine dose overnight. NPO awaiting stress test. Fall precautions/safety maintained. Hourly rounding. Will continue to monitor.

## 2019-06-21 NOTE — ASSESSMENT & PLAN NOTE
Diagnosed in 2015, s/p  anterior segmentectomy of the right upper lobe and removal of a portion of the right middle lobe  PET scan feb neg.

## 2019-06-21 NOTE — CARE PLAN
Problem: Infection  Goal: Will remain free from infection  Outcome: PROGRESSING AS EXPECTED  Pt educated on standard precautions and adequate hand hygiene. Pt taught back strategies to decrease risk of infection.    Problem: Knowledge Deficit  Goal: Knowledge of disease process/condition, treatment plan, diagnostic tests, and medications will improve  Outcome: PROGRESSING AS EXPECTED  Pt educated on process of stress test and plan to obtain MRI. Awaiting results to determine further plan of care. Pt verbalized understanding.

## 2019-06-21 NOTE — PROGRESS NOTES
Resting on and off. C/o heartburn, medicated per MAR. Snack provided, aware NPO after midnight. Call light in reach.

## 2019-06-21 NOTE — H&P
Hospital Medicine History & Physical Note    Date of Service  6/20/2019    Primary Care Physician  Siva Smart M.D.    Consultants  None    Code Status  Full Code    Chief Complaint  Chief Complaint   Patient presents with   • Chest Pressure   • Shortness of Breath       History of Presenting Illness   is a very pleasant 78 y.o. female with a past medical history of advanced triple negative breast cancer, status post chemotherapy, radiation and lumpectomy, history of lung cancer status post lobectomy, history of TIAs in the past, hypertension presented to the emergency room on 6/20/2019 for evaluation of left breast discomfort/chest discomfort which she says is heaviness, exacerbated by deep inspiration.  He has been also complaining of having extreme weakness over the past week with nighttime sweats.  But denies having any productive cough, dysuria or diarrhea.  She also feels like she has muscle aches but no specific back pain noted.    Review of Systems  Review of Systems   Constitutional: Positive for malaise/fatigue. Negative for chills and fever.   HENT: Negative for congestion, hearing loss, sore throat and tinnitus.    Eyes: Negative for blurred vision, double vision, photophobia and pain.   Respiratory: Negative for cough, hemoptysis, sputum production, shortness of breath and stridor.    Cardiovascular: Positive for chest pain. Negative for palpitations, orthopnea, claudication and PND.   Gastrointestinal: Negative for blood in stool, constipation, heartburn, melena, nausea and vomiting.   Genitourinary: Negative for dysuria, frequency and urgency.   Musculoskeletal: Positive for myalgias. Negative for back pain and neck pain.   Neurological: Positive for weakness. Negative for dizziness, tingling, tremors, sensory change, speech change and headaches.   Psychiatric/Behavioral: Negative for depression, memory loss and suicidal ideas. The patient is nervous/anxious.        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   • Cutaneous skin tags 1/29/2015   • Pneumonia 2014   • Personal history of colonic polyps 11/26/2012   • Cancer (HCC) 2006    CLL   • Accelerated junctional rhythm on EKG in 5/2018 in setting of chemo    • Cataract     right  and  left  IOL   • CLL (chronic lymphoblastic leukemia)    • DM (diabetes mellitus) (HCC)     oral meds   • Hiatus hernia syndrome     no surgery   • Indigestion    • MEDICAL HOME    • Statin intolerance    • Thyroid disease    • Type II or unspecified type diabetes mellitus without mention of complication, not stated as uncontrolled     pre-diabetic       Surgical History   has a past surgical history that includes us-needle core bx-breast panel; vaginal hysterectomy total; recovery (12/18/2015); other orthopedic surgery (1990); appendectomy (1955); cholecystectomy (1995); other (2001); other (1984); other (1990); thoracoscopy (Right, 2/1/2016); node dissection (Right, 2/1/2016); cataract extraction with iol; tonsillectomy; thyroidectomy (N/A, 11/29/2017); mastectomy (Left, 7/6/2018); and axillary node dissection (Left, 7/6/2018).    Family History  family history includes Cancer in her father and mother; Lung Disease in her father.    Social History   reports that she quit smoking about 13 years ago. Her smoking use included Cigarettes. She has a 75.00 pack-year smoking history. She has never used smokeless tobacco. She reports that she drinks about 1.2 oz of alcohol per week . She reports that she does not use drugs.    Allergies  Allergies   Allergen Reactions   • Atorvastatin Myalgia     Leg cramps     • Codeine Hives and Nausea     RXN=40 years ago   • Latex Hives and Rash     Gets severe rash and blisters   • Lovastatin Myalgia     Muscle cramps     • Simvastatin Myalgia      Leg cramps     • Tape Unspecified     Blisters  Paper tape ok   • Lisinopril Cough   • Metformin Diarrhea     Diarrhea         Medications  Prior to Admission medications    Medication Sig Start Date End Date Taking? Authorizing Provider   lansoprazole (PREVACID) 30 MG CAPSULE DELAYED RELEASE Take 30 mg by mouth 1 time daily as needed (upset stomach).   Yes Physician Outpatient   levothyroxine (SYNTHROID) 125 MCG Tab Take 125 mcg by mouth every morning.   Yes Physician Outpatient   venlafaxine XR (EFFEXOR XR) 37.5 MG CAPSULE SR 24 HR Take 37.5 mg by mouth every morning.   Yes Physician Outpatient   LORazepam (ATIVAN) 2 MG tablet Take 4 mg by mouth every bedtime. 5/23/19   Physician Outpatient   pimecrolimus (ELIDEL) 1 % cream Apply 1 Application to affected area(s) 3 times a day. 6/7/19   Corrine Newsome M.D.   Cholecalciferol (VITAMIN D) 2000 units Cap Take 1 Cap by mouth every day. 1/23/19   Siva Smart M.D.       Physical Exam  Temp:  [37.2 °C (98.9 °F)] 37.2 °C (98.9 °F)  Pulse:  [] 77  Resp:  [16-20] 18  BP: (155)/(85) 155/85  SpO2:  [94 %-97 %] 97 %  Physical Exam   Constitutional: She is oriented to person, place, and time. She appears well-developed and well-nourished. No distress.   HENT:   Head: Normocephalic and atraumatic.   Mouth/Throat: No oropharyngeal exudate.   Eyes: Pupils are equal, round, and reactive to light. Conjunctivae are normal. Right eye exhibits no discharge. No scleral icterus.   Neck: Neck supple. No JVD present. No thyromegaly present.   Cardiovascular: Normal rate and intact distal pulses.    No murmur heard.  Pulmonary/Chest: Effort normal and breath sounds normal. No stridor. No respiratory distress. She has no wheezes. She has no rales.   Abdominal: Soft. Bowel sounds are normal. She exhibits no distension. There is no tenderness. There is no rebound.   Musculoskeletal: Normal range of motion. She exhibits no edema.   Neurological: She is alert and oriented to  person, place, and time. No cranial nerve deficit.   Skin: Skin is warm. She is not diaphoretic. No erythema.   Left breast skin changes from radiation, also some lumpiness medial aspect 9:00 aspect of the breast   Psychiatric: She has a normal mood and affect. Her behavior is normal. Thought content normal.   Nursing note and vitals reviewed.      Laboratory:  Recent Labs      06/20/19   1420   WBC  8.9   RBC  4.57   HEMOGLOBIN  12.6   HEMATOCRIT  39.4   MCV  86.2   MCH  27.6   MCHC  32.0*   RDW  47.3   PLATELETCT  237   MPV  9.2     Recent Labs      06/20/19   1420   SODIUM  138   POTASSIUM  3.9   CHLORIDE  101   CO2  22   GLUCOSE  88   BUN  17   CREATININE  0.95   CALCIUM  8.7     Recent Labs      06/20/19   1420   ALTSGPT  27   ASTSGOT  35   ALKPHOSPHAT  278*   TBILIRUBIN  0.5   GLUCOSE  88         Recent Labs      06/20/19   1420   TROPONINI  0.02       Urinalysis:          Imaging:  CT-CTA CHEST PULMONARY ARTERY W/ RECONS   Final Result         1. No CT evidence of pulmonary embolism.      2. Grossly unchanged upper lobe soft tissue thickening in the right lower lobe.      3. Mild nodular thickening in the left axilla, similar to prior, could relate to prior postsurgical change.      4. Small hiatal hernia.      CT-HEAD W/O   Final Result         1. No acute intracranial abnormality. No evidence of acute intracranial hemorrhage or mass lesion.               DX-CHEST-PORTABLE (1 VIEW)   Final Result      No acute cardiopulmonary abnormality identified.      NM-BONE SCAN WHOLE BODY    (Results Pending)   MR-BRAIN-WITH & W/O    (Results Pending)   MR-CHEST-WITH & W/O AND SEQ    (Results Pending)       Assessment/Plan:  I anticipate this patient is appropriate for observation status at this time.    * Malaise and fatigue   Assessment & Plan    Reports malaise and fatigue, with night sweats.   At this point diffierntials include, infection, no obvious from labs, CTA chest neg for pneumonia.  UA pending.  Also  consider B-symptoms, as patient did have CLL diagnosis but after her chemotherapy for breast cancer, her CLL apparently chronic, PET scan neg, on surveillance?  Check TSH  Check Urine.  Consider oncology consultation or outpatient follow up.  CBC Is unremarkable.      Pain in the chest   Assessment & Plan    Rule out cardiac cause. Patient has had radiation and chemotherapy.  CTA PE neg  Initial EKG and troponin's are negative, we will continue to trend serial troponins, If negative we will proceed with a NM Stress test for risk stratification.  We will monitor patient on telemetry,  Aspirin has been ordered.   TSH Pending  Lipid panel ordered and pending        Cerebral vascular disease- old tia with exp aphasia- resolved; neg mri/mra brain- (present on admission)   Assessment & Plan    Hx of.  asymmetric pupils which have normalized  Check MRI brain for metastatic rule out      Malignant neoplasm of upper-outer quadrant of left breast in female, estrogen receptor negative (CMS-HCC)- 2/2018; 1 positive node; chemorx (dr. grant) ), lumpectomy ( dr sharpe), RT 2 mo (dr beltran)- (present on admission)   Assessment & Plan    Tripple neg breast cancer s/p lumpectomy, XRT and chemotherapy.   Now complaining of ackward lumpiness/firmness that is new?   CTA PE neg for findings but does show axillary lymph node abnormality, possibly scar tissue?  MRI chest ordered.  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      Gastroesophageal reflux disease with esophagitis- (present on admission)   Assessment & Plan    Resume prevacid.          VTE prophylaxis: Prophylaxis: sc heparin

## 2019-06-21 NOTE — PROGRESS NOTES
Pt arrived to room 313-1 from ED w/ RN and CNA. Stand and pivot from stretcher to bed w/ SBA. Tele placed. No complaints offered. Pt requesting food and rest. Oriented to room, call bell within reach. Report given to oncoming JEN Rajan.

## 2019-06-21 NOTE — PROGRESS NOTES
Resting with eyes closed. Respirations even and unlabored. Repositions self. No further c/o heartburn or pain after medication. Call light in reach.     Tele Report:   Sinus Rhythm  HR 60-85  Occ pAC  0.16/ 0.08/ 0.40

## 2019-06-21 NOTE — ASSESSMENT & PLAN NOTE
Reports malaise and fatigue, with night sweats.   Radiology to this point is negative, she has had previous breast, lung malignancy, questionably CLL  MRI of brain, chest are pending

## 2019-06-22 ENCOUNTER — PATIENT OUTREACH (OUTPATIENT)
Dept: HEALTH INFORMATION MANAGEMENT | Facility: OTHER | Age: 79
End: 2019-06-22

## 2019-06-22 VITALS
RESPIRATION RATE: 18 BRPM | DIASTOLIC BLOOD PRESSURE: 60 MMHG | SYSTOLIC BLOOD PRESSURE: 124 MMHG | HEIGHT: 65 IN | HEART RATE: 74 BPM | WEIGHT: 190.04 LBS | BODY MASS INDEX: 31.66 KG/M2 | OXYGEN SATURATION: 95 % | TEMPERATURE: 97.8 F

## 2019-06-22 DIAGNOSIS — C50.412 MALIGNANT NEOPLASM OF UPPER-OUTER QUADRANT OF LEFT FEMALE BREAST, UNSPECIFIED ESTROGEN RECEPTOR STATUS (HCC): ICD-10-CM

## 2019-06-22 PROCEDURE — A9270 NON-COVERED ITEM OR SERVICE: HCPCS | Performed by: HOSPITALIST

## 2019-06-22 PROCEDURE — G0378 HOSPITAL OBSERVATION PER HR: HCPCS

## 2019-06-22 PROCEDURE — 700102 HCHG RX REV CODE 250 W/ 637 OVERRIDE(OP): Performed by: HOSPITALIST

## 2019-06-22 PROCEDURE — 99217 PR OBSERVATION CARE DISCHARGE: CPT | Performed by: INTERNAL MEDICINE

## 2019-06-22 RX ORDER — LIDOCAINE 50 MG/G
1 PATCH TOPICAL EVERY 24 HOURS
Qty: 10 PATCH | Refills: 2 | Status: ON HOLD | OUTPATIENT
Start: 2019-06-22 | End: 2019-07-19

## 2019-06-22 RX ORDER — ACETAMINOPHEN 325 MG/1
650 TABLET ORAL EVERY 6 HOURS PRN
Qty: 30 TAB | Refills: 0 | Status: SHIPPED | OUTPATIENT
Start: 2019-06-22

## 2019-06-22 RX ORDER — TRAMADOL HYDROCHLORIDE 50 MG/1
50 TABLET ORAL ONCE
Status: DISCONTINUED | OUTPATIENT
Start: 2019-06-22 | End: 2019-06-22 | Stop reason: HOSPADM

## 2019-06-22 RX ORDER — ALPRAZOLAM 2 MG/1
2 TABLET ORAL
Qty: 2 TAB | Refills: 0 | Status: SHIPPED | OUTPATIENT
Start: 2019-06-22 | End: 2019-07-06

## 2019-06-22 RX ADMIN — ASPIRIN 325 MG ORAL TABLET 325 MG: 325 PILL ORAL at 05:42

## 2019-06-22 RX ADMIN — LORAZEPAM 2 MG: 1 TABLET ORAL at 00:29

## 2019-06-22 NOTE — PROGRESS NOTES
Resting in bed. Reports not being able to sleep, MD paged,  further medicated per MAR. No other c/o. Call light in reach.

## 2019-06-22 NOTE — PROGRESS NOTES
Pt aox4, discharge summary given, discharge education provided.  Pt verbalized understanding.  Pt to schedule follow up. IV removed. All belongings returned.  Escorted by staff to the [facility].

## 2019-06-22 NOTE — CARE PLAN
Problem: Knowledge Deficit  Goal: Knowledge of the prescribed therapeutic regimen will improve  Outcome: PROGRESSING AS EXPECTED  Plan of care discussed with patient. Opportunity given for questions. States understanding.     Problem: Pain Management  Goal: Pain level will decrease to patient's comfort goal  Outcome: PROGRESSING AS EXPECTED  Uses 0-10 scale appropriately. Pharmacologic and nonpharmacologic interventions in place.

## 2019-06-22 NOTE — PROGRESS NOTES
Assumed pt care. AOx4. VSS.  Denies any pain at this time.  Denies any other distress.  Discussed POC.  Pt verbalized understanding.  Hourly rounding in place. Fall precautions in place and call lights w/in reach.  Will continue to monitor.

## 2019-06-22 NOTE — PROGRESS NOTES
[facility] Medicine Daily Progress Note    Date of Service  6/21/2019    Chief Complaint  Left shoulder, chest wall pain    Hospital Course    *78-year-old female with prior history of breast cancer, lung cancer, CLL presents with left shoulder, chest wall pain.  CT showed skin thickening in the left axillary area similar to prior exams.  Exam shows same.  There is no obvious cellulitis      Interval Problem Update  Patient is somewhat anxious,  Discussed plan of care  Discussed that stress test was negative  MRIs are pending-if negative advised her she will be discharged tomorrow.  Bone scan cannot be performed due to stress test.  This will need to be ordered by her primary care provider as an outpatient.    Consultants/Specialty  none    Code Status  Full per H&P    Disposition  home    Review of Systems  Review of Systems   Constitutional: Positive for malaise/fatigue. Negative for chills, diaphoresis and fever.   HENT: Negative for congestion and sore throat.    Eyes: Negative for pain and redness.   Respiratory: Negative for cough and shortness of breath.    Cardiovascular: Positive for chest pain.   Gastrointestinal: Negative for abdominal pain, constipation, diarrhea, nausea and vomiting.   Genitourinary: Negative for dysuria.   Musculoskeletal: Positive for joint pain (Left shoulder, axillar).   Skin: Negative for itching and rash.   Neurological: Negative for dizziness, sensory change, speech change, weakness and headaches.   Psychiatric/Behavioral: Negative for depression. The patient is nervous/anxious.         Physical Exam  Temp:  [36.3 °C (97.3 °F)-36.5 °C (97.7 °F)] 36.3 °C (97.3 °F)  Pulse:  [67-79] 75  Resp:  [18] 18  BP: ()/() 114/52  SpO2:  [92 %-99 %] 99 %    Physical Exam   Constitutional: She is oriented to person, place, and time. She appears well-developed and well-nourished. No distress.   HENT:   Head: Normocephalic and atraumatic.   Right Ear: External ear normal.   Left Ear:  External ear normal.   Nose: Nose normal.   Mouth/Throat: Oropharynx is clear and moist.   Eyes: Pupils are equal, round, and reactive to light. Conjunctivae and EOM are normal. Right eye exhibits no discharge. Left eye exhibits no discharge. No scleral icterus.   Neck: Neck supple.   Cardiovascular: Normal rate and regular rhythm.    Pulmonary/Chest: Effort normal and breath sounds normal. She exhibits tenderness (Left axillary and breast fold with hyperpigmentation changes and mild induration of the skin).   Abdominal: Soft. Bowel sounds are normal. She exhibits no distension. There is no tenderness.   Musculoskeletal: She exhibits no edema or tenderness.   Neurological: She is alert and oriented to person, place, and time. No cranial nerve deficit.   Skin: Skin is warm and dry. She is not diaphoretic. No erythema.   Psychiatric: She has a normal mood and affect.   Nursing note and vitals reviewed.      Fluids    Intake/Output Summary (Last 24 hours) at 06/21/19 1736  Last data filed at 06/21/19 1300   Gross per 24 hour   Intake              720 ml   Output             1000 ml   Net             -280 ml       Laboratory  Recent Labs      06/20/19   1420  06/21/19   0413   WBC  8.9  6.2   RBC  4.57  3.76*   HEMOGLOBIN  12.6  10.5*   HEMATOCRIT  39.4  33.1*   MCV  86.2  88.0   MCH  27.6  27.9   MCHC  32.0*  31.7*   RDW  47.3  48.6   PLATELETCT  237  186   MPV  9.2  9.3     Recent Labs      06/20/19   1420  06/21/19   0413   SODIUM  138  138   POTASSIUM  3.9  4.0   CHLORIDE  101  105   CO2  22  23   GLUCOSE  88  123*   BUN  17  13   CREATININE  0.95  0.70   CALCIUM  8.7  8.3*             Recent Labs      06/21/19   0413   TRIGLYCERIDE  133   HDL  25*   LDL  96       Imaging  NM-CARDIAC STRESS TEST   Final Result      CT-CTA CHEST PULMONARY ARTERY W/ RECONS   Final Result         1. No CT evidence of pulmonary embolism.      2. Grossly unchanged upper lobe soft tissue thickening in the right lower lobe.      3. Mild  nodular thickening in the left axilla, similar to prior, could relate to prior postsurgical change.      4. Small hiatal hernia.      CT-HEAD W/O   Final Result         1. No acute intracranial abnormality. No evidence of acute intracranial hemorrhage or mass lesion.               DX-CHEST-PORTABLE (1 VIEW)   Final Result      No acute cardiopulmonary abnormality identified.      MR-BRAIN-WITH & W/O    (Results Pending)   MR-CHEST-WITH & W/O AND SEQ    (Results Pending)        Assessment/Plan  * Malaise and fatigue   Assessment & Plan    Reports malaise and fatigue, with night sweats.   Radiology to this point is negative, she has had previous breast, lung malignancy, questionably CLL  MRI of brain, chest are pending     Low TSH level   Assessment & Plan    Check TFTs     Chest wall pain   Assessment & Plan    CTA, stress test are negative  MRI pending  Oxycodone as needed       Cerebral vascular disease- old tia with exp aphasia- resolved; neg mri/mra brain- (present on admission)   Assessment & Plan    Hx of.  asymmetric pupils which have normalized  Check MRI brain for metastatic rule out      Malignant neoplasm of upper-outer quadrant of left breast in female, estrogen receptor negative (CMS-HCC)- 2/2018; 1 positive node; chemorx (dr. grant) ), lumpectomy ( dr sharpe), RT 2 mo (dr beltran)- (present on admission)   Assessment & Plan    Tripple neg breast cancer s/p lumpectomy, XRT and chemotherapy.   Now complaining of ackward lumpiness/firmness that is new?   CTA PE neg for findings but does show axillary lymph node abnormality, possibly scar tissue?  MRI chest ordered.  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      Gastroesophageal reflux disease with esophagitis- (present on admission)   Assessment & Plan    Continue PPI          VTE prophylaxis: heparin

## 2019-06-22 NOTE — PROGRESS NOTES
Telemetry Shift Summary    Rhythm SR/accelerated junctional  HR Range 70s-80s  Ectopy PACs  Measurements /0.08/0.40        Normal Values  Rhythm SR  HR Range    Measurements 0.12-0.20 / 0.06-0.10  / 0.30-0.52

## 2019-06-22 NOTE — CARE PLAN
Problem: Safety  Goal: Will remain free from injury  Outcome: PROGRESSING AS EXPECTED  Discussed safety measures, verbalized understanding. Fall precautions in place, bed alarm on, and call lights w/in reach.  Will continue to monitor.    Problem: Knowledge Deficit  Goal: Knowledge of disease process/condition, treatment plan, diagnostic tests, and medications will improve  Outcome: PROGRESSING AS EXPECTED  Discussed disease process, POC, medications.  Verbalized understanding. Will continue to monitor.

## 2019-06-22 NOTE — PROGRESS NOTES
"Due medications given per MAR. States pain is fine and \"I feel great\". Discussed testing and plan of care, states understanding. No c/o. Call light in reach.     Tele Report:  Sinus Rhythm/ Acc junctional  HR 60-70  Occ PAC  0.14/ 0.10/ 0.40  "

## 2019-06-22 NOTE — DISCHARGE INSTRUCTIONS
Discharge Instructions    Discharged to home by taxi with self. Discharged via wheelchair, hospital escort: Refused.  Special equipment needed: Not Applicable    Be sure to schedule a follow-up appointment with your primary care doctor or any specialists as instructed.     Discharge Plan:   Diet Plan: Discussed  Activity Level: Discussed  Confirmed Follow up Appointment: Patient to Call and Schedule Appointment  Confirmed Symptoms Management: Discussed  Medication Reconciliation Updated: Yes  Influenza Vaccine Indication: Not indicated: Previously immunized this influenza season and > 8 years of age    I understand that a diet low in cholesterol, fat, and sodium is recommended for good health. Unless I have been given specific instructions below for another diet, I accept this instruction as my diet prescription.   Other diet: Regular    Special Instructions:   Follow up for outpatient MRIs   Follow up with Dr Bing Smart to determine if need Bone scan and order   Diet and activity as tolerated          · Is patient discharged on Warfarin / Coumadin?   No     Depression / Suicide Risk    As you are discharged from this [facility], it is important to learn how to keep safe from harming yourself.    Recognize the warning signs:  · Abrupt changes in personality, positive or negative- including increase in energy   · Giving away possessions  · Change in eating patterns- significant weight changes-  positive or negative  · Change in sleeping patterns- unable to sleep or sleeping all the time   · Unwillingness or inability to communicate  · Depression  · Unusual sadness, discouragement and loneliness  · Talk of wanting to die  · Neglect of personal appearance   · Rebelliousness- reckless behavior  · Withdrawal from people/activities they love  · Confusion- inability to concentrate     If you or a loved one observes any of these behaviors or has concerns about self-harm, here's what you can do:  · Talk  about it- your feelings and reasons for harming yourself  · Remove any means that you might use to hurt yourself (examples: pills, rope, extension cords, firearm)  · Get professional help from the community (Mental Health, Substance Abuse, psychological counseling)  · Do not be alone:Call your Safe Contact- someone whom you trust who will be there for you.  · Call your local CRISIS HOTLINE 355-4448 or 874-994-4926  · Call your local Children's Mobile Crisis Response Team [place] (178) 804-6447 or [employer]  · Call the toll free National Suicide Prevention Hotlines   · National Suicide Prevention Lifeline 906-383-LJBP (4195)  · National [employer] Line Network 800-SUICIDE (946-3833)      Chest Wall Pain  Introduction  Chest wall pain is pain in or around the bones and muscles of your chest. Sometimes, an injury causes this pain. Sometimes, the cause may not be known. This pain may take several weeks or longer to get better.  Follow these instructions at home:  Pay attention to any changes in your symptoms. Take these actions to help with your pain:  · Rest as told by your doctor.  · Avoid activities that cause pain. Try not to use your chest, belly (abdominal), or side muscles to lift heavy things.  · If directed, apply ice to the painful area:  ¨ Put ice in a plastic bag.  ¨ Place a towel between your skin and the bag.  ¨ Leave the ice on for 20 minutes, 2-3 times per day.  · Take over-the-counter and prescription medicines only as told by your doctor.  · Do not use tobacco products, including cigarettes, chewing tobacco, and e-cigarettes. If you need help quitting, ask your doctor.  · Keep all follow-up visits as told by your doctor. This is important.  Contact a doctor if:  · You have a fever.  · Your chest pain gets worse.  · You have new symptoms.  Get help right away if:  · You feel sick to your stomach (nauseous) or you throw up (vomit).  · You feel sweaty or light-headed.  · You have a cough with  phlegm (sputum) or you cough up blood.  · You are short of breath.  This information is not intended to replace advice given to you by your health care provider. Make sure you discuss any questions you have with your health care provider.  Document Released: 06/05/2009 Document Revised: 05/25/2017 Document Reviewed: 03/14/2016  © 2017 Elsevier    Alprazolam tablets  What is this medicine?  ALPRAZOLAM (al PRAY js hobson) is a benzodiazepine. It is used to treat anxiety and panic attacks.  This medicine may be used for other purposes; ask your health care provider or pharmacist if you have questions.  COMMON BRAND NAME(S): Xanax  What should I tell my health care provider before I take this medicine?  They need to know if you have any of these conditions:  -an alcohol or drug abuse problem  -bipolar disorder, depression, psychosis or other mental health conditions  -glaucoma  -kidney or liver disease  -lung or breathing disease  -myasthenia gravis  -Parkinson's disease  -porphyria  -seizures or a history of seizures  -suicidal thoughts  -an unusual or allergic reaction to alprazolam, other benzodiazepines, foods, dyes, or preservatives  -pregnant or trying to get pregnant  -breast-feeding  How should I use this medicine?  Take this medicine by mouth with a glass of water. Follow the directions on the prescription label. Take your medicine at regular intervals. Do not take it more often than directed. Do not stop taking except on your doctor's advice.  A special MedGuide will be given to you by the pharmacist with each prescription and refill. Be sure to read this information carefully each time.  Talk to your pediatrician regarding the use of this medicine in children. Special care may be needed.  Overdosage: If you think you have taken too much of this medicine contact a poison control center or emergency room at once.  NOTE: This medicine is only for you. Do not share this medicine with others.  What if I miss a  dose?  If you miss a dose, take it as soon as you can. If it is almost time for your next dose, take only that dose. Do not take double or extra doses.  What may interact with this medicine?  Do not take this medicine with any of the following medications:  -certain antiviral medicines for HIV or AIDS like delavirdine, indinavir  -certain medicines for fungal infections like ketoconazole and itraconazole  -narcotic medicines for cough  -sodium oxybate  This medicine may also interact with the following medications:  -alcohol  -antihistamines for allergy, cough and cold  -certain antibiotics like clarithromycin, erythromycin, isoniazid, rifampin, rifapentine, rifabutin, and troleandomycin  -certain medicines for blood pressure, heart disease, irregular heart beat  -certain medicines for depression, like amitriptyline, fluoxetine, sertraline  -certain medicines for seizures like carbamazepine, oxcarbazepine, phenobarbital, phenytoin, primidone  -cimetidine  -cyclosporine  -female hormones, like estrogens or progestins and birth control pills, patches, rings, or injections  -general anesthetics like halothane, isoflurane, methoxyflurane, propofol  -grapefruit juice  -local anesthetics like lidocaine, pramoxine, tetracaine  -medicines that relax muscles for surgery  -narcotic medicines for pain  -other antiviral medicines for HIV or AIDS  -phenothiazines like chlorpromazine, mesoridazine, prochlorperazine, thioridazine  This list may not describe all possible interactions. Give your health care provider a list of all the medicines, herbs, non-prescription drugs, or dietary supplements you use. Also tell them if you smoke, drink alcohol, or use illegal drugs. Some items may interact with your medicine.  What should I watch for while using this medicine?  Tell your doctor or health care professional if your symptoms do not start to get better or if they get worse.  Do not stop taking except on your doctor's advice. You  may develop a severe reaction. Your doctor will tell you how much medicine to take.  You may get drowsy or dizzy. Do not drive, use machinery, or do anything that needs mental alertness until you know how this medicine affects you. To reduce the risk of dizzy and fainting spells, do not stand or sit up quickly, especially if you are an older patient. Alcohol may increase dizziness and drowsiness. Avoid alcoholic drinks.  If you are taking another medicine that also causes drowsiness, you may have more side effects. Give your health care provider a list of all medicines you use. Your doctor will tell you how much medicine to take. Do not take more medicine than directed. Call emergency for help if you have problems breathing or unusual sleepiness.  What side effects may I notice from receiving this medicine?  Side effects that you should report to your doctor or health care professional as soon as possible:  -allergic reactions like skin rash, itching or hives, swelling of the face, lips, or tongue  -breathing problems  -confusion  -loss of balance or coordination  -signs and symptoms of low blood pressure like dizziness; feeling faint or lightheaded, falls; unusually weak or tired  -suicidal thoughts or other mood changes  Side effects that usually do not require medical attention (report to your doctor or health care professional if they continue or are bothersome):  -dizziness  -dry mouth  -nausea, vomiting  -tiredness  This list may not describe all possible side effects. Call your doctor for medical advice about side effects. You may report side effects to FDA at 5-085-FDA-3391.  Where should I keep my medicine?  Keep out of the reach of children. This medicine can be abused. Keep your medicine in a safe place to protect it from theft. Do not share this medicine with anyone. Selling or giving away this medicine is dangerous and against the law.  Store at room temperature between 20 and 25 degrees C (68 and 77  degrees F). This medicine may cause accidental overdose and death if taken by other adults, children, or pets. Mix any unused medicine with a substance like cat litter or coffee grounds. Then throw the medicine away in a sealed container like a sealed bag or a coffee can with a lid. Do not use the medicine after the expiration date.  NOTE: This sheet is a summary. It may not cover all possible information. If you have questions about this medicine, talk to your doctor, pharmacist, or health care provider.  © 2018 Elsevier/Gold Standard (2016-09-15 13:47:25)

## 2019-06-23 LAB
T3RU NFR SERPL: 33 % (ref 28–41)
T4BG SERPL-MCNC: 20.7 UG/ML (ref 13–30)
THYROGLOB AB SERPL-ACNC: <0.9 IU/ML (ref 0–4)
THYROGLOB SERPL-MCNC: 0.2 NG/ML (ref 1.3–31.8)
THYROGLOB SERPL-MCNC: ABNORMAL NG/ML (ref 1.3–31.8)

## 2019-06-23 NOTE — DISCHARGE SUMMARY
Discharge Summary    CHIEF COMPLAINT ON ADMISSION  Chief Complaint   Patient presents with   • Chest Pressure   • Shortness of Breath       Reason for Admission  Chest Pressure, Arm Pain, Shortnes*     Admission Date  6/20/2019    CODE STATUS  Prior    HPI & HOSPITAL COURSE    *78-year-old female with prior history of breast cancer, lung cancer, CLL presents with left shoulder, chest wall pain.  CT showed skin thickening in the left axillary area similar to prior exams.  Exam shows same.  There is no obvious cellulitis      Patient was medicated initially with morphine but then with Percocet which caused her to be very agitated and itchy.  Her tenderness in her left axillary area was improved by the following day.  It was found that initial orders for MRI of the chest and brain could not be performed so soon after stress testing so this will be deferred to outpatient setting.  If this is negative I am not sure she requires a bone scan will leave this to her primary care provider.  Patient is on chronic high-dose Ativan at bedtime for sleep, therefore she was provided with 2 tablets of 2 mg Xanax for her MRIs.    Therefore, she is discharged in good and stable condition to home with close outpatient follow-up.      Discharge Date  6/22/2019    FOLLOW UP ITEMS POST DISCHARGE  MRI chest  MRI brain  ? 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 grant) ), lumpectomy ( dr sharpe), RT 2 mo (dr beltran) POA: Yes    Cerebral vascular disease- old tia with exp aphasia- resolved; neg  mri/mra brain POA: Yes    Chest wall pain POA: Unknown    Low TSH level POA: Unknown  Resolved Problems:    * No resolved hospital problems. *      FOLLOW UP  Future Appointments  Date Time Provider Department Center   6/28/2019 10:40 AM Siva Smart M.D. [facility]   6/28/2019 2:15 PM Corrine Newsome M.D. [facility]   7/24/2019 11:40 AM Siva Smart M.D. [facility] M.D.  [address] [place] [address]   [address] [place] [id number]  [phone]    On 6/28/2019        MEDICATIONS ON DISCHARGE     Medication List      START taking these medications      Instructions   acetaminophen 325 MG Tabs  Commonly known as:  TYLENOL   Take 2 Tabs by mouth every 6 hours as needed (Mild Pain; (Pain scale 1-3); Temp greater than 100.5 F).  Dose:  650 mg     alprazolam 2 MG tablet  Commonly known as:  XANAX   Take 1 Tab by mouth Once PRN (max 1 dose per day) for up to 2 doses. Take 30-45 minutes prior to MRI (2nd tablet is in case requires 2 days of testing)  Dose:  2 mg     lidocaine 5 % Ptch  Commonly known as:  LIDODERM   Apply 1 Patch to skin as directed every 24 hours.  Dose:  1 Patch        CONTINUE taking these medications      Instructions   lansoprazole 30 MG Cpdr  Commonly known as:  PREVACID   Take 30 mg by mouth 1 time daily as needed (upset stomach).  Dose:  30 mg     levothyroxine 125 MCG Tabs  Commonly known as:  SYNTHROID   Take 125 mcg by mouth every morning.  Dose:  125 mcg     LORazepam 2 MG tablet  Commonly known as:  ATIVAN   Take 4 mg by mouth every bedtime.  Dose:  4 mg     pimecrolimus 1 % cream  Commonly known as:  ELIDEL   Apply 1 Application to affected area(s) 3 times a day.  Dose:  1 Application     venlafaxine XR 37.5 MG Cp24  Commonly known as:  EFFEXOR XR   Take 37.5 mg by mouth every morning.  Dose:  37.5 mg     Vitamin D 2000 units Caps   Take 1 Cap by mouth every day.  Dose:  2000 Units            Allergies  Allergies   Allergen Reactions   • Atorvastatin Myalgia     Leg  cramps     • Codeine Hives and Nausea     RXN=40 years ago   • Latex Hives and Rash     Gets severe rash and blisters   • Lovastatin Myalgia     Muscle cramps     • Simvastatin Myalgia     Leg cramps     • Tape Unspecified     Blisters  Paper tape ok   • Lisinopril Cough   • Metformin Diarrhea     Diarrhea         DIET  Regular    ACTIVITY  regular    CONSULTATIONS  none    PROCEDURES  none    LABORATORY  Lab Results   Component Value Date    SODIUM 138 06/21/2019    POTASSIUM 4.0 06/21/2019    CHLORIDE 105 06/21/2019    CO2 23 06/21/2019    GLUCOSE 123 (H) 06/21/2019    BUN 13 06/21/2019    CREATININE 0.70 06/21/2019    CREATININE 0.76 04/12/2013        Lab Results   Component Value Date    WBC 6.2 06/21/2019    WBC 32.8 (HH) 04/12/2013    HEMOGLOBIN 10.5 (L) 06/21/2019    HEMATOCRIT 33.1 (L) 06/21/2019    PLATELETCT 186 06/21/2019

## 2019-06-24 ENCOUNTER — PATIENT OUTREACH (OUTPATIENT)
Dept: HEALTH INFORMATION MANAGEMENT | Facility: OTHER | Age: 79
End: 2019-06-24

## 2019-06-27 ENCOUNTER — PATIENT OUTREACH (OUTPATIENT)
Dept: HEALTH INFORMATION MANAGEMENT | Facility: OTHER | Age: 79
End: 2019-06-27

## 2019-06-28 ENCOUNTER — OFFICE VISIT (OUTPATIENT)
Dept: MEDICAL GROUP | Age: 79
End: 2019-06-28
Payer: MEDICARE

## 2019-06-28 VITALS
WEIGHT: 190 LBS | HEART RATE: 80 BPM | TEMPERATURE: 98.4 F | SYSTOLIC BLOOD PRESSURE: 126 MMHG | DIASTOLIC BLOOD PRESSURE: 60 MMHG | HEIGHT: 65 IN | OXYGEN SATURATION: 98 % | BODY MASS INDEX: 31.65 KG/M2

## 2019-06-28 DIAGNOSIS — Z17.1 MALIGNANT NEOPLASM OF UPPER-OUTER QUADRANT OF LEFT BREAST IN FEMALE, ESTROGEN RECEPTOR NEGATIVE (HCC): ICD-10-CM

## 2019-06-28 DIAGNOSIS — E78.2 MIXED HYPERLIPIDEMIA: ICD-10-CM

## 2019-06-28 DIAGNOSIS — C34.11 MALIGNANT NEOPLASM OF RIGHT UPPER LOBE OF LUNG (HCC): ICD-10-CM

## 2019-06-28 DIAGNOSIS — C50.412 MALIGNANT NEOPLASM OF UPPER-OUTER QUADRANT OF LEFT BREAST IN FEMALE, ESTROGEN RECEPTOR NEGATIVE (HCC): ICD-10-CM

## 2019-06-28 DIAGNOSIS — R74.8 ELEVATED ALKALINE PHOSPHATASE LEVEL: ICD-10-CM

## 2019-06-28 DIAGNOSIS — I10 ESSENTIAL HYPERTENSION: ICD-10-CM

## 2019-06-28 DIAGNOSIS — L24.5 IRRITANT CONTACT DERMATITIS DUE TO OTHER CHEMICAL PRODUCTS: ICD-10-CM

## 2019-06-28 DIAGNOSIS — E03.4 HYPOTHYROIDISM DUE TO ACQUIRED ATROPHY OF THYROID: ICD-10-CM

## 2019-06-28 PROBLEM — R79.89 LOW TSH LEVEL: Status: RESOLVED | Noted: 2019-06-21 | Resolved: 2019-06-28

## 2019-06-28 PROBLEM — L03.211 FACIAL CELLULITIS: Status: RESOLVED | Noted: 2019-06-03 | Resolved: 2019-06-28

## 2019-06-28 PROBLEM — K13.0 ANGULAR CHEILITIS: Status: RESOLVED | Noted: 2019-06-03 | Resolved: 2019-06-28

## 2019-06-28 PROCEDURE — 99214 OFFICE O/P EST MOD 30 MIN: CPT | Performed by: INTERNAL MEDICINE

## 2019-06-28 RX ORDER — LEVOTHYROXINE SODIUM 112 UG/1
112 TABLET ORAL
Qty: 100 TAB | Refills: 4 | Status: SHIPPED | OUTPATIENT
Start: 2019-06-28

## 2019-06-28 ASSESSMENT — ENCOUNTER SYMPTOMS
MUSCULOSKELETAL NEGATIVE: 1
CARDIOVASCULAR NEGATIVE: 1
EYES NEGATIVE: 1
NEUROLOGICAL NEGATIVE: 1
RESPIRATORY NEGATIVE: 1
GASTROINTESTINAL NEGATIVE: 1
CONSTITUTIONAL NEGATIVE: 1
PSYCHIATRIC NEGATIVE: 1

## 2019-06-28 NOTE — PROGRESS NOTES
Subjective:      Clarisse Reeves is a 78 y.o. female who presents with Hospital Follow-up and Generalized Body Aches (all over body)        HPI    The patient is here for followup after being admitted to [facility] on 6/20/19 for chest pain and shortness of breath. She was discharged on 6/22/19. An MRI of the chest and mikayla was ordered, but postponed to outpatient setting because she had a nuclear medicine stress test while admitted. These have not been scheduled yet. The hospitalist recommended a bone density scan because the patient was complaining of generalized pains throughout her body.     The patient states she has been excessively sweating for a few weeks now. On 6/20/19, she started sweating and developed shortness of breath and chest pain, so she presented to the ED.     The patient states Dr. Juarez would like to do a double mastectomy because the masses will not go away, and will continue to bother her. She does not want another surgery, because she thinks she can handle it. Dr. Juarez is not concerned for cancer.     The patient is currently taking levothyroxine 112 mg daily, however her most recent TSH shows she may be over-replacing her thyroid.    The patient saw a dermatologist, Dr. Newsome, for the sores around her mouth. She was prescribed Elidel cream, which completely resolved the sores. The patient realized that she recently changed toothpaste from [employer] to [employer], and that was likely the cause of her reaction. She has since changed a third time, to baking soda. She follows up with the dermatologist this afternoon.       Patient Active Problem List   Diagnosis   • Mixed hyperlipidemia   • Fatty infiltration of liver   • Vitamin D insufficiency   • Gastroesophageal reflux disease with esophagitis   • Multiple thyroid nodules- [facility], neg FNA 2015; us no change 2017   • Essential hypertension   • Hypothyroidism due to acquired atrophy of thyroid   • Obesity (BMI 30-39.9)   • Malignant neoplasm  of right upper lobe of lung - adenocarcinoma in situ, 2/1/2016-  partial lobectomy RUL/RML- Dr Ganser- folowed by [facility]   • Moderate episode of recurrent major depressive disorder (HCC)   • Primary insomnia   • Mild intermittent asthma without complication- [facility];  albuterol inhaler prn   • Malignant neoplasm of upper-outer quadrant of left breast in female, estrogen receptor negative (CMS-HCC)- 2/2018; 1 positive node; chemorx (dr. grant) ), lumpectomy ( dr sharpe), RT 2 mo (dr beltran)   • Anxiety   • TIA (transient ischemic attack) exp aphasia with headache    • Cerebral vascular disease- old tia with exp aphasia- resolved; neg mri/mra brain   • Ocular migraine   • IFG (impaired fasting glucose)   • Episodic tension-type headache, not intractable   • Mastitis chronic, left- r/o inflammatory carcinoma vs d/t RT- dr barnes to [facility]   • Screen for colon cancer   • Facial cellulitis   • HSV-1 (herpes simplex virus 1) infection   • Angular cheilitis- worse - prob d/t hsv 1- trial valtredx and steroids   • Malaise and fatigue   • Chest wall pain   • Low TSH level       Outpatient Medications Prior to Visit   Medication Sig Dispense Refill   • ALPRAZolam (XANAX) 2 MG tablet Take 1 Tab by mouth Once PRN (max 1 dose per day) for up to 2 doses. Take 30-45 minutes prior to MRI  (2nd tablet is in case requires 2 days of testing) 2 Tab 0   • lidocaine (LIDODERM) 5 % Patch Apply 1 Patch to skin as directed every 24 hours. 10 Patch 2   • LORazepam (ATIVAN) 2 MG tablet Take 4 mg by mouth every bedtime.     • lansoprazole (PREVACID) 30 MG CAPSULE DELAYED RELEASE Take 30 mg by mouth 1 time daily as needed (upset stomach).     • levothyroxine (SYNTHROID) 125 MCG Tab Take 125 mcg by mouth every morning.     • venlafaxine XR (EFFEXOR XR) 37.5 MG CAPSULE SR 24 HR Take 37.5 mg by mouth every morning.     • pimecrolimus (ELIDEL) 1 % cream Apply 1 Application to affected area(s) 3 times a day. 30 g 1   • Cholecalciferol (VITAMIN D) 2000 units  "Cap Take 1 Cap by mouth every day. 100 Cap 3   • acetaminophen (TYLENOL) 325 MG Tab Take 2 Tabs by mouth every 6 hours as needed (Mild Pain; (Pain scale 1-3); Temp greater than 100.5 F). 30 Tab 0     No facility-administered medications prior to visit.         Allergies   Allergen Reactions   • Atorvastatin Myalgia     Leg cramps     • Codeine Hives and Nausea     RXN=40 years ago   • Latex Hives and Rash     Gets severe rash and blisters   • Lovastatin Myalgia     Muscle cramps     • Simvastatin Myalgia     Leg cramps     • Tape Unspecified     Blisters  Paper tape ok   • Lisinopril Cough   • Metformin Diarrhea     Diarrhea         Review of Systems   Constitutional: Negative.    HENT: Negative.    Eyes: Negative.    Respiratory: Negative.    Cardiovascular: Negative.    Gastrointestinal: Negative.    Genitourinary: Negative.    Musculoskeletal: Negative.    Skin: Negative.    Neurological: Negative.    Endo/Heme/Allergies: Negative.    Psychiatric/Behavioral: Negative.    All other systems reviewed and are negative.       Objective:     /60 (BP Location: Right arm, Patient Position: Sitting, BP Cuff Size: Adult long)   Pulse 80   Temp 36.9 °C (98.4 °F) (Temporal)   Ht 1.651 m (5' 5\")   Wt 86.2 kg (190 lb)   SpO2 98%   BMI 31.62 kg/m²     Physical Exam   Constitutional: Oriented to person, place, and time. Appears well-developed and well-nourished. No distress.   Head: Normocephalic and atraumatic.   Right Ear: External ear normal.   Left Ear: External ear normal.   Nose: Nose normal.   Mouth/Throat: Oropharynx is clear and moist. No oropharyngeal exudate.   Eyes: Pupils are equal, round, and reactive to light. Conjunctivae and EOM are normal. Right eye exhibits no discharge. Left eye exhibits no discharge. No scleral icterus.   Neck: Normal range of motion. Neck supple. No JVD present. No tracheal deviation present. No thyromegaly present.   Cardiovascular: Normal rate, regular rhythm, normal heart " sounds and intact distal pulses.  Exam reveals no gallop and no friction rub.    No murmur heard.  Pulmonary/Chest: Effort normal. No stridor. No respiratory distress. No wheezing or rales. No tenderness.   Abdominal: Soft. Bowel sounds are normal. No distension and no mass. There is no tenderness. There is no rebound and no guarding. No hernia.   Musculoskeletal: Normal range of motion No edema or tenderness.   Lymphadenopathy: No cervical adenopathy.   Neurological: Alert and oriented to person, place, and time. Normal reflexes. Normal reflexes. No cranial nerve deficit. Normal muscle tone. Coordination normal.   Skin: Skin is warm and dry. No rash noted. Not diaphoretic. No erythema. No pallor.   Psychiatric: Normal mood and affect. Behavior is normal. Judgment and thought content normal.   Nursing note and vitals reviewed.      Lab Results   Component Value Date/Time    SODIUM 138 06/21/2019 04:13 AM    POTASSIUM 4.0 06/21/2019 04:13 AM    CHLORIDE 105 06/21/2019 04:13 AM    CO2 23 06/21/2019 04:13 AM    GLUCOSE 123 (H) 06/21/2019 04:13 AM    BUN 13 06/21/2019 04:13 AM    CREATININE 0.70 06/21/2019 04:13 AM    CREATININE 0.76 04/12/2013 11:17 AM    BUNCREATRAT 19 11/17/2014 10:29 AM    BUNCREATRAT 25 04/12/2013 11:17 AM    ALKPHOSPHAT 230 (H) 06/21/2019 04:13 AM    ASTSGOT 31 06/21/2019 04:13 AM    ALTSGPT 21 06/21/2019 04:13 AM    TBILIRUBIN 0.4 06/21/2019 04:13 AM     Lab Results   Component Value Date/Time    CHOLSTRLTOT 148 06/21/2019 04:13 AM    LDL 96 06/21/2019 04:13 AM    HDL 25 (A) 06/21/2019 04:13 AM    TRIGLYCERIDE 133 06/21/2019 04:13 AM         Assessment/Plan:     1. Malignant neoplasm of right upper lobe of lung - adenocarcinoma in situ, 2/1/2016-  partial lobectomy RUL/RML- Dr Ganser- folowed by [facility]  I have ordered a CT-PET scan to evaluate her body for various cancers and any possible causes of her bone pain.   - CT-PETCT-WHOLE BODY; Future    2. Hypothyroidism due to acquired atrophy of  thyroid  I have decreased her dose to 112 mcg daily, because she was over-replacing her thyroid.   - levothyroxine (SYNTHROID) 112 MCG Tab; Take 1 Tab by mouth Every morning on an empty stomach.  Dispense: 100 Tab; Refill: 4     3. Essential hypertension  Under good control. Continue same regimen.      4. Mixed hyperlipidemia  Under good control. Continue same regimen.    5. Malignant neoplasm of upper-outer quadrant of left breast in female, estrogen receptor negative (CMS-HCC)- 2/2018; 1 positive node; chemorx (dr. grant) ), lumpectomy ( dr sharpe), RT 2 mo (dr beltran)  See above.   - CT-PETCT-WHOLE BODY; Future    6. Elevated alkaline phosphatase level-=240 june 2019  Under good control. Continue same regimen.    7. Irritant contact dermatitis due to other chemical products- of  perioral area d/t new tooth paste; resolved with Elidel  Completely resolved with Elidel. She has sicne changed toothpaste and is no longer having a reaction.          40 minute face-to-face encounter took place today.  More than half of this time was spent in the coordination of care of the above problems, as well as counseling.     Rozina MEADOWS (Scribe), am scribing for, and in the presence of, Siva Smart M.D..    Electronically signed by: Rozina Watson (Marcial), 6/28/2019    Siva MEADOWS M.D., personally performed the services described in this documentation, as scribed by Rozina Watson in my presence, and it is both accurate and complete.

## 2019-07-08 DIAGNOSIS — F41.9 ANXIETY: ICD-10-CM

## 2019-07-08 RX ORDER — LORAZEPAM 2 MG/1
TABLET ORAL
Qty: 120 TAB | Refills: 0 | Status: SHIPPED
Start: 2019-07-08 | End: 2019-09-06

## 2019-07-11 ENCOUNTER — HOSPITAL ENCOUNTER (OUTPATIENT)
Dept: RADIOLOGY | Facility: MEDICAL CENTER | Age: 79
End: 2019-07-11
Attending: INTERNAL MEDICINE
Payer: MEDICARE

## 2019-07-11 DIAGNOSIS — C50.412 MALIGNANT NEOPLASM OF UPPER-OUTER QUADRANT OF LEFT BREAST IN FEMALE, ESTROGEN RECEPTOR NEGATIVE (HCC): ICD-10-CM

## 2019-07-11 DIAGNOSIS — Z17.1 MALIGNANT NEOPLASM OF UPPER-OUTER QUADRANT OF LEFT BREAST IN FEMALE, ESTROGEN RECEPTOR NEGATIVE (HCC): ICD-10-CM

## 2019-07-11 DIAGNOSIS — C34.11 MALIGNANT NEOPLASM OF RIGHT UPPER LOBE OF LUNG (HCC): ICD-10-CM

## 2019-07-11 DIAGNOSIS — R74.8 ELEVATED ALKALINE PHOSPHATASE LEVEL: ICD-10-CM

## 2019-07-11 PROCEDURE — A9552 F18 FDG: HCPCS

## 2019-07-15 ENCOUNTER — OFFICE VISIT (OUTPATIENT)
Dept: HEMATOLOGY ONCOLOGY | Facility: MEDICAL CENTER | Age: 79
End: 2019-07-15
Payer: MEDICARE

## 2019-07-15 VITALS
SYSTOLIC BLOOD PRESSURE: 122 MMHG | BODY MASS INDEX: 31.17 KG/M2 | DIASTOLIC BLOOD PRESSURE: 80 MMHG | TEMPERATURE: 97.5 F | WEIGHT: 187.28 LBS | OXYGEN SATURATION: 93 % | HEART RATE: 80 BPM | RESPIRATION RATE: 20 BRPM

## 2019-07-15 DIAGNOSIS — Z17.1 MALIGNANT NEOPLASM OF UPPER-OUTER QUADRANT OF LEFT BREAST IN FEMALE, ESTROGEN RECEPTOR NEGATIVE (HCC): ICD-10-CM

## 2019-07-15 DIAGNOSIS — C34.11 MALIGNANT NEOPLASM OF RIGHT UPPER LOBE OF LUNG (HCC): ICD-10-CM

## 2019-07-15 DIAGNOSIS — G89.3 CANCER RELATED PAIN: ICD-10-CM

## 2019-07-15 DIAGNOSIS — C50.412 MALIGNANT NEOPLASM OF UPPER-OUTER QUADRANT OF LEFT BREAST IN FEMALE, ESTROGEN RECEPTOR NEGATIVE (HCC): ICD-10-CM

## 2019-07-15 DIAGNOSIS — R94.2 ABNORMAL PET SCAN, LUNG: ICD-10-CM
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
## 2019-07-15 DIAGNOSIS — F40.240 CLAUSTROPHOBIA: ICD-10-CM

## 2019-07-15 PROCEDURE — 99215 OFFICE O/P EST HI 40 MIN: CPT | Performed by: NURSE PRACTITIONER

## 2019-07-15 RX ORDER — DEXAMETHASONE 4 MG/1
4 TABLET ORAL 2 TIMES DAILY
Qty: 30 TAB | Refills: 0 | Status: SHIPPED | OUTPATIENT
Start: 2019-07-15

## 2019-07-15 ASSESSMENT — ENCOUNTER SYMPTOMS
SHORTNESS OF BREATH: 0
SORE THROAT: 0
ABDOMINAL PAIN: 1
COUGH: 0
CONSTIPATION: 0
FALLS: 1
FEVER: 1
WEAKNESS: 1
DIARRHEA: 0
INSOMNIA: 0
DIAPHORESIS: 1
DIZZINESS: 1
NECK PAIN: 1
MYALGIAS: 1
NERVOUS/ANXIOUS: 1
TINGLING: 1
HEADACHES: 1
WHEEZING: 0
NAUSEA: 1
BACK PAIN: 1
VOMITING: 1
PALPITATIONS: 0
CHILLS: 0
WEIGHT LOSS: 1
ROS GI COMMENTS: GROIN PAIN

## 2019-07-15 ASSESSMENT — PAIN SCALES - GENERAL: PAINLEVEL: 9=SEVERE PAIN

## 2019-07-15 NOTE — PROGRESS NOTES
Subjective:      Clarisse Reeves is a 78 y.o. female who presents with Follow-Up (metastatic breast cancer/Siva Smart).        HPI    Patient referred to [facility] for concerning findings for metastatic disease on PET/CT.  Patient is accompanied by friend Clarisse for today's visit.    Patient with a significant cancer medical history.  In 2006 patient was diagnosed with CLL in [place].  This has been continued monitored and her most let recent CBC did not show any abnormal findings.    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  She underwent a lobectomy in February 2016 with Dr. Ganser.    In 2018 patient was diagnosed with stage IIIc cT2, cN3c, M0]. ER/LA negative HER-2 negative, Ki-67 30 to 50%, triple negative breast carcinoma.  She established with Dr. Barroso at that time.  Patient completed staging work-up including a PET/CT noting the hypermetabolic mass in the left breast, multiple hypermetabolic remi metastasis in the left axilla along with nonenlarged lymph nodes in the left supra clavicular and retroclavicular region with mild uptake suspicious for early metastasis.  She also had mild uptake in the inferior endplate of T6 which was nonspecific and likely to be degenerative.  An MRI of the thoracic spine showed no evidence of metastatic disease specifically at the T6 lesion that was completed in March 2018.  Patient received neoadjuvant AC, with weekly Taxol.  After week #6 her Taxol was held due to side effects.  She then underwent a left breast mastectomy with lymph node dissection in which 1 out of 7 lymph nodes were positive for disease.  There was no residual cancer seen in the breast.  She then resumed chemotherapy with cycle #7 of weekly Taxol and did complete 10 doses.  She did have severe fatigue and intolerance.  She then underwent adjuvant radiation therapy of the left breast.    Patient has had continued monitoring.  She was  "last seen in the clinic in December 2018.  She noticed some lumpiness involving the left breast and she did undergo a mammogram and ultrasound.  She was subsequently sent for a biopsy which was found to be benign.  She had a repeat ultrasound in April 2019 ordered by her surgeon, Dr. Crandall which showed no ultrasound evidence of solid or cystic mass near the palpable concern of the right breast.  Patient then underwent a diagnostic mammogram in May 2019 ordered by her PCP which showed stable mammographic appearance.  She did have edematous skin and underlying tissues at the 9 o'clock position of the left breast in the current area of clinical concern at that time and was due to follow-up with her surgeon, Dr. Juarez.     Patient was scheduled to follow-up with Dr. Rebolledo on May 15, 2019.  However, this appointment was canceled by patient.  Patient had thought that Dr. Rebolledo it was no longer working in the office and was told that she had to cancel the appointment.  Patient understands that she misunderstood that information.     There is a proximally on this time that patient began to experience pain.  She stated it was around mid May to the end of May where she felt like \"my body is in a bone pressure.\"  She states her every bone in her body hurts.  She is on any pain medication except for Tylenol which has not been very effective.  She started noticing drenching night sweats and states that her bed is constantly drenched from perspiration.  She also stated that she has noticed harder time standing and feels like her legs are getting give out.  She is quite fatigued and states that she is sleeping almost throughout the entire day.  She does note that her memory is gradually getting worse.  She does believe that she has balance issues but not all the time.  She states that she has fallen recently approximately 3 times.  She also has lightheadedness and dizziness but not all the time.  She denies any headaches.  She " went into the emergency department was admitted to the hospital in which she underwent a CT head which did not show any abnormalities.  She also had a CTA to evaluate for PE which was negative.  She also underwent a cardiac stress test.  She was discharged home and met with her primary care provider.  She stated that she talk to her primary care provider stating that she knew something was wrong but no imaging was picking anything up.  Therefore PCP ordered a PET/CT.    Most recent PET/CT completed on July 11, 2019 quite concerning.  There is noted to have diffuse bony metastatic disease involving the majority of the axial and proximal appendicular skeleton.  Also noted remi metastatic disease within the left intraclavicular space/brachial and mediastinal prevascular space.  There is also a conglomerate mass which surrounds/infiltrates the left brachial plexus.  Additionally there are multiple new hypermetabolic lymph nodes within the remi chains of the abdomen and retroperitoneum.  Possible hepatic metastasis noted.  There is left breast postoperative/post radiation changes without evidence of recurrence.  I personally reviewed the imaging report in detail with the patient.  I personally reviewed the imaging as well.    Please see past medical and surgical history below.    Patient does have a history of smoking in which she smoked approximately 50 years and average 1.5 pack/day.  She quit in 2006.    Allergies   Allergen Reactions   • Atorvastatin Myalgia     Leg cramps     • Codeine Hives and Nausea     RXN=40 years ago   • Latex Hives and Rash     Gets severe rash and blisters   • Lovastatin Myalgia     Muscle cramps     • Simvastatin Myalgia     Leg cramps     • Tape Unspecified     Blisters  Paper tape ok   • Lisinopril Cough   • Metformin Diarrhea     Diarrhea       Current Outpatient Prescriptions on File Prior to Visit   Medication Sig Dispense Refill   • LORazepam (ATIVAN) 2 MG tablet TAKE TWO TABLETS BY  MOUTH AT BEDTIME AS NEEDED FOR ANXIETY FOR UP TO 60 DAYS 120 Tab 0   • levothyroxine (SYNTHROID) 112 MCG Tab Take 1 Tab by mouth Every morning on an empty stomach. 100 Tab 4   • lansoprazole (PREVACID) 30 MG CAPSULE DELAYED RELEASE Take 30 mg by mouth 1 time daily as needed (upset stomach).     • pimecrolimus (ELIDEL) 1 % cream Apply 1 Application to affected area(s) 3 times a day. 30 g 1   • Cholecalciferol (VITAMIN D) 2000 units Cap Take 1 Cap by mouth every day. 100 Cap 3   • acetaminophen (TYLENOL) 325 MG Tab Take 2 Tabs by mouth every 6 hours as needed (Mild Pain; (Pain scale 1-3); Temp greater than 100.5 F). 30 Tab 0   • lidocaine (LIDODERM) 5 % Patch Apply 1 Patch to skin as directed every 24 hours. (Patient not taking: Reported on 7/15/2019) 10 Patch 2   • venlafaxine XR (EFFEXOR XR) 37.5 MG CAPSULE SR 24 HR Take 37.5 mg by mouth every morning.       No current facility-administered medications on file prior to visit.      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   • Cataract     right  and  left  IOL   • CLL (chronic lymphoblastic leukemia)    • Cutaneous skin tags 1/29/2015   • DM (diabetes mellitus) (HCC)     oral meds   • Hiatus hernia syndrome     no surgery   • Indigestion    • MEDICAL HOME    • Personal history of colonic polyps 11/26/2012   • Pneumonia 2014   • Pneumonia 06/2017   • Statin intolerance    • Thyroid disease    • Type II or unspecified type diabetes mellitus without mention of complication, not stated as uncontrolled     pre-diabetic     Past Surgical History:   Procedure Laterality Date   • MASTECTOMY Left 7/6/2018    Procedure: MASTECTOMY - PARTIAL AFTER WIRE LOCAALIZATION;  Surgeon: Esperanza Crandall M.D.;  [facility];  Service: General   • AXILLARY NODE DISSECTION Left 7/6/2018    Procedure: AXILLARY NODE DISSECTION;  Surgeon: Esperanza Crandall M.D.;  Location: SURGERY [facility];  Service: General   • THYROIDECTOMY N/A 11/29/2017    Procedure: THYROIDECTOMY COMPLETION, RECURRENT LARYNGEAL NERVE MONITORING;  Surgeon: Cameron Marcelino M.D.;  Location: SURGERY [facility];  Service: General   • THORACOSCOPY Right 2/1/2016    Procedure: THORACOSCOPY Upper Lobectomy ;  Surgeon: John H Ganser, M.D.;  Location: SURGERY [facility];  Service:    • NODE DISSECTION Right 2/1/2016    Procedure: NODE DISSECTION;  Surgeon: John H Ganser, M.D.;  Location: SURGERY [facility];  Service:    • RECOVERY  12/18/2015    Procedure: CT-SCP-RUL LUNG BIOPSY-;  Surgeon: Recoveryon Surgery;  [place] [facility] [place]  [place]:    • OTHER  2001    Lower Left segment of lung removed    • CHOLECYSTECTOMY  1995   • OTHER ORTHOPEDIC SURGERY  1990    bakers cyst removed in right knee, multiple scopes   • OTHER  1990    hemmroid removal   • OTHER  1984    left Thyroid removed, might remove right side in the future   • APPENDECTOMY  1955   • CATARACT EXTRACTION WITH IOL     • TONSILLECTOMY     • US-NEEDLE CORE BX-BREAST PANEL     • VAGINAL HYSTERECTOMY TOTAL      Hysterectomy,Total Vaginal     Family History   Problem Relation Age of Onset   • Cancer Mother    • Lung Disease Father    • Cancer Father      Social History     Social History   • Marital status: Single     Spouse name: N/A   • Number of children: N/A   • Years of education: N/A     Occupational History   • 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     Social History Main Topics   • Smoking status: Former Smoker     Packs/day: 1.50     Years: 50.00     Types: Cigarettes     Quit date: 5/6/2006   • Smokeless tobacco: Never Used      Comment: quit smoking 2002   • Alcohol use 1.2 oz/week     2 Glasses of wine per week      Comment: 2  drinks a week   • Drug use: No   • Sexual activity: Not Currently     Partners: Male     Other Topics Concern   • Not on file     Social History Narrative   • No narrative on file       Review of Systems   Constitutional: Positive for diaphoresis (constanty), fever (up to 100.1 - takes tylenol), malaise/fatigue and weight loss (200 in December 2018). Negative for chills.   HENT: Negative for congestion and sore throat.    Respiratory: Negative for cough, shortness of breath and wheezing.    Cardiovascular: Negative for chest pain and palpitations.   Gastrointestinal: Positive for abdominal pain (every once in a while), nausea and vomiting. Negative for constipation and diarrhea.        Groin pain   Genitourinary: Negative for dysuria.   Musculoskeletal: Positive for back pain, falls, joint pain, myalgias and neck pain.   Skin: Negative for itching and rash.   Neurological: Positive for dizziness, tingling (left hand - where lymphedema is), weakness and headaches.   Psychiatric/Behavioral: The patient is nervous/anxious. The patient does not have insomnia.           Objective:     /80 (BP Location: Right arm, Patient Position: Sitting, BP Cuff Size: Adult)   Pulse 80   Temp 36.4 °C (97.5 °F) (Temporal)   Resp 20   Wt 84.9 kg (187 lb 4.5 oz)   SpO2 93%   BMI 31.17 kg/m²       Physical Exam   Constitutional: She is oriented to person, place, and time. She appears well-developed and well-nourished. No distress.   HENT:   Head: Normocephalic and atraumatic.   Mouth/Throat: Oropharynx is clear and moist. No oropharyngeal exudate.   Eyes: Pupils are equal, round, and reactive to light. Conjunctivae and EOM are normal. Right eye exhibits no discharge. Left eye exhibits no discharge. No scleral icterus.   Neck: Normal range of motion. Neck supple.   Cardiovascular: Normal rate, regular rhythm, normal heart sounds and intact distal pulses.  Exam reveals no gallop and no friction rub.    No murmur  heard.  Pulmonary/Chest: Effort normal and breath sounds normal. No respiratory distress. She has no wheezes.   Abdominal: Soft. Bowel sounds are normal. She exhibits no distension. There is no tenderness.   Musculoskeletal: Normal range of motion. She exhibits tenderness. She exhibits no edema.   Neurological: She is alert and oriented to person, place, and time.   Skin: Skin is warm and dry. No rash noted. She is not diaphoretic. No erythema. No pallor.   Psychiatric: She has a normal mood and affect. Her behavior is normal.   Vitals reviewed.        [id number] Base To Mid-thigh    Result Date: 7/11/2019 7/11/2019 11:18 AM HISTORY/REASON FOR EXAM:  Breast cancer, restaging to detect metastases; Lung cancer, restaging to detect metastases; bone pain TECHNIQUE/EXAM DESCRIPTION AND NUMBER OF VIEWS: PET body imaging. Initially, 16.1 mCi F-18 FDG was administered intravenously under standardized conditions. Approximately 45 minutes after FDG administration, the patient was placed in the supine position on the PET CT table. Blood glucose level was 90 mg/dL. Low dose spiral CT imaging was performed from the skull base to the mid thighs. PET imaging was then performed from the skull base to the mid thighs. CT images, PET images, and PET/CT fused images were reviewed on a PACS 3D workstation. The limited non-contrast CT data are used primarily for attenuation correction and anatomic correlation.  Evaluation of solid organs and bowel are especially limited utilizing this technique. COMPARISON: PET CT scan 2/19/2018 FINDINGS: There is physiologic activity within the brain, heart, liver, GI,  systems. Osseous structures: There is diffuse abnormal fluorodeoxyglucose uptake throughout the majority of the axial and proximal appendicular skeleton. This includes cervical spine, thoracic spine, lumbar spine, bony pelvis, both proximal femur, both proximal humerus, both scapula, the sternum, and some medial rib. Maximum  standard uptake value is up to 20.8. Mediastinal blood pool is 2.7. This finding is consistent with diffuse bony metastatic disease. There is mild associated lysis and sclerosis. Head and neck: Within normal limits Chest: There are postoperative changes of the right upper lung zone a right upper lobe consistent with prior lobectomy or segmentectomy. There is linear density in the left upper lobe consistent with scar or atelectasis. There is an ill-defined mass in the left infraclavicular space/brachial plexus measuring approximately 3.5 x 2.6 cm. Maximum standard uptake value is 13.9. This is consistent with adenopathy and/or carcinomatosis. Additional infraclavicular node are present and there is a prevascular space node present identified on slice image 16 measuring 11 mm and having maximum standard uptake value of 12.8. Additional smaller hypermetabolic prevascular space lymph node are present within the superior mediastinum. Left breast has skin thickening with minimal enhancement having standard uptake value of 2.8. This is consistent with normal post radiation therapy changes. Abdomen and pelvis: There is hypermetabolic adenopathy within multiple serafin chains of the abdomen and retroperitoneum. This includes the periceliac space, toby hepatis, periaortic space, aortocaval space, and paracaval space. Maximum standard uptake value in the periceliac space is 17.4. Maximum standard uptake value in the periaortic space is 14.8. There is possibly abnormal uptake within the left lobe of the liver although this could be gastric. Maximum standard uptake value is 14.2.     1.  Diffuse bony metastatic disease involving the majority of the axial and proximal appendicular skeleton 2.  Serafin metastatic disease within the left intraclavicular space/brachial and mediastinal prevascular space. Conglomerate mass surrounds/infiltrates the left brachial plexus. 3.  Additionally, multiple new hypermetabolic lymph node within the  remi chains of the abdomen and retroperitoneum. This location is not typical for breast cancer or lung cancer metastases and may represent a separate malignancy or lymphoma 4.  Possible hepatic metastatic disease 5.  Left breast postoperative/post radiation therapy changes without evidence for recurrence 6.  Further assessment with CT chest, abdomen, and pelvis for accurate sizing and assessment of the liver is recommended.           Assessment/Plan:     1. Malignant neoplasm of right upper lobe of lung - adenocarcinoma in situ, 2/1/2016-  partial lobectomy RUL/RML- Dr Ganser- folowed by PMA  dexamethasone (DECADRON) 4 MG Tab    IR-NEEDLE BX-LYMPH NODE    MR-BRAIN-WITH & W/O    REFERRAL TO RADIATION ONCOLOGY    CANCELED: IR-BIOPSY-GENERIC   2. Malignant neoplasm of upper-outer quadrant of left breast in female, estrogen receptor negative (CMS-HCC)- 2/2018; 1 positive node; chemorx (dr. grant) ), lumpectomy ( dr sharpe), RT 2 mo (dr beltran)  dexamethasone (DECADRON) 4 MG Tab    IR-NEEDLE BX-LYMPH NODE    MR-BRAIN-WITH & W/O    REFERRAL TO RADIATION ONCOLOGY    CANCELED: IR-BIOPSY-GENERIC   3. Cancer related pain  dexamethasone (DECADRON) 4 MG Tab    REFERRAL TO RADIATION ONCOLOGY   4. Abnormal PET scan, lung  IR-NEEDLE BX-LYMPH NODE    MR-BRAIN-WITH & W/O    REFERRAL TO RADIATION ONCOLOGY    CANCELED: IR-BIOPSY-GENERIC   5. Claustrophobia  diazePAM (VALIUM) 5 MG Tab   6. Fall, subsequent encounter  MR-BRAIN-WITH & W/O    diazePAM (VALIUM) 5 MG Tab       Plan  1.  Patient with a significantly abnormal PET/CT concerning for metastatic disease.  Patient with a significant medical background with triple negative breast cancer as well as adenocarcinoma in situ of the lung as well as CLL.  Multiple abnormalities found on the PET/CT including bony metastatic disease, adenopathy throughout including abdominal and retroperitoneal location.  Patient with significant clinical symptoms concerning for significant bony  metastatic disease.  I discussed with patient and will order an urgent biopsy.  I personally spoke with interventional radiologist, Dr. Maradiaga today who agreed biopsy was warranted and recommended either a paraspinal or periaortic lymph node versus either a bone biopsy and will defer best site to biopsy to interventional radiologist performing the procedure.  I have ordered this urgently to see if we can get this taken care of as soon as possible.  Patient verbalized understanding was in.    2.  Patient with significant cancer related pain likely related to her bony metastatic disease.  I will start patient on low-dose of steroids 4 mg of dexamethasone p.o. twice daily to see if we get some pain relief as anti-inflammatories are the best for bone pain.  I will also reach out to patient's radiation oncologist, Dr. Saldana to discuss if there is any opportunity for palliative radiation therapy to the specific locations within the bones.  However patient stated all of her bones hurt at this time.  I discussed with patient she is to take with food and water when taking her dexamethasone.    Addendum: I did personally speak with Dr. Saldana and referral is been placed.  She is aware of patient's current clinical condition and will get patient in following the biopsy results next week.    3.  There is some concern for brain metastasis based on patient's current symptoms.  At this time patient is significantly claustrophobic and will require Valium and or Ativan.  I have asked that she keep track of how she feels on the dexamethasone as she may get some relief with regards to her balance issues if she does have brain metastasis. I have gone ahead and ordered the brain MRI and will attempt to get this completed as well.  I sent in a prescription for Valium at 5 mg p.o. for her to take 1 time prior to the MRI.  Patient is aware she will need a  home after the procedure.    4.  Patient to follow-up with me in the clinic once  biopsy is completed to discuss the results and further plan of care.      Please note that this dictation was created using voice recognition software. I have made every reasonable attempt to correct obvious errors, but I expect that there are errors of grammar and possibly content that I did not discover before finalizing the note.

## 2019-07-16 ENCOUNTER — TELEPHONE (OUTPATIENT)
Dept: MEDICAL GROUP | Age: 79
End: 2019-07-16

## 2019-07-16 RX ORDER — DIAZEPAM 5 MG/1
5 TABLET ORAL ONCE
Qty: 1 TAB | Refills: 0 | Status: SHIPPED | OUTPATIENT
Start: 2019-07-16 | End: 2019-07-16

## 2019-07-16 NOTE — TELEPHONE ENCOUNTER
ESTABLISHED PATIENT PRE-VISIT PLANNING     Patient was NOT contacted to complete PVP.     Note: Patient will not be contacted if there is no indication to call.     1.  Reviewed notes from the last few office visits within the medical group: Yes    2.  If any orders were placed at last visit or intended to be done for this visit (i.e. 6 mos follow-up), do we have Results/Consult Notes?        •  Labs - Labs ordered, completed on 5/7/19 and results are in chart.   Note: If patient appointment is for lab review and patient did not complete labs, check with provider if OK to reschedule patient until labs completed.       •  Imaging - Imaging ordered, completed and results are in chart.       •  Referrals - Referral ordered, patient has NOT been seen.    3. Is this appointment scheduled as a Hospital Follow-Up? No    4.  Immunizations were updated in Epic using WebIZ?: Epic matches WebIZ       •  Web Iz Recommendations: SHINGRIX (Shingles)    5.  Patient is due for the following Health Maintenance Topics:   Health Maintenance Due   Topic Date Due   • IMM ZOSTER VACCINES (1 of 2) 07/30/1991   • COLONOSCOPY  06/07/2019           6. Orders for overdue Health Maintenance topics pended in Pre-Charting? N\A    7.  AHA (MDX) form printed for Provider? No, already completed    8.  Patient was NOT informed to arrive 15 min prior to their scheduled appointment and bring in their medication bottles.

## 2019-07-18 ENCOUNTER — TELEPHONE (OUTPATIENT)
Dept: HEMATOLOGY ONCOLOGY | Facility: MEDICAL CENTER | Age: 79
End: 2019-07-18

## 2019-07-18 ENCOUNTER — HOSPITAL ENCOUNTER (OUTPATIENT)
Dept: RADIOLOGY | Facility: MEDICAL CENTER | Age: 79
End: 2019-07-18
Attending: NURSE PRACTITIONER
Payer: MEDICARE

## 2019-07-18 DIAGNOSIS — C50.412 MALIGNANT NEOPLASM OF UPPER-OUTER QUADRANT OF LEFT BREAST IN FEMALE, ESTROGEN RECEPTOR NEGATIVE (HCC): ICD-10-CM

## 2019-07-18 DIAGNOSIS — R94.2 ABNORMAL PET SCAN, LUNG: ICD-10-CM

## 2019-07-18 DIAGNOSIS — C34.11 MALIGNANT NEOPLASM OF RIGHT UPPER LOBE OF LUNG (HCC): ICD-10-CM

## 2019-07-18 DIAGNOSIS — Z17.1 MALIGNANT NEOPLASM OF UPPER-OUTER QUADRANT OF LEFT BREAST IN FEMALE, ESTROGEN RECEPTOR NEGATIVE (HCC): ICD-10-CM

## 2019-07-18 PROCEDURE — 76942 ECHO GUIDE FOR BIOPSY: CPT

## 2019-07-18 NOTE — PROGRESS NOTES
Pt scheduled for Core Biopsy of Left Supraclavicular Lymph Node.  Pt seen by Dr. Remy.  Bedside ultrasound done by Dr. Remy.  Biopsy cancelled.

## 2019-07-18 NOTE — TELEPHONE ENCOUNTER
Spoke to patient letting her know since Delmy has not seen her in the passed 30 days she cannot write her any pain medication without being seen in office. Patient stated she has too many appointments going on right now and she is overwhelmed. Per Delmy patient can contact her primary care provider to see if they will prescribe her anything or go to urgent care if her pain is uncontrollable.    Patient stated she understood and will be fine until seen next week.

## 2019-07-18 NOTE — TELEPHONE ENCOUNTER
Returned patients call and went over her biopsy and MRI information.     Patient stated she is in a lot of pain and tylenol is not working for her. She asked if we can prescribe her any medication for her pain. I let her know I will get a message to IRENA Villarreal regarding her request.

## 2019-07-18 NOTE — TELEPHONE ENCOUNTER
Patient called in requesting to speak with GLORIA Anne.   I informed patient that she has stepped out of the office for a moment and that I can help her.   Patient stated that she only wants to talk to Omid and that she will wait to have Omid call her back.   Patient can be reached at [phone].

## 2019-07-19 ENCOUNTER — PATIENT MESSAGE (OUTPATIENT)
Dept: MEDICAL GROUP | Age: 79
End: 2019-07-19

## 2019-07-19 ENCOUNTER — HOSPITAL ENCOUNTER (OUTPATIENT)
Facility: MEDICAL CENTER | Age: 79
End: 2019-07-19
Attending: INTERNAL MEDICINE | Admitting: INTERNAL MEDICINE
Payer: MEDICARE

## 2019-07-19 ENCOUNTER — APPOINTMENT (OUTPATIENT)
Dept: RADIOLOGY | Facility: MEDICAL CENTER | Age: 79
End: 2019-07-19
Attending: NURSE PRACTITIONER
Payer: MEDICARE

## 2019-07-19 VITALS
RESPIRATION RATE: 18 BRPM | WEIGHT: 187.17 LBS | HEIGHT: 65 IN | SYSTOLIC BLOOD PRESSURE: 157 MMHG | HEART RATE: 91 BPM | DIASTOLIC BLOOD PRESSURE: 91 MMHG | BODY MASS INDEX: 31.18 KG/M2 | TEMPERATURE: 97.5 F | OXYGEN SATURATION: 97 %

## 2019-07-19 DIAGNOSIS — R94.2 ABNORMAL PET SCAN, LUNG: ICD-10-CM

## 2019-07-19 DIAGNOSIS — C50.412 MALIGNANT NEOPLASM OF UPPER-OUTER QUADRANT OF LEFT BREAST IN FEMALE, ESTROGEN RECEPTOR NEGATIVE (HCC): ICD-10-CM

## 2019-07-19 DIAGNOSIS — C79.51 METASTATIC CANCER TO SPINE (HCC): ICD-10-CM

## 2019-07-19 DIAGNOSIS — C79.51 BONE METASTASES: ICD-10-CM

## 2019-07-19 DIAGNOSIS — G89.3 CANCER ASSOCIATED PAIN: ICD-10-CM

## 2019-07-19 DIAGNOSIS — Z17.1 MALIGNANT NEOPLASM OF UPPER-OUTER QUADRANT OF LEFT BREAST IN FEMALE, ESTROGEN RECEPTOR NEGATIVE (HCC): ICD-10-CM

## 2019-07-19 DIAGNOSIS — C34.11 MALIGNANT NEOPLASM OF RIGHT UPPER LOBE OF LUNG (HCC): ICD-10-CM

## 2019-07-19 PROBLEM — R07.89 CHEST WALL PAIN: Status: RESOLVED | Noted: 2019-06-20 | Resolved: 2019-07-19

## 2019-07-19 PROBLEM — L24.5 IRRITANT CONTACT DERMATITIS DUE TO OTHER CHEMICAL PRODUCTS: Status: RESOLVED | Noted: 2019-06-28 | Resolved: 2019-07-19

## 2019-07-19 PROBLEM — R53.83 MALAISE AND FATIGUE: Status: RESOLVED | Noted: 2019-06-20 | Resolved: 2019-07-19

## 2019-07-19 PROBLEM — R53.81 MALAISE AND FATIGUE: Status: RESOLVED | Noted: 2019-06-20 | Resolved: 2019-07-19

## 2019-07-19 PROBLEM — Z12.11 SCREEN FOR COLON CANCER: Status: RESOLVED | Noted: 2019-05-28 | Resolved: 2019-07-19

## 2019-07-19 PROBLEM — B00.9 HSV-1 (HERPES SIMPLEX VIRUS 1) INFECTION: Status: RESOLVED | Noted: 2019-06-03 | Resolved: 2019-07-19

## 2019-07-19 PROBLEM — N60.12 MASTITIS CHRONIC, LEFT: Status: RESOLVED | Noted: 2019-05-28 | Resolved: 2019-07-19

## 2019-07-19 LAB
INR PPP: 1.05 (ref 0.87–1.13)
PATHOLOGY CONSULT NOTE: NORMAL
PROTHROMBIN TIME: 13.9 SEC (ref 12–14.6)

## 2019-07-19 PROCEDURE — 88341 IMHCHEM/IMCYTCHM EA ADD ANTB: CPT | Mod: 91,XU

## 2019-07-19 PROCEDURE — 88342 IMHCHEM/IMCYTCHM 1ST ANTB: CPT | Mod: XU

## 2019-07-19 PROCEDURE — 88360 TUMOR IMMUNOHISTOCHEM/MANUAL: CPT

## 2019-07-19 PROCEDURE — 77012 CT SCAN FOR NEEDLE BIOPSY: CPT

## 2019-07-19 PROCEDURE — 88305 TISSUE EXAM BY PATHOLOGIST: CPT

## 2019-07-19 PROCEDURE — A9270 NON-COVERED ITEM OR SERVICE: HCPCS

## 2019-07-19 PROCEDURE — 85610 PROTHROMBIN TIME: CPT

## 2019-07-19 PROCEDURE — 700102 HCHG RX REV CODE 250 W/ 637 OVERRIDE(OP)

## 2019-07-19 PROCEDURE — 700111 HCHG RX REV CODE 636 W/ 250 OVERRIDE (IP): Performed by: RADIOLOGY

## 2019-07-19 PROCEDURE — 700111 HCHG RX REV CODE 636 W/ 250 OVERRIDE (IP)

## 2019-07-19 PROCEDURE — 700105 HCHG RX REV CODE 258: Performed by: RADIOLOGY

## 2019-07-19 PROCEDURE — 160002 HCHG RECOVERY MINUTES (STAT)

## 2019-07-19 RX ORDER — CELECOXIB 200 MG/1
CAPSULE ORAL
Status: COMPLETED
Start: 2019-07-19 | End: 2019-07-19

## 2019-07-19 RX ORDER — MIDAZOLAM HYDROCHLORIDE 1 MG/ML
.5-2 INJECTION INTRAMUSCULAR; INTRAVENOUS PRN
Status: DISCONTINUED | OUTPATIENT
Start: 2019-07-19 | End: 2019-07-19 | Stop reason: HOSPADM

## 2019-07-19 RX ORDER — OXYCODONE AND ACETAMINOPHEN 10; 325 MG/1; MG/1
1 TABLET ORAL EVERY 6 HOURS PRN
Qty: 40 TAB | Refills: 0 | Status: ON HOLD | OUTPATIENT
Start: 2019-07-19 | End: 2019-07-27

## 2019-07-19 RX ORDER — NALOXONE HYDROCHLORIDE 0.4 MG/ML
INJECTION, SOLUTION INTRAMUSCULAR; INTRAVENOUS; SUBCUTANEOUS
Status: COMPLETED
Start: 2019-07-19 | End: 2019-07-19

## 2019-07-19 RX ORDER — DOCUSATE SODIUM 100 MG/1
100 CAPSULE, LIQUID FILLED ORAL 3 TIMES DAILY PRN
Qty: 100 CAP | Refills: 3 | Status: SHIPPED | OUTPATIENT
Start: 2019-07-19

## 2019-07-19 RX ORDER — ONDANSETRON 2 MG/ML
4 INJECTION INTRAMUSCULAR; INTRAVENOUS PRN
Status: DISCONTINUED | OUTPATIENT
Start: 2019-07-19 | End: 2019-07-19 | Stop reason: HOSPADM

## 2019-07-19 RX ORDER — MIDAZOLAM HYDROCHLORIDE 1 MG/ML
INJECTION INTRAMUSCULAR; INTRAVENOUS
Status: COMPLETED
Start: 2019-07-19 | End: 2019-07-19

## 2019-07-19 RX ORDER — SODIUM CHLORIDE 9 MG/ML
1000 INJECTION, SOLUTION INTRAVENOUS
Status: DISCONTINUED | OUTPATIENT
Start: 2019-07-19 | End: 2019-07-19 | Stop reason: HOSPADM

## 2019-07-19 RX ORDER — SODIUM CHLORIDE 9 MG/ML
500 INJECTION, SOLUTION INTRAVENOUS
Status: DISCONTINUED | OUTPATIENT
Start: 2019-07-19 | End: 2019-07-19 | Stop reason: HOSPADM

## 2019-07-19 RX ORDER — CELECOXIB 200 MG/1
200 CAPSULE ORAL DAILY
Status: COMPLETED | OUTPATIENT
Start: 2019-07-19 | End: 2019-07-19

## 2019-07-19 RX ADMIN — CELECOXIB 200 MG: 200 CAPSULE ORAL at 15:00

## 2019-07-19 RX ADMIN — FENTANYL CITRATE 25 MCG: 50 INJECTION, SOLUTION INTRAMUSCULAR; INTRAVENOUS at 14:05

## 2019-07-19 RX ADMIN — MIDAZOLAM 1 MG: 1 INJECTION INTRAMUSCULAR; INTRAVENOUS at 14:11

## 2019-07-19 RX ADMIN — MIDAZOLAM HYDROCHLORIDE 1 MG: 1 INJECTION, SOLUTION INTRAMUSCULAR; INTRAVENOUS at 14:05

## 2019-07-19 RX ADMIN — MIDAZOLAM 1 MG: 1 INJECTION INTRAMUSCULAR; INTRAVENOUS at 14:05

## 2019-07-19 RX ADMIN — FENTANYL CITRATE 50 MCG: 50 INJECTION, SOLUTION INTRAMUSCULAR; INTRAVENOUS at 14:13

## 2019-07-19 RX ADMIN — FENTANYL CITRATE 25 MCG: 50 INJECTION, SOLUTION INTRAMUSCULAR; INTRAVENOUS at 14:08

## 2019-07-19 RX ADMIN — SODIUM CHLORIDE 1000 ML: 9 INJECTION, SOLUTION INTRAVENOUS at 11:49

## 2019-07-19 NOTE — PATIENT COMMUNICATION
1. Caller Name: Clarisse Reeves                                           Call Back Number: [phone]    Patient is requesting pain medication.   I informed her that she will need to come but she states she cannot as she doesn't have a ride and has some appointments today.      She states that you know what is going on with her and is asking for some help.      Please advise.

## 2019-07-19 NOTE — TELEPHONE ENCOUNTER
From: Clarisse Reeves  To: Siva Smart M.D.  Sent: 7/19/2019 6:18 AM PDT  Subject: Prescription Question    Dr. Smart This is Clarisse Reeves [birth year]. I am having some pretty bad pain and the Tylenol and ibuprophan  are not helping at all. Would you please call in prescription to [facility] for something like Tramadol. I really need it for the pain. Thank You Please. Clarisse Reeves [birth year]. Please let me know if you can do this.

## 2019-07-19 NOTE — OR NURSING
1439: Patient arrived from IR s/p retroperitoneal lymph node biopsy with RN. Left lower back biopsy site; clean, dry, and intact. Patient is alert and awake. Updated on POC.    1445: Patient tolerating PO intake.    1520: Updated friend that will be taking patient home on POC via telephone.    1620: Patient up to ambulate upon completion of bedrest. Patient has a steady gait, biopsy site is CDI. Criteria met to discharge patient.    1625: Discharge paperwork reviewed with patient and friend. Discussed activity, site care, worsening symptoms, and follow-up. Verbalized understanding. No further questions. PIV removed, tip intact.    1640: Patient escorted out in wheelchair with RN. Discharge instructions and personal belongings in possession of the patient. Copy of discharge instructions signed and placed in the chart. Patient discharged to home with responsible adult.

## 2019-07-19 NOTE — PROGRESS NOTES
CT Nursing Note:    Patient consented by Dr. Mayberry, all questions answered. Dr. Mayberry performed retroperitoneal lymph node biopsy with imaging guidance.  Cores x 5 taken by Mitch GOMES.  The pt appeared to tolerate the procedure well; ETCo2 baseline 30, with consistent waveform during the procedure.  Gauze and tegaderm applied to left flank, CDI and soft.  Pt alert  and verbally appropriate post procedure, vital signs stable during procedure  and transport, see flow sheet for vital signs.  Report given to JEN Albert.  RN transported pt to [facility] with Sao2 monitor.

## 2019-07-19 NOTE — PATIENT COMMUNICATION
Prescription for Percocet 10/325 #40 done.  Call patient to let her know and that she or someone else will have to come by to pick it up since we cannot send narcotic prescriptions  to the pharmacy.  I do not think tramadol will be strong enough.  But if she prefers that let us know.  Tramadol also cannot be sent to the pharmacy and must be picked up.    Finally patient needs to take a stool softener like Colace 100 mg 3 times a day while on any narcotics.  This is over-the-counter but I did do it as a prescription for Colace and sent it to her pharmacy.

## 2019-07-19 NOTE — DISCHARGE INSTRUCTIONS
ACTIVITY: Rest and take it easy for the first 24 hours.  A responsible adult is recommended to remain with you during that time.  It is normal to feel sleepy.  We encourage you to not do anything that requires balance, judgment or coordination.    MILD FLU-LIKE SYMPTOMS ARE NORMAL. YOU MAY EXPERIENCE GENERALIZED MUSCLE ACHES, THROAT IRRITATION, HEADACHE AND/OR SOME NAUSEA.    FOR 24 HOURS DO NOT:  Drive, operate machinery or run household appliances.  Drink beer or alcoholic beverages.   Make important decisions or sign legal documents.    SPECIAL INSTRUCTIONS: Follow up with your referring Physician.  Resume home medications.    DIET: To avoid nausea, slowly advance diet as tolerated, avoiding spicy or greasy foods for the first day.  Add more substantial food to your diet according to your physician's instructions.  Babies can be fed formula or breast milk as soon as they are hungry.  INCREASE FLUIDS AND FIBER TO AVOID CONSTIPATION.    SURGICAL DRESSING/BATHING: Keep dressing dry for 24 hours. May remove dressing and shower after 2:00 PM on 7/20, do not need to replace dressing. Do not submerge in water or bathe for 7 days.    FOLLOW-UP APPOINTMENT:  A follow-up appointment should be arranged with your doctor; call to schedule.    You should CALL YOUR PHYSICIAN if you develop:  Fever greater than 101 degrees F.  Pain not relieved by medication, or persistent nausea or vomiting.  Excessive bleeding (blood soaking through dressing) or unexpected drainage from the wound.  Extreme redness or swelling around the incision site, drainage of pus or foul smelling drainage.  Inability to urinate or empty your bladder within 8 hours.  Problems with breathing or chest pain.    You should call 911 if you develop problems with breathing or chest pain.  If you are unable to contact your doctor or surgical center, you should go to the nearest emergency room or urgent care center.  Physician's telephone #: [phone]    If any  questions arise, call your doctor.  If your doctor is not available, please feel free to call the Surgical Center at [phone].  The Center is open Monday through Friday from 7AM to 7PM.  You can also call the HEALTH HOTLINE open 24 hours/day, 7 days/week and speak to a nurse at [phone], or toll free at [phone].    A registered nurse may call you a few days after your surgery to see how you are doing after your procedure.    MEDICATIONS: Resume taking daily medication.  Take prescribed pain medication with food.  If no medication is prescribed, you may take non-aspirin pain medication if needed.  PAIN MEDICATION CAN BE VERY CONSTIPATING.  Take a stool softener or laxative such as senokot, pericolace, or milk of magnesia if needed.    If your physician has prescribed pain medication that includes Acetaminophen (Tylenol), do not take additional Acetaminophen (Tylenol) while taking the prescribed medication.    Depression / Suicide Risk    As you are discharged from this [facility] facility, it is important to learn how to keep safe from harming yourself.    Recognize the warning signs:  · Abrupt changes in personality, positive or negative- including increase in energy   · Giving away possessions  · Change in eating patterns- significant weight changes-  positive or negative  · Change in sleeping patterns- unable to sleep or sleeping all the time   · Unwillingness or inability to communicate  · Depression  · Unusual sadness, discouragement and loneliness  · Talk of wanting to die  · Neglect of personal appearance   · Rebelliousness- reckless behavior  · Withdrawal from people/activities they love  · Confusion- inability to concentrate     If you or a loved one observes any of these behaviors or has concerns about self-harm, here's what you can do:  · Talk about it- your feelings and reasons for harming yourself  · Remove any means that you might use to hurt yourself (examples: pills, rope,  extension cords, firearm)  · Get professional help from the community (Mental Health, Substance Abuse, psychological counseling)  · Do not be alone:Call your Safe Contact- someone whom you trust who will be there for you.  · Call your local CRISIS HOTLINE 156-0158 or 690-367-8509  · Call your local Children's Mobile Crisis Response Team [place] (480) 325-8418 or www.[employer]  · Call the toll free National Suicide Prevention Hotlines   · National Suicide Prevention Lifeline 161-983-SURZ (3518)  · National Hope Line Network 800-SUICIDE (752-2258)

## 2019-07-19 NOTE — OR SURGEON
Immediate Post- Operative Note        PostOp Diagnosis:Paraaortic lymphadenpapthy      Procedure(s): CT guided bx      Estimated Blood Loss: Less than 5 ml        Complications: None            7/19/2019     2:18 PM     El Mayberry

## 2019-07-22 ENCOUNTER — APPOINTMENT (OUTPATIENT)
Dept: RADIOLOGY | Facility: MEDICAL CENTER | Age: 79
End: 2019-07-22
Attending: EMERGENCY MEDICINE
Payer: MEDICARE

## 2019-07-22 ENCOUNTER — HOSPITAL ENCOUNTER (OUTPATIENT)
Facility: MEDICAL CENTER | Age: 79
End: 2019-07-27
Attending: EMERGENCY MEDICINE | Admitting: HOSPITALIST
Payer: MEDICARE

## 2019-07-22 DIAGNOSIS — G89.3 CANCER ASSOCIATED PAIN: ICD-10-CM

## 2019-07-22 DIAGNOSIS — Z17.1 MALIGNANT NEOPLASM OF UPPER-OUTER QUADRANT OF LEFT BREAST IN FEMALE, ESTROGEN RECEPTOR NEGATIVE (HCC): ICD-10-CM

## 2019-07-22 DIAGNOSIS — C79.51 BONE METASTASES: ICD-10-CM

## 2019-07-22 DIAGNOSIS — G93.89 BRAIN MASS: ICD-10-CM

## 2019-07-22 DIAGNOSIS — C50.412 MALIGNANT NEOPLASM OF UPPER-OUTER QUADRANT OF LEFT BREAST IN FEMALE, ESTROGEN RECEPTOR NEGATIVE (HCC): ICD-10-CM

## 2019-07-22 DIAGNOSIS — C79.51 METASTATIC CANCER TO SPINE (HCC): ICD-10-CM

## 2019-07-22 DIAGNOSIS — G45.9 TIA (TRANSIENT ISCHEMIC ATTACK): ICD-10-CM

## 2019-07-22 LAB
ABO GROUP BLD: NORMAL
ALBUMIN SERPL BCP-MCNC: 3.6 G/DL (ref 3.2–4.9)
ALBUMIN/GLOB SERPL: 1.2 G/DL
ALP SERPL-CCNC: 993 U/L (ref 30–99)
ALT SERPL-CCNC: 39 U/L (ref 2–50)
ANION GAP SERPL CALC-SCNC: 15 MMOL/L (ref 0–11.9)
ANISOCYTOSIS BLD QL SMEAR: ABNORMAL
APPEARANCE UR: CLEAR
APTT PPP: 25.8 SEC (ref 24.7–36)
AST SERPL-CCNC: 57 U/L (ref 12–45)
BASOPHILS # BLD AUTO: 0 % (ref 0–1.8)
BASOPHILS # BLD: 0 K/UL (ref 0–0.12)
BILIRUB SERPL-MCNC: 0.9 MG/DL (ref 0.1–1.5)
BILIRUB UR QL STRIP.AUTO: NEGATIVE
BLD GP AB SCN SERPL QL: NORMAL
BUN SERPL-MCNC: 17 MG/DL (ref 8–22)
CALCIUM SERPL-MCNC: 8.6 MG/DL (ref 8.5–10.5)
CHLORIDE SERPL-SCNC: 94 MMOL/L (ref 96–112)
CO2 SERPL-SCNC: 22 MMOL/L (ref 20–33)
COLOR UR: YELLOW
CREAT SERPL-MCNC: 0.7 MG/DL (ref 0.5–1.4)
EKG IMPRESSION: NORMAL
EOSINOPHIL # BLD AUTO: 0 K/UL (ref 0–0.51)
EOSINOPHIL NFR BLD: 0 % (ref 0–6.9)
ERYTHROCYTE [DISTWIDTH] IN BLOOD BY AUTOMATED COUNT: 51 FL (ref 35.9–50)
GLOBULIN SER CALC-MCNC: 3 G/DL (ref 1.9–3.5)
GLUCOSE SERPL-MCNC: 126 MG/DL (ref 65–99)
GLUCOSE UR STRIP.AUTO-MCNC: NEGATIVE MG/DL
HCT VFR BLD AUTO: 37.1 % (ref 37–47)
HGB BLD-MCNC: 11.7 G/DL (ref 12–16)
INR PPP: 1.07 (ref 0.87–1.13)
KETONES UR STRIP.AUTO-MCNC: NEGATIVE MG/DL
LEUKOCYTE ESTERASE UR QL STRIP.AUTO: NEGATIVE
LYMPHOCYTES # BLD AUTO: 4.57 K/UL (ref 1–4.8)
LYMPHOCYTES NFR BLD: 35.7 % (ref 22–41)
MANUAL DIFF BLD: NORMAL
MCH RBC QN AUTO: 27 PG (ref 27–33)
MCHC RBC AUTO-ENTMCNC: 31.5 G/DL (ref 33.6–35)
MCV RBC AUTO: 85.5 FL (ref 81.4–97.8)
MICRO URNS: NORMAL
MICROCYTES BLD QL SMEAR: ABNORMAL
MONOCYTES # BLD AUTO: 0.56 K/UL (ref 0–0.85)
MONOCYTES NFR BLD AUTO: 4.4 % (ref 0–13.4)
MORPHOLOGY BLD-IMP: NORMAL
MYELOCYTES NFR BLD MANUAL: 1.7 %
NEUTROPHILS # BLD AUTO: 7.23 K/UL (ref 2–7.15)
NEUTROPHILS NFR BLD: 56.5 % (ref 44–72)
NITRITE UR QL STRIP.AUTO: NEGATIVE
NRBC # BLD AUTO: 0.09 K/UL
NRBC BLD-RTO: 0.7 /100 WBC
OVALOCYTES BLD QL SMEAR: NORMAL
PH UR STRIP.AUTO: 6.5 [PH]
PLATELET # BLD AUTO: 100 K/UL (ref 164–446)
PLATELET BLD QL SMEAR: NORMAL
PMV BLD AUTO: 10.8 FL (ref 9–12.9)
POIKILOCYTOSIS BLD QL SMEAR: NORMAL
POTASSIUM SERPL-SCNC: 3.9 MMOL/L (ref 3.6–5.5)
PROMYELOCYTES NFR BLD MANUAL: 1.7 %
PROT SERPL-MCNC: 6.6 G/DL (ref 6–8.2)
PROT UR QL STRIP: NEGATIVE MG/DL
PROTHROMBIN TIME: 14.2 SEC (ref 12–14.6)
RBC # BLD AUTO: 4.34 M/UL (ref 4.2–5.4)
RBC BLD AUTO: PRESENT
RBC UR QL AUTO: NEGATIVE
RH BLD: NORMAL
SODIUM SERPL-SCNC: 131 MMOL/L (ref 135–145)
SP GR UR STRIP.AUTO: <=1.005
TROPONIN T SERPL-MCNC: 15 NG/L (ref 6–19)
UROBILINOGEN UR STRIP.AUTO-MCNC: 0.2 MG/DL
WBC # BLD AUTO: 12.8 K/UL (ref 4.8–10.8)

## 2019-07-22 PROCEDURE — 96374 THER/PROPH/DIAG INJ IV PUSH: CPT | Mod: XU

## 2019-07-22 PROCEDURE — 84484 ASSAY OF TROPONIN QUANT: CPT

## 2019-07-22 PROCEDURE — 80053 COMPREHEN METABOLIC PANEL: CPT

## 2019-07-22 PROCEDURE — 70450 CT HEAD/BRAIN W/O DYE: CPT

## 2019-07-22 PROCEDURE — 85610 PROTHROMBIN TIME: CPT

## 2019-07-22 PROCEDURE — 85027 COMPLETE CBC AUTOMATED: CPT

## 2019-07-22 PROCEDURE — 86901 BLOOD TYPING SEROLOGIC RH(D): CPT

## 2019-07-22 PROCEDURE — 86900 BLOOD TYPING SEROLOGIC ABO: CPT

## 2019-07-22 PROCEDURE — 85007 BL SMEAR W/DIFF WBC COUNT: CPT

## 2019-07-22 PROCEDURE — 70498 CT ANGIOGRAPHY NECK: CPT

## 2019-07-22 PROCEDURE — 99220 PR INITIAL OBSERVATION CARE,LEVL III: CPT | Mod: GC | Performed by: HOSPITALIST

## 2019-07-22 PROCEDURE — 99285 EMERGENCY DEPT VISIT HI MDM: CPT

## 2019-07-22 PROCEDURE — 86850 RBC ANTIBODY SCREEN: CPT

## 2019-07-22 PROCEDURE — G0378 HOSPITAL OBSERVATION PER HR: HCPCS

## 2019-07-22 PROCEDURE — 0042T CT-CEREBRAL PERFUSION ANALYSIS: CPT

## 2019-07-22 PROCEDURE — 700117 HCHG RX CONTRAST REV CODE 255: Performed by: EMERGENCY MEDICINE

## 2019-07-22 PROCEDURE — 70496 CT ANGIOGRAPHY HEAD: CPT

## 2019-07-22 PROCEDURE — 71045 X-RAY EXAM CHEST 1 VIEW: CPT

## 2019-07-22 PROCEDURE — 94760 N-INVAS EAR/PLS OXIMETRY 1: CPT

## 2019-07-22 PROCEDURE — 700111 HCHG RX REV CODE 636 W/ 250 OVERRIDE (IP): Performed by: EMERGENCY MEDICINE

## 2019-07-22 PROCEDURE — 93005 ELECTROCARDIOGRAM TRACING: CPT | Performed by: EMERGENCY MEDICINE

## 2019-07-22 PROCEDURE — 85730 THROMBOPLASTIN TIME PARTIAL: CPT

## 2019-07-22 PROCEDURE — 81003 URINALYSIS AUTO W/O SCOPE: CPT

## 2019-07-22 RX ORDER — ASPIRIN 325 MG
325 TABLET ORAL DAILY
Status: DISCONTINUED | OUTPATIENT
Start: 2019-07-23 | End: 2019-07-27 | Stop reason: HOSPADM

## 2019-07-22 RX ORDER — ASPIRIN 81 MG/1
324 TABLET, CHEWABLE ORAL DAILY
Status: DISCONTINUED | OUTPATIENT
Start: 2019-07-23 | End: 2019-07-27 | Stop reason: HOSPADM

## 2019-07-22 RX ORDER — POLYETHYLENE GLYCOL 3350 17 G/17G
1 POWDER, FOR SOLUTION ORAL
Status: DISCONTINUED | OUTPATIENT
Start: 2019-07-22 | End: 2019-07-24

## 2019-07-22 RX ORDER — LABETALOL HYDROCHLORIDE 5 MG/ML
10 INJECTION, SOLUTION INTRAVENOUS EVERY 4 HOURS PRN
Status: DISCONTINUED | OUTPATIENT
Start: 2019-07-22 | End: 2019-07-27 | Stop reason: HOSPADM

## 2019-07-22 RX ORDER — METOCLOPRAMIDE HYDROCHLORIDE 5 MG/ML
10 INJECTION INTRAMUSCULAR; INTRAVENOUS ONCE
Status: COMPLETED | OUTPATIENT
Start: 2019-07-22 | End: 2019-07-22

## 2019-07-22 RX ORDER — ASPIRIN 300 MG/1
300 SUPPOSITORY RECTAL DAILY
Status: DISCONTINUED | OUTPATIENT
Start: 2019-07-23 | End: 2019-07-27 | Stop reason: HOSPADM

## 2019-07-22 RX ORDER — BISACODYL 10 MG
10 SUPPOSITORY, RECTAL RECTAL
Status: DISCONTINUED | OUTPATIENT
Start: 2019-07-22 | End: 2019-07-24

## 2019-07-22 RX ORDER — ACETAMINOPHEN 325 MG/1
650 TABLET ORAL EVERY 6 HOURS PRN
Status: DISCONTINUED | OUTPATIENT
Start: 2019-07-22 | End: 2019-07-23

## 2019-07-22 RX ORDER — LEVOTHYROXINE SODIUM 0.15 MG/1
TABLET ORAL
COMMUNITY
Start: 2019-06-23 | End: 2019-07-22

## 2019-07-22 RX ORDER — AMOXICILLIN 250 MG
2 CAPSULE ORAL 2 TIMES DAILY
Status: DISCONTINUED | OUTPATIENT
Start: 2019-07-22 | End: 2019-07-24

## 2019-07-22 RX ORDER — ONDANSETRON 2 MG/ML
4 INJECTION INTRAMUSCULAR; INTRAVENOUS EVERY 4 HOURS PRN
Status: DISCONTINUED | OUTPATIENT
Start: 2019-07-22 | End: 2019-07-27 | Stop reason: HOSPADM

## 2019-07-22 RX ORDER — ONDANSETRON 4 MG/1
4 TABLET, ORALLY DISINTEGRATING ORAL EVERY 4 HOURS PRN
Status: DISCONTINUED | OUTPATIENT
Start: 2019-07-22 | End: 2019-07-27 | Stop reason: HOSPADM

## 2019-07-22 RX ADMIN — IOHEXOL 120 ML: 350 INJECTION, SOLUTION INTRAVENOUS at 20:23

## 2019-07-22 RX ADMIN — METOCLOPRAMIDE 10 MG: 5 INJECTION, SOLUTION INTRAMUSCULAR; INTRAVENOUS at 23:16

## 2019-07-23 ENCOUNTER — APPOINTMENT (OUTPATIENT)
Dept: RADIOLOGY | Facility: MEDICAL CENTER | Age: 79
End: 2019-07-23
Attending: STUDENT IN AN ORGANIZED HEALTH CARE EDUCATION/TRAINING PROGRAM
Payer: MEDICARE

## 2019-07-23 ENCOUNTER — HOSPITAL ENCOUNTER (OUTPATIENT)
Dept: RADIATION ONCOLOGY | Facility: MEDICAL CENTER | Age: 79
End: 2019-07-23
Attending: RADIOLOGY
Payer: MEDICARE

## 2019-07-23 ENCOUNTER — APPOINTMENT (OUTPATIENT)
Dept: CARDIOLOGY | Facility: MEDICAL CENTER | Age: 79
End: 2019-07-23
Attending: STUDENT IN AN ORGANIZED HEALTH CARE EDUCATION/TRAINING PROGRAM
Payer: MEDICARE

## 2019-07-23 ENCOUNTER — APPOINTMENT (OUTPATIENT)
Dept: HEMATOLOGY ONCOLOGY | Facility: MEDICAL CENTER | Age: 79
End: 2019-07-23
Payer: MEDICARE

## 2019-07-23 PROBLEM — G93.89 BRAIN MASS: Status: ACTIVE | Noted: 2019-07-23

## 2019-07-23 PROBLEM — F40.240 CLAUSTROPHOBIA: Status: ACTIVE | Noted: 2019-07-23

## 2019-07-23 LAB
ALBUMIN SERPL BCP-MCNC: 3.1 G/DL (ref 3.2–4.9)
ALBUMIN/GLOB SERPL: 1.1 G/DL
ALP SERPL-CCNC: 819 U/L (ref 30–99)
ALT SERPL-CCNC: 33 U/L (ref 2–50)
ANION GAP SERPL CALC-SCNC: 12 MMOL/L (ref 0–11.9)
ANISOCYTOSIS BLD QL SMEAR: ABNORMAL
AST SERPL-CCNC: 43 U/L (ref 12–45)
BASOPHILS # BLD AUTO: 0 % (ref 0–1.8)
BASOPHILS # BLD: 0 K/UL (ref 0–0.12)
BILIRUB SERPL-MCNC: 0.5 MG/DL (ref 0.1–1.5)
BUN SERPL-MCNC: 19 MG/DL (ref 8–22)
CALCIUM SERPL-MCNC: 8.2 MG/DL (ref 8.5–10.5)
CHLORIDE SERPL-SCNC: 95 MMOL/L (ref 96–112)
CHOLEST SERPL-MCNC: 143 MG/DL (ref 100–199)
CO2 SERPL-SCNC: 25 MMOL/L (ref 20–33)
CREAT SERPL-MCNC: 0.75 MG/DL (ref 0.5–1.4)
EKG IMPRESSION: NORMAL
EOSINOPHIL # BLD AUTO: 0 K/UL (ref 0–0.51)
EOSINOPHIL NFR BLD: 0 % (ref 0–6.9)
ERYTHROCYTE [DISTWIDTH] IN BLOOD BY AUTOMATED COUNT: 52 FL (ref 35.9–50)
EST. AVERAGE GLUCOSE BLD GHB EST-MCNC: 137 MG/DL
GLOBULIN SER CALC-MCNC: 2.9 G/DL (ref 1.9–3.5)
GLUCOSE SERPL-MCNC: 144 MG/DL (ref 65–99)
HBA1C MFR BLD: 6.4 % (ref 0–5.6)
HCT VFR BLD AUTO: 31.3 % (ref 37–47)
HDLC SERPL-MCNC: 27 MG/DL
HGB BLD-MCNC: 9.6 G/DL (ref 12–16)
LDLC SERPL CALC-MCNC: 63 MG/DL
LV EJECT FRACT  99904: 65
LV EJECT FRACT MOD 2C 99903: 63.41
LV EJECT FRACT MOD 4C 99902: 68.42
LV EJECT FRACT MOD BP 99901: 67.63
LYMPHOCYTES # BLD AUTO: 4.14 K/UL (ref 1–4.8)
LYMPHOCYTES NFR BLD: 34.8 % (ref 22–41)
MANUAL DIFF BLD: NORMAL
MCH RBC QN AUTO: 26.8 PG (ref 27–33)
MCHC RBC AUTO-ENTMCNC: 30.7 G/DL (ref 33.6–35)
MCV RBC AUTO: 87.4 FL (ref 81.4–97.8)
MICROCYTES BLD QL SMEAR: ABNORMAL
MONOCYTES # BLD AUTO: 0.52 K/UL (ref 0–0.85)
MONOCYTES NFR BLD AUTO: 4.4 % (ref 0–13.4)
MORPHOLOGY BLD-IMP: NORMAL
MYELOCYTES NFR BLD MANUAL: 1.8 %
NEUTROPHILS # BLD AUTO: 6.7 K/UL (ref 2–7.15)
NEUTROPHILS NFR BLD: 53.6 % (ref 44–72)
NEUTS BAND NFR BLD MANUAL: 2.7 % (ref 0–10)
NRBC # BLD AUTO: 0.08 K/UL
NRBC BLD-RTO: 0.7 /100 WBC
OVALOCYTES BLD QL SMEAR: NORMAL
PLATELET # BLD AUTO: 75 K/UL (ref 164–446)
PLATELET BLD QL SMEAR: NORMAL
PMV BLD AUTO: 10.4 FL (ref 9–12.9)
POIKILOCYTOSIS BLD QL SMEAR: NORMAL
POTASSIUM SERPL-SCNC: 4 MMOL/L (ref 3.6–5.5)
PROMYELOCYTES NFR BLD MANUAL: 2.7 %
PROT SERPL-MCNC: 6 G/DL (ref 6–8.2)
RBC # BLD AUTO: 3.58 M/UL (ref 4.2–5.4)
RBC BLD AUTO: PRESENT
SMUDGE CELLS BLD QL SMEAR: NORMAL
SODIUM SERPL-SCNC: 132 MMOL/L (ref 135–145)
TRIGL SERPL-MCNC: 265 MG/DL (ref 0–149)
WBC # BLD AUTO: 11.9 K/UL (ref 4.8–10.8)

## 2019-07-23 PROCEDURE — 99226 PR SUBSEQUENT OBSERVATION CARE,LEVEL III: CPT | Mod: GC | Performed by: HOSPITALIST

## 2019-07-23 PROCEDURE — 700111 HCHG RX REV CODE 636 W/ 250 OVERRIDE (IP): Performed by: STUDENT IN AN ORGANIZED HEALTH CARE EDUCATION/TRAINING PROGRAM

## 2019-07-23 PROCEDURE — 93306 TTE W/DOPPLER COMPLETE: CPT

## 2019-07-23 PROCEDURE — 93306 TTE W/DOPPLER COMPLETE: CPT | Mod: 26 | Performed by: INTERNAL MEDICINE

## 2019-07-23 PROCEDURE — G0378 HOSPITAL OBSERVATION PER HR: HCPCS

## 2019-07-23 PROCEDURE — 700102 HCHG RX REV CODE 250 W/ 637 OVERRIDE(OP): Performed by: STUDENT IN AN ORGANIZED HEALTH CARE EDUCATION/TRAINING PROGRAM

## 2019-07-23 PROCEDURE — 700117 HCHG RX CONTRAST REV CODE 255: Performed by: STUDENT IN AN ORGANIZED HEALTH CARE EDUCATION/TRAINING PROGRAM

## 2019-07-23 PROCEDURE — A9270 NON-COVERED ITEM OR SERVICE: HCPCS | Performed by: STUDENT IN AN ORGANIZED HEALTH CARE EDUCATION/TRAINING PROGRAM

## 2019-07-23 PROCEDURE — 96375 TX/PRO/DX INJ NEW DRUG ADDON: CPT | Mod: XU

## 2019-07-23 PROCEDURE — 80061 LIPID PANEL: CPT

## 2019-07-23 PROCEDURE — 96372 THER/PROPH/DIAG INJ SC/IM: CPT | Mod: XU

## 2019-07-23 PROCEDURE — 83036 HEMOGLOBIN GLYCOSYLATED A1C: CPT

## 2019-07-23 PROCEDURE — 80053 COMPREHEN METABOLIC PANEL: CPT

## 2019-07-23 PROCEDURE — 700102 HCHG RX REV CODE 250 W/ 637 OVERRIDE(OP): Performed by: INTERNAL MEDICINE

## 2019-07-23 PROCEDURE — 93010 ELECTROCARDIOGRAM REPORT: CPT | Performed by: INTERNAL MEDICINE

## 2019-07-23 PROCEDURE — 93005 ELECTROCARDIOGRAM TRACING: CPT | Performed by: HOSPITALIST

## 2019-07-23 PROCEDURE — A9585 GADOBUTROL INJECTION: HCPCS | Performed by: STUDENT IN AN ORGANIZED HEALTH CARE EDUCATION/TRAINING PROGRAM

## 2019-07-23 PROCEDURE — 99215 OFFICE O/P EST HI 40 MIN: CPT | Performed by: RADIOLOGY

## 2019-07-23 PROCEDURE — 700105 HCHG RX REV CODE 258: Performed by: INTERNAL MEDICINE

## 2019-07-23 PROCEDURE — 70553 MRI BRAIN STEM W/O & W/DYE: CPT

## 2019-07-23 PROCEDURE — 85027 COMPLETE CBC AUTOMATED: CPT

## 2019-07-23 PROCEDURE — 85007 BL SMEAR W/DIFF WBC COUNT: CPT

## 2019-07-23 PROCEDURE — A9270 NON-COVERED ITEM OR SERVICE: HCPCS | Performed by: INTERNAL MEDICINE

## 2019-07-23 RX ORDER — OXYCODONE AND ACETAMINOPHEN 10; 325 MG/1; MG/1
1 TABLET ORAL EVERY 6 HOURS PRN
Status: DISCONTINUED | OUTPATIENT
Start: 2019-07-23 | End: 2019-07-24

## 2019-07-23 RX ORDER — CALCIUM CARBONATE 500 MG/1
500 TABLET, CHEWABLE ORAL 3 TIMES DAILY PRN
Status: DISCONTINUED | OUTPATIENT
Start: 2019-07-23 | End: 2019-07-27 | Stop reason: HOSPADM

## 2019-07-23 RX ORDER — SODIUM CHLORIDE 9 MG/ML
500 INJECTION, SOLUTION INTRAVENOUS ONCE
Status: COMPLETED | OUTPATIENT
Start: 2019-07-23 | End: 2019-07-23

## 2019-07-23 RX ORDER — LORAZEPAM 2 MG/ML
2 INJECTION INTRAMUSCULAR ONCE
Status: COMPLETED | OUTPATIENT
Start: 2019-07-23 | End: 2019-07-23

## 2019-07-23 RX ORDER — DEXAMETHASONE 4 MG/1
4 TABLET ORAL 2 TIMES DAILY
Status: DISCONTINUED | OUTPATIENT
Start: 2019-07-23 | End: 2019-07-27 | Stop reason: HOSPADM

## 2019-07-23 RX ORDER — LEVOTHYROXINE SODIUM 112 UG/1
112 TABLET ORAL
Status: DISCONTINUED | OUTPATIENT
Start: 2019-07-23 | End: 2019-07-27 | Stop reason: HOSPADM

## 2019-07-23 RX ORDER — GADOBUTROL 604.72 MG/ML
7.5 INJECTION INTRAVENOUS ONCE
Status: COMPLETED | OUTPATIENT
Start: 2019-07-23 | End: 2019-07-23

## 2019-07-23 RX ORDER — LORAZEPAM 2 MG/ML
INJECTION INTRAMUSCULAR
Status: DISPENSED
Start: 2019-07-23 | End: 2019-07-23

## 2019-07-23 RX ORDER — LORAZEPAM 1 MG/1
2 TABLET ORAL
Status: DISCONTINUED | OUTPATIENT
Start: 2019-07-23 | End: 2019-07-24

## 2019-07-23 RX ORDER — CALCIUM CARBONATE 500 MG/1
500 TABLET, CHEWABLE ORAL 3 TIMES DAILY PRN
Status: DISCONTINUED | OUTPATIENT
Start: 2019-07-23 | End: 2019-07-23

## 2019-07-23 RX ORDER — DOCUSATE SODIUM 100 MG/1
100 CAPSULE, LIQUID FILLED ORAL 3 TIMES DAILY PRN
Status: DISCONTINUED | OUTPATIENT
Start: 2019-07-23 | End: 2019-07-24

## 2019-07-23 RX ORDER — LORAZEPAM 1 MG/1
2 TABLET ORAL NIGHTLY
Status: DISCONTINUED | OUTPATIENT
Start: 2019-07-23 | End: 2019-07-24

## 2019-07-23 RX ORDER — ACETAMINOPHEN 325 MG/1
650 TABLET ORAL EVERY 6 HOURS PRN
Status: DISCONTINUED | OUTPATIENT
Start: 2019-07-23 | End: 2019-07-27 | Stop reason: HOSPADM

## 2019-07-23 RX ADMIN — OXYCODONE HYDROCHLORIDE AND ACETAMINOPHEN 1 TABLET: 10; 325 TABLET ORAL at 14:09

## 2019-07-23 RX ADMIN — ASPIRIN 325 MG: 325 TABLET, FILM COATED ORAL at 05:43

## 2019-07-23 RX ADMIN — ACETAMINOPHEN 650 MG: 325 TABLET, FILM COATED ORAL at 12:16

## 2019-07-23 RX ADMIN — SENNOSIDES, DOCUSATE SODIUM 2 TABLET: 50; 8.6 TABLET, FILM COATED ORAL at 18:00

## 2019-07-23 RX ADMIN — ACETAMINOPHEN 650 MG: 325 TABLET, FILM COATED ORAL at 05:43

## 2019-07-23 RX ADMIN — GADOBUTROL 7.5 ML: 604.72 INJECTION INTRAVENOUS at 07:53

## 2019-07-23 RX ADMIN — ONDANSETRON 4 MG: 2 INJECTION INTRAMUSCULAR; INTRAVENOUS at 20:23

## 2019-07-23 RX ADMIN — LORAZEPAM 2 MG: 2 INJECTION INTRAMUSCULAR; INTRAVENOUS at 07:29

## 2019-07-23 RX ADMIN — SENNOSIDES, DOCUSATE SODIUM 2 TABLET: 50; 8.6 TABLET, FILM COATED ORAL at 00:12

## 2019-07-23 RX ADMIN — ANTACID TABLETS 500 MG: 500 TABLET, CHEWABLE ORAL at 19:19

## 2019-07-23 RX ADMIN — SODIUM CHLORIDE 500 ML: 9 INJECTION, SOLUTION INTRAVENOUS at 22:00

## 2019-07-23 RX ADMIN — ENOXAPARIN SODIUM 40 MG: 100 INJECTION SUBCUTANEOUS at 05:45

## 2019-07-23 RX ADMIN — DEXAMETHASONE 4 MG: 4 TABLET ORAL at 18:01

## 2019-07-23 RX ADMIN — LORAZEPAM 2 MG: 1 TABLET ORAL at 20:07

## 2019-07-23 RX ADMIN — SENNOSIDES, DOCUSATE SODIUM 2 TABLET: 50; 8.6 TABLET, FILM COATED ORAL at 05:44

## 2019-07-23 ASSESSMENT — ENCOUNTER SYMPTOMS
SPEECH CHANGE: 1
RESPIRATORY NEGATIVE: 1
WEIGHT LOSS: 0
COUGH: 0
CHILLS: 0
PHOTOPHOBIA: 0
TREMORS: 0
BLURRED VISION: 0
NAUSEA: 0
GASTROINTESTINAL NEGATIVE: 1
MUSCULOSKELETAL NEGATIVE: 1
DEPRESSION: 0
BACK PAIN: 1
WHEEZING: 0
SENSORY CHANGE: 0
TINGLING: 0
HEMOPTYSIS: 0
SEIZURES: 0
DIAPHORESIS: 1
FOCAL WEAKNESS: 0
SORE THROAT: 0
FEVER: 0
DOUBLE VISION: 0
HEADACHES: 0
PALPITATIONS: 0
SHORTNESS OF BREATH: 0
VOMITING: 0
LOSS OF CONSCIOUSNESS: 0
BLOOD IN STOOL: 0
EYES NEGATIVE: 1
ABDOMINAL PAIN: 0
CARDIOVASCULAR NEGATIVE: 1
MYALGIAS: 1
HEARTBURN: 0
DIZZINESS: 0
WEAKNESS: 0
MEMORY LOSS: 0
PND: 0

## 2019-07-23 ASSESSMENT — LIFESTYLE VARIABLES: EVER_SMOKED: NEVER

## 2019-07-23 ASSESSMENT — PATIENT HEALTH QUESTIONNAIRE - PHQ9
SUM OF ALL RESPONSES TO PHQ9 QUESTIONS 1 AND 2: 0
1. LITTLE INTEREST OR PLEASURE IN DOING THINGS: NOT AT ALL
2. FEELING DOWN, DEPRESSED, IRRITABLE, OR HOPELESS: NOT AT ALL

## 2019-07-23 NOTE — ASSESSMENT & PLAN NOTE
-At home on ativan  -Observed excess somnolance after IV ativan for MRI  -PLAN: on home ativan 2mg nightly

## 2019-07-23 NOTE — SENIOR ADMIT NOTE
[facility] Internal Medicine Senior Admit Note:    Clarisse Reeves is a 78 y.o. female with Hx of CLL, breast cancer, lung cancer who presents with dysarthria, aphasia noted on waking from a nap around 3pm. Code stroke was called and she underwent CT head w/o contrast, CTA head/neck, CT cerebral perfusion which did not reveal any acute abnormality. Her symptoms have now resolved completely and NIHSS is 0.   She had similar transient symptoms once approximately one year ago. Suspect TIA.  -admit for observation  -full dose ASA  -no statin given due to severe intolerance noted to all statins  -neuro checks q4h  -consider MRI in am      Mikayla Thornton M.D.   Please refer to Intern Dr. Meyer's H&P for complete admission details.

## 2019-07-23 NOTE — CONSULTS
RADIATION ONCOLOGY CONSULT    DATE OF SERVICE: 7/23/2019    IDENTIFICATION: A 78 y.o. female with metastatic probable triple negative breast cancer.  She is here at the kind request of Dr. Lacy Ramesh.      HISTORY OF PRESENT ILLNESS: Patient was diagnosed with a T2N1 grade 3 triple negative breast cancer where she received neoadjuvant AC Taxol followed by lumpectomy and node dissection and had no residual tumor in the breast but one microscopic node.  She then received radiation therapy completing therapy on 11/18/2018.  She has been complaining of aches and pains which have certainly worsened recently.  Her primary care physician ordered a PET CT scan 7/11/2019 which revealed diffuse bony metastatic disease involving the majority of the axial and proximal appendicular skeleton.  There is remi metastatic disease in the left infraclavicular space brachial region and mediastinal prevascular space.  There were masses surrounding the left brachial plexus.  There were multiple new hypermetabolic nodes within the change in the abdomen and retroperitoneum and possible metastatic disease to the liver.  She then underwent a biopsy on 7/18/2019 of the abdominal lymph nodes.  Tumor is consistent with metastatic breast cancer estrogen and progesterone 0 staining and mamma globin positive.  She was admitted yesterday because of change in mental status and an expressive a aphasia.  MRI scan of the brain was performed that was degraded by motion artifact.  There was a 28 mm dural based extra-axial enhancing mass in the right anterior frontal convexity not present on the scan 10/25/2018 suspicious for a dural base metastasis.  Today she is feeling back to normal just week slightly nauseated and hungry.      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   • Cataract     right  and  left  IOL   • CLL (chronic lymphoblastic leukemia)    • Cutaneous skin tags 1/29/2015   • DM (diabetes mellitus) (HCC)     oral meds   • Hiatus hernia syndrome     no surgery   • Indigestion    • MEDICAL HOME    • Personal history of colonic polyps 11/26/2012   • Pneumonia 2014   • Pneumonia 06/2017   • Statin intolerance    • Thyroid disease    • Type II or unspecified type diabetes mellitus without mention of complication, not stated as uncontrolled     pre-diabetic       PAST SURGICAL HISTORY:  Past Surgical History:   Procedure Laterality Date   • MASTECTOMY Left 7/6/2018    Procedure: MASTECTOMY - PARTIAL AFTER WIRE LOCAALIZATION;  Surgeon: Esperanza Crandall M.D.;  Location: SURGERY SAME DAY [facility];  Service: General   • AXILLARY NODE DISSECTION Left 7/6/2018    Procedure: AXILLARY NODE DISSECTION;  Surgeon: Esperanza Crandall M.D.;  Location: SURGERY SAME DAY [facility];  Service: General   • THYROIDECTOMY N/A 11/29/2017    Procedure: THYROIDECTOMY COMPLETION, RECURRENT LARYNGEAL NERVE MONITORING;  Surgeon: Cameron Marcelino M.D.;  Location: SURGERY SAME DAY [facility];  Service: General   • THORACOSCOPY Right 2/1/2016    Procedure: THORACOSCOPY Upper Lobectomy ;  Surgeon: John H Ganser, M.D.;  Location: SURGERY [facility];  Service:    • NODE DISSECTION Right 2/1/2016    Procedure: NODE DISSECTION;  Surgeon: John H Ganser, M.D.;  Location: SURGERY [facility];  Service:    • RECOVERY  12/18/2015    Procedure: CT-SCP-RUL LUNG BIOPSY-;  Surgeon: [facility];  Location: SURGERY PRE-POST PROC UNIT [facility];  Service:    • OTHER  2001    Lower Left segment of lung removed    • CHOLECYSTECTOMY  1995   • OTHER ORTHOPEDIC SURGERY  1990    bakers cyst removed in right knee, multiple scopes   • OTHER  1990    hemmroid removal   • OTHER  1984    left Thyroid removed, might  remove right side in the future   • APPENDECTOMY     • CATARACT EXTRACTION WITH IOL     • TONSILLECTOMY     • US-NEEDLE CORE BX-BREAST PANEL     • VAGINAL HYSTERECTOMY TOTAL      Hysterectomy,Total Vaginal       GYNECOLOGICAL STATUS:   miscarriage 3    CURRENT MEDICATIONS:  Current Facility-Administered Medications   Medication Dose Route Frequency Provider Last Rate Last Dose   • LORazepam (ATIVAN) tablet 2 mg  2 mg Oral Once PRN Siva Elias M.D.       • LORAZEPAM 2 MG/ML INJ SOLN            • oxyCODONE-acetaminophen (PERCOCET-10)  MG per tablet 1 Tab  1 Tab Oral Q6HRS PRN Siva Elias M.D.   1 Tab at 19 1409   • LORazepam (ATIVAN) tablet 2 mg  2 mg Oral Nightly Siva Elias M.D.       • levothyroxine (SYNTHROID) tablet 112 mcg  112 mcg Oral AM ES Siva Elias M.D.       • docusate sodium (COLACE) capsule 100 mg  100 mg Oral TID PRN Siva Elias M.D.       • dexamethasone (DECADRON) tablet 4 mg  4 mg Oral BID Siva Elias M.D.       • acetaminophen (TYLENOL) tablet 650 mg  650 mg Oral Q6HRS PRN Siva Elias M.D.       • senna-docusate (PERICOLACE or SENOKOT S) 8.6-50 MG per tablet 2 Tab  2 Tab Oral BID Josué Meyer M.D.   2 Tab at 19 0544    And   • polyethylene glycol/lytes (MIRALAX) PACKET 1 Packet  1 Packet Oral QDAY PRN Josué Meyer M.D.        And   • magnesium hydroxide (MILK OF MAGNESIA) suspension 30 mL  30 mL Oral QDAY PRN Josué Meyer M.D.        And   • bisacodyl (DULCOLAX) suppository 10 mg  10 mg Rectal QDAY PRN Josué Meyer M.D.       • enoxaparin (LOVENOX) inj 40 mg  40 mg Subcutaneous DAILY Josué Meyer M.D.   40 mg at 19 0545   • ondansetron (ZOFRAN) syringe/vial injection 4 mg  4 mg Intravenous Q4HRS PRN Josué Meyer M.D.       • ondansetron (ZOFRAN ODT) dispertab 4 mg  4 mg Oral Q4HRS PRN Josué Meyer M.D.       • labetalol (NORMODYNE,TRANDATE) injection 10 mg  10 mg Intravenous Q4HRS PRN Josué Meyer M.D.       • Pharmacy consult  request - Allow for permissive hypertension: SBP up to 220 mmHg/DBP up to 120 mmHg x 48 hours   Other PHARMACY TO DOSE Mikayla Thornton M.D.       • aspirin (ASA) tablet 325 mg  325 mg Oral DAILY Mikayla Thornton M.D.   325 mg at 07/23/19 0543    Or   • aspirin (ASA) chewable tab 324 mg  324 mg Oral DAILY Mikayla Thornton M.D.        Or   • aspirin (ASA) suppository 300 mg  300 mg Rectal DAILY Mikayla Thornton M.D.           ALLERGIES:    Atorvastatin; Codeine; Latex; Lovastatin; Simvastatin; Tape; Lisinopril; and Metformin    FAMILY HISTORY:    Family History   Problem Relation Age of Onset   • Cancer Mother    • Lung Disease Father    • Cancer Father            SOCIAL HISTORY:     reports that she quit smoking about 13 years ago. Her smoking use included Cigarettes. She has a 75.00 pack-year smoking history. She has never used smokeless tobacco. She reports that she drinks about 1.2 oz of alcohol per week . She reports that she does not use drugs.  Patient lives alone in apartment with her dog Kelsey    REVIEW OF SYSTEMS:  Review of Systems:   Constitutional: Denies fever, chills, sweats, or weight loss, but yesterday had quite a bit of chills and sweats  HENT: Has neck pain, denies headache or dizziness, hearing loss  Eyes: Denies vision changes  Respiratory: Denies cough, congestion, SOB, hemoptysis  Cardiovascular: Denies palpations, chest pain or easy fatiguability   Gastrointestinal: Denies diarrhea, abdominal pain or constipation.  No blood in stool.  Genitourinal: Denies hematuria, dysuria, incontinence  Musculoskeletal: Has pain throughout her bones  Skin: Denies itching or rash, or new lesions  Neurological: Denies numbness or tingling, weakness, siezures  Endocrine: Denies thyroid problems or diabetes  Psyche: Denies depression, anxiety, mood changes    What makes the pain better: Immobility  What makes the pain worse: Mobility  Pain controlled with current regimen: yes  Pain related to condition being  "seen here for: yes      PHYSICAL EXAM:    3 = Capable of only limited self care, confined to bed or chair more than 50% of waking hours  Vitals:    07/23/19 0046 07/23/19 0435 07/23/19 0835 07/23/19 1317   BP: 132/71 137/94 103/51 117/66   Pulse: 85 97 75 100   Resp: 19 17 20 18   Temp: 36.7 °C (98 °F) 36.8 °C (98.2 °F) 35.8 °C (96.5 °F) 36.6 °C (97.8 °F)   TempSrc: Temporal Temporal Temporal Temporal   SpO2: 98% 99% 92% 100%   Weight: 81.8 kg (180 lb 5.4 oz)      Height: 1.676 m (5' 6\")      Location: Generalized, Neck, Shoulder  Description: Sore        GENERAL: Lying comfortably in bed pale alert and oriented x3  Musculoskeletal: Pain throughout the bones  HEENT:  Pupils are equal, round, and reactive to light.  Extraocular muscles   are intact. Sclerae nonicteric.  Conjunctivae pink.  Oral cavity, tongue   protrudes midline.   NECK:   No peripheral adenopathy of the neck, supraclavicular fossa or axillae   bilaterally.  LUNGS:  Clear to ascultation   HEART:  Regular rate and rhythm.  No murmur appreciated  ABDOMEN:  Soft. No evidence of hepatosplenomegaly.  Slight abdominal tenderness in the right upper quadrant  EXTREMITIES:  Without Edema.  NEUROLOGIC:  Cranial nerves II through XII were intact. Normal stance and gait motor and sensory grossly within normal limits      IMPRESSION:    A 78 y.o. with metastatic most likely triple negative breast cancer throughout the bones lymph nodes in the mediastinum and abdomen as well as probable liver metastasis.      RECOMMENDATIONS:   I had a long discussion with the patient regarding this diagnosis.  She does have a lot of pain in her left shoulder for which she had been referred to me for.  We both agree that radiation is therapy is probably not can be much in the way of benefit at this point.  She is going to think about what she wants to do for treatment and speak with Lacy Ramesh regarding this as well.  Most likely she will like to proceed with palliative care " and/or hospice.    I am happy to see her at any time.    Thank you for the opportunity to participate in her care.  If any questions or comments, please do not hesitate in calling.    Please note that this dictation was created using voice recognition software. I have made every reasonable attempt to correct obvious errors, but I expect that there are errors of grammar and possibly content that I did not discover before finalizing the note.

## 2019-07-23 NOTE — ASSESSMENT & PLAN NOTE
-C/o myalgia and bone pain; elevated alkaline phosphatase  - PET/CT on 7/11/19 was positive for bony metastasis  -On decadron; percocet  -PLAN:  Continue home decadron.  Pain control per palliative care: MS contin 30mg q8h, MSIR 15mg q3h prn

## 2019-07-23 NOTE — H&P
[facility]        Internal Medicine Admitting History and Physical    Note Author: Josué Meyer M.D.       Name Clarisse Reeves 1940   Age/Sex 78 y.o. female   MRN [id number]   Code Status FULL CODE     After 5PM or if no immediate response to page, please call for cross-coverage  Attending/Team: Dr. Houston/Radha See Patient List for primary contact information  Call [phone] to page    1st Call - Day Intern (R1):   Dr. Mcpherson 2nd Call - Day Sr. Resident (R2/R3):   Dr. Elias       Chief Complaint:   Difficulty in speaking    HPI:  Ms. Reeves is a 77 y/o female with the PMHx of CLL, breast cancer and lung cancer. She was brought to the ED with the chief complaint of difficulty in speaking. She was not feeling fine after waking up in the morning today. According to her he was confused and had difficulty in putting her clothes in closet. She started having problem in finding words and difficulty in speaking at around 3 pm after a nap. Her friend called EMS after noticing abnormal speech. Her speech improved during her way to the hospital. She denies any sensory changes or focal weakness, facial droop, shaking of any of her body part, syncope, headache, any vision changes, chest pain, shortness of breath, palpitation, nausea, vomiting, abdominal pain. She had similar episode of TIA and aphasia last year with complete recovery within an hour. She also c/o nighttime sweating for he past 2 months.      She is tachycardiac; other vitals are stable. CBC showed elevated WBC and low Hb, electrolytes are unremarkable; elevated ALP; EKG and troponin normal; CT head and cerebral perfusion study are negative for any bleeding or ischemic changes.       Review of Systems   Constitutional: Positive for diaphoresis (c/o nighttime sweats). Negative for chills and fever.   HENT: Negative.  Negative for congestion and sore throat.    Eyes: Negative.  Negative for blurred vision, double vision and photophobia.    Respiratory: Negative.  Negative for cough, hemoptysis, shortness of breath and wheezing.    Cardiovascular: Negative.  Negative for chest pain, palpitations, leg swelling and PND.   Gastrointestinal: Negative.  Negative for abdominal pain, blood in stool, heartburn, nausea and vomiting.   Genitourinary: Negative.  Negative for dysuria and hematuria.   Musculoskeletal: Positive for back pain, joint pain and myalgias.   Skin: Negative.  Negative for itching and rash.   Neurological: Positive for speech change (one episode of difficulty speaking for an hour ). Negative for dizziness, tingling, tremors, sensory change, focal weakness, seizures, loss of consciousness, weakness and headaches.   Psychiatric/Behavioral: Negative for depression and memory loss.             Past Medical History (Chronic medical problem, known complications and current treatment)    CLL  Metastatic Breast Cancer s/p left breast mastectomy with lymph node dissection   Malignant neoplasm of RU lobe of lung - adenocarcinoma in situ s/p partial lobectomy RUL/RML  Generalized chronic pain  Hypothyroidism  HTN  GERD  Depression  Anxiety  TIA  HSV-1 infection      Past Surgical History:  Past Surgical History:   Procedure Laterality Date   • MASTECTOMY Left 7/6/2018    Procedure: MASTECTOMY - PARTIAL AFTER WIRE LOCAALIZATION;  Surgeon: Esperanza Crandall M.D.;  Location: SURGERY SAME DAY [facility];  Service: General   • AXILLARY NODE DISSECTION Left 7/6/2018    Procedure: AXILLARY NODE DISSECTION;  Surgeon: Esperanza Crandall M.D.;  Location: SURGERY SAME DAY [facility];  Service: General   • THYROIDECTOMY N/A 11/29/2017    Procedure: THYROIDECTOMY COMPLETION, RECURRENT LARYNGEAL NERVE MONITORING;  Surgeon: Cameron Marcelino M.D.;  Location: SURGERY SAME DAY [facility];  Service: General   • THORACOSCOPY Right 2/1/2016    Procedure: THORACOSCOPY Upper Lobectomy ;  Surgeon: John H Ganser, M.D.;  Location: SURGERY [facility];  Service:    •  NODE DISSECTION Right 2/1/2016    Procedure: NODE DISSECTION;  Surgeon: John H Ganser, M.D.;  Location: SURGERY [facility];  Service:    • RECOVERY  12/18/2015    Procedure: CT-SCP-RUL LUNG BIOPSY-;  Surgeon: Patty Surgery;  [place] [facility]  Service:    • OTHER  2001    Lower Left segment of lung removed    • CHOLECYSTECTOMY  1995   • OTHER ORTHOPEDIC SURGERY  1990    bakers cyst removed in right knee, multiple scopes   • OTHER  1990    hemmroid removal   • OTHER  1984    left Thyroid removed, might remove right side in the future   • APPENDECTOMY  1955   • CATARACT EXTRACTION WITH IOL     • TONSILLECTOMY     • US-NEEDLE CORE BX-BREAST PANEL     • VAGINAL HYSTERECTOMY TOTAL      Hysterectomy,Total Vaginal       Current Outpatient Medications:  Home Medications     Reviewed by Cecilia Xavier R.N. (Registered Nurse) on 07/23/19 at 0143  Med List Status: Complete   Medication Last Dose Status   acetaminophen (TYLENOL) 325 MG Tab 7/22/2019 Active   dexamethasone (DECADRON) 4 MG Tab 7/21/2019 Active   docusate sodium (COLACE) 100 MG Cap NEW Rx Active   levothyroxine (SYNTHROID) 112 MCG Tab 7/22/2019 Active   LORazepam (ATIVAN) 2 MG tablet > 3 DAYS Active   oxyCODONE-acetaminophen (PERCOCET)  MG Tab 7/22/2019 Active                Medication Allergy/Sensitivities:  Allergies   Allergen Reactions   • Atorvastatin Myalgia     Leg cramps     • Codeine Hives and Nausea     RXN=40 years ago   • Latex Hives and Rash     Gets severe rash and blisters   • Lovastatin Myalgia     Muscle cramps     • Simvastatin Myalgia     Leg cramps     • Tape Unspecified     Blisters  Paper tape ok   • Lisinopril Cough   • Metformin Diarrhea     Diarrhea           Family History (mandatory)   Family History   Problem Relation Age of Onset   • Cancer Mother    • Lung Disease Father    • Cancer Father        Social History (mandatory)   Social History     Social History   • Marital status:  "Single     Spouse name: N/A   • Number of children: N/A   • Years of education: N/A     Occupational History   • 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     Social History Main Topics   • Smoking status: Former Smoker     Packs/day: 1.50     Years: 50.00     Types: Cigarettes     Quit date: 5/6/2006   • Smokeless tobacco: Never Used      Comment: quit smoking 2002   • Alcohol use 1.2 oz/week     2 Glasses of wine per week      Comment: 2 drinks a week   • Drug use: No   • Sexual activity: Not Currently     Partners: Male     Other Topics Concern   • Not on file     Social History Narrative   • No narrative on file     Living situation: Patient lives by herself  PCP : Siva Smart M.D.    Physical Exam     Vitals:    07/22/19 2358 07/23/19 0029 07/23/19 0046 07/23/19 0435   BP:  121/63 132/71 137/94   Pulse:  97 85 97   Resp:  18 19 17   Temp:  36.8 °C (98.2 °F) 36.7 °C (98 °F) 36.8 °C (98.2 °F)   TempSrc:  Temporal Temporal Temporal   SpO2: 99% 100% 98% 99%   Weight:   81.8 kg (180 lb 5.4 oz)    Height:   1.676 m (5' 6\")      Body mass index is 29.11 kg/m².  O2 therapy: Pulse Oximetry: 99 %, O2 (LPM): 2, O2 Delivery: Oxymask    Physical Exam   Constitutional: She is oriented to person, place, and time and well-developed, well-nourished, and in no distress. No distress.   HENT:   Head: Normocephalic and atraumatic.   Eyes: Pupils are equal, round, and reactive to light. Conjunctivae and EOM are normal. Right eye exhibits no discharge.   Neck: Normal range of motion. Neck supple. No JVD present.   Cardiovascular: Normal rate, regular rhythm, normal heart sounds and intact distal pulses.  Exam reveals no gallop and no friction rub.    No murmur heard.  Pulmonary/Chest: Effort normal and breath sounds normal. No respiratory distress. She has no wheezes. She exhibits no tenderness.   Abdominal: Soft. Bowel sounds are normal. She exhibits no distension and no mass. There is no tenderness.   Musculoskeletal: Normal range " of motion. She exhibits no edema or tenderness.   Lymphadenopathy:     She has no cervical adenopathy.   Neurological: She is alert and oriented to person, place, and time. She has normal reflexes. She displays no weakness, no tremor, facial symmetry and normal speech. No cranial nerve deficit.   Skin: She is not diaphoretic.         Data Review       Old Records Request:   Completed  Current Records review/summary: Completed    Lab Data Review:  Recent Results (from the past 24 hour(s))   CBC WITH DIFFERENTIAL    Collection Time: 07/22/19  6:48 PM   Result Value Ref Range    WBC 12.8 (H) 4.8 - 10.8 K/uL    RBC 4.34 4.20 - 5.40 M/uL    Hemoglobin 11.7 (L) 12.0 - 16.0 g/dL    Hematocrit 37.1 37.0 - 47.0 %    MCV 85.5 81.4 - 97.8 fL    MCH 27.0 27.0 - 33.0 pg    MCHC 31.5 (L) 33.6 - 35.0 g/dL    RDW 51.0 (H) 35.9 - 50.0 fL    Platelet Count 100 (L) 164 - 446 K/uL    MPV 10.8 9.0 - 12.9 fL    Neutrophils-Polys 56.50 44.00 - 72.00 %    Lymphocytes 35.70 22.00 - 41.00 %    Monocytes 4.40 0.00 - 13.40 %    Eosinophils 0.00 0.00 - 6.90 %    Basophils 0.00 0.00 - 1.80 %    Nucleated RBC 0.70 /100 WBC    Neutrophils (Absolute) 7.23 (H) 2.00 - 7.15 K/uL    Lymphs (Absolute) 4.57 1.00 - 4.80 K/uL    Monos (Absolute) 0.56 0.00 - 0.85 K/uL    Eos (Absolute) 0.00 0.00 - 0.51 K/uL    Baso (Absolute) 0.00 0.00 - 0.12 K/uL    NRBC (Absolute) 0.09 K/uL    Anisocytosis 2+     Microcytosis 2+    COMP METABOLIC PANEL    Collection Time: 07/22/19  6:48 PM   Result Value Ref Range    Sodium 131 (L) 135 - 145 mmol/L    Potassium 3.9 3.6 - 5.5 mmol/L    Chloride 94 (L) 96 - 112 mmol/L    Co2 22 20 - 33 mmol/L    Anion Gap 15.0 (H) 0.0 - 11.9    Glucose 126 (H) 65 - 99 mg/dL    Bun 17 8 - 22 mg/dL    Creatinine 0.70 0.50 - 1.40 mg/dL    Calcium 8.6 8.5 - 10.5 mg/dL    AST(SGOT) 57 (H) 12 - 45 U/L    ALT(SGPT) 39 2 - 50 U/L    Alkaline Phosphatase 993 (H) 30 - 99 U/L    Total Bilirubin 0.9 0.1 - 1.5 mg/dL    Albumin 3.6 3.2 - 4.9 g/dL     Total Protein 6.6 6.0 - 8.2 g/dL    Globulin 3.0 1.9 - 3.5 g/dL    A-G Ratio 1.2 g/dL   PROTHROMBIN TIME    Collection Time: 19  6:48 PM   Result Value Ref Range    PT 14.2 12.0 - 14.6 sec    INR 1.07 0.87 - 1.13   APTT    Collection Time: 19  6:48 PM   Result Value Ref Range    APTT 25.8 24.7 - 36.0 sec   COD (ADULT)    Collection Time: 19  6:48 PM   Result Value Ref Range    ABO Grouping Only A     Rh Grouping Only POS     Antibody Screen-Cod NEG    TROPONIN    Collection Time: 19  6:48 PM   Result Value Ref Range    Troponin T 15 6 - 19 ng/L   ESTIMATED GFR    Collection Time: 19  6:48 PM   Result Value Ref Range    GFR If African American >60 >60 mL/min/1.73 m 2    GFR If Non African American >60 >60 mL/min/1.73 m 2   PLATELET ESTIMATE    Collection Time: 19  6:48 PM   Result Value Ref Range    Plt Estimation Decreased    MORPHOLOGY    Collection Time: 19  6:48 PM   Result Value Ref Range    RBC Morphology Present     Poikilocytosis 1+     Ovalocytes 1+    PERIPHERAL SMEAR REVIEW    Collection Time: 19  6:48 PM   Result Value Ref Range    Peripheral Smear Review see below    DIFFERENTIAL MANUAL    Collection Time: 19  6:48 PM   Result Value Ref Range    Myelocytes 1.70 %    Progranulocytes 1.70 %    Manual Diff Status PERFORMED    EKG (NOW)    Collection Time: 19  7:15 PM   Result Value Ref Range    Report       [facility]    Test Date:  2019  Pt Name:    GREG DEMARCO                 Department: ER  MRN:        [id number]                      Room:  Gender:     Female                       Technician: [id number]  :        [id number]                   Requested By:PADDY GODDARD  Order #:    [id number]                    Reading MD: Paddy Goddard MD    Measurements  Intervals                                Axis  Rate:       125                          P:          114  NV:         144                          QRS:         17  QRSD:       82                           T:          39  QT:         328  QTc:        473    Interpretive Statements  SINUS TACHYCARDIA  MULTIPLE PREMATURE COMPLEXES, VENT & SUPRAVEN  MINIMAL ST DEPRESSION, INFERIOR LEADS  Compared to ECG 06/20/2019 20:36:42  ST (T wave) deviation now present  Sinus rhythm no longer present    Electronically Signed On 7- 21:04:53 PDT by Paddy Goddard MD     URINALYSIS CULTURE, IF INDICATED    Collection Time: 07/22/19 10:00 PM   Result Value Ref Range    Color Yellow     Character Clear     Specific Gravity <=1.005 <1.035    Ph 6.5 5.0 - 8.0    Glucose Negative Negative mg/dL    Ketones Negative Negative mg/dL    Protein Negative Negative mg/dL    Bilirubin Negative Negative    Urobilinogen, Urine 0.2 Negative    Nitrite Negative Negative    Leukocyte Esterase Negative Negative    Occult Blood Negative Negative    Micro Urine Req see below    CBC with Differential    Collection Time: 07/23/19  3:36 AM   Result Value Ref Range    WBC 11.9 (H) 4.8 - 10.8 K/uL    RBC 3.58 (L) 4.20 - 5.40 M/uL    Hemoglobin 9.6 (L) 12.0 - 16.0 g/dL    Hematocrit 31.3 (L) 37.0 - 47.0 %    MCV 87.4 81.4 - 97.8 fL    MCH 26.8 (L) 27.0 - 33.0 pg    MCHC 30.7 (L) 33.6 - 35.0 g/dL    RDW 52.0 (H) 35.9 - 50.0 fL    Platelet Count 75 (L) 164 - 446 K/uL    MPV 10.4 9.0 - 12.9 fL    Nucleated RBC 0.70 /100 WBC    NRBC (Absolute) 0.08 K/uL   Comp Metabolic Panel (CMP)    Collection Time: 07/23/19  3:36 AM   Result Value Ref Range    Sodium 132 (L) 135 - 145 mmol/L    Potassium 4.0 3.6 - 5.5 mmol/L    Chloride 95 (L) 96 - 112 mmol/L    Co2 25 20 - 33 mmol/L    Anion Gap 12.0 (H) 0.0 - 11.9    Glucose 144 (H) 65 - 99 mg/dL    Bun 19 8 - 22 mg/dL    Creatinine 0.75 0.50 - 1.40 mg/dL    Calcium 8.2 (L) 8.5 - 10.5 mg/dL    AST(SGOT) 43 12 - 45 U/L    ALT(SGPT) 33 2 - 50 U/L    Alkaline Phosphatase 819 (H) 30 - 99 U/L    Total Bilirubin 0.5 0.1 - 1.5 mg/dL    Albumin 3.1 (L) 3.2 - 4.9 g/dL    Total  Protein 6.0 6.0 - 8.2 g/dL    Globulin 2.9 1.9 - 3.5 g/dL    A-G Ratio 1.1 g/dL   Lipid Profile    Collection Time: 07/23/19  3:36 AM   Result Value Ref Range    Cholesterol,Tot 143 100 - 199 mg/dL    Triglycerides 265 (H) 0 - 149 mg/dL    HDL 27 (A) >=40 mg/dL    LDL 63 <100 mg/dL   ESTIMATED GFR    Collection Time: 07/23/19  3:36 AM   Result Value Ref Range    GFR If African American >60 >60 mL/min/1.73 m 2    GFR If Non African American >60 >60 mL/min/1.73 m 2       Imaging/Procedures Review:    Independant Imaging Review: Completed     CT-CTA NECK WITH & W/O-POST PROCESSING   Final Result         1.  No visualized dissection, significant stenosis, or vascular occlusion.      CT-CTA HEAD WITH & W/O-POST PROCESS   Final Result         1.  No large vessel occlusion or aneurysm.      CT-CEREBRAL PERFUSION ANALYSIS   Final Result         1.  Cerebral blood flow less than 30% likely representing completed infarct = 0 mL.      2.  T Max more than 6 seconds likely representing combination of completed infarct and ischemia = 0 mL.      3.  Mismatched volume likely representing ischemic brain/penumbra = None      4.  Please note that the cerebral perfusion was performed on the limited brain tissue around the basal ganglia region. Infarct/ischemia outside the CT perfusion sections can be missed in this study.      DX-CHEST-PORTABLE (1 VIEW)   Final Result      No acute cardiopulmonary abnormality.      CT-HEAD W/O   Final Result      1.  No evidence of acute intracranial process.      2.  Cerebral atrophy as well as periventricular chronic small vessel ischemic change.               EKG:     EKG Independent Review: Completed  QTc:473, HR: 125, sinus tachycardia, no ST/T changes     Records reviewed and summarized in current documentation :  Yes  [employer] teaching service handout given to patient:  No         Assessment/Plan     * TIA (transient ischemic attack) exp aphasia- (present on admission)   Assessment & Plan    Patient  c/o one episode of difficulty in speaking and dysarthria which resolved within 1 hour; NIHSS is 0     - Normal troponin and EKG; normal PT-INR  - CT head, cerebral perfusion study negative for ischemia or heamorrhage  - admit for observation  - neuro checks Q4  - Aspirin  - Consider echo, carotid U/S doppler  - neuro consult; appreciate recommandations     Bone metastases (HCC)- (present on admission)   Assessment & Plan    C/o myalgia and bone pain; elevated alkaline phosphatase  - PET/CT on 7/11/19 was positive for bony metastasis  - On decadron; percocet  - F/U with oncologist       Malignant neoplasm of upper-outer quadrant of left breast in female, estrogen receptor negative (CMS-HCC)- 2/2018; 1 positive node; chemorx (dr. 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 Grant  - lymph node biopsy last week; waiting for results       CLL (chronic lymphocytic leukemia)- wbc= 20k-30k, since 2006 until march 2018 when it returned normal at 5-10k following chemorx for breast ca- dr grant; no rx; oncologist to follow- (present on admission)   Assessment & Plan    H/o CLL; patient is afebrile and asymptomatic  - elevated WBC, low Hb and platelets   - No current treatment  - F/U with oncologist     Claustrophobia   Assessment & Plan    On Ativan       Anxiety- (present on admission)   Assessment & Plan    On ativan     Multiple thyroid nodules- (present on admission)   Assessment & Plan    S/P thyroidectomy  - on Levothyroxine  - Continue current home meds         Anticipated Hospital stay: Observation admit        Quality Measures  Quality-Core Measures   Reviewed items::   EKG reviewed, Labs reviewed, Medications reviewed and Radiology images reviewed  Oglesby catheter::  No Oglesby  DVT prophylaxis pharmacological::  Enoxaparin (Lovenox)    PCP: Siva Smart M.D.

## 2019-07-23 NOTE — ASSESSMENT & PLAN NOTE
-Patient does not tolerate MRI or CTs without IV ativan  -PLAN:  Future imaging to be done with 1mg IV ativan 30 min prior.

## 2019-07-23 NOTE — ED PROVIDER NOTES
ER Provider Note     Scribed for Paddy Goddard M.D. by Gurmeet Lara. 7/22/2019, 6:48 PM.    Primary Care Provider: Siva Smart M.D.  Means of Arrival: ambulance   History obtained from: Patient  History limited by: None     CHIEF COMPLAINT  Chief Complaint   Patient presents with   • Possible Stroke     Pt took nap approx 1500, woke up at 1745 w/ slurred speech/ gibberish. EMS called.       HPI  Clarisse Reeves is a 78 y.o. female who presents to the Emergency Department with stroke like symptoms. The patient was last seen normal four hours ago. After she was seen normal she took a nap and when she woke up she had difficulty speaking. The patient denies any motor deficits at any time. She states that her symptoms began to resolve. When her symptoms started she took any oxycodone at home. The patient has a history of breast cancer for which she is currently being treated. The patient is allergic to statins, Codeine, Latex, and Tape.    REVIEW OF SYSTEMS  See HPI for further details. All other systems are negative.     PAST MEDICAL HISTORY   has a past medical history of Accelerated junctional rhythm on EKG in 5/2018 in setting of chemo; Cancer (HCC) (2006); Cancer (HCC) (2016); Cancer (HCC) (2018); Carcinoma in situ of respiratory system (2016); Cataract; CLL (chronic lymphoblastic leukemia); Cutaneous skin tags (1/29/2015); DM (diabetes mellitus) ([facility]); Hiatus hernia syndrome; Indigestion; MEDICAL HOME; Personal history of colonic polyps (11/26/2012); Pneumonia (2014); Pneumonia (06/2017); Statin intolerance; Thyroid disease; and Type II or unspecified type diabetes mellitus without mention of complication, not stated as uncontrolled.    SURGICAL HISTORY   has a past surgical history that includes us-needle core bx-breast panel; vaginal hysterectomy total; recovery (12/18/2015); other orthopedic surgery (1990); appendectomy (1955); cholecystectomy (1995); other (2001); other (1984); other (1990);  "thoracoscopy (Right, 2/1/2016); node dissection (Right, 2/1/2016); cataract extraction with iol; tonsillectomy; thyroidectomy (N/A, 11/29/2017); mastectomy (Left, 7/6/2018); and axillary node dissection (Left, 7/6/2018).    SOCIAL HISTORY  Social History   Substance Use Topics   • Smoking status: Former Smoker     Packs/day: 1.50     Years: 50.00     Types: Cigarettes     Quit date: 5/6/2006   • Smokeless tobacco: Never Used      Comment: quit smoking 2002   • Alcohol use 1.2 oz/week     2 Glasses of wine per week      Comment: 2 drinks a week      History   Drug Use No       FAMILY HISTORY  Family History   Problem Relation Age of Onset   • Cancer Mother    • Lung Disease Father    • Cancer Father        CURRENT MEDICATIONS  Home Medications     Reviewed by Franko Zambrano (Pharmacy Tech) on 07/22/19 at 1935  Med List Status: Complete   Medication Last Dose Status   acetaminophen (TYLENOL) 325 MG Tab 7/22/2019 Active   dexamethasone (DECADRON) 4 MG Tab 7/21/2019 Active   docusate sodium (COLACE) 100 MG Cap NEW Rx Active   levothyroxine (SYNTHROID) 112 MCG Tab 7/22/2019 Active   LORazepam (ATIVAN) 2 MG tablet > 3 DAYS Active   oxyCODONE-acetaminophen (PERCOCET)  MG Tab 7/22/2019 Active                ALLERGIES  Allergies   Allergen Reactions   • Atorvastatin Myalgia     Leg cramps     • Codeine Hives and Nausea     RXN=40 years ago   • Latex Hives and Rash     Gets severe rash and blisters   • Lovastatin Myalgia     Muscle cramps     • Simvastatin Myalgia     Leg cramps     • Tape Unspecified     Blisters  Paper tape ok   • Lisinopril Cough   • Metformin Diarrhea     Diarrhea         PHYSICAL EXAM  VITAL SIGNS: /58   Pulse 97   Temp (!) 35.7 °C (96.3 °F) (Tympanic)   Resp 18   Ht 1.651 m (5' 5\")   Wt 84.9 kg (187 lb 2.7 oz)   SpO2 94%   BMI 31.15 kg/m²       Constitutional: Alert in no apparent distress.  HENT: No signs of trauma, Bilateral external ears normal, Nose normal.   Eyes: Pupils " are equal and reactive, Conjunctiva normal, Non-icteric.   Neck: Normal range of motion, No tenderness, Supple, No stridor.   Lymphatic: No lymphadenopathy noted.   Cardiovascular: Regular rate and rhythm, no murmurs.   Thorax & Lungs: Normal breath sounds, No respiratory distress, No wheezing, No chest tenderness.   Abdomen: Bowel sounds normal, Soft, No tenderness, No masses, No pulsatile masses. No peritoneal signs.  Skin: Warm, Dry, No erythema, No rash.   Back: No bony tenderness, No CVA tenderness.   Extremities: Intact distal pulses, No edema, No tenderness, No cyanosis.  Musculoskeletal: Good range of motion in all major joints. No tenderness to palpation or major deformities noted.   Neurologic: Alert , Normal motor function, Normal sensory function, No focal deficits noted. Slightly stuttered responses  Psychiatric: Affect normal, Judgment normal, Mood normal.       DIAGNOSTIC STUDIES / PROCEDURES    EKG Interpretation:  Interpreted by me    12 Lead EKG interpreted by me to show:  sinus tachycardia  Rate 125  Axis: Normal  Intervals: Normal  multiple PVCs and PACs  flat T wave in lead 3   Normal ST segments  My impression of this EKG: Does not indicate ischemia or arrhythmia at this time.     LABS  Labs Reviewed   CBC WITH DIFFERENTIAL - Abnormal; Notable for the following:        Result Value    WBC 12.8 (*)     Hemoglobin 11.7 (*)     MCHC 31.5 (*)     RDW 51.0 (*)     Platelet Count 100 (*)     Neutrophils (Absolute) 7.23 (*)     All other components within normal limits    Narrative:     Indicate which anticoagulants the patient is on:->UNKNOWN   COMP METABOLIC PANEL - Abnormal; Notable for the following:     Sodium 131 (*)     Chloride 94 (*)     Anion Gap 15.0 (*)     Glucose 126 (*)     AST(SGOT) 57 (*)     Alkaline Phosphatase 993 (*)     All other components within normal limits    Narrative:     Indicate which anticoagulants the patient is on:->UNKNOWN   PROTHROMBIN TIME    Narrative:      Indicate which anticoagulants the patient is on:->UNKNOWN   APTT    Narrative:     Indicate which anticoagulants the patient is on:->UNKNOWN   COD (ADULT)   TROPONIN    Narrative:     Indicate which anticoagulants the patient is on:->UNKNOWN   ESTIMATED GFR    Narrative:     Indicate which anticoagulants the patient is on:->UNKNOWN   PLATELET ESTIMATE    Narrative:     Indicate which anticoagulants the patient is on:->UNKNOWN   MORPHOLOGY    Narrative:     Indicate which anticoagulants the patient is on:->UNKNOWN   PERIPHERAL SMEAR REVIEW    Narrative:     Indicate which anticoagulants the patient is on:->UNKNOWN   DIFFERENTIAL MANUAL    Narrative:     Indicate which anticoagulants the patient is on:->UNKNOWN   URINALYSIS,CULTURE IF INDICATED     All labs reviewed by me.    RADIOLOGY  CT-CTA NECK WITH & W/O-POST PROCESSING   Final Result         1.  No visualized dissection, significant stenosis, or vascular occlusion.      CT-CTA HEAD WITH & W/O-POST PROCESS   Final Result         1.  No large vessel occlusion or aneurysm.      CT-CEREBRAL PERFUSION ANALYSIS   Final Result         1.  Cerebral blood flow less than 30% likely representing completed infarct = 0 mL.      2.  T Max more than 6 seconds likely representing combination of completed infarct and ischemia = 0 mL.      3.  Mismatched volume likely representing ischemic brain/penumbra = None      4.  Please note that the cerebral perfusion was performed on the limited brain tissue around the basal ganglia region. Infarct/ischemia outside the CT perfusion sections can be missed in this study.      DX-CHEST-PORTABLE (1 VIEW)   Final Result      No acute cardiopulmonary abnormality.      CT-HEAD W/O   Final Result      1.  No evidence of acute intracranial process.      2.  Cerebral atrophy as well as periventricular chronic small vessel ischemic change.         The radiologist's interpretation of all radiological studies have been reviewed by me.    COURSE &  MEDICAL DECISION MAKING  Pertinent Labs & Imaging studies reviewed. (See chart for details)    This is a 78 y.o. female that presents with garbled speech is now improving.  She has no other focal deficits.  I am concerned this could represent stroke.  This also could represent an elect light derangement as well.  I will get a stroke protocol and reassess the patient after this..     6:48 PM - Patient seen and examined at bedside. Ordered DX- Chest, CT-Head, CT-Cerebral Perfusion, CT-CTA Head, CT-CTA Neck, CBC with Differential, CMP, Prothrombin, APTT, COD, Troponin, UA, EKG.    9:07 PM - The hospitalist has been paged at this time.    9:29 PM - I spoke with [facility] regarding the patient's . They accept the patient for admission at this time    The patient has negative imaging of her head and neck as well as imaging of her vessels of her neck and head.  The patient has a mild low sodium 131.  She has a mild acidosis of 12.  She is a mild anemia around 11.  At this time given the patient had these symptoms that are now resolving to the hospital for further stroke work-up.    The total critical care time on this patient is 40 minutes, resuscitating patient, speaking with admitting physician, and deciphering test results. This 40 minutes is exclusive of separately billable procedures.        DISPOSITION:  Patient will be admitted to [facility] in guarded condition.    FINAL IMPRESSION  1. TIA (transient ischemic attack)          Gurmeet MEADOWS (Scribe), am scribing for, and in the presence of, Paddy Goddard M.D..    Electronically signed by: Gurmeet Lara (Codyibe), 2019    Paddy MEADOWS M.D. personally performed the services described in this documentation, as scribed by Gurmeet Lara in my presence, and it is both accurate and complete.     The note accurately reflects work and decisions made by me.  Paddy Goddard  2019  1:54 AM

## 2019-07-23 NOTE — DISCHARGE PLANNING
Care Transition Team Assessment    Spoke with patient at bedside. Anticipate no needs @ present. Friends will be ride @ D/C.    Information Source  Orientation : Oriented x 4  Information Given By: Patient    Readmission Evaluation  Is this a readmission?: No    Interdisciplinary Discharge Planning  Does Admitting Nurse Feel This Could be a Complex Discharge?: No  Primary Care Physician: Bing  Lives with - Patient's Self Care Capacity: Alone and Able to Care For Self  Patient or legal guardian wants to designate a caregiver (see row info): No  Support Systems: Friends / Neighbors  Housing / Facility: 1 Story Apartment / Condo  Do You Take your Prescribed Medications Regularly: Yes  Able to Return to Previous ADL's: Yes  Mobility Issues: No  Prior Services: Home-Independent  Patient Expects to be Discharged to:: Home  Assistance Needed: No  Durable Medical Equipment: Not Applicable    Discharge Preparedness  What are your discharge supports?: Other (comment) (Friends)  Prior Functional Level: Ambulatory    Functional Assesment  Prior Functional Level: Ambulatory    Finances  Prescription Coverage: Yes    Anticipated Discharge Information  Anticipated discharge disposition: Home  Discharge Address: [address] [place] [address] [address]  Discharge Contact Phone Number: [phone]

## 2019-07-23 NOTE — CARE PLAN
Problem: Pain Management  Goal: Pain level will decrease to patient's comfort goal  Outcome: PROGRESSING AS EXPECTED  Pain assessed via NPRS scale- denies pain @ this time

## 2019-07-23 NOTE — PROGRESS NOTES
Pt returned to unit from MRI. Pt lethargic but rousable from ativan given for MRI. Pt very diaphoretic. Full linen change completed.

## 2019-07-23 NOTE — ASSESSMENT & PLAN NOTE
-Status post mastectomy of left breast.  -MRI brain 7/23/2019 showed 2 cm dural lesion suspicious for metastasis.   -Patient agreed to code status change to DNR/I.  -Palliative following:  Asisting with pain/symptom control  -PLAN: Pending HER results of recent retroperitoneal LN BX. Showed metastatic BRCA ER- AL-, pending HER2 and PDL1 mutation analysis. Then onc can determine chemotherapy. Currently on narcotics and benzos as managed by palliative for full body pain and bone pain.  Rad-onc does not think radiation will be of benefit. Will followup with Dr. Delgado in ~2weeks. Scheduling contacted and will make appointment in next 2 weeks and inform patient.

## 2019-07-23 NOTE — ED TRIAGE NOTES
Chief Complaint   Patient presents with   • Possible Stroke     Pt took nap approx 1500, woke up at 1745 w/ slurred speech/ gibberish. EMS called.     [facility], upon arrival stroke scale O, pt is diaphoretic. Hx of breast and lung cancer.

## 2019-07-23 NOTE — PROGRESS NOTES
Initial assessment completed. Patient is A&Ox4 and able to make needs known. Pt occasionally forgetful but neuro is otherwise intact; NIHSS 0. C/o 8/10 generalized pain and neck pain; medicated per MAR.  Afib on monitor; O2 sat 98% on RA. POC discussed with pt: MRI results, labs, safety. No further questions at this time. Fall precautions in place. Bed locked and in the lowest position, call light instructions provided, call light within reach. Needs met.

## 2019-07-23 NOTE — PROGRESS NOTES
Internal Medicine Interval Note  Note Author: Dale Mcpherson M.D.     Name Clarisse Reeves 1940   Age/Sex 78 y.o. female   MRN [id number]   Code Status Full     After 5PM or if no immediate response to page, please call for cross-coverage  Attending/Team: Dr. Houston/Radha See Patient List for primary contact information  Call [phone] to page    1st Call - Day Intern (R1):   Dr. Mcpherson 2nd Call - Day Sr. Resident (R2/R3):   Dr. Elias         Reason for interval visit  (Principal Problem)   Dysarthria      Interval Problem Daily Status Update  (24 hours, problem oriented, brief subjective history, new lab/imaging data pertinent to that problem)   This morning patient is very pleasant and has no neurological symptoms or focal deficits.  Her goal is to remain comfortable and maintain maximum amount of activity possible, which may mean deciding against chemotherapy or radiation.  She feels ready to make this choice.    -MRI ordered to check for possible metastases  -Heme-onc consulted, appreciate their input    Review of Systems   Constitutional: Positive for diaphoresis and malaise/fatigue. Negative for chills, fever and weight loss.   HENT: Negative.    Eyes: Negative.    Respiratory: Negative.    Cardiovascular: Negative.    Gastrointestinal: Negative.    Genitourinary: Negative.    Musculoskeletal: Negative.    Skin: Negative.    Neurological: Negative for dizziness.       Disposition/Barriers to discharge:   None    Consultants/Specialty  Dr. Saldana radiation oncology  PCP: Siva Smart M.D.      Quality Measures  Quality-Core Measures   Oglesby catheter::  No Oglesby  DVT prophylaxis pharmacological::  Enoxaparin (Lovenox)  Ulcer Prophylaxis::  Not indicated          Physical Exam       Vitals:    19 0046 19 0435 19 0835 19 1317   BP: 132/71 137/94 103/51 117/66   Pulse: 85 97 75 100   Resp: 19 17 20 18   Temp: 36.7 °C (98 °F) 36.8 °C (98.2 °F) 35.8 °C (96.5  "°F) 36.6 °C (97.8 °F)   TempSrc: Temporal Temporal Temporal Temporal   SpO2: 98% 99% 92% 100%   Weight: 81.8 kg (180 lb 5.4 oz)      Height: 1.676 m (5' 6\")        Body mass index is 29.11 kg/m². Weight: 81.8 kg (180 lb 5.4 oz)  Oxygen Therapy:  Pulse Oximetry: 100 %, O2 (LPM): 0, O2 Delivery: None (Room Air)    Physical Exam   Constitutional: She is oriented to person, place, and time and well-developed, well-nourished, and in no distress.   HENT:   Head: Normocephalic and atraumatic.   Cardiovascular: Normal rate, regular rhythm and intact distal pulses.  Exam reveals no gallop and no friction rub.    No murmur heard.  Abdominal: Soft. Bowel sounds are normal. She exhibits no distension. There is no tenderness. There is no rebound and no guarding.   Musculoskeletal: She exhibits no edema or tenderness.   Neurological: She is alert and oriented to person, place, and time. She displays normal reflexes. No cranial nerve deficit. She exhibits normal muscle tone. Coordination normal.   Skin: Skin is warm. No rash noted. She is diaphoretic.   Psychiatric: Mood and affect normal.             Assessment/Plan     * TIA (transient ischemic attack) exp aphasia- (present on admission)   Assessment & Plan    Patient c/o one episode of difficulty in speaking and dysarthria which resolved within 1 hour; NIHSS is 0.  No current symptoms.    - Normal troponin and EKG; normal PT-INR  - CT head, cerebral perfusion study negative for ischemia or heamorrhage  -Echo with bubble study unremarkable, no shunt, EF 65%  -NIH stroke scale every 12 hours  - Aspirin  - neuro consulted; appreciate recommendations     Brain mass   Assessment & Plan    MRI brain today showed 2 cm dural mass suspicious for metastasis.  Transferring from observation to full admit.  Disposition pending oncology recommendations.    - Per Dr Danny dang-onc, will consult Dr. Saldana her radiation oncologist  - had lymph node biopsy last week; pending " results  -Continue home medications: Percocet for pain and Decadron     Malignant neoplasm of upper-outer quadrant of left breast in female, estrogen receptor negative (CMS-HCC)- 2/2018; 1 positive node; chemorx (dr. grant) ), lumpectomy ( dr sharpe), RT 2 mo (dr beltran)- (present on admission)   Assessment & Plan    Status post mastectomy of left breast.  MRI brain today showed 2 cm dural lesion suspicious for metastasis.  Transferring from observation to full admit.  Disposition pending oncology recommendations.    - Per Dr Danny dang-onc, will consult Dr. 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 Grant  - lymph node biopsy last week; waiting for results       CLL (chronic lymphocytic leukemia)- wbc= 20k-30k, since 2006 until march 2018 when it returned normal at 5-10k following chemorx for breast ca- dr grant; no rx; oncologist to follow- (present on admission)   Assessment & Plan    H/o CLL; patient is afebrile and asymptomatic  - elevated WBC, low Hb and platelets   - No current treatment  - F/U with oncologist     Claustrophobia   Assessment & Plan    On Ativan       Anxiety- (present on admission)   Assessment & Plan    On ativan     Normocytic anemia- (present on admission)   Assessment & Plan    Likely due to anemia of chronic disease     Multiple thyroid nodules- (present on admission)   Assessment & Plan    S/P thyroidectomy  - on Levothyroxine  - Continue  current home meds

## 2019-07-23 NOTE — ASSESSMENT & PLAN NOTE
RESOLVED  -Due to TIA vs brain mets.  -Patient c/o one episode of difficulty in speaking and dysarthria which resolved within 1 hour; NIHSS is 0.    -CT head, cerebral perfusion study negative for ischemia or heamorrhage  -Echo with bubble study unremarkable, no shunt, EF 65%  -PLAN: neuro checks and NIH stroke scale q12h.  Aspirin

## 2019-07-23 NOTE — RESPIRATORY CARE
COPD EDUCATION by COPD CLINICAL EDUCATOR  7/23/2019 at 7:59 AM by Mikayla Menon     Patient reviewed by COPD education team. Patient does not qualify for the COPD program.

## 2019-07-23 NOTE — PROGRESS NOTES
Pt desatting down to low 80's while awake and on 2LO2- switched to Oxy mask-deep breathing encouraged. Up to 4LO2

## 2019-07-23 NOTE — PROGRESS NOTES
Pt brought to floor by this writer and bharti iCsneros @ 7952. No acute distress noted. A&O x4. Pleasant and coorperative/ fatigued. On 2LO2/ no home dose and satting low 90's. Lungs diminished but clr. Aferbile/ denies chills. Q 4 Neuo checks in place- no deficits noted. Denies Numbness/tingling to all ext/ Passed Bedside swallow/ 5/5 strength to all ext. Denies H/A/ SOB/CP/Pressure.Ambulates w/ 1 assist- weak/ unsteady/ reports intermittent dizziness. No facial droop/ drooling/slurred speech noted. Snack offered and consumed 50%. Denies pain/discomfort. All needs meeds met @ this time. Hourly and PRN rounding in practice.

## 2019-07-23 NOTE — CARE PLAN
Problem: Safety  Goal: Will remain free from falls  Outcome: PROGRESSING AS EXPECTED  Fall precautions in place- pt encouraged to call for needs

## 2019-07-23 NOTE — ED NOTES
Chief Complaint   Patient presents with   • Possible Stroke     Pt took nap approx 1500, woke up at 1745 w/ slurred speech/ gibberish. EMS called.     BIB from [facility] for above complaint. On arrival slurred speech has resolved. HIHSS: 0. Vital signs stable. A+Ox4. Patient diaphoretic. Patient to Ct with/out contrast due to lack of IV. Roomed to red 10 and report given to Nay wilson RN.

## 2019-07-24 ENCOUNTER — PATIENT OUTREACH (OUTPATIENT)
Dept: HEALTH INFORMATION MANAGEMENT | Facility: OTHER | Age: 79
End: 2019-07-24

## 2019-07-24 LAB
ALBUMIN SERPL BCP-MCNC: 3.2 G/DL (ref 3.2–4.9)
ALBUMIN/GLOB SERPL: 1.1 G/DL
ALP SERPL-CCNC: 702 U/L (ref 30–99)
ALT SERPL-CCNC: 27 U/L (ref 2–50)
ANION GAP SERPL CALC-SCNC: 13 MMOL/L (ref 0–11.9)
ANISOCYTOSIS BLD QL SMEAR: ABNORMAL
AST SERPL-CCNC: 36 U/L (ref 12–45)
BASOPHILS # BLD AUTO: 0 % (ref 0–1.8)
BASOPHILS # BLD: 0 K/UL (ref 0–0.12)
BILIRUB SERPL-MCNC: 0.4 MG/DL (ref 0.1–1.5)
BUN SERPL-MCNC: 16 MG/DL (ref 8–22)
CALCIUM SERPL-MCNC: 8.2 MG/DL (ref 8.5–10.5)
CHLORIDE SERPL-SCNC: 98 MMOL/L (ref 96–112)
CO2 SERPL-SCNC: 22 MMOL/L (ref 20–33)
CREAT SERPL-MCNC: 0.54 MG/DL (ref 0.5–1.4)
EOSINOPHIL # BLD AUTO: 0 K/UL (ref 0–0.51)
EOSINOPHIL NFR BLD: 0 % (ref 0–6.9)
ERYTHROCYTE [DISTWIDTH] IN BLOOD BY AUTOMATED COUNT: 51.3 FL (ref 35.9–50)
GGT SERPL-CCNC: 145 U/L (ref 7–34)
GLOBULIN SER CALC-MCNC: 3 G/DL (ref 1.9–3.5)
GLUCOSE SERPL-MCNC: 143 MG/DL (ref 65–99)
HCT VFR BLD AUTO: 29.1 % (ref 37–47)
HGB BLD-MCNC: 9.3 G/DL (ref 12–16)
LYMPHOCYTES # BLD AUTO: 5.11 K/UL (ref 1–4.8)
LYMPHOCYTES NFR BLD: 48.7 % (ref 22–41)
MAGNESIUM SERPL-MCNC: 2 MG/DL (ref 1.5–2.5)
MANUAL DIFF BLD: NORMAL
MCH RBC QN AUTO: 27.6 PG (ref 27–33)
MCHC RBC AUTO-ENTMCNC: 32 G/DL (ref 33.6–35)
MCV RBC AUTO: 86.4 FL (ref 81.4–97.8)
MICROCYTES BLD QL SMEAR: ABNORMAL
MONOCYTES # BLD AUTO: 0.55 K/UL (ref 0–0.85)
MONOCYTES NFR BLD AUTO: 5.2 % (ref 0–13.4)
MORPHOLOGY BLD-IMP: NORMAL
MYELOCYTES NFR BLD MANUAL: 0.9 %
NEUTROPHILS # BLD AUTO: 4.75 K/UL (ref 2–7.15)
NEUTROPHILS NFR BLD: 41.7 % (ref 44–72)
NEUTS BAND NFR BLD MANUAL: 3.5 % (ref 0–10)
NRBC # BLD AUTO: 0 K/UL
NRBC BLD-RTO: 0 /100 WBC
OVALOCYTES BLD QL SMEAR: NORMAL
PLATELET # BLD AUTO: 70 K/UL (ref 164–446)
PLATELET BLD QL SMEAR: NORMAL
PMV BLD AUTO: 9.6 FL (ref 9–12.9)
POIKILOCYTOSIS BLD QL SMEAR: NORMAL
POTASSIUM SERPL-SCNC: 4.3 MMOL/L (ref 3.6–5.5)
PROT SERPL-MCNC: 6.2 G/DL (ref 6–8.2)
RBC # BLD AUTO: 3.37 M/UL (ref 4.2–5.4)
RBC BLD AUTO: PRESENT
SODIUM SERPL-SCNC: 133 MMOL/L (ref 135–145)
WBC # BLD AUTO: 10.5 K/UL (ref 4.8–10.8)

## 2019-07-24 PROCEDURE — 80053 COMPREHEN METABOLIC PANEL: CPT

## 2019-07-24 PROCEDURE — A9270 NON-COVERED ITEM OR SERVICE: HCPCS | Performed by: INTERNAL MEDICINE

## 2019-07-24 PROCEDURE — 97165 OT EVAL LOW COMPLEX 30 MIN: CPT

## 2019-07-24 PROCEDURE — 85027 COMPLETE CBC AUTOMATED: CPT

## 2019-07-24 PROCEDURE — 96372 THER/PROPH/DIAG INJ SC/IM: CPT

## 2019-07-24 PROCEDURE — 700111 HCHG RX REV CODE 636 W/ 250 OVERRIDE (IP)

## 2019-07-24 PROCEDURE — A9270 NON-COVERED ITEM OR SERVICE: HCPCS | Performed by: STUDENT IN AN ORGANIZED HEALTH CARE EDUCATION/TRAINING PROGRAM

## 2019-07-24 PROCEDURE — 83735 ASSAY OF MAGNESIUM: CPT

## 2019-07-24 PROCEDURE — A9270 NON-COVERED ITEM OR SERVICE: HCPCS | Performed by: NURSE PRACTITIONER

## 2019-07-24 PROCEDURE — G0378 HOSPITAL OBSERVATION PER HR: HCPCS

## 2019-07-24 PROCEDURE — 99205 OFFICE O/P NEW HI 60 MIN: CPT | Performed by: NURSE PRACTITIONER

## 2019-07-24 PROCEDURE — 97163 PT EVAL HIGH COMPLEX 45 MIN: CPT

## 2019-07-24 PROCEDURE — 700102 HCHG RX REV CODE 250 W/ 637 OVERRIDE(OP): Performed by: INTERNAL MEDICINE

## 2019-07-24 PROCEDURE — 700102 HCHG RX REV CODE 250 W/ 637 OVERRIDE(OP): Performed by: NURSE PRACTITIONER

## 2019-07-24 PROCEDURE — 700102 HCHG RX REV CODE 250 W/ 637 OVERRIDE(OP): Performed by: STUDENT IN AN ORGANIZED HEALTH CARE EDUCATION/TRAINING PROGRAM

## 2019-07-24 PROCEDURE — 700101 HCHG RX REV CODE 250: Performed by: STUDENT IN AN ORGANIZED HEALTH CARE EDUCATION/TRAINING PROGRAM

## 2019-07-24 PROCEDURE — 96375 TX/PRO/DX INJ NEW DRUG ADDON: CPT

## 2019-07-24 PROCEDURE — 85007 BL SMEAR W/DIFF WBC COUNT: CPT

## 2019-07-24 PROCEDURE — 82977 ASSAY OF GGT: CPT

## 2019-07-24 PROCEDURE — 700111 HCHG RX REV CODE 636 W/ 250 OVERRIDE (IP): Performed by: STUDENT IN AN ORGANIZED HEALTH CARE EDUCATION/TRAINING PROGRAM

## 2019-07-24 PROCEDURE — 99225 PR SUBSEQUENT OBSERVATION CARE,LEVEL II: CPT | Mod: GC | Performed by: HOSPITALIST

## 2019-07-24 RX ORDER — LORAZEPAM 2 MG/1
2 TABLET ORAL NIGHTLY
Status: DISCONTINUED | OUTPATIENT
Start: 2019-07-24 | End: 2019-07-27 | Stop reason: HOSPADM

## 2019-07-24 RX ORDER — FAMOTIDINE 20 MG/1
20 TABLET, FILM COATED ORAL 2 TIMES DAILY
Status: DISCONTINUED | OUTPATIENT
Start: 2019-07-24 | End: 2019-07-25

## 2019-07-24 RX ORDER — ANALGESIC BALM 1.74; 4.06 G/29G; G/29G
OINTMENT TOPICAL 3 TIMES DAILY PRN
Status: DISCONTINUED | OUTPATIENT
Start: 2019-07-24 | End: 2019-07-24

## 2019-07-24 RX ORDER — MORPHINE SULFATE 15 MG/1
15 TABLET, FILM COATED, EXTENDED RELEASE ORAL EVERY 12 HOURS
Status: DISCONTINUED | OUTPATIENT
Start: 2019-07-24 | End: 2019-07-25

## 2019-07-24 RX ORDER — BISACODYL 10 MG
10 SUPPOSITORY, RECTAL RECTAL
Status: DISCONTINUED | OUTPATIENT
Start: 2019-07-24 | End: 2019-07-27 | Stop reason: HOSPADM

## 2019-07-24 RX ORDER — GABAPENTIN 100 MG/1
100 CAPSULE ORAL
Status: DISCONTINUED | OUTPATIENT
Start: 2019-07-24 | End: 2019-07-25

## 2019-07-24 RX ORDER — AMOXICILLIN 250 MG
2 CAPSULE ORAL 2 TIMES DAILY
Status: DISCONTINUED | OUTPATIENT
Start: 2019-07-24 | End: 2019-07-27 | Stop reason: HOSPADM

## 2019-07-24 RX ORDER — MORPHINE SULFATE 15 MG/1
7.5 TABLET ORAL
Status: DISCONTINUED | OUTPATIENT
Start: 2019-07-24 | End: 2019-07-25

## 2019-07-24 RX ORDER — POLYETHYLENE GLYCOL 3350 17 G/17G
1 POWDER, FOR SOLUTION ORAL DAILY
Status: DISCONTINUED | OUTPATIENT
Start: 2019-07-24 | End: 2019-07-27 | Stop reason: HOSPADM

## 2019-07-24 RX ORDER — LORAZEPAM 1 MG/1
1 TABLET ORAL NIGHTLY
Status: DISCONTINUED | OUTPATIENT
Start: 2019-07-24 | End: 2019-07-24

## 2019-07-24 RX ORDER — MORPHINE SULFATE 4 MG/ML
INJECTION, SOLUTION INTRAMUSCULAR; INTRAVENOUS
Status: COMPLETED
Start: 2019-07-24 | End: 2019-07-24

## 2019-07-24 RX ORDER — LIDOCAINE 50 MG/G
1 PATCH TOPICAL EVERY 24 HOURS
Status: DISCONTINUED | OUTPATIENT
Start: 2019-07-24 | End: 2019-07-27 | Stop reason: HOSPADM

## 2019-07-24 RX ORDER — MORPHINE SULFATE 4 MG/ML
3 INJECTION, SOLUTION INTRAMUSCULAR; INTRAVENOUS
Status: DISCONTINUED | OUTPATIENT
Start: 2019-07-24 | End: 2019-07-27 | Stop reason: HOSPADM

## 2019-07-24 RX ADMIN — MORPHINE SULFATE 3 MG: 4 INJECTION INTRAVENOUS at 17:46

## 2019-07-24 RX ADMIN — MORPHINE SULFATE 15 MG: 15 TABLET, EXTENDED RELEASE ORAL at 18:39

## 2019-07-24 RX ADMIN — MORPHINE SULFATE 7.5 MG: 15 TABLET ORAL at 21:13

## 2019-07-24 RX ADMIN — ANTACID TABLETS 500 MG: 500 TABLET, CHEWABLE ORAL at 18:39

## 2019-07-24 RX ADMIN — ANTACID TABLETS 500 MG: 500 TABLET, CHEWABLE ORAL at 15:21

## 2019-07-24 RX ADMIN — ENOXAPARIN SODIUM 40 MG: 100 INJECTION SUBCUTANEOUS at 05:52

## 2019-07-24 RX ADMIN — POLYETHYLENE GLYCOL 3350 1 PACKET: 17 POWDER, FOR SOLUTION ORAL at 18:43

## 2019-07-24 RX ADMIN — ASPIRIN 325 MG: 325 TABLET, FILM COATED ORAL at 05:51

## 2019-07-24 RX ADMIN — GABAPENTIN 100 MG: 100 CAPSULE ORAL at 20:38

## 2019-07-24 RX ADMIN — LEVOTHYROXINE SODIUM 112 MCG: 112 TABLET ORAL at 05:51

## 2019-07-24 RX ADMIN — FAMOTIDINE 20 MG: 20 TABLET ORAL at 17:46

## 2019-07-24 RX ADMIN — DEXAMETHASONE 4 MG: 4 TABLET ORAL at 05:51

## 2019-07-24 RX ADMIN — SENNOSIDES, DOCUSATE SODIUM 2 TABLET: 50; 8.6 TABLET, FILM COATED ORAL at 18:39

## 2019-07-24 RX ADMIN — OXYCODONE HYDROCHLORIDE AND ACETAMINOPHEN 1 TABLET: 10; 325 TABLET ORAL at 12:41

## 2019-07-24 RX ADMIN — DEXAMETHASONE 4 MG: 4 TABLET ORAL at 17:47

## 2019-07-24 RX ADMIN — LORAZEPAM 2 MG: 2 TABLET ORAL at 20:38

## 2019-07-24 RX ADMIN — LIDOCAINE 1 PATCH: 50 PATCH TOPICAL at 15:16

## 2019-07-24 ASSESSMENT — COGNITIVE AND FUNCTIONAL STATUS - GENERAL
STANDING UP FROM CHAIR USING ARMS: A LITTLE
MOVING FROM LYING ON BACK TO SITTING ON SIDE OF FLAT BED: UNABLE
SUGGESTED CMS G CODE MODIFIER DAILY ACTIVITY: CJ
MOVING TO AND FROM BED TO CHAIR: A LITTLE
HELP NEEDED FOR BATHING: A LITTLE
DAILY ACTIVITIY SCORE: 21
SUGGESTED CMS G CODE MODIFIER MOBILITY: CL
DRESSING REGULAR LOWER BODY CLOTHING: A LITTLE
TOILETING: A LITTLE
TURNING FROM BACK TO SIDE WHILE IN FLAT BAD: A LITTLE
CLIMB 3 TO 5 STEPS WITH RAILING: A LOT
WALKING IN HOSPITAL ROOM: A LOT
MOBILITY SCORE: 14

## 2019-07-24 ASSESSMENT — ENCOUNTER SYMPTOMS
INSOMNIA: 1
SHORTNESS OF BREATH: 1
ABDOMINAL PAIN: 1
DOUBLE VISION: 0
MEMORY LOSS: 1
DIZZINESS: 0
TINGLING: 0
FOCAL WEAKNESS: 0
NERVOUS/ANXIOUS: 1
EYES NEGATIVE: 1
DEPRESSION: 0
BLURRED VISION: 0
CARDIOVASCULAR NEGATIVE: 1
WEIGHT LOSS: 0
MUSCULOSKELETAL NEGATIVE: 1
SORE THROAT: 0
RESPIRATORY NEGATIVE: 1
WEIGHT LOSS: 1
DIAPHORESIS: 1
GASTROINTESTINAL NEGATIVE: 1
CONSTIPATION: 1
WEAKNESS: 1
NAUSEA: 1
COUGH: 0
FEVER: 0
CHILLS: 0

## 2019-07-24 ASSESSMENT — GAIT ASSESSMENTS
DEVIATION: ATAXIC;DECREASED BASE OF SUPPORT;SHUFFLED GAIT;DECREASED HEEL STRIKE;DECREASED TOE OFF
ASSISTIVE DEVICE: HAND HELD ASSIST
GAIT LEVEL OF ASSIST: MODERATE ASSIST
DISTANCE (FEET): 50

## 2019-07-24 ASSESSMENT — LIFESTYLE VARIABLES: SUBSTANCE_ABUSE: 0

## 2019-07-24 ASSESSMENT — ACTIVITIES OF DAILY LIVING (ADL): TOILETING: INDEPENDENT

## 2019-07-24 NOTE — THERAPY
"Physical Therapy Evaluation completed.   Bed Mobility:  Supine to Sit: Supervised  Transfers: Sit to Stand: Minimal Assist  Gait: Level Of Assist: Moderate Assist with Hand Held Assist       Plan of Care: Will benefit from Physical Therapy 3 times per week and Plan to complete next treatment by Friday 7/26  Discharge Recommendations: Equipment: Will Continue to Assess for Equipment Needs. Post-acute therapy Discharge to a transitional care facility for continued skilled therapy services.    Pt is a 78 year old female admitted to the hospital for slurred speech, word salad, and ALOC at home. Pt with PMH of lung/breast CA, currently undergoing treatment. At time of initial evaluation, pt presents with generalized weakness, L side>R, decreased balance, gait deviations and poor activity tolerance. Pt ambulated in hallway with physical assist from PT/OT. Pt with extremely narrow DO with intermittent scissoring gait. Pt also with L lateral lean and veering to the L. Pt returned to bed and reported significant increase in fatigue. Pt would benefit from skilled PT intervention while in the acute care setting to address the listed deficits and improve mobility prior to DC home. Based on current level of function, pt will require post acute transitional care upon DC    See \"Rehab Therapy-Acute\" Patient Summary Report for complete documentation.     "

## 2019-07-24 NOTE — PROGRESS NOTES
Internal Medicine Interval Note  Note Author: Dale Mcpherson M.D.     Name Clarisse Reeves 1940   Age/Sex 78 y.o. female   MRN [id number]   Code Status DNAR/DNI     After 5PM or if no immediate response to page, please call for cross-coverage  Attending/Team: Dr. Houston/Radha See Patient List for primary contact information  Call [phone] to page    1st Call - Day Intern (R1):   Dr. Mcpherson 2nd Call - Day Sr. Resident (R2/R3):   Dr. Elias         Reason for interval visit  (Principal Problem)   Dysarthria      Interval Problem Daily Status Update  (24 hours, problem oriented, brief subjective history, new lab/imaging data pertinent to that problem)   Last night pt had episode of tachycardia, around the time that she received ativan.  EKG showed no concerning changes, and the patient was given bolus of 500 cc fluids.  Nursing noted confusion at this time as well. no complaints of tachycardia this morning, only diaphoresis and fatigue. Patient is pleasant, but emotional at times due to the knowledge that her brain masses likely metastasis. She met with Dr. Saldana yesterday and agrees that risks of radiation will outweigh benefits at this point . Wishes for her further care to be focused on comfort. Would like to review her palliative care options.  Due to these new findings and history of dysarthria she agrees that it would not be appropriate for her to continue driving.    After extensive discussion with the patient and in the presence of the entire internal medicine team (Dr. Houston, Dr. Elias, Dr. Mcpherson, and Johnathon MS3), the patient was asked and confirmed understanding that her new masses were likely metastatic and that the prognosis is poor.  Given this situation and with full capacity, made the decision to become DNR/DNI.  Orders changed to reflect this fact. Palliative care has been consulted to inform her of her options going forward.    -Given TIA symptoms and new brain mass  driving privileges should be revoked, patient agrees.  Will contact  for appropriate paperwork  -Heme-onc consulted and will follow with the patient today  -Palliative care consulted    Review of Systems   Constitutional: Positive for diaphoresis and malaise/fatigue. Negative for chills, fever and weight loss.   HENT: Negative.    Eyes: Negative.    Respiratory: Negative.    Cardiovascular: Negative.    Gastrointestinal: Negative.    Genitourinary: Negative.    Musculoskeletal: Negative.    Skin: Negative.    Neurological: Negative for dizziness.       Disposition/Barriers to discharge:   None    Consultants/Specialty  Heme-onc  Palliative care  PCP: Siva Smart M.D.      Quality Measures  Quality-Core Measures   Oglesby catheter::  No Oglesby  DVT prophylaxis pharmacological::  Enoxaparin (Lovenox)  Ulcer Prophylaxis::  Not indicated          Physical Exam       Vitals:    07/23/19 2205 07/24/19 0000 07/24/19 0400 07/24/19 0944   BP:  146/64 125/62 122/71   Pulse: (!) 102 (!) 108 82 79   Resp:  16 16 17   Temp:  36 °C (96.8 °F) 36.1 °C (97 °F) 35.8 °C (96.5 °F)   TempSrc:  Temporal Temporal Temporal   SpO2:  99% 98% 98%   Weight:       Height:         Body mass index is 29.11 kg/m².    Oxygen Therapy:  Pulse Oximetry: 98 %, O2 (LPM): 0, O2 Delivery: None (Room Air)    Physical Exam   Constitutional: She is oriented to person, place, and time and well-developed, well-nourished, and in no distress.   HENT:   Head: Normocephalic and atraumatic.   Cardiovascular: Normal rate, regular rhythm and intact distal pulses.  Exam reveals no gallop and no friction rub.    No murmur heard.  Abdominal: Soft. Bowel sounds are normal. She exhibits no distension. There is no tenderness. There is no rebound and no guarding.   Musculoskeletal: She exhibits no edema or tenderness.   Neurological: She is alert and oriented to person, place, and time. She displays normal reflexes. No cranial nerve deficit. She exhibits  normal muscle tone. Coordination normal.   Skin: Skin is warm. No rash noted. She is diaphoretic.   Psychiatric: Mood and affect normal.       Assessment/Plan     * TIA (transient ischemic attack) exp aphasia- (present on admission)   Assessment & Plan    Patient c/o one episode of difficulty in speaking and dysarthria which resolved within 1 hour; NIHSS is 0.  No current symptoms.  CT head, cerebral perfusion study negative for ischemia or heamorrhage  Echo with bubble study unremarkable, no shunt, EF 65%    Plan:  -neuro checks and NIH stroke scale q12h  - Aspirin  -lidocaine patch/bengay for chronic neck/shoulder pain     Brain mass   Assessment & Plan    MRI brain 7/23 showed 2 cm dural mass suspicious for metastasis. Disposition pending oncology recommendations.    Plan:  - Per Dr Danny dang-onc, will consult Dr. Beltran her radiation oncologist  - had lymph node biopsy last week; pending results  -Continue home medications: Percocet for pain and Decadron     Malignant neoplasm of upper-outer quadrant of left breast in female, estrogen receptor negative (CMS-HCC)- 2/2018; 1 positive node; chemorx (dr. grant) ), lumpectomy ( dr sharpe), RT 2 mo (dr beltran)- (present on admission)   Assessment & Plan    Status post mastectomy of left breast.  MRI brain today showed 2 cm dural lesion suspicious for metastasis. Disposition will be pending oncology and palliative care input.    Plan:  -Oncology will follow up with her today  -Palliative care consulted  -Pt made decision with her capacity and of her own free will to change CODE STATUS to DNR/DNI.  Please see documentation in subjective portion.  -had lymph node biopsy last week; pending results  -Continue home medications: Percocet for pain and Decadron  -Appears to become very sedated and confused with Ativan 2 mg.  Decreased to Ativan 1 mg nightly and discontinued PRN dose.       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 Rebolledo         CLL (chronic lymphocytic leukemia)- wbc= 20k-30k, since 2006 until march 2018 when it returned normal at 5-10k following chemorx for breast ca- dr rebolledo; no rx; oncologist to follow- (present on admission)   Assessment & Plan    H/o CLL; patient is afebrile and asymptomatic  - elevated WBC, low Hb and platelets   - No current treatment     Claustrophobia   Assessment & Plan    On ativan    Plan:  decreased dose to 1mg nightly due to confusion at night     Anxiety- (present on admission)   Assessment & Plan    On ativan    Plan:  decreased dose to 1mg nightly due to confusion at night     Normocytic anemia- (present on admission)   Assessment & Plan    Likely due to anemia of chronic disease     Multiple thyroid nodules- (present on admission)   Assessment & Plan    S/P thyroidectomy  - on Levothyroxine  - Continue current home meds

## 2019-07-24 NOTE — THERAPY
"Occupational Therapy Evaluation completed.   Functional Status:  Min A with ADLs and txfs, limited by weakness and decreased balance.  Plan of Care: Will benefit from Occupational Therapy 3 times per week  Discharge Recommendations:  Equipment: Will Continue to Assess for Equipment Needs. Post-acute therapy Discharge to a transitional care facility for continued therapy services.    See \"Rehab Therapy-Acute\" Patient Summary Report for complete documentation.    "

## 2019-07-24 NOTE — PROGRESS NOTES
Pt complains of continuous pain. Medicated per MAR; MD on unit updated on pt pain, MD to discuss pain intervention with team. See orders.

## 2019-07-24 NOTE — PROGRESS NOTES
CNU charge contacted this RN would like to have stroke scales removed because they do not preform them on the CNU. MD paged.

## 2019-07-24 NOTE — PROGRESS NOTES
MD returned page; Will remove stroke scale eval, CNU charge updated, Pt to be transferred to oncology

## 2019-07-24 NOTE — PROGRESS NOTES
Bedside report received 0720. POC discussed with pt; Neuro intact, orient x 4, Pending room transfer;  all questions answered at this time.

## 2019-07-24 NOTE — PROGRESS NOTES
A/o x4, respirations are even and unlabored on room air ,assessment completed, vital signs stable, weakness noted, ST on the monitor,updated communication board,  poc discussed and understood, verbalized understanding, box meal given, all questions answered at this time , fall precautions in place, call button within reach, will continue to monitor

## 2019-07-24 NOTE — PROGRESS NOTES
Pt c/o heartburn, requesting TUMS. MD paged. No changes in neuro assessment. Pt c/o pain but amenable to waiting for percocet to be available. Needs met.
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
## 2019-07-24 NOTE — CONSULTS
MEDICAL ONCOLOGY CONSULT NOTE    DATE OF SERVICE: 07/24/2019     REQUESTING MD: Rakan Houston MD    REASON FOR CONSULT: Clarisse Reeves is a 78 y.o. female who is being seen in consultation at the request of Dr. Houston for an evaluation of metastatic breast cancer    CHIEF COMPLAINT: Altered mental status    HISTORY OF PRESENT ILLNESS:  78-year-old with multiple medical problems is being seen in consultation for evaluation of metastatic breast cancer.  She has a history of early stage triple negative breast cancer diagnosed in 2018.  She had that time received neoadjuvant AC-T followed by lumpectomy and lymph node dissection. There was no residual tumor at the time of lumpectomy but she did have one microscopic node involved. She then went on to receive radiation therapy and completed this in November, 2018.  She has been having significant bone pains recently most significant in her left shoulder.  Due to the symptoms her primary care provider ordered a PET/CT scan and this was completed on July 11, 2019 which showed diffuse bony metastatic disease involving the majority of the axial and appendicular skeleton.  She was admitted due to altered mental status and difficulties with her speech.  This started abruptly the day of admission.  Due to the symptoms she had an MRI of the brain performed which showed a dural based extra axial enhancing mass in the right anterior frontal cavity.Today, she feels that her mentation has improved and is back to her baseline.    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   • Cataract     right  and  left  IOL   • CLL (chronic lymphoblastic leukemia)    • Cutaneous skin tags 1/29/2015   • DM (diabetes mellitus) ([facility])     oral meds   •  Hiatus hernia syndrome     no surgery   • Indigestion    • MEDICAL HOME    • Personal history of colonic polyps 11/26/2012   • Pneumonia 2014   • Pneumonia 06/2017   • Statin intolerance    • Thyroid disease    • Type II or unspecified type diabetes mellitus without mention of complication, not stated as uncontrolled     pre-diabetic        Past Surgical History:   Procedure Laterality Date   • MASTECTOMY Left 7/6/2018    Procedure: MASTECTOMY - PARTIAL AFTER WIRE LOCAALIZATION;  Surgeon: Esperanza Crandall M.D.;  Location: SURGERY SAME DAY [facility];  Service: General   • AXILLARY NODE DISSECTION Left 7/6/2018    Procedure: AXILLARY NODE DISSECTION;  Surgeon: Esperanza Crandall M.D.;  Location: SURGERY SAME DAY [facility];  Service: General   • THYROIDECTOMY N/A 11/29/2017    Procedure: THYROIDECTOMY COMPLETION, RECURRENT LARYNGEAL NERVE MONITORING;  Surgeon: Cameron Marcelino M.D.;  Location: SURGERY SAME DAY [facility];  Service: General   • THORACOSCOPY Right 2/1/2016    Procedure: THORACOSCOPY Upper Lobectomy ;  Surgeon: John H Ganser, M.D.;  Location: SURGERY [facility];  Service:    • NODE DISSECTION Right 2/1/2016    Procedure: NODE DISSECTION;  Surgeon: John H Ganser, M.D.;  Location: SURGERY [facility];  Service:    • RECOVERY  12/18/2015    Procedure: CT-SCP-RUL LUNG BIOPSY-;  Surgeon: Recoveryonly Surgery;  Location: SURGERY PRE-POST PROC UNIT [facility];  Service:    • OTHER  2001    Lower Left segment of lung removed    • CHOLECYSTECTOMY  1995   • OTHER ORTHOPEDIC SURGERY  1990    bakers cyst removed in right knee, multiple scopes   • OTHER  1990    hemmroid removal   • OTHER  1984    left Thyroid removed, might remove right side in the future   • APPENDECTOMY  1955   • CATARACT EXTRACTION WITH IOL     • TONSILLECTOMY     • US-NEEDLE CORE BX-BREAST PANEL     • VAGINAL HYSTERECTOMY TOTAL      Hysterectomy,Total Vaginal        Current Facility-Administered Medications   Medication Dose Route  Frequency Provider Last Rate Last Dose   • LORazepam (ATIVAN) tablet 1 mg  1 mg Oral Nightly Rakan Houston M.D.       • lidocaine (LIDODERM) 5 % 1 Patch  1 Patch Transdermal Q24HR Dale Mcpherson M.D.       • menthol-methyl salicylate (BENGAY) ointment   Topical TID PRN Dale Mcpherson M.D.       • oxyCODONE-acetaminophen (PERCOCET-10)  MG per tablet 1 Tab  1 Tab Oral Q6HRS PRN Siva Elias M.D.   1 Tab at 07/24/19 1241   • levothyroxine (SYNTHROID) tablet 112 mcg  112 mcg Oral AM ES Siva Elias M.D.   112 mcg at 07/24/19 0551   • docusate sodium (COLACE) capsule 100 mg  100 mg Oral TID PRN Siva Elias M.D.       • dexamethasone (DECADRON) tablet 4 mg  4 mg Oral BID Siva Elias M.D.   4 mg at 07/24/19 0551   • acetaminophen (TYLENOL) tablet 650 mg  650 mg Oral Q6HRS PRN Siva Elias M.D.       • calcium carbonate (TUMS) chewable tab 500 mg  500 mg Oral TID PRN Dale Mcpherson M.D.   500 mg at 07/23/19 1919   • senna-docusate (PERICOLACE or SENOKOT S) 8.6-50 MG per tablet 2 Tab  2 Tab Oral BID Josué Meyer M.D.   2 Tab at 07/23/19 1800    And   • polyethylene glycol/lytes (MIRALAX) PACKET 1 Packet  1 Packet Oral QDAY PRN Josué Meyer M.D.        And   • magnesium hydroxide (MILK OF MAGNESIA) suspension 30 mL  30 mL Oral QDAY PRN Josué Meyer M.D.        And   • bisacodyl (DULCOLAX) suppository 10 mg  10 mg Rectal QDAY PRN Josué Meyer M.D.       • enoxaparin (LOVENOX) inj 40 mg  40 mg Subcutaneous DAILY Josué Meyer M.D.   40 mg at 07/24/19 0552   • ondansetron (ZOFRAN) syringe/vial injection 4 mg  4 mg Intravenous Q4HRS PRN Josué Meyer M.D.   4 mg at 07/23/19 2023   • ondansetron (ZOFRAN ODT) dispertab 4 mg  4 mg Oral Q4HRS PRN Josué Meyer M.D.       • labetalol (NORMODYNE,TRANDATE) injection 10 mg  10 mg Intravenous Q4HRS PRN Josué Meyer M.D.       • Pharmacy consult request - Allow for permissive hypertension: SBP up to 220 mmHg/DBP up to 120 mmHg x 48 hours    Other PHARMACY TO DOSE Mikayla Thornton M.D.       • aspirin (ASA) tablet 325 mg  325 mg Oral DAILY Mikayla Thornton M.D.   325 mg at 07/24/19 0551    Or   • aspirin (ASA) chewable tab 324 mg  324 mg Oral DAILY Mikayla Thornton M.D.        Or   • aspirin (ASA) suppository 300 mg  300 mg Rectal DAILY Mikayla Thornton M.D.           Atorvastatin; Codeine; Latex; Lovastatin; Simvastatin; Tape; Lisinopril; and Metformin    Social History   Substance Use Topics   • Smoking status: Former Smoker     Packs/day: 1.50     Years: 50.00     Types: Cigarettes     Quit date: 5/6/2006   • Smokeless tobacco: Never Used      Comment: quit smoking 2002   • Alcohol use 1.2 oz/week     2 Glasses of wine per week      Comment: 2 drinks a week       Family History   Problem Relation Age of Onset   • Cancer Mother    • Lung Disease Father    • Cancer Father        Review of Systems:  12 system review was completed and negative except as mentioned in HPI and below:  Generalized weakness, fatigue, diffuse bone pains    PHYSICAL EXAM:  Vitals:    07/24/19 0000 07/24/19 0400 07/24/19 0944 07/24/19 1321   BP: 146/64 125/62 122/71 133/99   Pulse: (!) 108 82 79 95   Resp: 16 16 17 19   Temp: 36 °C (96.8 °F) 36.1 °C (97 °F) 35.8 °C (96.5 °F) 36.1 °C (97 °F)   TempSrc: Temporal Temporal Temporal Temporal   SpO2: 99% 98% 98% 98%   Weight:       Height:           Physical Exam   Constitutional: She is oriented to person, place, and time. No distress.   HENT:   Head: Normocephalic and atraumatic.   Right Ear: External ear normal.   Left Ear: External ear normal.   Nose: Nose normal.   Mouth/Throat: Oropharynx is clear and moist. No oropharyngeal exudate.   Eyes: Pupils are equal, round, and reactive to light. Conjunctivae are normal. No scleral icterus.   Neck: Neck supple.   Cardiovascular: Normal rate, regular rhythm and normal heart sounds.    Pulmonary/Chest: Effort normal and breath sounds normal.   Abdominal: Soft. Bowel sounds are normal. She  exhibits no distension. There is no tenderness.   Musculoskeletal: She exhibits no edema or tenderness.   Lymphadenopathy:     She has no cervical adenopathy.   Neurological: She is alert and oriented to person, place, and time. No cranial nerve deficit. Coordination normal.   Skin: Skin is warm and dry. No rash noted. She is not diaphoretic. No erythema.   Psychiatric: Mood and affect normal.       LABORATORY:  Recent Labs      07/22/19 1848 07/23/19 0336  07/24/19   0403   WBC  12.8*  11.9*  10.5   RBC  4.34  3.58*  3.37*   HEMOGLOBIN  11.7*  9.6*  9.3*   HEMATOCRIT  37.1  31.3*  29.1*   MCV  85.5  87.4  86.4   MCH  27.0  26.8*  27.6   RDW  51.0*  52.0*  51.3*   PLATELETCT  100*  75*  70*   MPV  10.8  10.4  9.6   NEUTSPOLYS  56.50  53.60  41.70*   LYMPHOCYTES  35.70  34.80  48.70*   MONOCYTES  4.40  4.40  5.20   EOSINOPHILS  0.00  0.00  0.00   BASOPHILS  0.00  0.00  0.00   RBCMORPHOLO  Present  Present  Present       Recent Labs      07/22/19 1848 07/23/19   0336  07/24/19   0403   SODIUM  131*  132*  133*   POTASSIUM  3.9  4.0  4.3   CHLORIDE  94*  95*  98   CO2  22  25  22   GLUCOSE  126*  144*  143*   BUN  17  19  16       Recent Labs      07/22/19 1848 07/23/19   0336  07/24/19   0403   ASTSGOT  57*  43  36   ALTSGPT  39  33  27   TBILIRUBIN  0.9  0.5  0.4   ALKPHOSPHAT  993*  819*  702*   GLOBULIN  3.0  2.9  3.0   INR  1.07   --    --        RADIOLOGY:  PET/CT Scan from 7/11/2019  1.  Diffuse bony metastatic disease involving the majority of the axial and proximal appendicular skeleton  2.  Serafin metastatic disease within the left intraclavicular space/brachial and mediastinal prevascular space. Conglomerate mass surrounds/infiltrates the left brachial plexus.  3.  Additionally, multiple new hypermetabolic lymph node within the serafin chains of the abdomen and retroperitoneum. This location is not typical for breast cancer or lung cancer metastases and may represent a separate malignancy or  lymphoma  4.  Possible hepatic metastatic disease  5.  Left breast postoperative/post radiation therapy changes without evidence for recurrence  6.  Further assessment with CT chest, abdomen, and pelvis for accurate sizing and assessment of the liver is recommended.    MRI of the brain 7/23/19:  1.  Images degraded by patient motion artifact.  2.  Mild cerebral atrophy.  3.  Mild supratentorial white matter disease most consistent with microvascular ischemic change.  4.  20 mm dural based extra-axial enhancing mass at the right anterior frontal convexity which was not present on relatively recent MRI study from 10/25/2018. This lesion would be suspicious for a dural based metastasis. Meningioma is considered less   likely as a meningioma would be unlikely to develop over such a short period of time. Minimal FLAIR hyperintensity in the underlying brain parenchyma which may represent vasogenic edema.    Pathology:  FINAL DIAGNOSIS:  A. Retroperitoneal lymph node biopsy cores:         Metastatic poorly differentiated carcinoma, consistent with          breast primary.  Comment: The present biopsy is compared to the patient's prior breast  mass biopsy ([id number]) which demonstrates the same histologic  morphology. Prognostic markers for ER and NJ were performed and are  both completely negative (see below).  Studies for Her2 have been  requested with the findings to be reported in an addendum.      ASSESSMENT AND PLAN:  78-year-old woman with multiple medical comorbid conditions and a history of early stage triple negative breast cancer is being seen a diagnosis for recurrent now metastatic breast cancer.  On a recent PET/CT scan she was noted to have diffuse osseous disease as well as several areas of lymphadenopathy. Recent MRI of the brain shows a 20 mm dural based extra-axial enhancing mass in the right anterior frontal convexity. Recent biopsy of a retroperitoneal lymph node on July 19, 2019 shows metastatic poorly  differentiated carcinoma consistent with breast primary.  ER-TN were performed and are negative.  HER-2 studies are still pending.  However given her past history of triple negative breast cancer, her HER-2 studies are most likely going to be negative.  I did discuss the diagnosis in detail with the patient today. Her functional status at this point is quite poor and is going to need assistance.  We did discuss in general the treatment options available for metastatic breast cancer should it confirmed to be triple negative, this would primarily be chemotherapy. If she does have PDL 1 expression then combining chemotherapy with chemotherapy would also be an option.  I also discussed a palliative approach to her care including hospice care. The patient would like to think about her options and will await final pathology including her to testing. Radiation oncology has been consulted and appreciate their assistance.  All the patient's questions were answered and she was appreciative of this consultation.    Patient is has a high medical complexity and is at high risk for complication, morbidity, and mortality.    Thank you for this consultation, will continue to follow the patient. If you have any questions or concerns, please contact me.    Michelle Delgado MD  [facility]  [phone]

## 2019-07-25 ENCOUNTER — APPOINTMENT (OUTPATIENT)
Dept: RADIOLOGY | Facility: MEDICAL CENTER | Age: 79
End: 2019-07-25
Attending: STUDENT IN AN ORGANIZED HEALTH CARE EDUCATION/TRAINING PROGRAM
Payer: MEDICARE

## 2019-07-25 PROBLEM — R13.0 APHAGIA: Status: ACTIVE | Noted: 2018-10-16

## 2019-07-25 LAB
ALBUMIN SERPL BCP-MCNC: 3.3 G/DL (ref 3.2–4.9)
ALBUMIN/GLOB SERPL: 1 G/DL
ALP SERPL-CCNC: 655 U/L (ref 30–99)
ALT SERPL-CCNC: 29 U/L (ref 2–50)
ANION GAP SERPL CALC-SCNC: 16 MMOL/L (ref 0–11.9)
ANISOCYTOSIS BLD QL SMEAR: ABNORMAL
AST SERPL-CCNC: 42 U/L (ref 12–45)
BASOPHILS # BLD AUTO: 0 % (ref 0–1.8)
BASOPHILS # BLD: 0 K/UL (ref 0–0.12)
BILIRUB SERPL-MCNC: 0.3 MG/DL (ref 0.1–1.5)
BUN SERPL-MCNC: 21 MG/DL (ref 8–22)
CALCIUM SERPL-MCNC: 8.4 MG/DL (ref 8.5–10.5)
CHLORIDE SERPL-SCNC: 97 MMOL/L (ref 96–112)
CO2 SERPL-SCNC: 20 MMOL/L (ref 20–33)
CREAT SERPL-MCNC: 0.53 MG/DL (ref 0.5–1.4)
EOSINOPHIL # BLD AUTO: 0.13 K/UL (ref 0–0.51)
EOSINOPHIL NFR BLD: 0.9 % (ref 0–6.9)
ERYTHROCYTE [DISTWIDTH] IN BLOOD BY AUTOMATED COUNT: 51.6 FL (ref 35.9–50)
GLOBULIN SER CALC-MCNC: 3.2 G/DL (ref 1.9–3.5)
GLUCOSE SERPL-MCNC: 229 MG/DL (ref 65–99)
HCT VFR BLD AUTO: 31.2 % (ref 37–47)
HGB BLD-MCNC: 9.8 G/DL (ref 12–16)
LYMPHOCYTES # BLD AUTO: 4.27 K/UL (ref 1–4.8)
LYMPHOCYTES NFR BLD: 30.3 % (ref 22–41)
MANUAL DIFF BLD: NORMAL
MCH RBC QN AUTO: 27 PG (ref 27–33)
MCHC RBC AUTO-ENTMCNC: 31.4 G/DL (ref 33.6–35)
MCV RBC AUTO: 86 FL (ref 81.4–97.8)
MICROCYTES BLD QL SMEAR: ABNORMAL
MONOCYTES # BLD AUTO: 0.78 K/UL (ref 0–0.85)
MONOCYTES NFR BLD AUTO: 5.5 % (ref 0–13.4)
MORPHOLOGY BLD-IMP: NORMAL
NEUTROPHILS # BLD AUTO: 8.93 K/UL (ref 2–7.15)
NEUTROPHILS NFR BLD: 60.5 % (ref 44–72)
NEUTS BAND NFR BLD MANUAL: 2.8 % (ref 0–10)
NRBC # BLD AUTO: 0.04 K/UL
NRBC BLD-RTO: 0.3 /100 WBC
OVALOCYTES BLD QL SMEAR: NORMAL
PLATELET # BLD AUTO: 82 K/UL (ref 164–446)
PLATELET BLD QL SMEAR: NORMAL
PMV BLD AUTO: 10.3 FL (ref 9–12.9)
POIKILOCYTOSIS BLD QL SMEAR: NORMAL
POTASSIUM SERPL-SCNC: 4.5 MMOL/L (ref 3.6–5.5)
PROT SERPL-MCNC: 6.5 G/DL (ref 6–8.2)
RBC # BLD AUTO: 3.63 M/UL (ref 4.2–5.4)
RBC BLD AUTO: PRESENT
SODIUM SERPL-SCNC: 133 MMOL/L (ref 135–145)
WBC # BLD AUTO: 14.1 K/UL (ref 4.8–10.8)

## 2019-07-25 PROCEDURE — 85027 COMPLETE CBC AUTOMATED: CPT

## 2019-07-25 PROCEDURE — 700102 HCHG RX REV CODE 250 W/ 637 OVERRIDE(OP): Performed by: INTERNAL MEDICINE

## 2019-07-25 PROCEDURE — 700105 HCHG RX REV CODE 258: Performed by: STUDENT IN AN ORGANIZED HEALTH CARE EDUCATION/TRAINING PROGRAM

## 2019-07-25 PROCEDURE — 80053 COMPREHEN METABOLIC PANEL: CPT

## 2019-07-25 PROCEDURE — 85007 BL SMEAR W/DIFF WBC COUNT: CPT

## 2019-07-25 PROCEDURE — A9270 NON-COVERED ITEM OR SERVICE: HCPCS | Performed by: STUDENT IN AN ORGANIZED HEALTH CARE EDUCATION/TRAINING PROGRAM

## 2019-07-25 PROCEDURE — 99225 PR SUBSEQUENT OBSERVATION CARE,LEVEL II: CPT | Mod: GC | Performed by: HOSPITALIST

## 2019-07-25 PROCEDURE — 700111 HCHG RX REV CODE 636 W/ 250 OVERRIDE (IP): Performed by: STUDENT IN AN ORGANIZED HEALTH CARE EDUCATION/TRAINING PROGRAM

## 2019-07-25 PROCEDURE — A9270 NON-COVERED ITEM OR SERVICE: HCPCS | Performed by: NURSE PRACTITIONER

## 2019-07-25 PROCEDURE — 700101 HCHG RX REV CODE 250: Performed by: STUDENT IN AN ORGANIZED HEALTH CARE EDUCATION/TRAINING PROGRAM

## 2019-07-25 PROCEDURE — 99497 ADVNCD CARE PLAN 30 MIN: CPT | Performed by: NURSE PRACTITIONER

## 2019-07-25 PROCEDURE — 700102 HCHG RX REV CODE 250 W/ 637 OVERRIDE(OP): Performed by: NURSE PRACTITIONER

## 2019-07-25 PROCEDURE — G0378 HOSPITAL OBSERVATION PER HR: HCPCS

## 2019-07-25 PROCEDURE — A9270 NON-COVERED ITEM OR SERVICE: HCPCS | Performed by: INTERNAL MEDICINE

## 2019-07-25 PROCEDURE — 700102 HCHG RX REV CODE 250 W/ 637 OVERRIDE(OP): Performed by: STUDENT IN AN ORGANIZED HEALTH CARE EDUCATION/TRAINING PROGRAM

## 2019-07-25 PROCEDURE — 36415 COLL VENOUS BLD VENIPUNCTURE: CPT

## 2019-07-25 PROCEDURE — 700111 HCHG RX REV CODE 636 W/ 250 OVERRIDE (IP): Performed by: NURSE PRACTITIONER

## 2019-07-25 PROCEDURE — 74018 RADEX ABDOMEN 1 VIEW: CPT

## 2019-07-25 PROCEDURE — 99498 ADVNCD CARE PLAN ADDL 30 MIN: CPT | Performed by: NURSE PRACTITIONER

## 2019-07-25 PROCEDURE — 96376 TX/PRO/DX INJ SAME DRUG ADON: CPT

## 2019-07-25 PROCEDURE — 96372 THER/PROPH/DIAG INJ SC/IM: CPT

## 2019-07-25 PROCEDURE — 99226 PR SUBSEQUENT OBSERVATION CARE,LEVEL III: CPT | Mod: 25 | Performed by: NURSE PRACTITIONER

## 2019-07-25 RX ORDER — GABAPENTIN 100 MG/1
200 CAPSULE ORAL
Status: DISCONTINUED | OUTPATIENT
Start: 2019-07-25 | End: 2019-07-26

## 2019-07-25 RX ORDER — BISACODYL 10 MG
10 SUPPOSITORY, RECTAL RECTAL
Status: DISCONTINUED | OUTPATIENT
Start: 2019-07-25 | End: 2019-07-27 | Stop reason: HOSPADM

## 2019-07-25 RX ORDER — FAMOTIDINE 20 MG/1
20 TABLET, FILM COATED ORAL ONCE
Status: COMPLETED | OUTPATIENT
Start: 2019-07-25 | End: 2019-07-25

## 2019-07-25 RX ORDER — SODIUM CHLORIDE 9 MG/ML
INJECTION, SOLUTION INTRAVENOUS CONTINUOUS
Status: DISCONTINUED | OUTPATIENT
Start: 2019-07-25 | End: 2019-07-26

## 2019-07-25 RX ORDER — MORPHINE SULFATE 15 MG/1
15 TABLET ORAL
Status: DISCONTINUED | OUTPATIENT
Start: 2019-07-25 | End: 2019-07-27 | Stop reason: HOSPADM

## 2019-07-25 RX ORDER — FAMOTIDINE 20 MG/1
40 TABLET, FILM COATED ORAL DAILY
Status: DISCONTINUED | OUTPATIENT
Start: 2019-07-26 | End: 2019-07-27 | Stop reason: HOSPADM

## 2019-07-25 RX ORDER — MORPHINE SULFATE 30 MG/1
30 TABLET, FILM COATED, EXTENDED RELEASE ORAL EVERY 12 HOURS
Status: DISCONTINUED | OUTPATIENT
Start: 2019-07-25 | End: 2019-07-26

## 2019-07-25 RX ORDER — LACTULOSE 20 G/30ML
30 SOLUTION ORAL 3 TIMES DAILY
Status: DISPENSED | OUTPATIENT
Start: 2019-07-25 | End: 2019-07-26

## 2019-07-25 RX ADMIN — ASPIRIN 325 MG: 325 TABLET, FILM COATED ORAL at 04:54

## 2019-07-25 RX ADMIN — MORPHINE SULFATE 30 MG: 30 TABLET, FILM COATED, EXTENDED RELEASE ORAL at 17:00

## 2019-07-25 RX ADMIN — POLYETHYLENE GLYCOL 3350 1 PACKET: 17 POWDER, FOR SOLUTION ORAL at 04:54

## 2019-07-25 RX ADMIN — SODIUM CHLORIDE: 9 INJECTION, SOLUTION INTRAVENOUS at 21:09

## 2019-07-25 RX ADMIN — LACTULOSE 30 ML: 20 SOLUTION ORAL at 08:23

## 2019-07-25 RX ADMIN — LORAZEPAM 2 MG: 2 TABLET ORAL at 21:09

## 2019-07-25 RX ADMIN — LACTULOSE 30 ML: 20 SOLUTION ORAL at 13:15

## 2019-07-25 RX ADMIN — MORPHINE SULFATE 15 MG: 15 TABLET ORAL at 14:42

## 2019-07-25 RX ADMIN — FAMOTIDINE 20 MG: 20 TABLET ORAL at 17:00

## 2019-07-25 RX ADMIN — MORPHINE SULFATE 15 MG: 15 TABLET, EXTENDED RELEASE ORAL at 04:54

## 2019-07-25 RX ADMIN — MORPHINE SULFATE 15 MG: 15 TABLET ORAL at 20:00

## 2019-07-25 RX ADMIN — ENOXAPARIN SODIUM 40 MG: 100 INJECTION SUBCUTANEOUS at 04:54

## 2019-07-25 RX ADMIN — DEXAMETHASONE 4 MG: 4 TABLET ORAL at 04:54

## 2019-07-25 RX ADMIN — LIDOCAINE 1 PATCH: 50 PATCH TOPICAL at 13:16

## 2019-07-25 RX ADMIN — GABAPENTIN 200 MG: 100 CAPSULE ORAL at 21:09

## 2019-07-25 RX ADMIN — SENNOSIDES, DOCUSATE SODIUM 2 TABLET: 50; 8.6 TABLET, FILM COATED ORAL at 04:54

## 2019-07-25 RX ADMIN — MORPHINE SULFATE 3 MG: 4 INJECTION INTRAVENOUS at 11:25

## 2019-07-25 RX ADMIN — FAMOTIDINE 20 MG: 20 TABLET ORAL at 04:54

## 2019-07-25 RX ADMIN — MORPHINE SULFATE 15 MG: 15 TABLET ORAL at 23:07

## 2019-07-25 RX ADMIN — DEXAMETHASONE 4 MG: 4 TABLET ORAL at 17:00

## 2019-07-25 RX ADMIN — SENNOSIDES, DOCUSATE SODIUM 2 TABLET: 50; 8.6 TABLET, FILM COATED ORAL at 17:00

## 2019-07-25 RX ADMIN — LEVOTHYROXINE SODIUM 112 MCG: 112 TABLET ORAL at 04:54

## 2019-07-25 RX ADMIN — MORPHINE SULFATE 7.5 MG: 15 TABLET ORAL at 08:23

## 2019-07-25 ASSESSMENT — ENCOUNTER SYMPTOMS
FEVER: 0
DIAPHORESIS: 1
WEIGHT LOSS: 0
DIZZINESS: 0
EYES NEGATIVE: 1
GASTROINTESTINAL NEGATIVE: 1
MUSCULOSKELETAL NEGATIVE: 1
CARDIOVASCULAR NEGATIVE: 1
CHILLS: 0
RESPIRATORY NEGATIVE: 1

## 2019-07-25 NOTE — PROGRESS NOTES
Received report & assumed care of patient at 1900. Assessment completed. Patient is A&Ox4, up x2 with FWW. R PIV patent, SL. Patient reports pain of 7/10, medication provided per MAR. POC discussed & questions answered. Bed locked and in lowest position, bed alarm is on, call light within reach, non-skid socks in place, hourly rounding. Patient reports no further needs at this time.

## 2019-07-25 NOTE — PROGRESS NOTES
"       Internal Medicine Interval Note  Note Author: Siva Elias M.D.     Name Clarisse Reeves     1940   Age/Sex 78 y.o. female   MRN [id number]   Code Status DNAR/DNI     After 5PM or if no immediate response to page, please call for cross-coverage  Attending/Team: Dr. Houston/Radha See Patient List for primary contact information  Call [phone] to page    1st Call - Day Intern (R1):   Dr. Mcpherson 2nd Call - Day Sr. Resident (R2/R3):   Dr. Elias         Reason for interval visit  (Principal Problem)   Dysarthria  Breast [facility]    ID: Ms. Reeves is a 79 y/o female with the PMHx of CLL, breast cancer and lung cancer.  She initially presented for transient dysphagia.  On brain MRI, new mass was found.  Just prior to this she had a PET scan concerning for bone.  She is now working with onc and pall.    Interval Problem Daily Status Update  (24 hours, problem oriented, brief subjective history, new lab/imaging data pertinent to that problem)   -Overnight.  No events  -Breast cancer with mets to bone/brain:  Guerard pending bx result before proposing therapy.  Patient wants \"to do something now,\" but it \"has to be worth it.\"  Palliative planning POA discussion today.  -Cancer pain:  Complains of full body pain especially in the left shoulder  -Constipation, narc associated.  Last BM .  She feels \"full.\"  No pain.  She has been getting miralax and pericolace. Ordered KUB.   Vitals stable, no sign of perf.  KUB showed large amount of stool in the proximal colon.  Started lactulose and Dulcolax suppository.  -Mobility: Pending OT/PT evaluation.  Ambulation will likely help constipation.  -ROS:  Feels hot    Review of Systems   Constitutional: Positive for diaphoresis and malaise/fatigue. Negative for chills, fever and weight loss.   HENT: Negative.    Eyes: Negative.    Respiratory: Negative.    Cardiovascular: Negative.    Gastrointestinal: Negative.    Genitourinary: Negative.    Musculoskeletal: " Negative.    Skin: Negative.    Neurological: Negative for dizziness.       Disposition/Barriers to discharge:   None    Consultants/Specialty  -Heme-onc: Chemotherapy pending results of recent retroperitoneal lymph node biopsy cores.  Pending Her  -Radiation oncology: Patient and physician agree radiation unlikely to provide benefit.  -PT/OT: Pending  -Palliative care: Recommend hospice consult.  Increased dosing on pain regiment.  PCP: Siva Smart M.D.      Quality Measures  Quality-Core Measures   Reviewed items::  EKG reviewed, Labs reviewed, Medications reviewed and Radiology images reviewed  Oglesby catheter::  No Oglesby  DVT prophylaxis pharmacological::  Enoxaparin (Lovenox)  Ulcer Prophylaxis::  Not indicated      Physical Exam       Vitals:    07/24/19 2003 07/25/19 0409 07/25/19 0839 07/25/19 1721   BP: 144/75 134/76 139/81 138/75   Pulse: 80 76 73 66   Resp: 20 18 16 18   Temp: 36.3 °C (97.3 °F) 36.1 °C (97 °F) 36.5 °C (97.7 °F) 36.1 °C (97 °F)   TempSrc: Temporal Temporal Temporal Temporal   SpO2: 99% 94% 96% 93%   Weight:       Height:         Body mass index is 29.11 kg/m².    Oxygen Therapy:  Pulse Oximetry: 93 %, O2 Delivery: None (Room Air)    Physical Exam   Constitutional: She is oriented to person, place, and time and well-developed, well-nourished, and in no distress.   HENT:   Head: Normocephalic and atraumatic.   Cardiovascular: Normal rate, regular rhythm and intact distal pulses.  Exam reveals no gallop and no friction rub.    No murmur heard.  Abdominal: Soft. Bowel sounds are normal. She exhibits no distension. There is no tenderness. There is no rebound and no guarding.   Musculoskeletal: She exhibits no edema or tenderness.   Neurological: She is alert and oriented to person, place, and time. She displays normal reflexes. No cranial nerve deficit. She exhibits normal muscle tone. Coordination normal.   Skin: Skin is warm. No rash noted. She is diaphoretic.   Psychiatric: Mood and  affect normal.       Assessment/Plan     * Malignant neoplasm of upper-outer quadrant of left breast in female, estrogen receptor negative (CMS-HCC)- 2/2018; 1 positive node; chemorx (dr. grant) ), lumpectomy ( dr sharpe), RT 2 mo (dr beltran)- (present on admission)   Assessment & Plan    -Status post mastectomy of left breast.  -MRI brain 7/23/2019 showed 2 cm dural lesion suspicious for metastasis. Disposition will be pending oncology and palliative care input.  -Patient agreed to code status change to DNR/I.  -Palliative following:  Asisting with pain/symptom control  -Appears to become very sedated and confused with Ativan 2 mg.  Decreased to Ativan 1 mg nightly and discontinued PRN dose.  -PLAN: Pending HER results of recent retroperitoneal LN BX.  Then onc can determine chemotherapy. Currently on narcotics and benzos as managed by palliative for full body pain and bone pain.  Rad-onc does not think radiation will be of benefit.       Brain mass   Assessment & Plan    -MRI brain 7/23 showed 2 cm dural mass suspicious for metastasis. Disposition pending oncology recommendations.  -PLAN: Pending HER results of recent retroperitoneal LN BX.  Then onc can determine chemotherapy.  Rad-onc feels that radiation therapy likely to be unbeneficial     Bone metastases (HCC)- (present on admission)   Assessment & Plan    -C/o myalgia and bone pain; elevated alkaline phosphatase  - PET/CT on 7/11/19 was positive for bony metastasis  -On decadron; percocet  -PLAN:  Continue home decadron.  Pain control per palliative as noted above.     Aphagia- (present on admission)   Assessment & Plan    RESOLVED  -Due to TIA vs brain mets.  -Patient c/o one episode of difficulty in speaking and dysarthria which resolved within 1 hour; NIHSS is 0.    -CT head, cerebral perfusion study negative for ischemia or heamorrhage  -Echo with bubble study unremarkable, no shunt, EF 65%  -PLAN: neuro checks and NIH stroke scale q12h.  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 Rebolledo  -PLAN:  Cont to monitor     CLL (chronic lymphocytic leukemia)- wbc= 20k-30k, since 2006 until march 2018 when it returned normal at 5-10k following chemorx for breast ca- dr rebolledo; no rx; oncologist to follow- (present on admission)   Assessment & Plan    -H/o CLL; patient is afebrile and asymptomatic  -elevated WBC, low Hb and platelets   -PLAN:  Continue to monitor     Claustrophobia   Assessment & Plan    -Patient does not tolerate MRI or CTs without IV ativan  -PLAN:  Future imaging to be done with 1mg IV ativan 30 min prior.       Anxiety- (present on admission)   Assessment & Plan    -At home on ativan  -Observed excess somnolance after IV ativan for MRI  -PLAN: decreased dose to 1mg nightly due to confusion at night     Normocytic anemia- (present on admission)   Assessment & Plan    -Likely due to anemia of chronic disease  -PLAN: Cont to monitor     Multiple thyroid nodules- (present on admission)   Assessment & Plan    -S/P thyroidectomy  -on Levothyroxine  -PLAN:  Continue current home meds

## 2019-07-25 NOTE — PROGRESS NOTES
Palliative Care Advance Care Planning Progress Note    General: Clarisse Reeves is a 78-year-old female admitted 7/22/2019 for dysarthria and altered mental status.  She was originally seen by palliative care yesterday for pain management due to metastatic bone pain related to breast cancer.    Discussion: Met with patient and her friend Brittany.  Assisted with completion of new healthcare directive.  Patient will like her 3 friends to act as co-agents: Brittany Raul [phone], Clarisse Molina [phone], and Noy Jc [phone].  Patient selected #2, 3, and 5 for statement of desires meaning she would not want life sustaining or prolonging treatment if she were in a coma or severely ill without reasonable hope of recovery or survival.  She would like her co-farmer of  for healthcare to outweigh burden and benefit of decision-making.  Patient also elected to complete the declaration to physicians.      Assisted patient with completion of POLST form patient selected DNR/allow natural death, selective focused treatment, no feeding tube, trial of IV fluids.      Explained to patient that she is pending PT, OT, SLP evaluation for postacute needs.  Reassured patient that I discussed patient's concerns regarding finances, postacute needs, and inability to care for herself with  Carmella.    All questions addressed.  Provided therapeutic communication including open ended questions, therapeutic silence, and normalization of thoughts/feelings throughout encounter.  Encouraged patient and Brittany to call with any questions or needs.    Outcome: AD completed and awaiting notary (patient will need three copies once completed).  POLST completed and on hard chart, copy scanned into EPIC.  Please send pink POLST on hard chart home with patient; patient would like 3 copies when she discharges.     Plan: Continue to follow for pain management and ensure AD is notarized/scanned into record.      Recommendations: I recommend a hospice consult.  Patient would like to pursue continued chemotherapy with Dr. Delgado once outpatient.  Pending PT/OT recommendation for post acute needs.     Updated: RN and CM.     Thank you for allowing Palliative Care to participate in this patient's care. Please call our team with questions and/or additional needs.    Total visit time was 75 minutes discussing advance care planning.    Agnieszka Stokes A.P.R.N.  Palliative Care Nurse Practitioner  [phone]

## 2019-07-25 NOTE — PROGRESS NOTES
Report to JEN Dickson Receiving RN. Pt transported to floor with all personal belongings; meds and chart.

## 2019-07-25 NOTE — PROGRESS NOTES
"Palliative Progress Note               Author: Agnieszka RISHABH Ponchoadan Date & Time created: 2019  12:20 PM     Reason for subsequent visit:  Metastatic bone pain    Interval History:  No acute events overnight.  Patient transferred to oncology.  Patient's friend Noy and Clarisse at patient's bedside.  She reports that overall her pain is a 9/10 severity mostly in her left scapular/collar bone area which is constant.  She is getting relief from both IV and oral IR morphine but only for about 1.5-2 hours that brings her pain down to a 5.5-6/10.  She continues to have shooting pains up her legs.  She was unable to sleep last night stating \"I ate ice cream at 2AM.\"  She was also concerned that she did not receive her normal dose of lorazepam which she first stated was 4 mg.  When I clarified that it is 2 mg from what she told me yesterday and her outpatient medication list, she expressed that \"they only gave me one pill and I take two at home.\"  She reoriented after I explained that we likely use a different formulary but she received the correct dose.  She stated \"my memory is shot.\"  She stated \"I hate asking for pain medications because I don't want to seem like an addict.\"     She continues to complain of heartburn that she has had for \"200 years.\"  She reports she takes Prevacid every other day at home which controls her heartburn.    Review of Systems:  Positive for dyspnea (on exertion). Positive for pain (left scapula/clavical; bilateral lower extremities). Positive for fatigue. Negative for sedation. Negative for depression. Positive for insomnia. Positive for nausea and vomiting (intermittent nasuea without vomiting; + heartburn). Positive for constipation (last bowel movement 19). Negative for odynophagia.      Physical Exam:    Recent vital signs  Temp (24hrs), Av.3 °C (97.3 °F), Min:36.1 °C (97 °F), Max:36.5 °C (97.7 °F)  Temperature: 36.5 °C (97.7 °F)  Pulse  Av.1  Min: 73  Max: 114   Blood " Pressure : 139/81       Physical Exam   Constitutional: She is oriented to person, place, and time. She appears well-developed. No distress.   HENT:   Mouth/Throat: Oropharynx is clear and moist. No oropharyngeal exudate (some white coating noted but patient has not burshed her teeth).   Eyes: Pupils are equal, round, and reactive to light.   Cardiovascular: Normal rate and normal heart sounds.    Pulmonary/Chest: Effort normal and breath sounds normal. No respiratory distress.   Abdominal: Soft. Bowel sounds are normal. She exhibits distension. There is no tenderness.   Had to press hard on abdomen   Musculoskeletal: She exhibits no edema.   Neurological: She is alert and oriented to person, place, and time.   Skin: Skin is dry. There is pallor.   Psychiatric: She has a normal mood and affect. Her behavior is normal. Judgment and thought content normal. Her speech is tangential. She exhibits abnormal recent memory (very forgetful but reorients easily).   Nursing note and vitals reviewed.    Recent Labs      07/23/19   0336  07/24/19   0403  07/25/19   0917   SODIUM  132*  133*  133*   POTASSIUM  4.0  4.3  4.5   CHLORIDE  95*  98  97   CO2  25  22  20   GLUCOSE  144*  143*  229*   BUN  19  16  21   CREATININE  0.75  0.54  0.53   CALCIUM  8.2*  8.2*  8.4*     Recent Labs      07/23/19   0336  07/24/19   0403  07/25/19   0917   WBC  11.9*  10.5  14.1*   RBC  3.58*  3.37*  3.63*   HEMOGLOBIN  9.6*  9.3*  9.8*   HEMATOCRIT  31.3*  29.1*  31.2*   MCV  87.4  86.4  86.0   MCH  26.8*  27.6  27.0   MCHC  30.7*  32.0*  31.4*   RDW  52.0*  51.3*  51.6*   PLATELETCT  75*  70*  82*   MPV  10.4  9.6  10.3     Imaging:  Went down for abdominal x-ray following our encounter    Medications reviewed. Labs Reviewed.     Problem List:  1.  Metastatic bone pain (active)  2.  Metastatic breast cancer to bone (active)  3.  Brain mass (active)  4.  Adenocarcinoma of right lung in situ (stable)  5.  Chronic lymphocytic leukemia (stable)  6.  " Nocturnal anxiety with associated insomnia (active)  7.  Dysarthria and altered mental status (resolved)  8.  Heartburn (active)  9.  Intermittent nausea (active)  10.  Intermittent anorexia (resolved)  12.  Constipation (active)  13.  Multiple thyroid nodules status post thyroidectomy (stable)    Assessment/Plan:  Metastatic bone pain  MEDD (morphine equivalent daily dose) is approximately 57 mg:  -MS Contin 15 mg x 2 doses =30 mg MEDD  -MS IR 7.5 mg x 2 doses =15 mg MEDD  -Morphine 3 mg x 1 doses =12 mg MEDD    7/24/19 ~ 15 mg MEDD     CONTINUOUS OPIOID THERAPY:  Increase MS Contin to 30 mg p.o. twice daily due to patient's high pain ratings and resistance to asking for short acting medication  Educated about the difference between addiction, dependence, and adverse effects of not controlling cancer related pain     SHORT ACTING OPIOID THERAPY:  Increase MS IR to 15 mg p.o. every 3 hours as needed moderate to severe breakthrough pain  Continue morphine 3 mg IV every 3 hours as needed severe breakthrough pain if unrelieved by oral opioid     ADJUNCT THERAPY:  Increase gabapentin to 200 mg p.o. at bedtime  Continue dexamethasone 4 mg p.o. twice daily  Continue lidocaine patch as patient felt that it helped \"a little\"     Metastatic breast cancer to bone  Patient had lymph node biopsy 7/19/2019; PDL 1 testing  She met with Dr. Saldana and determined no further radiation therapy  Patient expressed she does not feel ready for hospice and would like to pursue chemotherapy if it is an option  Discussed with patient's oncologist Dr. Delgado who is recommending outpatient follow-up     Nocturnal anxiety with associated insomnia  Continue Lorazepam 2 mg p.o. at bedtime     Heartburn   Increase pantoprazole to 40 mg p.o. daily  Continue Tums as needed     Intermittent nausea and anorexia  PRN antiemetics available  Patient ate most of lunch     Constipation  Pending abdominal x-ray  Bowel regimen ordered  Continue " senna-docusate 2 tabs twice a day  Continue daily MiraLAX  Lactulose 30 mL p.o. 3 times daily ordered by primary team this morning which I agree with continue with aggressive bowel regimen as patient has not had bowel movement since 7/21/2019     Multiple thyroid nodules status post thyroidectomy  On replacement therapy        Code Status: DNR/DNI    Advance Care Planning/Current Goals of Care: Begin assisting patient with completion of new healthcare directive but transport arrived to take patient to x-ray.  Will return later this afternoon.    35 minutes spent with greater than 50% spent counseling and coordinating the patient's care regarding pain and symptom management as well as plan of care.   Please refer to HPI and assessment/plan for details.     Agnieszka Stokes A.P.R.N.  Palliative Care Nurse Practitioner  [phone]

## 2019-07-25 NOTE — CARE PLAN
Problem: Communication  Goal: The ability to communicate needs accurately and effectively will improve  Outcome: PROGRESSING AS EXPECTED  Plan of care discussed with patient, all questions answered.    Problem: Safety  Goal: Will remain free from injury  Outcome: PROGRESSING AS EXPECTED  Call light within reach, room near nurses station, bed locked and in lowest position, bed alarm on, hourly rounding.

## 2019-07-25 NOTE — CARE PLAN
Problem: Safety  Goal: Will remain free from falls  Outcome: PROGRESSING AS EXPECTED  Bed alarm is on. Fall precautions are in place.    Problem: Infection  Goal: Will remain free from infection  Outcome: PROGRESSING AS EXPECTED  Pt is afebrile

## 2019-07-25 NOTE — PROGRESS NOTES
"/81   Pulse 73   Temp 36.5 °C (97.7 °F) (Temporal)   Resp 16   Ht 1.676 m (5' 6\")   Wt 81.8 kg (180 lb 5.4 oz)   SpO2 96%   BMI 29.11 kg/m²   Received report and assumed care of pt at 0700. Pt is A&O x4. Pt is 9/10 pain. Pt denies nausea, chest pain, or SOB. Bed is in lowest position and call light is within reach. Hourly rounding is in place.  "

## 2019-07-25 NOTE — PROGRESS NOTES
2 RN skin check complete.Skin clean, dry, intact. L breast with brownish discoloration due to prior radiation treatments.  Devices in place: R PIV.  Skin assessed under devices: clean, dry, intact.  No existing or potential pressure ulcers found.  The following interventions in place: patient turns self from side to side.

## 2019-07-26 ENCOUNTER — PATIENT OUTREACH (OUTPATIENT)
Dept: HEALTH INFORMATION MANAGEMENT | Facility: OTHER | Age: 79
End: 2019-07-26

## 2019-07-26 ENCOUNTER — APPOINTMENT (OUTPATIENT)
Dept: RADIOLOGY | Facility: MEDICAL CENTER | Age: 79
End: 2019-07-26
Attending: STUDENT IN AN ORGANIZED HEALTH CARE EDUCATION/TRAINING PROGRAM
Payer: MEDICARE

## 2019-07-26 PROBLEM — R13.0 APHAGIA: Status: RESOLVED | Noted: 2018-10-16 | Resolved: 2019-07-26

## 2019-07-26 LAB
ANION GAP SERPL CALC-SCNC: 15 MMOL/L (ref 0–11.9)
BASOPHILS # BLD AUTO: 0 % (ref 0–1.8)
BASOPHILS # BLD: 0 K/UL (ref 0–0.12)
BUN SERPL-MCNC: 24 MG/DL (ref 8–22)
CALCIUM SERPL-MCNC: 8.7 MG/DL (ref 8.5–10.5)
CHLORIDE SERPL-SCNC: 98 MMOL/L (ref 96–112)
CO2 SERPL-SCNC: 22 MMOL/L (ref 20–33)
CREAT SERPL-MCNC: 0.64 MG/DL (ref 0.5–1.4)
EOSINOPHIL # BLD AUTO: 0 K/UL (ref 0–0.51)
EOSINOPHIL NFR BLD: 0 % (ref 0–6.9)
ERYTHROCYTE [DISTWIDTH] IN BLOOD BY AUTOMATED COUNT: 52.9 FL (ref 35.9–50)
GLUCOSE SERPL-MCNC: 177 MG/DL (ref 65–99)
HCT VFR BLD AUTO: 35.4 % (ref 37–47)
HGB BLD-MCNC: 10.8 G/DL (ref 12–16)
LYMPHOCYTES # BLD AUTO: 5.01 K/UL (ref 1–4.8)
LYMPHOCYTES NFR BLD: 31.9 % (ref 22–41)
MANUAL DIFF BLD: NORMAL
MCH RBC QN AUTO: 26.8 PG (ref 27–33)
MCHC RBC AUTO-ENTMCNC: 30.5 G/DL (ref 33.6–35)
MCV RBC AUTO: 87.8 FL (ref 81.4–97.8)
METAMYELOCYTES NFR BLD MANUAL: 2.6 %
MONOCYTES # BLD AUTO: 0.69 K/UL (ref 0–0.85)
MONOCYTES NFR BLD AUTO: 4.4 % (ref 0–13.4)
MORPHOLOGY BLD-IMP: NORMAL
NEUTROPHILS # BLD AUTO: 9.45 K/UL (ref 2–7.15)
NEUTROPHILS NFR BLD: 58.4 % (ref 44–72)
NEUTS BAND NFR BLD MANUAL: 1.8 % (ref 0–10)
NRBC # BLD AUTO: 0.06 K/UL
NRBC BLD-RTO: 0.4 /100 WBC
OVALOCYTES BLD QL SMEAR: NORMAL
PLATELET # BLD AUTO: 87 K/UL (ref 164–446)
PLATELET BLD QL SMEAR: NORMAL
PMV BLD AUTO: 10.1 FL (ref 9–12.9)
POIKILOCYTOSIS BLD QL SMEAR: NORMAL
POTASSIUM SERPL-SCNC: 4.4 MMOL/L (ref 3.6–5.5)
PROMYELOCYTES NFR BLD MANUAL: 0.9 %
RBC # BLD AUTO: 4.03 M/UL (ref 4.2–5.4)
RBC BLD AUTO: PRESENT
SODIUM SERPL-SCNC: 135 MMOL/L (ref 135–145)
WBC # BLD AUTO: 15.7 K/UL (ref 4.8–10.8)

## 2019-07-26 PROCEDURE — 700102 HCHG RX REV CODE 250 W/ 637 OVERRIDE(OP): Performed by: INTERNAL MEDICINE

## 2019-07-26 PROCEDURE — 76705 ECHO EXAM OF ABDOMEN: CPT

## 2019-07-26 PROCEDURE — 36415 COLL VENOUS BLD VENIPUNCTURE: CPT

## 2019-07-26 PROCEDURE — A9270 NON-COVERED ITEM OR SERVICE: HCPCS | Performed by: INTERNAL MEDICINE

## 2019-07-26 PROCEDURE — 96372 THER/PROPH/DIAG INJ SC/IM: CPT

## 2019-07-26 PROCEDURE — 99225 PR SUBSEQUENT OBSERVATION CARE,LEVEL II: CPT | Mod: GC | Performed by: HOSPITALIST

## 2019-07-26 PROCEDURE — 85027 COMPLETE CBC AUTOMATED: CPT

## 2019-07-26 PROCEDURE — 700102 HCHG RX REV CODE 250 W/ 637 OVERRIDE(OP): Performed by: STUDENT IN AN ORGANIZED HEALTH CARE EDUCATION/TRAINING PROGRAM

## 2019-07-26 PROCEDURE — A9270 NON-COVERED ITEM OR SERVICE: HCPCS | Performed by: STUDENT IN AN ORGANIZED HEALTH CARE EDUCATION/TRAINING PROGRAM

## 2019-07-26 PROCEDURE — G0378 HOSPITAL OBSERVATION PER HR: HCPCS

## 2019-07-26 PROCEDURE — 99224 PR SUBSEQUENT OBSERVATION CARE,LEVEL I: CPT | Mod: 25 | Performed by: INTERNAL MEDICINE

## 2019-07-26 PROCEDURE — 80048 BASIC METABOLIC PNL TOTAL CA: CPT

## 2019-07-26 PROCEDURE — 700102 HCHG RX REV CODE 250 W/ 637 OVERRIDE(OP): Performed by: NURSE PRACTITIONER

## 2019-07-26 PROCEDURE — 700111 HCHG RX REV CODE 636 W/ 250 OVERRIDE (IP): Performed by: STUDENT IN AN ORGANIZED HEALTH CARE EDUCATION/TRAINING PROGRAM

## 2019-07-26 PROCEDURE — A9270 NON-COVERED ITEM OR SERVICE: HCPCS | Performed by: NURSE PRACTITIONER

## 2019-07-26 PROCEDURE — 700101 HCHG RX REV CODE 250: Performed by: STUDENT IN AN ORGANIZED HEALTH CARE EDUCATION/TRAINING PROGRAM

## 2019-07-26 PROCEDURE — 85007 BL SMEAR W/DIFF WBC COUNT: CPT

## 2019-07-26 PROCEDURE — 92610 EVALUATE SWALLOWING FUNCTION: CPT

## 2019-07-26 PROCEDURE — 99497 ADVNCD CARE PLAN 30 MIN: CPT | Performed by: INTERNAL MEDICINE

## 2019-07-26 RX ORDER — MORPHINE SULFATE 30 MG/1
30 TABLET, FILM COATED, EXTENDED RELEASE ORAL EVERY 8 HOURS
Status: DISCONTINUED | OUTPATIENT
Start: 2019-07-26 | End: 2019-07-27 | Stop reason: HOSPADM

## 2019-07-26 RX ORDER — GABAPENTIN 300 MG/1
300 CAPSULE ORAL
Status: DISCONTINUED | OUTPATIENT
Start: 2019-07-26 | End: 2019-07-27 | Stop reason: HOSPADM

## 2019-07-26 RX ADMIN — MORPHINE SULFATE 30 MG: 30 TABLET, FILM COATED, EXTENDED RELEASE ORAL at 07:02

## 2019-07-26 RX ADMIN — ENOXAPARIN SODIUM 40 MG: 100 INJECTION SUBCUTANEOUS at 07:03

## 2019-07-26 RX ADMIN — LEVOTHYROXINE SODIUM 112 MCG: 112 TABLET ORAL at 07:02

## 2019-07-26 RX ADMIN — MORPHINE SULFATE 15 MG: 15 TABLET ORAL at 12:46

## 2019-07-26 RX ADMIN — MORPHINE SULFATE 30 MG: 30 TABLET, FILM COATED, EXTENDED RELEASE ORAL at 12:46

## 2019-07-26 RX ADMIN — LIDOCAINE 1 PATCH: 50 PATCH TOPICAL at 15:41

## 2019-07-26 RX ADMIN — FAMOTIDINE 40 MG: 20 TABLET ORAL at 07:01

## 2019-07-26 RX ADMIN — SENNOSIDES, DOCUSATE SODIUM 2 TABLET: 50; 8.6 TABLET, FILM COATED ORAL at 07:03

## 2019-07-26 RX ADMIN — DEXAMETHASONE 4 MG: 4 TABLET ORAL at 20:30

## 2019-07-26 RX ADMIN — SENNOSIDES, DOCUSATE SODIUM 2 TABLET: 50; 8.6 TABLET, FILM COATED ORAL at 20:24

## 2019-07-26 RX ADMIN — LORAZEPAM 2 MG: 2 TABLET ORAL at 20:24

## 2019-07-26 RX ADMIN — POLYETHYLENE GLYCOL 3350 1 PACKET: 17 POWDER, FOR SOLUTION ORAL at 07:03

## 2019-07-26 RX ADMIN — DEXAMETHASONE 4 MG: 4 TABLET ORAL at 07:02

## 2019-07-26 RX ADMIN — GABAPENTIN 300 MG: 300 CAPSULE ORAL at 20:24

## 2019-07-26 RX ADMIN — MORPHINE SULFATE 15 MG: 15 TABLET ORAL at 02:19

## 2019-07-26 RX ADMIN — MORPHINE SULFATE 15 MG: 15 TABLET ORAL at 07:02

## 2019-07-26 RX ADMIN — ASPIRIN 325 MG: 325 TABLET, FILM COATED ORAL at 06:00

## 2019-07-26 ASSESSMENT — COGNITIVE AND FUNCTIONAL STATUS - GENERAL
DRESSING REGULAR LOWER BODY CLOTHING: A LITTLE
MOVING TO AND FROM BED TO CHAIR: A LITTLE
DAILY ACTIVITIY SCORE: 21
SUGGESTED CMS G CODE MODIFIER DAILY ACTIVITY: CJ
TOILETING: A LITTLE
STANDING UP FROM CHAIR USING ARMS: A LITTLE
WALKING IN HOSPITAL ROOM: A LOT
SUGGESTED CMS G CODE MODIFIER MOBILITY: CL
TURNING FROM BACK TO SIDE WHILE IN FLAT BAD: A LITTLE
CLIMB 3 TO 5 STEPS WITH RAILING: A LOT
MOVING FROM LYING ON BACK TO SITTING ON SIDE OF FLAT BED: UNABLE
MOBILITY SCORE: 14
HELP NEEDED FOR BATHING: A LITTLE

## 2019-07-26 ASSESSMENT — PATIENT HEALTH QUESTIONNAIRE - PHQ9
2. FEELING DOWN, DEPRESSED, IRRITABLE, OR HOPELESS: NOT AT ALL
SUM OF ALL RESPONSES TO PHQ9 QUESTIONS 1 AND 2: 0
1. LITTLE INTEREST OR PLEASURE IN DOING THINGS: NOT AT ALL

## 2019-07-26 ASSESSMENT — ENCOUNTER SYMPTOMS
GASTROINTESTINAL NEGATIVE: 1
EYES NEGATIVE: 1
DIZZINESS: 0
DIAPHORESIS: 1
RESPIRATORY NEGATIVE: 1
MUSCULOSKELETAL NEGATIVE: 1
CARDIOVASCULAR NEGATIVE: 1
WEIGHT LOSS: 0
FEVER: 0
CHILLS: 0

## 2019-07-26 ASSESSMENT — LIFESTYLE VARIABLES: DO YOU DRINK ALCOHOL: NO

## 2019-07-26 NOTE — DISCHARGE PLANNING
Received Choice form at [phone]  Agency/Facility Name: [facility]. [facility] 2. [facility] 3. [facility]   Referral sent per Choice form @ [phone]

## 2019-07-26 NOTE — PROGRESS NOTES
Palliative Progress Note               Author: Desirae RISHABH Bolden Date & Time created: 2019  1:43 PM     Reason for subsequent visit: Pain    Interval History:  She reports that she is doing a bit better since she is taking the IR more often. She does admit to mental clouding but not related to morphine, has been present since chemotherapy.    Review of Systems:  Positive for pain. Positive for fatigue. Positive for anxiety. Negative for depression. Negative for insomnia. Negative for nausea and vomiting. Positive for constipation. Negative for dysphagia.      Physical Exam:    Recent vital signs  Temp (24hrs), Av.3 °C (97.4 °F), Min:36.1 °C (97 °F), Max:36.8 °C (98.2 °F)  Temperature: 36.8 °C (98.2 °F)  Pulse  Av.6  Min: 66  Max: 114   Blood Pressure : 133/64       Physical Exam   Constitutional: She appears well-developed.   Eyes: Pupils are equal, round, and reactive to light.   Cardiovascular: Normal rate and regular rhythm.    Pulmonary/Chest: Effort normal and breath sounds normal.   Abdominal: Soft. Bowel sounds are normal. She exhibits no distension.     Recent Labs      19   0403  19   0917  19   0025   SODIUM  133*  133*  135   POTASSIUM  4.3  4.5  4.4   CHLORIDE  98  97  98   CO2  22  20  22   GLUCOSE  143*  229*  177*   BUN  16  21  24*   CREATININE  0.54  0.53  0.64   CALCIUM  8.2*  8.4*  8.7     Recent Labs      19   0403  19   0917  19   0025   WBC  10.5  14.1*  15.7*   RBC  3.37*  3.63*  4.03*   HEMOGLOBIN  9.3*  9.8*  10.8*   HEMATOCRIT  29.1*  31.2*  35.4*   MCV  86.4  86.0  87.8   MCH  27.6  27.0  26.8*   MCHC  32.0*  31.4*  30.5*   RDW  51.3*  51.6*  52.9*   PLATELETCT  70*  82*  87*   MPV  9.6  10.3  10.1     <PALLIATIVEREVIEW>  Problem List:  1.  Metastatic bone pain (active)  2.  Metastatic breast cancer to bone (active)  3.  Brain mass (active)  4.  Adenocarcinoma of right lung in situ (stable)  5.  Chronic lymphocytic leukemia (stable)  6.  " Nocturnal anxiety with associated insomnia (active)  7.  Dysarthria and altered mental status (resolved)  8.  Heartburn (active)  9.  Intermittent nausea (active)  10.  Intermittent anorexia (resolved)  12.  Constipation (active)  13.  Multiple thyroid nodules status post thyroidectomy (stable)       Assessment/Plan:  MEDD (morphine equivalent daily dose) is approximately 144 mg:  -MS Contin 30 mg x 2 doses =60 mg MEDD  -MS IR 15 mg x 5 doses =75 mg MEDD  -Morphine 3 mg x 1 doses =9 mg MEDD     7/25/19 - 57 mg MEDD  7/24/19 ~ 15 mg MEDD     CONTINUOUS OPIOID THERAPY:  Increase MS Contin to 30 mg p.o. Q 8 h    Patient has improved overall pain control but is using more BTP medication.   SHORT ACTING OPIOID THERAPY:  Continue MS IR 15 mg po q 3 h prn.  Continue morphine 3 mg IV every 3 hours as needed severe breakthrough pain if unrelieved by oral opioid     ADJUNCT THERAPY:  Increase gabapentin to 300 mg p.o. at bedtime  Continue dexamethasone 4 mg p.o. twice daily  Continue lidocaine patch as patient felt that it helped \"a little\"     Metastatic breast cancer to bone  Patient had lymph node biopsy 7/19/2019; PDL 1 testing  She met with Dr. Saldana and determined no further radiation therapy  Patient expressed she does not feel ready for hospice and would like to pursue chemotherapy if it is an option  Discussed with patient's oncologist Dr. Delgado who is recommending outpatient follow-up  Dr Delgado to F/U today with patient   Nocturnal anxiety with associated insomnia  Continue Lorazepam 2 mg p.o. at bedtime     Heartburn   Increase pantoprazole to 40 mg p.o. daily  Continue Tums as needed     Intermittent nausea and anorexia  PRN antiemetics available  Patient ate most of lunch     Constipation  Pending abdominal x-ray  Bowel regimen ordered  Continue senna-docusate 2 tabs twice a day  Continue daily MiraLAX  Lactulose 30 mL p.o. 3 times daily ordered by primary team this morning which I agree with continue with " "aggressive bowel regimen.  Last BM 7/25/19  Multiple thyroid nodules status post thyroidectomy  On replacement therapy     Code Status:DNR/DNI    Advance Care Planning/Current Goals of Care: Patient reports to me today that she is terrified of going home alone as she has no family to help take care of her and she is too weak to manage on her own. She may want to consider more chemotherapy but want to know \"how much time she would get\". She was under the impression that she had to be near death to get hospice care and she believed hospice would \"take away al of her choices\". I attempted to clarify aspects of the hospice benefit. She also reports she only gets about $2000.00 per month that she lives on. She was agreeable to the possibility of going to SNF if it would help get her stronger.  20 minutes was spent discussing advance care planning.     20 minutes spent with greater than 50% spent counseling and coordinating the patient's care regarding symptom control.   Please refer to HPI and assessment/plan for details.         "

## 2019-07-26 NOTE — DISCHARGE SUMMARY
Internal Medicine Discharge Summary  Note Author: Dale Mcpherson M.D.       Name Clarisse Reeves 1940   Age/Sex 78 y.o. female   MRN [id number]         Admit Date:  2019       Discharge Date:   2019    Service:   [facility] Yellow Team  Attending Physician(s):   Rakan Houston MD       Senior Resident(s):   Siva Elias MD   Ricci Resident(s):   Dale Mcpherson MD  PCP: Siva Smart M.D.      Primary Diagnosis:   Metastatic breast cancer    Secondary Diagnoses:                Principal Problem:    Malignant neoplasm of upper-outer quadrant of left breast in female, estrogen receptor negative (CMS-HCC)- 2018; 1 positive node; chemorx (dr. 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 *      Hospital Summary (Brief Narrative):       Mrs. Reeves is a 78-year-old female with a past medical history of CLL, lung adenocarcinoma status post partial lobectomy, triple negative breast cancer diagnosed in 2018 status post lumpectomy, chemotherapy and radiation who presented with dysarthria and confusion witnessed by a friend at home which resolved within 1 hour when they reached the ED. Stroke work-up including CT head and CT perfusion study revealed no evidence of hemorrhage or ischemia. Echo with Bubble study showed no shunt with EF  65%, normal diastolic function and no valvular abnormalities. Due to fatigue she has recently had a PET CT scan which showed metastasis to the bone and a retroperitoneal lymph node biopsy last week which showed metastatic breast cancer ER negative ID negative.  MRI brain showed a new 2 cm right dural mass suspicious for metastasis.  Radiation oncology was consulted and determined, with agreement from patient, that radiation would not be beneficial. The biopsy will be sent for HER-2 and PDL 1 analysis for potential chemotherapeutic options per Dr. Delgado from hematology oncology. She has significant bone pain, fatigue, diaphoresis, and myalgias from the metastases, and palliative care was consulted and formulated the pain management regimen. She will be discharged to [facility] care SNF for rehab.  Once biopsy results complete, chemotherapy may be started.  She will be scheduled for follow up with oncology within 2 weeks of discharge.    Patient /Hospital Summary (Details -- Problem Oriented) :              * Malignant neoplasm of upper-outer quadrant of left breast in female, estrogen receptor negative (CMS-HCC)- 2/2018; 1 positive node; chemorx (dr. grant) ), lumpectomy ( dr sharpe), RT 2 mo (dr beltran)   Assessment & Plan    -Status post mastectomy of left breast.  -MRI brain 7/23/2019 showed 2 cm dural lesion suspicious for metastasis.   -Patient agreed to code status change to DNR/I.  -Palliative following:  Asisting with pain/symptom control  -PLAN: Pending HER results of recent retroperitoneal LN BX. Showed metastatic BRCA ER- ID-, pending HER2 and PDL1 mutation analysis. Then onc can determine chemotherapy. Currently on narcotics and benzos as managed by palliative for full body pain and bone pain.  Rad-onc does not think radiation will be of benefit. Will followup with Dr. Delgado in ~2weeks. Scheduling contacted and will make appointment in next 2 weeks and inform patient.       Brain mass   Assessment & Plan     See plan for Breast Neoplasm         Bone metastases (HCC)   Assessment & Plan    -C/o myalgia and bone pain; elevated alkaline phosphatase  - PET/CT on 7/11/19 was positive for bony metastasis  -On decadron; percocet  -PLAN:  Continue home decadron.  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 Grant     CLL (chronic lymphocytic leukemia)- wbc= 20k-30k, since 2006 until march 2018 when it returned normal at 5-10k following chemorx for breast ca- dr grant; no rx; oncologist to follow   Assessment & Plan    -H/o CLL; patient is afebrile and asymptomatic     Aphagia-resolved as of 7/26/2019   Assessment & Plan    RESOLVED  -Due to TIA vs brain mets.  -Patient c/o one episode of difficulty in speaking and dysarthria which resolved within 1 hour; NIHSS is 0.    -CT head, cerebral perfusion study negative for ischemia or heamorrhage  -Echo with bubble study unremarkable, no shunt, EF 65%  -PLAN: neuro checks and NIH stroke scale q12h.  Aspirin     Claustrophobia   Assessment & Plan    -Patient does not tolerate MRI or CTs without IV ativan  -PLAN:  Future imaging to be done with 1mg IV ativan 30 min prior.       Anxiety   Assessment & Plan    -At home on ativan  -Observed excess somnolance after IV ativan for MRI  -PLAN: on home ativan 2mg nightly     Normocytic anemia   Assessment & Plan    -Likely due to anemia of chronic disease     Multiple thyroid nodules   Assessment & Plan    -S/P thyroidectomy  -on Levothyroxine  -PLAN:  Continue current home meds         Consultants:     Hematology/Oncology Dr. Delgado  Radiation Oncology Dr. eLs Bolden    Procedures:        Echocardiogram 7/23/19:  Left ventricular ejection fraction is visually estimated to  be 65%.  Normal diastolic function.  No evidence of right to left shunt by agitated saline challenge.  No significant valve abnormalities.     Imaging/ Testing:      CT Head 7/22:  1.  No evidence of acute intracranial process.    2.  Cerebral atrophy as well as periventricular chronic small vessel ischemic change.  CTA 7/22:  The posterior circulation shows the distal vertebral arteries to be patent. The vertebrobasilar confluence is intact. The basilar artery is patent. No aneurysm or occlusive lesion is evident.    Minimal atherosclerotic changes of the right intracranial internal carotid arteries without significant stenosis, otherwise the anterior circulation shows no stenotic or occlusive lesion. No aneurysm is evident about the [place].    The brain is unremarkable.    3D angiographic/MIP images of the vasculature confirm the vascular findings as described above.    MRI Brain 7/23:   1.  Images degraded by patient motion artifact.  2.  Mild cerebral atrophy.  3.  Mild supratentorial white matter disease most consistent with microvascular ischemic change.  4.  20 mm dural based extra-axial enhancing mass at the right anterior frontal convexity which was not present on relatively recent MRI study from 10/25/2018. This lesion would be suspicious for a dural based metastasis. Meningioma is considered less   likely as a meningioma would be unlikely to develop over such a short period of time. Minimal FLAIR hyperintensity in the underlying brain parenchyma which may represent vasogenic edema.    Discharge Medications:         Medication Reconciliation: Completed    No current outpatient prescriptions on file.     Disposition:   To [facility]  Diet:  Mechanical Soft diet  Activity:   As tolerated with PT    Instructions:      The patient was instructed to return to the ER in the event of worsening symptoms. I have counseled the patient on the importance of compliance and the patient has agreed to proceed  with all medical recommendations and follow up plan indicated above.   The patient understands that all medications come with benefits and risks. Risks may include permanent injury or death and these risks can be minimized with close reassessment and monitoring.         Primary Care Provider:    Siva Smart MD  Discharge summary faxed to primary care provider:  Deferred  Copy of discharge summary given to the patient: Completed    Follow up appointment details :      Future Appointments  Date Time Provider Department Center   7/31/2019 1:00 PM Siva Smart M.D. [address] [facility]     No follow-up provider specified.  Dr. Delgado, hematology/oncology to schedule followup in 2 weeks.    Pending Studies:        Lymph node biopsy results, HER2 and PDL1    Time spent on discharge day patient visit, preparing discharge paperwork and arranging for patient follow up.    Summary of follow up issues:   Lymph node biopsy for potential chemotherapy, will be followed up by Dr. Delgado.    Discharge Time (Minutes) :    80  Hospital Course Type: Observation Stay      Condition on Discharge  : Guarded  ______________________________________________________________________    Interval history/exam for day of discharge:    -No overnight events  -Pain controlled with scheduled morphine and PRN morhpine  -She continues to complain about feeling hot  -She has reduced sleep from cancer associated anxiety    Physical Exam   Constitutional: She is oriented to person, place, and time. She appears well-developed.   HENT:   Head: Normocephalic and atraumatic.   Neck: Normal range of motion. Neck supple.   Cardiovascular: Normal rate, regular rhythm, normal heart sounds and intact distal pulses.  Exam reveals no gallop and no friction rub.    No murmur heard.  Pulmonary/Chest: Effort normal and breath sounds normal. No respiratory distress. She has no wheezes.   Abdominal: Soft. Bowel sounds are normal. She exhibits no distension and no mass.  There is no tenderness. There is no guarding.   Musculoskeletal: She exhibits no edema or tenderness.   Neurological: She is alert and oriented to person, place, and time.   Skin: Skin is warm. No rash noted. She is diaphoretic. No erythema.   Psychiatric:   Anxious      Most Recent Labs:    Lab Results   Component Value Date/Time    WBC 15.7 (H) 07/26/2019 12:25 AM    WBC 32.8 (HH) 04/12/2013 11:17 AM    RBC 4.03 (L) 07/26/2019 12:25 AM    RBC 4.85 04/12/2013 11:17 AM    HEMOGLOBIN 10.8 (L) 07/26/2019 12:25 AM    HEMATOCRIT 35.4 (L) 07/26/2019 12:25 AM    MCV 87.8 07/26/2019 12:25 AM    MCV 91 04/12/2013 11:17 AM    MCH 26.8 (L) 07/26/2019 12:25 AM    MCH 29.5 04/12/2013 11:17 AM    MCHC 30.5 (L) 07/26/2019 12:25 AM    MPV 10.1 07/26/2019 12:25 AM    NEUTSPOLYS 58.40 07/26/2019 12:25 AM    LYMPHOCYTES 31.90 07/26/2019 12:25 AM    MONOCYTES 4.40 07/26/2019 12:25 AM    EOSINOPHILS 0.00 07/26/2019 12:25 AM    BASOPHILS 0.00 07/26/2019 12:25 AM    HYPOCHROMIA 2+ 03/22/2017 12:19 PM    ANISOCYTOSIS 1+ 07/25/2019 09:17 AM      Lab Results   Component Value Date/Time    SODIUM 135 07/26/2019 12:25 AM    POTASSIUM 4.4 07/26/2019 12:25 AM    CHLORIDE 98 07/26/2019 12:25 AM    CO2 22 07/26/2019 12:25 AM    GLUCOSE 177 (H) 07/26/2019 12:25 AM    BUN 24 (H) 07/26/2019 12:25 AM    CREATININE 0.64 07/26/2019 12:25 AM    CREATININE 0.76 04/12/2013 11:17 AM    BUNCREATRAT 19 11/17/2014 10:29 AM    BUNCREATRAT 25 04/12/2013 11:17 AM      Lab Results   Component Value Date/Time    ALTSGPT 29 07/25/2019 09:17 AM    ASTSGOT 42 07/25/2019 09:17 AM    ALKPHOSPHAT 655 (H) 07/25/2019 09:17 AM    TBILIRUBIN 0.3 07/25/2019 09:17 AM    DBILIRUBIN <0.1 07/19/2017 01:25 PM    LIPASE 21 08/16/2017 03:09 PM    ALBUMIN 3.3 07/25/2019 09:17 AM    GLOBULIN 3.2 07/25/2019 09:17 AM    INR 1.07 07/22/2019 06:48 PM    MACROCYTOSIS 1+ 05/31/2018 02:05 PM     Lab Results   Component Value Date/Time    PROTHROMBTM 14.2 07/22/2019 06:48 PM    INR 1.07  07/22/2019 06:48 PM

## 2019-07-26 NOTE — CARE PLAN
Problem: Safety  Goal: Will remain free from injury  Outcome: PROGRESSING AS EXPECTED  Call light within reach, room near nurses station, bed locked and in lowest position, hourly rounding.    Problem: Pain Management  Goal: Pain level will decrease to patient's comfort goal  Outcome: PROGRESSING SLOWER THAN EXPECTED  Patient educated on 0-10 pain rating scale, PRN pain medication provided to patient.

## 2019-07-26 NOTE — THERAPY
Physical Therapy Contact Note    PT treatment attempted. Patient refused despite education regarding importance of OOB activity; reports she has been mobilizing with RN staff. Will re attempt as appropriate and able.    Janet Guthrie, PT, DPT  [phone]

## 2019-07-26 NOTE — PROGRESS NOTES
Internal Medicine Interval Note  Note Author: Dale Mcpherson M.D.     Name Clarisse Reeves 1940   Age/Sex 78 y.o. female   MRN [id number]   Code Status DNAR/DNI     After 5PM or if no immediate response to page, please call for cross-coverage  Attending/Team: Dr. Houston/Radha See Patient List for primary contact information  Call [phone] to page    1st Call - Day Intern (R1):   Dr. Mcpherson 2nd Call - Day Sr. Resident (R2/R3):   Dr. Elias         Reason for interval visit  (Principal Problem)   Dysarthria  Metastatic breast cancer    ID: Ms. Reeves is a 79 y/o female with the PMHx of CLL, breast cancer and lung cancer.  She initially presented for transient dysphagia.  On brain MRI, new mass was found.  Just prior to this she had a PET scan concerning for bone mets.  She is now working with onc and pall.    Interval Problem Daily Status Update  (24 hours, problem oriented, brief subjective history, new lab/imaging data pertinent to that problem)   -Overnight.  No events  -Breast cancer with mets to bone/brain:  Danny pending bx result before proposing therapy, will follow up as outpatient.  Patient does not want hospice now, may be open to chemo depending on prognosis.  -Patient agrees driving privileges should be revoked, will fill out appropriate DMV paperwork.  -Cancer pain:  Complains of full body pain especially in the left shoulder  -Constipation, narc associated.  Had bowel movement overnight. On lactulose  -Mobility: PT/OT recommends acute care, [facility] coordination [facility]. Potential discharge tomorrow pending facility acceptance.  -ROS:  Feels hot, diaphoretic    Review of Systems   Constitutional: Positive for diaphoresis and malaise/fatigue. Negative for chills, fever and weight loss.   HENT: Negative.    Eyes: Negative.    Respiratory: Negative.    Cardiovascular: Negative.    Gastrointestinal: Negative.    Genitourinary: Negative.    Musculoskeletal:  Negative.    Skin: Negative.    Neurological: Negative for dizziness.       Disposition/Barriers to discharge:   None    Consultants/Specialty  -Heme-onc: Chemotherapy pending results of recent retroperitoneal lymph node biopsy cores.  Pending Her  -Radiation oncology: Patient and physician agree radiation unlikely to provide benefit.  -PT/OT: Recommend Acute Care  -Palliative care: Increased dosing on pain regimen.  PCP: Siva Smart M.D.      Quality Measures  Quality-Core Measures   Reviewed items::  EKG reviewed, Labs reviewed, Medications reviewed and Radiology images reviewed  Oglesby catheter::  No Oglesby  DVT prophylaxis pharmacological::  Enoxaparin (Lovenox)  Ulcer Prophylaxis::  Not indicated      Physical Exam       Vitals:    07/25/19 1721 07/25/19 2049 07/26/19 0440 07/26/19 0900   BP: 138/75 137/77 127/68 133/64   Pulse: 66 82 81 80   Resp: 18 18 20 18   Temp: 36.1 °C (97 °F) 36.3 °C (97.4 °F) 36.1 °C (97 °F) 36.8 °C (98.2 °F)   TempSrc: Temporal Temporal Temporal Temporal   SpO2: 93% 98% 99% 98%   Weight:       Height:         Body mass index is 29.11 kg/m².    Oxygen Therapy:  Pulse Oximetry: 98 %, O2 Delivery: None (Room Air)    Physical Exam   Constitutional: She is oriented to person, place, and time and well-developed, well-nourished, and in no distress.   HENT:   Head: Normocephalic and atraumatic.   Cardiovascular: Normal rate, regular rhythm and intact distal pulses.  Exam reveals no gallop and no friction rub.    No murmur heard.  Abdominal: Soft. Bowel sounds are normal. She exhibits no distension. There is no tenderness. There is no rebound and no guarding.   Musculoskeletal: She exhibits no edema or tenderness.   Neurological: She is alert and oriented to person, place, and time. She displays normal reflexes. No cranial nerve deficit. She exhibits normal muscle tone. Coordination normal.   Skin: Skin is warm. No rash noted. She is diaphoretic.   Psychiatric: Mood and affect normal.        Assessment/Plan     * Malignant neoplasm of upper-outer quadrant of left breast in female, estrogen receptor negative (CMS-HCC)- 2/2018; 1 positive node; chemorx (dr. grant) ), lumpectomy ( dr sharpe), RT 2 mo (dr beltran)- (present on admission)   Assessment & Plan    -Status post mastectomy of left breast.  -MRI brain 7/23/2019 showed 2 cm dural lesion suspicious for metastasis.   -Patient agreed to code status change to DNR/I.  -Palliative following:  Asisting with pain/symptom control  -Appears to become very sedated and confused with Ativan 2 mg.  Decreased to Ativan 1 mg nightly and discontinued PRN dose.  -PLAN: Pending HER results of recent retroperitoneal LN BX.  Then onc can determine chemotherapy. Currently on narcotics and benzos as managed by palliative for full body pain and bone pain.  Rad-onc does not think radiation will be of benefit. Will followup with Dr. Delgado in ~2weeks. Scheduling contacted and will make appointment in next 2 weeks and inform patient.       Brain mass   Assessment & Plan    See plan for Breast Neoplasm     Bone metastases (HCC)- (present on admission)   Assessment & Plan    -C/o myalgia and bone pain; elevated alkaline phosphatase  - PET/CT on 7/11/19 was positive for bony metastasis  -On decadron; percocet  -PLAN:  Continue home decadron.  Pain control per palliative as noted above.     Malignant neoplasm of right upper lobe of lung - adenocarcinoma in situ, 2/1/2016-  partial lobectomy RUL/RML- Dr Ganser- folowed by [facility]- (present on admission)   Assessment & Plan    -Prior history of lung cancer, was managed by oncologist Dr. Grant  -PLAN:  Cont to monitor     CLL (chronic lymphocytic leukemia)- wbc= 20k-30k, since 2006 until march 2018 when it returned normal at 5-10k following chemorx for breast ca- dr grant; no rx; oncologist to follow- (present on admission)   Assessment & Plan    -H/o CLL; patient is afebrile and asymptomatic  -elevated WBC, low Hb and  platelets   -PLAN:  Continue to monitor     Claustrophobia   Assessment & Plan    -Patient does not tolerate MRI or CTs without IV ativan  -PLAN:  Future imaging to be done with 1mg IV ativan 30 min prior.       Anxiety- (present on admission)   Assessment & Plan    -At home on ativan  -Observed excess somnolance after IV ativan for MRI  -PLAN: on home ativan 2mg nightly     Normocytic anemia- (present on admission)   Assessment & Plan    -Likely due to anemia of chronic disease  -PLAN: Cont to monitor     Multiple thyroid nodules- (present on admission)   Assessment & Plan    -S/P thyroidectomy  -on Levothyroxine  -PLAN:  Continue current home meds

## 2019-07-26 NOTE — PROGRESS NOTES
Received report & assumed care of patient at 1900. Assessment completed. Patient is A&Ox4, up x2. R PIV patent, running NS at 83 mL/hr. Patient reports pain of 9/10, medication provided per MAR. POC discussed & questions answered. Bed locked and in lowest position, bed alarm is on, call light within reach, non-skid socks in place, hourly rounding. Patient reports no further needs at this time.

## 2019-07-26 NOTE — DISCHARGE PLANNING
Anticipated Discharge Disposition: SNF    Action: Order for SNF received. Spoke with the patient at the bedside about SNF choices. The [facility] as her #1,#2 and #3 choice respectively.  The patient signed a choice form.  Choice form faxed to Nancy LEWIS at [phone].    Barriers to Discharge: SNF acceptance    Plan: SNF

## 2019-07-27 VITALS
BODY MASS INDEX: 28.98 KG/M2 | HEIGHT: 66 IN | HEART RATE: 83 BPM | RESPIRATION RATE: 18 BRPM | OXYGEN SATURATION: 97 % | SYSTOLIC BLOOD PRESSURE: 139 MMHG | DIASTOLIC BLOOD PRESSURE: 73 MMHG | WEIGHT: 180.34 LBS | TEMPERATURE: 97 F

## 2019-07-27 PROCEDURE — A9270 NON-COVERED ITEM OR SERVICE: HCPCS | Performed by: INTERNAL MEDICINE

## 2019-07-27 PROCEDURE — 99224 PR SUBSEQUENT OBSERVATION CARE,LEVEL I: CPT | Performed by: INTERNAL MEDICINE

## 2019-07-27 PROCEDURE — 99217 PR OBSERVATION CARE DISCHARGE: CPT | Mod: GC | Performed by: HOSPITALIST

## 2019-07-27 PROCEDURE — A9270 NON-COVERED ITEM OR SERVICE: HCPCS | Performed by: NURSE PRACTITIONER

## 2019-07-27 PROCEDURE — A9270 NON-COVERED ITEM OR SERVICE: HCPCS | Performed by: STUDENT IN AN ORGANIZED HEALTH CARE EDUCATION/TRAINING PROGRAM

## 2019-07-27 PROCEDURE — 700111 HCHG RX REV CODE 636 W/ 250 OVERRIDE (IP): Performed by: STUDENT IN AN ORGANIZED HEALTH CARE EDUCATION/TRAINING PROGRAM

## 2019-07-27 PROCEDURE — 700102 HCHG RX REV CODE 250 W/ 637 OVERRIDE(OP): Performed by: INTERNAL MEDICINE

## 2019-07-27 PROCEDURE — 700102 HCHG RX REV CODE 250 W/ 637 OVERRIDE(OP): Performed by: STUDENT IN AN ORGANIZED HEALTH CARE EDUCATION/TRAINING PROGRAM

## 2019-07-27 PROCEDURE — 96372 THER/PROPH/DIAG INJ SC/IM: CPT

## 2019-07-27 PROCEDURE — G0378 HOSPITAL OBSERVATION PER HR: HCPCS

## 2019-07-27 PROCEDURE — 700102 HCHG RX REV CODE 250 W/ 637 OVERRIDE(OP): Performed by: NURSE PRACTITIONER

## 2019-07-27 RX ORDER — LIDOCAINE 50 MG/G
1 PATCH TOPICAL EVERY 24 HOURS
Qty: 10 PATCH | Refills: 1 | Status: SHIPPED | OUTPATIENT
Start: 2019-07-27

## 2019-07-27 RX ORDER — MORPHINE SULFATE 30 MG/1
30 TABLET, FILM COATED, EXTENDED RELEASE ORAL EVERY 8 HOURS
Qty: 15 TAB | Refills: 0 | Status: SHIPPED | OUTPATIENT
Start: 2019-07-27 | End: 2019-08-01

## 2019-07-27 RX ORDER — ONDANSETRON 4 MG/1
4 TABLET, ORALLY DISINTEGRATING ORAL EVERY 4 HOURS PRN
Qty: 10 TAB | Refills: 0 | Status: SHIPPED | OUTPATIENT
Start: 2019-07-27

## 2019-07-27 RX ORDER — FAMOTIDINE 40 MG/1
40 TABLET, FILM COATED ORAL DAILY
Qty: 60 TAB | Refills: 1 | Status: SHIPPED | OUTPATIENT
Start: 2019-07-28

## 2019-07-27 RX ORDER — MORPHINE SULFATE 15 MG/1
15 TABLET ORAL
Qty: 30 TAB | Refills: 0 | Status: SHIPPED | OUTPATIENT
Start: 2019-07-27 | End: 2019-08-01

## 2019-07-27 RX ORDER — GABAPENTIN 300 MG/1
300 CAPSULE ORAL
Qty: 90 CAP | Refills: 1 | Status: SHIPPED | OUTPATIENT
Start: 2019-07-27

## 2019-07-27 RX ADMIN — ASPIRIN 325 MG: 325 TABLET, FILM COATED ORAL at 06:26

## 2019-07-27 RX ADMIN — MORPHINE SULFATE 15 MG: 15 TABLET ORAL at 06:26

## 2019-07-27 RX ADMIN — FAMOTIDINE 40 MG: 20 TABLET ORAL at 06:25

## 2019-07-27 RX ADMIN — MORPHINE SULFATE 15 MG: 15 TABLET ORAL at 10:44

## 2019-07-27 RX ADMIN — DEXAMETHASONE 4 MG: 4 TABLET ORAL at 06:25

## 2019-07-27 RX ADMIN — POLYETHYLENE GLYCOL 3350 1 PACKET: 17 POWDER, FOR SOLUTION ORAL at 06:26

## 2019-07-27 RX ADMIN — MORPHINE SULFATE 30 MG: 30 TABLET, FILM COATED, EXTENDED RELEASE ORAL at 14:02

## 2019-07-27 RX ADMIN — MORPHINE SULFATE 30 MG: 30 TABLET, FILM COATED, EXTENDED RELEASE ORAL at 06:26

## 2019-07-27 RX ADMIN — ENOXAPARIN SODIUM 40 MG: 100 INJECTION SUBCUTANEOUS at 06:26

## 2019-07-27 RX ADMIN — LEVOTHYROXINE SODIUM 112 MCG: 112 TABLET ORAL at 06:26

## 2019-07-27 RX ADMIN — SENNOSIDES, DOCUSATE SODIUM 2 TABLET: 50; 8.6 TABLET, FILM COATED ORAL at 06:26

## 2019-07-27 ASSESSMENT — PATIENT HEALTH QUESTIONNAIRE - PHQ9
2. FEELING DOWN, DEPRESSED, IRRITABLE, OR HOPELESS: NOT AT ALL
1. LITTLE INTEREST OR PLEASURE IN DOING THINGS: NOT AT ALL
SUM OF ALL RESPONSES TO PHQ9 QUESTIONS 1 AND 2: 0

## 2019-07-27 ASSESSMENT — LIFESTYLE VARIABLES: DO YOU DRINK ALCOHOL: NO

## 2019-07-27 NOTE — PROGRESS NOTES
Palliative Progress Note               Author: Desirae Bolden Date & Time created: 2019  11:11 AM     Reason for subsequent visit: Pain    Interval History:  Patient refused a dose of long acting morphine last night but doesn't know why she did that. Her shoulder is very painful at this point. She was talking about EOL issues and became tearful when talking about her dog. She does not feel like it would be good for either of them to see each other. She is hopeful to go to [facility] and understands that she would not tolerate chemotherapy at this point. She asked me why she feels so weak and was able to accept that it is because her cancer is so advanced. She would like to take a shower.    Review of Systems:  Negative for delirium (mild confusion is intermittant). Positive for pain. Positive for anorexia. Negative for nausea and vomiting. Positive for constipation.      Physical Exam:    Recent vital signs  Temp (24hrs), Av.6 °C (97.8 °F), Min:36.1 °C (97 °F), Max:36.9 °C (98.4 °F)  Temperature: 36.1 °C (97 °F)  Pulse  Av.3  Min: 66  Max: 114   Blood Pressure : 139/73       Physical Exam   Constitutional: She appears well-developed.   Gown is wet and hair wet and unkept. Tied up in sheets some.   Cardiovascular: Normal rate and regular rhythm.    Pulmonary/Chest: Effort normal and breath sounds normal.   Abdominal: Soft. Bowel sounds are normal.   Skin:   Very diaphoretic.     Recent Labs      19   0917  19   0025   SODIUM  133*  135   POTASSIUM  4.5  4.4   CHLORIDE  97  98   CO2  20  22   GLUCOSE  229*  177*   BUN  21  24*   CREATININE  0.53  0.64   CALCIUM  8.4*  8.7     Recent Labs      19   0917  19   0025   WBC  14.1*  15.7*   RBC  3.63*  4.03*   HEMOGLOBIN  9.8*  10.8*   HEMATOCRIT  31.2*  35.4*   MCV  86.0  87.8   MCH  27.0  26.8*   MCHC  31.4*  30.5*   RDW  51.6*  52.9*   PLATELETCT  82*  87*   MPV  10.3  10.1     <PALLIATIVEREVIEW>  Problem List:  1.  Metastatic bone  "pain (active)  2.  Metastatic breast cancer to bone (active)  3.  Brain mass (active)  4.  Adenocarcinoma of right lung in situ (stable)  5.  Chronic lymphocytic leukemia (stable)  6.  Nocturnal anxiety with associated insomnia (active)  7.  Dysarthria and altered mental status (resolved)  8.  Heartburn (active)  9.  Intermittent nausea (active)  10.  Intermittent anorexia (resolved)  12.  Constipation (active)  13.  Multiple thyroid nodules status post thyroidectomy (stable)       Assessment/Plan:  MEDD (morphine equivalent daily dose) is approximately 90 mg:  -MS Contin 30 mg x 2 doses =60 mg MEDD  -MS IR 15 mg x 2 doses =30 mg MEDD  -Morphine 3 mg x 0 doses =0 mg MEDD    7/26/19 - 144 mg MEDD   7/25/19 - 57 mg MEDD  7/24/19 ~ 15 mg MEDD    I encouraged her not to refuse her long acting morphine     CONTINUOUS OPIOID THERAPY:  Continue MS Contin to 30 mg p.o. Q 8 h     Patient has improved overall pain control but is using more BTP medication.   SHORT ACTING OPIOID THERAPY:  Continue MS IR 15 mg po q 3 h prn.  Continue morphine 3 mg IV every 3 hours as needed severe breakthrough pain if unrelieved by oral opioid     ADJUNCT THERAPY:  Continue gabapentin to 300 mg p.o. at bedtime  Continue dexamethasone 4 mg p.o. twice daily  Continue lidocaine patch as patient felt that it helped \"a little\"     Metastatic breast cancer to bone  Patient had lymph node biopsy 7/19/2019; PDL 1 testing  She met with Dr. Saldana and determined no further radiation therapy  Patient is a poor candidate for chemotherapy DT ECOG 3-4. She did ask me what I thought her prognosis was and I told her she possible had weeks to months. She was pleased with this.    Plan for SNF discharge. She is very clear that she does not want to go to [facility].     Code Status:DNR/DNI    20 minutes spent with greater than 50% spent counseling and coordinating the patient's care regarding symptom control. EOL issues.   Please refer to HPI and assessment/plan " for details.

## 2019-07-27 NOTE — DISCHARGE INSTRUCTIONS
Discharge Instructions    Discharged to other by medical transportation with escort. Discharged via wheelchair, hospital escort: Yes.  Special equipment needed: Not Applicable    Be sure to schedule a follow-up appointment with your primary care doctor or any specialists as instructed.     Discharge Plan:   Diet Plan: Discussed  Activity Level: Discussed  Confirmed Follow up Appointment: Appointment Scheduled  Confirmed Symptoms Management: Discussed  Medication Reconciliation Updated: Yes  Influenza Vaccine Indication: Patient Refuses    I understand that a diet low in cholesterol, fat, and sodium is recommended for good health. Unless I have been given specific instructions below for another diet, I accept this instruction as my diet prescription.   Other diet: Heart healthy      Special Instructions: None    · Is patient discharged on Warfarin / Coumadin?   No     Depression / Suicide Risk    As you are discharged from this [facility] facility, it is important to learn how to keep safe from harming yourself.    Recognize the warning signs:  · Abrupt changes in personality, positive or negative- including increase in energy   · Giving away possessions  · Change in eating patterns- significant weight changes-  positive or negative  · Change in sleeping patterns- unable to sleep or sleeping all the time   · Unwillingness or inability to communicate  · Depression  · Unusual sadness, discouragement and loneliness  · Talk of wanting to die  · Neglect of personal appearance   · Rebelliousness- reckless behavior  · Withdrawal from people/activities they love  · Confusion- inability to concentrate     If you or a loved one observes any of these behaviors or has concerns about self-harm, here's what you can do:  · Talk about it- your feelings and reasons for harming yourself  · Remove any means that you might use to hurt yourself (examples: pills, rope, extension cords, firearm)  · Get professional help from the  Internal Medicine community (Mental Health, Substance Abuse, psychological counseling)  · Do not be alone:Call your Safe Contact- someone whom you trust who will be there for you.  · Call your local CRISIS HOTLINE 423-7417 or 450-801-9784  · Call your local Children's Mobile Crisis Response Team [place] (272) 732-5162 or www.[facility]  · Call the toll free National Suicide Prevention Hotlines   · National Suicide Prevention Lifeline 776-697-INUJ (0533)  · Wide [employer] Fund Line Network 800-SUICIDE (194-0631)    Please Follow-up with appointments below.      Bone Metastasis  Introduction  Bone metastasis is cancer that spreads to the bones from another part of the body. A person may have bone metastasis in one bone or in more than one bone. Cancer that spreads to the bones is different from cancer that starts in the bones (primary bone cancer). Bone metastasis is more common than primary bone cancer.  The spine is the most common area for bone metastasis. Other common areas include:  · Hip bone (pelvis).  · Ribs.  · Skull.  · Long bones of the arm or leg.  Bone metastasis is painful, and it damages the bones. Bone metastasis damages and weakens bones in two ways. A person may have bone destruction (osteolytic damage) or abnormal bone growth (osteoblastic destruction). Both of these conditions can make bones so weak that they break (pathologic fracture) even from a minor injury.  What are the causes?  This condition is caused by cancer cells that spread to bone. These cells can get into your bloodstream and spread through your body. They can also get into the vessels that are part of your lymphatic system (lymph vessels) and spread that way.  What increases the risk?  This condition is more likely to develop in people who have an advanced type of cancer that is known to spread to bone. Cancers that often spread to bone include:  · Breast cancer.  · Prostate cancer.  · Lung cancer.  · Thyroid cancer.  · Kidney cancer.  What are  the signs or symptoms?  The most common symptom of this condition is bone pain, especially while you are resting. Other symptoms include:  · A broken bone (fracture) that happens with little or no trauma.  · Low number of red blood cells (anemia). Bone destruction may damage the spongy tissue (bone marrow) in the center of some bones where red blood cells are produced. Anemia can cause:  ¨ Weakness.  ¨ Shortness of breath.  ¨ Headache.  ¨ Dizziness.  · Back or neck pain with numbness or weakness, especially if you have bone metastasis in your spine.  · High levels of calcium in your blood (hypercalcemia). When bone is destroyed, calcium is released into your blood. Symptoms of hypercalcemia include:  ¨ Constipation.  ¨ Thirst.  ¨ Nausea.  ¨ Sleepiness.  How is this diagnosed?  This condition may be diagnosed based on:  · Your symptoms and medical history. Your health care provider may suspect this condition if you are being treated for cancer or have had cancer treatment in the past.  · A physical exam.  · Imaging studies, such as:  ¨ Bone X-rays, especially in the area where you have pain.  ¨ CT scan.  ¨ Bone scan.  ¨ MRI.  · Blood tests to check for anemia or hypercalcemia.  · A procedure to remove a piece of bone so it can be examined under a microscope (biopsy).  How is this treated?  Treatment for this condition depends on your overall health, the type of cancer you have, and how much the cancer has spread. You will work with a team of health care providers to determine which treatment is best for you. Treatment will focus on managing pain, preventing bone weakness, and slowing the spread of the cancer. Treatment may include:  · Radiation therapy. This treatment uses X-rays to kill cancer cells. It is most effective for reducing pain, stopping tumor growth, and lowering the risk of fractures.  · Radioisotope therapy. This treatment uses a radioactive medicine that is injected into your blood. The medicine  travels to areas where cancer cells are active and kills them.  · Chemotherapy. For this treatment, you are given cancer-killing drugs. You may have chemotherapy in cycles, with rest periods in between.  · Medicines to block cells that destroy bone (bisphosphonates and denosumab). These medicines are used to control bone pain. They may help to reduce hypercalcemia.  · Medicines to reduce pain (opiates).  · Endocrine therapies. These therapies slow cancer growth by blocking specific chemical messengers (hormones). Some types of cancer, including breast and prostate cancers, depend on hormones.  · Targeted therapies. These therapies involve the use of drugs that block the growth and spread of cancer. This treatment is different from standard chemotherapy.  · Immunotherapies. These therapies use the body's defense system (immune system) to fight cancer cells.  · Surgery. You may have surgery to remove bone cancer or to prevent or repair a fracture.  Follow these instructions at home:  · Take medicines only as directed by your health care provider.  · Do not drive or operate heavy machinery while taking pain medicine.  · Take the following steps to help prevent constipation, which can result from some pain medicines.  ¨ Include plenty of fruits and whole grains in your diet.  ¨ Drink enough fluid to keep your urine clear or pale yellow.  ¨ Ask your health care provider about taking a laxative if needed.  · Keep all follow-up visits as directed by your health care provider. This is important.  Contact a health care provider if:  · Your pain medicine is not helping.  · You are too weak to care for yourself at home.  Get help right away if:  · You fall and have pain or an injury.  · You have trouble walking.  · You have numbness or tingling in your legs.  · Your pain suddenly gets worse.  · You lose control of your bowels or your bladder.  · You are very sleepy or confused.  This information is not intended to replace  advice given to you by your health care provider. Make sure you discuss any questions you have with your health care provider.  Document Released: 12/08/2003 Document Revised: 05/25/2017 Document Reviewed: 10/21/2015  © 2017 Elsevier

## 2019-07-27 NOTE — PROGRESS NOTES
Received report & assumed care of patient at 1900. Assessment completed. Patient is A&Ox4 with some intermittent confusion, up x2 with FWW. No IV, MD aware. Patient denies any pain, n/v at this time. POC discussed & questions answered. Bed locked and in lowest position, bed alarm is on, call light within reach, non-skid socks in place, hourly rounding. Patient reports no further needs at this time.

## 2019-07-27 NOTE — DISCHARGE PLANNING
Agency/Facility Name: [facility]  Spoke To: Abelardo  Outcome: Bed became available for patient. Facility is able to  patient via wheelchair van @ 1400.     JEN Oneill notified via [place].

## 2019-07-27 NOTE — DISCHARGE PLANNING
Dr. Mcpherson notified via tiger text of Care Coordination still waiting to hear back from [facility] about today's bed availability.

## 2019-07-27 NOTE — DISCHARGE PLANNING
Dr. Mcpherson and charge nurse notified of no bed availability today at [facility] for pt. Care Coordination to check with facility again tomorrow.

## 2019-07-27 NOTE — DISCHARGE PLANNING
Agency/Facility Name: [facility]  Spoke To: Abelardo  Outcome: No beds available today.     JEN Oneill notified.

## 2019-07-27 NOTE — PROGRESS NOTES
Oncology/Hematology Progress Note               Author: Michelle LOLA Delgado Date & Time created: 7/26/2019  6:02 PM     Interval History:  No acute events overnight. Pt reports feeling fatigued. Currently having near 10/10 pain in her bones - diffusely. Reports confusion is still intermittent.    Review of Systems:  Review of Systems   All other systems reviewed and are negative.      Physical Exam:  Physical Exam   Constitutional: She is oriented to person, place, and time. She appears well-developed. No distress.   HENT:   Head: Normocephalic and atraumatic.   Right Ear: External ear normal.   Left Ear: External ear normal.   Nose: Nose normal.   Mouth/Throat: Oropharynx is clear and moist. No oropharyngeal exudate.   Eyes: Pupils are equal, round, and reactive to light. No scleral icterus.   Neck: Neck supple.   Cardiovascular: Normal rate.    Pulmonary/Chest: Effort normal. No respiratory distress.   Musculoskeletal: She exhibits no edema.   Neurological: She is alert and oriented to person, place, and time.   Skin: Skin is warm and dry. No rash noted.   Psychiatric: She has a normal mood and affect.       Labs:          Recent Labs      07/24/19 0403  07/25/19 0917  07/26/19   0025   SODIUM  133*  133*  135   POTASSIUM  4.3  4.5  4.4   CHLORIDE  98  97  98   CO2  22  20  22   BUN  16  21  24*   CREATININE  0.54  0.53  0.64   MAGNESIUM  2.0   --    --    CALCIUM  8.2*  8.4*  8.7     Recent Labs      07/24/19 0403  07/25/19 0917  07/26/19   0025   ALTSGPT  27  29   --    ASTSGOT  36  42   --    ALKPHOSPHAT  702*  655*   --    TBILIRUBIN  0.4  0.3   --    GAMMAGT  145*   --    --    GLUCOSE  143*  229*  177*     Recent Labs      07/24/19   0403  07/25/19   0917  07/26/19   0025   RBC  3.37*  3.63*  4.03*   HEMOGLOBIN  9.3*  9.8*  10.8*   HEMATOCRIT  29.1*  31.2*  35.4*   PLATELETCT  70*  82*  87*     Recent Labs      07/24/19   0403  07/25/19 0917  07/26/19   0025   WBC  10.5  14.1*  15.7*   NEUTSPOLYS   41.70*  60.50  58.40   LYMPHOCYTES  48.70*  30.30  31.90   MONOCYTES  5.20  5.50  4.40   EOSINOPHILS  0.00  0.90  0.00   BASOPHILS  0.00  0.00  0.00   ASTSGOT  36  42   --    ALTSGPT  27  29   --    ALKPHOSPHAT  702*  655*   --    TBILIRUBIN  0.4  0.3   --      Recent Labs      19   0403  19   0917  19   0025   SODIUM  133*  133*  135   POTASSIUM  4.3  4.5  4.4   CHLORIDE  98  97  98   CO2  22  20  22   GLUCOSE  143*  229*  177*   BUN  16  21  24*   CREATININE  0.54  0.53  0.64   CALCIUM  8.2*  8.4*  8.7     Hemodynamics:  Temp (24hrs), Av.5 °C (97.7 °F), Min:36.1 °C (97 °F), Max:36.8 °C (98.2 °F)  Temperature: 36.7 °C (98.1 °F)  Pulse  Av.7  Min: 66  Max: 114   Blood Pressure : 141/79     Respiratory:    Respiration: 18, Pulse Oximetry: 97 %     Work Of Breathing / Effort: Mild  RUL Breath Sounds: Clear, RML Breath Sounds: Diminished, RLL Breath Sounds: Diminished, JERED Breath Sounds: Clear, LLL Breath Sounds: Diminished  Fluids:  No intake or output data in the 24 hours ending 19     GI/Nutrition:  Orders Placed This Encounter   Procedures   • Diet Order Regular     Standing Status:   Standing     Number of Occurrences:   1     Order Specific Question:   Diet:     Answer:   Regular [1]     Order Specific Question:   Texture/Fiber modifications:     Answer:   Dysphagia 3(Mechanical Soft)specify fluid consistency(question 6) [3]     Order Specific Question:   Consistency/Fluid modifications:     Answer:   Thin Liquids [3]     Order Specific Question:   Miscellaneous modifications:     Answer:   SLP - 1:1 Supervision by Nursing [21]     Medical Decision Making, by Problem:  Active Hospital Problems    Diagnosis   • *Malignant neoplasm of upper-outer quadrant of left breast in female, estrogen receptor negative (CMS-HCC)- 2018; 1 positive node; chemorx (dr. grant) ), lumpectomy ( dr sharpe), RT 2 mo (dr beltran) [C50.412, Z17.1]   • Brain mass [G93.9]   • Bone metastases (HCC)  [C79.51]   • Malignant neoplasm of right upper lobe of lung - adenocarcinoma in situ, 2/1/2016-  partial lobectomy RUL/RML- Dr Ganser- folowed by [facility] [C34.11]   • CLL (chronic lymphocytic leukemia)- wbc= 20k-30k, since 2006 until march 2018 when it returned normal at 5-10k following chemorx for breast ca- dr grant; no rx; oncologist to follow [C91.90]   • Claustrophobia [F40.240]   • Anxiety [F41.9]   • Normocytic anemia [D64.9]   • Multiple thyroid nodules [E04.2]       Assessment and Plan:  78-year-old woman with multiple medical comorbid conditions and a history of early stage triple negative breast cancer is being seen a diagnosis for recurrent now metastatic breast cancer.  On a recent PET/CT scan she was noted to have diffuse osseous disease as well as several areas of lymphadenopathy. Recent MRI of the brain shows a 20 mm dural based extra-axial enhancing mass in the right anterior frontal convexity. Recent biopsy of a retroperitoneal lymph node on July 19, 2019 shows metastatic poorly differentiated carcinoma consistent with breast primary.  ER-CT were performed and are negative.  HER-2 studies are still pending.  However given her past history of triple negative breast cancer, her HER-2 studies are most likely going to be negative.   -- I again reviewed the diagnosis with the patient today.  -- She understands it is Stage IV  -- Explained to her that her functional status/performanance status needs to improve before considering systemic therapy, her ECOG PS is currently 3-4.  -- Again discussed a palliative approach to her care  -- appreciate palliative recs for pain management    -- Will arrange follow up in clinic once pt is closer to discharge, please inform me when she is going to be discharged and will arrange outpatient follow up     Patient is has a high medical complexity and is at high risk for complication, morbidity, and mortality.     Thank you for this consultation, oncology will sign off. If  you have any questions or concerns, please contact me.     Michelle Delgado MD  [facility]  [phone]     Quality-Core Measures

## 2019-07-27 NOTE — PROGRESS NOTES
Patient discharged.  Patient left unit via wheelchair with [facility] van. Personal belongings with patient when leaving unit. Patient given prescriptions and instructions of when to visit with physician. Verbalizes understanding. Copy of discharge instructions with patient and in the chart. [facility] called and report given to JEN Lorenzo at [phone].

## 2019-07-27 NOTE — THERAPY
"Speech Language Therapy Clinical Swallow Evaluation completed.  Functional Status: The patient was seen for clinical swallow evaluation this date. The patient was awake, alert and oriented to self, location and reason. The patient reported difficulty with dry foods and breads. The patient was given PO trials of thin liquids, mixed consistency solids and soft solids. The patient consumed PO trials with no overt s/s of aspiration or difficulty aside from x2 drifting off to sleep mid-sentence. Education provided to patient regarding swallow strategies, diet modifications and SLP recommendations. At this time, recommend patient remain on D3/thin liquid meal items with addition of supervision given patient's tendency to fall asleep easily.     Recommendations - Diet:  Dysphagia III, Thin Liquid                          Strategies: Direct supervision during meals and Head of Bed at 90 Degrees                          Medication Administration: Whole with Liquid Wash    Plan of Care: Will benefit from Speech Therapy 3 times per week    Post-Acute Therapy: Recommend outpatient or home health transitional care services for continued speech therapy services      See \"Rehab Therapy-Acute\" Patient Summary Report for complete documentation.     "

## 2019-07-27 NOTE — CARE PLAN
Problem: Safety  Goal: Will remain free from injury  Outcome: PROGRESSING AS EXPECTED  Call light within reach, room near nurses station, bed locked and in lowest position, bed alarm on, hourly rounding.    Problem: Pain Management  Goal: Pain level will decrease to patient's comfort goal  Outcome: PROGRESSING AS EXPECTED  Scheduled and PRN pain medication provided to patient.

## 2019-07-27 NOTE — DISCHARGE PLANNING
Agency/Facility Name: [facility]   Outcome: Left voice message for admissions in regards to bed availability. Requested a call back.

## 2019-07-27 NOTE — CARE PLAN
Problem: Safety  Goal: Will remain free from falls  Outcome: PROGRESSING AS EXPECTED  Patient has remained free from falls during this shift when transferred. All fall precautions in place.     Problem: Pain Management  Goal: Pain level will decrease to patient's comfort goal  Outcome: PROGRESSING AS EXPECTED  Patient has expressed that pain medication is taking care of pain when ordered PRN.

## 2019-07-30 ENCOUNTER — TELEPHONE (OUTPATIENT)
Dept: MEDICAL GROUP | Age: 79
End: 2019-07-30

## 2019-07-30 NOTE — TELEPHONE ENCOUNTER
ESTABLISHED PATIENT PRE-VISIT PLANNING     Patient was NOT contacted to complete PVP.     Note: Patient will not be contacted if there is no indication to call.     1.  Reviewed notes from the last few office visits within the medical group: Yes    2.  If any orders were placed at last visit or intended to be done for this visit (i.e. 6 mos follow-up), do we have Results/Consult Notes?        •  Labs - Labs were not ordered at last office visit.   Note: If patient appointment is for lab review and patient did not complete labs, check with provider if OK to reschedule patient until labs completed.       •  Imaging - Imaging ordered, completed and results are in chart.       •  Referrals - Referral ordered, patient has NOT been seen.    3. Is this appointment scheduled as a Hospital Follow-Up? Yes, visit was at [facility].     4.  Immunizations were updated in Epic using WebIZ?: Epic matches WebIZ       •  Web Iz Recommendations: SHINGRIX (Shingles)    5.  Patient is due for the following Health Maintenance Topics:   Health Maintenance Due   Topic Date Due   • IMM ZOSTER VACCINES (1 of 2) 07/30/1991   • COLONOSCOPY  06/07/2019           6. Orders for overdue Health Maintenance topics pended in Pre-Charting? N\A    7.  AHA (MDX) form printed for Provider? No, already completed    8.  Patient was NOT informed to arrive 15 min prior to their scheduled appointment and bring in their medication bottles.

## 2019-08-08 NOTE — PLAN OF CARE (IOPOC)
Confidential Physician Report regarding patient's condition(s) faxed to Ebonie OLIVEIRA [phone]. Patient signed form and verbalized understanding with requirement of reporting.

## 2020-02-12 NOTE — PROGRESS NOTES
Pt is here for her scheduled Neulasta. Education provided. Pt states she is going to call her MD Monday to prescribe her some pain medication for breakthrough pain she has at times. She also needs strips for her glucometer - Care Chest information given. Pt will also call her nurse navigator on Monday to find out if she can help. Discharged home to self care. Next appointment verified.    
Attending Attestation (For Attendings USE Only)...

## 2020-03-26 ENCOUNTER — TELEPHONE (OUTPATIENT)
Dept: PHYSICAL THERAPY | Facility: REHABILITATION | Age: 80
End: 2020-03-26

## 2020-03-26 NOTE — OP THERAPY DISCHARGE SUMMARY
Outpatient Physical Therapy  DISCHARGE SUMMARY NOTE      [facility] [address]  [address]  [place] [address]  Phone:  [phone]  Fax:  [phone]    Date of Visit: 03/26/2020    Patient: Clarisse Reeves  YOB: 1940  MRN: [id number]     Referring Provider: No referring provider defined for this encounter.   Referring Diagnosis No admission diagnoses are documented for this encounter.     Physical Therapy Occurrence Codes    Date of onset of impairment:  7/6/18   Date physical therapy care plan established or reviewed:  9/12/18   Date physical therapy treatment started:  9/12/18          Functional Assessment Used        Your patient is being discharged from Physical Therapy with the following comments:   · Patient was seen for skilled physical therapy to address lymphedema secondary to breast cancer between 9/12-10/19/2018. Patient did not return for follow up treatment and, sadly, patient has since passed. Providing PT is no longer with this organization and DC paperwork completed on their behalf.     Lynn Amanda, PT, DPT    Date: 3/26/2020

## 2022-10-10 NOTE — PROGRESS NOTES
12h chart check - check w/ MD re: PICC placement in medial subclavian - further advancement?  Pt sweating at night - temp not measured greater than 100   NEEDS VF UPDATED TO DETERMINE IF THERE IS ENOUGH EVIDENCE OF GLAUCOMA +/- MIGS AND IF PT IS A CANDIDATE FOR ADVANCED.

## 2023-07-06 NOTE — DISCHARGE PLANNING
ATTN: Case Management  RE: Referral for Home Health                We would like to take this opportunity to thank you for submitting a referral for your patient to continue care with [facility]. Our skilled team is dedicated to helping all patients recover and gain independence in the home setting.            As of 3/18/2018, we have accepted the above patient into our service. A [facility] clinician will be out to see the patient within 48 hours to conduct our initial visit. If you have any questions or concerns regarding the patient’s transition to Home Health, please do not hesitate to contact us. We are open for referrals 7 days a week from 8AM to 5PM at [phone].      We look forward to collaborating with you,  [facility] Team   No

## 2024-09-23 NOTE — PROGRESS NOTES
Patient: Kasia Hernandez    Procedure: Procedure(s):  ROBOTIC-ASSISTED TOTAL LAPAROSCOPIC HYSTERECTOMY, BILATERAL SALPINGO-OOPHORECTOMY, PELVIC WASHINGS, INTRAOPERATIVE SENTINEL LYMPH NODE MAPPING  BILATERAL SENTINEL PELVIC AND RIGHT PARA-AORTIC SENTINEL LYMPH NODE DISSECTIONS  OMENTAL BIOPSY, RIGHT PELVIC PERITONEAL BIOPSY       Diagnosis: Endometrial cancer [C54.1]  Diagnosis Additional Information: No value filed.    Anesthesia Type:   General     Note:    Oropharynx: oropharynx clear of all foreign objects and spontaneously breathing  Level of Consciousness: drowsy  Oxygen Supplementation: face mask  Level of Supplemental Oxygen (L/min / FiO2): 8  Independent Airway: airway patency satisfactory and stable  Dentition: dentition unchanged  Vital Signs Stable: post-procedure vital signs reviewed and stable  Report to RN Given: handoff report given  Patient transferred to: PACU    Handoff Report: Identifed the Patient, Identified the Reponsible Provider, Reviewed the pertinent medical history, Discussed the surgical course, Reviewed Intra-OP anesthesia mangement and issues during anesthesia, Set expectations for post-procedure period and Allowed opportunity for questions and acknowledgement of understanding      Vitals:  Vitals Value Taken Time   /82 09/23/24 1534   Temp     Pulse 64 09/23/24 1536   Resp 14 09/23/24 1536   SpO2     Vitals shown include unfiled device data.    Electronically Signed By: MELANIE Gonsalez CRNA  September 23, 2024  3:37 PM   Chemotherapy Verification - PRIMARY RN      Height = 165.1cm  Weight = 89.4kg  BSA = 2.02m2       Medication: Doxorubicin  Dose: 48mg/m2  Calculated Dose: 96.96mg                             (In mg/m2, AUC, mg/kg)     Medication: Cyclophosphamide  Dose: 480mg/m2  Calculated Dose: 969.6mg                             (In mg/m2, AUC, mg/kg)      I confirm this process was performed independently with the BSA and all final chemotherapy dosing calculations congruent.  Any discrepancies of 5% or greater have been addressed with the chemotherapy pharmacist. The resolution of the discrepancy has been documented in the EPIC progress notes.
